# Patient Record
Sex: MALE | Race: WHITE | NOT HISPANIC OR LATINO | URBAN - METROPOLITAN AREA
[De-identification: names, ages, dates, MRNs, and addresses within clinical notes are randomized per-mention and may not be internally consistent; named-entity substitution may affect disease eponyms.]

---

## 2016-11-26 RX ORDER — SERTRALINE 25 MG/1
1 TABLET, FILM COATED ORAL
Qty: 0 | Refills: 0 | DISCHARGE
Start: 2016-11-26

## 2018-04-20 ENCOUNTER — EMERGENCY (EMERGENCY)
Facility: HOSPITAL | Age: 63
LOS: 1 days | Discharge: HOME | End: 2018-04-20
Attending: EMERGENCY MEDICINE | Admitting: EMERGENCY MEDICINE
Payer: COMMERCIAL

## 2018-04-20 VITALS
SYSTOLIC BLOOD PRESSURE: 141 MMHG | DIASTOLIC BLOOD PRESSURE: 78 MMHG | HEART RATE: 70 BPM | OXYGEN SATURATION: 96 % | RESPIRATION RATE: 18 BRPM

## 2018-04-20 VITALS
SYSTOLIC BLOOD PRESSURE: 149 MMHG | WEIGHT: 164.02 LBS | HEART RATE: 74 BPM | RESPIRATION RATE: 18 BRPM | OXYGEN SATURATION: 96 % | HEIGHT: 70 IN | DIASTOLIC BLOOD PRESSURE: 80 MMHG | TEMPERATURE: 98 F

## 2018-04-20 DIAGNOSIS — Z98.89 OTHER SPECIFIED POSTPROCEDURAL STATES: Chronic | ICD-10-CM

## 2018-04-20 DIAGNOSIS — Z90.49 ACQUIRED ABSENCE OF OTHER SPECIFIED PARTS OF DIGESTIVE TRACT: Chronic | ICD-10-CM

## 2018-04-20 DIAGNOSIS — E11.9 TYPE 2 DIABETES MELLITUS WITHOUT COMPLICATIONS: ICD-10-CM

## 2018-04-20 LAB
ACETONE SERPL-MCNC: ABNORMAL
ALBUMIN SERPL ELPH-MCNC: 3.8 G/DL — SIGNIFICANT CHANGE UP (ref 3.3–5)
ALP SERPL-CCNC: 138 U/L — HIGH (ref 40–120)
ALT FLD-CCNC: 20 U/L — SIGNIFICANT CHANGE UP (ref 12–78)
ANION GAP SERPL CALC-SCNC: 8 MMOL/L — SIGNIFICANT CHANGE UP (ref 5–17)
APPEARANCE UR: CLEAR — SIGNIFICANT CHANGE UP
AST SERPL-CCNC: 20 U/L — SIGNIFICANT CHANGE UP (ref 15–37)
BASOPHILS # BLD AUTO: 0.1 K/UL — SIGNIFICANT CHANGE UP (ref 0–0.2)
BASOPHILS NFR BLD AUTO: 1.3 % — SIGNIFICANT CHANGE UP (ref 0–2)
BILIRUB SERPL-MCNC: 0.4 MG/DL — SIGNIFICANT CHANGE UP (ref 0.2–1.2)
BILIRUB UR-MCNC: NEGATIVE — SIGNIFICANT CHANGE UP
BUN SERPL-MCNC: 13 MG/DL — SIGNIFICANT CHANGE UP (ref 7–23)
CALCIUM SERPL-MCNC: 9.7 MG/DL — SIGNIFICANT CHANGE UP (ref 8.5–10.1)
CHLORIDE SERPL-SCNC: 99 MMOL/L — SIGNIFICANT CHANGE UP (ref 96–108)
CO2 SERPL-SCNC: 26 MMOL/L — SIGNIFICANT CHANGE UP (ref 22–31)
COLOR SPEC: SIGNIFICANT CHANGE UP
CREAT SERPL-MCNC: 0.81 MG/DL — SIGNIFICANT CHANGE UP (ref 0.5–1.3)
DIFF PNL FLD: NEGATIVE — SIGNIFICANT CHANGE UP
EOSINOPHIL # BLD AUTO: 0.1 K/UL — SIGNIFICANT CHANGE UP (ref 0–0.5)
EOSINOPHIL NFR BLD AUTO: 1.1 % — SIGNIFICANT CHANGE UP (ref 0–6)
GLUCOSE SERPL-MCNC: 319 MG/DL — HIGH (ref 70–99)
GLUCOSE UR QL: 1000 MG/DL
HCT VFR BLD CALC: 44.8 % — SIGNIFICANT CHANGE UP (ref 39–50)
HGB BLD-MCNC: 15.9 G/DL — SIGNIFICANT CHANGE UP (ref 13–17)
KETONES UR-MCNC: NEGATIVE — SIGNIFICANT CHANGE UP
LACTATE SERPL-SCNC: 1.3 MMOL/L — SIGNIFICANT CHANGE UP (ref 0.7–2)
LEUKOCYTE ESTERASE UR-ACNC: NEGATIVE — SIGNIFICANT CHANGE UP
LYMPHOCYTES # BLD AUTO: 2.3 K/UL — SIGNIFICANT CHANGE UP (ref 1–3.3)
LYMPHOCYTES # BLD AUTO: 23.9 % — SIGNIFICANT CHANGE UP (ref 13–44)
MAGNESIUM SERPL-MCNC: 1.8 MG/DL — SIGNIFICANT CHANGE UP (ref 1.6–2.6)
MCHC RBC-ENTMCNC: 33.1 PG — SIGNIFICANT CHANGE UP (ref 27–34)
MCHC RBC-ENTMCNC: 35.5 GM/DL — SIGNIFICANT CHANGE UP (ref 32–36)
MCV RBC AUTO: 93.2 FL — SIGNIFICANT CHANGE UP (ref 80–100)
MONOCYTES # BLD AUTO: 0.3 K/UL — SIGNIFICANT CHANGE UP (ref 0–0.9)
MONOCYTES NFR BLD AUTO: 3.6 % — SIGNIFICANT CHANGE UP (ref 1–9)
NEUTROPHILS # BLD AUTO: 6.7 K/UL — SIGNIFICANT CHANGE UP (ref 1.8–7.4)
NEUTROPHILS NFR BLD AUTO: 70.1 % — SIGNIFICANT CHANGE UP (ref 43–77)
NITRITE UR-MCNC: NEGATIVE — SIGNIFICANT CHANGE UP
PH UR: 7 — SIGNIFICANT CHANGE UP (ref 5–8)
PLATELET # BLD AUTO: 197 K/UL — SIGNIFICANT CHANGE UP (ref 150–400)
POTASSIUM SERPL-MCNC: 3.8 MMOL/L — SIGNIFICANT CHANGE UP (ref 3.5–5.3)
POTASSIUM SERPL-SCNC: 3.8 MMOL/L — SIGNIFICANT CHANGE UP (ref 3.5–5.3)
PROT SERPL-MCNC: 7.6 G/DL — SIGNIFICANT CHANGE UP (ref 6–8.3)
PROT UR-MCNC: NEGATIVE — SIGNIFICANT CHANGE UP
RBC # BLD: 4.8 M/UL — SIGNIFICANT CHANGE UP (ref 4.2–5.8)
RBC # FLD: 11.7 % — SIGNIFICANT CHANGE UP (ref 10.3–14.5)
SODIUM SERPL-SCNC: 133 MMOL/L — LOW (ref 135–145)
SP GR SPEC: 1 — LOW (ref 1.01–1.02)
UROBILINOGEN FLD QL: NEGATIVE — SIGNIFICANT CHANGE UP
WBC # BLD: 9.6 K/UL — SIGNIFICANT CHANGE UP (ref 3.8–10.5)
WBC # FLD AUTO: 9.6 K/UL — SIGNIFICANT CHANGE UP (ref 3.8–10.5)

## 2018-04-20 PROCEDURE — 87086 URINE CULTURE/COLONY COUNT: CPT

## 2018-04-20 PROCEDURE — 96361 HYDRATE IV INFUSION ADD-ON: CPT

## 2018-04-20 PROCEDURE — 99284 EMERGENCY DEPT VISIT MOD MDM: CPT | Mod: 25

## 2018-04-20 PROCEDURE — 83605 ASSAY OF LACTIC ACID: CPT

## 2018-04-20 PROCEDURE — 81003 URINALYSIS AUTO W/O SCOPE: CPT

## 2018-04-20 PROCEDURE — 80053 COMPREHEN METABOLIC PANEL: CPT

## 2018-04-20 PROCEDURE — 96360 HYDRATION IV INFUSION INIT: CPT

## 2018-04-20 PROCEDURE — 87040 BLOOD CULTURE FOR BACTERIA: CPT

## 2018-04-20 PROCEDURE — 85027 COMPLETE CBC AUTOMATED: CPT

## 2018-04-20 PROCEDURE — 99285 EMERGENCY DEPT VISIT HI MDM: CPT

## 2018-04-20 PROCEDURE — 82009 KETONE BODYS QUAL: CPT

## 2018-04-20 PROCEDURE — 83735 ASSAY OF MAGNESIUM: CPT

## 2018-04-20 PROCEDURE — 82962 GLUCOSE BLOOD TEST: CPT

## 2018-04-20 RX ORDER — METFORMIN HYDROCHLORIDE 850 MG/1
500 TABLET ORAL ONCE
Qty: 0 | Refills: 0 | Status: COMPLETED | OUTPATIENT
Start: 2018-04-20 | End: 2018-04-20

## 2018-04-20 RX ORDER — SODIUM CHLORIDE 9 MG/ML
1000 INJECTION INTRAMUSCULAR; INTRAVENOUS; SUBCUTANEOUS
Qty: 0 | Refills: 0 | Status: COMPLETED | OUTPATIENT
Start: 2018-04-20 | End: 2018-04-20

## 2018-04-20 RX ORDER — INSULIN HUMAN 100 [IU]/ML
7 INJECTION, SOLUTION SUBCUTANEOUS ONCE
Qty: 0 | Refills: 0 | Status: DISCONTINUED | OUTPATIENT
Start: 2018-04-20 | End: 2018-04-20

## 2018-04-20 RX ORDER — METFORMIN HYDROCHLORIDE 850 MG/1
1 TABLET ORAL
Qty: 60 | Refills: 0
Start: 2018-04-20 | End: 2018-05-19

## 2018-04-20 RX ADMIN — SODIUM CHLORIDE 1000 MILLILITER(S): 9 INJECTION INTRAMUSCULAR; INTRAVENOUS; SUBCUTANEOUS at 15:41

## 2018-04-20 RX ADMIN — SODIUM CHLORIDE 1000 MILLILITER(S): 9 INJECTION INTRAMUSCULAR; INTRAVENOUS; SUBCUTANEOUS at 12:37

## 2018-04-20 RX ADMIN — METFORMIN HYDROCHLORIDE 500 MILLIGRAM(S): 850 TABLET ORAL at 15:49

## 2018-04-20 RX ADMIN — SODIUM CHLORIDE 1000 MILLILITER(S): 9 INJECTION INTRAMUSCULAR; INTRAVENOUS; SUBCUTANEOUS at 13:45

## 2018-04-20 NOTE — ED PROVIDER NOTE - CHPI ED SYMPTOMS NEG
no vomiting/no headache/no chills/no dizziness/no pain/no fever/no decreased eating/drinking/no loss of consciousness/no nausea/no back pain

## 2018-04-20 NOTE — ED PROVIDER NOTE - OBJECTIVE STATEMENT
pt is a 61 yo male who is recovering alcoholic last drink was 11/17. he has hx of mi with stent sp orif left femur sp colon cancer with resection of colon is still smoking.  has family hx of diabetes.  his pmd is dr scott and cardiologist is dr joiner.  he went to see his doctor for routine care.  prior to the visit he had non fasting blood work done yesterday. he was called early this am because his blood glucose was over 500, and directed to come to er.  pt denies excessive thirst, but endorses frequent urination for past 2 years.  no recent illness or injury.  on further review he states that his diet consists of coca cola, jelly beans, mallomars, egg creams. but since yesterday he has been drinking seltzer instead.  he does eat regular food as well but has swapped out his alcohol for sweets.

## 2018-04-20 NOTE — CONSULT NOTE ADULT - ASSESSMENT
Vital Signs Last 24 Hrs  T(C): 36.6 (20 Apr 2018 11:28), Max: 36.6 (20 Apr 2018 11:28)  T(F): 97.9 (20 Apr 2018 11:28), Max: 97.9 (20 Apr 2018 11:28)  HR: 74 (20 Apr 2018 11:28) (74 - 74)  BP: 149/80 (20 Apr 2018 11:28) (149/80 - 149/80)  BP(mean): --  RR: 18 (20 Apr 2018 11:28) (18 - 18)  SpO2: 96% (20 Apr 2018 11:28) (96% - 96%)     eGFR if Non African American: 96 mL/min/1.73M2 (04-20-18 @ 12:29)  eGFR if African American: 111 mL/min/1.73M2 (04-20-18 @ 12:29)      CAPILLARY BLOOD GLUCOSE      POCT Blood Glucose.: 245 mg/dL (20 Apr 2018 14:27)  POCT Blood Glucose.: 329 mg/dL (20 Apr 2018 11:31)

## 2018-04-20 NOTE — CONSULT NOTE ADULT - PROBLEM SELECTOR RECOMMENDATION 9
T2D with a1c pending  Diabetes education provided with booklet  diabetes wellness brochure given- no referral at this time  Contact #/email given to patient as resource after discharge  recommended following up with PCP-patient to make appt  Recommend if a1c >9% to followup with endocrinologist (PCP has given name)

## 2018-04-20 NOTE — CONSULT NOTE ADULT - SUBJECTIVE AND OBJECTIVE BOX
62y    Male    Patient is a 62y old  Male who presents with a chief complaint of High blood glucose in routine blood work with PCP. Family Hx T2D. patient states has large amounts of candy/cakes/regular coke/milk.     HPI:      PAST MEDICAL & SURGICAL HISTORY:  Colon cancer  Alcohol abuse  Myocardial infarct  Hypertension  Pancreatitis  History of heart surgery: cardiac stent  History of colon resection      MEDICATIONS  (STANDING):  insulin regular  human recombinant. 7 Unit(s) IV Push once  sodium chloride 0.9% Bolus 1000 milliLiter(s) IV Bolus every 1 hour

## 2018-04-20 NOTE — ED ADULT TRIAGE NOTE - CHIEF COMPLAINT QUOTE
Patient sent by MD for abnormal glucose blood levels over 500 per his MD.  in triage. Patient states he recently had a cortisone steroid injection in his shoulder on 4/09. Patient asymptomatic

## 2018-04-20 NOTE — ED PROVIDER NOTE - PROGRESS NOTE DETAILS
pat weil np diabetes educator paged dr scott paged to discuss diabetic care- a1c=14 start with metformin 500 bid he will see in office monday pt was educated by np, improved glucose, counselled on accucheck use and supplies prescribed diet counselled

## 2018-04-20 NOTE — ED PROVIDER NOTE - MEDICAL DECISION MAKING DETAILS
hyperglycemia in pt with poor dietary habits, has hx of pancreatitis due to etoh ro acute dm, dka hydrate correct gbl and confer with pmd

## 2018-04-21 LAB
CULTURE RESULTS: NO GROWTH — SIGNIFICANT CHANGE UP
SPECIMEN SOURCE: SIGNIFICANT CHANGE UP

## 2018-04-26 LAB
CULTURE RESULTS: SIGNIFICANT CHANGE UP
CULTURE RESULTS: SIGNIFICANT CHANGE UP
SPECIMEN SOURCE: SIGNIFICANT CHANGE UP
SPECIMEN SOURCE: SIGNIFICANT CHANGE UP

## 2019-01-11 NOTE — ED PROVIDER NOTE - DISCHARGE REVIEW MATERIAL PRESENTED
. O-L Flap Text: The defect edges were debeveled with a #15 scalpel blade.  Given the location of the defect, shape of the defect and the proximity to free margins an O-L flap was deemed most appropriate.  Using a sterile surgical marker, an appropriate advancement flap was drawn incorporating the defect and placing the expected incisions within the relaxed skin tension lines where possible.    The area thus outlined was incised deep to adipose tissue with a #15 scalpel blade.  The skin margins were undermined to an appropriate distance in all directions utilizing iris scissors.

## 2022-03-25 PROBLEM — Z00.00 ENCOUNTER FOR PREVENTIVE HEALTH EXAMINATION: Status: ACTIVE | Noted: 2022-03-25

## 2022-05-09 ENCOUNTER — TRANSCRIPTION ENCOUNTER (OUTPATIENT)
Age: 67
End: 2022-05-09

## 2022-05-09 ENCOUNTER — INPATIENT (INPATIENT)
Facility: HOSPITAL | Age: 67
LOS: 7 days | Discharge: HOME | DRG: 441 | End: 2022-05-17
Attending: INTERNAL MEDICINE | Admitting: HOSPITALIST
Payer: COMMERCIAL

## 2022-05-09 VITALS
TEMPERATURE: 98 F | SYSTOLIC BLOOD PRESSURE: 103 MMHG | WEIGHT: 167.99 LBS | HEART RATE: 99 BPM | RESPIRATION RATE: 20 BRPM | OXYGEN SATURATION: 98 % | HEIGHT: 70 IN | DIASTOLIC BLOOD PRESSURE: 61 MMHG

## 2022-05-09 DIAGNOSIS — Z98.89 OTHER SPECIFIED POSTPROCEDURAL STATES: Chronic | ICD-10-CM

## 2022-05-09 DIAGNOSIS — Z90.49 ACQUIRED ABSENCE OF OTHER SPECIFIED PARTS OF DIGESTIVE TRACT: Chronic | ICD-10-CM

## 2022-05-09 DIAGNOSIS — K92.2 GASTROINTESTINAL HEMORRHAGE, UNSPECIFIED: ICD-10-CM

## 2022-05-09 LAB
ALBUMIN SERPL ELPH-MCNC: 4.2 G/DL — SIGNIFICANT CHANGE UP (ref 3.3–5.2)
ALP SERPL-CCNC: 151 U/L — HIGH (ref 40–120)
ALT FLD-CCNC: 57 U/L — HIGH
ANION GAP SERPL CALC-SCNC: 20 MMOL/L — HIGH (ref 5–17)
ANISOCYTOSIS BLD QL: SIGNIFICANT CHANGE UP
AST SERPL-CCNC: 137 U/L — HIGH
BASOPHILS # BLD AUTO: 0 K/UL — SIGNIFICANT CHANGE UP (ref 0–0.2)
BASOPHILS NFR BLD AUTO: 0 % — SIGNIFICANT CHANGE UP (ref 0–2)
BILIRUB SERPL-MCNC: 1.7 MG/DL — SIGNIFICANT CHANGE UP (ref 0.4–2)
BUN SERPL-MCNC: 14.7 MG/DL — SIGNIFICANT CHANGE UP (ref 8–20)
CALCIUM SERPL-MCNC: 10.1 MG/DL — SIGNIFICANT CHANGE UP (ref 8.6–10.2)
CHLORIDE SERPL-SCNC: 91 MMOL/L — LOW (ref 98–107)
CO2 SERPL-SCNC: 23 MMOL/L — SIGNIFICANT CHANGE UP (ref 22–29)
CREAT SERPL-MCNC: 0.57 MG/DL — SIGNIFICANT CHANGE UP (ref 0.5–1.3)
EGFR: 109 ML/MIN/1.73M2 — SIGNIFICANT CHANGE UP
EOSINOPHIL # BLD AUTO: 0.05 K/UL — SIGNIFICANT CHANGE UP (ref 0–0.5)
EOSINOPHIL NFR BLD AUTO: 0.8 % — SIGNIFICANT CHANGE UP (ref 0–6)
GIANT PLATELETS BLD QL SMEAR: PRESENT — SIGNIFICANT CHANGE UP
GLUCOSE SERPL-MCNC: 140 MG/DL — HIGH (ref 70–99)
HCT VFR BLD CALC: 36.5 % — LOW (ref 39–50)
HCT VFR BLD CALC: 41.1 % — SIGNIFICANT CHANGE UP (ref 39–50)
HGB BLD-MCNC: 13.1 G/DL — SIGNIFICANT CHANGE UP (ref 13–17)
HGB BLD-MCNC: 14.6 G/DL — SIGNIFICANT CHANGE UP (ref 13–17)
LIDOCAIN IGE QN: 46 U/L — SIGNIFICANT CHANGE UP (ref 22–51)
LYMPHOCYTES # BLD AUTO: 1.19 K/UL — SIGNIFICANT CHANGE UP (ref 1–3.3)
LYMPHOCYTES # BLD AUTO: 21 % — SIGNIFICANT CHANGE UP (ref 13–44)
MACROCYTES BLD QL: SIGNIFICANT CHANGE UP
MAGNESIUM SERPL-MCNC: 1.8 MG/DL — SIGNIFICANT CHANGE UP (ref 1.6–2.6)
MANUAL SMEAR VERIFICATION: SIGNIFICANT CHANGE UP
MCHC RBC-ENTMCNC: 35.5 GM/DL — SIGNIFICANT CHANGE UP (ref 32–36)
MCHC RBC-ENTMCNC: 35.9 GM/DL — SIGNIFICANT CHANGE UP (ref 32–36)
MCHC RBC-ENTMCNC: 38.1 PG — HIGH (ref 27–34)
MCHC RBC-ENTMCNC: 38.3 PG — HIGH (ref 27–34)
MCV RBC AUTO: 106.7 FL — HIGH (ref 80–100)
MCV RBC AUTO: 107.3 FL — HIGH (ref 80–100)
MONOCYTES # BLD AUTO: 0.28 K/UL — SIGNIFICANT CHANGE UP (ref 0–0.9)
MONOCYTES NFR BLD AUTO: 5 % — SIGNIFICANT CHANGE UP (ref 2–14)
NEUTROPHILS # BLD AUTO: 3.94 K/UL — SIGNIFICANT CHANGE UP (ref 1.8–7.4)
NEUTROPHILS NFR BLD AUTO: 69.8 % — SIGNIFICANT CHANGE UP (ref 43–77)
OB PNL STL: POSITIVE
PLAT MORPH BLD: NORMAL — SIGNIFICANT CHANGE UP
PLATELET # BLD AUTO: 41 K/UL — LOW (ref 150–400)
PLATELET # BLD AUTO: 48 K/UL — LOW (ref 150–400)
POTASSIUM SERPL-MCNC: 3.7 MMOL/L — SIGNIFICANT CHANGE UP (ref 3.5–5.3)
POTASSIUM SERPL-SCNC: 3.7 MMOL/L — SIGNIFICANT CHANGE UP (ref 3.5–5.3)
PROT SERPL-MCNC: 7.2 G/DL — SIGNIFICANT CHANGE UP (ref 6.6–8.7)
RBC # BLD: 3.42 M/UL — LOW (ref 4.2–5.8)
RBC # BLD: 3.83 M/UL — LOW (ref 4.2–5.8)
RBC # FLD: 11 % — SIGNIFICANT CHANGE UP (ref 10.3–14.5)
RBC # FLD: 11.2 % — SIGNIFICANT CHANGE UP (ref 10.3–14.5)
RBC BLD AUTO: ABNORMAL
SODIUM SERPL-SCNC: 134 MMOL/L — LOW (ref 135–145)
VARIANT LYMPHS # BLD: 3.4 % — SIGNIFICANT CHANGE UP (ref 0–6)
WBC # BLD: 5.65 K/UL — SIGNIFICANT CHANGE UP (ref 3.8–10.5)
WBC # BLD: 5.65 K/UL — SIGNIFICANT CHANGE UP (ref 3.8–10.5)
WBC # FLD AUTO: 5.65 K/UL — SIGNIFICANT CHANGE UP (ref 3.8–10.5)
WBC # FLD AUTO: 5.65 K/UL — SIGNIFICANT CHANGE UP (ref 3.8–10.5)

## 2022-05-09 PROCEDURE — 99223 1ST HOSP IP/OBS HIGH 75: CPT

## 2022-05-09 PROCEDURE — 76705 ECHO EXAM OF ABDOMEN: CPT | Mod: 26

## 2022-05-09 PROCEDURE — 99284 EMERGENCY DEPT VISIT MOD MDM: CPT

## 2022-05-09 PROCEDURE — 73502 X-RAY EXAM HIP UNI 2-3 VIEWS: CPT | Mod: 26,LT

## 2022-05-09 PROCEDURE — 99497 ADVNCD CARE PLAN 30 MIN: CPT | Mod: 25

## 2022-05-09 RX ORDER — SODIUM CHLORIDE 9 MG/ML
1000 INJECTION INTRAMUSCULAR; INTRAVENOUS; SUBCUTANEOUS
Refills: 0 | Status: COMPLETED | OUTPATIENT
Start: 2022-05-09 | End: 2022-05-09

## 2022-05-09 RX ORDER — ATENOLOL 25 MG/1
1 TABLET ORAL
Qty: 0 | Refills: 0 | DISCHARGE

## 2022-05-09 RX ORDER — ONDANSETRON 8 MG/1
4 TABLET, FILM COATED ORAL
Refills: 0 | Status: DISCONTINUED | OUTPATIENT
Start: 2022-05-09 | End: 2022-05-17

## 2022-05-09 RX ORDER — PANTOPRAZOLE SODIUM 20 MG/1
40 TABLET, DELAYED RELEASE ORAL
Refills: 0 | Status: DISCONTINUED | OUTPATIENT
Start: 2022-05-09 | End: 2022-05-15

## 2022-05-09 RX ORDER — ACETAMINOPHEN 500 MG
650 TABLET ORAL EVERY 6 HOURS
Refills: 0 | Status: DISCONTINUED | OUTPATIENT
Start: 2022-05-09 | End: 2022-05-17

## 2022-05-09 RX ORDER — PANTOPRAZOLE SODIUM 20 MG/1
40 TABLET, DELAYED RELEASE ORAL ONCE
Refills: 0 | Status: COMPLETED | OUTPATIENT
Start: 2022-05-09 | End: 2022-05-09

## 2022-05-09 RX ORDER — SODIUM CHLORIDE 9 MG/ML
1000 INJECTION INTRAMUSCULAR; INTRAVENOUS; SUBCUTANEOUS ONCE
Refills: 0 | Status: COMPLETED | OUTPATIENT
Start: 2022-05-09 | End: 2022-05-09

## 2022-05-09 RX ORDER — MAGNESIUM SULFATE 500 MG/ML
2 VIAL (ML) INJECTION ONCE
Refills: 0 | Status: COMPLETED | OUTPATIENT
Start: 2022-05-09 | End: 2022-05-10

## 2022-05-09 RX ADMIN — SODIUM CHLORIDE 80 MILLILITER(S): 9 INJECTION INTRAMUSCULAR; INTRAVENOUS; SUBCUTANEOUS at 19:41

## 2022-05-09 RX ADMIN — SODIUM CHLORIDE 1000 MILLILITER(S): 9 INJECTION INTRAMUSCULAR; INTRAVENOUS; SUBCUTANEOUS at 11:33

## 2022-05-09 RX ADMIN — Medication 30 MILLILITER(S): at 11:33

## 2022-05-09 RX ADMIN — PANTOPRAZOLE SODIUM 40 MILLIGRAM(S): 20 TABLET, DELAYED RELEASE ORAL at 11:33

## 2022-05-09 NOTE — H&P ADULT - ASSESSMENT
67 yo M smoker , with h/o CAD , HTN , anxiety admitted with abdominal pain , nausea poor appetite and bloody vomiting , alcohol use       1- Epigastric pain /nausea vomiting   bloody emesis   suspected Gi bleed   protonix 40 mg bid   clear liquid   GI  consult called by ED   monitor hb closely ,type and screen in am     2- fatty liver due to cronic alcohol use likely   counseling provided   monitor LFTs     3- h/o CAD / HTN   will call pharmacy for his med list   will hold plavix     4- Hypomagnesemia   replace iv mg x1     5- Alcohol use cronic , mild   last drink 5 days ago   no sign of withdrawal will monitor   use ativan prn     6- Anxiety disorder   cont sertraline     7- Frequent falls with left leg pain   h/o left femur fx surgery in the past limping   will get xary r/o fracture     DVT prophylaxis   no chemical prophylaxis  due to gi bleed

## 2022-05-09 NOTE — ED PROVIDER NOTE - NSICDXPASTMEDICALHX_GEN_ALL_CORE_FT
PAST MEDICAL HISTORY:  Alcohol abuse     Colon cancer     Hypertension     Myocardial infarct     Pancreatitis

## 2022-05-09 NOTE — H&P ADULT - NSHPLABSRESULTS_GEN_ALL_CORE
13.1   5.65  )-----------( 41       ( 09 May 2022 16:09 )             36.5   05-09    134<L>  |  91<L>  |  14.7  ----------------------------<  140<H>  3.7   |  23.0  |  0.57    Ca    10.1      09 May 2022 11:34  Mg     1.8     05-09    TPro  7.2  /  Alb  4.2  /  TBili  1.7  /  DBili  x   /  AST  137<H>  /  ALT  57<H>  /  AlkPhos  151<H>  05-09  s< from: US Abdomen Upper Quadrant Right (05.09.22 @ 13:37) >    IMPRESSION:  No gallbladder disease or other acute finding. Fatty liver.        --- End of Report ---      < end of copied text >

## 2022-05-09 NOTE — ED ADULT NURSE NOTE - OBJECTIVE STATEMENT
Pt walked in c/o blood in stool and vomiting blood for 2 x days hx alcohol abuse in no acute distress [facility]

## 2022-05-09 NOTE — H&P ADULT - HISTORY OF PRESENT ILLNESS
67 yo M with h/o HTN , CAD , depression smoker presented to the ED with c/o vomiting bloody pink color  started Friday to saturday morning associated with abdominal discomfort , nausea . He had daily vomiting yesterday and today no blood , also reports dark  black  color stool since Friday . Pt is feeling lightheaded , weak reports falling on Friday . He is c/o leg pain hip thigh area    65 yo M with h/o HTN , CAD , depression smoker presented to the ED with c/o vomiting bloody pink color  started Friday to saturday morning associated with abdominal discomfort , nausea . He had daily vomiting yesterday and today no blood , also reports dark  black  color stool since Friday . Pt is feeling lightheaded , weak reports falling on Friday . He is c/o leg pain hip thigh area   Pt is also states that has not been able to eta since Friday can not hold food due to stomach discomfort

## 2022-05-09 NOTE — ED PROVIDER NOTE - PROGRESS NOTE DETAILS
dropped by 1unit on hgb only got 500cc on repeat; still notes that feels lightheaded weak + hgb; notes problems with transportation and that wont be able to get to appoitments will admit for GI eval

## 2022-05-09 NOTE — H&P ADULT - NSHPPHYSICALEXAM_GEN_ALL_CORE
Vital Signs Last 24 Hrs  T(C): 36.8 (09 May 2022 14:54), Max: 36.8 (09 May 2022 14:54)  T(F): 98.3 (09 May 2022 14:54), Max: 98.3 (09 May 2022 14:54)  HR: 89 (09 May 2022 14:54) (89 - 99)  BP: 117/76 (09 May 2022 14:54) (103/61 - 117/76)  BP(mean): --  RR: 20 (09 May 2022 14:54) (20 - 20)  SpO2: 98% (09 May 2022 14:54) (98% - 98%)

## 2022-05-09 NOTE — ED PROVIDER NOTE - CLINICAL SUMMARY MEDICAL DECISION MAKING FREE TEXT BOX
possibly gastritis vs PUD vs pancreatitis; also ttp to ruq will check ruq sono labs gi cocktail; guiac reassess

## 2022-05-09 NOTE — ED PROVIDER NOTE - NS ED ROS FT
Denies f/c//cp/sob/palpitations/ cough/rash/headached/c/dysuria/hematuria  +EPIGASTRIC PAIN BLACK stools, vomiting

## 2022-05-09 NOTE — ED PROVIDER NOTE - OBJECTIVE STATEMENT
67yo M hx of pancreatitis, htn, alcohol abuse, colon resenction pw vomiting since saturday. notes pink tinged blood in the vomit and since then has been having black stools. also notes has been feeling more generalized weakness since the vomiting as cant keep anything down. denies diarrhea. has been feeling cold. notes 2 episodes of vomiting on sat and then sunday; one toeday but none bloody today. +epigastric discomfort. Denies f/cp/sob/palpitations/ cough/rash/headache//d/c/dysuria/hematuria. no sick contacts no recent travel. not on ac. last alcohol drink was on thursday - denies drinking daily.

## 2022-05-09 NOTE — H&P ADULT - NSHPSOCIALHISTORY_GEN_ALL_CORE
he is smoker 1 pack daily stopped 1-2 weeks ago , drinks alcohol 1 beer daily used to drink more in the past

## 2022-05-09 NOTE — H&P ADULT - CONVERSATION DETAILS
Pt is lives alone , has kids and had no HCP or living will   discussed with him his wishes and who he would want to be his HCP : his ex wife and one his son   he wants full code

## 2022-05-09 NOTE — ED ADULT TRIAGE NOTE - CHIEF COMPLAINT QUOTE
Pt brought in by ex wife c/o vomiting blood and dark tarry stools x 2 days.  PMH ALS.  Pt endorsing lightheadedness.  As per ex wife, pt is everyday drinker and has required ativan in past admissions for withdrawal; pt endorsing last drink thursday

## 2022-05-09 NOTE — ED PROVIDER NOTE - PHYSICAL EXAMINATION
Head: atraumatic, normacephalic  Face: atraumatic, no crepitus no orbiral/maxillary/mandibular ttp  throat: uvula midline no exudates  eyes: perrla eomi  heart: rrr s1s2  lungs: ctab  abd: soft, mild epigastric RUQ ttp nd +bs no rebound/guarding no cva ttp; rectal: no hemorrhoids + black stool  skin: warm  LE: no swelling, no calf ttp  back: no midline cervical/thoracic/lumbar ttp

## 2022-05-09 NOTE — ED ADULT TRIAGE NOTE - GLASGOW COMA SCALE: EYE OPENING, MLM
Occupational Therapy   Occupational Therapy Initial Assessment and Treatment Note   Date: 2021   Patient Name: Sammi Kathleen  MRN: [id number]     : 1961    Date of Service: 2021    Discharge Recommendations:Solitario Tapia scored a 19/24 on the AM-PAC ADL Inpatient form. Current research shows that an AM-PAC score of 18 or greater is typically associated with a discharge to the patient's home setting. Based on the patient's AM-PAC score, and their current ADL deficits, it is recommended that the patient have 2-3 sessions per week of Occupational Therapy at d/c to increase the patient's independence. At this time, this patient demonstrates the endurance and safety to discharge home with [facility] and a follow up treatment frequency of 2-3x/wk. Please see assessment section for further patient specific details. If patient discharges prior to next session this note will serve as a discharge summary. Please see below for the latest assessment towards goals. S Level 1  OT Equipment Recommendations  Equipment Needed: Yes  Other: Tub transfer bench / Raised Toilet seat w / arms---pt edu on use. Assessment   Performance deficits / Impairments: Decreased functional mobility ; Decreased ADL status; Decreased endurance  Assessment: Pt from home with family - pt struggling with mobility /LE ADLs 2/2 BLE OA and recent fall in 2021. Pt needing Mod /Max A with LE ADLs and Min A with functional transfers. Pt would benefit from ongoing OT at d/c to maximize functional level. Recommend Tub transfer bench / RTS with arm. Will follow as inpt  Treatment Diagnosis: impaired ADLs / functional transfers / decreased endurance  Prognosis: Fair;Good  Decision Making: Medium Complexity  OT Education: OT Role;Plan of Care;ADL Adaptive Strategies; Equipment; Family Education  Patient Education: Pt/ wife verb understanding- reinforce as needed  REQUIRES OT FOLLOW UP: Yes  Activity Tolerance  Activity Tolerance: Patient Tolerated treatment well;Patient limited by fatigue  Activity Tolerance: fatigues easily this pm. Pt reports medication is making him very tired  Safety Devices  Safety Devices in place: Yes  Type of devices: Bed alarm in place; Left in bed;Nurse notified;Call light within reach           Patient Diagnosis(es): The encounter diagnosis was Primary osteoarthritis of right knee. has a past medical history of Adopted, Arthritis, Asthma, CAD (coronary artery disease), Depression, Diabetes mellitus ([address] GERD (gastroesophageal reflux disease), Hyperlipidemia, MI, old, Obesity, Urinary frequency, Urinary urgency, and Vitamin D deficiency. has a past surgical history that includes Cholecystectomy; angioplasty (sept 2015); hernia repair; Testicle removal (Right); other surgical history (N/A, 10/20/2015); other surgical history; Andi-en-Y Gastric Bypass (11/19/2018); pr lap gastric bypass/andi-en-y (N/A, 11/19/2018); Stimulator Surgery; and Total knee arthroplasty (Right, 4/15/2021). Treatment Diagnosis: impaired ADLs / functional transfers / decreased endurance      Restrictions  Position Activity Restriction  Other position/activity restrictions: Full weight bearing on operative leg; Knee immobilizer until full quadriceps function present    Subjective   General  Chart Reviewed:  Yes  Additional Pertinent Hx: Admit to observation 4/15 s/p R TOTAL KNEE ARTHROPLASTY GIOVANY                           PMHX: CAD, DM, HLD, Depression,Obesity, Gasric bypass 2018,  Family / Caregiver Present: Yes(wife)  Diagnosis: R TKA -Giovany on 4/15  Subjective  Subjective: \"I just feel really really tired\"  Pt found in bed - agreeable for OOB/OT eval and tx with min encouragement  Patient Currently in Pain: Yes(4/10 Knee area -RLE)  Vital Signs  Patient Currently in Pain: Yes(4/10 Knee area -RLE)    Social/Functional History  Social/Functional History  Lives With: Spouse  Type of Home: House  Home Layout: One level  Home Access: Stairs to home initially  ADL  Feeding: Setup  Grooming: Setup  LE Dressing: Maximum assistance(to don /doff off of RLE 2/2 KI / pain. Pt edu on Modified tech - possible need for AE -)  Toileting: Maximum assistance; Moderate assistance(2/2 KI and reach limit on commode this pm.  Min A with clothing management in stance - simulated)  Tone RUE  RUE Tone: Normotonic  Tone LUE  LUE Tone: Normotonic  Coordination  Movements Are Fluid And Coordinated: Yes     Bed mobility  Supine to Sit: Contact guard assistance(effortful)  Sit to Supine: Moderate assistance;Minimal assistance(with RLE)  Scooting: Supervision(to HOB in sitting x3)  Transfers  Sit to stand: Contact guard assistance;Minimal assistance  Stand to sit: Contact guard assistance  Transfer Comments: raised bed and extra time -- attempts  Vision - Basic Assessment  Prior Vision: Wears glasses only for reading  Cognition  Overall Cognitive Status: [facility]  Cognition Comment: sleepy during session.     LUE AROM (degrees)  LUE AROM : WFL  Left Hand AROM (degrees)  Left Hand AROM: WFL  RUE AROM (degrees)  RUE AROM : WFL  Right Hand AROM (degrees)  Right Hand AROM: WFL  LUE Strength  Gross LUE Strength: WFL  RUE Strength  Gross RUE Strength: WFL     Plan   Plan  Times per week: 7x  Times per day: Daily  Current Treatment Recommendations: Functional Mobility Training, Endurance Training, Safety Education & Training, Self-Care / ADL, Equipment Evaluation, Education, & procurement, Patient/Caregiver Education & Training    AM-PAC Score  AM-[facility] Inpatient Daily Activity Raw Score: 19 (04/16/21 1449)  AM-PAC Inpatient ADL T-Scale Score : 40.22 (04/16/21 1449)  ADL Inpatient CMS 0-100% Score: 42.8 (04/16/21 1449)  ADL Inpatient CMS G-Code Modifier : CK (04/16/21 1449)    Goals  Short term goals  Time Frame for Short term goals: at d/c  Short term goal 1: Stance x 6 mins with Supervision for ADLs  Short term goal 2: LE Dressing with CGA and AE prn  Short term goal 3: Commode transfers with SBA /RTS prn  Patient Goals   Patient goals : Be independent     Therapy Time   Individual Concurrent Group Co-treatment   Time In 1332         Time Out 1430         Minutes 58            Timed Code Treatment Minutes:   40 mins     Total Treatment Minutes:  58 mins       Neli Robbins OT (E4) spontaneous

## 2022-05-09 NOTE — ED STATDOCS - PROGRESS NOTE DETAILS
Rhett Barahona for ED attending, Dr. Moreno. 67 y/o male with PMHx of alcohol abuse, history of colon cancer s/p partial resection presents with vomiting blood x2. associated with black stools and dizzy spells. Will send to main for further evaluation.

## 2022-05-09 NOTE — H&P ADULT - NSICDXFAMILYHX_GEN_ALL_CORE_FT
FAMILY HISTORY:  Father  Still living? No  FH: prostate cancer, Age at diagnosis: Age Unknown    Mother  Still living? No  Family history of atherosclerosis, Age at diagnosis: Age Unknown

## 2022-05-09 NOTE — H&P ADULT - EYES
Last remote check received was January 3rd. Will ask Meli to reschedule patient for office visit in June/July. Thank you   detailed exam PERRL/EOMI/conjunctiva clear

## 2022-05-10 ENCOUNTER — RESULT REVIEW (OUTPATIENT)
Age: 67
End: 2022-05-10

## 2022-05-10 DIAGNOSIS — K92.2 GASTROINTESTINAL HEMORRHAGE, UNSPECIFIED: ICD-10-CM

## 2022-05-10 DIAGNOSIS — Z01.810 ENCOUNTER FOR PREPROCEDURAL CARDIOVASCULAR EXAMINATION: ICD-10-CM

## 2022-05-10 DIAGNOSIS — I25.10 ATHEROSCLEROTIC HEART DISEASE OF NATIVE CORONARY ARTERY WITHOUT ANGINA PECTORIS: ICD-10-CM

## 2022-05-10 LAB
ALBUMIN SERPL ELPH-MCNC: 3.6 G/DL — SIGNIFICANT CHANGE UP (ref 3.3–5.2)
ALP SERPL-CCNC: 127 U/L — HIGH (ref 40–120)
ALT FLD-CCNC: 44 U/L — HIGH
ANION GAP SERPL CALC-SCNC: 15 MMOL/L — SIGNIFICANT CHANGE UP (ref 5–17)
AST SERPL-CCNC: 88 U/L — HIGH
BILIRUB SERPL-MCNC: 1.4 MG/DL — SIGNIFICANT CHANGE UP (ref 0.4–2)
BLD GP AB SCN SERPL QL: SIGNIFICANT CHANGE UP
BUN SERPL-MCNC: 10.8 MG/DL — SIGNIFICANT CHANGE UP (ref 8–20)
CALCIUM SERPL-MCNC: 9.4 MG/DL — SIGNIFICANT CHANGE UP (ref 8.6–10.2)
CHLORIDE SERPL-SCNC: 96 MMOL/L — LOW (ref 98–107)
CO2 SERPL-SCNC: 22 MMOL/L — SIGNIFICANT CHANGE UP (ref 22–29)
CREAT SERPL-MCNC: 0.48 MG/DL — LOW (ref 0.5–1.3)
EGFR: 115 ML/MIN/1.73M2 — SIGNIFICANT CHANGE UP
FERRITIN SERPL-MCNC: 1005 NG/ML — HIGH (ref 30–400)
FOLATE SERPL-MCNC: 10.5 NG/ML — SIGNIFICANT CHANGE UP
GLUCOSE BLDC GLUCOMTR-MCNC: 140 MG/DL — HIGH (ref 70–99)
GLUCOSE BLDC GLUCOMTR-MCNC: 143 MG/DL — HIGH (ref 70–99)
GLUCOSE BLDC GLUCOMTR-MCNC: 149 MG/DL — HIGH (ref 70–99)
GLUCOSE BLDC GLUCOMTR-MCNC: 164 MG/DL — HIGH (ref 70–99)
GLUCOSE SERPL-MCNC: 269 MG/DL — HIGH (ref 70–99)
HCT VFR BLD CALC: 34 % — LOW (ref 39–50)
HCV AB S/CO SERPL IA: 0.1 S/CO — SIGNIFICANT CHANGE UP (ref 0–0.99)
HCV AB SERPL-IMP: SIGNIFICANT CHANGE UP
HGB BLD-MCNC: 12.1 G/DL — LOW (ref 13–17)
IRON SATN MFR SERPL: 32 % — SIGNIFICANT CHANGE UP (ref 16–55)
IRON SATN MFR SERPL: 84 UG/DL — SIGNIFICANT CHANGE UP (ref 59–158)
MAGNESIUM SERPL-MCNC: 2.5 MG/DL — SIGNIFICANT CHANGE UP (ref 1.6–2.6)
PHOSPHATE SERPL-MCNC: 1.2 MG/DL — LOW (ref 2.4–4.7)
POTASSIUM SERPL-MCNC: 3 MMOL/L — LOW (ref 3.5–5.3)
POTASSIUM SERPL-SCNC: 3 MMOL/L — LOW (ref 3.5–5.3)
PROT SERPL-MCNC: 6.1 G/DL — LOW (ref 6.6–8.7)
SARS-COV-2 RNA SPEC QL NAA+PROBE: SIGNIFICANT CHANGE UP
SODIUM SERPL-SCNC: 133 MMOL/L — LOW (ref 135–145)
TIBC SERPL-MCNC: 266 UG/DL — SIGNIFICANT CHANGE UP (ref 220–430)
TRANSFERRIN SERPL-MCNC: 186 MG/DL — SIGNIFICANT CHANGE UP (ref 180–329)
TSH SERPL-MCNC: 0.71 UIU/ML — SIGNIFICANT CHANGE UP (ref 0.27–4.2)
VIT B12 SERPL-MCNC: 1183 PG/ML — SIGNIFICANT CHANGE UP (ref 232–1245)

## 2022-05-10 PROCEDURE — 73700 CT LOWER EXTREMITY W/O DYE: CPT | Mod: 26,LT

## 2022-05-10 PROCEDURE — 88342 IMHCHEM/IMCYTCHM 1ST ANTB: CPT | Mod: 26

## 2022-05-10 PROCEDURE — 99222 1ST HOSP IP/OBS MODERATE 55: CPT

## 2022-05-10 PROCEDURE — 88305 TISSUE EXAM BY PATHOLOGIST: CPT | Mod: 26

## 2022-05-10 PROCEDURE — 43239 EGD BIOPSY SINGLE/MULTIPLE: CPT

## 2022-05-10 PROCEDURE — 99222 1ST HOSP IP/OBS MODERATE 55: CPT | Mod: 25

## 2022-05-10 PROCEDURE — 99233 SBSQ HOSP IP/OBS HIGH 50: CPT

## 2022-05-10 RX ORDER — INSULIN LISPRO 100/ML
VIAL (ML) SUBCUTANEOUS
Refills: 0 | Status: DISCONTINUED | OUTPATIENT
Start: 2022-05-10 | End: 2022-05-11

## 2022-05-10 RX ORDER — DEXTROSE 50 % IN WATER 50 %
12.5 SYRINGE (ML) INTRAVENOUS ONCE
Refills: 0 | Status: DISCONTINUED | OUTPATIENT
Start: 2022-05-10 | End: 2022-05-11

## 2022-05-10 RX ORDER — POTASSIUM PHOSPHATE, MONOBASIC POTASSIUM PHOSPHATE, DIBASIC 236; 224 MG/ML; MG/ML
15 INJECTION, SOLUTION INTRAVENOUS ONCE
Refills: 0 | Status: DISCONTINUED | OUTPATIENT
Start: 2022-05-10 | End: 2022-05-10

## 2022-05-10 RX ORDER — DEXTROSE 50 % IN WATER 50 %
25 SYRINGE (ML) INTRAVENOUS ONCE
Refills: 0 | Status: DISCONTINUED | OUTPATIENT
Start: 2022-05-10 | End: 2022-05-11

## 2022-05-10 RX ORDER — POTASSIUM PHOSPHATE, MONOBASIC POTASSIUM PHOSPHATE, DIBASIC 236; 224 MG/ML; MG/ML
15 INJECTION, SOLUTION INTRAVENOUS ONCE
Refills: 0 | Status: COMPLETED | OUTPATIENT
Start: 2022-05-10 | End: 2022-05-10

## 2022-05-10 RX ORDER — POTASSIUM CHLORIDE 20 MEQ
40 PACKET (EA) ORAL ONCE
Refills: 0 | Status: COMPLETED | OUTPATIENT
Start: 2022-05-10 | End: 2022-05-10

## 2022-05-10 RX ORDER — GLUCAGON INJECTION, SOLUTION 0.5 MG/.1ML
1 INJECTION, SOLUTION SUBCUTANEOUS ONCE
Refills: 0 | Status: DISCONTINUED | OUTPATIENT
Start: 2022-05-10 | End: 2022-05-11

## 2022-05-10 RX ORDER — OCTREOTIDE ACETATE 200 UG/ML
50 INJECTION, SOLUTION INTRAVENOUS; SUBCUTANEOUS ONCE
Refills: 0 | Status: COMPLETED | OUTPATIENT
Start: 2022-05-10 | End: 2022-05-10

## 2022-05-10 RX ORDER — DEXTROSE 50 % IN WATER 50 %
15 SYRINGE (ML) INTRAVENOUS ONCE
Refills: 0 | Status: DISCONTINUED | OUTPATIENT
Start: 2022-05-10 | End: 2022-05-11

## 2022-05-10 RX ORDER — SODIUM CHLORIDE 9 MG/ML
1000 INJECTION, SOLUTION INTRAVENOUS
Refills: 0 | Status: DISCONTINUED | OUTPATIENT
Start: 2022-05-10 | End: 2022-05-11

## 2022-05-10 RX ORDER — POTASSIUM CHLORIDE 20 MEQ
10 PACKET (EA) ORAL ONCE
Refills: 0 | Status: COMPLETED | OUTPATIENT
Start: 2022-05-10 | End: 2022-05-10

## 2022-05-10 RX ORDER — OCTREOTIDE ACETATE 200 UG/ML
50 INJECTION, SOLUTION INTRAVENOUS; SUBCUTANEOUS
Qty: 500 | Refills: 0 | Status: DISCONTINUED | OUTPATIENT
Start: 2022-05-10 | End: 2022-05-11

## 2022-05-10 RX ORDER — SODIUM,POTASSIUM PHOSPHATES 278-250MG
1 POWDER IN PACKET (EA) ORAL
Refills: 0 | Status: COMPLETED | OUTPATIENT
Start: 2022-05-10 | End: 2022-05-10

## 2022-05-10 RX ADMIN — Medication 100 MILLIEQUIVALENT(S): at 11:01

## 2022-05-10 RX ADMIN — Medication 25 GRAM(S): at 01:05

## 2022-05-10 RX ADMIN — POTASSIUM PHOSPHATE, MONOBASIC POTASSIUM PHOSPHATE, DIBASIC 62.5 MILLIMOLE(S): 236; 224 INJECTION, SOLUTION INTRAVENOUS at 10:04

## 2022-05-10 RX ADMIN — OCTREOTIDE ACETATE 10 MICROGRAM(S)/HR: 200 INJECTION, SOLUTION INTRAVENOUS; SUBCUTANEOUS at 23:08

## 2022-05-10 RX ADMIN — OCTREOTIDE ACETATE 10 MICROGRAM(S)/HR: 200 INJECTION, SOLUTION INTRAVENOUS; SUBCUTANEOUS at 10:07

## 2022-05-10 RX ADMIN — OCTREOTIDE ACETATE 50 MICROGRAM(S): 200 INJECTION, SOLUTION INTRAVENOUS; SUBCUTANEOUS at 10:04

## 2022-05-10 RX ADMIN — Medication 1 TABLET(S): at 17:00

## 2022-05-10 RX ADMIN — PANTOPRAZOLE SODIUM 40 MILLIGRAM(S): 20 TABLET, DELAYED RELEASE ORAL at 05:33

## 2022-05-10 RX ADMIN — PANTOPRAZOLE SODIUM 40 MILLIGRAM(S): 20 TABLET, DELAYED RELEASE ORAL at 21:48

## 2022-05-10 NOTE — PROGRESS NOTE ADULT - ASSESSMENT
67 yo M smoker , with h/o CAD , HTN , anxiety admitted with abdominal pain , nausea poor appetite and bloody vomiting , alcohol use       1- Epigastric pain /nausea vomiting EGD performed   duodenitis , possible Derrek's esophagitis   no vomiting   no ulcer active bleed   cont protonix bid   advance diet   d/w Gi team   hb dropped but good range , no need for blood tx     2-Hypokalemia / hypomagnesemia   replaced this am   repeat lytes in am     3-fatty liver due to cronic alcohol  counseling provided to stop     4 h/o CAD / HTN   cardiology input appreciated   plavix on hold  will d/w gi if wilfredo to resume     5- Alcohol use cronic , mild   no sign of withdrawal will monitor   use ativan prn   drinks 1 beer daily last drink 6 days ago     6- h/o Diabetes Mellitus old records reviewed   accucheck and insulin sliding scale ordered   check hba1c     7-Anxiety disorder   cont sertraline     8- Frequent falls with left leg pain   h/o left femur fx surgery in the past limping   xray reviewed no fx   will get CT of hip       DVT prophylaxis   no chemical prophylaxis  due to gi bleed

## 2022-05-10 NOTE — CONSULT NOTE ADULT - SUBJECTIVE AND OBJECTIVE BOX
HISTORY OF PRESENT ILLNESS: 65 yo M with h/o HTN , CAD, s/p PCI on Plavix , colon cancer s/p transverse colon resection, depression, smoker (1 packet per day), prior use of alcohol (now drink 1 beer per day) presented to the ED after hematemesis and melena. Patient reported bright blood emesis x 2 days, (Saturday 05/07 and Sunday 05/08, 2 times each day), and pink colored emesis yesterday. Patient aslo reported black stool on Saturday and Sunday, followed by dark colored BM yesterday. Reports associated generalized abdominal pain and poor appetite.  Denies fever, chills, shortness of breath, chest pian, palpitation, diarrhea, or constipation. In ED his initial hemoglobin was 14.0, now with slow down trend to 13-->12.0. ZAIN reveals brown stool.  US abdomen reveals steatosis. His last colonoscopy and endoscopy was a few years ago and patient unable to recall the report. Patient follows with [facility] and his last visit was1.5 years ago.     Review of Systems:  · ENMT: negative  · Respiratory and Thorax: negative  · Cardiovascular: negative  · Gastrointestinal: see above.  · Genitourinary:	negative  · Musculoskeletal: negative  · Neurological: negative  · Psychiatric: negative  · Hematology/Lymphatics: negative  · Endocrine: negative      PAST MEDICAL/SURGICAL HISTORY:  Pancreatitis    Hypertension    Myocardial infarct    Alcohol abuse    Colon cancer    History of colon resection    History of heart surgery  cardiac stent      SOCIAL HISTORY:  - TOBACCO: Denies  - ALCOHOL: Denies  - ILLICIT DRUG USE: Denies    FAMILY HISTORY:  No known history of gastrointestinal or liver disease;  FH: prostate cancer (Father)    Family history of atherosclerosis (Mother)        HOME MEDICATIONS:  atenolol 25 mg oral tablet: 1 tab(s) orally once a day (09 May 2022 18:48)  mirtazapine 7.5 mg oral tablet: 2 tab(s) orally once a day (at bedtime) (09 May 2022 18:50)  sertraline 100 mg oral tablet: 1 tab(s) orally once a day (09 May 2022 18:50)  simvastatin 40 mg oral tablet: 1 tab(s) orally once a day (at bedtime) (09 May 2022 18:50)    INPATIENT MEDICATIONS:  MEDICATIONS  (STANDING):  dextrose 5%. 1000 milliLiter(s) (50 mL/Hr) IV Continuous <Continuous>  dextrose 5%. 1000 milliLiter(s) (100 mL/Hr) IV Continuous <Continuous>  dextrose 50% Injectable 25 Gram(s) IV Push once  dextrose 50% Injectable 12.5 Gram(s) IV Push once  dextrose 50% Injectable 25 Gram(s) IV Push once  glucagon  Injectable 1 milliGRAM(s) IntraMuscular once  insulin lispro (ADMELOG) corrective regimen sliding scale   SubCutaneous three times a day before meals  octreotide  Infusion 50 MICROgram(s)/Hr (10 mL/Hr) IV Continuous <Continuous>  octreotide  Injectable 50 MICROGram(s) IV Push once  pantoprazole  Injectable 40 milliGRAM(s) IV Push two times a day  potassium chloride    Tablet ER 40 milliEquivalent(s) Oral once  potassium chloride  10 mEq/100 mL IVPB 10 milliEquivalent(s) IV Intermittent once  potassium phosphate / sodium phosphate Tablet (K-PHOS No. 2) 1 Tablet(s) Oral three times a day with meals  potassium phosphate IVPB 15 milliMole(s) IV Intermittent once  potassium phosphate IVPB 15 milliMole(s) IV Intermittent once    MEDICATIONS  (PRN):  acetaminophen     Tablet .. 650 milliGRAM(s) Oral every 6 hours PRN Moderate Pain (4 - 6)  aluminum hydroxide/magnesium hydroxide/simethicone Suspension 30 milliLiter(s) Oral every 4 hours PRN Dyspepsia  dextrose Oral Gel 15 Gram(s) Oral once PRN Blood Glucose LESS THAN 70 milliGRAM(s)/deciliter  ondansetron Injectable 4 milliGRAM(s) IV Push four times a day PRN Nausea and/or Vomiting    ALLERGIES:  No Known Allergies    T(C): 36.7 (05-10-22 @ 08:14), Max: 37.1 (05-09-22 @ 22:19)  HR: 99 (05-10-22 @ 08:14) (89 - 108)  BP: 131/90 (05-10-22 @ 08:14) (103/61 - 153/85)  RR: 18 (05-10-22 @ 08:14) (18 - 20)  SpO2: 96% (05-10-22 @ 08:14) (95% - 98%)    05-09-22 @ 07:01  -  05-10-22 @ 07:00  --------------------------------------------------------  IN: 0 mL / OUT: 300 mL / NET: -300 mL        PHYSICAL EXAM:    Constitutional: In no acute distress  Neuro: Awake alert, oriented to person, place and situation  HEENT: PERRL, anicteric sclerae  Neck: supple, no JVD  CV: regular rate, regular rhythm, +S1S2,   Pulm/chest: lung sounds clear anteriorly, fine rales on bilateral bases. No accessory muscle use noted  Abd: soft, NT, ND, +BS  Rectal exam: Brown stool. Internal hemorrhoids   Ext: no Cyanosis, clubbing or edema  Skin: warm, no jaundice.    Psych: calm, appropriate affect        LABS:             12.1   x     )-----------( x        ( 05-10 @ 03:03 )             34.0                13.1   5.65  )-----------( 41       ( 05-09 @ 16:09 )             36.5                14.6   5.65  )-----------( 48       ( 05-09 @ 11:34 )             41.1         05-10    133<L>  |  96<L>  |  10.8  ----------------------------<  269<H>  3.0<L>   |  22.0  |  0.48<L>    Ca    9.4      10 May 2022 03:03  Phos  1.2     05-10  Mg     2.5     05-10    TPro  6.1<L>  /  Alb  3.6  /  TBili  1.4  /  DBili  x   /  AST  88<H>  /  ALT  44<H>  /  AlkPhos  127<H>  05-10    LIVER FUNCTIONS - ( 10 May 2022 03:03 )  Alb: 3.6 g/dL / Pro: 6.1 g/dL / ALK PHOS: 127 U/L / ALT: 44 U/L / AST: 88 U/L / GGT: x             Lipase, Serum: 46 U/L (05-09-22 @ 11:34)    Iron - Total Binding Capacity.: 266 ug/dL (05-10-22 @ 03:03)  % Saturation, Iron: 32 % (05-10-22 @ 03:03)  Iron Total, Serum: 84 ug/dL (05-10-22 @ 03:03)  Ferritin, Serum: 1005 ng/mL *H* (05-10-22 @ 03:03)        < from: US Abdomen Upper Quadrant Right (05.09.22 @ 13:37) >  FINDINGS:  Liver: Normal size, increased echotexture consistent with steatosis.  Bile ducts: Normal caliber. Common bile duct measures 5 mm.  Gallbladder: Within normal limits.  Pancreas: Visualized portions are within normal limits.  Right kidney: 10.2 cm. No hydronephrosis.  Ascites: None.  IVC: Visualized portions are within normal limits.    IMPRESSION:  No gallbladder disease or other acute finding. Fatty liver.    < end of copied text >

## 2022-05-10 NOTE — CONSULT NOTE ADULT - PROBLEM SELECTOR RECOMMENDATION 9
Most likley from peptic ulcer disease or Padmini Wies tear. US ultrasound reveals steatosis. Reported melena prior to arrival. ZAIN with brown stool.   Last Hemoglobin 12.0 down trend from 14 on arrival to ED yesterday. No evidence of overt GI bleed.   Octreotide 50 mcg x1 dose followed by infusion  Protonix 40 IV BID  Will keep NPO  Type and screen, INR ordered.   Will plan for EGD today once [id number] by cardiology.   Replace electrolytes as needed, order placed.   Plan discussed with the patient and Dr. Sands
- RCRI 0.9% class II risk of major cardiac event.   - METS<4, but no s/s of ACS or decompensated CHF.   - Obtain TTE.   - If echo with normal EF, no new RWMA, and no significant valvular abnormalities, with no active chest pain, evidence of ischemia, decompensated CHF, or unstable arrhythmia and therefore no absolute cardiac contraindication to the planned surgical procedure.

## 2022-05-10 NOTE — CONSULT NOTE ADULT - SUBJECTIVE AND OBJECTIVE BOX
[facility]                                              CARDIOLOGY AT [place] [address]                                             Telephone: [phone]. Fax:[phone]                                                       CARDIOLOGY CONSULTATION NOTE                                                                                             History obtained by: Patient and medical record  Community Cardiologist: Dr. Ricci    obtained: Yes [  ] No [ x ]  Reason for Consultation: Hanny-operative Cardiac Risk Stratification   Available out pt records reviewed: Yes [ x ] No [  ]    Chief complaint:    Patient is a 66y old  Male who presents with a chief complaint of vomiting blood , abdominal pain (10 May 2022 08:27)      HPI:  65 yo M with h/o HTN , CAD , depression smoker presented to the ED with c/o vomiting bloody pink color  started Friday to saturday morning associated with abdominal discomfort , nausea . He had daily vomiting yesterday and today no blood , also reports dark  black  color stool since Friday . Pt is feeling lightheaded , weak reports falling on Friday . He is c/o leg pain hip thigh area   Pt is also states that has not been able to eta since Friday can not hold food due to stomach discomfort    (09 May 2022 18:26)      CARDIAC TESTING   ECHO:    STRESS:    CATH:     ELECTROPHYSIOLOGY:     PAST MEDICAL HISTORY  Pancreatitis    Hypertension    Myocardial infarct    Alcohol abuse    Colon cancer        PAST SURGICAL HISTORY  History of colon resection    History of heart surgery        SOCIAL HISTORY:  Denies smoking/alcohol/drugs  CIGARETTES:     ALCOHOL:  DRUGS:    FAMILY HISTORY:  FH: prostate cancer (Father)    Family history of atherosclerosis (Mother)      Family History of Cardiovascular Disease:  Yes [  ] No [  ]  Coronary Artery Disease in first degree relative: Yes [  ] No [  ]  Sudden Cardiac Death in First degree relative: Yes [  ] No [  ]    HOME MEDICATIONS:  atenolol 25 mg oral tablet: 1 tab(s) orally once a day (09 May 2022 18:48)  mirtazapine 7.5 mg oral tablet: 2 tab(s) orally once a day (at bedtime) (09 May 2022 18:50)  sertraline 100 mg oral tablet: 1 tab(s) orally once a day (09 May 2022 18:50)  simvastatin 40 mg oral tablet: 1 tab(s) orally once a day (at bedtime) (09 May 2022 18:50)      CURRENT CARDIAC MEDICATIONS:      CURRENT OTHER MEDICATIONS:  acetaminophen     Tablet .. 650 milliGRAM(s) Oral every 6 hours PRN Moderate Pain (4 - 6)  ondansetron Injectable 4 milliGRAM(s) IV Push four times a day PRN Nausea and/or Vomiting  aluminum hydroxide/magnesium hydroxide/simethicone Suspension 30 milliLiter(s) Oral every 4 hours PRN Dyspepsia  pantoprazole  Injectable 40 milliGRAM(s) IV Push two times a day  dextrose 5%. 1000 milliLiter(s) (50 mL/Hr) IV Continuous <Continuous>  dextrose 5%. 1000 milliLiter(s) (100 mL/Hr) IV Continuous <Continuous>  dextrose 50% Injectable 25 Gram(s) IV Push once, Stop order after: 1 Doses  dextrose 50% Injectable 12.5 Gram(s) IV Push once, Stop order after: 1 Doses  dextrose 50% Injectable 25 Gram(s) IV Push once, Stop order after: 1 Doses  dextrose Oral Gel 15 Gram(s) Oral once, Stop order after: 1 Doses PRN Blood Glucose LESS THAN 70 milliGRAM(s)/deciliter  glucagon  Injectable 1 milliGRAM(s) IntraMuscular once, Stop order after: 1 Doses  insulin lispro (ADMELOG) corrective regimen sliding scale   SubCutaneous three times a day before meals  octreotide  Infusion 50 MICROgram(s)/Hr (10 mL/Hr) IV Continuous <Continuous>  potassium phosphate / sodium phosphate Tablet (K-PHOS No. 2) 1 Tablet(s) Oral three times a day with meals, Stop order after: 3 Doses      ALLERGIES:   No Known Allergies      REVIEW OF SYMPTOMS:   CONSTITUTIONAL: No fever, no chills, no weight loss, no weight gain, no fatigue   ENMT:  No vertigo; No sinus or throat pain  NECK: No pain or stiffness  CARDIOVASCULAR: No chest pain, no dyspnea, no syncope/presyncope, no palpitations, no dizziness, no Orthopnea, no Paroxsymal nocturnal dyspnea  RESPIRATORY: no Shortness of breath, no cough, no wheezing  : No dysuria, no hematuria   GI: No dark color stool, no nausea, no diarrhea, no constipation, no abdominal pain   NEURO: No headache, no slurred speech   MUSCULOSKELETAL: No joint pain or swelling; No muscle, back, or extremity pain  PSYCH: No agitation, no anxiety.    ALL OTHER REVIEW OF SYSTEMS ARE NEGATIVE.    VITAL SIGNS:  T(C): 36.7 (05-10-22 @ 08:14), Max: 37.1 (05-09-22 @ 22:19)  T(F): 98 (05-10-22 @ 08:14), Max: 98.8 (05-09-22 @ 22:19)  HR: 99 (05-10-22 @ 08:14) (89 - 108)  BP: 131/90 (05-10-22 @ 08:14) (117/76 - 153/85)  RR: 18 (05-10-22 @ 08:14) (18 - 20)  SpO2: 96% (05-10-22 @ 08:14) (95% - 98%)    INTAKE AND OUTPUT:     05-09 @ 07:01  -  05-10 @ 07:00  --------------------------------------------------------  IN: 0 mL / OUT: 300 mL / NET: -300 mL        PHYSICAL EXAM:  Constitutional: Comfortable . No acute distress.   HEENT: Atraumatic and normocephalic , neck is supple . no JVD. No carotid bruit.  CNS: A&Ox3. No focal deficits.   Respiratory: CTAB, unlabored   Cardiovascular: RRR normal s1 s2. No murmur. No rubs or gallop.  Gastrointestinal: Soft, non-tender. +Bowel sounds.   Extremities: 2+ Peripheral Pulses, No clubbing, cyanosis, or edema  Psychiatric: Calm . no agitation.   Skin: Warm and dry, no ulcers on extremities     LABS:  ( 09 May 2022 11:34 )  Troponin T  <0.01,  CPK  X    , CKMB  X    , BNP X                                  12.1   x     )-----------( x        ( 10 May 2022 03:03 )             34.0     05-10    133<L>  |  96<L>  |  10.8  ----------------------------<  269<H>  3.0<L>   |  22.0  |  0.48<L>    Ca    9.4      10 May 2022 03:03  Phos  1.2     05-10  Mg     2.5     05-10    TPro  6.1<L>  /  Alb  3.6  /  TBili  1.4  /  DBili  x   /  AST  88<H>  /  ALT  44<H>  /  AlkPhos  127<H>  05-10            Thyroid Stimulating Hormone, Serum: 0.71 uIU/mL (05-10-22 @ 03:03)      INTERPRETATION OF TELEMETRY:     ECG:   Prior ECG: Yes [  ] No [  ]    RADIOLOGY & ADDITIONAL STUDIES:    X-ray:    CT scan:   MRI:   US:                                                [facility]                                              CARDIOLOGY AT [place] [address]                                             Telephone: [phone]. Fax:[phone]                                                       CARDIOLOGY CONSULTATION NOTE                                                                                             History obtained by: Patient and medical record  Community Cardiologist: Dr. Ricci    obtained: Yes [  ] No [ x ]  Reason for Consultation: Hanny-operative Cardiac Risk Stratification   Available out pt records reviewed: Yes [ x ] No [  ]    Chief complaint:    Patient is a 66y old  Male who presents with a chief complaint of vomiting blood , abdominal pain (10 May 2022 08:27)      HPI: 67 y/o M with a PMHx MI/CAD s/p PCI (LEELA x 1 pRCA 2007), HTN, depression, former heavy EtOH abuse, and HLD who presented to the ED with complaints of hematemesis. Patient states that he had a beer on Thursday night, but overall does not drink often anymore. Patient states that on Saturday he felt very weak and tired, and then had some nausea and emesis with some pink color. Pt also noted dark black colored stools as well over the weekend. Patient states that the symptoms occurred on Sunday as well, so he came to the ER to be evaluated. Patient is planned for EGD, and cardiology evaluation is requested. Patient follows with Dr. Ricci, but often isn't seen for years at a time. Patient's last NST was 2018 with no ischemia. Patient with METS<4, but denies any chest pain or dyspnea. Patient denies fevers, chills, syncope, near syncope, headache, or dizziness.       CARDIAC TESTING   ECHO:  Outpatient Echocardiogram 03/18  EF 60-65%, mild concentric LVH, no significant valvular abnormalities.    STRESS:  NST 03/18  Normal EF, diaphragmatic attenuation.    CATH:       PAST MEDICAL HISTORY  Pancreatitis    Hypertension    Myocardial infarct    Alcohol abuse    Colon cancer        PAST SURGICAL HISTORY  History of colon resection    History of heart surgery        SOCIAL HISTORY:  CIGARETTES:   Former smoker, quit a few weeks ago. Smoked 1 PPD x 45 years  ALCOHOL: Used to drink multiple beers daily, now beer occasionally   DRUGS: Denies    FAMILY HISTORY:  FH: prostate cancer (Father)    Family history of atherosclerosis (Mother)      Family History of Cardiovascular Disease:  Yes [x  ] No [  ]  Coronary Artery Disease in first degree relative: Yes [  x] No [  ]  Sudden Cardiac Death in First degree relative: Yes [  ] No [x  ]    HOME MEDICATIONS:  atenolol 25 mg oral tablet: 1 tab(s) orally once a day (09 May 2022 18:48)  mirtazapine 7.5 mg oral tablet: 2 tab(s) orally once a day (at bedtime) (09 May 2022 18:50)  sertraline 100 mg oral tablet: 1 tab(s) orally once a day (09 May 2022 18:50)  simvastatin 40 mg oral tablet: 1 tab(s) orally once a day (at bedtime) (09 May 2022 18:50)      CURRENT CARDIAC MEDICATIONS:      CURRENT OTHER MEDICATIONS:  acetaminophen     Tablet .. 650 milliGRAM(s) Oral every 6 hours PRN Moderate Pain (4 - 6)  ondansetron Injectable 4 milliGRAM(s) IV Push four times a day PRN Nausea and/or Vomiting  aluminum hydroxide/magnesium hydroxide/simethicone Suspension 30 milliLiter(s) Oral every 4 hours PRN Dyspepsia  pantoprazole  Injectable 40 milliGRAM(s) IV Push two times a day  dextrose 5%. 1000 milliLiter(s) (50 mL/Hr) IV Continuous <Continuous>  dextrose 5%. 1000 milliLiter(s) (100 mL/Hr) IV Continuous <Continuous>  dextrose 50% Injectable 25 Gram(s) IV Push once, Stop order after: 1 Doses  dextrose 50% Injectable 12.5 Gram(s) IV Push once, Stop order after: 1 Doses  dextrose 50% Injectable 25 Gram(s) IV Push once, Stop order after: 1 Doses  dextrose Oral Gel 15 Gram(s) Oral once, Stop order after: 1 Doses PRN Blood Glucose LESS THAN 70 milliGRAM(s)/deciliter  glucagon  Injectable 1 milliGRAM(s) IntraMuscular once, Stop order after: 1 Doses  insulin lispro (ADMELOG) corrective regimen sliding scale   SubCutaneous three times a day before meals  octreotide  Infusion 50 MICROgram(s)/Hr (10 mL/Hr) IV Continuous <Continuous>  potassium phosphate / sodium phosphate Tablet (K-PHOS No. 2) 1 Tablet(s) Oral three times a day with meals, Stop order after: 3 Doses      ALLERGIES:   No Known Allergies      REVIEW OF SYMPTOMS:   CONSTITUTIONAL: No fever, no chills, no weight loss, no weight gain, no fatigue   ENMT:  No vertigo; No sinus or throat pain  NECK: No pain or stiffness  CARDIOVASCULAR: AS PER HPI  RESPIRATORY: no Shortness of breath, no cough, no wheezing  : No dysuria, no hematuria   GI: AS PER HPI  NEURO: No headache, no slurred speech   MUSCULOSKELETAL: No joint pain or swelling; No muscle, back, or extremity pain  PSYCH: No agitation, no anxiety.    ALL OTHER REVIEW OF SYSTEMS ARE NEGATIVE.    VITAL SIGNS:  T(C): 36.7 (05-10-22 @ 08:14), Max: 37.1 (05-09-22 @ 22:19)  T(F): 98 (05-10-22 @ 08:14), Max: 98.8 (05-09-22 @ 22:19)  HR: 99 (05-10-22 @ 08:14) (89 - 108)  BP: 131/90 (05-10-22 @ 08:14) (117/76 - 153/85)  RR: 18 (05-10-22 @ 08:14) (18 - 20)  SpO2: 96% (05-10-22 @ 08:14) (95% - 98%)    INTAKE AND OUTPUT:     05-09 @ 07:01  -  05-10 @ 07:00  --------------------------------------------------------  IN: 0 mL / OUT: 300 mL / NET: -300 mL        PHYSICAL EXAM:  Constitutional: Comfortable . No acute distress.   HEENT: Atraumatic and normocephalic , neck is supple . no JVD. No carotid bruit.  CNS: A&Ox3. No focal deficits.   Respiratory: CTAB, unlabored   Cardiovascular: RRR normal s1 s2. No murmur. No rubs or gallop.  Gastrointestinal: Soft, non-tender. +Bowel sounds.   Extremities: 2+ Peripheral Pulses, No clubbing, cyanosis, or edema  Psychiatric: Calm . no agitation.   Skin: Warm and dry, no ulcers on extremities     LABS:  ( 09 May 2022 11:34 )  Troponin T  <0.01,  CPK  X    , CKMB  X    , BNP X                                  12.1   x     )-----------( x        ( 10 May 2022 03:03 )             34.0     05-10    133<L>  |  96<L>  |  10.8  ----------------------------<  269<H>  3.0<L>   |  22.0  |  0.48<L>    Ca    9.4      10 May 2022 03:03  Phos  1.2     05-10  Mg     2.5     05-10    TPro  6.1<L>  /  Alb  3.6  /  TBili  1.4  /  DBili  x   /  AST  88<H>  /  ALT  44<H>  /  AlkPhos  127<H>  05-10            Thyroid Stimulating Hormone, Serum: 0.71 uIU/mL (05-10-22 @ 03:03)      INTERPRETATION OF TELEMETRY: Not on telemetry    ECG: NSR, inferior infarct, PRWP, NSST/ T wave abnormalities  Prior ECG: Yes [  ] No [  ]    RADIOLOGY & ADDITIONAL STUDIES:    X-ray:      CT scan:     MRI:   US:

## 2022-05-10 NOTE — PROGRESS NOTE ADULT - SUBJECTIVE AND OBJECTIVE BOX
Patient is a 66y old  Male who presents with a chief complaint of vomiting blood , abdominal pain     pt is seen in am , no overnight events   no nausea NPO   had BM overnight       MEDICATIONS  (STANDING):  dextrose 5%. 1000 milliLiter(s) (50 mL/Hr) IV Continuous <Continuous>  dextrose 5%. 1000 milliLiter(s) (100 mL/Hr) IV Continuous <Continuous>  dextrose 50% Injectable 25 Gram(s) IV Push once  dextrose 50% Injectable 12.5 Gram(s) IV Push once  dextrose 50% Injectable 25 Gram(s) IV Push once  glucagon  Injectable 1 milliGRAM(s) IntraMuscular once  insulin lispro (ADMELOG) corrective regimen sliding scale   SubCutaneous three times a day before meals  octreotide  Infusion 50 MICROgram(s)/Hr (10 mL/Hr) IV Continuous <Continuous>  pantoprazole  Injectable 40 milliGRAM(s) IV Push two times a day  potassium phosphate / sodium phosphate Tablet (K-PHOS No. 2) 1 Tablet(s) Oral three times a day with meals  ondansetron Injectable 4 milliGRAM(s) IV Push four times a day PRN Nausea and/or Vomiting    Vital Signs Last 24 Hrs  T(C): 36.7 (10 May 2022 08:14), Max: 37.1 (09 May 2022 22:19)  T(F): 98 (10 May 2022 08:14), Max: 98.8 (09 May 2022 22:19)  HR: 99 (10 May 2022 08:14) (96 - 108)  BP: 131/90 (10 May 2022 08:14) (131/90 - 153/85)  BP(mean): --  RR: 18 (10 May 2022 08:14) (18 - 18)  SpO2: 96% (10 May 2022 08:14) (95% - 96%)09 May 2022 07:01  -  10 May 2022 07:00  --------------------------------------------------------  IN: 0 mL / OUT: 300 mL / NET: -300 mL        PHYSICAL EXAM:  General: awake alert , no distress  ENT: dry mouth   Neck: supple, no JVD   Lungs: CTA bilateral   Cardio: Regular rate , S1/S2, No murmur  Abdomen: Soft, Nontender, Nondistended; Bowel sounds present  Extremities: No calf tenderness, No pitting edema    LABS:                        12.1   x     )-----------( x        ( 10 May 2022 03:03 )             34.0     05-10    133  |  96  |  10.8  ----------------------------<  269  3.0   |  22.0  |  0.48    Ca    9.4      10 May 2022 03:03  Phos  1.2     05-10  Mg     2.5     05-10    TPro  6.1  /  Alb  3.6  /  TBili  1.4  /  DBili  x   /  AST  88  /  ALT  44  /  AlkPhos  127  05-10      Lipase, Serum: 46 U/L (05-09-22 @ 11:34)    end< from: EGD (05.10.22 @ 00:00) >  Findings:     Esophagus Mucosa Localized irregularity at 40 cm ofthe mucosa was noted in the    Z-line and gastroesophageal junction. These findings raise suspicion for    Chung's intestinal metaplasia.     Stomach Mucosa Diffuse congestion, petechiae and mosaic mucosal pattern of the    mucosa was noted in the stomach. These findings were suggestive of portal    hypertensive gastropathy and Biopsies were obtained to evaluate for H.Pylori    infection.     Duodenum Mucosa Diffuse erythema of the mucosa was noted in the Bulb. These    findings are compatible with duodenitis.       < end of copied text >                TSH 0.71   TSH with FT4 reflex --  Total T3 --              POCT Blood Glucose.: 140 mg/dL (10 May 2022 16:08)  POCT Blood Glucose.: 143 mg/dL (10 May 2022 14:43)  POCT Blood Glucose.: 149 mg/dL (10 May 2022 11:38)          COVID-19 PCR: NotDetec (05-09-22 @ 23:02)    RADIOLOGY & ADDITIONAL TESTS:

## 2022-05-10 NOTE — CONSULT NOTE ADULT - NS ATTEND AMEND GEN_ALL_CORE FT
Patient with history of coronary disease, on Plavix, alcohol abuse, fatty liver, presented with hematemesis and melena.  Will recommend to correct electrolytes and will consider EGD today.  IV PPI infusion.  IV octreotide.  Antibiotics.  Further recommendations after the EGD.
65 y/o M with a PMHx MI/CAD s/p PCI (LEELA x 1 pRCA 2007), HTN, depression, former heavy EtOH abuse, and HLD who presented to the ED with complaints of hematemesis. Patient states that he had a beer on Thursday night, but overall does not drink often anymore. Patient states that on Saturday he felt very weak and tired, and then had some nausea and emesis with some pink color. Pt also noted dark black colored stools as well over the weekend. Patient states that the symptoms occurred on Sunday as well, so he came to the ER to be evaluated. Patient is planned for EGD, and cardiology evaluation is requested. Patient follows with Dr. Ricci, but often isn't seen for years at a time. Patient's last NST was 2018 with no ischemia. Patient with METS<4, but denies any chest pain or dyspnea. Patient denies fevers, chills, syncope, near syncope, headache, or dizziness.   Troponin negative x 1.    Patient is feeling OK, no chest pain.  Patient states he has not been very active due to leg problem.    Exam: Chest- Bilateral Clear BS  Cardiac-S1 and S2+. No murmur  Abdomen- soft    Patient has <4 Mets exercise capacity, no symptoms suggestive of angina or CHF.    Patient is elevated risk for perioperative cardiac events for GI procedure (assuming echo is normal), Pending his echo. Please check echo results.  Patient is advised to f/u his Cardiologist Dr Ricci.    Will sign off,

## 2022-05-10 NOTE — CONSULT NOTE ADULT - PROBLEM SELECTOR RECOMMENDATION 3
- Hx of MI s/p PCI x 1 pRCA.   - NST 2018 with no ischemia.   - Restart Plavix when cleared by GI.   - Restart home atenolol and zocor when able to take PO.   - Echo as above.

## 2022-05-11 DIAGNOSIS — I50.21 ACUTE SYSTOLIC (CONGESTIVE) HEART FAILURE: ICD-10-CM

## 2022-05-11 LAB
A1C WITH ESTIMATED AVERAGE GLUCOSE RESULT: 5.1 % — SIGNIFICANT CHANGE UP (ref 4–5.6)
ALBUMIN SERPL ELPH-MCNC: 3.6 G/DL — SIGNIFICANT CHANGE UP (ref 3.3–5.2)
ALP SERPL-CCNC: 116 U/L — SIGNIFICANT CHANGE UP (ref 40–120)
ALT FLD-CCNC: 38 U/L — SIGNIFICANT CHANGE UP
ANION GAP SERPL CALC-SCNC: 10 MMOL/L — SIGNIFICANT CHANGE UP (ref 5–17)
AST SERPL-CCNC: 60 U/L — HIGH
BILIRUB SERPL-MCNC: 1.2 MG/DL — SIGNIFICANT CHANGE UP (ref 0.4–2)
BUN SERPL-MCNC: 9.4 MG/DL — SIGNIFICANT CHANGE UP (ref 8–20)
CALCIUM SERPL-MCNC: 9.3 MG/DL — SIGNIFICANT CHANGE UP (ref 8.6–10.2)
CHLORIDE SERPL-SCNC: 102 MMOL/L — SIGNIFICANT CHANGE UP (ref 98–107)
CO2 SERPL-SCNC: 25 MMOL/L — SIGNIFICANT CHANGE UP (ref 22–29)
CREAT SERPL-MCNC: 0.4 MG/DL — LOW (ref 0.5–1.3)
EGFR: 121 ML/MIN/1.73M2 — SIGNIFICANT CHANGE UP
ESTIMATED AVERAGE GLUCOSE: 100 MG/DL — SIGNIFICANT CHANGE UP (ref 68–114)
GLUCOSE BLDC GLUCOMTR-MCNC: 123 MG/DL — HIGH (ref 70–99)
GLUCOSE BLDC GLUCOMTR-MCNC: 171 MG/DL — HIGH (ref 70–99)
GLUCOSE SERPL-MCNC: 147 MG/DL — HIGH (ref 70–99)
HCT VFR BLD CALC: 34.2 % — LOW (ref 39–50)
HGB BLD-MCNC: 12.2 G/DL — LOW (ref 13–17)
MCHC RBC-ENTMCNC: 35.7 GM/DL — SIGNIFICANT CHANGE UP (ref 32–36)
MCHC RBC-ENTMCNC: 38.7 PG — HIGH (ref 27–34)
MCV RBC AUTO: 108.6 FL — HIGH (ref 80–100)
PLATELET # BLD AUTO: 58 K/UL — LOW (ref 150–400)
POTASSIUM SERPL-MCNC: 3.4 MMOL/L — LOW (ref 3.5–5.3)
POTASSIUM SERPL-SCNC: 3.4 MMOL/L — LOW (ref 3.5–5.3)
PROT SERPL-MCNC: 6.2 G/DL — LOW (ref 6.6–8.7)
RBC # BLD: 3.15 M/UL — LOW (ref 4.2–5.8)
RBC # FLD: 11.2 % — SIGNIFICANT CHANGE UP (ref 10.3–14.5)
SODIUM SERPL-SCNC: 137 MMOL/L — SIGNIFICANT CHANGE UP (ref 135–145)
WBC # BLD: 4.15 K/UL — SIGNIFICANT CHANGE UP (ref 3.8–10.5)
WBC # FLD AUTO: 4.15 K/UL — SIGNIFICANT CHANGE UP (ref 3.8–10.5)

## 2022-05-11 PROCEDURE — 74176 CT ABD & PELVIS W/O CONTRAST: CPT | Mod: 26

## 2022-05-11 PROCEDURE — 99232 SBSQ HOSP IP/OBS MODERATE 35: CPT

## 2022-05-11 PROCEDURE — 99233 SBSQ HOSP IP/OBS HIGH 50: CPT

## 2022-05-11 RX ORDER — POTASSIUM CHLORIDE 20 MEQ
40 PACKET (EA) ORAL ONCE
Refills: 0 | Status: COMPLETED | OUTPATIENT
Start: 2022-05-11 | End: 2022-05-11

## 2022-05-11 RX ORDER — LOSARTAN POTASSIUM 100 MG/1
25 TABLET, FILM COATED ORAL DAILY
Refills: 0 | Status: DISCONTINUED | OUTPATIENT
Start: 2022-05-11 | End: 2022-05-13

## 2022-05-11 RX ORDER — POTASSIUM CHLORIDE 20 MEQ
40 PACKET (EA) ORAL ONCE
Refills: 0 | Status: DISCONTINUED | OUTPATIENT
Start: 2022-05-11 | End: 2022-05-11

## 2022-05-11 RX ORDER — CEFTRIAXONE 500 MG/1
1 INJECTION, POWDER, FOR SOLUTION INTRAMUSCULAR; INTRAVENOUS EVERY 24 HOURS
Refills: 0 | Status: DISCONTINUED | OUTPATIENT
Start: 2022-05-11 | End: 2022-05-11

## 2022-05-11 RX ORDER — METOPROLOL TARTRATE 50 MG
25 TABLET ORAL DAILY
Refills: 0 | Status: DISCONTINUED | OUTPATIENT
Start: 2022-05-11 | End: 2022-05-13

## 2022-05-11 RX ORDER — SERTRALINE 25 MG/1
100 TABLET, FILM COATED ORAL DAILY
Refills: 0 | Status: DISCONTINUED | OUTPATIENT
Start: 2022-05-11 | End: 2022-05-17

## 2022-05-11 RX ADMIN — Medication 25 MILLIGRAM(S): at 15:59

## 2022-05-11 RX ADMIN — Medication 2: at 08:23

## 2022-05-11 RX ADMIN — SERTRALINE 100 MILLIGRAM(S): 25 TABLET, FILM COATED ORAL at 14:29

## 2022-05-11 RX ADMIN — LOSARTAN POTASSIUM 25 MILLIGRAM(S): 100 TABLET, FILM COATED ORAL at 15:59

## 2022-05-11 RX ADMIN — PANTOPRAZOLE SODIUM 40 MILLIGRAM(S): 20 TABLET, DELAYED RELEASE ORAL at 15:54

## 2022-05-11 RX ADMIN — PANTOPRAZOLE SODIUM 40 MILLIGRAM(S): 20 TABLET, DELAYED RELEASE ORAL at 08:23

## 2022-05-11 RX ADMIN — Medication 40 MILLIEQUIVALENT(S): at 14:29

## 2022-05-11 NOTE — PROGRESS NOTE ADULT - SUBJECTIVE AND OBJECTIVE BOX
[facility]                                                         CARDIOLOGY AT [facility]                                                                  [address], [place] [address]                                                         Telephone: [phone]. Fax:[phone]                                                                             PROGRESS NOTE    Reason for follow up: Hanny-operative Cardiac Risk Stratification  Update: Patient underwent EGD yesterday that showed portal hypertensive gastropathy. Patient's echo showed newly reduced LVEF 30% with new RWMA. Patient will need LHC, but needs to be able to take DAPT. Awaiting clearance from GI for DAPT.       Review of symptoms:   Cardiac:  No chest pain. No dyspnea. No palpitations.  Respiratory: no cough. No dyspnea  Gastrointestinal: No diarrhea. No abdominal pain. No bleeding.   Neuro: No focal neuro complaints.    Vitals:  T(C): 36.9 (05-11-22 @ 08:33), Max: 37.2 (05-11-22 @ 01:22)  HR: 90 (05-11-22 @ 08:33) (87 - 94)  BP: 148/82 (05-11-22 @ 08:33) (128/74 - 148/82)  RR: 18 (05-11-22 @ 08:33) (18 - 18)  SpO2: 96% (05-11-22 @ 08:33) (95% - 96%)  Wt(kg): --  I&O's Summary    10 May 2022 07:01  -  11 May 2022 07:00  --------------------------------------------------------  IN: 0 mL / OUT: 100 mL / NET: -100 mL      Weight (kg): 76.2 (05-09 @ 09:34)    PHYSICAL EXAM:  Appearance: Comfortable. No acute distress  HEENT:  Atraumatic. Normocephalic.  Normal oral mucosa  Neurologic: A & O x 3, no gross focal deficits.  Cardiovascular: RRR S1 S2, No murmur, no rubs/gallops. No JVD  Respiratory: Lungs clear to auscultation, unlabored   Gastrointestinal:  Soft, Non-tender, + BS  Lower Extremities: 2+ Peripheral Pulses, No clubbing, cyanosis, or edema  Psychiatry: Patient is calm. No agitation.   Skin: warm and dry.    CURRENT CARDIAC MEDICATIONS:      CURRENT OTHER MEDICATIONS:  acetaminophen     Tablet .. 650 milliGRAM(s) Oral every 6 hours PRN Moderate Pain (4 - 6)  ondansetron Injectable 4 milliGRAM(s) IV Push four times a day PRN Nausea and/or Vomiting  aluminum hydroxide/magnesium hydroxide/simethicone Suspension 30 milliLiter(s) Oral every 4 hours PRN Dyspepsia  pantoprazole  Injectable 40 milliGRAM(s) IV Push two times a day  dextrose 50% Injectable 25 Gram(s) IV Push once, Stop order after: 1 Doses  dextrose 50% Injectable 12.5 Gram(s) IV Push once, Stop order after: 1 Doses  dextrose 50% Injectable 25 Gram(s) IV Push once, Stop order after: 1 Doses  dextrose Oral Gel 15 Gram(s) Oral once, Stop order after: 1 Doses PRN Blood Glucose LESS THAN 70 milliGRAM(s)/deciliter  glucagon  Injectable 1 milliGRAM(s) IntraMuscular once, Stop order after: 1 Doses  insulin lispro (ADMELOG) corrective regimen sliding scale   SubCutaneous three times a day before meals  octreotide  Infusion 50 MICROgram(s)/Hr (10 mL/Hr) IV Continuous <Continuous>  dextrose 5%. 1000 milliLiter(s) (50 mL/Hr) IV Continuous <Continuous>  dextrose 5%. 1000 milliLiter(s) (100 mL/Hr) IV Continuous <Continuous>  potassium phosphate / sodium phosphate Tablet (K-PHOS No. 2) 1 Tablet(s) Oral three times a day with meals, Stop order after: 3 Doses      LABS:	 	  ( 09 May 2022 11:34 )  Troponin T  <0.01,  CPK  X    , CKMB  X    , BNP X                                  12.2   4.15  )-----------( 58       ( 11 May 2022 08:27 )             34.2     05-11    137  |  102  |  9.4  ----------------------------<  147<H>  3.4<L>   |  25.0  |  0.40<L>    Ca    9.3      11 May 2022 08:27  Phos  1.2     05-10  Mg     2.5     05-10    TPro  6.2<L>  /  Alb  3.6  /  TBili  1.2  /  DBili  x   /  AST  60<H>  /  ALT  38  /  AlkPhos  116  05-11      Lipid Profile:   HgA1c:   TSH: Thyroid Stimulating Hormone, Serum: 0.71 uIU/mL      TELEMETRY: SR  ECG:    DIAGNOSTIC TESTING:  [ ] Echocardiogram:   < from: TTE Echo Complete w/ Contrast w/ Doppler (05.10.22 @ 17:24) >  PHYSICIAN INTERPRETATION:  Left Ventricle: Endocardial visualization was enhanced with intravenous   echo contrast. The left ventricular internal cavity size is normal.  Moderately decreased segmental left ventricular systolic function.   Spectral Doppler shows impaired relaxation pattern of left ventricular   myocardial filling (Grade I diastolic dysfunction). Normal LV filling   pressures.      LV Wall Scoring:  The entire apex and mid and apical anterior wall are akinetic. The mid  anteroseptal segment is hypokinetic.    Right Ventricle: Normal right ventricular size and function.  Left Atrium: The left atrium is normal in size.  Right Atrium: The right atrium is normal in size.  Pericardium: There is no evidence of pericardial effusion. There is a   significant pericardial fat pad present.  Mitral Valve: The mitral valve is normal in structure. Mitral leaflet   mobility is normal. Trace mitral valve regurgitation is seen.  Tricuspid Valve: Trivial tricuspid regurgitation is visualized. Adequate   TR velocity was not obtained to accurately assess RVSP.  Aortic Valve: The aortic valve is trileaflet. Sclerotic aortic valve with   normal opening. No evidence of aortic valve regurgitation is seen.  Pulmonic Valve: The pulmonic valve was not well visualized. Trace   pulmonic valve regurgitation.  Aorta: The aortic root is normal in size and structure.  Pulmonary Artery: The pulmonary artery is not well seen.  Venous:The inferior vena cava was normal sized, with respiratory size   variation greater than 50%.  In comparison to the previous echocardiogram(s): There are no prior   studies on this patient for comparison purposes.      Summary:   1. Endocardial visualization was enhanced with intravenous echo contrast.   2. LV Ejection Fraction by Blake's Method with a biplane EF of 30 %.   3. Moderately decreased segmental left ventricular systolic function.   4. Entire apex, mid and apical anterior wall, and mid anteroseptal   segment are abnormal as described above.   5. Spectral Doppler shows impaired relaxation pattern of left   ventricular myocardial filling (Grade I diastolic dysfunction).   6. Normal right ventricular size and function.   7. Trace mitral valve regurgitation.   8. Sclerotic aortic valve with normal opening.   9. Adequate TR velocity was not obtained to accurately assess RVSP.    Adebayo Echeverria MD Electronically signed on 5/11/2022 at 7:22:52 AM    < end of copied text >    [ ]  Catheterization:  [ ] Stress Test:    OTHER:

## 2022-05-11 NOTE — PROGRESS NOTE ADULT - PROBLEM SELECTOR PLAN 1
portal hypertensive gastropathy  hemoglobin stable  PPI qd  needs ETOH rehab  may discharge from GI standpoint portal hypertensive gastropathy  hemoglobin stable  PPI qd  needs ETOH rehab  -   I spoke to Dr Martinez. Cardiology  would like to do cardiac cath and may need dual antiplt meds. Pt had severe portal hypertensive gastropathy and will likely bleed. ASA is likley ok.    - will get ct abdomen to assess liver ( has sequela of cirrhosis- thrombocytopenia, portal hypertensive gastropathy)    - check AFP

## 2022-05-11 NOTE — PHYSICAL THERAPY INITIAL EVALUATION ADULT - PERTINENT HX OF CURRENT PROBLEM, REHAB EVAL
67 yo M smoker , with h/o CAD , HTN , anxiety admitted with abdominal pain , nausea poor appetite and bloody vomiting , alcohol use. Patient admitted to medicine and seen in consult by GI and cardio. Patient had an egd on 5/10 showing - Also 1 unit of platelets given but no prbcs

## 2022-05-11 NOTE — PROGRESS NOTE ADULT - ASSESSMENT
A/P: A/P:  65 y/o M with a PMHx MI/CAD s/p PCI (LEELA x 1 pRCA 2007), HTN, depression, former heavy EtOH abuse, and HLD who presented to the ED with complaints of hematemesis. Patient states that he had a beer on Thursday night, but overall does not drink often anymore. Patient states that on Saturday he felt very weak and tired, and then had some nausea and emesis with some pink color. Pt also noted dark black colored stools as well over the weekend. Patient states that the symptoms occurred on Sunday as well, so he came to the ER to be evaluated. Patient is planned for EGD, and cardiology evaluation is requested. Patient follows with Dr. Ricci, but often isn't seen for years at a time. Patient's last NST was 2018 with no ischemia. Patient with METS<4, but denies any chest pain or dyspnea. Patient denies fevers, chills, syncope, near syncope, headache, or dizziness.   Troponin negative x 1

## 2022-05-11 NOTE — PROGRESS NOTE ADULT - SUBJECTIVE AND OBJECTIVE BOX
Patient is a 66y old  Male who presents with a chief complaint of vomiting blood , abdominal pain (10 May 2022 16:11)      HPI:  65 yo M with h/o HTN , CAD , depression smoker presented to the ED with c/o vomiting bloody pink color  started Friday to saturday morning associated with abdominal discomfort , nausea . He had daily vomiting yesterday and today no blood , also reports dark  black  color stool since Friday . Pt is feeling lightheaded , weak reports falling on Friday . He is c/o leg pain hip thigh area   Pt is also states that has not been able to eta since Friday can not hold food due to stomach discomfort         Patient  has no rectal bleeding or  melena .  Tolerating solid diet.  Waiting for hemoglobin today . NO abdominal pain        REVIEW OF SYSTEMS:  Constitutional: No fever, weight loss or fatigue  ENMT:  No difficulty hearing, tinnitus, vertigo; No sinus or throat pain  Respiratory: No cough, wheezing, chills or hemoptysis  Cardiovascular: No chest pain, palpitations, dizziness or leg swelling  Gastrointestinal: No abdominal or epigastric pain. No nausea, vomiting or hematemesis; No diarrhea or constipation. No melena or hematochezia.  Skin: No itching, burning, rashes or lesions   Musculoskeletal: No joint pain or swelling; No muscle, back or extremity pain    PAST MEDICAL & SURGICAL HISTORY:  Pancreatitis      Hypertension      Myocardial infarct      Alcohol abuse      Colon cancer      History of colon resection      History of heart surgery  cardiac stent          FAMILY HISTORY:  FH: prostate cancer (Father)    Family history of atherosclerosis (Mother)        SOCIAL HISTORY:  Smoking Status: [ ] Current, [ ] Former, [ ] Never  Pack Years:  [  ] EtOH-no  [  ] IVDA    MEDICATIONS:  MEDICATIONS  (STANDING):  dextrose 5%. 1000 milliLiter(s) (50 mL/Hr) IV Continuous <Continuous>  dextrose 5%. 1000 milliLiter(s) (100 mL/Hr) IV Continuous <Continuous>  dextrose 50% Injectable 25 Gram(s) IV Push once  dextrose 50% Injectable 12.5 Gram(s) IV Push once  dextrose 50% Injectable 25 Gram(s) IV Push once  glucagon  Injectable 1 milliGRAM(s) IntraMuscular once  insulin lispro (ADMELOG) corrective regimen sliding scale   SubCutaneous three times a day before meals  octreotide  Infusion 50 MICROgram(s)/Hr (10 mL/Hr) IV Continuous <Continuous>  pantoprazole  Injectable 40 milliGRAM(s) IV Push two times a day  potassium phosphate / sodium phosphate Tablet (K-PHOS No. 2) 1 Tablet(s) Oral three times a day with meals    MEDICATIONS  (PRN):  acetaminophen     Tablet .. 650 milliGRAM(s) Oral every 6 hours PRN Moderate Pain (4 - 6)  aluminum hydroxide/magnesium hydroxide/simethicone Suspension 30 milliLiter(s) Oral every 4 hours PRN Dyspepsia  dextrose Oral Gel 15 Gram(s) Oral once PRN Blood Glucose LESS THAN 70 milliGRAM(s)/deciliter  ondansetron Injectable 4 milliGRAM(s) IV Push four times a day PRN Nausea and/or Vomiting      Allergies    No Known Allergies    Intolerances        Vital Signs Last 24 Hrs  T(C): 36.9 (11 May 2022 08:33), Max: 37.2 (11 May 2022 01:22)  T(F): 98.4 (11 May 2022 08:33), Max: 99 (11 May 2022 01:22)  HR: 90 (11 May 2022 08:33) (87 - 94)  BP: 148/82 (11 May 2022 08:33) (128/74 - 148/82)  BP(mean): --  RR: 18 (11 May 2022 08:33) (18 - 18)  SpO2: 96% (11 May 2022 08:33) (95% - 96%)    05-10 @ 07:01  -  05-11 @ 07:00  --------------------------------------------------------  IN: 0 mL / OUT: 100 mL / NET: -100 mL          PHYSICAL EXAM:    General: ; in no acute distress  HEENT: MMM, conjunctiva and sclera clear  H-RRR  L-CTA  Gastrointestinal: Soft, non-tender non-distended; Normal bowel sounds; No rebound or guarding  Extremities: Normal range of motion, No clubbing, cyanosis or edema  Neurological: Alert and oriented x3  Skin: Warm and dry. No obvious rash      LABS:                        12.2   4.15  )-----------( 58       ( 11 May 2022 08:27 )             34.2     11 May 2022 08:27    137    |  102    |  9.4    ----------------------------<  147    3.4     |  25.0   |  0.40     Ca    9.3        11 May 2022 08:27  Phos  1.2       10 May 2022 03:03  Mg     2.5       10 May 2022 03:03    TPro  6.2    /  Alb  3.6    /  TBili  1.2    /  DBili  x      /  AST  60     /  ALT  38     /  AlkPhos  116    / Amylase x      /Lipase x      11 May 2022 08:27              RADIOLOGY & ADDITIONAL STUDIES:     < from: US Abdomen Upper Quadrant Right (05.09.22 @ 13:37) >  IMPRESSION:  No gallbladder disease or other acute finding. Fatty liver.        < end of copied text >   Patient is a 66y old  Male who presents with a chief complaint of vomiting blood , abdominal pain (10 May 2022 16:11)      HPI:  67 yo M with h/o HTN , CAD , depression smoker presented to the ED with c/o vomiting bloody pink color  started Friday to saturday morning associated with abdominal discomfort , nausea . He had daily vomiting yesterday and today no blood , also reports dark  black  color stool since Friday . Pt is feeling lightheaded , weak reports falling on Friday . He is c/o leg pain hip thigh area   Pt is also states that has not been able to eta since Friday can not hold food due to stomach discomfort         Patient  has no rectal bleeding or  melena .  Tolerating solid diet.  Waiting for hemoglobin today . NO abdominal pain. EGD showed white's portal hypertensive gastropathy and duodenitis         REVIEW OF SYSTEMS:  Constitutional: No fever, weight loss or fatigue  ENMT:  No difficulty hearing, tinnitus, vertigo; No sinus or throat pain  Respiratory: No cough, wheezing, chills or hemoptysis  Cardiovascular: No chest pain, palpitations, dizziness or leg swelling  Gastrointestinal: No abdominal or epigastric pain. No nausea, vomiting or hematemesis; No diarrhea or constipation. No melena or hematochezia.  Skin: No itching, burning, rashes or lesions   Musculoskeletal: No joint pain or swelling; No muscle, back or extremity pain    PAST MEDICAL & SURGICAL HISTORY:  Pancreatitis      Hypertension      Myocardial infarct      Alcohol abuse      Colon cancer      History of colon resection      History of heart surgery  cardiac stent          FAMILY HISTORY:  FH: prostate cancer (Father)    Family history of atherosclerosis (Mother)        SOCIAL HISTORY:  Smoking Status: [ ] Current, [ ] Former, [ ] Never  Pack Years:  [  ] EtOH-no  [  ] IVDA    MEDICATIONS:  MEDICATIONS  (STANDING):  dextrose 5%. 1000 milliLiter(s) (50 mL/Hr) IV Continuous <Continuous>  dextrose 5%. 1000 milliLiter(s) (100 mL/Hr) IV Continuous <Continuous>  dextrose 50% Injectable 25 Gram(s) IV Push once  dextrose 50% Injectable 12.5 Gram(s) IV Push once  dextrose 50% Injectable 25 Gram(s) IV Push once  glucagon  Injectable 1 milliGRAM(s) IntraMuscular once  insulin lispro (ADMELOG) corrective regimen sliding scale   SubCutaneous three times a day before meals  octreotide  Infusion 50 MICROgram(s)/Hr (10 mL/Hr) IV Continuous <Continuous>  pantoprazole  Injectable 40 milliGRAM(s) IV Push two times a day  potassium phosphate / sodium phosphate Tablet (K-PHOS No. 2) 1 Tablet(s) Oral three times a day with meals    MEDICATIONS  (PRN):  acetaminophen     Tablet .. 650 milliGRAM(s) Oral every 6 hours PRN Moderate Pain (4 - 6)  aluminum hydroxide/magnesium hydroxide/simethicone Suspension 30 milliLiter(s) Oral every 4 hours PRN Dyspepsia  dextrose Oral Gel 15 Gram(s) Oral once PRN Blood Glucose LESS THAN 70 milliGRAM(s)/deciliter  ondansetron Injectable 4 milliGRAM(s) IV Push four times a day PRN Nausea and/or Vomiting      Allergies    No Known Allergies    Intolerances        Vital Signs Last 24 Hrs  T(C): 36.9 (11 May 2022 08:33), Max: 37.2 (11 May 2022 01:22)  T(F): 98.4 (11 May 2022 08:33), Max: 99 (11 May 2022 01:22)  HR: 90 (11 May 2022 08:33) (87 - 94)  BP: 148/82 (11 May 2022 08:33) (128/74 - 148/82)  BP(mean): --  RR: 18 (11 May 2022 08:33) (18 - 18)  SpO2: 96% (11 May 2022 08:33) (95% - 96%)    05-10 @ 07:01  -  05-11 @ 07:00  --------------------------------------------------------  IN: 0 mL / OUT: 100 mL / NET: -100 mL          PHYSICAL EXAM:    General: ; in no acute distress  HEENT: MMM, conjunctiva and sclera clear  H-RRR  L-CTA  Gastrointestinal: Soft, non-tender non-distended; Normal bowel sounds; No rebound or guarding  Extremities: Normal range of motion, No clubbing, cyanosis or edema  Neurological: Alert and oriented x3  Skin: Warm and dry. No obvious rash      LABS:                        12.2   4.15  )-----------( 58       ( 11 May 2022 08:27 )             34.2     11 May 2022 08:27    137    |  102    |  9.4    ----------------------------<  147    3.4     |  25.0   |  0.40     Ca    9.3        11 May 2022 08:27  Phos  1.2       10 May 2022 03:03  Mg     2.5       10 May 2022 03:03    TPro  6.2    /  Alb  3.6    /  TBili  1.2    /  DBili  x      /  AST  60     /  ALT  38     /  AlkPhos  116    / Amylase x      /Lipase x      11 May 2022 08:27              RADIOLOGY & ADDITIONAL STUDIES:     < from: US Abdomen Upper Quadrant Right (05.09.22 @ 13:37) >  IMPRESSION:  No gallbladder disease or other acute finding. Fatty liver.        < end of copied text >

## 2022-05-11 NOTE — PATIENT PROFILE ADULT - FALL HARM RISK - HARM RISK INTERVENTIONS

## 2022-05-11 NOTE — PHYSICAL THERAPY INITIAL EVALUATION ADULT - ADDITIONAL COMMENTS
pt lives alone on a housebarge with no stairs. he has to walk down the dock and across the ramp to the barge. he has a cane and RW from prior hip surgery. was not using them. states he will not be able to fit the RW on the barge, but agreeable to using it in the community.

## 2022-05-11 NOTE — PROGRESS NOTE ADULT - ASSESSMENT
65 yo M smoker , with h/o CAD , HTN , anxiety admitted with abdominal pain , nausea poor appetite and bloody vomiting , alcohol use. Patient admitted to medicine and seen in consult by GI and cardio. Patient had an egd on 5/10 showing - Also 1 unit of platelets given but no prbcs required      Impressions:      Irregularity at 40 cm in the Z-line and gastroesophageal junction.      Congestion, petechiae and mosaic mucosal pattern in the stomach.      Erythema in the Bulb compatible with duodenitis. Path taken       #Epigastric pain /nausea vomiting EGD performed   duodenitis , possible Derrek's esophagitis   no vomiting   no ulcer active bleed   cont protonix bid , dc octreotide  advance diet   d/w Gi team     Hypokalemia / hypomagnesemia   replete    #fatty liver due to chronic alcohol  counseling provided to stop     #h/o CAD / HTN / acute chf 0- spoke to cardio  - for diagnostic cath in am   - npo pmn   - spoke to GI , recommended ct abd, will order without IV contrast;  given the  shortage ( to assess for cirrhosis)    #Alcohol use cronic , mild   no sign of withdrawal will monitor     #h/o Diabetes Mellitus old records reviewed   dc ssi   a1c 5    #-Anxiety disorder   cont sertraline     #thrombocytopenia - from bm suppression  -  given 1 unit of platelets already     #Frequent falls with left leg pain   h/o left femur fx surgery in the past limping   ct hip showing healing frcture    DVT prophylaxis   no chemical prophylaxis  due to gi bleed

## 2022-05-11 NOTE — PHYSICAL THERAPY INITIAL EVALUATION ADULT - MANUAL MUSCLE TESTING RESULTS, REHAB EVAL
bilateral shoulder flex, elbow flex WFL; right hip flex, knee ext, ankle df WFL; left hip flex 3+/5, knee ext 4-/5, ankle df WFL

## 2022-05-11 NOTE — PROGRESS NOTE ADULT - SUBJECTIVE AND OBJECTIVE BOX
BRIE WEBB    [id number]    66y      Male    INTERVAL HPI/OVERNIGHT EVENTS: patient being seen for gi bleed and chf. Patient seen at bedside and complains of continued fatigue.     patient is s.p egd yesterday     REVIEW OF SYSTEMS:    CONSTITUTIONAL: fatigue  RESPIRATORY: No cough, wheezing, hemoptysis; No shortness of breath  CARDIOVASCULAR: No chest pain, palpitations  GASTROINTESTINAL: No abdominal or epigastric pain. No nausea, vomiting  NEUROLOGICAL: No headaches, memory loss, loss of strength.  MISCELLANEOUS:      Vital Signs Last 24 Hrs  T(C): 36.9 (11 May 2022 08:33), Max: 37.2 (11 May 2022 01:22)  T(F): 98.4 (11 May 2022 08:33), Max: 99 (11 May 2022 01:22)  HR: 90 (11 May 2022 08:33) (87 - 94)  BP: 148/82 (11 May 2022 08:33) (128/74 - 148/82)  BP(mean): --  RR: 18 (11 May 2022 08:33) (18 - 18)  SpO2: 96% (11 May 2022 08:33) (95% - 96%)    PHYSICAL EXAM:  General: awake alert , no distress  ENT: dry mouth   Neck: supple, no JVD   Lungs: CTA bilateral   Cardio: Regular rate , S1/S2, No murmur  Abdomen: Soft, Nontender, Nondistended; Bowel sounds present  Extremities: No calf tenderness, No pitting edema  NEURO aao x4    LABS:                        12.2   4.15  )-----------( 58       ( 11 May 2022 08:27 )             34.2     05-11    137  |  102  |  9.4  ----------------------------<  147<H>  3.4<L>   |  25.0  |  0.40<L>    Ca    9.3      11 May 2022 08:27  Phos  1.2     05-10  Mg     2.5     05-10    TPro  6.2<L>  /  Alb  3.6  /  TBili  1.2  /  DBili  x   /  AST  60<H>  /  ALT  38  /  AlkPhos  116  05-11        MEDICATIONS  (STANDING):  losartan 25 milliGRAM(s) Oral daily  metoprolol succinate ER 25 milliGRAM(s) Oral daily  pantoprazole  Injectable 40 milliGRAM(s) IV Push two times a day  potassium chloride   Solution 40 milliEquivalent(s) Oral once  potassium phosphate / sodium phosphate Tablet (K-PHOS No. 2) 1 Tablet(s) Oral three times a day with meals    MEDICATIONS  (PRN):  acetaminophen     Tablet .. 650 milliGRAM(s) Oral every 6 hours PRN Moderate Pain (4 - 6)  aluminum hydroxide/magnesium hydroxide/simethicone Suspension 30 milliLiter(s) Oral every 4 hours PRN Dyspepsia  dextrose Oral Gel 15 Gram(s) Oral once PRN Blood Glucose LESS THAN 70 milliGRAM(s)/deciliter  ondansetron Injectable 4 milliGRAM(s) IV Push four times a day PRN Nausea and/or Vomiting      RADIOLOGY & ADDITIONAL TESTS:  ct abd and pelvis without contrast ordered

## 2022-05-11 NOTE — PROGRESS NOTE ADULT - PROBLEM SELECTOR PLAN 1
- Newly reduced LVEF of 30% with RWMA.   - Patient asymptomatic.   - Does not appear overloaded on exam.   - Will need LHC, but  needs to be cleared for DAPT by GI.   - Start losartan 25mg PO qday and Toprol XL 25mg PO qday.   - Will need f/u with primary Cardiologist Dr. Ricci for optimization.

## 2022-05-12 LAB
ALBUMIN SERPL ELPH-MCNC: 3.5 G/DL — SIGNIFICANT CHANGE UP (ref 3.3–5.2)
ALP SERPL-CCNC: 113 U/L — SIGNIFICANT CHANGE UP (ref 40–120)
ALT FLD-CCNC: 33 U/L — SIGNIFICANT CHANGE UP
ANION GAP SERPL CALC-SCNC: 12 MMOL/L — SIGNIFICANT CHANGE UP (ref 5–17)
APTT BLD: 27.2 SEC — LOW (ref 27.5–35.5)
AST SERPL-CCNC: 49 U/L — HIGH
BASOPHILS # BLD AUTO: 0.06 K/UL — SIGNIFICANT CHANGE UP (ref 0–0.2)
BASOPHILS NFR BLD AUTO: 1.2 % — SIGNIFICANT CHANGE UP (ref 0–2)
BILIRUB SERPL-MCNC: 1 MG/DL — SIGNIFICANT CHANGE UP (ref 0.4–2)
BUN SERPL-MCNC: 9 MG/DL — SIGNIFICANT CHANGE UP (ref 8–20)
CALCIUM SERPL-MCNC: 9.5 MG/DL — SIGNIFICANT CHANGE UP (ref 8.6–10.2)
CHLORIDE SERPL-SCNC: 100 MMOL/L — SIGNIFICANT CHANGE UP (ref 98–107)
CO2 SERPL-SCNC: 23 MMOL/L — SIGNIFICANT CHANGE UP (ref 22–29)
CREAT SERPL-MCNC: 0.4 MG/DL — LOW (ref 0.5–1.3)
EGFR: 121 ML/MIN/1.73M2 — SIGNIFICANT CHANGE UP
EOSINOPHIL # BLD AUTO: 0.14 K/UL — SIGNIFICANT CHANGE UP (ref 0–0.5)
EOSINOPHIL NFR BLD AUTO: 2.8 % — SIGNIFICANT CHANGE UP (ref 0–6)
GLUCOSE SERPL-MCNC: 142 MG/DL — HIGH (ref 70–99)
HCT VFR BLD CALC: 33.7 % — LOW (ref 39–50)
HGB BLD-MCNC: 12 G/DL — LOW (ref 13–17)
IMM GRANULOCYTES NFR BLD AUTO: 0.4 % — SIGNIFICANT CHANGE UP (ref 0–1.5)
INR BLD: 1.14 RATIO — SIGNIFICANT CHANGE UP (ref 0.88–1.16)
LYMPHOCYTES # BLD AUTO: 1.54 K/UL — SIGNIFICANT CHANGE UP (ref 1–3.3)
LYMPHOCYTES # BLD AUTO: 31 % — SIGNIFICANT CHANGE UP (ref 13–44)
MAGNESIUM SERPL-MCNC: 1.6 MG/DL — LOW (ref 1.8–2.6)
MCHC RBC-ENTMCNC: 35.6 GM/DL — SIGNIFICANT CHANGE UP (ref 32–36)
MCHC RBC-ENTMCNC: 38.8 PG — HIGH (ref 27–34)
MCV RBC AUTO: 109.1 FL — HIGH (ref 80–100)
MONOCYTES # BLD AUTO: 0.66 K/UL — SIGNIFICANT CHANGE UP (ref 0–0.9)
MONOCYTES NFR BLD AUTO: 13.3 % — SIGNIFICANT CHANGE UP (ref 2–14)
NEUTROPHILS # BLD AUTO: 2.55 K/UL — SIGNIFICANT CHANGE UP (ref 1.8–7.4)
NEUTROPHILS NFR BLD AUTO: 51.3 % — SIGNIFICANT CHANGE UP (ref 43–77)
PLATELET # BLD AUTO: 66 K/UL — LOW (ref 150–400)
POTASSIUM SERPL-MCNC: 3.7 MMOL/L — SIGNIFICANT CHANGE UP (ref 3.5–5.3)
POTASSIUM SERPL-SCNC: 3.7 MMOL/L — SIGNIFICANT CHANGE UP (ref 3.5–5.3)
PROT SERPL-MCNC: 6.2 G/DL — LOW (ref 6.6–8.7)
PROTHROM AB SERPL-ACNC: 13.2 SEC — SIGNIFICANT CHANGE UP (ref 10.5–13.4)
RBC # BLD: 3.09 M/UL — LOW (ref 4.2–5.8)
RBC # FLD: 11 % — SIGNIFICANT CHANGE UP (ref 10.3–14.5)
SODIUM SERPL-SCNC: 135 MMOL/L — SIGNIFICANT CHANGE UP (ref 135–145)
WBC # BLD: 4.97 K/UL — SIGNIFICANT CHANGE UP (ref 3.8–10.5)
WBC # FLD AUTO: 4.97 K/UL — SIGNIFICANT CHANGE UP (ref 3.8–10.5)

## 2022-05-12 PROCEDURE — 99497 ADVNCD CARE PLAN 30 MIN: CPT

## 2022-05-12 PROCEDURE — 93458 L HRT ARTERY/VENTRICLE ANGIO: CPT | Mod: 26

## 2022-05-12 PROCEDURE — 99152 MOD SED SAME PHYS/QHP 5/>YRS: CPT

## 2022-05-12 PROCEDURE — 99233 SBSQ HOSP IP/OBS HIGH 50: CPT

## 2022-05-12 RX ORDER — SODIUM CHLORIDE 9 MG/ML
250 INJECTION INTRAMUSCULAR; INTRAVENOUS; SUBCUTANEOUS
Refills: 0 | Status: DISCONTINUED | OUTPATIENT
Start: 2022-05-12 | End: 2022-05-17

## 2022-05-12 RX ORDER — ASCORBIC ACID 60 MG
500 TABLET,CHEWABLE ORAL DAILY
Refills: 0 | Status: DISCONTINUED | OUTPATIENT
Start: 2022-05-12 | End: 2022-05-17

## 2022-05-12 RX ORDER — MAGNESIUM SULFATE 500 MG/ML
2 VIAL (ML) INJECTION ONCE
Refills: 0 | Status: COMPLETED | OUTPATIENT
Start: 2022-05-12 | End: 2022-05-12

## 2022-05-12 RX ORDER — POTASSIUM CHLORIDE 20 MEQ
20 PACKET (EA) ORAL
Refills: 0 | Status: COMPLETED | OUTPATIENT
Start: 2022-05-12 | End: 2022-05-13

## 2022-05-12 RX ORDER — FOLIC ACID 0.8 MG
1 TABLET ORAL DAILY
Refills: 0 | Status: DISCONTINUED | OUTPATIENT
Start: 2022-05-12 | End: 2022-05-17

## 2022-05-12 RX ORDER — THIAMINE MONONITRATE (VIT B1) 100 MG
100 TABLET ORAL DAILY
Refills: 0 | Status: DISCONTINUED | OUTPATIENT
Start: 2022-05-12 | End: 2022-05-17

## 2022-05-12 RX ADMIN — Medication 25 MILLIGRAM(S): at 05:56

## 2022-05-12 RX ADMIN — PANTOPRAZOLE SODIUM 40 MILLIGRAM(S): 20 TABLET, DELAYED RELEASE ORAL at 16:27

## 2022-05-12 RX ADMIN — Medication 25 GRAM(S): at 10:09

## 2022-05-12 RX ADMIN — Medication 100 MILLIGRAM(S): at 16:28

## 2022-05-12 RX ADMIN — PANTOPRAZOLE SODIUM 40 MILLIGRAM(S): 20 TABLET, DELAYED RELEASE ORAL at 05:56

## 2022-05-12 RX ADMIN — SERTRALINE 100 MILLIGRAM(S): 25 TABLET, FILM COATED ORAL at 16:28

## 2022-05-12 RX ADMIN — Medication 1 MILLIGRAM(S): at 10:09

## 2022-05-12 RX ADMIN — Medication 500 MILLIGRAM(S): at 10:09

## 2022-05-12 RX ADMIN — LOSARTAN POTASSIUM 25 MILLIGRAM(S): 100 TABLET, FILM COATED ORAL at 05:56

## 2022-05-12 NOTE — PROGRESS NOTE ADULT - SUBJECTIVE AND OBJECTIVE BOX
BRIE WEBB    [id number]    66y      Male    INTERVAL HPI/OVERNIGHT EVENTS: patient being seen for gi bleed and chf. Patient seen at bedside and complains of continued fatigue.   Pt examined in Cath labs s/p Rt radial LHC   CAD noted, CT surgery consulted     REVIEW OF SYSTEMS:    CONSTITUTIONAL: fatigue  RESPIRATORY: No cough, wheezing, hemoptysis; No shortness of breath  CARDIOVASCULAR: No chest pain, palpitations  GASTROINTESTINAL: No abdominal or epigastric pain. No nausea, vomiting  NEUROLOGICAL: No headaches, memory loss, loss of strength.  MISCELLANEOUS:      Vital Signs Last 24 Hrs  T(C): 36.7 (12 May 2022 15:53), Max: 36.7 (12 May 2022 01:20)  T(F): 98.1 (12 May 2022 15:53), Max: 98.1 (12 May 2022 15:53)  HR: 85 (12 May 2022 15:53) (70 - 87)  BP: 138/84 (12 May 2022 15:53) (129/76 - 169/113)  BP(mean): --  RR: 18 (12 May 2022 15:53) (16 - 19)  SpO2: 100% (12 May 2022 15:53) (96% - 100%)    PHYSICAL EXAM:  General: awake alert , no distress  ENT: dry mouth   Neck: supple, no JVD   Lungs: CTA bilateral   Cardio: Regular rate , S1/S2, No murmur  Abdomen: Soft, Nontender, Nondistended; Bowel sounds present  Extremities: No calf tenderness, No pitting edema  NEURO aao x4    LABS:                                   12.0   4.97  )-----------( 66       ( 12 May 2022 06:53 )             33.7   05-12    135  |  100  |  9.0  ----------------------------<  142<H>  3.7   |  23.0  |  0.40<L>    Ca    9.5      12 May 2022 06:53  Mg     1.6     05-12    TPro  6.2<L>  /  Alb  3.5  /  TBili  1.0  /  DBili  x   /  AST  49<H>  /  ALT  33  /  AlkPhos  113  05-12        MEDICATIONS  (STANDING):  ascorbic acid 500 milliGRAM(s) Oral daily  folic acid 1 milliGRAM(s) Oral daily  losartan 25 milliGRAM(s) Oral daily  metoprolol succinate ER 25 milliGRAM(s) Oral daily  pantoprazole  Injectable 40 milliGRAM(s) IV Push two times a day  potassium phosphate / sodium phosphate Tablet (K-PHOS No. 2) 1 Tablet(s) Oral three times a day with meals  sertraline 100 milliGRAM(s) Oral daily  sodium chloride 0.9%. 250 milliLiter(s) (250 mL/Hr) IV Continuous <Continuous>  sodium chloride 0.9%. 250 milliLiter(s) (250 mL/Hr) IV Continuous <Continuous>  thiamine 100 milliGRAM(s) Oral daily    MEDICATIONS  (PRN):  acetaminophen     Tablet .. 650 milliGRAM(s) Oral every 6 hours PRN Moderate Pain (4 - 6)  aluminum hydroxide/magnesium hydroxide/simethicone Suspension 30 milliLiter(s) Oral every 4 hours PRN Dyspepsia  artificial  tears Solution 1 Drop(s) Both EYES two times a day PRN Dry Eyes  ondansetron Injectable 4 milliGRAM(s) IV Push four times a day PRN Nausea and/or Vomiting

## 2022-05-12 NOTE — PROGRESS NOTE ADULT - SUBJECTIVE AND OBJECTIVE BOX
[facility]                                                         CARDIOLOGY AT [facility]                                                                  [address] [facility], [facility] [place] [address]                                                         Telephone: [phone]. Fax:[phone]                                                                             PROGRESS NOTE    Reason for follow up: Hanny-operative Cardiac Risk Stratification  Update: Patient underwent EGD that showed portal hypertensive gastropathy. Patient's echo showed newly reduced LVEF 30% with new RWMA. Patient presents today for [facility].       Review of symptoms:   Cardiac:  No chest pain. No dyspnea. No palpitations.  Respiratory: no cough. No dyspnea  Gastrointestinal: No diarrhea. No abdominal pain. No bleeding.   Neuro: No focal neuro complaints.    Vitals:  T(C): 36.6 (05-12-22 @ 08:22), Max: 37.1 (05-11-22 @ 16:00)  HR: 70 (05-12-22 @ 08:22) (70 - 88)  BP: 137/83 (05-12-22 @ 08:22) (126/68 - 144/87)  RR: 18 (05-12-22 @ 08:22) (17 - 18)  SpO2: 96% (05-12-22 @ 08:22) (96% - 98%)  Wt(kg): --  I&O's Summary    Weight (kg): 76.2 (05-09 @ 09:34)    PHYSICAL EXAM:  Appearance: Comfortable. No acute distress  HEENT:  Atraumatic. Normocephalic.  Normal oral mucosa  Neurologic: A & O x 3, no gross focal deficits.  Cardiovascular: RRR S1 S2, No murmur, no rubs/gallops. No JVD  Respiratory: Lungs clear to auscultation, unlabored   Gastrointestinal:  Soft, Non-tender, + BS  Lower Extremities: 2+ Peripheral Pulses, No clubbing, cyanosis, or edema  Psychiatry: Patient is calm. No agitation.   Skin: warm and dry.  ASA 3  Mallampati 2  BRA 1.1%    creat 0.4    CURRENT CARDIAC MEDICATIONS:  losartan 25 milliGRAM(s) Oral daily  metoprolol succinate ER 25 milliGRAM(s) Oral daily      CURRENT OTHER MEDICATIONS:  acetaminophen     Tablet .. 650 milliGRAM(s) Oral every 6 hours PRN Moderate Pain (4 - 6)  ondansetron Injectable 4 milliGRAM(s) IV Push four times a day PRN Nausea and/or Vomiting  sertraline 100 milliGRAM(s) Oral daily  aluminum hydroxide/magnesium hydroxide/simethicone Suspension 30 milliLiter(s) Oral every 4 hours PRN Dyspepsia  pantoprazole  Injectable 40 milliGRAM(s) IV Push two times a day  ascorbic acid 500 milliGRAM(s) Oral daily  folic acid 1 milliGRAM(s) Oral daily  potassium phosphate / sodium phosphate Tablet (K-PHOS No. 2) 1 Tablet(s) Oral three times a day with meals, Stop order after: 3 Doses  sodium chloride 0.9%. 250 milliLiter(s) (250 mL/Hr) IV Continuous <Continuous>, Stop order after: 1 Hours  thiamine 100 milliGRAM(s) Oral daily      LABS:	 	  ( 09 May 2022 11:34 )  Troponin T  <0.01,  CPK  X    , CKMB  X    , BNP X                                  12.0   4.97  )-----------( 67       ( 12 May 2022 06:53 )             33.7     05-12    135  |  100  |  9.0  ----------------------------<  142<H>  3.7   |  23.0  |  0.40<L>    Ca    9.5      12 May 2022 06:53  Mg     1.6     05-12    TPro  6.2<L>  /  Alb  3.5  /  TBili  1.0  /  DBili  x   /  AST  49<H>  /  ALT  33  /  AlkPhos  113  05-12    PT/INR/PTT ( 12 May 2022 06:53 )                       :                       :      13.2         :       27.2                  .        .                   .              .           .       1.14        .                                       TSH: Thyroid Stimulating Hormone, Serum: 0.71 uIU/mL      TELEMETRY: NSR 70s  ECG: < from: 12 Lead ECG (05.09.22 @ 16:38) >  Ventricular Rate 97 BPM    Atrial Rate 97 BPM    P-R Interval 140 ms    QRS Duration 84 ms    Q-T Interval 364 ms    QTC Calculation(Bazett) 462 ms    P Axis 68 degrees    R Axis 58 degrees    T Axis 53 degrees    Diagnosis Line *** Poor data quality, interpretation may be adversely affected  Sinus rhythm with occasional Premature ventricular complexes  Possible Inferior infarct , age undetermined  Anterior infarct , age undetermined  Abnormal ECG    Confirmed by JOSEFINA JONES ([id number]) on 5/9/2022 5:18:17 PM    < end of copied text >      DIAGNOSTIC TESTING:  [ ] Echocardiogram:   < from: TTE Echo Complete w/ Contrast w/ Doppler (05.10.22 @ 17:24) >  PHYSICIAN INTERPRETATION:  Left Ventricle: Endocardial visualization was enhanced with intravenous   echo contrast. The left ventricular internal cavity size is normal.  Moderately decreased segmental left ventricular systolic function.   Spectral Doppler shows impaired relaxation pattern of left ventricular   myocardial filling (Grade I diastolic dysfunction). Normal LV filling   pressures.      LV Wall Scoring:  The entire apex and mid and apical anterior wall are akinetic. The mid  anteroseptal segment is hypokinetic.    Right Ventricle: Normal right ventricular size and function.  Left Atrium: The left atrium is normal in size.  Right Atrium: The right atrium is normal in size.  Pericardium: There is no evidence of pericardial effusion. There is a   significant pericardial fat pad present.  Mitral Valve: The mitral valve is normal in structure. Mitral leaflet   mobility is normal. Trace mitral valve regurgitation is seen.  Tricuspid Valve: Trivial tricuspid regurgitation is visualized. Adequate   TR velocity was not obtained to accurately assess RVSP.  Aortic Valve: The aortic valve is trileaflet. Sclerotic aortic valve with   normal opening. No evidence of aortic valve regurgitation is seen.  Pulmonic Valve: The pulmonic valve was not well visualized. Trace   pulmonic valve regurgitation.  Aorta: The aortic root is normal in size and structure.  Pulmonary Artery: The pulmonary artery is not well seen.  Venous:The inferior vena cava was normal sized, with respiratory size   variation greater than 50%.  In comparison to the previous echocardiogram(s): There are no prior   studies on this patient for comparison purposes.      Summary:   1. Endocardial visualization was enhanced with intravenous echo contrast.   2. LV Ejection Fraction by Blake's Method with a biplane EF of 30 %.   3. Moderately decreased segmental left ventricular systolic function.   4. Entire apex, mid and apical anterior wall, and mid anteroseptal   segment are abnormal as described above.   5. Spectral Doppler shows impaired relaxation pattern of left   ventricular myocardial filling (Grade I diastolic dysfunction).   6. Normal right ventricular size and function.   7. Trace mitral valve regurgitation.   8. Sclerotic aortic valve with normal opening.   9. Adequate TR velocity was not obtained to accurately assess RVSP.    Adebayo Echeverria MD Electronically signed on 5/11/2022 at 7:22:52 AM    < end of copied text >    [ ]  Catheterization: RCA stent x 1 2007 at [facility] as per patient  [ ] Stress Test:  2018--No ischemia  OTHER: 	                                                                [facility]                                                         CARDIOLOGY AT [facility]                                                                  [address] [facility], [place] [address]                                                         Telephone: [phone]. Fax:[phone]                                                                             PROGRESS NOTE    Reason for follow up: Hanny-operative Cardiac Risk Stratification  Update: Patient underwent EGD that showed portal hypertensive gastropathy. Patient's echo showed newly reduced LVEF 30% with RWMA. Patient presents today for [facility].       Review of symptoms:   Cardiac:  No chest pain. No dyspnea. No palpitations.  Respiratory: no cough. No dyspnea  Gastrointestinal: No diarrhea. No abdominal pain. No bleeding.   Neuro: No focal neuro complaints.    Vitals:  T(C): 36.6 (05-12-22 @ 08:22), Max: 37.1 (05-11-22 @ 16:00)  HR: 70 (05-12-22 @ 08:22) (70 - 88)  BP: 137/83 (05-12-22 @ 08:22) (126/68 - 144/87)  RR: 18 (05-12-22 @ 08:22) (17 - 18)  SpO2: 96% (05-12-22 @ 08:22) (96% - 98%)  Wt(kg): --  I&O's Summary    Weight (kg): 76.2 (05-09 @ 09:34)    PHYSICAL EXAM:  Appearance: Comfortable. No acute distress  HEENT:  Atraumatic. Normocephalic.  Normal oral mucosa  Neurologic: A & O x 3, no gross focal deficits.  Cardiovascular: RRR S1 S2, No murmur, no rubs/gallops. No JVD  Respiratory: Lungs clear to auscultation, unlabored   Gastrointestinal:  Soft, Non-tender, + BS  Lower Extremities: 2+ Peripheral Pulses, No clubbing, cyanosis, or edema  Psychiatry: Patient is calm. No agitation.   Skin: warm and dry.  ASA 3  Mallampati 2  BRA 1.1%    creat 0.4    CURRENT CARDIAC MEDICATIONS:  losartan 25 milliGRAM(s) Oral daily  metoprolol succinate ER 25 milliGRAM(s) Oral daily      CURRENT OTHER MEDICATIONS:  acetaminophen     Tablet .. 650 milliGRAM(s) Oral every 6 hours PRN Moderate Pain (4 - 6)  ondansetron Injectable 4 milliGRAM(s) IV Push four times a day PRN Nausea and/or Vomiting  sertraline 100 milliGRAM(s) Oral daily  aluminum hydroxide/magnesium hydroxide/simethicone Suspension 30 milliLiter(s) Oral every 4 hours PRN Dyspepsia  pantoprazole  Injectable 40 milliGRAM(s) IV Push two times a day  ascorbic acid 500 milliGRAM(s) Oral daily  folic acid 1 milliGRAM(s) Oral daily  potassium phosphate / sodium phosphate Tablet (K-PHOS No. 2) 1 Tablet(s) Oral three times a day with meals, Stop order after: 3 Doses  sodium chloride 0.9%. 250 milliLiter(s) (250 mL/Hr) IV Continuous <Continuous>, Stop order after: 1 Hours  thiamine 100 milliGRAM(s) Oral daily      LABS:	 	  ( 09 May 2022 11:34 )  Troponin T  <0.01,  CPK  X    , CKMB  X    , BNP X                                  12.0   4.97  )-----------( 66       ( 12 May 2022 06:53 )             33.7     05-12    135  |  100  |  9.0  ----------------------------<  142<H>  3.7   |  23.0  |  0.40<L>    Ca    9.5      12 May 2022 06:53  Mg     1.6     05-12    TPro  6.2<L>  /  Alb  3.5  /  TBili  1.0  /  DBili  x   /  AST  49<H>  /  ALT  33  /  AlkPhos  113  05-12    PT/INR/PTT ( 12 May 2022 06:53 )                       :                       :      13.2         :       27.2                  .        .                   .              .           .       1.14        .                                       TSH: Thyroid Stimulating Hormone, Serum: 0.71 uIU/mL      TELEMETRY: NSR 70s  ECG: < from: 12 Lead ECG (05.09.22 @ 16:38) >  Ventricular Rate 97 BPM    Atrial Rate 97 BPM    P-R Interval 140 ms    QRS Duration 84 ms    Q-T Interval 364 ms    QTC Calculation(Bazett) 462 ms    P Axis 68 degrees    R Axis 58 degrees    T Axis 53 degrees    Diagnosis Line *** Poor data quality, interpretation may be adversely affected  Sinus rhythm with occasional Premature ventricular complexes  Possible Inferior infarct , age undetermined  Anterior infarct , age undetermined  Abnormal ECG    Confirmed by JOSEFINA JONES ([id number]) on 5/9/2022 5:18:17 PM    < end of copied text >      DIAGNOSTIC TESTING:  [ ] Echocardiogram:   < from: TTE Echo Complete w/ Contrast w/ Doppler (05.10.22 @ 17:24) >  PHYSICIAN INTERPRETATION:  Left Ventricle: Endocardial visualization was enhanced with intravenous   echo contrast. The left ventricular internal cavity size is normal.  Moderately decreased segmental left ventricular systolic function.   Spectral Doppler shows impaired relaxation pattern of left ventricular   myocardial filling (Grade I diastolic dysfunction). Normal LV filling   pressures.      LV Wall Scoring:  The entire apex and mid and apical anterior wall are akinetic. The mid  anteroseptal segment is hypokinetic.    Right Ventricle: Normal right ventricular size and function.  Left Atrium: The left atrium is normal in size.  Right Atrium: The right atrium is normal in size.  Pericardium: There is no evidence of pericardial effusion. There is a   significant pericardial fat pad present.  Mitral Valve: The mitral valve is normal in structure. Mitral leaflet   mobility is normal. Trace mitral valve regurgitation is seen.  Tricuspid Valve: Trivial tricuspid regurgitation is visualized. Adequate   TR velocity was not obtained to accurately assess RVSP.  Aortic Valve: The aortic valve is trileaflet. Sclerotic aortic valve with   normal opening. No evidence of aortic valve regurgitation is seen.  Pulmonic Valve: The pulmonic valve was not well visualized. Trace   pulmonic valve regurgitation.  Aorta: The aortic root is normal in size and structure.  Pulmonary Artery: The pulmonary artery is not well seen.  Venous:The inferior vena cava was normal sized, with respiratory size   variation greater than 50%.  In comparison to the previous echocardiogram(s): There are no prior   studies on this patient for comparison purposes.      Summary:   1. Endocardial visualization was enhanced with intravenous echo contrast.   2. LV Ejection Fraction by Blake's Method with a biplane EF of 30 %.   3. Moderately decreased segmental left ventricular systolic function.   4. Entire apex, mid and apical anterior wall, and mid anteroseptal   segment are abnormal as described above.   5. Spectral Doppler shows impaired relaxation pattern of left   ventricular myocardial filling (Grade I diastolic dysfunction).   6. Normal right ventricular size and function.   7. Trace mitral valve regurgitation.   8. Sclerotic aortic valve with normal opening.   9. Adequate TR velocity was not obtained to accurately assess RVSP.    Adebayo Echeverria MD Electronically signed on 5/11/2022 at 7:22:52 AM    < end of copied text >    [ ]  Catheterization: RCA stent x 1 2007 at [facility] as per patient  [ ] Stress Test:  2018--No ischemia  OTHER:

## 2022-05-12 NOTE — CONSULT NOTE ADULT - ASSESSMENT
65 yo M with h/o HTN , CAD, s/p PCI on Plavix , colon cancer s/p transverse colon resection, depression, smoker (1 packet per day), prior use of alcohol (now drink 1 beer per day) presented to the ED after hematemesis and melena. Patient reported bright blood emesis x 2 days, (Saturday 05/07 and Sunday 05/08, 2 times each day), and pink colored emesis yesterday. Patient seen and evaluated at bedside, reporting no complaints, no overnight events,                                                            .
A/P:  67 y/o M with a PMHx MI/CAD s/p PCI (LEELA x 1 pRCA 2007), HTN, depression, former heavy EtOH abuse, and HLD who presented to the ED with complaints of hematemesis. Patient states that he had a beer on Thursday night, but overall does not drink often anymore. Patient states that on Saturday he felt very weak and tired, and then had some nausea and emesis with some pink color. Pt also noted dark black colored stools as well over the weekend. Patient states that the symptoms occurred on Sunday as well, so he came to the ER to be evaluated.  Patient underwent EGD that showed portal hypertensive gastropathy.  Patient follows with Dr. Ricci, but often isn't seen for years at a time. Patient's last NST was 2018 with no ischemia. Patient with METS<4, but denies any chest pain or dyspnea. Patient denies fevers, chills, syncope, near syncope, headache, or dizziness. Troponin negative x 1.   Presents today for [facility] as part of cardiac evaluation which revealed 2v CAD.  CT Surgery to eval for CABG     Problem/Plan - 1:  ·  Problem: Acute systolic congestive heart failure.   ·  Plan: - Newly reduced LVEF of 30% with RWMA.   - Patient asymptomatic.   - Does not appear overloaded on exam.   - Hold losartan in preparation for surgery, continue Toprol XL 25mg PO qday.   - Will need f/u with primary Cardiologist Dr. Ricci for optimization.     Problem/Plan - 2:  ·  Problem: CAD (coronary artery disease).   ·  Plan: - Hx of PCI to pRCA 2007.   -significant 2v CAD on todays cath  - Now with newly reduced LVEF and RWMA as above.   - Plavix on hold for GI bleed.   -  toprol XL.   - Start statin when cleared by GI and LFTs have normalized.  - No s/ s of ACS.   Films reviewed wth Dr. Gray, he will speak to patient about surgery.  -Pre-op workup in the meantime - PFT's, carotids, labs, CXR,      Problem/Plan - 3:  ·  Problem: GI bleed.   ·  Plan: - GI following.    Problem 4 - Cirrhosis  -appears to be Estiven Class A  -Will ask GI to comment further in light of plan for surgical revascularization  -Discussed with Dr. Gray      
A/P:  67 y/o M with a PMHx MI/CAD s/p PCI (LEELA x 1 pRCA 2007), HTN, depression, former heavy EtOH abuse, and HLD who presented to the ED with complaints of hematemesis. Patient states that he had a beer on Thursday night, but overall does not drink often anymore. Patient states that on Saturday he felt very weak and tired, and then had some nausea and emesis with some pink color. Pt also noted dark black colored stools as well over the weekend. Patient states that the symptoms occurred on Sunday as well, so he came to the ER to be evaluated. Patient is planned for EGD, and cardiology evaluation is requested. Patient follows with Dr. Ricci, but often isn't seen for years at a time. Patient's last NST was 2018 with no ischemia. Patient with METS<4, but denies any chest pain or dyspnea. Patient denies fevers, chills, syncope, near syncope, headache, or dizziness.   Troponin negative x 1

## 2022-05-12 NOTE — PROGRESS NOTE ADULT - ASSESSMENT
65 yo M smoker , with h/o CAD , HTN , anxiety admitted with abdominal pain , nausea poor appetite and bloody vomiting , alcohol use. Patient admitted to medicine and seen in consult by GI and cardio. Patient had an egd on 5/10 showing - Also 1 unit of platelets given but no prbcs required      Impressions:      Irregularity at 40 cm in the Z-line and gastroesophageal junction.      Congestion, petechiae and mosaic mucosal pattern in the stomach.      Erythema in the Bulb compatible with duodenitis. Path taken       #Epigastric pain /nausea vomiting EGD performed   duodenitis , possible Derrek's esophagitis   no vomiting   no ulcer active bleed   cont protonix bid , dc octreotide  Diet advanced    Hypokalemia / hypomagnesemia   KCL 40 PO x 1     #fatty liver due to chronic alcohol  counseling provided to stop     #h/o CAD / HTN / acute chf 0- spoke to cardio  s/p LHC, plan for Cardiac surgery eval fpr significant CAD     #Alcohol use cronic , mild   no sign of withdrawal will monitor     #h/o Diabetes Mellitus old records reviewed   dc ssi   a1c 5    #-Anxiety disorder   cont sertraline     #thrombocytopenia - from bm suppression  -  given 1 unit of platelets already     #Frequent falls with left leg pain   h/o left femur fx surgery in the past limping   ct hip showing healing frcture    DVT prophylaxis   no chemical prophylaxis  due to gi bleed

## 2022-05-12 NOTE — PROGRESS NOTE ADULT - SUBJECTIVE AND OBJECTIVE BOX
Patient is a 66y old  Male who presents with a chief complaint of vomiting blood , abdominal pain (11 May 2022 11:47)      INTERVAL HPI/OVERNIGHT EVENTS: Patient seen and evaluated at bedside, reporting no complaints, no overnight events, CT of abdomen and pelvis revealed micronodular liver, suspecting liver cirrhosis, mildly dilated proximal and mid small bowel without free transition point. Consider enteritis.  Patient stating used to be a drinker, but stopped 1 year ago. Denies nausea, vomiting, abdominal pain, chest pain, shortness of breath, hematemesis, hematochezia, melena    MEDICATIONS  (STANDING):  ascorbic acid 500 milliGRAM(s) Oral daily  folic acid 1 milliGRAM(s) Oral daily  losartan 25 milliGRAM(s) Oral daily  magnesium sulfate  IVPB 2 Gram(s) IV Intermittent once  metoprolol succinate ER 25 milliGRAM(s) Oral daily  pantoprazole  Injectable 40 milliGRAM(s) IV Push two times a day  potassium phosphate / sodium phosphate Tablet (K-PHOS No. 2) 1 Tablet(s) Oral three times a day with meals  sertraline 100 milliGRAM(s) Oral daily  thiamine 100 milliGRAM(s) Oral daily    MEDICATIONS  (PRN):  acetaminophen     Tablet .. 650 milliGRAM(s) Oral every 6 hours PRN Moderate Pain (4 - 6)  aluminum hydroxide/magnesium hydroxide/simethicone Suspension 30 milliLiter(s) Oral every 4 hours PRN Dyspepsia  ondansetron Injectable 4 milliGRAM(s) IV Push four times a day PRN Nausea and/or Vomiting      Allergies    No Known Allergies    Intolerances    Review of Systems:  · ENMT: negative  · Respiratory and Thorax: negative  · Cardiovascular: negative  · Gastrointestinal: see above.  · Genitourinary:	negative  · Musculoskeletal: negative  · Neurological: negative  · Psychiatric: negative  · Hematology/Lymphatics: negative  · Endocrine: negative      Vital Signs Last 24 Hrs  T(C): 36.6 (12 May 2022 08:22), Max: 37.1 (11 May 2022 16:00)  T(F): 97.9 (12 May 2022 08:22), Max: 98.7 (11 May 2022 16:00)  HR: 70 (12 May 2022 08:22) (70 - 88)  BP: 137/83 (12 May 2022 08:22) (126/68 - 144/87)  BP(mean): --  RR: 18 (12 May 2022 08:22) (17 - 18)  SpO2: 96% (12 May 2022 08:22) (96% - 98%)    PHYSICAL EXAM:  · Constitutional: Sitting comfortably, thin man, in no acute distress.   · Eyes: EOMI; PERRL; no drainage or redness  · ENMT: No oral lesions; no gross abnormalities  · Neck:	No bruits; no thyromegaly or nodules  · Back:	No deformity or limitation of movement  · Respiratory: Breath Sounds equal & clear to percussion & auscultation, no accessory muscle use  · Cardiovascular: Regular rate & rhythm, normal S1, S2; no murmurs, gallops or rubs; no S3, S4  · Gastrointestinal: Soft, non-tender, no hepatosplenomegaly, normal bowel sounds      LABS:                        12.0   4.97  )-----------( 66       ( 12 May 2022 06:53 )             33.7     05-12    135  |  100  |  9.0  ----------------------------<  142<H>  3.7   |  23.0  |  0.40<L>    Ca    9.5      12 May 2022 06:53  Mg     1.6     05-12    TPro  6.2<L>  /  Alb  3.5  /  TBili  1.0  /  DBili  x   /  AST  49<H>  /  ALT  33  /  AlkPhos  113  05-12    PT/INR - ( 12 May 2022 06:53 )   PT: 13.2 sec;   INR: 1.14 ratio         PTT - ( 12 May 2022 06:53 )  PTT:27.2 sec    LIVER FUNCTIONS - ( 12 May 2022 06:53 )  Alb: 3.5 g/dL / Pro: 6.2 g/dL / ALK PHOS: 113 U/L / ALT: 33 U/L / AST: 49 U/L / GGT: x             RADIOLOGY & ADDITIONAL TESTS:  < from: CT Abdomen and Pelvis No Cont (05.11.22 @ 12:32) >  ACC: [id number] EXAM:  CT ABDOMEN AND PELVIS                          PROCEDURE DATE:  05/11/2022          INTERPRETATION:  CLINICAL INFORMATION: 66 years  Male with cirrhosis.    COMPARISON: None.    CONTRAST/COMPLICATIONS:  IV Contrast: None  Oral Contrast: None  Complications: None    PROCEDURE:  CT of the Abdomen and Pelvis was performed.  Sagittal and coronal reformats were performed.    LIMITATIONS: Evaluation of the solid organs, vascular structures and GI   tract is limited without oral and IV contrast.    FINDINGS:  LOWER CHEST: Patchy lingular groundglass opacities. Coronary artery   calcifications and/or stents.    LIVER: Micronodular contour.  BILE DUCTS: Normal caliber.  GALLBLADDER: Within normal limits.  SPLEEN: Within normal limits.  PANCREAS: Within normal limits.  ADRENALS: Within normal limits.  KIDNEYS/URETERS: Nonspecific bilateral perinephric fat stranding. No   hydroureteronephrosis or calculus.    BLADDER: Decompressed.  REPRODUCTIVE ORGANS: Unremarkable prostate.    BOWEL: No bowel obstruction.Partial right colon resection with ileocolic   anastomosis. Mildly dilated proximal and mid small bowel without discrete   transition point.  PERITONEUM: Trace perihepatic ascites.  VESSELS: Atherosclerotic changes.  RETROPERITONEUM/LYMPH NODES: No lymphadenopathy.  ABDOMINAL WALL: Within normal limits.  BONES: ORIF left hip. Large L4 superior endplate Schmorl's node.    IMPRESSION:  Lingular groundglass opacities secondary to pneumonia or motion artifact.    Suspectcirrhosis.    Mildly dilated proximal and mid small bowel without free transition   point. Consider enteritis. Recommend follow-up to exclude early or   partial obstruction.      < end of copied text >

## 2022-05-12 NOTE — PROGRESS NOTE ADULT - PROBLEM SELECTOR PLAN 1
Possibly bleed cause by portal hypertensive gastropathy precipitated by Plavix.  S/p EGD on 05/10 that revealed irregular Z-line and gastroesophageal junction, congestion, petechia and mosaic pattern in the stomach, erythema in the bulb compatible with duodenitis  NPO for Cath   Continue PPI  Awaiting pathology  Continue to follow up Cardiology recommendations. Possibly bleed cause by portal hypertensive gastropathy while on Plavix.  S/p EGD on 05/10 that revealed irregular Z-line and gastroesophageal junction, congestion, petechia and mosaic pattern in the stomach, erythema in the bulb compatible with duodenitis  NPO for Cath   Continue PPI  Awaiting pathology  Continue to follow up Cardiology recommendations.

## 2022-05-12 NOTE — PROGRESS NOTE ADULT - ASSESSMENT
A/P:  67 y/o M with a PMHx MI/CAD s/p PCI (LEELA x 1 pRCA 2007), HTN, depression, former heavy EtOH abuse, and HLD who presented to the ED with complaints of hematemesis. Patient states that he had a beer on Thursday night, but overall does not drink often anymore. Patient states that on Saturday he felt very weak and tired, and then had some nausea and emesis with some pink color. Pt also noted dark black colored stools as well over the weekend. Patient states that the symptoms occurred on Sunday as well, so he came to the ER to be evaluated.  Patient underwent EGD that showed portal hypertensive gastropathy.  Patient follows with Dr. Ricci, but often isn't seen for years at a time. Patient's last NST was 2018 with no ischemia. Patient with METS<4, but denies any chest pain or dyspnea. Patient denies fevers, chills, syncope, near syncope, headache, or dizziness. Troponin negative x 1.   Presents today for [facility] as part of cardiac evaluation.     Problem/Plan - 1:  ·  Problem: Acute systolic congestive heart failure.   ·  Plan: - Newly reduced LVEF of 30% with RWMA.   - Patient asymptomatic.   - Does not appear overloaded on exam.   - For Diagnostic only LHC today;  Needs to be cleared for DAPT by GI.   - Maintain losartan 25mg PO qday and Toprol XL 25mg PO qday.   - Will need f/u with primary Cardiologist Dr. Ricci for optimization.  - NPO for cardiac cath  - Consent obtained by MD    Problem/Plan - 2:  ·  Problem: CAD (coronary artery disease).   ·  Plan: - Hx of PCI to pRCA 2007.   - Now with newly reduced LVEF and RWMA as above.   - Plavix on hold for GI bleed.   - On losartan and toprol XL.   - Start statin when cleared by GI and LFTs have normalized.  - For diagnostic only LHC today, but need clearance from GI for DAPT.   - No s/ s of ACS.    Problem/Plan - 3:  ·  Problem: GI bleed.   ·  Plan: - GI following.          A/P:  67 y/o M with a PMHx MI/CAD s/p PCI (LEELA x 1 pRCA 2007), HTN, depression, former heavy EtOH abuse, and HLD who presented to the ED with complaints of hematemesis. Patient states that he had a beer on Thursday night, but overall does not drink often anymore. Patient states that on Saturday he felt very weak and tired, and then had some nausea and emesis with some pink color. Pt also noted dark black colored stools as well over the weekend. Patient states that the symptoms occurred on Sunday as well, so he came to the ER to be evaluated.  Patient underwent EGD that showed portal hypertensive gastropathy.  Patient follows with Dr. Ricci, but often isn't seen for years at a time. Patient's last NST was 2018 with no ischemia. Patient with METS<4, but denies any chest pain or dyspnea. Patient denies fevers, chills, syncope, near syncope, headache, or dizziness. Troponin negative x 1.   Presents today for LHC as part of cardiac evaluation.     Problem/Plan - 1:  ·  Problem: Acute systolic congestive heart failure.   ·  Plan: - Newly reduced LVEF of 30% with RWMA.   - Patient asymptomatic.   - Does not appear overloaded on exam.   - For Diagnostic only LHC today;  Needs to be cleared for DAPT by GI.   - Maintain losartan 25mg PO qday and Toprol XL 25mg PO qday.   - Will need f/u with primary Cardiologist Dr. Ricci for optimization.  - NPO for cardiac cath  - Consent obtained by MD    Problem/Plan - 2:  ·  Problem: CAD (coronary artery disease).   ·  Plan: - Hx of PCI to pRCA 2007.   - Now with newly reduced LVEF and RWMA as above.   - Plavix on hold for GI bleed.   - On losartan and toprol XL.   - Start statin when cleared by GI and LFTs have normalized.  - For diagnostic only LHC today, but need clearance from GI for DAPT.   - No s/ s of ACS.    Problem/Plan - 3:  ·  Problem: GI bleed.   ·  Plan: - GI following.    CARDIOLOGY NP ADDENDUM:  -s/p LHC via RRA with Dr. Farmer:  mLAD; 80% mid to distal LCX; patent RCA stent (mild ISR) (prelim report; official report to follow)  -Right radial site benign (band removed by RN) with 4x4 and tegaderm dressing in place; RUE warm/mobile/acyanotic; + right ulnar pulse  -Bedrest x 1 hour post procedure then OOB as tolerated  -Recommending CT surgery for revascularization--consult called  -Discussed with Dr. Farmer          A/P:  65 y/o M with a PMHx MI/CAD s/p PCI (LEELA x 1 pRCA 2007), HTN, depression, former heavy EtOH abuse, and HLD who presented to the ED with complaints of hematemesis. Patient states that he had a beer on Thursday night, but overall does not drink often anymore. Patient states that on Saturday he felt very weak and tired, and then had some nausea and emesis with some pink color. Pt also noted dark black colored stools as well over the weekend. Patient states that the symptoms occurred on Sunday as well, so he came to the ER to be evaluated.  Patient underwent EGD that showed portal hypertensive gastropathy.  Patient follows with Dr. Ricci, but often isn't seen for years at a time. Patient's last NST was 2018 with no ischemia. Patient with METS<4, but denies any chest pain or dyspnea. Patient denies fevers, chills, syncope, near syncope, headache, or dizziness. Troponin negative x 1.   Presents today for LHC as part of cardiac evaluation.     Problem/Plan - 1:  ·  Problem: Acute systolic congestive heart failure.   ·  Plan: - Newly reduced LVEF of 30% with RWMA.   - Patient asymptomatic.   - Does not appear overloaded on exam.   - For Diagnostic only LHC today;  Needs to be cleared for DAPT by GI.   - Maintain losartan 25mg PO qday and Toprol XL 25mg PO qday.   - Will need f/u with primary Cardiologist Dr. Ricci for optimization.  - NPO for cardiac cath  - Consent obtained by MD    Problem/Plan - 2:  ·  Problem: CAD (coronary artery disease).   ·  Plan: - Hx of PCI to pRCA 2007.   - Now with newly reduced LVEF and RWMA as above.   - Plavix on hold for GI bleed.   - On losartan and toprol XL.   - Start statin when cleared by GI and LFTs have normalized.  - For diagnostic only LHC today, but need clearance from GI for DAPT.   - No s/ s of ACS.    Problem/Plan - 3:  ·  Problem: GI bleed.   ·  Plan: - GI following.    CARDIOLOGY NP ADDENDUM:  -s/p LHC via RRA with Dr. Farmer:  mLAD; 80% mid to distal LCX; patent RCA stent (mild ISR) (prelim report; official report to follow)  -Right radial site benign (band removed by RN) with 4x4 and tegaderm dressing in place; RUE warm/mobile/acyanotic; + right ulnar pulse  -Bedrest x 1 hour post procedure then OOB as tolerated  -IVF hydration pre and post cardiac cath as per protocol for JJ prevention  -Recommending CT surgery for revascularization--consult called  -Discussed with Dr. Farmer

## 2022-05-12 NOTE — CONSULT NOTE ADULT - SUBJECTIVE AND OBJECTIVE BOX
Surgeon:    Consult requesting by:    HISTORY OF PRESENT ILLNESS:  67 y/o M with a PMHx MI/CAD s/p PCI (LEELA x 1 pRCA 2007), HTN, depression, former heavy EtOH abuse, and HLD who presented to the ED with complaints of hematemesis. Patient states that he had a beer on Thursday night, but overall does not drink often anymore. Patient states that on Saturday he felt very weak and tired, and then had some nausea and emesis with some pink color. Pt also noted dark black colored stools as well over the weekend. Patient states that the symptoms occurred on Sunday as well, so he came to the ER to be evaluated.  Patient underwent EGD that showed portal hypertensive gastropathy.  Patient follows with Dr. Ricci, but often isn't seen for years at a time. Patient's last NST was 2018 with no ischemia. Patient with METS<4, but denies any chest pain or dyspnea. Patient denies fevers, chills, syncope, near syncope, headache, or dizziness. Troponin negative x 1.   Presents today for [facility] as part of cardiac evaluation which revealed  of LAD and significant circumflex stenosis with patent RCA stents.  CT Surgery consulted for surgical revascularization in light of the patient's recent GI Bleed and the need for DAPT after a stent, in addition to the patient's potential for lack of compliance.  Patient also expresses interest in going to skilled nursing facility because he feels he can not be completely independent.          PAST MEDICAL & SURGICAL HISTORY:  Pancreatitis      Hypertension      Myocardial infarct      Alcohol abuse      Colon cancer      History of colon resection      History of cardiac stent          MEDICATIONS  (STANDING):  ascorbic acid 500 milliGRAM(s) Oral daily  folic acid 1 milliGRAM(s) Oral daily  losartan 25 milliGRAM(s) Oral daily  metoprolol succinate ER 25 milliGRAM(s) Oral daily  pantoprazole  Injectable 40 milliGRAM(s) IV Push two times a day  potassium phosphate / sodium phosphate Tablet (K-PHOS No. 2) 1 Tablet(s) Oral three times a day with meals  sertraline 100 milliGRAM(s) Oral daily  sodium chloride 0.9%. 250 milliLiter(s) (250 mL/Hr) IV Continuous <Continuous>  sodium chloride 0.9%. 250 milliLiter(s) (250 mL/Hr) IV Continuous <Continuous>  thiamine 100 milliGRAM(s) Oral daily    MEDICATIONS  (PRN):  acetaminophen     Tablet .. 650 milliGRAM(s) Oral every 6 hours PRN Moderate Pain (4 - 6)  aluminum hydroxide/magnesium hydroxide/simethicone Suspension 30 milliLiter(s) Oral every 4 hours PRN Dyspepsia  ondansetron Injectable 4 milliGRAM(s) IV Push four times a day PRN Nausea and/or Vomiting    Antiplatelet therapy:                           Last dose/amt:    Allergies    No Known Allergies    Intolerances        SOCIAL HISTORY:  Smoker: [ ] Yes  [x ] No        PACK YEARS:                         WHEN QUIT?  ETOH use: [x ] Yes  [ ] No              FREQUENCY / QUANTITY: quit years ago, now only occasional    Ilicit Drug use:  [ ] Yes  [ x] No  Occupation: retired  Live with: self, in a house[place] on the [place]  Assisted device use: none    FAMILY HISTORY:  FH: prostate cancer (Father)    Family history of atherosclerosis (Mother)        Review of Systems  CONSTITUTIONAL:  Fevers[ ] chills[ ] sweats[ ] fatigue[x ] weight loss[ ] weight gain [ ]                                     NEGATIVE [ ]   NEURO:  parathesias[ ] seizures [ ]  syncope [ ]  confusion [ ]   + frequent falls                                                   NEGATIVE[ ]   EYES: glasses[ ]  blurry vision[ ]  discharge[ ] pain[ ] glaucoma [ ]                                                                          NEGATIVEx ]   ENMT:  difficulty hearing [ ]  vertigo[ ]  dysphagia[ ] epistaxis[ ] recent dental work [ ]                                    NEGATIVE[ x]   CV:  chest pain[ ] palpitations[ ] BOLES [ ] diaphoresis [ ]                                                                                           NEGATIVE[x ]   RESPIRATORY:  wheezing[ ] SOB[ ] cough [ x] sputum[ ] hemoptysis[ ]                                                                  NEGATIVE[ ]   GI:  nausea[ ]  vommiting [ ]  diarrhea[ ] constipation [ ] melena [ ]                                                                      NEGATIVE[x ] not currently, see HPI  : hematuria[ ]  dysuria[ ] urgency[ ] incontinence[ ]                                                                                            NEGATIVE[ x]   MUSKULOSKELETAL:  arthritis[ ]  joint swelling [ ] muscle weakness [x ]   weakness in legs. + claudication               NEGATIVE[ ]   SKIN/BREAST:  rash[ ] itching [ ]  hair loss[ ] masses[ ]                                                                                              NEGATIVE[x ]   PSYCH:  dementia [ ] depresion [ ] anxiety[ ]                                                                                                               NEGATIVE[ x]   HEME/LYMPH:  bruises easily[ ] enlarged lymph nodes[ ] tender lymph nodes[ ]                                               NEGATIVE[x ]   ENDOCRINE:  cold intolerance[ ] heat intolerance[ ] polydipsia[ ]                                                                          NEGATIVE[x ]     PHYSICAL EXAM  Vital Signs Last 24 Hrs  T(C): 36.7 (12 May 2022 15:53), Max: 36.7 (11 May 2022 20:00)  T(F): 98.1 (12 May 2022 15:53), Max: 98.1 (12 May 2022 15:53)  HR: 85 (12 May 2022 15:53) (70 - 87)  BP: 138/84 (12 May 2022 15:53) (129/76 - 169/113)  BP(mean): --  RR: 18 (12 May 2022 15:53) (16 - 19)  SpO2: 100% (12 May 2022 15:53) (96% - 100%)    CONSTITUTIONAL:                                                                          WNL[ ]   Neuro: WNL[ x] Normal exam oriented to person/place & time with no focal motor or sensory  deficits. Other                     Eyes: WNL[x ]   Normal exam of conjunctiva & lids, pupils equally reactive. Other     ENT: WNL[ x]    Normal exam of nasal/oral mucosa with absence of cyanosis. Other.  + dental caries, multiple missing teeth, overall poor dentition  Neck: WNL[ x]  Normal exam of jugular veins, trachea & thyroid. Other no bruits  Chest: WNL[ ] Normal lung exam with good air movement absence of wheezes, rales, Other + ronchi right base                                                                               CV:  Auscultation: normal [x ] S3[ ] S4[ ] Irregular [ ] Rub[ ] Clicks[ ]    Murmurs none:[x ]systolic [ ]  diastolic [ ] holosystolic [ ]  Carotids: No Bruits[x ] Other                 Abdominal Aorta: normal [x ] nonpalpable[x ]Other                                                                                      GI:           WNL[ x] Normal exam of abdomen, liver & spleen with no noted masses or tenderness. Other ?palpable liver border                                                                                                        Extremities: WNL[x ] Normal no evidence of cyanosis or deformity Edema: none[x ]trace[ ]1+[ ]2+[ ]3+[ ]4+[ ]  Lower Extremity Pulses: Right[ ] Left[ ]Varicosities[ ] unable to palpate DP pulses  SKIN :WNL[x ] Normal exam to inspection & palation. Other:                                                          LABS:                        12.0   4.97  )-----------( 66       ( 12 May 2022 06:53 )             33.7     05-12    135  |  100  |  9.0  ----------------------------<  142<H>  3.7   |  23.0  |  0.40<L>    Ca    9.5      12 May 2022 06:53  Mg     1.6     05-12    TPro  6.2<L>  /  Alb  3.5  /  TBili  1.0  /  DBili  x   /  AST  49<H>  /  ALT  33  /  AlkPhos  113  05-12    PT/INR - ( 12 May 2022 06:53 )   PT: 13.2 sec;   INR: 1.14 ratio         PTT - ( 12 May 2022 06:53 )  PTT:27.2 sec            Cardiac Cath:  LAD,  80% mid to distal LCX; patent RCA stent    TTE / JACQUIE:1. Endocardial visualization was enhanced with intravenous echo contrast.   2. LV Ejection Fraction by Blake's Method with a biplane EF of 30 %.   3. Moderately decreased segmental left ventricular systolic function.   4. Entire apex, mid and apical anterior wall, and mid anteroseptal   segment are abnormal as described above.   5. Spectral Doppler shows impaired relaxation pattern of left   ventricular myocardial filling (Grade I diastolic dysfunction).   6. Normal right ventricular size and function.   7. Trace mitral valve regurgitation.   8. Sclerotic aortic valve with normal opening.   9. Adequate TR velocity was not obtained to accurately assess RVSP.

## 2022-05-12 NOTE — PROGRESS NOTE ADULT - CONVERSATION DETAILS
Pt understands extent of acute on ch medical issues. At this time oulw like to remain full code.   Wishes for ex wife (Mai) to be HCP        ACP time : 18 mins

## 2022-05-13 DIAGNOSIS — K76.6 PORTAL HYPERTENSION: ICD-10-CM

## 2022-05-13 DIAGNOSIS — K31.9 DISEASE OF STOMACH AND DUODENUM, UNSPECIFIED: ICD-10-CM

## 2022-05-13 LAB
A1C WITH ESTIMATED AVERAGE GLUCOSE RESULT: 5.1 % — SIGNIFICANT CHANGE UP (ref 4–5.6)
ALBUMIN SERPL ELPH-MCNC: 3.7 G/DL — SIGNIFICANT CHANGE UP (ref 3.3–5.2)
ALP SERPL-CCNC: 130 U/L — HIGH (ref 40–120)
ALT FLD-CCNC: 36 U/L — SIGNIFICANT CHANGE UP
ANION GAP SERPL CALC-SCNC: 10 MMOL/L — SIGNIFICANT CHANGE UP (ref 5–17)
APPEARANCE UR: ABNORMAL
APTT BLD: 26.4 SEC — LOW (ref 27.5–35.5)
AST SERPL-CCNC: 60 U/L — HIGH
BACTERIA # UR AUTO: NEGATIVE — SIGNIFICANT CHANGE UP
BILIRUB SERPL-MCNC: 1.1 MG/DL — SIGNIFICANT CHANGE UP (ref 0.4–2)
BILIRUB UR-MCNC: NEGATIVE — SIGNIFICANT CHANGE UP
BLD GP AB SCN SERPL QL: SIGNIFICANT CHANGE UP
BUN SERPL-MCNC: 10.4 MG/DL — SIGNIFICANT CHANGE UP (ref 8–20)
CALCIUM SERPL-MCNC: 9.6 MG/DL — SIGNIFICANT CHANGE UP (ref 8.6–10.2)
CHLORIDE SERPL-SCNC: 101 MMOL/L — SIGNIFICANT CHANGE UP (ref 98–107)
CHOLEST SERPL-MCNC: 163 MG/DL — SIGNIFICANT CHANGE UP
CO2 SERPL-SCNC: 24 MMOL/L — SIGNIFICANT CHANGE UP (ref 22–29)
COLOR SPEC: YELLOW — SIGNIFICANT CHANGE UP
CREAT SERPL-MCNC: 0.45 MG/DL — LOW (ref 0.5–1.3)
DIFF PNL FLD: NEGATIVE — SIGNIFICANT CHANGE UP
EGFR: 117 ML/MIN/1.73M2 — SIGNIFICANT CHANGE UP
EPI CELLS # UR: SIGNIFICANT CHANGE UP
ESTIMATED AVERAGE GLUCOSE: 100 MG/DL — SIGNIFICANT CHANGE UP (ref 68–114)
GLUCOSE BLDC GLUCOMTR-MCNC: 137 MG/DL — HIGH (ref 70–99)
GLUCOSE SERPL-MCNC: 125 MG/DL — HIGH (ref 70–99)
GLUCOSE UR QL: NEGATIVE MG/DL — SIGNIFICANT CHANGE UP
HCT VFR BLD CALC: 35.2 % — LOW (ref 39–50)
HDLC SERPL-MCNC: 53 MG/DL — SIGNIFICANT CHANGE UP
HGB BLD-MCNC: 12.2 G/DL — LOW (ref 13–17)
INR BLD: 1.12 RATIO — SIGNIFICANT CHANGE UP (ref 0.88–1.16)
KETONES UR-MCNC: NEGATIVE — SIGNIFICANT CHANGE UP
LEUKOCYTE ESTERASE UR-ACNC: NEGATIVE — SIGNIFICANT CHANGE UP
LIPID PNL WITH DIRECT LDL SERPL: 91 MG/DL — SIGNIFICANT CHANGE UP
MAGNESIUM SERPL-MCNC: 1.8 MG/DL — SIGNIFICANT CHANGE UP (ref 1.6–2.6)
MCHC RBC-ENTMCNC: 34.7 GM/DL — SIGNIFICANT CHANGE UP (ref 32–36)
MCHC RBC-ENTMCNC: 38.1 PG — HIGH (ref 27–34)
MCV RBC AUTO: 110 FL — HIGH (ref 80–100)
MRSA PCR RESULT.: SIGNIFICANT CHANGE UP
NITRITE UR-MCNC: NEGATIVE — SIGNIFICANT CHANGE UP
NON HDL CHOLESTEROL: 110 MG/DL — SIGNIFICANT CHANGE UP
PH UR: 7 — SIGNIFICANT CHANGE UP (ref 5–8)
PHOSPHATE SERPL-MCNC: 2.4 MG/DL — SIGNIFICANT CHANGE UP (ref 2.4–4.7)
PLATELET # BLD AUTO: 92 K/UL — LOW (ref 150–400)
POTASSIUM SERPL-MCNC: 4.1 MMOL/L — SIGNIFICANT CHANGE UP (ref 3.5–5.3)
POTASSIUM SERPL-SCNC: 4.1 MMOL/L — SIGNIFICANT CHANGE UP (ref 3.5–5.3)
PREALB SERPL-MCNC: 13 MG/DL — LOW (ref 18–38)
PROT SERPL-MCNC: 6.5 G/DL — LOW (ref 6.6–8.7)
PROT UR-MCNC: NEGATIVE — SIGNIFICANT CHANGE UP
PROTHROM AB SERPL-ACNC: 13 SEC — SIGNIFICANT CHANGE UP (ref 10.5–13.4)
RBC # BLD: 3.2 M/UL — LOW (ref 4.2–5.8)
RBC # FLD: 11.2 % — SIGNIFICANT CHANGE UP (ref 10.3–14.5)
RBC CASTS # UR COMP ASSIST: SIGNIFICANT CHANGE UP /HPF (ref 0–4)
S AUREUS DNA NOSE QL NAA+PROBE: DETECTED
SODIUM SERPL-SCNC: 135 MMOL/L — SIGNIFICANT CHANGE UP (ref 135–145)
SP GR SPEC: 1.01 — SIGNIFICANT CHANGE UP (ref 1.01–1.02)
TRIGL SERPL-MCNC: 93 MG/DL — SIGNIFICANT CHANGE UP
TSH SERPL-MCNC: 1.17 UIU/ML — SIGNIFICANT CHANGE UP (ref 0.27–4.2)
UROBILINOGEN FLD QL: 8 MG/DL
WBC # BLD: 5.16 K/UL — SIGNIFICANT CHANGE UP (ref 3.8–10.5)
WBC # FLD AUTO: 5.16 K/UL — SIGNIFICANT CHANGE UP (ref 3.8–10.5)
WBC UR QL: SIGNIFICANT CHANGE UP /HPF (ref 0–5)

## 2022-05-13 PROCEDURE — 99232 SBSQ HOSP IP/OBS MODERATE 35: CPT

## 2022-05-13 PROCEDURE — 93880 EXTRACRANIAL BILAT STUDY: CPT | Mod: 26

## 2022-05-13 PROCEDURE — 71045 X-RAY EXAM CHEST 1 VIEW: CPT | Mod: 26

## 2022-05-13 RX ORDER — ASPIRIN/CALCIUM CARB/MAGNESIUM 324 MG
81 TABLET ORAL DAILY
Refills: 0 | Status: DISCONTINUED | OUTPATIENT
Start: 2022-05-13 | End: 2022-05-17

## 2022-05-13 RX ORDER — CHLORHEXIDINE GLUCONATE 213 G/1000ML
1 SOLUTION TOPICAL DAILY
Refills: 0 | Status: DISCONTINUED | OUTPATIENT
Start: 2022-05-13 | End: 2022-05-17

## 2022-05-13 RX ORDER — MUPIROCIN 20 MG/G
1 OINTMENT TOPICAL
Refills: 0 | Status: DISCONTINUED | OUTPATIENT
Start: 2022-05-13 | End: 2022-05-17

## 2022-05-13 RX ORDER — IPRATROPIUM/ALBUTEROL SULFATE 18-103MCG
3 AEROSOL WITH ADAPTER (GRAM) INHALATION EVERY 6 HOURS
Refills: 0 | Status: DISCONTINUED | OUTPATIENT
Start: 2022-05-13 | End: 2022-05-17

## 2022-05-13 RX ORDER — ATORVASTATIN CALCIUM 80 MG/1
20 TABLET, FILM COATED ORAL AT BEDTIME
Refills: 0 | Status: DISCONTINUED | OUTPATIENT
Start: 2022-05-13 | End: 2022-05-13

## 2022-05-13 RX ORDER — METOPROLOL TARTRATE 50 MG
50 TABLET ORAL DAILY
Refills: 0 | Status: DISCONTINUED | OUTPATIENT
Start: 2022-05-13 | End: 2022-05-17

## 2022-05-13 RX ADMIN — Medication 1 DROP(S): at 16:35

## 2022-05-13 RX ADMIN — PANTOPRAZOLE SODIUM 40 MILLIGRAM(S): 20 TABLET, DELAYED RELEASE ORAL at 05:59

## 2022-05-13 RX ADMIN — Medication 20 MILLIEQUIVALENT(S): at 00:52

## 2022-05-13 RX ADMIN — MUPIROCIN 1 APPLICATION(S): 20 OINTMENT TOPICAL at 16:35

## 2022-05-13 RX ADMIN — LOSARTAN POTASSIUM 25 MILLIGRAM(S): 100 TABLET, FILM COATED ORAL at 05:59

## 2022-05-13 RX ADMIN — SERTRALINE 100 MILLIGRAM(S): 25 TABLET, FILM COATED ORAL at 09:42

## 2022-05-13 RX ADMIN — Medication 20 MILLIEQUIVALENT(S): at 00:54

## 2022-05-13 RX ADMIN — Medication 1 MILLIGRAM(S): at 09:42

## 2022-05-13 RX ADMIN — PANTOPRAZOLE SODIUM 40 MILLIGRAM(S): 20 TABLET, DELAYED RELEASE ORAL at 16:35

## 2022-05-13 RX ADMIN — CHLORHEXIDINE GLUCONATE 1 APPLICATION(S): 213 SOLUTION TOPICAL at 09:47

## 2022-05-13 RX ADMIN — Medication 81 MILLIGRAM(S): at 09:41

## 2022-05-13 RX ADMIN — Medication 25 MILLIGRAM(S): at 05:58

## 2022-05-13 RX ADMIN — Medication 100 MILLIGRAM(S): at 09:41

## 2022-05-13 RX ADMIN — Medication 500 MILLIGRAM(S): at 09:42

## 2022-05-13 NOTE — PROGRESS NOTE ADULT - NS ATTEND OPT1 GEN_ALL_CORE
I attest my time as attending is greater than 50% of the total combined time spent on qualifying patient care activities by the PA/NP and attending.
I independently performed the documented:

## 2022-05-13 NOTE — PROGRESS NOTE ADULT - ASSESSMENT
65 yo M smoker , with h/o CAD , HTN , anxiety admitted with abdominal pain , nausea poor appetite and bloody vomiting , alcohol use. Patient admitted to medicine and seen in consult by GI and cardio. Patient had an egd on 5/10 showing - Also 1 unit of platelets given but no prbcs required. s/p LHC with concern for significant 2 vessel CAD. CT surgery now on board.       Epigastric pain /nausea vomiting EGD performed   duodenitis , possible Derrek's esophagitis   no vomiting   no ulcer active bleed   cont protonix bid , dc octreotide  Diet advanced    Hypokalemia / hypomagnesemia   KCL 40 PO x 1     fatty liver due to chronic alcohol  counseling provided to stop  Hold Statin for now  Repeat LFTs in am  f/u with GI for preop cardiac surgical clearance in am      h/o CAD / HTN / CHF  s/p [facility], plan for Cardiac surgery eval for 2 vessel CAD   significant CAD   Hold Losartan for now (per CT surgery)   Continue ASA, hold Plavix for now   #Alcohol use cronic , mild   no sign of withdrawal will monitor     #h/o Diabetes Mellitus old records reviewed   dc ssi   a1c 5    #-Anxiety disorder   cont sertraline     #thrombocytopenia - from bm suppression  -  given 1 unit of platelets already     #Frequent falls with left leg pain   h/o left femur fx surgery in the past limping   ct hip showing healing frcture    DVT prophylaxis   no chemical prophylaxis  due to gi bleed

## 2022-05-13 NOTE — PROGRESS NOTE ADULT - NS ATTEND AMEND GEN_ALL_CORE FT
65 y/o M with a PMHx MI/CAD s/p PCI (LEELA x 1 pRCA 2007), HTN, depression, former heavy EtOH abuse, and HLD who presented to the ED with complaints of hematemesis. Patient states that he had a beer on Thursday night, but overall does not drink often anymore. Patient states that on Saturday he felt very weak and tired, and then had some nausea and emesis with some pink color. Pt also noted dark black colored stools as well over the weekend. Patient states that the symptoms occurred on Sunday as well, so he came to the ER to be evaluated. Patient is planned for EGD, and cardiology evaluation is requested. Patient follows with Dr. Ricci, but often isn't seen for years at a time. Patient's last NST was 2018 with no ischemia. Patient with METS<4, but denies any chest pain or dyspnea. Patient denies fevers, chills, syncope, near syncope, headache, or dizziness.   Troponin negative x 1.    Patient is feeling OK, no chest pain.  Patient states he has not been very active due to leg problem.    GI note appreciated, Patient has Portal hypertensive gastropathy.        Exam: Chest- Bilateral Clear BS  Cardiac-S1 and S2+. No murmur  Abdomen- soft    Patient has <4 Mets exercise capacity, no symptoms suggestive of angina or CHF.    Patients echo results as noted above.  Patients Cath findings noted, he has 2 Vessel CAD.  CT Surgery Consult appreciated,       Patient seen and examined by me.  I have discussed my recommendation with the PA which are outlined above.  Patient to f/u his Cardiologist Dr Ricci    Will sign off
65 y/o M with a PMHx MI/CAD s/p PCI (LEELA x 1 pRCA 2007), HTN, depression, former heavy EtOH abuse, and HLD who presented to the ED with complaints of hematemesis. Patient states that he had a beer on Thursday night, but overall does not drink often anymore. Patient states that on Saturday he felt very weak and tired, and then had some nausea and emesis with some pink color. Pt also noted dark black colored stools as well over the weekend. Patient states that the symptoms occurred on Sunday as well, so he came to the ER to be evaluated. Patient is planned for EGD, and cardiology evaluation is requested. Patient follows with Dr. Ricci, but often isn't seen for years at a time. Patient's last NST was 2018 with no ischemia. Patient with METS<4, but denies any chest pain or dyspnea. Patient denies fevers, chills, syncope, near syncope, headache, or dizziness.   Troponin negative x 1.    Patient is feeling OK, no chest pain.  Patient states he has not been very active due to leg problem.    GI note appreciated, Patient has Portal hypertensive gastropathy.        Exam: Chest- Bilateral Clear BS  Cardiac-S1 and S2+. No murmur  Abdomen- soft    Patient has <4 Mets exercise capacity, no symptoms suggestive of angina or CHF.    Patients echo results as noted above.  Patients Cath findings noted, he has 2 Vessel CAD.  CT Surgery Consult appreciated, awaiting decision for CABG      Patient seen and examined by me.  I have discussed my recommendation with the PA which are outlined above.  Will follow.
The patient is now asymptomatic with no complaints and a stable hemoglobin.  The minor hematemesis was probably due to the portal hypertensive gastropathy as a result of underlying alcoholic cirrhosis.  There were no varices however.  Would recommend ongoing use of pantoprazole 40 mg p.o. every morning as well as beta-blocker on a daily basis. He is at risk of further bleeding with anticoagulation or antiplatelet agents but there is no absolute contraindication should they absolutely be required.  Can follow-up with Dr. Pate, the hepatologist in our office for further evaluation.  Otherwise we will simply see as needed your request while in hospital.
65 y/o M with a PMHx MI/CAD s/p PCI (LEELA x 1 pRCA 2007), HTN, depression, former heavy EtOH abuse, and HLD who presented to the ED with complaints of hematemesis. Patient states that he had a beer on Thursday night, but overall does not drink often anymore. Patient states that on Saturday he felt very weak and tired, and then had some nausea and emesis with some pink color. Pt also noted dark black colored stools as well over the weekend. Patient states that the symptoms occurred on Sunday as well, so he came to the ER to be evaluated. Patient is planned for EGD, and cardiology evaluation is requested. Patient follows with Dr. Ricci, but often isn't seen for years at a time. Patient's last NST was 2018 with no ischemia. Patient with METS<4, but denies any chest pain or dyspnea. Patient denies fevers, chills, syncope, near syncope, headache, or dizziness.   Troponin negative x 1.    Patient is feeling OK, no chest pain.  Patient states he has not been very active due to leg problem.    GI note appreciated, Patient has Portal hypertensive gastropathy.    Exam: Chest- Bilateral Clear BS  Cardiac-S1 and S2+. No murmur  Abdomen- soft    Patient has <4 Mets exercise capacity, no symptoms suggestive of angina or CHF.    Patients echo results as noted above.    Needs at least Diagnostic LHC.  NO PCI    Patient seen and examined by me.  I have discussed my recommendation with the PA which are outlined above.  Will follow.

## 2022-05-13 NOTE — PROGRESS NOTE ADULT - SUBJECTIVE AND OBJECTIVE BOX
[facility]                                                         CARDIOLOGY AT [facility]                                                                  [address] [facility], [id number] [place] [address]- [facility]                                                         Telephone: [phone]. Fax:[phone]                                                                             PROGRESS NOTE    Reason for follow up: Gi bleeding / Cardio clear prior to EGD  Update: "feels OK , denies complaints of chest pain/sob/dizziness/palps  abd pain or vomititing   sp; [facility] 5/12/22: [facility]      Review of symptoms:   Cardiac:  No chest pain. No dyspnea. No palpitations.  Respiratory: no cough. No dyspnea  Gastrointestinal: No diarrhea. No abdominal pain. No bleeding.   Neuro: No focal neuro complaints.    Vitals:  T(C): 36.8 (05-13-22 @ 05:55), Max: 36.9 (05-12-22 @ 21:16)  HR: 69 (05-13-22 @ 05:55) (69 - 86)  BP: 147/80 (05-13-22 @ 05:55) (111/68 - 169/113)  RR: 18 (05-13-22 @ 05:55) (16 - 19)  SpO2: 99% (05-13-22 @ 05:55) (97% - 100%)  Wt(kg): --  I&O's Summary    Weight (kg): 76.2 (05-09 @ 09:34)    PHYSICAL EXAM:  Appearance: Comfortable. No acute distress  HEENT:  Atraumatic. Normocephalic.   Neurologic: A & O x 3, no gross focal deficits.  Cardiovascular: RRR S1 S2,RRR60's  No murmur, no rubs/gallops. No JVD  Respiratory: Lungs clear to auscultation, unlabored   Gastrointestinal:  Soft, Non-tender, + BS  Right radial site no hematoma , pulse 2+ good cap refill dressing removed   Lower Extremities: 2+ Peripheral Pulses, No clubbing, cyanosis, or edema  Psychiatry: Patient is calm. No agitation.   Skin: warm and dry.    CURRENT CARDIAC MEDICATIONS:  losartan 25 milliGRAM(s) Oral daily  metoprolol succinate ER 25 milliGRAM(s) Oral daily      CURRENT OTHER MEDICATIONS:  acetaminophen     Tablet .. 650 milliGRAM(s) Oral every 6 hours PRN Moderate Pain (4 - 6)  ondansetron Injectable 4 milliGRAM(s) IV Push four times a day PRN Nausea and/or Vomiting  sertraline 100 milliGRAM(s) Oral daily  aluminum hydroxide/magnesium hydroxide/simethicone Suspension 30 milliLiter(s) Oral every 4 hours PRN Dyspepsia  pantoprazole  Injectable 40 milliGRAM(s) IV Push two times a day  artificial  tears Solution 1 Drop(s) Both EYES two times a day PRN Dry Eyes  ascorbic acid 500 milliGRAM(s) Oral daily  folic acid 1 milliGRAM(s) Oral daily  potassium phosphate / sodium phosphate Tablet (K-PHOS No. 2) 1 Tablet(s) Oral three times a day with meals, Stop order after: 3 Doses  sodium chloride 0.9%. 250 milliLiter(s) (250 mL/Hr) IV Continuous <Continuous>, Stop order after: 1 Hours  sodium chloride 0.9%. 250 milliLiter(s) (250 mL/Hr) IV Continuous <Continuous>, Stop order after: 1 Hours  thiamine 100 milliGRAM(s) Oral daily      LABS:	 	  ( 09 May 2022 11:34 )  Troponin T  <0.01,  CPK  X    , CKMB  X    , BNP X                                  12.2   5.16  )-----------( 92       ( 13 May 2022 06:44 )             35.2     05-13    135  |  101  |  10.4  ----------------------------<  125<H>  4.1   |  24.0  |  0.45<L>    Ca    9.6      13 May 2022 06:44  Phos  2.4     05-13  Mg     1.8     05-13    TPro  6.5<L>  /  Alb  3.7  /  TBili  1.1  /  DBili  x   /  AST  60<H>  /  ALT  36  /  AlkPhos  130<H>  05-13    PT/INR/PTT ( 13 May 2022 06:44 )                       :                       :      13.0         :       26.4                  .        .                   .              .           .       1.12        .                                       Lipid Profile: Date: 05-13 @ 06:44  Total cholesterol 163; Direct LDL: --; HDL: 53; Triglycerides:93    HgA1c:   TSH: Thyroid Stimulating Hormone, Serum: 1.17 uIU/mL  Thyroid Stimulating Hormone, Serum: 0.71 uIU/mL      TELEMETRY: RSR 60's no events      DIAGNOSTIC TESTING:  [ ] Echocardiogram:     < from: TTE Echo Complete w/ Contrast w/ Doppler (05.10.22 @ 17:24) >  . Endocardial visualization was enhanced with intravenous echo contrast.   2. LV Ejection Fraction by Blake's Method with a biplane EF of 30 %.   3. Moderately decreased segmental left ventricular systolic function.   4. Entire apex, mid and apical anterior wall, and mid anteroseptal   segment are abnormal as described above.   5. Spectral Doppler shows impaired relaxation pattern of left   ventricular myocardial filling (Grade I diastolic dysfunction).   6. Normal right ventricular size and function.   7. Trace mitral valve regurgitation.   8. Sclerotic aortic valve with normal opening.   9. Adequate TR velocity was not obtained to accurately assess RVSP.    < end of copied text >  [ ]  Catheterization:   RCA stent x 1 2007 at [facility] as per patient  [ ] Stress Test:  2018--No ischemia	    5/12/22: [facility] via [facility] with Dr. Farmer:  mLAD; 80% mid to distal LCX; patent RCA stent (mild ISR) (prelim report; official report to follow)

## 2022-05-13 NOTE — PROGRESS NOTE ADULT - SUBJECTIVE AND OBJECTIVE BOX
BRIE WEBB    [id number]    66y      Male    INTERVAL HPI/OVERNIGHT EVENTS: patient being seen for gi bleed and chf. Patient seen at bedside and complains of continued fatigue.   Pt examined in Cath labs s/p Rt radial LHC   CAD noted, CT surgery recs noted  Statin and Losartan on hold for now    REVIEW OF SYSTEMS:    CONSTITUTIONAL: fatigue  RESPIRATORY: No cough, wheezing, hemoptysis; No shortness of breath  CARDIOVASCULAR: No chest pain, palpitations  GASTROINTESTINAL: No abdominal or epigastric pain. No nausea, vomiting  NEUROLOGICAL: No headaches, memory loss, loss of strength.  MISCELLANEOUS:      Vital Signs Last 24 Hrs  T(C): 36.8 (13 May 2022 15:38), Max: 36.9 (12 May 2022 21:16)  T(F): 98.2 (13 May 2022 15:38), Max: 98.4 (12 May 2022 21:16)  HR: 93 (13 May 2022 15:38) (69 - 93)  BP: 128/80 (13 May 2022 15:38) (111/68 - 147/80)  BP(mean): --  RR: 18 (13 May 2022 15:38) (17 - 18)  SpO2: 97% (13 May 2022 15:38) (96% - 99%)    PHYSICAL EXAM:  General: awake alert , no distress  ENT: dry mouth   Neck: supple, no JVD   Lungs: CTA bilateral   Cardio: Regular rate , S1/S2, No murmur  Abdomen: Soft, Nontender, Nondistended; Bowel sounds present  Extremities: No calf tenderness, No pitting edema  NEURO aao x4    LABS:                                   12.2   5.16  )-----------( 92       ( 13 May 2022 06:44 )             35.2   05-13    135  |  101  |  10.4  ----------------------------<  125<H>  4.1   |  24.0  |  0.45<L>    Ca    9.6      13 May 2022 06:44  Phos  2.4     05-13  Mg     1.8     05-13    TPro  6.5<L>  /  Alb  3.7  /  TBili  1.1  /  DBili  x   /  AST  60<H>  /  ALT  36  /  AlkPhos  130<H>  05-13          MEDICATIONS  (STANDING):  ascorbic acid 500 milliGRAM(s) Oral daily  folic acid 1 milliGRAM(s) Oral daily  losartan 25 milliGRAM(s) Oral daily  metoprolol succinate ER 25 milliGRAM(s) Oral daily  pantoprazole  Injectable 40 milliGRAM(s) IV Push two times a day  potassium phosphate / sodium phosphate Tablet (K-PHOS No. 2) 1 Tablet(s) Oral three times a day with meals  sertraline 100 milliGRAM(s) Oral daily  sodium chloride 0.9%. 250 milliLiter(s) (250 mL/Hr) IV Continuous <Continuous>  sodium chloride 0.9%. 250 milliLiter(s) (250 mL/Hr) IV Continuous <Continuous>  thiamine 100 milliGRAM(s) Oral daily    MEDICATIONS  (PRN):  acetaminophen     Tablet .. 650 milliGRAM(s) Oral every 6 hours PRN Moderate Pain (4 - 6)  aluminum hydroxide/magnesium hydroxide/simethicone Suspension 30 milliLiter(s) Oral every 4 hours PRN Dyspepsia  artificial  tears Solution 1 Drop(s) Both EYES two times a day PRN Dry Eyes  ondansetron Injectable 4 milliGRAM(s) IV Push four times a day PRN Nausea and/or Vomiting

## 2022-05-13 NOTE — PROGRESS NOTE ADULT - SUBJECTIVE AND OBJECTIVE BOX
Subjective: Pt sitting up in bed.  NAD noted.  Denies CP or SOB.      Tele:  SR                        T(F): 98 (05-13-22 @ 10:42), Max: 98.4 (05-12-22 @ 21:16)  HR: 88 (05-13-22 @ 10:42) (69 - 88)  BP: 140/68 (05-13-22 @ 10:42) (111/68 - 147/80)  RR: 17 (05-13-22 @ 10:42) (17 - 18)  SpO2: 96% (05-13-22 @ 10:42) (96% - 100%) room air.      LV EF: 35%    No Known Allergies      05-13    135  |  101  |  10.4  ----------------------------<  125<H>  4.1   |  24.0  |  0.45<L>    Ca    9.6      13 May 2022 06:44  Phos  2.4     05-13  Mg     1.8     05-13    TPro  6.5<L>  /  Alb  3.7  /  TBili  1.1  /  DBili  x   /  AST  60<H>  /  ALT  36  /  AlkPhos  130<H>  05-13                               12.2   5.16  )-----------( 92       ( 13 May 2022 06:44 )             35.2        PT/INR - ( 13 May 2022 06:44 )   PT: 13.0 sec;   INR: 1.12 ratio         PTT - ( 13 May 2022 06:44 )  PTT:26.4 sec         CAPILLARY BLOOD GLUCOSE  POCT Blood Glucose.: 137 mg/dL (13 May 2022 12:06)           CXR: < from: Xray Chest 1 View- PORTABLE-Routine (Xray Chest 1 View- PORTABLE-Routine in AM.) (05.13.22 @ 04:54) >  IMPRESSION:  No acute radiographic findings and no change    --- End of Report ---    YUMIKO CLARK DO; Attending Radiologist  This document has been electronically signed. May 13 2022  2:06PM    < end of copied text >      I&O's Detail    13 May 2022 07:01  -  13 May 2022 15:12  --------------------------------------------------------  IN:    Oral Fluid: 720 mL  Total IN: 720 mL    OUT:    Voided (mL): 400 mL  Total OUT: 400 mL    Total NET: 320 mL    Active Medications:  acetaminophen     Tablet .. 650 milliGRAM(s) Oral every 6 hours PRN  albuterol/ipratropium for Nebulization 3 milliLiter(s) Nebulizer every 6 hours PRN  aluminum hydroxide/magnesium hydroxide/simethicone Suspension 30 milliLiter(s) Oral every 4 hours PRN  artificial  tears Solution 1 Drop(s) Both EYES two times a day PRN  ascorbic acid 500 milliGRAM(s) Oral daily  aspirin  chewable 81 milliGRAM(s) Oral daily  atorvastatin 20 milliGRAM(s) Oral at bedtime  chlorhexidine 2% Cloths 1 Application(s) Topical daily  folic acid 1 milliGRAM(s) Oral daily  losartan 25 milliGRAM(s) Oral daily  metoprolol succinate ER 50 milliGRAM(s) Oral daily  mupirocin 2% Nasal 1 Application(s) Both Nostrils two times a day  ondansetron Injectable 4 milliGRAM(s) IV Push four times a day PRN  pantoprazole  Injectable 40 milliGRAM(s) IV Push two times a day  sertraline 100 milliGRAM(s) Oral daily  sodium chloride 0.9%. 250 milliLiter(s) IV Continuous <Continuous>  sodium chloride 0.9%. 250 milliLiter(s) IV Continuous <Continuous>  thiamine 100 milliGRAM(s) Oral daily      Physical Exam:    Neuro:  AAOX3.      Pulm: Diminished BLL.    CV: RRR    Abd: Soft/NT                      PAST MEDICAL & SURGICAL HISTORY:  Pancreatitis      Hypertension      Myocardial infarct      Alcohol abuse      Colon cancer      History of colon resection      History of heart surgery  cardiac stent

## 2022-05-13 NOTE — PROGRESS NOTE ADULT - ASSESSMENT
A/P:  65 y/o M with a PMHx MI/CAD s/p PCI (LEELA x 1 pRCA 2007), HTN, depression, former heavy EtOH abuse, and HLD who presented to the ED with complaints of hematemesis. Patient states that he had a beer on Thursday night, but overall does not drink often anymore. Patient states that on Saturday he felt very weak and tired, and then had some nausea and emesis with some pink color. Pt also noted dark black colored stools as well over the weekend. Patient states that the symptoms occurred on Sunday as well, so he came to the ER to be evaluated.  Patient underwent EGD that showed portal hypertensive gastropathy.  Patient follows with Dr. Ricci, but often isn't seen for years at a time. Patient's last NST was 2018 with no ischemia. Patient with METS<4, but denies any chest pain or dyspnea. Patient denies fevers, chills, syncope, near syncope, headache, or dizziness. Troponin negative x 1.   Presents today for [facility] as part of cardiac evaluation which revealed 2v CAD.  CT Surgery to eval for CABG     Problem/Plan - 1:  ·  Problem: Acute systolic congestive heart failure.   ·  Plan: - Newly reduced LVEF of 30% with RWMA.   - Patient asymptomatic.   - Does not appear overloaded on exam.   - Hold losartan in preparation for surgery, continue Toprol XL 25mg PO qday.   - Will need f/u with primary Cardiologist Dr. Ricci for optimization.     Problem/Plan - 2:  ·  Problem: CAD (coronary artery disease).   ·  Plan: - Hx of PCI to pRCA 2007.   -significant 2v CAD on todays cath  - Now with newly reduced LVEF and RWMA as above.   - Plavix on hold for GI bleed.   -  toprol XL.   - Start statin when cleared by GI and LFTs have normalized.  - No s/ s of ACS.   Films reviewed wth Dr. Gray, he will speak to patient about surgery.  -Pre-op workup in the meantime - PFT's, carotids, labs, CXR,      Problem/Plan - 3:  ·  Problem: GI bleed.   ·  Plan: - GI following.    Problem 4 - Cirrhosis  -appears to be Estiven Class A  -Will ask GI to comment further in light of plan for surgical revascularization  -Discussed with Dr. Gray

## 2022-05-13 NOTE — PROGRESS NOTE ADULT - PROBLEM SELECTOR PLAN 1
[facility]   -significant 2v CAD cath yesterday.  - Now with newly reduced LVEF and RWMA as above.   - Plavix on hold for GI bleed.   -  toprol XL.   - Statin when cleared by GI and LFTs have normalized.  - No s/ s of ACS.   Pre-op workup for possible CABG.    Plan per Primary team Is discharge to [facility].   Pt to see hepatologist in office after discharge from [facility], then may see Dr. Gray in the office.

## 2022-05-13 NOTE — PROGRESS NOTE ADULT - ASSESSMENT
65 y/o M with a PMHx MI/CAD s/p PCI (LEELA x 1 pRCA 2007), HTN, depression, former heavy EtOH abuse, and HLD   who presented to the ED with complaints of hematemesis. Patient states that he had a beer on Thursday night, but overall does not drink often anymore. Patient states that on Saturday he felt very weak and tired, and then had some nausea and emesis with some pink color. Pt also noted dark black colored stools as well over the weekend. Patient states that the symptoms occurred on Sunday as well, so he came to the ER to be evaluated.    Patient underwent EGD that showed portal hypertensive gastropathy.  Patient follows with Dr. Ricci, but often isn't seen for years at a time.   Patient's last NST was 2018 with no ischemia. Patient with METS<4, but denies any chest pain or dyspnea. Patient denies fevers, chills, syncope, near syncope, headache, or dizziness. Troponin negative x 1.       Presents 5/12/22 for [facility] as part of cardiac evaluation.: newly reduced LVF and WMA on echo     5/13/22 Hemodynamically stable without complaints chest pain /sob      Problem/Plan - 1:  ·  Problem: Acute systolic congestive heart failure.   ·  - Newly reduced LVEF of 30% with RWMA.   - Patient asymptomatic. without complaints of chest pain or sob  - Euvolemic on  exam.   - For Diagnostic only [facility] 5/12/22 :  mLAD; 80% mid to distal LCX; patent RCA stent (mild ISR) (prelim report; official report to follow)      Problem/Plan - 2:  ·  Problem: CAD (coronary artery disease).   - Hx of PCI to pRCA 2007.    C 5/12/22 :  mLAD; 80% mid to distal LCX; patent RCA stent (mild ISR) (prelim report; official report to follow)  CTS follow up discussed: pt will go to [facility]/ follow up EGD prior to surgical intervention  continue BB/ARB, will add asa 81 daily and statin now that LFTs have improved     Problem/Plan - 3:  ·  Problem: GI bleed/ portal  hypertensive gastropathy, likely due to chronic alcohol use  stable no bleeding noted   H&H stable   GI follow up noted - high risk for bleed with AC/ Antiplatelet use, no absolute contraindication   will add ASA 81 mg     * discussed with CTS and Dr. Bradley barlow for [facility]  67 y/o M with a PMHx MI/CAD s/p PCI (LEELA x 1 pRCA 2007), HTN, depression, former heavy EtOH abuse, and HLD   who presented to the ED with complaints of hematemesis. Patient states that he had a beer on Thursday night, but overall does not drink often anymore. Patient states that on Saturday he felt very weak and tired, and then had some nausea and emesis with some pink color. Pt also noted dark black colored stools as well over the weekend. Patient states that the symptoms occurred on Sunday as well, so he came to the ER to be evaluated.    Patient underwent EGD that showed portal hypertensive gastropathy.  Patient follows with Dr. Ricci, but often isn't seen for years at a time.   Patient's last NST was 2018 with no ischemia. Patient with METS<4, but denies any chest pain or dyspnea. Patient denies fevers, chills, syncope, near syncope, headache, or dizziness. Troponin negative x 1.       Presents 5/12/22 for [facility] as part of cardiac evaluation.: newly reduced LVF and WMA on echo     5/13/22 Hemodynamically stable without complaints chest pain /sob      Problem/Plan - 1:  ·  Problem: Acute systolic congestive heart failure.   ·  - Newly reduced LVEF of 30% with RWMA.   - Patient asymptomatic. without complaints of chest pain or sob  - Euvolemic on  exam.   - For Diagnostic only [facility] 5/12/22 :  mLAD; 80% mid to distal LCX; patent RCA stent (mild ISR) (prelim report; official report to follow)      Problem/Plan - 2:  ·  Problem: CAD (coronary artery disease).   - Hx of PCI to pRCA 2007.    C 5/12/22 :  mLAD; 80% mid to distal LCX; patent RCA stent (mild ISR) (prelim report; official report to follow)  CTS follow up discussed: pt will go to [facility]/ follow up EGD prior to surgical intervention  continue BB will increase to 50 mg daily /ARB/ , will add asa 81 daily and statin now that LFTs have improved     Problem/Plan - 3:  ·  Problem: GI bleed/ portal  hypertensive gastropathy, likely due to chronic alcohol use  stable no bleeding noted   H&H stable   GI follow up noted - high risk for bleed with AC/ Antiplatelet use, no absolute contraindication   will add ASA 81 mg     * discussed with CTS and Dr. Bradley barlow for [facility]

## 2022-05-14 LAB
ALBUMIN SERPL ELPH-MCNC: 3.3 G/DL — SIGNIFICANT CHANGE UP (ref 3.3–5.2)
ALP SERPL-CCNC: 135 U/L — HIGH (ref 40–120)
ALT FLD-CCNC: 39 U/L — SIGNIFICANT CHANGE UP
ANION GAP SERPL CALC-SCNC: 16 MMOL/L — SIGNIFICANT CHANGE UP (ref 5–17)
AST SERPL-CCNC: 58 U/L — HIGH
BILIRUB SERPL-MCNC: 1.1 MG/DL — SIGNIFICANT CHANGE UP (ref 0.4–2)
BUN SERPL-MCNC: 7.2 MG/DL — LOW (ref 8–20)
CALCIUM SERPL-MCNC: 9.4 MG/DL — SIGNIFICANT CHANGE UP (ref 8.6–10.2)
CHLORIDE SERPL-SCNC: 98 MMOL/L — SIGNIFICANT CHANGE UP (ref 98–107)
CO2 SERPL-SCNC: 22 MMOL/L — SIGNIFICANT CHANGE UP (ref 22–29)
CREAT SERPL-MCNC: 0.38 MG/DL — LOW (ref 0.5–1.3)
EGFR: 123 ML/MIN/1.73M2 — SIGNIFICANT CHANGE UP
GLUCOSE BLDC GLUCOMTR-MCNC: 142 MG/DL — HIGH (ref 70–99)
GLUCOSE SERPL-MCNC: 120 MG/DL — HIGH (ref 70–99)
HCT VFR BLD CALC: 33.8 % — LOW (ref 39–50)
HGB BLD-MCNC: 11.8 G/DL — LOW (ref 13–17)
MCHC RBC-ENTMCNC: 34.9 GM/DL — SIGNIFICANT CHANGE UP (ref 32–36)
MCHC RBC-ENTMCNC: 37.9 PG — HIGH (ref 27–34)
MCV RBC AUTO: 108.7 FL — HIGH (ref 80–100)
PLATELET # BLD AUTO: 91 K/UL — LOW (ref 150–400)
POTASSIUM SERPL-MCNC: 3.5 MMOL/L — SIGNIFICANT CHANGE UP (ref 3.5–5.3)
POTASSIUM SERPL-SCNC: 3.5 MMOL/L — SIGNIFICANT CHANGE UP (ref 3.5–5.3)
PROT SERPL-MCNC: 6.2 G/DL — LOW (ref 6.6–8.7)
RBC # BLD: 3.11 M/UL — LOW (ref 4.2–5.8)
RBC # FLD: 11.1 % — SIGNIFICANT CHANGE UP (ref 10.3–14.5)
SODIUM SERPL-SCNC: 136 MMOL/L — SIGNIFICANT CHANGE UP (ref 135–145)
WBC # BLD: 4.57 K/UL — SIGNIFICANT CHANGE UP (ref 3.8–10.5)
WBC # FLD AUTO: 4.57 K/UL — SIGNIFICANT CHANGE UP (ref 3.8–10.5)

## 2022-05-14 PROCEDURE — 99233 SBSQ HOSP IP/OBS HIGH 50: CPT

## 2022-05-14 PROCEDURE — 99232 SBSQ HOSP IP/OBS MODERATE 35: CPT

## 2022-05-14 RX ADMIN — MUPIROCIN 1 APPLICATION(S): 20 OINTMENT TOPICAL at 05:55

## 2022-05-14 RX ADMIN — Medication 1 DROP(S): at 16:48

## 2022-05-14 RX ADMIN — Medication 500 MILLIGRAM(S): at 08:42

## 2022-05-14 RX ADMIN — Medication 81 MILLIGRAM(S): at 08:42

## 2022-05-14 RX ADMIN — PANTOPRAZOLE SODIUM 40 MILLIGRAM(S): 20 TABLET, DELAYED RELEASE ORAL at 05:55

## 2022-05-14 RX ADMIN — SERTRALINE 100 MILLIGRAM(S): 25 TABLET, FILM COATED ORAL at 08:41

## 2022-05-14 RX ADMIN — PANTOPRAZOLE SODIUM 40 MILLIGRAM(S): 20 TABLET, DELAYED RELEASE ORAL at 16:48

## 2022-05-14 RX ADMIN — MUPIROCIN 1 APPLICATION(S): 20 OINTMENT TOPICAL at 16:48

## 2022-05-14 RX ADMIN — Medication 1 MILLIGRAM(S): at 08:41

## 2022-05-14 RX ADMIN — Medication 50 MILLIGRAM(S): at 05:56

## 2022-05-14 RX ADMIN — Medication 100 MILLIGRAM(S): at 08:41

## 2022-05-14 RX ADMIN — CHLORHEXIDINE GLUCONATE 1 APPLICATION(S): 213 SOLUTION TOPICAL at 08:39

## 2022-05-14 NOTE — PROGRESS NOTE ADULT - PROBLEM SELECTOR PLAN 1
Hx of PCI to pRCA 2007.   -significant 2v CAD on cath  - Now with newly reduced LVEF and RWMA as above.   - Plavix on hold for GI bleed.   -  toprol XL.   - Statin when cleared by GI and LFTs have normalized.  - No s/ s of ACS.   Pre-op workup for possible CABG.    Plan per Primary team Is discharge to [facility].   Pt to see hepatologist in office after discharge from [facility], then f/u with Dr. Gray in the office for CABG eval

## 2022-05-14 NOTE — PROGRESS NOTE ADULT - ASSESSMENT
65 yo M smoker , with h/o CAD , HTN , anxiety admitted with abdominal pain , nausea poor appetite and bloody vomiting , alcohol use.  s/p LHC with concern for significant 2 vessel CAD. CT surgery now on board.       Epigastric pain /nausea vomiting  EGD performed - duodenitis , possible Derrek's esophagitis,  portal hypertension gastropathy.  cont protonix bid     fatty liver due to chronic alcohol  counseling provided to stop  Hold Statin for now    h/o CAD / HTN / CHF  EF 30%  s/p [facility], plan for Cardiac surgery eval for 2 vessel CAD   significant CAD   Hold Losartan for now (per CT surgery)   Continue ASA, hold Plavix in light of GI bleed  f/u CT surgery regarding timing of surgery. Pre-op testing ordered by CT surgery    #Alcohol use, mild   no sign of withdrawal will monitor     #h/o Diabetes Mellitus old records reviewed   dc ssi   a1c 5    #-Anxiety disorder   cont sertraline     #thrombocytopenia - from bm suppression  -  given 1 unit of platelets already     #Frequent falls with left leg pain   h/o left femur fx surgery in the past limping   ct hip showing healing fracture    DVT prophylaxis   no chemical prophylaxis  due to gi bleed

## 2022-05-14 NOTE — PROGRESS NOTE ADULT - SUBJECTIVE AND OBJECTIVE BOX
[facility] Division of Hospital Medicine    Chief Complaint:      SUBJECTIVE / OVERNIGHT EVENTS: no complaints this am. no chest pain or SOB.     Patient denies chest pain, SOB, abd pain, N/V, fever, chills, dysuria or any other complaints. All remainder ROS negative.     MEDICATIONS  (STANDING):  ascorbic acid 500 milliGRAM(s) Oral daily  aspirin  chewable 81 milliGRAM(s) Oral daily  chlorhexidine 2% Cloths 1 Application(s) Topical daily  folic acid 1 milliGRAM(s) Oral daily  metoprolol succinate ER 50 milliGRAM(s) Oral daily  mupirocin 2% Nasal 1 Application(s) Both Nostrils two times a day  pantoprazole  Injectable 40 milliGRAM(s) IV Push two times a day  sertraline 100 milliGRAM(s) Oral daily  sodium chloride 0.9%. 250 milliLiter(s) (250 mL/Hr) IV Continuous <Continuous>  sodium chloride 0.9%. 250 milliLiter(s) (250 mL/Hr) IV Continuous <Continuous>  thiamine 100 milliGRAM(s) Oral daily    MEDICATIONS  (PRN):  acetaminophen     Tablet .. 650 milliGRAM(s) Oral every 6 hours PRN Moderate Pain (4 - 6)  albuterol/ipratropium for Nebulization 3 milliLiter(s) Nebulizer every 6 hours PRN Shortness of Breath and/or Wheezing  aluminum hydroxide/magnesium hydroxide/simethicone Suspension 30 milliLiter(s) Oral every 4 hours PRN Dyspepsia  artificial  tears Solution 1 Drop(s) Both EYES two times a day PRN Dry Eyes  ondansetron Injectable 4 milliGRAM(s) IV Push four times a day PRN Nausea and/or Vomiting        I&O's Summary    13 May 2022 07:01  -  14 May 2022 07:00  --------------------------------------------------------  IN: 1200 mL / OUT: 400 mL / NET: 800 mL        PHYSICAL EXAM:  Vital Signs Last 24 Hrs  T(C): 36.7 (14 May 2022 09:33), Max: 37.1 (14 May 2022 05:55)  T(F): 98 (14 May 2022 09:33), Max: 98.7 (14 May 2022 05:55)  HR: 88 (14 May 2022 09:33) (85 - 93)  BP: 132/60 (14 May 2022 09:33) (128/80 - 137/84)  BP(mean): --  RR: 17 (14 May 2022 09:33) (17 - 18)  SpO2: 96% (14 May 2022 09:33) (95% - 98%)        CONSTITUTIONAL: NAD, well-developed, well-groomed  ENMT: Moist oral mucosa, no pharyngeal injection or exudates; normal dentition  RESPIRATORY: Normal respiratory effort; lungs are clear to auscultation bilaterally  CARDIOVASCULAR: Regular rate and rhythm, normal S1 and S2, no murmur/rub/gallop; No lower extremity edema; Peripheral pulses are 2+ bilaterally  ABDOMEN: Nontender to palpation, normoactive bowel sounds, no rebound/guarding; No hepatosplenomegaly  MUSCLOSKELETAL:  Normal gait; no clubbing or cyanosis of digits; no joint swelling or tenderness to palpation  PSYCH: A+O to person, place, and time; affect appropriate  NEUROLOGY: CN 2-12 are intact and symmetric; no gross sensory deficits;   SKIN: No rashes; no palpable lesions    LABS:                        11.8   4.57  )-----------( 91       ( 14 May 2022 07:40 )             33.8     05-14    136  |  98  |  7.2<L>  ----------------------------<  120<H>  3.5   |  22.0  |  0.38<L>    Ca    9.4      14 May 2022 07:41  Phos  2.4     05-13  Mg     1.8     05-13    TPro  6.2<L>  /  Alb  3.3  /  TBili  1.1  /  DBili  x   /  AST  58<H>  /  ALT  39  /  AlkPhos  135<H>  05-14    PT/INR - ( 13 May 2022 06:44 )   PT: 13.0 sec;   INR: 1.12 ratio         PTT - ( 13 May 2022 06:44 )  PTT:26.4 sec      Urinalysis Basic - ( 13 May 2022 05:33 )    Color: Yellow / Appearance: Slightly Turbid / S.015 / pH: x  Gluc: x / Ketone: Negative  / Bili: Negative / Urobili: 8 mg/dL   Blood: x / Protein: Negative / Nitrite: Negative   Leuk Esterase: Negative / RBC: 0-2 /HPF / WBC 0-2 /HPF   Sq Epi: x / Non Sq Epi: Few / Bacteria: Negative        CAPILLARY BLOOD GLUCOSE            RADIOLOGY & ADDITIONAL TESTS:  Results Reviewed:   Imaging Personally Reviewed:  Electrocardiogram Personally Reviewed:

## 2022-05-14 NOTE — PROGRESS NOTE ADULT - SUBJECTIVE AND OBJECTIVE BOX
Subjective: no complaints denies CP, palpitations, SOB, cough, fever, chills, itchiness/rash, diaphoresis, vision changes, HA, dizziness/lightheadedness, numbness/tingling, abd pain, N/V     T(C): 36.8 (05-14-22 @ 15:40), Max: 37.1 (05-14-22 @ 05:55)  HR: 88 (05-14-22 @ 15:40) (85 - 88)  BP: 128/71 (05-14-22 @ 15:40) (128/71 - 137/80)  RR: 18 (05-14-22 @ 15:40) (17 - 18)  SpO2: 96% (05-14-22 @ 15:40) (95% - 96%)    05-14    136  |  98  |  7.2<L>  ----------------------------<  120<H>  3.5   |  22.0  |  0.38<L>    Ca    9.4      14 May 2022 07:41  Phos  2.4     05-13  Mg     1.8     05-13    TPro  6.2<L>  /  Alb  3.3  /  TBili  1.1  /  DBili  x   /  AST  58<H>  /  ALT  39  /  AlkPhos  135<H>  05-14                               11.8   4.57  )-----------( 91       ( 14 May 2022 07:40 )             33.8   PT/INR - ( 13 May 2022 06:44 )   PT: 13.0 sec;   INR: 1.12 ratio    PTT - ( 13 May 2022 06:44 )  PTT:26.4 sec    05-13 @ 07:01  -  05-14 @ 07:00  --------------------------------------------------------  IN:    Oral Fluid: 1200 mL  Total IN: 1200 mL    OUT:    Voided (mL): 400 mL  Total OUT: 400 mL  Total NET: 800 mL    05-14 @ 07:01  -  05-14 @ 20:59  --------------------------------------------------------  IN:    Oral Fluid: 480 mL  Total IN: 480 mL    OUT:  Total OUT: 0 mL  Total NET: 480 mL    MEDICATIONS  (STANDING):  ascorbic acid 500 milliGRAM(s) Oral daily  aspirin  chewable 81 milliGRAM(s) Oral daily  chlorhexidine 2% Cloths 1 Application(s) Topical daily  folic acid 1 milliGRAM(s) Oral daily  metoprolol succinate ER 50 milliGRAM(s) Oral daily  mupirocin 2% Nasal 1 Application(s) Both Nostrils two times a day  pantoprazole  Injectable 40 milliGRAM(s) IV Push two times a day  sertraline 100 milliGRAM(s) Oral daily  sodium chloride 0.9%. 250 milliLiter(s) (250 mL/Hr) IV Continuous <Continuous>  sodium chloride 0.9%. 250 milliLiter(s) (250 mL/Hr) IV Continuous <Continuous>  thiamine 100 milliGRAM(s) Oral daily    MEDICATIONS  (PRN):  acetaminophen     Tablet .. 650 milliGRAM(s) Oral every 6 hours PRN Moderate Pain (4 - 6)  albuterol/ipratropium for Nebulization 3 milliLiter(s) Nebulizer every 6 hours PRN Shortness of Breath and/or Wheezing  aluminum hydroxide/magnesium hydroxide/simethicone Suspension 30 milliLiter(s) Oral every 4 hours PRN Dyspepsia  artificial  tears Solution 1 Drop(s) Both EYES two times a day PRN Dry Eyes  ondansetron Injectable 4 milliGRAM(s) IV Push four times a day PRN Nausea and/or Vomiting    Physical Exam  Neuro: A+O x 3, non-focal, speech clear and intact  Pulm: CTA, b/l  CV: S1S2 RRR +WILTON  Abd:+BS soft NT ND  Extrem/MS: no edema/cyanosis, WWP, COLTON

## 2022-05-14 NOTE — EEG REPORT - NS EEG TEXT BOX
[facility]   COMPREHENSIVE EPILEPSY CENTER   REPORT OF ROUTINE VIDEO EEG     [facility]: [address], [place] [address], Ph#: [phone]  [facility]: [id number], [place] [address], Ph#: [phone]  [facility]: [address] [place] [address], Ph#: [phone]    Patient Name: BRIE WEBB  Age and : 66y ([birth year])  MRN #: [id number]  Location: [address] [address]  Referring Physician: Vivi Salmon    Study Date: 22    _____________________________________________________________  TECHNICAL INFORMATION    Placement and Labeling of Electrodes:  The EEG was performed utilizing 20 channels referential EEG connections (coronal over temporal over parasagittal montage) using all standard 10-20 electrode placements with EKG.  Recording was at a sampling rate of 256 samples per second per channel.  Time synchronized digital video recording was done simultaneously with EEG recording.  A low light infrared camera was used for low light recording.  Fabricio and seizure detection algorithms were utilized.    _____________________________________________________________  HISTORY    Patient is a 66y old  Male who presents with a chief complaint of vomiting blood , abdominal pain (14 May 2022 14:10)      PERTINENT MEDICATION:  none  _____________________________________________________________  STUDY INTERPRETATION    Findings: The background was continuous, spontaneously variable and reactive. During wakefulness, the posterior dominant rhythm consisted of symmetric, well-modulated 8 Hz activity, with amplitude to 30 uV, that attenuated to eye opening.  Low amplitude frontal beta was noted in wakefulness.    Background Slowing:  No generalized background slowing was present.    Focal Slowing:   None were present.    Sleep Background:  Drowsiness and stage II sleep transients were not recorded.    Other Non-Epileptiform Findings:  None were present.    Interictal Epileptiform Activity:   None were present.    Events:  Clinical events: None recorded.  Seizures: None recorded.    Activation Procedures:   Hyperventilation was not performed.    Photic stimulation was performed and did not elicit any abnormality.     Artifacts:  Intermittent myogenic and movement artifacts were noted.    ECG:  The heart rate on single channel ECG was predominantly between 85-95 BPM.  _____________________________________________________________  EEG SUMMARY/CLASSIFICATION    Normal EEG in the awake state  _____________________________________________________________  EEG IMPRESSION/CLINICAL CORRELATE    Normal EEG study.  No epileptiform pattern or seizure seen.    Ruslan Golden MD PGY-5  Epilepsy Fellow    This Preliminary report is based on fellow review. Final report pending attending review.    Reading Room: [phone]  On Call Service After Hours: [phone]     [facility]   COMPREHENSIVE EPILEPSY CENTER   REPORT OF ROUTINE VIDEO EEG     [facility]: [address], [place] [address], Ph#: [phone]  [facility]: [id number], [place] [address], Ph#: [phone]  [facility]: [address] [place] [address], Ph#: [phone]    Patient Name: BRIE WEBB  Age and : 66y (57)  MRN #: [id number]  Location: [address] [address]  Referring Physician: Vivi Salmon    Study Date: 22    _____________________________________________________________  TECHNICAL INFORMATION    Placement and Labeling of Electrodes:  The EEG was performed utilizing 20 channels referential EEG connections (coronal over temporal over parasagittal montage) using all standard 10-20 electrode placements with EKG.  Recording was at a sampling rate of 256 samples per second per channel.  Time synchronized digital video recording was done simultaneously with EEG recording.  A low light infrared camera was used for low light recording.  Fabricio and seizure detection algorithms were utilized.    _____________________________________________________________  HISTORY    Patient is a 66y old  Male who presents with a chief complaint of vomiting blood , abdominal pain (14 May 2022 14:10)      PERTINENT MEDICATION:  none  _____________________________________________________________  STUDY INTERPRETATION    Findings: The background was continuous, spontaneously variable and reactive. During wakefulness, the posterior dominant rhythm consisted of symmetric, well-modulated 8 Hz activity, with amplitude to 30 uV, that attenuated to eye opening.  Low amplitude frontal beta was noted in wakefulness.    Background Slowing:  No generalized background slowing was present.    Focal Slowing:   None were present.    Sleep Background:  Drowsiness and stage II sleep transients were not recorded.    Other Non-Epileptiform Findings:  None were present.    Interictal Epileptiform Activity:   None were present.    Events:  Clinical events: None recorded.  Seizures: None recorded.    Activation Procedures:   Hyperventilation was not performed.    Photic stimulation was performed and did not elicit any abnormality.     Artifacts:  Intermittent myogenic and movement artifacts were noted.    ECG:  The heart rate on single channel ECG was predominantly between 85-95 BPM.  _____________________________________________________________  EEG SUMMARY/CLASSIFICATION    Normal EEG in the awake state  _____________________________________________________________  EEG IMPRESSION/CLINICAL CORRELATE    Normal EEG study.  No epileptiform pattern or seizure seen.    Ruslan Golden MD PGY-5  Epilepsy Fellow        Reading Room: [phone]  On Call Service After Hours: [phone]

## 2022-05-15 LAB
GLUCOSE BLDC GLUCOMTR-MCNC: 146 MG/DL — HIGH (ref 70–99)
GLUCOSE BLDC GLUCOMTR-MCNC: 157 MG/DL — HIGH (ref 70–99)
HCT VFR BLD CALC: 34.6 % — LOW (ref 39–50)
HGB BLD-MCNC: 12.3 G/DL — LOW (ref 13–17)
MCHC RBC-ENTMCNC: 35.5 GM/DL — SIGNIFICANT CHANGE UP (ref 32–36)
MCHC RBC-ENTMCNC: 38.6 PG — HIGH (ref 27–34)
MCV RBC AUTO: 108.5 FL — HIGH (ref 80–100)
PLATELET # BLD AUTO: 138 K/UL — LOW (ref 150–400)
RBC # BLD: 3.19 M/UL — LOW (ref 4.2–5.8)
RBC # FLD: 11.1 % — SIGNIFICANT CHANGE UP (ref 10.3–14.5)
WBC # BLD: 5.4 K/UL — SIGNIFICANT CHANGE UP (ref 3.8–10.5)
WBC # FLD AUTO: 5.4 K/UL — SIGNIFICANT CHANGE UP (ref 3.8–10.5)

## 2022-05-15 PROCEDURE — 99231 SBSQ HOSP IP/OBS SF/LOW 25: CPT

## 2022-05-15 PROCEDURE — 99233 SBSQ HOSP IP/OBS HIGH 50: CPT

## 2022-05-15 RX ORDER — PANTOPRAZOLE SODIUM 20 MG/1
40 TABLET, DELAYED RELEASE ORAL
Refills: 0 | Status: DISCONTINUED | OUTPATIENT
Start: 2022-05-15 | End: 2022-05-17

## 2022-05-15 RX ADMIN — SERTRALINE 100 MILLIGRAM(S): 25 TABLET, FILM COATED ORAL at 17:24

## 2022-05-15 RX ADMIN — Medication 100 MILLIGRAM(S): at 17:24

## 2022-05-15 RX ADMIN — CHLORHEXIDINE GLUCONATE 1 APPLICATION(S): 213 SOLUTION TOPICAL at 08:23

## 2022-05-15 RX ADMIN — MUPIROCIN 1 APPLICATION(S): 20 OINTMENT TOPICAL at 05:56

## 2022-05-15 RX ADMIN — Medication 1 MILLIGRAM(S): at 17:24

## 2022-05-15 RX ADMIN — Medication 50 MILLIGRAM(S): at 05:48

## 2022-05-15 RX ADMIN — PANTOPRAZOLE SODIUM 40 MILLIGRAM(S): 20 TABLET, DELAYED RELEASE ORAL at 05:48

## 2022-05-15 RX ADMIN — MUPIROCIN 1 APPLICATION(S): 20 OINTMENT TOPICAL at 17:24

## 2022-05-15 RX ADMIN — Medication 500 MILLIGRAM(S): at 17:24

## 2022-05-15 RX ADMIN — Medication 81 MILLIGRAM(S): at 17:23

## 2022-05-15 NOTE — PROGRESS NOTE ADULT - ASSESSMENT
66M, PMHx MI/CAD s/p PCI (LEELA x 1 pRCA 2007), HTN, depression, former heavy EtOH abuse, and HLD who presented to the ED with complaints of hematemesis. Patient states that he had a beer on Thursday night, but overall does not drink often anymore. Patient states that on Saturday he felt very weak and tired, and then had some nausea and emesis with some pink color. Pt also noted dark black colored stools as well over the weekend. Patient states that the symptoms occurred on Sunday as well, so he came to the ER to be evaluated.  Patient underwent EGD that showed portal hypertensive gastropathy.  Patient follows with Dr. Ricci, but often isn't seen for years at a time. Patient's last NST was 2018 with no ischemia. Patient with METS<4, but denies any chest pain or dyspnea. Patient denies fevers, chills, syncope, near syncope, headache, or dizziness. Troponin negative x 1.   Presents today for [facility] as part of cardiac evaluation which revealed 2v CAD.  CT Surgery to eval for CABG

## 2022-05-15 NOTE — PROGRESS NOTE ADULT - SUBJECTIVE AND OBJECTIVE BOX
SUBJECTIVE:  Pt walking out of the bathroom, pt states, "I am doing fine"  Pt c/o of slight SOB while walking and excretion alleviated with rest. Pt denies any current chest pain, SOB, palpitations, n/v/d      PAST MEDICAL & SURGICAL HISTORY:  Pancreatitis      Hypertension      Myocardial infarct      Alcohol abuse      Colon cancer      History of colon resection      History of heart surgery  cardiac stent          MEDICATIONS  acetaminophen     Tablet .. 650 milliGRAM(s) Oral every 6 hours PRN  albuterol/ipratropium for Nebulization 3 milliLiter(s) Nebulizer every 6 hours PRN  aluminum hydroxide/magnesium hydroxide/simethicone Suspension 30 milliLiter(s) Oral every 4 hours PRN  artificial  tears Solution 1 Drop(s) Both EYES two times a day PRN  ascorbic acid 500 milliGRAM(s) Oral daily  aspirin  chewable 81 milliGRAM(s) Oral daily  chlorhexidine 2% Cloths 1 Application(s) Topical daily  folic acid 1 milliGRAM(s) Oral daily  metoprolol succinate ER 50 milliGRAM(s) Oral daily  mupirocin 2% Nasal 1 Application(s) Both Nostrils two times a day  ondansetron Injectable 4 milliGRAM(s) IV Push four times a day PRN  pantoprazole    Tablet 40 milliGRAM(s) Oral before breakfast  sertraline 100 milliGRAM(s) Oral daily  sodium chloride 0.9%. 250 milliLiter(s) IV Continuous <Continuous>  sodium chloride 0.9%. 250 milliLiter(s) IV Continuous <Continuous>  thiamine 100 milliGRAM(s) Oral daily  MEDICATIONS  (PRN):  acetaminophen     Tablet .. 650 milliGRAM(s) Oral every 6 hours PRN Moderate Pain (4 - 6)  albuterol/ipratropium for Nebulization 3 milliLiter(s) Nebulizer every 6 hours PRN Shortness of Breath and/or Wheezing  aluminum hydroxide/magnesium hydroxide/simethicone Suspension 30 milliLiter(s) Oral every 4 hours PRN Dyspepsia  artificial  tears Solution 1 Drop(s) Both EYES two times a day PRN Dry Eyes  ondansetron Injectable 4 milliGRAM(s) IV Push four times a day PRN Nausea and/or Vomiting      Daily     Daily Weight in k.5 (15 May 2022 05:11)                              12.3   5.40  )-----------( 138      ( 15 May 2022 05:37 )             34.6       136  |  98  |  7.2<L>  ----------------------------<  120<H>  3.5   |  22.0  |  0.38<L>    Ca    9.4      14 May 2022 07:41    TPro  6.2<L>  /  Alb  3.3  /  TBili  1.1  /  DBili  x   /  AST  58<H>  /  ALT  39  /  AlkPhos  135<H>              Objective:  T(C): 36.7 (05-15-22 @ 08:57), Max: 37.1 (22 @ 21:13)  HR: 80 (05-15-22 @ 08:57) (80 - 81)  BP: 122/68 (05-15-22 @ 08:57) (122/68 - 131/78)  RR: 17 (05-15-22 @ 08:57) (17 - 18)  SpO2: 96% (05-15-22 @ 08:57) (96% - 99%)  Wt(kg): --CAPILLARY BLOOD GLUCOSE      POCT Blood Glucose.: 142 mg/dL (14 May 2022 21:28)  I&O's Summary    14 May 2022 07:  -  15 May 2022 07:00  --------------------------------------------------------  IN: 480 mL / OUT: 0 mL / NET: 480 mL    15 May 2022 07:01  -  15 May 2022 16:34  --------------------------------------------------------  IN: 240 mL / OUT: 0 mL / NET: 240 mL        Physical Exam  Neuro: A+O x 3, non-focal, speech clear and intact  Pulm: CTA, b/l  CV: S1S2 RRR   Abd:+BS soft NT ND  Extrem/MS: no edema/cyanosis, WWP, SEGURA    Imaging:  CXR:  < from: Xray Chest 1 View- PORTABLE-Routine (Xray Chest 1 View- PORTABLE-Routine in AM.) (22 @ 04:54) >      INTERPRETATION:  Clinical history: 66-year-old male, preop CABG.    Single view of the chest is compared to 2016.    FINDINGS: Normal cardiac silhouette and normal pulmonary vasculature with   no consolidation, effusion, pneumothorax or acute osseous finding.    IMPRESSION:  No acute radiographic findings and no change    --- End of Report ---      < end of copied text >      < from: TTE Echo Complete w/ Contrast w/ Doppler (05.10.22 @ 17:24) >  Summary:   1. Endocardial visualization was enhanced with intravenous echo contrast.   2. LV Ejection Fraction by Blake's Method with a biplane EF of 30 %.   3. Moderately decreased segmental left ventricular systolic function.   4. Entire apex, mid and apical anterior wall, and mid anteroseptal   segment are abnormal as described above.   5. Spectral Doppler shows impaired relaxation pattern of left   ventricular myocardial filling (Grade I diastolic dysfunction).   6. Normal right ventricular size and function.   7. Trace mitral valve regurgitation.   8. Sclerotic aortic valve with normal opening.   9. Adequate TR velocity was not obtained to accurately assess RVSP.    Adebayo Echeverria MD Electronically signed on 2022 at 7:22:52 AM            *** Final ***    < end of copied text >      < from: US Duplex Carotid Arteries Complete, Bilateral (22 @ 19:22) >  IMPRESSION:    No hemodynamically significant stenosis in the visualized internal   carotid arteries. Elevated peak systolic velocities of the left common   carotid artery, suggesting stenosis.    Measurement of carotid stenosis is based on velocity parameters that   correlate the residual internal carotid diameter with that of the more   distal vessel in accordance with a method such as the [place] American   Symptomatic Carotid Endarterectomy Trial (NASCET).    --- End of Report ---            < end of copied text >

## 2022-05-15 NOTE — PROGRESS NOTE ADULT - PROBLEM SELECTOR PLAN 1
[facility]   -significant 2v CAD on cath  - Now with newly reduced LVEF and RWMA as above.   - Plavix on hold for GI bleed.   -  toprol XL.   - Statin when cleared by GI and LFTs have normalized.  - No s/ s of ACS.   Pre-op workup for possible CABG.    Plan per Primary team Is discharge to [facility]   Pt to see hepatologist in office after discharge from [facility] then f/u with Dr. Gray in the office for CABG eval    Plan of care d/w with Attending on call Dr. Smith

## 2022-05-15 NOTE — PROGRESS NOTE ADULT - PROBLEM SELECTOR PROBLEM 1
Acute systolic congestive heart failure
CAD (coronary artery disease)
GI bleed
CAD (coronary artery disease)
GI bleed
CAD (coronary artery disease)

## 2022-05-15 NOTE — PROGRESS NOTE ADULT - PROBLEM SELECTOR PLAN 2
Newly reduced LVEF of 30% with RWMA.   - Patient asymptomatic.   - Does not appear overloaded on exam.   - continue Toprol XL 25mg PO qday.   - Will need f/u with primary Cardiologist Dr. Ricci for optimization.
- Hx of PCI to [facility] 2007.   - Now with newly reduced LVEF and RWMA as above.   - Plavix on hold for GI bleed.   - Started on losartan and toprol XL.   - Start statin when cleared by GI and LFTs have normalized.  - Will need LHC, but need clearance from GI for DAPT.   - No s/ s of ACS.
Newly reduced LVEF of 30% with RWMA.   - Patient asymptomatic.   - Does not appear overloaded on exam.   - Hold losartan in preparation for surgery, continue Toprol XL 25mg PO qday.   - Will need f/u with primary Cardiologist Dr. Ricci for optimization.
Newly reduced LVEF of 30% with RWMA.   - Patient asymptomatic.   - Does not appear overloaded on exam.   - continue Toprol XL 25mg PO qday.   - Will need f/u with primary Cardiologist Dr. Ricci for optimization.

## 2022-05-15 NOTE — PROGRESS NOTE ADULT - ASSESSMENT
65 yo M smoker , with h/o CAD , HTN , anxiety admitted with abdominal pain , nausea poor appetite and bloody vomiting , alcohol use.  s/p [facility] with concern for significant 2 vessel CAD. CT surgery now on board.       Epigastric pain /nausea vomiting  EGD performed - duodenitis , possible Derrek's esophagitis,  portal hypertension gastropathy.  cont protonix bid     fatty liver due to chronic alcohol  counseling provided to stop  Hold Statin for now    h/o CAD / HTN / CHF  EF 30%  s/p [facility], plan for Cardiac surgery eval for 2 vessel CAD   significant CAD   Hold Losartan for now (per CT surgery)   Continue ASA, hold Plavix in light of GI bleed per CT surgery    #Alcohol use, mild   no sign of withdrawal will monitor     #h/o Diabetes Mellitus old records reviewed   dc ssi   a1c 5    #-Anxiety disorder   cont sertraline     #thrombocytopenia - from bm suppression  -  given 1 unit of platelets already     #Frequent falls with left leg pain   h/o left femur fx surgery in the past limping   ct hip showing healing fracture    DVT prophylaxis   no chemical prophylaxis  due to gi bleed   Dispo: Discharge planning. pt wants to go to rehab. spoke with CM/SW today- awaiting rehab placement. Follow with GI after rehab then Dr. Guardado per CT surgery

## 2022-05-15 NOTE — PROGRESS NOTE ADULT - SUBJECTIVE AND OBJECTIVE BOX
[facility] Division of Hospital Medicine    Chief Complaint:      SUBJECTIVE / OVERNIGHT EVENTS: doin ok. no new complaints. states he cannot go back to where he lives as its not safe for him. he wants to go to rehab.     Patient denies chest pain, SOB, abd pain, N/V, fever, chills, dysuria or any other complaints. All remainder ROS negative.     MEDICATIONS  (STANDING):  ascorbic acid 500 milliGRAM(s) Oral daily  aspirin  chewable 81 milliGRAM(s) Oral daily  chlorhexidine 2% Cloths 1 Application(s) Topical daily  folic acid 1 milliGRAM(s) Oral daily  metoprolol succinate ER 50 milliGRAM(s) Oral daily  mupirocin 2% Nasal 1 Application(s) Both Nostrils two times a day  pantoprazole    Tablet 40 milliGRAM(s) Oral before breakfast  sertraline 100 milliGRAM(s) Oral daily  sodium chloride 0.9%. 250 milliLiter(s) (250 mL/Hr) IV Continuous <Continuous>  sodium chloride 0.9%. 250 milliLiter(s) (250 mL/Hr) IV Continuous <Continuous>  thiamine 100 milliGRAM(s) Oral daily    MEDICATIONS  (PRN):  acetaminophen     Tablet .. 650 milliGRAM(s) Oral every 6 hours PRN Moderate Pain (4 - 6)  albuterol/ipratropium for Nebulization 3 milliLiter(s) Nebulizer every 6 hours PRN Shortness of Breath and/or Wheezing  aluminum hydroxide/magnesium hydroxide/simethicone Suspension 30 milliLiter(s) Oral every 4 hours PRN Dyspepsia  artificial  tears Solution 1 Drop(s) Both EYES two times a day PRN Dry Eyes  ondansetron Injectable 4 milliGRAM(s) IV Push four times a day PRN Nausea and/or Vomiting        I&O's Summary    14 May 2022 07:01  -  15 May 2022 07:00  --------------------------------------------------------  IN: 480 mL / OUT: 0 mL / NET: 480 mL    15 May 2022 07:01  -  15 May 2022 15:28  --------------------------------------------------------  IN: 240 mL / OUT: 0 mL / NET: 240 mL        PHYSICAL EXAM:  Vital Signs Last 24 Hrs  T(C): 36.7 (15 May 2022 08:57), Max: 37.1 (14 May 2022 21:13)  T(F): 98 (15 May 2022 08:57), Max: 98.8 (14 May 2022 21:13)  HR: 80 (15 May 2022 08:57) (80 - 88)  BP: 122/68 (15 May 2022 08:57) (122/68 - 131/78)  BP(mean): --  RR: 17 (15 May 2022 08:57) (17 - 18)  SpO2: 96% (15 May 2022 08:57) (96% - 99%)        CONSTITUTIONAL: NAD, well-developed, well-groomed  ENMT: Moist oral mucosa, no pharyngeal injection or exudates; normal dentition  RESPIRATORY: Normal respiratory effort; lungs are clear to auscultation bilaterally  CARDIOVASCULAR: Regular rate and rhythm, normal S1 and S2, no murmur/rub/gallop; No lower extremity edema; Peripheral pulses are 2+ bilaterally  ABDOMEN: Nontender to palpation, normoactive bowel sounds, no rebound/guarding; No hepatosplenomegaly  MUSCLOSKELETAL:  Normal gait; no clubbing or cyanosis of digits; no joint swelling or tenderness to palpation  PSYCH: A+O to person, place, and time; affect appropriate  NEUROLOGY: CN 2-12 are intact and symmetric; no gross sensory deficits;   SKIN: No rashes; no palpable lesions    LABS:                        12.3   5.40  )-----------( 138      ( 15 May 2022 05:37 )             34.6     05-14    136  |  98  |  7.2<L>  ----------------------------<  120<H>  3.5   |  22.0  |  0.38<L>    Ca    9.4      14 May 2022 07:41    TPro  6.2<L>  /  Alb  3.3  /  TBili  1.1  /  DBili  x   /  AST  58<H>  /  ALT  39  /  AlkPhos  135<H>  05-14              CAPILLARY BLOOD GLUCOSE      POCT Blood Glucose.: 142 mg/dL (14 May 2022 21:28)        RADIOLOGY & ADDITIONAL TESTS:  Results Reviewed:   Imaging Personally Reviewed:  Electrocardiogram Personally Reviewed:

## 2022-05-15 NOTE — PROGRESS NOTE ADULT - PROBLEM SELECTOR PROBLEM 2
CAD (coronary artery disease)
Acute systolic congestive heart failure

## 2022-05-16 LAB
GLUCOSE BLDC GLUCOMTR-MCNC: 161 MG/DL — HIGH (ref 70–99)
SARS-COV-2 RNA SPEC QL NAA+PROBE: SIGNIFICANT CHANGE UP
SARS-COV-2 RNA SPEC QL NAA+PROBE: SIGNIFICANT CHANGE UP
SURGICAL PATHOLOGY STUDY: SIGNIFICANT CHANGE UP

## 2022-05-16 PROCEDURE — 99232 SBSQ HOSP IP/OBS MODERATE 35: CPT

## 2022-05-16 RX ADMIN — Medication 81 MILLIGRAM(S): at 13:55

## 2022-05-16 RX ADMIN — Medication 1 MILLIGRAM(S): at 13:55

## 2022-05-16 RX ADMIN — Medication 500 MILLIGRAM(S): at 13:55

## 2022-05-16 RX ADMIN — PANTOPRAZOLE SODIUM 40 MILLIGRAM(S): 20 TABLET, DELAYED RELEASE ORAL at 05:23

## 2022-05-16 RX ADMIN — Medication 100 MILLIGRAM(S): at 13:55

## 2022-05-16 RX ADMIN — Medication 50 MILLIGRAM(S): at 05:22

## 2022-05-16 RX ADMIN — MUPIROCIN 1 APPLICATION(S): 20 OINTMENT TOPICAL at 17:45

## 2022-05-16 RX ADMIN — SERTRALINE 100 MILLIGRAM(S): 25 TABLET, FILM COATED ORAL at 13:55

## 2022-05-16 RX ADMIN — MUPIROCIN 1 APPLICATION(S): 20 OINTMENT TOPICAL at 05:25

## 2022-05-16 RX ADMIN — CHLORHEXIDINE GLUCONATE 1 APPLICATION(S): 213 SOLUTION TOPICAL at 17:45

## 2022-05-16 NOTE — PROGRESS NOTE ADULT - ASSESSMENT
67 yo M smoker , with h/o CAD , HTN , anxiety admitted with abdominal pain , nausea poor appetite and bloody vomiting , alcohol use.  s/p [facility] with concern for significant 2 vessel CAD. CT surgery now on board.       Epigastric pain /nausea vomiting  EGD performed - duodenitis , possible Derrek's esophagitis,  portal hypertension gastropathy.  cont protonix bid     fatty liver due to chronic alcohol  counseling provided to stop  Hold Statin for now    h/o CAD / HTN / CHF  EF 30%  s/p [facility], plan for Cardiac surgery eval for 2 vessel CAD   significant CAD   Hold Losartan for now (per CT surgery)   Continue ASA, hold Plavix in light of GI bleed per CT surgery    #Alcohol use, mild   no sign of withdrawal will monitor     #h/o Diabetes Mellitus old records reviewed   dc ssi   a1c 5    #-Anxiety disorder   cont sertraline     #thrombocytopenia - from bm suppression  -  given 1 unit of platelets already     #Frequent falls with left leg pain   h/o left femur fx surgery in the past limping   ct hip showing healing fracture    DVT prophylaxis   no chemical prophylaxis  due to gi bleed   Dispo: Discharge planning.  Follow with GI after rehab then Dr. Guardado per CT surgery. pending auth for    65 yo M smoker , with h/o CAD , HTN , anxiety admitted with abdominal pain , nausea poor appetite and bloody vomiting , alcohol use.  s/p [facility] with concern for significant 2 vessel CAD. CT surgery now on board.       Epigastric pain /nausea vomiting  EGD performed - duodenitis , possible Derrek's esophagitis,  portal hypertension gastropathy.  cont protonix bid     fatty liver due to chronic alcohol  counseling provided to stop  Hold Statin for now    h/o CAD / HTN / CHF  EF 30%  s/p [facility], plan for Cardiac surgery eval for 2 vessel CAD   significant CAD   Hold Losartan for now (per CT surgery)   Continue ASA, hold Plavix in light of GI bleed per CT surgery    #Alcohol use, mild   no sign of withdrawal will monitor     #h/o Diabetes Mellitus old records reviewed   dc ssi   a1c 5    #-Anxiety disorder   cont sertraline     #thrombocytopenia - from bm suppression  -  given 1 unit of platelets already     #Frequent falls with left leg pain   h/o left femur fx surgery in the past limping   ct hip showing healing fracture    DVT prophylaxis   no chemical prophylaxis  due to gi bleed   Dispo: Discharge planning.  Follow with GI after rehab then Dr. Guardado per CT surgery. pending auth for rehab per CM/SW. medically ready for discharge. spoke with IRAIDA this am

## 2022-05-16 NOTE — PROGRESS NOTE ADULT - PROVIDER SPECIALTY LIST ADULT
Internal Medicine
Cardiology
Cardiology
Hospitalist
Gastroenterology
Hospitalist
Hospitalist
Thoracic Surgery
Cardiology
Hospitalist
Internal Medicine
CT Surgery
Internal Medicine
Gastroenterology
CT Surgery

## 2022-05-16 NOTE — PROGRESS NOTE ADULT - SUBJECTIVE AND OBJECTIVE BOX
[facility] Division of Hospital Medicine    Chief Complaint:      SUBJECTIVE / OVERNIGHT EVENTS: pt is doing well. not in distress. no issues overnight. he wants to go rehab. [place] boat per CM.    All remainder ROS negative.     MEDICATIONS  (STANDING):  ascorbic acid 500 milliGRAM(s) Oral daily  aspirin  chewable 81 milliGRAM(s) Oral daily  chlorhexidine 2% Cloths 1 Application(s) Topical daily  folic acid 1 milliGRAM(s) Oral daily  metoprolol succinate ER 50 milliGRAM(s) Oral daily  mupirocin 2% Nasal 1 Application(s) Both Nostrils two times a day  pantoprazole    Tablet 40 milliGRAM(s) Oral before breakfast  sertraline 100 milliGRAM(s) Oral daily  sodium chloride 0.9%. 250 milliLiter(s) (250 mL/Hr) IV Continuous <Continuous>  sodium chloride 0.9%. 250 milliLiter(s) (250 mL/Hr) IV Continuous <Continuous>  thiamine 100 milliGRAM(s) Oral daily    MEDICATIONS  (PRN):  acetaminophen     Tablet .. 650 milliGRAM(s) Oral every 6 hours PRN Moderate Pain (4 - 6)  albuterol/ipratropium for Nebulization 3 milliLiter(s) Nebulizer every 6 hours PRN Shortness of Breath and/or Wheezing  aluminum hydroxide/magnesium hydroxide/simethicone Suspension 30 milliLiter(s) Oral every 4 hours PRN Dyspepsia  artificial  tears Solution 1 Drop(s) Both EYES two times a day PRN Dry Eyes  ondansetron Injectable 4 milliGRAM(s) IV Push four times a day PRN Nausea and/or Vomiting        I&O's Summary    15 May 2022 07:01  -  16 May 2022 07:00  --------------------------------------------------------  IN: 530 mL / OUT: 0 mL / NET: 530 mL        PHYSICAL EXAM:  Vital Signs Last 24 Hrs  T(C): 36.7 (16 May 2022 11:00), Max: 37 (15 May 2022 15:45)  T(F): 98 (16 May 2022 11:00), Max: 98.6 (15 May 2022 15:45)  HR: 82 (16 May 2022 11:00) (78 - 93)  BP: 101/78 (16 May 2022 11:00) (101/78 - 129/76)  BP(mean): --  RR: 18 (16 May 2022 11:00) (17 - 18)  SpO2: 97% (16 May 2022 11:00) (96% - 97%)        CONSTITUTIONAL: NAD, well-developed, well-groomed  ENMT: Moist oral mucosa, no pharyngeal injection or exudates; normal dentition  RESPIRATORY: Normal respiratory effort; lungs are clear to auscultation bilaterally  CARDIOVASCULAR: Regular rate and rhythm, normal S1 and S2, no murmur/rub/gallop; No lower extremity edema; Peripheral pulses are 2+ bilaterally  ABDOMEN: Nontender to palpation, normoactive bowel sounds, no rebound/guarding; No hepatosplenomegaly  MUSCLOSKELETAL:  Normal gait; no clubbing or cyanosis of digits; no joint swelling or tenderness to palpation  PSYCH: A+O to person, place, and time; affect appropriate  NEUROLOGY: CN 2-12 are intact and symmetric; no gross sensory deficits;   SKIN: No rashes; no palpable lesions    LABS:                        12.3   5.40  )-----------( 138      ( 15 May 2022 05:37 )             34.6                     CAPILLARY BLOOD GLUCOSE      POCT Blood Glucose.: 157 mg/dL (15 May 2022 21:19)  POCT Blood Glucose.: 146 mg/dL (15 May 2022 17:22)        RADIOLOGY & ADDITIONAL TESTS:  Results Reviewed:   Imaging Personally Reviewed:  Electrocardiogram Personally Reviewed:

## 2022-05-16 NOTE — PROGRESS NOTE ADULT - REASON FOR ADMISSION
vomiting blood , abdominal pain

## 2022-05-17 ENCOUNTER — NON-APPOINTMENT (OUTPATIENT)
Age: 67
End: 2022-05-17

## 2022-05-17 ENCOUNTER — TRANSCRIPTION ENCOUNTER (OUTPATIENT)
Age: 67
End: 2022-05-17

## 2022-05-17 VITALS — WEIGHT: 167.99 LBS

## 2022-05-17 LAB — GLUCOSE BLDC GLUCOMTR-MCNC: 168 MG/DL — HIGH (ref 70–99)

## 2022-05-17 PROCEDURE — C1769: CPT

## 2022-05-17 PROCEDURE — 97163 PT EVAL HIGH COMPLEX 45 MIN: CPT

## 2022-05-17 PROCEDURE — P9100: CPT

## 2022-05-17 PROCEDURE — U0003: CPT

## 2022-05-17 PROCEDURE — 73502 X-RAY EXAM HIP UNI 2-3 VIEWS: CPT

## 2022-05-17 PROCEDURE — 84484 ASSAY OF TROPONIN QUANT: CPT

## 2022-05-17 PROCEDURE — 93005 ELECTROCARDIOGRAM TRACING: CPT

## 2022-05-17 PROCEDURE — 88342 IMHCHEM/IMCYTCHM 1ST ANTB: CPT

## 2022-05-17 PROCEDURE — 86850 RBC ANTIBODY SCREEN: CPT

## 2022-05-17 PROCEDURE — 85025 COMPLETE CBC W/AUTO DIFF WBC: CPT

## 2022-05-17 PROCEDURE — 84100 ASSAY OF PHOSPHORUS: CPT

## 2022-05-17 PROCEDURE — 99239 HOSP IP/OBS DSCHRG MGMT >30: CPT

## 2022-05-17 PROCEDURE — 85610 PROTHROMBIN TIME: CPT

## 2022-05-17 PROCEDURE — 82746 ASSAY OF FOLIC ACID SERUM: CPT

## 2022-05-17 PROCEDURE — 86803 HEPATITIS C AB TEST: CPT

## 2022-05-17 PROCEDURE — 84443 ASSAY THYROID STIM HORMONE: CPT

## 2022-05-17 PROCEDURE — 88305 TISSUE EXAM BY PATHOLOGIST: CPT

## 2022-05-17 PROCEDURE — 93880 EXTRACRANIAL BILAT STUDY: CPT

## 2022-05-17 PROCEDURE — 83540 ASSAY OF IRON: CPT

## 2022-05-17 PROCEDURE — 97112 NEUROMUSCULAR REEDUCATION: CPT

## 2022-05-17 PROCEDURE — 99285 EMERGENCY DEPT VISIT HI MDM: CPT | Mod: 25

## 2022-05-17 PROCEDURE — 82272 OCCULT BLD FECES 1-3 TESTS: CPT

## 2022-05-17 PROCEDURE — 93458 L HRT ARTERY/VENTRICLE ANGIO: CPT

## 2022-05-17 PROCEDURE — U0005: CPT

## 2022-05-17 PROCEDURE — C1894: CPT

## 2022-05-17 PROCEDURE — 82728 ASSAY OF FERRITIN: CPT

## 2022-05-17 PROCEDURE — 87641 MR-STAPH DNA AMP PROBE: CPT

## 2022-05-17 PROCEDURE — 83735 ASSAY OF MAGNESIUM: CPT

## 2022-05-17 PROCEDURE — 36415 COLL VENOUS BLD VENIPUNCTURE: CPT

## 2022-05-17 PROCEDURE — 76705 ECHO EXAM OF ABDOMEN: CPT

## 2022-05-17 PROCEDURE — 94010 BREATHING CAPACITY TEST: CPT

## 2022-05-17 PROCEDURE — 83036 HEMOGLOBIN GLYCOSYLATED A1C: CPT

## 2022-05-17 PROCEDURE — 97116 GAIT TRAINING THERAPY: CPT

## 2022-05-17 PROCEDURE — 80053 COMPREHEN METABOLIC PANEL: CPT

## 2022-05-17 PROCEDURE — 84466 ASSAY OF TRANSFERRIN: CPT

## 2022-05-17 PROCEDURE — 80061 LIPID PANEL: CPT

## 2022-05-17 PROCEDURE — 99152 MOD SED SAME PHYS/QHP 5/>YRS: CPT

## 2022-05-17 PROCEDURE — 86900 BLOOD TYPING SEROLOGIC ABO: CPT

## 2022-05-17 PROCEDURE — 74176 CT ABD & PELVIS W/O CONTRAST: CPT

## 2022-05-17 PROCEDURE — 83690 ASSAY OF LIPASE: CPT

## 2022-05-17 PROCEDURE — 81001 URINALYSIS AUTO W/SCOPE: CPT

## 2022-05-17 PROCEDURE — P9037: CPT

## 2022-05-17 PROCEDURE — C8929: CPT

## 2022-05-17 PROCEDURE — 71045 X-RAY EXAM CHEST 1 VIEW: CPT

## 2022-05-17 PROCEDURE — 82962 GLUCOSE BLOOD TEST: CPT

## 2022-05-17 PROCEDURE — 97530 THERAPEUTIC ACTIVITIES: CPT

## 2022-05-17 PROCEDURE — C1887: CPT

## 2022-05-17 PROCEDURE — 99153 MOD SED SAME PHYS/QHP EA: CPT

## 2022-05-17 PROCEDURE — 85018 HEMOGLOBIN: CPT

## 2022-05-17 PROCEDURE — 73700 CT LOWER EXTREMITY W/O DYE: CPT

## 2022-05-17 PROCEDURE — 96374 THER/PROPH/DIAG INJ IV PUSH: CPT

## 2022-05-17 PROCEDURE — 82607 VITAMIN B-12: CPT

## 2022-05-17 PROCEDURE — 85730 THROMBOPLASTIN TIME PARTIAL: CPT

## 2022-05-17 PROCEDURE — 86901 BLOOD TYPING SEROLOGIC RH(D): CPT

## 2022-05-17 PROCEDURE — 85014 HEMATOCRIT: CPT

## 2022-05-17 PROCEDURE — 85027 COMPLETE CBC AUTOMATED: CPT

## 2022-05-17 PROCEDURE — 87640 STAPH A DNA AMP PROBE: CPT

## 2022-05-17 PROCEDURE — 36430 TRANSFUSION BLD/BLD COMPNT: CPT

## 2022-05-17 PROCEDURE — 83550 IRON BINDING TEST: CPT

## 2022-05-17 PROCEDURE — 84134 ASSAY OF PREALBUMIN: CPT

## 2022-05-17 RX ORDER — PANTOPRAZOLE SODIUM 20 MG/1
1 TABLET, DELAYED RELEASE ORAL
Qty: 0 | Refills: 0 | DISCHARGE
Start: 2022-05-17

## 2022-05-17 RX ORDER — ASPIRIN/CALCIUM CARB/MAGNESIUM 324 MG
1 TABLET ORAL
Qty: 0 | Refills: 0 | DISCHARGE
Start: 2022-05-17

## 2022-05-17 RX ORDER — FOLIC ACID 0.8 MG
1 TABLET ORAL
Qty: 0 | Refills: 0 | DISCHARGE
Start: 2022-05-17

## 2022-05-17 RX ORDER — ASCORBIC ACID 60 MG
1 TABLET,CHEWABLE ORAL
Qty: 0 | Refills: 0 | DISCHARGE
Start: 2022-05-17

## 2022-05-17 RX ORDER — THIAMINE MONONITRATE (VIT B1) 100 MG
1 TABLET ORAL
Qty: 0 | Refills: 0 | DISCHARGE
Start: 2022-05-17

## 2022-05-17 RX ORDER — METOPROLOL TARTRATE 50 MG
1 TABLET ORAL
Qty: 0 | Refills: 0 | DISCHARGE
Start: 2022-05-17

## 2022-05-17 RX ADMIN — Medication 500 MILLIGRAM(S): at 08:26

## 2022-05-17 RX ADMIN — MUPIROCIN 1 APPLICATION(S): 20 OINTMENT TOPICAL at 05:46

## 2022-05-17 RX ADMIN — Medication 81 MILLIGRAM(S): at 08:26

## 2022-05-17 RX ADMIN — CHLORHEXIDINE GLUCONATE 1 APPLICATION(S): 213 SOLUTION TOPICAL at 08:29

## 2022-05-17 RX ADMIN — Medication 50 MILLIGRAM(S): at 05:46

## 2022-05-17 RX ADMIN — Medication 100 MILLIGRAM(S): at 08:27

## 2022-05-17 RX ADMIN — SERTRALINE 100 MILLIGRAM(S): 25 TABLET, FILM COATED ORAL at 08:26

## 2022-05-17 RX ADMIN — PANTOPRAZOLE SODIUM 40 MILLIGRAM(S): 20 TABLET, DELAYED RELEASE ORAL at 05:46

## 2022-05-17 RX ADMIN — Medication 1 MILLIGRAM(S): at 08:27

## 2022-05-17 NOTE — DIETITIAN INITIAL EVALUATION ADULT - OTHER INFO
Pt with h/o CAD , HTN , anxiety admitted with abdominal pain , nausea, poor appetite and bloody vomiting , alcohol use.  s/p [facility] with concern for significant 2 vessel CAD. CT surgery now on board.

## 2022-05-17 NOTE — DISCHARGE NOTE PROVIDER - CARE PROVIDER_API CALL
Ruslan Gray; KADY)  Surgery; Thoracic and Cardiac Surgery  [address] [place] [address]  Phone: [phone]  Fax: [phone]  Follow Up Time:     Kristi Huerta)  Cardiology; Cardiovascular Disease; Internal Medicine  [address] [place] [address]  Phone: [phone]  Fax: [phone]  Follow Up Time:     Anthony Barnes)  Gastroenterology; Internal Medicine  [address] [place] [address]  Phone: [phone]  Fax: [phone]  Follow Up Time:

## 2022-05-17 NOTE — DIETITIAN NUTRITION RISK NOTIFICATION - TREATMENT: THE FOLLOWING DIET HAS BEEN RECOMMENDED
Diet, DASH/TLC:   Sodium & Cholesterol Restricted  Consistent Carbohydrate {Evening Snack} (CSTCHOSN) (05-10-22 @ 16:19) [Active]

## 2022-05-17 NOTE — DISCHARGE NOTE NURSING/CASE MANAGEMENT/SOCIAL WORK - PATIENT PORTAL LINK FT
You can access the FollowMyHealth Patient Portal offered by [facility] by registering at the following website: [email or web address]. By joining [employer]’s FollowMyHealth portal, you will also be able to view your health information using other applications (apps) compatible with our system.

## 2022-05-17 NOTE — DIETITIAN INITIAL EVALUATION ADULT - NS FNS DIET ORDER
Spoke with patient and advised of message per Dr. Pramod Og. Diet, DASH/TLC:   Sodium & Cholesterol Restricted  Consistent Carbohydrate {Evening Snack} (CSTCHOSN) (05-10-22 @ 16:19)

## 2022-05-17 NOTE — DISCHARGE NOTE NURSING/CASE MANAGEMENT/SOCIAL WORK - NSDCVIVACCINE_GEN_ALL_CORE_FT
Tdap; 02-Sep-2016 07:30; Syl Snow (RN); [employer]; [id number]; IntraMuscular; Deltoid Left.; 0.5 milliLiter(s); VIS (VIS Published: 09-May-2013, VIS Presented: 02-Sep-2016);

## 2022-05-17 NOTE — DIETITIAN INITIAL EVALUATION ADULT - ORAL INTAKE PTA/DIET HISTORY
Pt reports good po intake at meals.  Pt states fair po intake prior to admission and reports likely wt loss, but unsure how much.  Discussed importance of consuming adequate protein intake at meals to optimize nutrition status. Pt agreeable.  Aware pt awaiting discharge to rehab.  RD to remain available.

## 2022-05-17 NOTE — DISCHARGE NOTE PROVIDER - NSDCMRMEDTOKEN_GEN_ALL_CORE_FT
ascorbic acid 500 mg oral tablet: 1 tab(s) orally once a day  aspirin 81 mg oral tablet, chewable: 1 tab(s) orally once a day  folic acid 1 mg oral tablet: 1 tab(s) orally once a day  metoprolol succinate 50 mg oral tablet, extended release: 1 tab(s) orally once a day  mirtazapine 7.5 mg oral tablet: 2 tab(s) orally once a day (at bedtime)  pantoprazole 40 mg oral delayed release tablet: 1 tab(s) orally once a day (before a meal)  sertraline 100 mg oral tablet: 1 tab(s) orally once a day  simvastatin 40 mg oral tablet: 1 tab(s) orally once a day (at bedtime)  thiamine 100 mg oral tablet: 1 tab(s) orally once a day

## 2022-05-17 NOTE — DIETITIAN INITIAL EVALUATION ADULT - PERTINENT MEDS FT
MEDICATIONS  (STANDING):  ascorbic acid 500 milliGRAM(s) Oral daily  aspirin  chewable 81 milliGRAM(s) Oral daily  chlorhexidine 2% Cloths 1 Application(s) Topical daily  folic acid 1 milliGRAM(s) Oral daily  metoprolol succinate ER 50 milliGRAM(s) Oral daily  mupirocin 2% Nasal 1 Application(s) Both Nostrils two times a day  pantoprazole    Tablet 40 milliGRAM(s) Oral before breakfast  sertraline 100 milliGRAM(s) Oral daily  sodium chloride 0.9%. 250 milliLiter(s) (250 mL/Hr) IV Continuous <Continuous>  sodium chloride 0.9%. 250 milliLiter(s) (250 mL/Hr) IV Continuous <Continuous>  thiamine 100 milliGRAM(s) Oral daily    MEDICATIONS  (PRN):  acetaminophen     Tablet .. 650 milliGRAM(s) Oral every 6 hours PRN Moderate Pain (4 - 6)  albuterol/ipratropium for Nebulization 3 milliLiter(s) Nebulizer every 6 hours PRN Shortness of Breath and/or Wheezing  aluminum hydroxide/magnesium hydroxide/simethicone Suspension 30 milliLiter(s) Oral every 4 hours PRN Dyspepsia  artificial  tears Solution 1 Drop(s) Both EYES two times a day PRN Dry Eyes  ondansetron Injectable 4 milliGRAM(s) IV Push four times a day PRN Nausea and/or Vomiting

## 2022-05-17 NOTE — DISCHARGE NOTE PROVIDER - NSDCCPCAREPLAN_GEN_ALL_CORE_FT
PRINCIPAL DISCHARGE DIAGNOSIS  Diagnosis: GI bleed  Assessment and Plan of Treatment: S/P EGD: duodenitis , possible Derrek's esophagitis,  portal hypertension gastropathy.   Continue protonix  Follow up with [facility]        SECONDARY DISCHARGE DIAGNOSES  Diagnosis: CAD (coronary artery disease)  Assessment and Plan of Treatment: Continue ASA 81 mg daily/Statin/BB  Follow up with CTS for eventual CABG  Follow up with your Cardiologist    Diagnosis: Ischemic cardiomyopathy  Assessment and Plan of Treatment: Low sodium diet  Follow up with your Cardiologist for further management

## 2022-05-17 NOTE — DISCHARGE NOTE PROVIDER - HOSPITAL COURSE
66M, PMHx MI/CAD s/p PCI (LEELA x 1 pRCA 2007), HTN, depression, former heavy EtOH abuse, and HLD who presented to the ED with complaints of hematemesis.  Patient underwent EGD :   duodenitis , possible Derrek's esophagitis,  portal hypertension gastropathy. to cont protonix bid. Patient follows with Dr. Ricci, but often isn't seen for years at a time. Patient's last NST was 2018 with no ischemia. Patient with METS<4, but denies any chest pain or dyspnea. Troponin negative x 1. ECHO: EF 30%  with RWMA. Seen in consultation by Cardiology for [facility] as part of cardiac evaluation which revealed  :  mLAD; 80% mid to distal LCX; patent RCA stent (mild ISR). CT Surgery consulted  to eval for CABG. Patient will go to [facility]/ follow up EGD prior to surgical intervention. To continue BB will increased to 50 mg daily /ARB/ . GI follow up noted - high risk for bleed with AC/ Antiplatelet use, no absolute contraindication .  Added asa 81 daily and statin now that LFTs have improved. Patient is medically stable for discharge to [facility].   Vital Signs Last 24 Hrs  T(C): 37.1 (17 May 2022 05:30), Max: 37.1 (17 May 2022 05:30)  T(F): 98.8 (17 May 2022 05:30), Max: 98.8 (17 May 2022 05:30)  HR: 82 (17 May 2022 05:30) (60 - 82)  BP: 111/73 (17 May 2022 05:30) (101/78 - 130/75)  BP(mean): --  RR: 18 (17 May 2022 05:30) (18 - 18)  SpO2: 96% (17 May 2022 05:30) (96% - 98%)                CAPILLARY BLOOD GLUCOSE      POCT Blood Glucose.: 168 mg/dL (17 May 2022 08:06)  POCT Blood Glucose.: 161 mg/dL (16 May 2022 17:35    PE:  CONSTITUTIONAL: NAD, well-developed, well-groomed  ENMT: Moist oral mucosa, no pharyngeal injection or exudates; normal dentition  RESPIRATORY: Normal respiratory effort; lungs are clear to auscultation bilaterally  CARDIOVASCULAR: Regular rate and rhythm, normal S1 and S2, no murmur/rub/gallop; No lower extremity edema; Peripheral pulses are 2+ bilaterally  ABDOMEN: Nontender to palpation, normoactive bowel sounds, no rebound/guarding; No hepatosplenomegaly  MUSCLOSKELETAL:  Normal gait; no clubbing or cyanosis of digits; no joint swelling or tenderness to palpation  PSYCH: A+O to person, place, and time; affect appropriate  NEUROLOGY: CN 2-12 are intact and symmetric; no gross sensory deficits;   SKIN: No rashes; no palpable lesions  Right radial access site: Ecchymotic, no active bleeding, +radial pulse

## 2022-05-17 NOTE — DISCHARGE NOTE NURSING/CASE MANAGEMENT/SOCIAL WORK - NSDCPEFALRISK_GEN_ALL_CORE
For information on Fall & Injury Prevention, visit: [email or web address] OR  [email or web address] OR  https://www.cdc.gov/steadi/patient.html

## 2022-05-17 NOTE — DIETITIAN INITIAL EVALUATION ADULT - ETIOLOGY
related to inability to consume sufficient protein energy intake with decreased appetite in setting of N/V, weakness

## 2022-05-17 NOTE — DIETITIAN INITIAL EVALUATION ADULT - PERTINENT LABORATORY DATA
POCT Blood Glucose.: 168 mg/dL (05-17-22 @ 08:06)  A1C with Estimated Average Glucose Result: 5.1 % (05-13-22 @ 06:44)  A1C with Estimated Average Glucose Result: 5.1 % (05-11-22 @ 04:27)

## 2022-05-17 NOTE — DISCHARGE NOTE PROVIDER - CARE PROVIDERS DIRECT ADDRESSES
,DirectAddress_Unknown,[email or web address]

## 2022-05-17 NOTE — DISCHARGE NOTE PROVIDER - PROVIDER TOKENS
PROVIDER:[TOKEN:[[id number]]],PROVIDER:[TOKEN:[[id number]]],PROVIDER:[TOKEN:[[id number]]]

## 2022-06-03 ENCOUNTER — APPOINTMENT (OUTPATIENT)
Dept: NEUROLOGY | Facility: CLINIC | Age: 67
End: 2022-06-03

## 2022-07-01 ENCOUNTER — APPOINTMENT (OUTPATIENT)
Dept: CARDIOTHORACIC SURGERY | Facility: CLINIC | Age: 67
End: 2022-07-01

## 2022-07-08 ENCOUNTER — APPOINTMENT (OUTPATIENT)
Dept: CARDIOTHORACIC SURGERY | Facility: CLINIC | Age: 67
End: 2022-07-08

## 2022-07-08 VITALS
SYSTOLIC BLOOD PRESSURE: 137 MMHG | RESPIRATION RATE: 16 BRPM | DIASTOLIC BLOOD PRESSURE: 78 MMHG | WEIGHT: 161 LBS | HEART RATE: 88 BPM | OXYGEN SATURATION: 94 % | HEIGHT: 72 IN | BODY MASS INDEX: 21.81 KG/M2

## 2022-07-08 DIAGNOSIS — Z85.038 PERSONAL HISTORY OF OTHER MALIGNANT NEOPLASM OF LARGE INTESTINE: ICD-10-CM

## 2022-07-08 DIAGNOSIS — Z78.9 OTHER SPECIFIED HEALTH STATUS: ICD-10-CM

## 2022-07-08 DIAGNOSIS — Z87.898 PERSONAL HISTORY OF OTHER SPECIFIED CONDITIONS: ICD-10-CM

## 2022-07-08 DIAGNOSIS — Z87.891 PERSONAL HISTORY OF NICOTINE DEPENDENCE: ICD-10-CM

## 2022-07-08 PROCEDURE — 99214 OFFICE O/P EST MOD 30 MIN: CPT

## 2022-07-11 NOTE — CONSULT LETTER
[FreeTextEntry2] : Kristi Huerta MD [FreeTextEntry3] : Ruslan Gray MD\par Chief of Cardiovascular and Thoracic Surgery\par System Director of Endovascular and Cardiovascular Surgery\par , Cardiovascular and Thoracic Surgery\par [facility], [facility]\par [facility]\par [facility]\par [address] [place] [address]\[place] [address]\par Tel. [phone]\par Fax [phone]

## 2022-07-11 NOTE — HISTORY OF PRESENT ILLNESS
[FreeTextEntry1] : Mr. BRIE WEBB is a 66 year male who presents today for followup. Previously, he has been seen while in the hospital where he presented with hematemesis and was found to have duodenitis, portal hypertension gastropathy. During workup he had an echocardiogram which showed a reduced ejection fraction of 30%. Subsequent cardiac catheterization revealed significant coronary artery disease.\par \par Additional past medical history includes MI/CAD s/p PCI (LEELA x 1 pRCA 2007), HTN, depression, former heavy \par EtOH abuse, and HLD\par \par Today, the patient reports living at [facility]. \par \par When the patient was seen in hospital, he had a very tenuous social situation.  He was living alone in a house boat, which he described himself as filthy, and he had no support.  He was also somewhat malnourished, and had become physically inactive.  He was therefore discharged to rehabilitation where he reports doing quite well.  My main concern has been his liver function prior to undergoing surgery.  CT scan of the abdomen shows a micronodular morphology.  Thus far, he has not been seen by hepatology.  He reports no further GI bleeding.  He denies chest pain or shortness of breath.\par \par I have reviewed his entire hospital work-up including cardiac catheterization.\par \par The patient's past medical history, past surgical history, family history, social history, allergies, medications, and multisystem review of systems were individually reviewed with the patient.  There are no pertinent additions or subtractions.  The patient was personally seen and examined.\par \par Referring: Bradley

## 2022-07-11 NOTE — ASSESSMENT
[FreeTextEntry1] : PLAN:\par - Hepatology Consultation\par - Gastroenterology Consultation\par - Transthoracic Echocardiogram \par - Return to care after consults and imaging\par - Obtain records from [facility].  At this time we have no records from his rehabilitation center.  He does not recall any of his medications, nor do we have the medication list.  This requires careful review prior to surgery, especially if he is maintained on any antiplatelet agents.\par \par I would like to thank you for referring this patient to my attention and for allowing me to participate in his care.  If there are any further questions, or I can be of any further assistance, please do not hesitate contacting me at any time.\par \par \par I, Ketan Kendrick NP am scribing for and in the presence of Dr. Gray the following sections HISTORY OF PRESENT ILLNESS, PAST MEDICAL/FAMILY/SOCIAL HISTORY; REVIEW OF SYSTEMS; VITAL SIGNS; PHYSICAL EXAM; DISPOSITION.\par \par "I personally performed the services described in the documentation, reviewed the documentation recorded by the scribe in my presence and accurately and completely records my words and actions."\par

## 2022-07-11 NOTE — PHYSICAL EXAM
[Sclera] : the sclera and conjunctiva were normal [Neck Appearance] : the appearance of the neck was normal [Respiration, Rhythm And Depth] : normal respiratory rhythm and effort [Auscultation Breath Sounds / Voice Sounds] : lungs were clear to auscultation bilaterally [Heart Rate And Rhythm] : heart rate was normal and rhythm regular [Examination Of The Chest] : the chest was normal in appearance [2+] : left 2+ [Abnormal Walk] : normal gait [Motor Tone] : muscle strength and tone were normal [Skin Color & Pigmentation] : normal skin color and pigmentation [Sensation] : the sensory exam was normal to light touch and pinprick [Motor Exam] : the motor exam was normal [Oriented To Time, Place, And Person] : oriented to person, place, and time [Impaired Insight] : insight and judgment were intact [FreeTextEntry1] : NYHAC II

## 2022-07-11 NOTE — REVIEW OF SYSTEMS
[Negative] : Heme/Lymph [Feeling Poorly] : not feeling poorly [Feeling Tired] : not feeling tired [Chest Pain] : no chest pain [Palpitations] : no palpitations [Lower Ext Edema] : no extremity edema [Shortness Of Breath] : no shortness of breath [Cough] : no cough [SOB on Exertion] : no shortness of breath during exertion

## 2022-07-11 NOTE — DATA REVIEWED
[FreeTextEntry1] : Cardiac Catheterization from 05/12/22\par \par \par \par \par Carotid Doppler from 05/13/22\par \par \par \par \par Echocardiogram from 05/10/22\par \par \par \par CT Scan of the Abdomen and Pelvis

## 2022-07-18 ENCOUNTER — OUTPATIENT (OUTPATIENT)
Dept: OUTPATIENT SERVICES | Facility: HOSPITAL | Age: 67
LOS: 1 days | End: 2022-07-18
Payer: COMMERCIAL

## 2022-07-18 DIAGNOSIS — I25.10 ATHEROSCLEROTIC HEART DISEASE OF NATIVE CORONARY ARTERY WITHOUT ANGINA PECTORIS: ICD-10-CM

## 2022-07-18 DIAGNOSIS — Z90.49 ACQUIRED ABSENCE OF OTHER SPECIFIED PARTS OF DIGESTIVE TRACT: Chronic | ICD-10-CM

## 2022-07-18 DIAGNOSIS — Z98.89 OTHER SPECIFIED POSTPROCEDURAL STATES: Chronic | ICD-10-CM

## 2022-07-18 PROCEDURE — 93306 TTE W/DOPPLER COMPLETE: CPT | Mod: 26

## 2022-07-18 PROCEDURE — C8929: CPT

## 2022-07-19 ENCOUNTER — APPOINTMENT (OUTPATIENT)
Dept: GASTROENTEROLOGY | Facility: CLINIC | Age: 67
End: 2022-07-19

## 2022-07-19 VITALS
TEMPERATURE: 98 F | OXYGEN SATURATION: 98 % | DIASTOLIC BLOOD PRESSURE: 60 MMHG | RESPIRATION RATE: 16 BRPM | HEART RATE: 80 BPM | HEIGHT: 72 IN | SYSTOLIC BLOOD PRESSURE: 110 MMHG | BODY MASS INDEX: 21.67 KG/M2 | WEIGHT: 160 LBS

## 2022-07-19 PROCEDURE — 99214 OFFICE O/P EST MOD 30 MIN: CPT

## 2022-07-19 NOTE — HISTORY OF PRESENT ILLNESS
[de-identified] : Patient arrived for evaluation.  He was evaluated in the [facility] in May 2022.  He has history of MI, coronary artery disease, status post PCI, RCA stent, hypertension, depression, former have the ethanol use and dyslipidemia.  He had presented with hematemesis and underwent EGD which revealed irregular Z-line and portal hypertensive gastropathy.  He was also noted to have ejection fraction of 30% with regional wall motion abnormalities.  He underwent left heart cath.  He was evaluated by CT surgery.  He was started on aspirin.  He had EGD biopsies which were negative for H. pylori infection. He was seen by CT surgery and was planned for hepatology and gastroenterology consultation.  Transthoracic echo was also being planned.  CT abdomen was performed without contrast which has revealed possible cirrhosis.  He is in a rehab.  He is improving.  No more bleeding.  Last colonoscopy was about 8 to 10 years ago.  He used to drink 5-6 beers every day for a long term.  Last use was 3 months ago.  He used to smoke but stopped many years ago.  No recall of any hepatitis infection.

## 2022-07-19 NOTE — ASSESSMENT
[FreeTextEntry1] : I had extensive discussion with the patient.  He should be evaluated with blood test, ultrasound abdominal, alpha-fetoprotein.  On EGD there is no evidence of any esophageal varices.  We will perform a colonoscopy.  If his blood tests are improving and he continues to be abstinent from alcohol, we will calculate his child Enrique class and his meld score.  Based on that we will determine the any perioperative risk if he is a candidate for surgery.  In the interim, he will follow-up with cardiology, CT surgery and hepatology team.\par \par Anthony Barnes MD\par Gastroenterology \par \par

## 2022-07-20 ENCOUNTER — EMERGENCY (EMERGENCY)
Facility: HOSPITAL | Age: 67
LOS: 1 days | Discharge: HOME | End: 2022-07-20
Attending: EMERGENCY MEDICINE
Payer: COMMERCIAL

## 2022-07-20 VITALS
DIASTOLIC BLOOD PRESSURE: 83 MMHG | OXYGEN SATURATION: 95 % | HEART RATE: 86 BPM | SYSTOLIC BLOOD PRESSURE: 149 MMHG | HEIGHT: 70 IN | RESPIRATION RATE: 18 BRPM

## 2022-07-20 DIAGNOSIS — Z90.49 ACQUIRED ABSENCE OF OTHER SPECIFIED PARTS OF DIGESTIVE TRACT: Chronic | ICD-10-CM

## 2022-07-20 DIAGNOSIS — Z98.89 OTHER SPECIFIED POSTPROCEDURAL STATES: Chronic | ICD-10-CM

## 2022-07-20 LAB
A1C WITH ESTIMATED AVERAGE GLUCOSE RESULT: 14 % — HIGH (ref 4–5.6)
ALBUMIN SERPL ELPH-MCNC: 4.4 G/DL — SIGNIFICANT CHANGE UP (ref 3.3–5.2)
ALP SERPL-CCNC: 198 U/L — HIGH (ref 40–120)
ALT FLD-CCNC: 23 U/L — SIGNIFICANT CHANGE UP
ANION GAP SERPL CALC-SCNC: 13 MMOL/L — SIGNIFICANT CHANGE UP (ref 5–17)
APPEARANCE UR: CLEAR — SIGNIFICANT CHANGE UP
AST SERPL-CCNC: 27 U/L — SIGNIFICANT CHANGE UP
BASE EXCESS BLDV CALC-SCNC: 3.6 MMOL/L — HIGH (ref -2–3)
BASOPHILS # BLD AUTO: 0 K/UL — SIGNIFICANT CHANGE UP (ref 0–0.2)
BASOPHILS NFR BLD AUTO: 0 % — SIGNIFICANT CHANGE UP (ref 0–2)
BILIRUB SERPL-MCNC: 0.4 MG/DL — SIGNIFICANT CHANGE UP (ref 0.4–2)
BILIRUB UR-MCNC: NEGATIVE — SIGNIFICANT CHANGE UP
BUN SERPL-MCNC: 14.3 MG/DL — SIGNIFICANT CHANGE UP (ref 8–20)
CA-I SERPL-SCNC: 1.33 MMOL/L — SIGNIFICANT CHANGE UP (ref 1.15–1.33)
CALCIUM SERPL-MCNC: 10.4 MG/DL — HIGH (ref 8.6–10.2)
CHLORIDE BLDV-SCNC: 92 MMOL/L — LOW (ref 98–107)
CHLORIDE SERPL-SCNC: 89 MMOL/L — LOW (ref 98–107)
CO2 SERPL-SCNC: 25 MMOL/L — SIGNIFICANT CHANGE UP (ref 22–29)
COLOR SPEC: YELLOW — SIGNIFICANT CHANGE UP
CREAT SERPL-MCNC: 0.81 MG/DL — SIGNIFICANT CHANGE UP (ref 0.5–1.3)
DIFF PNL FLD: NEGATIVE — SIGNIFICANT CHANGE UP
EGFR: 98 ML/MIN/1.73M2 — SIGNIFICANT CHANGE UP
EOSINOPHIL # BLD AUTO: 1.19 K/UL — HIGH (ref 0–0.5)
EOSINOPHIL NFR BLD AUTO: 11.5 % — HIGH (ref 0–6)
ESTIMATED AVERAGE GLUCOSE: 355 MG/DL — HIGH (ref 68–114)
ETHANOL SERPL-MCNC: <10 MG/DL — SIGNIFICANT CHANGE UP (ref 0–9)
GAS PNL BLDV: 127 MMOL/L — LOW (ref 136–145)
GAS PNL BLDV: SIGNIFICANT CHANGE UP
GLUCOSE BLDV-MCNC: 618 MG/DL — CRITICAL HIGH (ref 70–99)
GLUCOSE SERPL-MCNC: 560 MG/DL — CRITICAL HIGH (ref 70–99)
GLUCOSE UR QL: 1000 MG/DL
HCO3 BLDV-SCNC: 29 MMOL/L — SIGNIFICANT CHANGE UP (ref 22–29)
HCT VFR BLD CALC: 36.6 % — LOW (ref 39–50)
HCT VFR BLDA CALC: 43 % — SIGNIFICANT CHANGE UP
HGB BLD CALC-MCNC: 14.4 G/DL — SIGNIFICANT CHANGE UP (ref 12.6–17.4)
HGB BLD-MCNC: 13.5 G/DL — SIGNIFICANT CHANGE UP (ref 13–17)
KETONES UR-MCNC: NEGATIVE — SIGNIFICANT CHANGE UP
LACTATE BLDV-MCNC: 2.9 MMOL/L — HIGH (ref 0.5–2)
LEUKOCYTE ESTERASE UR-ACNC: NEGATIVE — SIGNIFICANT CHANGE UP
LIDOCAIN IGE QN: 46 U/L — SIGNIFICANT CHANGE UP (ref 22–51)
LYMPHOCYTES # BLD AUTO: 4.27 K/UL — HIGH (ref 1–3.3)
LYMPHOCYTES # BLD AUTO: 41.3 % — SIGNIFICANT CHANGE UP (ref 13–44)
MANUAL SMEAR VERIFICATION: SIGNIFICANT CHANGE UP
MCHC RBC-ENTMCNC: 34.9 PG — HIGH (ref 27–34)
MCHC RBC-ENTMCNC: 36.9 GM/DL — HIGH (ref 32–36)
MCV RBC AUTO: 94.6 FL — SIGNIFICANT CHANGE UP (ref 80–100)
MONOCYTES # BLD AUTO: 0.1 K/UL — SIGNIFICANT CHANGE UP (ref 0–0.9)
MONOCYTES NFR BLD AUTO: 1 % — LOW (ref 2–14)
NEUTROPHILS # BLD AUTO: 4.78 K/UL — SIGNIFICANT CHANGE UP (ref 1.8–7.4)
NEUTROPHILS NFR BLD AUTO: 46.2 % — SIGNIFICANT CHANGE UP (ref 43–77)
NITRITE UR-MCNC: NEGATIVE — SIGNIFICANT CHANGE UP
OSMOLALITY SERPL: 312 MOSMOL/KG — HIGH (ref 280–301)
PCO2 BLDV: 50 MMHG — SIGNIFICANT CHANGE UP (ref 42–55)
PH BLDV: 7.37 — SIGNIFICANT CHANGE UP (ref 7.32–7.43)
PH UR: 6.5 — SIGNIFICANT CHANGE UP (ref 5–8)
PLAT MORPH BLD: NORMAL — SIGNIFICANT CHANGE UP
PLATELET # BLD AUTO: 158 K/UL — SIGNIFICANT CHANGE UP (ref 150–400)
PO2 BLDV: <42 MMHG — SIGNIFICANT CHANGE UP (ref 25–45)
POTASSIUM BLDV-SCNC: 4.2 MMOL/L — SIGNIFICANT CHANGE UP (ref 3.5–5.1)
POTASSIUM SERPL-MCNC: 4.2 MMOL/L — SIGNIFICANT CHANGE UP (ref 3.5–5.3)
POTASSIUM SERPL-SCNC: 4.2 MMOL/L — SIGNIFICANT CHANGE UP (ref 3.5–5.3)
PROT SERPL-MCNC: 7.6 G/DL — SIGNIFICANT CHANGE UP (ref 6.6–8.7)
PROT UR-MCNC: NEGATIVE — SIGNIFICANT CHANGE UP
RBC # BLD: 3.87 M/UL — LOW (ref 4.2–5.8)
RBC # FLD: 11.6 % — SIGNIFICANT CHANGE UP (ref 10.3–14.5)
RBC BLD AUTO: NORMAL — SIGNIFICANT CHANGE UP
SAO2 % BLDV: 72.7 % — SIGNIFICANT CHANGE UP
SMUDGE CELLS # BLD: PRESENT — SIGNIFICANT CHANGE UP
SODIUM SERPL-SCNC: 127 MMOL/L — LOW (ref 135–145)
SP GR SPEC: 1.01 — SIGNIFICANT CHANGE UP (ref 1.01–1.02)
UROBILINOGEN FLD QL: NEGATIVE MG/DL — SIGNIFICANT CHANGE UP
WBC # BLD: 10.34 K/UL — SIGNIFICANT CHANGE UP (ref 3.8–10.5)
WBC # FLD AUTO: 10.34 K/UL — SIGNIFICANT CHANGE UP (ref 3.8–10.5)

## 2022-07-20 PROCEDURE — 99285 EMERGENCY DEPT VISIT HI MDM: CPT

## 2022-07-20 PROCEDURE — 99204 OFFICE O/P NEW MOD 45 MIN: CPT

## 2022-07-20 RX ORDER — INSULIN LISPRO 100/ML
14 VIAL (ML) SUBCUTANEOUS ONCE
Refills: 0 | Status: COMPLETED | OUTPATIENT
Start: 2022-07-20 | End: 2022-07-20

## 2022-07-20 RX ORDER — SODIUM CHLORIDE 9 MG/ML
1000 INJECTION, SOLUTION INTRAVENOUS ONCE
Refills: 0 | Status: COMPLETED | OUTPATIENT
Start: 2022-07-20 | End: 2022-07-20

## 2022-07-20 RX ADMIN — SODIUM CHLORIDE 1000 MILLILITER(S): 9 INJECTION, SOLUTION INTRAVENOUS at 20:32

## 2022-07-20 RX ADMIN — Medication 14 UNIT(S): at 21:18

## 2022-07-20 NOTE — ED PROVIDER NOTE - OBJECTIVE STATEMENT
66 y m with pmh of CADn htn colon cnacner presenting for hyperglycemia found on labs at outside facility. Has been getting phsyical therapy and got labs last night which showed glucose in 500s. Says that was seen 8 years ago for DM and started metformin but endo took him off after glycemic control without meds. Denies f/c, n/v, ab pain, sob, cp, dysuria.

## 2022-07-20 NOTE — ED PROVIDER NOTE - ATTENDING CONTRIBUTION TO CARE
Hyperglycemia without ketosis or acidosis, a1c 14, will need to initiate insulin therapy. Will keep in ED obs for glycemic control, endocrine consult.

## 2022-07-21 LAB
ANION GAP SERPL CALC-SCNC: 11 MMOL/L — SIGNIFICANT CHANGE UP (ref 5–17)
BASE EXCESS BLDV CALC-SCNC: 5.8 MMOL/L — HIGH (ref -2–3)
BUN SERPL-MCNC: 9.6 MG/DL — SIGNIFICANT CHANGE UP (ref 8–20)
CA-I SERPL-SCNC: 1.33 MMOL/L — SIGNIFICANT CHANGE UP (ref 1.15–1.33)
CALCIUM SERPL-MCNC: 9.9 MG/DL — SIGNIFICANT CHANGE UP (ref 8.6–10.2)
CHLORIDE BLDV-SCNC: 100 MMOL/L — SIGNIFICANT CHANGE UP (ref 98–107)
CHLORIDE SERPL-SCNC: 97 MMOL/L — LOW (ref 98–107)
CO2 SERPL-SCNC: 26 MMOL/L — SIGNIFICANT CHANGE UP (ref 22–29)
CREAT SERPL-MCNC: 0.55 MG/DL — SIGNIFICANT CHANGE UP (ref 0.5–1.3)
CULTURE RESULTS: SIGNIFICANT CHANGE UP
EGFR: 110 ML/MIN/1.73M2 — SIGNIFICANT CHANGE UP
GAS PNL BLDV: 131 MMOL/L — LOW (ref 136–145)
GAS PNL BLDV: SIGNIFICANT CHANGE UP
GAS PNL BLDV: SIGNIFICANT CHANGE UP
GLUCOSE BLDC GLUCOMTR-MCNC: 299 MG/DL — HIGH (ref 70–99)
GLUCOSE BLDC GLUCOMTR-MCNC: 323 MG/DL — HIGH (ref 70–99)
GLUCOSE BLDC GLUCOMTR-MCNC: 343 MG/DL — HIGH (ref 70–99)
GLUCOSE BLDC GLUCOMTR-MCNC: 404 MG/DL — HIGH (ref 70–99)
GLUCOSE BLDV-MCNC: 324 MG/DL — HIGH (ref 70–99)
GLUCOSE SERPL-MCNC: 291 MG/DL — HIGH (ref 70–99)
HCO3 BLDV-SCNC: 30 MMOL/L — HIGH (ref 22–29)
HCT VFR BLDA CALC: 42 % — SIGNIFICANT CHANGE UP
HGB BLD CALC-MCNC: 13.9 G/DL — SIGNIFICANT CHANGE UP (ref 12.6–17.4)
LACTATE BLDV-MCNC: 1.1 MMOL/L — SIGNIFICANT CHANGE UP (ref 0.5–2)
MAGNESIUM SERPL-MCNC: 1.5 MG/DL — LOW (ref 1.8–2.6)
PCO2 BLDV: 48 MMHG — SIGNIFICANT CHANGE UP (ref 42–55)
PH BLDV: 7.41 — SIGNIFICANT CHANGE UP (ref 7.32–7.43)
PO2 BLDV: 65 MMHG — HIGH (ref 25–45)
POTASSIUM BLDV-SCNC: 4.1 MMOL/L — SIGNIFICANT CHANGE UP (ref 3.5–5.1)
POTASSIUM SERPL-MCNC: 3.9 MMOL/L — SIGNIFICANT CHANGE UP (ref 3.5–5.3)
POTASSIUM SERPL-SCNC: 3.9 MMOL/L — SIGNIFICANT CHANGE UP (ref 3.5–5.3)
SAO2 % BLDV: 93.3 % — SIGNIFICANT CHANGE UP
SARS-COV-2 RNA SPEC QL NAA+PROBE: SIGNIFICANT CHANGE UP
SODIUM SERPL-SCNC: 134 MMOL/L — LOW (ref 135–145)
SPECIMEN SOURCE: SIGNIFICANT CHANGE UP

## 2022-07-21 PROCEDURE — 99220: CPT

## 2022-07-21 RX ORDER — SERTRALINE 25 MG/1
100 TABLET, FILM COATED ORAL DAILY
Refills: 0 | Status: DISCONTINUED | OUTPATIENT
Start: 2022-07-21 | End: 2022-07-25

## 2022-07-21 RX ORDER — MAGNESIUM SULFATE 500 MG/ML
2 VIAL (ML) INJECTION ONCE
Refills: 0 | Status: COMPLETED | OUTPATIENT
Start: 2022-07-21 | End: 2022-07-21

## 2022-07-21 RX ORDER — DEXTROSE 50 % IN WATER 50 %
25 SYRINGE (ML) INTRAVENOUS ONCE
Refills: 0 | Status: DISCONTINUED | OUTPATIENT
Start: 2022-07-21 | End: 2022-07-25

## 2022-07-21 RX ORDER — ASPIRIN/CALCIUM CARB/MAGNESIUM 324 MG
81 TABLET ORAL DAILY
Refills: 0 | Status: DISCONTINUED | OUTPATIENT
Start: 2022-07-21 | End: 2022-07-25

## 2022-07-21 RX ORDER — ACETAMINOPHEN 500 MG
650 TABLET ORAL EVERY 6 HOURS
Refills: 0 | Status: DISCONTINUED | OUTPATIENT
Start: 2022-07-21 | End: 2022-07-25

## 2022-07-21 RX ORDER — DEXTROSE 50 % IN WATER 50 %
15 SYRINGE (ML) INTRAVENOUS ONCE
Refills: 0 | Status: DISCONTINUED | OUTPATIENT
Start: 2022-07-21 | End: 2022-07-25

## 2022-07-21 RX ORDER — DIPHENHYDRAMINE HCL 50 MG
50 CAPSULE ORAL AT BEDTIME
Refills: 0 | Status: DISCONTINUED | OUTPATIENT
Start: 2022-07-21 | End: 2022-07-25

## 2022-07-21 RX ORDER — SODIUM CHLORIDE 9 MG/ML
1000 INJECTION, SOLUTION INTRAVENOUS
Refills: 0 | Status: DISCONTINUED | OUTPATIENT
Start: 2022-07-21 | End: 2022-07-25

## 2022-07-21 RX ORDER — MIRTAZAPINE 45 MG/1
15 TABLET, ORALLY DISINTEGRATING ORAL AT BEDTIME
Refills: 0 | Status: DISCONTINUED | OUTPATIENT
Start: 2022-07-21 | End: 2022-07-25

## 2022-07-21 RX ORDER — LANOLIN ALCOHOL/MO/W.PET/CERES
5 CREAM (GRAM) TOPICAL AT BEDTIME
Refills: 0 | Status: DISCONTINUED | OUTPATIENT
Start: 2022-07-21 | End: 2022-07-25

## 2022-07-21 RX ORDER — INSULIN LISPRO 100/ML
VIAL (ML) SUBCUTANEOUS
Refills: 0 | Status: DISCONTINUED | OUTPATIENT
Start: 2022-07-21 | End: 2022-07-25

## 2022-07-21 RX ORDER — SODIUM CHLORIDE 9 MG/ML
1000 INJECTION INTRAMUSCULAR; INTRAVENOUS; SUBCUTANEOUS
Refills: 0 | Status: DISCONTINUED | OUTPATIENT
Start: 2022-07-21 | End: 2022-07-25

## 2022-07-21 RX ORDER — THIAMINE MONONITRATE (VIT B1) 100 MG
100 TABLET ORAL DAILY
Refills: 0 | Status: DISCONTINUED | OUTPATIENT
Start: 2022-07-21 | End: 2022-07-25

## 2022-07-21 RX ORDER — FOLIC ACID 0.8 MG
1 TABLET ORAL DAILY
Refills: 0 | Status: DISCONTINUED | OUTPATIENT
Start: 2022-07-21 | End: 2022-07-25

## 2022-07-21 RX ORDER — GLUCAGON INJECTION, SOLUTION 0.5 MG/.1ML
1 INJECTION, SOLUTION SUBCUTANEOUS ONCE
Refills: 0 | Status: DISCONTINUED | OUTPATIENT
Start: 2022-07-21 | End: 2022-07-25

## 2022-07-21 RX ORDER — METFORMIN HYDROCHLORIDE 850 MG/1
500 TABLET ORAL EVERY 12 HOURS
Refills: 0 | Status: DISCONTINUED | OUTPATIENT
Start: 2022-07-21 | End: 2022-07-25

## 2022-07-21 RX ORDER — INSULIN GLARGINE 100 [IU]/ML
15 INJECTION, SOLUTION SUBCUTANEOUS AT BEDTIME
Refills: 0 | Status: DISCONTINUED | OUTPATIENT
Start: 2022-07-21 | End: 2022-07-25

## 2022-07-21 RX ORDER — METOPROLOL TARTRATE 50 MG
50 TABLET ORAL DAILY
Refills: 0 | Status: DISCONTINUED | OUTPATIENT
Start: 2022-07-21 | End: 2022-07-25

## 2022-07-21 RX ORDER — INSULIN GLARGINE 100 [IU]/ML
5 INJECTION, SOLUTION SUBCUTANEOUS ONCE
Refills: 0 | Status: DISCONTINUED | OUTPATIENT
Start: 2022-07-21 | End: 2022-07-21

## 2022-07-21 RX ORDER — INSULIN GLARGINE 100 [IU]/ML
22 INJECTION, SOLUTION SUBCUTANEOUS AT BEDTIME
Refills: 0 | Status: DISCONTINUED | OUTPATIENT
Start: 2022-07-21 | End: 2022-07-21

## 2022-07-21 RX ORDER — DEXTROSE 50 % IN WATER 50 %
12.5 SYRINGE (ML) INTRAVENOUS ONCE
Refills: 0 | Status: DISCONTINUED | OUTPATIENT
Start: 2022-07-21 | End: 2022-07-25

## 2022-07-21 RX ORDER — SIMVASTATIN 20 MG/1
40 TABLET, FILM COATED ORAL AT BEDTIME
Refills: 0 | Status: DISCONTINUED | OUTPATIENT
Start: 2022-07-21 | End: 2022-07-25

## 2022-07-21 RX ADMIN — Medication 81 MILLIGRAM(S): at 15:33

## 2022-07-21 RX ADMIN — Medication 6: at 08:49

## 2022-07-21 RX ADMIN — Medication 1 MILLIGRAM(S): at 15:33

## 2022-07-21 RX ADMIN — Medication 8: at 13:02

## 2022-07-21 RX ADMIN — Medication 25 GRAM(S): at 23:21

## 2022-07-21 RX ADMIN — METFORMIN HYDROCHLORIDE 500 MILLIGRAM(S): 850 TABLET ORAL at 05:01

## 2022-07-21 RX ADMIN — METFORMIN HYDROCHLORIDE 500 MILLIGRAM(S): 850 TABLET ORAL at 18:41

## 2022-07-21 RX ADMIN — Medication 100 MILLIGRAM(S): at 15:33

## 2022-07-21 RX ADMIN — Medication 50 MILLIGRAM(S): at 05:01

## 2022-07-21 RX ADMIN — Medication 12: at 16:24

## 2022-07-21 RX ADMIN — SIMVASTATIN 40 MILLIGRAM(S): 20 TABLET, FILM COATED ORAL at 22:30

## 2022-07-21 RX ADMIN — INSULIN GLARGINE 15 UNIT(S): 100 INJECTION, SOLUTION SUBCUTANEOUS at 22:23

## 2022-07-21 RX ADMIN — SERTRALINE 100 MILLIGRAM(S): 25 TABLET, FILM COATED ORAL at 15:33

## 2022-07-21 RX ADMIN — MIRTAZAPINE 15 MILLIGRAM(S): 45 TABLET, ORALLY DISINTEGRATING ORAL at 22:23

## 2022-07-21 RX ADMIN — SODIUM CHLORIDE 100 MILLILITER(S): 9 INJECTION INTRAMUSCULAR; INTRAVENOUS; SUBCUTANEOUS at 05:01

## 2022-07-21 NOTE — ED CDU PROVIDER INITIAL DAY NOTE - PHYSICAL EXAMINATION
Const: AOX3 nontoxic appearing, no apparent respiratory or physical distress. Stable gait   HEENT: NC/AT. Moist mucous membranes.  Eyes: ASHLEY. EOMI  Neck: Soft and supple. Full ROM without pain.  Cardiac: Regular rate and regular rhythm. +S1/S2. No murmurs. Peripheral pulses 2+ and symmetric. No LE edema.  Resp: Speaking in full sentences. No evidence of respiratory distress. No wheezes, rales or rhonchi. No adventitious breath sounds   Abd: Soft, non-tender, non-distended.   Back: Spine midline and non-tender. No CVAT.  Skin: No rashes, abrasions or lacerations.  Neuro: Awake, alert & oriented x 3. Moves all extremities symmetrically.

## 2022-07-21 NOTE — ED CDU PROVIDER INITIAL DAY NOTE - PROGRESS NOTE DETAILS
Davey: spoke with endocrinologist who saw patient and has recs as follows: 5u lantus now then 15 u lantus QHS, metformin 500 BID, diabetic educator consult and outpatient follow up with endo. Recommends staying overnight for optimal diabetic meds adjustment. Diabetic educator consult placed. I spoke with patient and updated him on plan, he is agreeable. Adds that he is scheduled for open heart surgery in August and has been staying at [place] in antipication of this.

## 2022-07-21 NOTE — CONSULT NOTE ADULT - SUBJECTIVE AND OBJECTIVE BOX
HPI:  66 y.o. male with PMHx of DM, CAD, s/p MI and stents, HLD, HTN, ETOH abuse was reffered to ED after outside office fount him hyperglycemic . Patient was diagnossed with DM 8 years ago and was placed shortly on Metformin. Treatment was discontinued in 2-3 months due to "good sugars". Since then patient never treated for diabetes. Does not keep any diabetic diet. He states he sees PCP once a year and BG was normal. Denies polyuria polydipsia or polyphagia Denies wt. loss. Reports quitting alcohol 3 months ago. Supposed to have CABG in August.   Upon presentation serum . Received 14U of short acting insulin and dose of Metformin 500 mg. Placed on correction scale and Lantus 22U at bed time to be given tonight    PAST MEDICAL & SURGICAL HISTORY:  Pancreatitis  Hypertension  Myocardial infarct  Alcohol abuse  Colon cancer  History of colon resection  History of heart surgery  cardiac stent    Social History:  Hx of ETOH abuse    FAMILY HISTORY:  FH: prostate cancer (Father)    Family history of atherosclerosis (Mother)    Allergies  No Known Allergies    REVIEW OF SYSTEMS:    CONSTITUTIONAL: No fever, weight loss, or fatigue  EYES: No eye pain, visual disturbances, or discharge  ENMT:  No difficulty hearing, tinnitus, vertigo; No sinus or throat pain  NECK: No pain or stiffness  RESPIRATORY: No cough, wheezing, chills or hemoptysis; No shortness of breath  CARDIOVASCULAR: No chest pain, palpitations, dizziness, or leg swelling  GASTROINTESTINAL: No abdominal or epigastric pain. No nausea, vomiting, or hematemesis; No diarrhea or constipation. No melena or hematochezia.  NEUROLOGICAL: No headaches, memory loss, loss of strength, numbness, or tremors  SKIN: No itching, burning, rashes, or lesions   MUSCULOSKELETAL: No joint pain or swelling; No muscle, back, or extremity pain  PSYCHIATRIC: No depression, anxiety, mood swings, or difficulty sleeping    MEDICATIONS  (STANDING):  aspirin enteric coated 81 milliGRAM(s) Oral daily  dextrose 5%. 1000 milliLiter(s) (50 mL/Hr) IV Continuous <Continuous>  dextrose 5%. 1000 milliLiter(s) (100 mL/Hr) IV Continuous <Continuous>  dextrose 50% Injectable 25 Gram(s) IV Push once  dextrose 50% Injectable 12.5 Gram(s) IV Push once  dextrose 50% Injectable 25 Gram(s) IV Push once  folic acid 1 milliGRAM(s) Oral daily  glucagon  Injectable 1 milliGRAM(s) IntraMuscular once  insulin glargine Injectable (LANTUS) 15 Unit(s) SubCutaneous at bedtime  insulin glargine Injectable (LANTUS) 5 Unit(s) SubCutaneous once  insulin lispro (ADMELOG) corrective regimen sliding scale   SubCutaneous three times a day before meals  metFORMIN 500 milliGRAM(s) Oral every 12 hours  metoprolol succinate ER 50 milliGRAM(s) Oral daily  mirtazapine 15 milliGRAM(s) Oral at bedtime  sertraline 100 milliGRAM(s) Oral daily  simvastatin 40 milliGRAM(s) Oral at bedtime  sodium chloride 0.9%. 1000 milliLiter(s) (100 mL/Hr) IV Continuous <Continuous>  thiamine 100 milliGRAM(s) Oral daily    MEDICATIONS  (PRN):  acetaminophen     Tablet .. 650 milliGRAM(s) Oral every 6 hours PRN Temp greater or equal to 38C (100.4F), Mild Pain (1 - 3)  dextrose Oral Gel 15 Gram(s) Oral once PRN Blood Glucose LESS THAN 70 milliGRAM(s)/deciliter  diphenhydrAMINE 50 milliGRAM(s) Oral at bedtime PRN Insomnia  melatonin. 5 milliGRAM(s) Oral at bedtime PRN Insomnia      Vital Signs Last 24 Hrs  T(C): 36.8 (21 Jul 2022 08:02), Max: 36.8 (21 Jul 2022 08:02)  T(F): 98.2 (21 Jul 2022 08:02), Max: 98.2 (21 Jul 2022 08:02)  HR: 74 (21 Jul 2022 08:02) (71 - 86)  BP: 134/76 (21 Jul 2022 08:02) (134/76 - 149/83)  BP(mean): --  RR: 18 (21 Jul 2022 08:02) (18 - 20)  SpO2: 97% (21 Jul 2022 08:02) (95% - 98%)    Parameters below as of 21 Jul 2022 08:02  Patient On (Oxygen Delivery Method): room air    Height (cm): 177.8 (07-20-22 @ 19:13)    Physical Exam:    Constitutional: NAD, well-developed  HEENT: EOMI, no exophalmos  Neck: trachea midline, no thyroid enlargement  Respiratory: CTAB, normal respirations  Cardiovascular: S1 and S2, RRR  Gastrointestinal: BS+, soft, ntnd  Extremities: No peripheral edema  Neurological: AOx3, no focal deficits  Psychiatric: Normal mood and normal affect  Skin: no rashes, no acanthosis    LABS  07-21    134<L>  |  97<L>  |  9.6  ----------------------------<  291<H>  3.9   |  26.0  |  0.55    Ca    9.9      21 Jul 2022 08:04  Mg     1.5     07-21    TPro  7.6  /  Alb  4.4  /  TBili  0.4  /  DBili  x   /  AST  27  /  ALT  23  /  AlkPhos  198<H>  07-20                          13.5   10.34 )-----------( 158      ( 20 Jul 2022 19:34 )             36.6       A1C with Estimated Average Glucose Result: 14.0 % (07-20-22 @ 19:34)    Ketone - Urine: Negative (07-20 @ 19:34)    Albumin, Serum: 4.4 g/dL (07-20-22 @ 19:34)  Aspartate Aminotransferase (AST/SGOT): 27 U/L (07-20-22 @ 19:34)  Alanine Aminotransferase (ALT/SGPT): 23 U/L (07-20-22 @ 19:34)  Alkaline Phosphatase, Serum: 198 U/L (07-20-22 @ 19:34)    CAPILLARY BLOOD GLUCOSE  POCT Blood Glucose.: 323 mg/dL (21 Jul 2022 11:27)  POCT Blood Glucose.: 299 mg/dL (21 Jul 2022 08:18)  POCT Blood Glucose.: 178 mg/dL (21 Jul 2022 01:03)  POCT Blood Glucose.: 392 mg/dL (20 Jul 2022 21:46)  POCT Blood Glucose.: 401 mg/dL (20 Jul 2022 21:12)  POCT Blood Glucose.: >530 mg/dL (20 Jul 2022 19:14)

## 2022-07-21 NOTE — CONSULT NOTE ADULT - CONSULT REQUESTED BY NAME
Patient reports falling down the stairs last night at 8 PM. Patient reports pain and swelling in left hand 3rd and 4th digits.    No medications taken for pain.    Patient would like communication of their results via:        Cell Phone:   Telephone Information:   Mobile [phone]     Okay to leave a message containing results? Yes     Dr. Chinchilla

## 2022-07-21 NOTE — CONSULT NOTE ADULT - ASSESSMENT
66 y.o. male with PMHx of DM, CAD, s/p MI and stents, HLD, HTN, ETOH abuse, Hx of pancreatitis, Colon Ca s/p colon resection(13y.ago) was referred to ED after outside office fount him hyperglycemic . Patient was diagnossed with DM 8 years ago and was placed shortly on Metformin. Treatment was discontinued in 2-3 months due to "good sugars". Since then patient never treated for diabetes. Does not keep any diabetic diet. He states he sees PCP once a year and BG was normal. Denies polyuria polydipsia or polyphagia Denies wt. loss. Reports quitting alcohol 3 months ago. Supposed to have CABG in August.   Upon presentation serum , A1C 14%. Received 14U of short acting insulin and dose of Metformin 500 mg. Placed on correction scale and Lantus 22U at bed time to be given tonight      # Severely uncontrolled DM  Evidenced by A1C 14%  Likely Type 2 with Hx of ETOH abuse  However auto-immune DM should be r/o  Please send C-peptide, SUKHWINDER and Islet Cell Ab  Suspect non compliance. Needs easy regimen to assure compliance  Will start basal insulin  Give Lantus 5 units now.  Start Lantus 15 units qhs with first dose tonight  Continue correction scale while in the hospital.  Monitor POC glucose qac and qhs with goal 120 - 180 mg/dl  Can continue Metformin 500 mg BID  Might benefit from SGLT-2 as outpatient.   Patient needs diabetic education and diet  Endocrinology will follow    # Hx of CAD, MI and stents, supposed to have CABG in August, 2022  Unlikely if A1C that high  Needs good glycemic control and follow up with his cardiologist  Continue Metoprolol    # HLD  Continue statins  Advised low fat/cholesterol diet    # Hx of ETOH abuse  Claiming stopped 3 months ago  Continue thiamine

## 2022-07-21 NOTE — ED CDU PROVIDER INITIAL DAY NOTE - MEDICAL DECISION MAKING DETAILS
66 y m with pmh of CADn htn colon cnacner presenting for hyperglycemia found on labs at outside facility. Has been getting physical therapy and got labs last night which showed glucose in 500s. Says that was seen 8 years ago for DM and started metformin but endo took him off after glycemic control without meds   start on metformin    Lantus 22  units   sliding scale   consult Endo  cont home med   repeat the BMP and mag  htn / cad on med 66 y m with pmh of CADn htn colon cnacner presenting for hyperglycemia found on labs at outside facility. Has been getting physical therapy and got labs last night which showed glucose in 500s. Says that was seen 8 years ago for DM and started metformin but endo took him off after glycemic control without meds   start on metformin    Lantus 22  units   sliding scale   consult Endo  cont home med   repeat the BMP- VBG in Am and trending serum lactate -   pseudohyponatremia  likely will re check the  bmp   htn / cad on med

## 2022-07-21 NOTE — ED CDU PROVIDER INITIAL DAY NOTE - OBJECTIVE STATEMENT
66 y m with pmh of CADn htn colon cnacner presenting for hyperglycemia found on labs at outside facility. Has been getting physical therapy and got labs last night which showed glucose in 500s. Says that was seen 8 years ago for DM and started metformin but endo took him off after glycemic control without meds. states last mom seen her pcp and every thins was normal . he has some pins and needle sensation on the left foot /. Denies f/c, n/v, ab pain, sob, cp, dysuria.

## 2022-07-21 NOTE — ED CDU PROVIDER INITIAL DAY NOTE - NS ED ATTENDING STATEMENT MOD
Attending with This was a shared visit with the MABEL. I reviewed and verified the documentation and independently performed the documented:

## 2022-07-22 VITALS
TEMPERATURE: 98 F | SYSTOLIC BLOOD PRESSURE: 134 MMHG | DIASTOLIC BLOOD PRESSURE: 75 MMHG | OXYGEN SATURATION: 94 % | RESPIRATION RATE: 18 BRPM | HEART RATE: 70 BPM

## 2022-07-22 LAB
ALBUMIN SERPL ELPH-MCNC: 4 G/DL — SIGNIFICANT CHANGE UP (ref 3.3–5.2)
ALP SERPL-CCNC: 118 U/L — SIGNIFICANT CHANGE UP (ref 40–120)
ALT FLD-CCNC: 10 U/L — SIGNIFICANT CHANGE UP
ANION GAP SERPL CALC-SCNC: 10 MMOL/L — SIGNIFICANT CHANGE UP (ref 5–17)
ANION GAP SERPL CALC-SCNC: 13 MMOL/L — SIGNIFICANT CHANGE UP (ref 5–17)
AST SERPL-CCNC: 14 U/L — SIGNIFICANT CHANGE UP
BASOPHILS # BLD AUTO: 0.03 K/UL — SIGNIFICANT CHANGE UP (ref 0–0.2)
BASOPHILS NFR BLD AUTO: 0.5 % — SIGNIFICANT CHANGE UP (ref 0–2)
BILIRUB SERPL-MCNC: 0.5 MG/DL — SIGNIFICANT CHANGE UP (ref 0.4–2)
BUN SERPL-MCNC: 15.1 MG/DL — SIGNIFICANT CHANGE UP (ref 8–20)
BUN SERPL-MCNC: 16.3 MG/DL — SIGNIFICANT CHANGE UP (ref 8–20)
C PEPTIDE SERPL-MCNC: 2.5 NG/ML — SIGNIFICANT CHANGE UP (ref 1.1–4.4)
CALCIUM SERPL-MCNC: 10.1 MG/DL — SIGNIFICANT CHANGE UP (ref 8.6–10.2)
CALCIUM SERPL-MCNC: 9.6 MG/DL — SIGNIFICANT CHANGE UP (ref 8.6–10.2)
CHLORIDE SERPL-SCNC: 101 MMOL/L — SIGNIFICANT CHANGE UP (ref 98–107)
CHLORIDE SERPL-SCNC: 102 MMOL/L — SIGNIFICANT CHANGE UP (ref 98–107)
CO2 SERPL-SCNC: 26 MMOL/L — SIGNIFICANT CHANGE UP (ref 22–29)
CO2 SERPL-SCNC: 27 MMOL/L — SIGNIFICANT CHANGE UP (ref 22–29)
CREAT SERPL-MCNC: 0.57 MG/DL — SIGNIFICANT CHANGE UP (ref 0.5–1.3)
CREAT SERPL-MCNC: 1.15 MG/DL — SIGNIFICANT CHANGE UP (ref 0.5–1.3)
EGFR: 109 ML/MIN/1.73M2 — SIGNIFICANT CHANGE UP
EGFR: 71 ML/MIN/1.73M2 — SIGNIFICANT CHANGE UP
EOSINOPHIL # BLD AUTO: 0.11 K/UL — SIGNIFICANT CHANGE UP (ref 0–0.5)
EOSINOPHIL NFR BLD AUTO: 1.7 % — SIGNIFICANT CHANGE UP (ref 0–6)
GLUCOSE BLDC GLUCOMTR-MCNC: 210 MG/DL — HIGH (ref 70–99)
GLUCOSE BLDC GLUCOMTR-MCNC: 289 MG/DL — HIGH (ref 70–99)
GLUCOSE BLDC GLUCOMTR-MCNC: 290 MG/DL — HIGH (ref 70–99)
GLUCOSE BLDC GLUCOMTR-MCNC: 348 MG/DL — HIGH (ref 70–99)
GLUCOSE SERPL-MCNC: 112 MG/DL — HIGH (ref 70–99)
GLUCOSE SERPL-MCNC: 236 MG/DL — HIGH (ref 70–99)
HCT VFR BLD CALC: 32 % — LOW (ref 39–50)
HCT VFR BLD CALC: 39.4 % — SIGNIFICANT CHANGE UP (ref 39–50)
HGB BLD-MCNC: 10.2 G/DL — LOW (ref 13–17)
HGB BLD-MCNC: 13.9 G/DL — SIGNIFICANT CHANGE UP (ref 13–17)
IMM GRANULOCYTES NFR BLD AUTO: 0.3 % — SIGNIFICANT CHANGE UP (ref 0–1.5)
LYMPHOCYTES # BLD AUTO: 1.82 K/UL — SIGNIFICANT CHANGE UP (ref 1–3.3)
LYMPHOCYTES # BLD AUTO: 28.2 % — SIGNIFICANT CHANGE UP (ref 13–44)
MAGNESIUM SERPL-MCNC: 1.9 MG/DL — SIGNIFICANT CHANGE UP (ref 1.6–2.6)
MCHC RBC-ENTMCNC: 28.8 PG — SIGNIFICANT CHANGE UP (ref 27–34)
MCHC RBC-ENTMCNC: 31.9 GM/DL — LOW (ref 32–36)
MCHC RBC-ENTMCNC: 34.1 PG — HIGH (ref 27–34)
MCHC RBC-ENTMCNC: 35.3 GM/DL — SIGNIFICANT CHANGE UP (ref 32–36)
MCV RBC AUTO: 90.4 FL — SIGNIFICANT CHANGE UP (ref 80–100)
MCV RBC AUTO: 96.6 FL — SIGNIFICANT CHANGE UP (ref 80–100)
MONOCYTES # BLD AUTO: 0.72 K/UL — SIGNIFICANT CHANGE UP (ref 0–0.9)
MONOCYTES NFR BLD AUTO: 11.1 % — SIGNIFICANT CHANGE UP (ref 2–14)
NEUTROPHILS # BLD AUTO: 3.76 K/UL — SIGNIFICANT CHANGE UP (ref 1.8–7.4)
NEUTROPHILS NFR BLD AUTO: 58.2 % — SIGNIFICANT CHANGE UP (ref 43–77)
PLATELET # BLD AUTO: 153 K/UL — SIGNIFICANT CHANGE UP (ref 150–400)
PLATELET # BLD AUTO: 159 K/UL — SIGNIFICANT CHANGE UP (ref 150–400)
POTASSIUM SERPL-MCNC: 3.5 MMOL/L — SIGNIFICANT CHANGE UP (ref 3.5–5.3)
POTASSIUM SERPL-MCNC: 4 MMOL/L — SIGNIFICANT CHANGE UP (ref 3.5–5.3)
POTASSIUM SERPL-SCNC: 3.5 MMOL/L — SIGNIFICANT CHANGE UP (ref 3.5–5.3)
POTASSIUM SERPL-SCNC: 4 MMOL/L — SIGNIFICANT CHANGE UP (ref 3.5–5.3)
PROT SERPL-MCNC: 7.6 G/DL — SIGNIFICANT CHANGE UP (ref 6.6–8.7)
RBC # BLD: 3.54 M/UL — LOW (ref 4.2–5.8)
RBC # BLD: 4.08 M/UL — LOW (ref 4.2–5.8)
RBC # FLD: 11.6 % — SIGNIFICANT CHANGE UP (ref 10.3–14.5)
RBC # FLD: 15.8 % — HIGH (ref 10.3–14.5)
SODIUM SERPL-SCNC: 139 MMOL/L — SIGNIFICANT CHANGE UP (ref 135–145)
SODIUM SERPL-SCNC: 140 MMOL/L — SIGNIFICANT CHANGE UP (ref 135–145)
WBC # BLD: 6.46 K/UL — SIGNIFICANT CHANGE UP (ref 3.8–10.5)
WBC # BLD: 8.88 K/UL — SIGNIFICANT CHANGE UP (ref 3.8–10.5)
WBC # FLD AUTO: 6.46 K/UL — SIGNIFICANT CHANGE UP (ref 3.8–10.5)
WBC # FLD AUTO: 8.88 K/UL — SIGNIFICANT CHANGE UP (ref 3.8–10.5)

## 2022-07-22 PROCEDURE — 99217: CPT

## 2022-07-22 PROCEDURE — U0005: CPT

## 2022-07-22 PROCEDURE — 96374 THER/PROPH/DIAG INJ IV PUSH: CPT

## 2022-07-22 PROCEDURE — G0378: CPT

## 2022-07-22 PROCEDURE — 85018 HEMOGLOBIN: CPT

## 2022-07-22 PROCEDURE — 84132 ASSAY OF SERUM POTASSIUM: CPT

## 2022-07-22 PROCEDURE — 83605 ASSAY OF LACTIC ACID: CPT

## 2022-07-22 PROCEDURE — 82803 BLOOD GASES ANY COMBINATION: CPT

## 2022-07-22 PROCEDURE — 83930 ASSAY OF BLOOD OSMOLALITY: CPT

## 2022-07-22 PROCEDURE — 84295 ASSAY OF SERUM SODIUM: CPT

## 2022-07-22 PROCEDURE — 80048 BASIC METABOLIC PNL TOTAL CA: CPT

## 2022-07-22 PROCEDURE — 99283 EMERGENCY DEPT VISIT LOW MDM: CPT | Mod: 25

## 2022-07-22 PROCEDURE — 83735 ASSAY OF MAGNESIUM: CPT

## 2022-07-22 PROCEDURE — 82962 GLUCOSE BLOOD TEST: CPT

## 2022-07-22 PROCEDURE — 82947 ASSAY GLUCOSE BLOOD QUANT: CPT

## 2022-07-22 PROCEDURE — 85014 HEMATOCRIT: CPT

## 2022-07-22 PROCEDURE — 86341 ISLET CELL ANTIBODY: CPT

## 2022-07-22 PROCEDURE — 84681 ASSAY OF C-PEPTIDE: CPT

## 2022-07-22 PROCEDURE — 83036 HEMOGLOBIN GLYCOSYLATED A1C: CPT

## 2022-07-22 PROCEDURE — 85027 COMPLETE CBC AUTOMATED: CPT

## 2022-07-22 PROCEDURE — 83690 ASSAY OF LIPASE: CPT

## 2022-07-22 PROCEDURE — 81003 URINALYSIS AUTO W/O SCOPE: CPT

## 2022-07-22 PROCEDURE — 82435 ASSAY OF BLOOD CHLORIDE: CPT

## 2022-07-22 PROCEDURE — 80307 DRUG TEST PRSMV CHEM ANLYZR: CPT

## 2022-07-22 PROCEDURE — 85025 COMPLETE CBC W/AUTO DIFF WBC: CPT

## 2022-07-22 PROCEDURE — U0003: CPT

## 2022-07-22 PROCEDURE — 36415 COLL VENOUS BLD VENIPUNCTURE: CPT

## 2022-07-22 PROCEDURE — 80053 COMPREHEN METABOLIC PANEL: CPT

## 2022-07-22 PROCEDURE — 87086 URINE CULTURE/COLONY COUNT: CPT

## 2022-07-22 PROCEDURE — 82330 ASSAY OF CALCIUM: CPT

## 2022-07-22 RX ORDER — ISOPROPYL ALCOHOL, BENZOCAINE .7; .06 ML/ML; ML/ML
1 SWAB TOPICAL
Qty: 100 | Refills: 1
Start: 2022-07-22 | End: 2022-09-09

## 2022-07-22 RX ORDER — METFORMIN HYDROCHLORIDE 850 MG/1
1 TABLET ORAL
Qty: 60 | Refills: 0
Start: 2022-07-22 | End: 2022-08-20

## 2022-07-22 RX ORDER — DIPHENHYDRAMINE HCL 50 MG
1 CAPSULE ORAL
Qty: 0 | Refills: 0 | DISCHARGE
Start: 2022-07-22

## 2022-07-22 RX ORDER — ACETAMINOPHEN 500 MG
2 TABLET ORAL
Qty: 0 | Refills: 0 | DISCHARGE
Start: 2022-07-22

## 2022-07-22 RX ORDER — INSULIN GLARGINE 100 [IU]/ML
15 INJECTION, SOLUTION SUBCUTANEOUS
Qty: 3 | Refills: 0
Start: 2022-07-22 | End: 2022-08-20

## 2022-07-22 RX ORDER — LANOLIN ALCOHOL/MO/W.PET/CERES
1 CREAM (GRAM) TOPICAL
Qty: 0 | Refills: 0 | DISCHARGE
Start: 2022-07-22

## 2022-07-22 RX ADMIN — Medication 8: at 11:53

## 2022-07-22 RX ADMIN — Medication 81 MILLIGRAM(S): at 11:52

## 2022-07-22 RX ADMIN — Medication 50 MILLIGRAM(S): at 05:32

## 2022-07-22 RX ADMIN — SERTRALINE 100 MILLIGRAM(S): 25 TABLET, FILM COATED ORAL at 11:52

## 2022-07-22 RX ADMIN — Medication 6: at 17:03

## 2022-07-22 RX ADMIN — METFORMIN HYDROCHLORIDE 500 MILLIGRAM(S): 850 TABLET ORAL at 17:03

## 2022-07-22 RX ADMIN — Medication 6: at 07:44

## 2022-07-22 RX ADMIN — Medication 100 MILLIGRAM(S): at 11:52

## 2022-07-22 RX ADMIN — METFORMIN HYDROCHLORIDE 500 MILLIGRAM(S): 850 TABLET ORAL at 05:32

## 2022-07-22 RX ADMIN — Medication 1 MILLIGRAM(S): at 11:52

## 2022-07-22 NOTE — ED CDU PROVIDER SUBSEQUENT DAY NOTE - MEDICAL DECISION MAKING DETAILS
66 y m with pmh of CAD, htn, colon cancer presenting for hyperglycemia.   -continuing glucose monitoring with lantus, sliding scale and metformin.   -T1DM labs sent.  -followed by endocrine

## 2022-07-22 NOTE — ED CDU PROVIDER SUBSEQUENT DAY NOTE - NSICDXFAMILYHX_GEN_ALL_CORE_FT
FAMILY HISTORY:  Father  Still living? No  FH: prostate cancer, Age at diagnosis: Age Unknown    Mother  Still living? No  Family history of atherosclerosis, Age at diagnosis: Age Unknown    
FAMILY HISTORY:  Father  Still living? No  FH: prostate cancer, Age at diagnosis: Age Unknown    Mother  Still living? No  Family history of atherosclerosis, Age at diagnosis: Age Unknown

## 2022-07-22 NOTE — ED CDU PROVIDER SUBSEQUENT DAY NOTE - NSICDXPASTMEDICALHX_GEN_ALL_CORE_FT
PAST MEDICAL HISTORY:  Alcohol abuse     Colon cancer     Hypertension     Myocardial infarct     Pancreatitis     
PAST MEDICAL HISTORY:  Alcohol abuse     Colon cancer     Hypertension     Myocardial infarct     Pancreatitis

## 2022-07-22 NOTE — ED CDU PROVIDER SUBSEQUENT DAY NOTE - ATTENDING APP SHARED VISIT CONTRIBUTION OF CARE
hyperglycemia- pt has no complaints at this time; endocrinology evaluated pt recommend lantus continue metformin  - diabetes education--anticipate dc
hyperglycemia- pt has no complaints at this time; endocrinology evaluated pt recommend lantus continue metformin  - diabetes education--anticipate dc.

## 2022-07-22 NOTE — ED CDU PROVIDER DISPOSITION NOTE - CARE PROVIDERS DIRECT ADDRESSES
,[email or web address]

## 2022-07-22 NOTE — ED CDU PROVIDER DISPOSITION NOTE - CLINICAL COURSE
66 y.o. male with PMHx of DM, CAD, s/p MI and stents, HLD, HTN, ETOH abuse was referred to ED after outside office fount him hyperglycemic . He was on insulin in the past and was stopped due "good sugars." Remained asx throughout hospital course. Not acidotic. Seen by endocrinology and started on lantus, SSI and metformin. T1DM labs sent. Stable for discharge home with Lantus 15U and metformin 500bid. RN taught diabetic teaching to pt including self injecting. epigastric area 66 y.o. male with PMHx of DM, CAD, s/p MI and stents, HLD, HTN, ETOH abuse was referred to ED after outside office fount him hyperglycemic . He was on insulin in the past and was stopped due "good sugars." Remained asx throughout hospital course. Not acidotic. Seen by endocrinology and started on lantus, SSI and metformin. T1DM labs sent. Stable for discharge home with Lantus 15U and metformin 500bid. RN taught diabetic teaching to pt including self injecting.  AIDAN spoke to facility [facility] to notify then that pt is ready to return. Transportation set by AIDAN.

## 2022-07-22 NOTE — ED CDU PROVIDER SUBSEQUENT DAY NOTE - HISTORY
Pt resting comfortably at time of re-assessment. No events overnight. Pending further glucose control. Will continue to monitor.

## 2022-07-22 NOTE — ED CDU PROVIDER SUBSEQUENT DAY NOTE - NSICDXPASTSURGICALHX_GEN_ALL_CORE_FT
PAST SURGICAL HISTORY:  History of colon resection     History of heart surgery cardiac stent    
PAST SURGICAL HISTORY:  History of colon resection     History of heart surgery cardiac stent

## 2022-07-22 NOTE — ED CDU PROVIDER DISPOSITION NOTE - PATIENT PORTAL LINK FT
You can access the FollowMyHealth Patient Portal offered by [facility] by registering at the following website: [email or web address]. By joining [facility]’s FollowMyHealth portal, you will also be able to view your health information using other applications (apps) compatible with our system.

## 2022-07-22 NOTE — CHART NOTE - NSCHARTNOTEFT_GEN_A_CORE
SWNote: per SW handoff pt ready for discharge to go back to [facility] ([facility]) . Worker called admissions (John) pt accepted, per john pt can be sent by jason pt has mcare and mcaid. Pt not showing mcaid on our registration (face sheet) . John provided [id number]. Worker informed registration dept (Ella)  info updated. [facility] called (waiting ~13 min for response). RN (Rasheeda) and pt made aware lette to p/u pt ,felicia provide ETA as soon as its obtained.

## 2022-07-22 NOTE — ED CDU PROVIDER DISPOSITION NOTE - NS ED ATTENDING STATEMENT MOD
This was a shared visit with the MABEL. I reviewed and verified the documentation and independently performed the documented:

## 2022-07-22 NOTE — ED CDU PROVIDER DISPOSITION NOTE - NSFOLLOWUPINSTRUCTIONS_ED_ALL_ED_FT
Follow up with your cardiologist.   Follow up with endocrinologist (2 office numbers provided below).   Continue insulin once a day at night.   Continue to monitor blood sugar twice a day.     Hyperglycemia    Hyperglycemia occurs when the glucose (sugar) level in your blood is too high. Symptoms include increased urination, increased thirst, a dry mouth, or changes in appetite. If started on a medication, take exactly as prescribed by your health care professional. Maintain a healthy lifestyle and follow up with your primary care physician.    SEEK IMMEDIATE MEDICAL CARE IF YOU HAVE ANY OF THE FOLLOWING SYMPTOMS: shortness of breath, change in mental status, nausea or vomiting, fruity smell to your breath, or any signs of dehydration.

## 2022-07-22 NOTE — ED ADULT NURSE REASSESSMENT NOTE - NS ED NURSE REASSESS COMMENT FT1
Medicated pt as ordered. . 8 units given per sliding scale. Pt lunch tray given. Will continue to monitor
patient received AOx4  without distress. negative complaints, vss as per RN on transfer of care.
provided edu, to patient regarding checking sugars, with frequency and insulin injection process. pt vocalized understanding and provided teach-back demonstration
Rec'd pt in stretcher AAO x3. Lungs CTA. Abd s/nt. Skin intact. IV in place and flushing with blood return. Fluids running at 100 mL/hr. Pt AM . 6 units given per sliding scale. Pt given a breakfast tray. Awaiting POC. Will continue to monitor. In view of nursing station. VSS

## 2022-07-22 NOTE — ED CDU PROVIDER DISPOSITION NOTE - ATTENDING APP SHARED VISIT CONTRIBUTION OF CARE
Seen by endocrinology and started on lantus, SSI and metformin. T1DM labs sent. Stable for discharge home with Lantus 15U and metformin 500bid. diabetic teaching provided pt feels compfortable using lantus at home dc

## 2022-07-22 NOTE — ED CDU PROVIDER SUBSEQUENT DAY NOTE - NS ED ATTENDING STATEMENT MOD
This was a shared visit with the MABEL. I reviewed and verified the documentation and independently performed the documented:
This was a shared visit with the MABEL. I reviewed and verified the documentation and independently performed the documented:

## 2022-07-22 NOTE — ED CDU PROVIDER DISPOSITION NOTE - CARE PROVIDER_API CALL
Liliam Cuevas)  EndocrinologyMetabDiabetes  [address], [place] [id number]  Phone: [phone]  Fax: [phone]  Follow Up Time:     Mark Guallpa (DO)  Internal Medicine  [address] [place] [address]  Phone: [phone]  Fax: [phone]  Follow Up Time:

## 2022-07-24 LAB — ISLET CELL512 AB SER-ACNC: SIGNIFICANT CHANGE UP

## 2022-07-26 LAB — GAD65 AB SER-MCNC: 0.01 NMOL/L — SIGNIFICANT CHANGE UP

## 2022-07-29 ENCOUNTER — NON-APPOINTMENT (OUTPATIENT)
Age: 67
End: 2022-07-29

## 2022-07-29 ENCOUNTER — APPOINTMENT (OUTPATIENT)
Dept: CARDIOLOGY | Facility: CLINIC | Age: 67
End: 2022-07-29

## 2022-07-29 VITALS
HEART RATE: 84 BPM | SYSTOLIC BLOOD PRESSURE: 128 MMHG | BODY MASS INDEX: 22.35 KG/M2 | WEIGHT: 165 LBS | TEMPERATURE: 98.2 F | DIASTOLIC BLOOD PRESSURE: 70 MMHG | HEIGHT: 72 IN | OXYGEN SATURATION: 98 %

## 2022-07-29 VITALS — SYSTOLIC BLOOD PRESSURE: 126 MMHG | DIASTOLIC BLOOD PRESSURE: 72 MMHG

## 2022-07-29 PROCEDURE — 99214 OFFICE O/P EST MOD 30 MIN: CPT

## 2022-07-29 PROCEDURE — 93000 ELECTROCARDIOGRAM COMPLETE: CPT

## 2022-07-29 RX ORDER — PNV NO.95/FERROUS FUM/FOLIC AC 28MG-0.8MG
TABLET ORAL
Refills: 0 | Status: DISCONTINUED | COMMUNITY
End: 2022-07-29

## 2022-07-29 NOTE — ASSESSMENT
[FreeTextEntry1] : CORONARY VESSELS: The coronary circulation is right dominant.\par LM:   --  LM: The vessel was large sized. Angiography showed mild\par atherosclerosis with no flow limiting lesions.\par LAD:   --  Mid LAD: There was a 100 % stenosis. The lesion was moderately\par calcified. This lesion is a chronic total occlusion.\par --  Distal LAD: This is an excellent target for bypass.\par CX:   --  Mid circumflex: There was a 50 % stenosis.\par --  OM2: There was a 80 % stenosis. This is an excellent target for bypass.\par RCA:   --  RCA: The vessel was large sized (dominant).\par --  Proximal RCA: patent stent.\par --  Mid RCA: Patent stent.\par COMPLICATIONS: No complications occurred during the cath lab visit.\par DIAGNOSTIC IMPRESSIONS: Two vessel CAD ( of the LAD and severe OM\par disease)\par DIAGNOSTIC RECOMMENDATIONS: CT surgery consult for CABG\par Prepared and signed by\par Tai Farmer MD\par Signed 05/13/2022 12:37:1\par \par Summary:\par  1. Technically difficult study. Endocardial visualization was enhanced \par with intravenous echo contrast.\par  2. The left atrium is normal in size.\par  3. Segmental wall motion abnormalities in Mid LAD territory.\par  4. Left ventricular ejection fraction, by visual estimation, is 30 to \par 35%. NOrmal left atrial pressures.\par  5. The right atrium is normal in size.\par  6. Normal right ventricular size and function.\par  7. NO significant valvular abnormality.\par  8. There is no evidence of pericardial effusion.\par \par MD Chris, RPVI Electronically signed on 7/20/2022 at 9:00:07 PM\par \par EKG 7/29/2022- Sinus  Rhythm \par -Consider Old inferior infarct  -Old anterior infarct. \par \par Assessment:\par 1.  Coronary artery disease-two-vessel CAD based on the cardiac catheterization in May 2022\par 2.  Heart failure with reduced LVEF-LVEF 30 to 35% by echocardiogram in May 2022\par 3.  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  Continue current medical\par 2.  Start Entresto 24/26 1 tablet twice daily\par 3.  Basic metabolic panel in 1 week\par 4.  Follow-up in 4 weeks\par \par Admission Reconciliation is Completed\par Discharge Reconciliation is Completed\par ascorbic acid 500 mg oral tablet: 1 tab(s) orally once a day\par aspirin 81 mg oral tablet, chewable: 1 tab(s) orally once a day\par folic acid 1 mg oral tablet: 1 tab(s) orally once a day\par metoprolol succinate 50 mg oral tablet, extended release: 1 tab(s) orally once a day\par mirtazapine 7.5 mg oral tablet: 2 tab(s) orally once a day (at bedtime)\par pantoprazole 40 mg oral delayed release tablet: 1 tab(s) orally once a day (before a meal)\par sertraline 100 mg oral tablet: 1 tab(s) orally once a day\par simvastatin 40 mg oral tablet: 1 tab(s) orally once a day (at bedtime)\par thiamine 100 mg oral tablet: 1 tab(s) orally once a day\par

## 2022-07-29 NOTE — HISTORY OF PRESENT ILLNESS
[FreeTextEntry1] : Patient is a 66-year-old  male M, PMHx MI/CAD s/p PCI (LEELA x 1 pRCA 2007), HTN, depression, former smoker, former heavy EtOH abuse, and HLD who has a history of :   duodenitis , possible Derrek's esophagitis,  portal hypertension gastropathy. to cont protonix bid. Patient follows with Dr. Ricci, but often isn't seen for years at a time.  Patient was seen by me in the hospital for an abnormal echo,. Troponin negative x 1. ECHO: EF 30%  with RWMA.  Patient had a cardiac catheterization in the hospital in May 2022 which showed two-vessel coronary artery disease,  mLAD; 80% mid to distal LCX; patent RCA stent (mild ISR).  Patient was seen by CT surgery for possible CABG.  CT Surgery consulted  to nader for CABG. .\par \par    \par Patient was discharged to rehab, patient currently living in [place].  Patient brought a list of medications that he is given at the [facility].  Patient denies any cardiopulmonary complaints.  Patient denies any chest pain dyspnea, orthopnea.  Patient reports that he stopped smoking stopped alcohol drinking.  Patient is currently on aspirin and Toprol-XL.\par \par \par

## 2022-08-04 ENCOUNTER — APPOINTMENT (OUTPATIENT)
Dept: HEPATOLOGY | Facility: CLINIC | Age: 67
End: 2022-08-04

## 2022-08-04 ENCOUNTER — APPOINTMENT (OUTPATIENT)
Dept: CARDIOTHORACIC SURGERY | Facility: CLINIC | Age: 67
End: 2022-08-04

## 2022-08-22 ENCOUNTER — APPOINTMENT (OUTPATIENT)
Dept: CARDIOLOGY | Facility: CLINIC | Age: 67
End: 2022-08-22

## 2022-08-22 ENCOUNTER — NON-APPOINTMENT (OUTPATIENT)
Age: 67
End: 2022-08-22

## 2022-08-22 VITALS
OXYGEN SATURATION: 96 % | HEART RATE: 75 BPM | BODY MASS INDEX: 23.3 KG/M2 | DIASTOLIC BLOOD PRESSURE: 66 MMHG | SYSTOLIC BLOOD PRESSURE: 143 MMHG | TEMPERATURE: 98.7 F | WEIGHT: 172 LBS | HEIGHT: 72 IN

## 2022-08-22 PROCEDURE — 99214 OFFICE O/P EST MOD 30 MIN: CPT

## 2022-08-22 PROCEDURE — 93000 ELECTROCARDIOGRAM COMPLETE: CPT

## 2022-08-22 RX ORDER — POLYETHYLENE GLYCOL 3350 AND ELECTROLYTES WITH LEMON FLAVOR 236; 22.74; 6.74; 5.86; 2.97 G/4L; G/4L; G/4L; G/4L; G/4L
236 POWDER, FOR SOLUTION ORAL
Qty: 1 | Refills: 0 | Status: DISCONTINUED | COMMUNITY
Start: 2022-08-11 | End: 2022-08-22

## 2022-08-31 ENCOUNTER — NON-APPOINTMENT (OUTPATIENT)
Age: 67
End: 2022-08-31

## 2022-08-31 ENCOUNTER — APPOINTMENT (OUTPATIENT)
Dept: ENDOCRINOLOGY | Facility: CLINIC | Age: 67
End: 2022-08-31

## 2022-08-31 VITALS
RESPIRATION RATE: 16 BRPM | HEIGHT: 72 IN | BODY MASS INDEX: 23.87 KG/M2 | HEART RATE: 87 BPM | WEIGHT: 176.25 LBS | TEMPERATURE: 97.8 F | DIASTOLIC BLOOD PRESSURE: 70 MMHG | OXYGEN SATURATION: 98 % | SYSTOLIC BLOOD PRESSURE: 150 MMHG

## 2022-08-31 DIAGNOSIS — Z79.4 LONG TERM (CURRENT) USE OF INSULIN: ICD-10-CM

## 2022-08-31 DIAGNOSIS — E55.9 VITAMIN D DEFICIENCY, UNSPECIFIED: ICD-10-CM

## 2022-08-31 DIAGNOSIS — E53.8 DEFICIENCY OF OTHER SPECIFIED B GROUP VITAMINS: ICD-10-CM

## 2022-08-31 DIAGNOSIS — E78.5 HYPERLIPIDEMIA, UNSPECIFIED: ICD-10-CM

## 2022-08-31 PROCEDURE — 99215 OFFICE O/P EST HI 40 MIN: CPT

## 2022-08-31 PROCEDURE — 99205 OFFICE O/P NEW HI 60 MIN: CPT

## 2022-08-31 NOTE — HISTORY OF PRESENT ILLNESS
[FreeTextEntry1] : quality: dm2 \par onset 2012\par severity: moderate \par modifying factors: diet helps and exercise \par associated factors: HLD\par

## 2022-08-31 NOTE — ASSESSMENT
[FreeTextEntry1] : DM2 in patient with HTN, HLD, alcoholic cirrhosis and CAD in needs of CABG soon . \par H/o pancreatitis. s/p PCI after MI  when 45 yo. Recently seen in ER [facility] for NKH and A1c 1.4. \par He is in rehab center now. needs clearance \par \par DM2: \par bgs am 200-270 lunch 200-270 dinner= 200-270  bedtime 250's \par pt noncompliant with terrible diet habits \par MF ER  1000 mg BID \par increase lantus to 18 u Hs \par take novolog 6 u TID  w meals. \par send logbook in 1 week to adjust doses of insulins.  \par use SSi moderate 2 units > 150 / 50 increments \par discussed diet and exercise\par encouraged more exercise walking 30 min 3 x week\par Needs to try to have more protein for meals\par Importance of blood glucose monitoring discussed. Targets reviewed. Recommended pt try to keep blood glucose level below 130 (110) before meals and below 160 (140) two hours after meals.\par diabetic / alcoholic neuropathy: starT b complex 1 tb qd \par check nas/c ratio \par eye exam annually reccom \par \par HTN:  bp at target on meds. Continue current management.\par I advised low fat/low cholesterol diet, low salt diet, and weight loss\par \par HLD: check lipids. continue statin. CAD needs CABG soon. \par \par Alcoholic cirrhosis \par h/o esophageal bleed. \par start B complex.

## 2022-09-08 ENCOUNTER — OUTPATIENT (OUTPATIENT)
Dept: OUTPATIENT SERVICES | Facility: HOSPITAL | Age: 67
LOS: 1 days | End: 2022-09-08
Payer: COMMERCIAL

## 2022-09-08 ENCOUNTER — APPOINTMENT (OUTPATIENT)
Dept: GASTROENTEROLOGY | Facility: HOSPITAL | Age: 67
End: 2022-09-08

## 2022-09-08 ENCOUNTER — RESULT REVIEW (OUTPATIENT)
Age: 67
End: 2022-09-08

## 2022-09-08 ENCOUNTER — TRANSCRIPTION ENCOUNTER (OUTPATIENT)
Age: 67
End: 2022-09-08

## 2022-09-08 DIAGNOSIS — Z90.49 ACQUIRED ABSENCE OF OTHER SPECIFIED PARTS OF DIGESTIVE TRACT: Chronic | ICD-10-CM

## 2022-09-08 DIAGNOSIS — Z98.89 OTHER SPECIFIED POSTPROCEDURAL STATES: Chronic | ICD-10-CM

## 2022-09-08 DIAGNOSIS — Z12.11 ENCOUNTER FOR SCREENING FOR MALIGNANT NEOPLASM OF COLON: ICD-10-CM

## 2022-09-08 LAB — GLUCOSE BLDC GLUCOMTR-MCNC: 176 MG/DL — HIGH (ref 70–99)

## 2022-09-08 PROCEDURE — 88305 TISSUE EXAM BY PATHOLOGIST: CPT

## 2022-09-08 PROCEDURE — 45380 COLONOSCOPY AND BIOPSY: CPT | Mod: XS,PT

## 2022-09-08 PROCEDURE — 82962 GLUCOSE BLOOD TEST: CPT

## 2022-09-08 PROCEDURE — 88305 TISSUE EXAM BY PATHOLOGIST: CPT | Mod: 26

## 2022-09-08 PROCEDURE — 45385 COLONOSCOPY W/LESION REMOVAL: CPT

## 2022-09-08 PROCEDURE — 45385 COLONOSCOPY W/LESION REMOVAL: CPT | Mod: PT

## 2022-09-08 PROCEDURE — 45380 COLONOSCOPY AND BIOPSY: CPT | Mod: 59

## 2022-09-12 ENCOUNTER — NON-APPOINTMENT (OUTPATIENT)
Age: 67
End: 2022-09-12

## 2022-09-12 LAB — SURGICAL PATHOLOGY STUDY: SIGNIFICANT CHANGE UP

## 2022-09-13 ENCOUNTER — APPOINTMENT (OUTPATIENT)
Dept: GASTROENTEROLOGY | Facility: CLINIC | Age: 67
End: 2022-09-13

## 2022-09-13 ENCOUNTER — NON-APPOINTMENT (OUTPATIENT)
Age: 67
End: 2022-09-13

## 2022-09-13 VITALS
HEIGHT: 72 IN | BODY MASS INDEX: 24.52 KG/M2 | SYSTOLIC BLOOD PRESSURE: 126 MMHG | DIASTOLIC BLOOD PRESSURE: 78 MMHG | RESPIRATION RATE: 14 BRPM | HEART RATE: 90 BPM | WEIGHT: 181 LBS | OXYGEN SATURATION: 98 %

## 2022-09-13 PROCEDURE — 99215 OFFICE O/P EST HI 40 MIN: CPT

## 2022-09-19 ENCOUNTER — APPOINTMENT (OUTPATIENT)
Dept: HEPATOLOGY | Facility: CLINIC | Age: 67
End: 2022-09-19

## 2022-09-21 ENCOUNTER — APPOINTMENT (OUTPATIENT)
Dept: GASTROENTEROLOGY | Facility: CLINIC | Age: 67
End: 2022-09-21

## 2022-09-21 VITALS
RESPIRATION RATE: 14 BRPM | DIASTOLIC BLOOD PRESSURE: 72 MMHG | HEART RATE: 89 BPM | TEMPERATURE: 98.1 F | WEIGHT: 178 LBS | SYSTOLIC BLOOD PRESSURE: 116 MMHG | OXYGEN SATURATION: 98 % | BODY MASS INDEX: 24.11 KG/M2 | HEIGHT: 72 IN

## 2022-09-21 PROCEDURE — 91200 LIVER ELASTOGRAPHY: CPT

## 2022-09-21 NOTE — HISTORY OF PRESENT ILLNESS
[de-identified] : BRIE WEBB is a 66 year old male with a PMH significant for (high waist circumference, high BMI)\par \par He presents today for\par \par Denies heart disease, DM, HLD, autoimmune disease, fatigue, malaise, arthralgias, myalgias, pruritus, recent infection, abdominal pain or distension, jaundice, hematemesis, hematochezia, dark urine, confusion, unintentional weight loss or gain. Denies family history of liver disease, sudden death or late trimester miscarriages.

## 2022-09-21 NOTE — ADDENDUM
[FreeTextEntry1] : I, Rupali Harper NP, acted as scribe for SATYA Bansal for this patient encounter.

## 2022-09-29 ENCOUNTER — NON-APPOINTMENT (OUTPATIENT)
Age: 67
End: 2022-09-29

## 2022-10-06 ENCOUNTER — RESULT REVIEW (OUTPATIENT)
Age: 67
End: 2022-10-06

## 2022-10-06 ENCOUNTER — APPOINTMENT (OUTPATIENT)
Dept: GASTROENTEROLOGY | Facility: CLINIC | Age: 67
End: 2022-10-06

## 2022-10-06 VITALS
RESPIRATION RATE: 16 BRPM | OXYGEN SATURATION: 97 % | HEART RATE: 96 BPM | WEIGHT: 184 LBS | SYSTOLIC BLOOD PRESSURE: 130 MMHG | BODY MASS INDEX: 24.92 KG/M2 | TEMPERATURE: 97.7 F | HEIGHT: 72 IN | DIASTOLIC BLOOD PRESSURE: 84 MMHG

## 2022-10-06 PROCEDURE — 99214 OFFICE O/P EST MOD 30 MIN: CPT

## 2022-10-06 NOTE — ASSESSMENT
[FreeTextEntry1] : BRIE WEBB is a 67 year old male with a PMH significant for Cirrhosis, HTN, DM, HLD, CAD s/p PCI (DESx1), CHF, former smoker, and EtOH abuse. \par \par Cirrhosis\par -Disease progression of cirrhosis discussed, including but not limited to hepatocellular carcinoma, varices with or without bleeding, hepatic encephalopathy, ascites, liver failure and death. \par -Labs ordered including CBC, CMP, and PT/INR\par \par HCC Screening\par -I have explained the need for imaging every 6 months to assess for HCC.\par -CT AP no con 5/11/22 - suspicious for cirrhosis\par -triphasic CT AP ordered\par -AFP 9/27/22 - 5.3, next due March 2023\par \par Hepatitis\par -Hepatitis A antibody positive but IgM negative indicating resolved infections. \par -Hepatitis B and C negative. Pt is immune to Hepatitis B. \par -Autoimmune panel normal 2022\par \par Variceal Screening\par -EGD 5/10/22 - portal hypertensive gastropathy, no varices\par -if pt experiences hematemesis, advised to go to ER immediately\par \par Encephalopathy\par -notify provider if new onset confusion\par -at least 1-2 bms/day\par \par Ascites\par -monitor weight daily\par -Contact provider for increased abdominal distension\par \par Diet\par -High Protein Low Fat Low Sugar Low Salt (up to 2G Na/day) Diet\par -No prolonged fasting\par -Mediterranean Diet info provided\par -No alcohol consumption\par \par Pain\par -NO NSAIDS!!\par -Can take up to 2G Tylenol per day\par \par Transplant\par -I have explained that disease can progress to the point of requiring an evaluation for liver transplantation. \par -MELDNa - will calculate at next visit with labs\par \par Follow Up\par -Pt is pending CABG, to be scheduled after pt receives clearance. Will calculate pt's surgical risks after we receive lab work.\par -Follow up in 2-3 weeks with labs and imaging or sooner if needed

## 2022-10-06 NOTE — ASSESSMENT
[FreeTextEntry1] : 67-year-old male with metabolic syndrome and extensive cardiac disease with a decreased ejection fraction, heart failure with reduced ejection fraction with a possible CABG in the future presents for management of his cirrhosis diagnosed on noncontrast CT .\par \par Labs ordered to evaluate competing etiology of liver disease.\par FibroScan ordered to follow-up on the cirrhosis diagnosis.\par

## 2022-10-06 NOTE — PHYSICAL EXAM
[General Appearance - Alert] : alert [General Appearance - In No Acute Distress] : in no acute distress [Outer Ear] : the ears and nose were normal in appearance [Oropharynx] : the oropharynx was normal [Neck Appearance] : the appearance of the neck was normal [Auscultation Breath Sounds / Voice Sounds] : lungs were clear to auscultation bilaterally [Heart Rate And Rhythm] : heart rate was normal and rhythm regular [Edema] : there was no peripheral edema [Bowel Sounds] : normal bowel sounds [Abdomen Soft] : soft [Abdomen Tenderness] : non-tender [Abdomen Mass (___ Cm)] : no abdominal mass palpated [Abnormal Walk] : normal gait [Nail Clubbing] : no clubbing  or cyanosis of the fingernails [Musculoskeletal - Swelling] : no joint swelling seen [Motor Tone] : muscle strength and tone were normal [Skin Color & Pigmentation] : normal skin color and pigmentation [Skin Turgor] : normal skin turgor [] : no rash [No Focal Deficits] : no focal deficits [Oriented To Time, Place, And Person] : oriented to person, place, and time [Impaired Insight] : insight and judgment were intact [Affect] : the affect was normal

## 2022-10-06 NOTE — PHYSICAL EXAM
[General Appearance - Alert] : alert [General Appearance - In No Acute Distress] : in no acute distress [General Appearance - Well Nourished] : well nourished [General Appearance - Well Developed] : well developed [General Appearance - Well-Appearing] : healthy appearing [Normal Sphincter Tone] : normal sphincter tone [No Rectal Mass] : no rectal mass [Oriented To Time, Place, And Person] : oriented to person, place, and time [Scleral Icterus] : No Scleral Icterus [Spider Angioma] : No spider angioma(s) were observed [Abdominal  Ascites] : no ascites [Ascites Tense] : ascites is not tense [Splenomegaly] : no splenomegaly [Caput Medusae] : no caput medusae observed [Asterixis] : no asterixis observed [Jaundice] : No jaundice [Palmar Erythema] : no Palmar Erythema [Occult Blood Positive] : stool was negative for occult blood

## 2022-10-06 NOTE — HISTORY OF PRESENT ILLNESS
[de-identified] : BRIE WEBB is a 67 year old male with a PMH significant for Cirrhosis, HTN, DM, HLD, CAD s/p PCI (DESx1), CHF, former smoker, and EtOH abuse. He is currently a resident at [facility] ([phone]).\par \par 10/6/22: Pt presents for follow up visit. Lab work ordered at last visit not completely done. Labs reviewed with pt. Denies fatigue, malaise, arthralgias, myalgias, pruritus, recent infection, abdominal pain or distension, jaundice, hematemesis, hematochezia, dark urine, confusion, unintentional weight loss or gain. \par \par Labs 9/27/22 - AFP 5.3. Hepatitis A antibody positive but IgM negative indicating resolved infections. Hepatitis B and C negative. Pt is immune to Hepatitis B. TFT normal. Iron studies normal. CHRISSY, ASMA, AMA, and IgG normal.\par \par Pt has a history of alcohol abuse, with 5-6 beers per day since he was 16 years old. His last alcohol drink was in March 2022. \par \par CT AP no con 5/11/22 - suspicious for cirrhosis\par EGD 5/10/22 - portal hypertensive gastropathy, no varices

## 2022-10-11 ENCOUNTER — APPOINTMENT (OUTPATIENT)
Dept: ENDOCRINOLOGY | Facility: CLINIC | Age: 67
End: 2022-10-11

## 2022-10-21 ENCOUNTER — OUTPATIENT (OUTPATIENT)
Dept: OUTPATIENT SERVICES | Facility: HOSPITAL | Age: 67
LOS: 1 days | End: 2022-10-21

## 2022-10-21 ENCOUNTER — APPOINTMENT (OUTPATIENT)
Dept: CT IMAGING | Facility: CLINIC | Age: 67
End: 2022-10-21

## 2022-10-21 DIAGNOSIS — Z98.89 OTHER SPECIFIED POSTPROCEDURAL STATES: Chronic | ICD-10-CM

## 2022-10-21 DIAGNOSIS — Z90.49 ACQUIRED ABSENCE OF OTHER SPECIFIED PARTS OF DIGESTIVE TRACT: Chronic | ICD-10-CM

## 2022-10-21 DIAGNOSIS — K70.30 ALCOHOLIC CIRRHOSIS OF LIVER WITHOUT ASCITES: ICD-10-CM

## 2022-10-21 PROCEDURE — 74177 CT ABD & PELVIS W/CONTRAST: CPT | Mod: 26

## 2022-10-25 NOTE — PROCEDURE
[FreeTextEntry1] : FibroScan/ Vibration Controlled Transient Elastography (VCTE) Report\par \par PROBE SIZE: \par \par INDICATION: M\par \par REFERRING PHYSICIAN: RADHA\par \par  \par PROCEDURE:\par \par Patient was NPO for 4 hours prior to procedure. After providing oral explanations of the procedure to the patient, patient was placed in supine position with right arm in maximum abduction to allow optimal exposure of right lateral abdomen. Patient abdomen was briefly assessed to identify to the terminus of the xyphoid process. The ideal target testing spot was located mid-line and lateral to this point. To acquire proper signals, the skin to liver capsule distance was accessed with the FibroScan probe. Patient was instructed to breathe normally and to abstain from sudden movements during the procedure. Ten shear waves were produced and measurements of the speed of each wave evaluated. Patient tolerated the procedure well and was discharged without incident.\par \par  \par FINDINGS:\par \par - Median shear wave speed of3.1  meters/second.\par \par - This corresponds to a median Liver Stiffness Score of 28.8 kPa.\par \par - IQR/med 13   %\par \par - CAP  353 dB/m\par  \par RESULTS:\par \par FIBROSIS: F4 Fibrosis\par \par \par \par  \par \par The recommendation regarding fibrosis stage and/ or steatosis grade is based on current published scientific literature and the provided patient’s diagnosis. Any further medical or surgical intervention should be made by considering the overall medical condition of the patient.\par

## 2022-10-28 ENCOUNTER — APPOINTMENT (OUTPATIENT)
Dept: CARDIOTHORACIC SURGERY | Facility: CLINIC | Age: 67
End: 2022-10-28

## 2022-10-28 VITALS
WEIGHT: 184 LBS | BODY MASS INDEX: 24.92 KG/M2 | RESPIRATION RATE: 16 BRPM | OXYGEN SATURATION: 96 % | HEART RATE: 92 BPM | TEMPERATURE: 97.6 F | DIASTOLIC BLOOD PRESSURE: 72 MMHG | SYSTOLIC BLOOD PRESSURE: 118 MMHG | HEIGHT: 72 IN

## 2022-10-28 DIAGNOSIS — I10 ESSENTIAL (PRIMARY) HYPERTENSION: ICD-10-CM

## 2022-10-28 DIAGNOSIS — Z82.49 FAMILY HISTORY OF ISCHEMIC HEART DISEASE AND OTHER DISEASES OF THE CIRCULATORY SYSTEM: ICD-10-CM

## 2022-10-28 PROCEDURE — 99215 OFFICE O/P EST HI 40 MIN: CPT

## 2022-10-28 RX ORDER — SACUBITRIL AND VALSARTAN 24; 26 MG/1; MG/1
24-26 TABLET, FILM COATED ORAL
Qty: 28 | Refills: 0 | Status: COMPLETED | COMMUNITY
Start: 2022-08-10 | End: 2022-10-28

## 2022-10-31 NOTE — ASSESSMENT
[FreeTextEntry1] : I had the pleasure of seeing Mr. WEBB in the office today to discuss .\par \par Briefly, this is a 67 year old male remains clean without alcohol, history of coronary artery disease status post stents, hyperlipidemia and hypertension. Since his last visit he has followed up with a gastroenterologist and hepatologist as requested. He hasn't used alcohol in several months. He looks well and reports feeling good with no current symptoms. \par \par I would like to obtain the final staging from the hepatologist before surgery. He still has to complete some labwork, so these were printed and the patient was instructed to get the labs drawn as soon as possible. He states that he will. After the labs are evaluated by Dr. Pate, we will be able to give the patient a better idea of risk. On review of old labs, he does not seem to have advanced cirrhosis and we will likely be able to proceed with the surgery at that time.\par \par As it has been several months now since his last cardiac catheterization, I also do feel it is prudent to repeat the test one day prior to surgery to detail surgical plan.\par \par The planned procedure, hospital stay and recovery was discussed in detail. All risks, benefits and alternatives were discussed at length with the patient. All questions addressed. The patient fully understood and would like to proceed with surgical intervention as discussed. There is not an imminent rush to surgery given the stability of the patient's symptoms and the importance of continued rehabilitation so I feel it would be best for him to also establish his housing plan as he is currently at subacute rehab ([facility]) and plans to move to an assisted living facility. This would allow for a smooth transition from the hospital to his new permanent residence.\par \par I have fully reviewed and evaluated all available results. All questions were answered and concerns were addressed. \par \par Plan:\par - Continue aspirin up until day of surgery\par - No drug allergies and no pacemaker confirmed\par - Assisted living planned for postop residence\par - Coronary artery bypass grafting x 2 planned\par - Will plan to schedule surgery after MELD score finalized with risk profile\par - *Preadmit* Will plan a cardiac catheterization the day prior to surgery to evaluate right coronary artery stents\par - Presurgical testing to be scheduled, which will include chest xray, electrocardiogram and standard labs\par - Patient is in full understanding and agreement with the plan going forward. \par \par I, AVINASH Michael, am scribing for and in the presence of Dr. Gray the following sections HISTORY OF PRESENT ILLNESS, PAST MEDICAL/FAMILY/SOCIAL HISTORY; REVIEW OF SYSTEMS, VITAL SIGNS, PHYSICAL EXAM, ASSESSMENT, PLAN AND DISPOSITION.\par \par "I personally performed the services described in the documentation and reviewed the documentation recorded by the scribe in my presence which accurately and completely recorded my words and actions."

## 2022-10-31 NOTE — CONSULT LETTER
[Dear  ___] : Dear  [unfilled], [Courtesy Letter:] : I had the pleasure of seeing your patient, [unfilled], in my office today. [Please see my note below.] : Please see my note below. [Consult Closing:] : Thank you very much for allowing me to participate in the care of this patient.  If you have any questions, please do not hesitate to contact me. [Sincerely,] : Sincerely, [FreeTextEntry2] : Kristi Huerta MD [FreeTextEntry3] : Ruslan Gray MD\par Chief of Cardiovascular and Thoracic Surgery\par System Director of Endovascular and Cardiovascular Surgery\par , Cardiovascular and Thoracic Surgery\par [facility], [facility]\par [facility]\par [facility]\par [address] [place] [address]\[place] [address]\par Tel. [phone]\par Fax [phone]

## 2022-10-31 NOTE — PHYSICAL EXAM
[Sclera] : the sclera and conjunctiva were normal [PERRL With Normal Accommodation] : pupils were equal in size, round, and reactive to light [Neck Appearance] : the appearance of the neck was normal [] : the neck was supple [Neck Cervical Mass (___cm)] : no neck mass was observed [Jugular Venous Distention Increased] : there was no jugular-venous distention [Respiration, Rhythm And Depth] : normal respiratory rhythm and effort [Heart Rate And Rhythm] : heart rate was normal and rhythm regular [Heart Sounds] : normal S1 and S2 [2+] : left 2+ [Cervical Lymph Nodes Enlarged Posterior Bilaterally] : posterior cervical [Cervical Lymph Nodes Enlarged Anterior Bilaterally] : anterior cervical [Abnormal Walk] : normal gait [Skin Color & Pigmentation] : normal skin color and pigmentation [Skin Turgor] : normal skin turgor [No Focal Deficits] : no focal deficits [Oriented To Time, Place, And Person] : oriented to person, place, and time [Impaired Insight] : insight and judgment were intact [FreeTextEntry1] : systolic murmur at rusb 2/6 [FreeTextEntry2] : no edema

## 2022-10-31 NOTE — HISTORY OF PRESENT ILLNESS
[FreeTextEntry1] : Mr. BRIE WEBB is a 67 year male who presents today for followup. Previously, he has been followed for coronary artery disease and reduced ejection fraction of 30%. At his last appointment, we identified several social factors preventing immediate surgery. We recommended a hepatology and gastroenterology consultation as well as a transthoracic echocardiogram at his last appointment in July and he is following up today. He was confirmed to have cirrhosis but labs were pending to confirm staging.\par \par Additional past medical history includes MI/CAD s/p PCI (LEELA x 1 pRCA 2007), HTN, depression, former heavy\par EtOH abuse, and HLD\par \par Today, the patient reports feeling fine. Patient denies chest pain, palpitations, shortness of breath, cough, fevers, chills, fatigue and unintentional weight loss or gain. \par \par Resides at [place]. Arrives today walking independent. \par \par Did have a colonoscopy but will have to be re-done due to incomplete prep.\par \par Referring: Bradley

## 2022-11-03 ENCOUNTER — APPOINTMENT (OUTPATIENT)
Dept: GASTROENTEROLOGY | Facility: CLINIC | Age: 67
End: 2022-11-03

## 2022-11-04 ENCOUNTER — TRANSCRIPTION ENCOUNTER (OUTPATIENT)
Age: 67
End: 2022-11-04

## 2022-11-04 ENCOUNTER — APPOINTMENT (OUTPATIENT)
Dept: GASTROENTEROLOGY | Facility: HOSPITAL | Age: 67
End: 2022-11-04

## 2022-11-04 ENCOUNTER — OUTPATIENT (OUTPATIENT)
Dept: OUTPATIENT SERVICES | Facility: HOSPITAL | Age: 67
LOS: 1 days | Discharge: HOME | End: 2022-11-04
Payer: COMMERCIAL

## 2022-11-04 ENCOUNTER — RESULT REVIEW (OUTPATIENT)
Age: 67
End: 2022-11-04

## 2022-11-04 DIAGNOSIS — Z12.11 ENCOUNTER FOR SCREENING FOR MALIGNANT NEOPLASM OF COLON: ICD-10-CM

## 2022-11-04 DIAGNOSIS — Z98.89 OTHER SPECIFIED POSTPROCEDURAL STATES: Chronic | ICD-10-CM

## 2022-11-04 DIAGNOSIS — Z90.49 ACQUIRED ABSENCE OF OTHER SPECIFIED PARTS OF DIGESTIVE TRACT: Chronic | ICD-10-CM

## 2022-11-04 DIAGNOSIS — K70.30 ALCOHOLIC CIRRHOSIS OF LIVER W/OUT ASCITES: ICD-10-CM

## 2022-11-04 LAB — GLUCOSE BLDC GLUCOMTR-MCNC: 131 MG/DL — HIGH (ref 70–99)

## 2022-11-04 PROCEDURE — 45385 COLONOSCOPY W/LESION REMOVAL: CPT

## 2022-11-04 PROCEDURE — 82962 GLUCOSE BLOOD TEST: CPT

## 2022-11-04 PROCEDURE — 88305 TISSUE EXAM BY PATHOLOGIST: CPT | Mod: 26

## 2022-11-04 PROCEDURE — 88305 TISSUE EXAM BY PATHOLOGIST: CPT

## 2022-11-04 DEVICE — NAIL OSTEO 1.5X16MM STRL: Type: IMPLANTABLE DEVICE | Status: FUNCTIONAL

## 2022-11-04 NOTE — PHYSICAL EXAM

## 2022-11-10 LAB — SURGICAL PATHOLOGY STUDY: SIGNIFICANT CHANGE UP

## 2022-11-14 ENCOUNTER — APPOINTMENT (OUTPATIENT)
Dept: CARDIOLOGY | Facility: CLINIC | Age: 67
End: 2022-11-14

## 2022-11-14 ENCOUNTER — NON-APPOINTMENT (OUTPATIENT)
Age: 67
End: 2022-11-14

## 2022-11-14 VITALS
HEIGHT: 72 IN | SYSTOLIC BLOOD PRESSURE: 129 MMHG | BODY MASS INDEX: 24.79 KG/M2 | OXYGEN SATURATION: 95 % | DIASTOLIC BLOOD PRESSURE: 76 MMHG | TEMPERATURE: 98.3 F | WEIGHT: 183 LBS | HEART RATE: 88 BPM

## 2022-11-14 DIAGNOSIS — I25.10 ATHEROSCLEROTIC HEART DISEASE OF NATIVE CORONARY ARTERY W/OUT ANGINA PECTORIS: ICD-10-CM

## 2022-11-14 DIAGNOSIS — E11.65 TYPE 2 DIABETES MELLITUS WITH HYPERGLYCEMIA: ICD-10-CM

## 2022-11-14 DIAGNOSIS — R52 PAIN, UNSPECIFIED: ICD-10-CM

## 2022-11-14 DIAGNOSIS — I50.20 UNSPECIFIED SYSTOLIC (CONGESTIVE) HEART FAILURE: ICD-10-CM

## 2022-11-14 PROCEDURE — 93000 ELECTROCARDIOGRAM COMPLETE: CPT

## 2022-11-14 PROCEDURE — 99213 OFFICE O/P EST LOW 20 MIN: CPT

## 2022-11-14 RX ORDER — POLYETHYLENE GLYCOL 3350 AND ELECTROLYTES WITH LEMON FLAVOR 236; 22.74; 6.74; 5.86; 2.97 G/4L; G/4L; G/4L; G/4L; G/4L
236 POWDER, FOR SOLUTION ORAL
Qty: 1 | Refills: 0 | Status: DISCONTINUED | COMMUNITY
Start: 2022-09-28 | End: 2022-11-14

## 2022-11-14 RX ORDER — METFORMIN HYDROCHLORIDE 500 MG/1
500 TABLET, COATED ORAL
Qty: 120 | Refills: 0 | Status: DISCONTINUED | COMMUNITY
Start: 2022-09-05 | End: 2022-11-14

## 2022-11-14 NOTE — ASSESSMENT
[FreeTextEntry1] : CORONARY VESSELS: The coronary circulation is right dominant.\par LM:   --  LM: The vessel was large sized. Angiography showed mild\par atherosclerosis with no flow limiting lesions.\par LAD:   --  Mid LAD: There was a 100 % stenosis. The lesion was moderately\par calcified. This lesion is a chronic total occlusion.\par --  Distal LAD: This is an excellent target for bypass.\par CX:   --  Mid circumflex: There was a 50 % stenosis.\par --  OM2: There was a 80 % stenosis. This is an excellent target for bypass.\par RCA:   --  RCA: The vessel was large sized (dominant).\par --  Proximal RCA: patent stent.\par --  Mid RCA: Patent stent.\par COMPLICATIONS: No complications occurred during the cath lab visit.\par DIAGNOSTIC IMPRESSIONS: Two vessel CAD ( of the LAD and severe OM\par disease)\par DIAGNOSTIC RECOMMENDATIONS: CT surgery consult for CABG\par Prepared and signed by\par Tai Farmer MD\par Signed 05/13/2022 12:37:1\par \par Summary:\par  1. Technically difficult study. Endocardial visualization was enhanced \par with intravenous echo contrast.\par  2. The left atrium is normal in size.\par  3. Segmental wall motion abnormalities in Mid LAD territory.\par  4. Left ventricular ejection fraction, by visual estimation, is 30 to \par 35%. NOrmal left atrial pressures.\par  5. The right atrium is normal in size.\par  6. Normal right ventricular size and function.\par  7. NO significant valvular abnormality.\par  8. There is no evidence of pericardial effusion.\par \par MD Chris, RPVI Electronically signed on 7/20/2022 at 9:00:07 PM\par \par EKG 7/29/2022- Sinus  Rhythm \par -Consider Old inferior infarct  -Old anterior infarct. \par \par Echocardiogram July 18, 2022:\par LVEF 30 to 35%.\par \par EKG 11/14/2022- Sinus  Rhythm  -With rate variation \par cv = 12.\par -Old inferior-apical infarct  -Old anterior infarct. \par \par Assessment:\par 1.  Coronary artery disease-two-vessel CAD based on the cardiac catheterization in May 2022\par 2.  Heart failure with reduced LVEF-LVEF 30 to 35% by echocardiogram in May 2022\par 3.  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  Continue current medical therapy\par 2.  Follow-up in 2 months, patient scheduled for a CABG November 23, 2022

## 2022-11-14 NOTE — HISTORY OF PRESENT ILLNESS
[FreeTextEntry1] : Patient is a 67-year-old  male M, PMHx MI/CAD s/p PCI (LEELA x 1 pRCA 2007), HTN, depression, former smoker, former heavy EtOH abuse, and HLD who has a history of :   duodenitis , possible Derrek's esophagitis,  portal hypertension gastropathy. to cont protonix bid. .  Patient was seen by me in the hospital for an abnormal echo,. Troponin negative x 1. ECHO: EF 30%  with RWMA.  Patient had a cardiac catheterization in the hospital in May 2022 which showed two-vessel coronary artery disease,  mLAD; 80% mid to distal LCX; patent RCA stent (mild ISR).  Patient was seen by CT surgery for possible CABG.  CT Surgery consulted  to nader for CABG. .\par Patient is followed by GI for cirrhosis of liver\par    \par \par Today, patient presents for a follow-up visit.  Patient reports that he is scheduled for a CABG on November 23, 2022, a day prior to that we will have a cardiac catheterization.  Patient is feeling fine he has no complaints.\par  Patient brought a list of medications that he is given at the [place].  Patient denies any cardiopulmonary complaints.  Patient denies any chest pain dyspnea, orthopnea.  Patient reports that he has stopped smoking stopped alcohol drinking many months ago..  Patient is currently on aspirin and Toprol-XL.Patient  currently living in [place]. \par \par \par

## 2022-11-18 RX ORDER — CHLORHEXIDINE GLUCONATE 213 G/1000ML
1 SOLUTION TOPICAL ONCE
Refills: 0 | Status: DISCONTINUED | OUTPATIENT
Start: 2022-11-21 | End: 2022-11-22

## 2022-11-18 NOTE — H&P PST ADULT - OTHER CARE PROVIDERS
Dr. Gray Ruslan Gray ([facility], [address], [place] [address], Phone: [phone], Fax: [phone]), Yan Ricci ([facility], [address], [place] [address], Phone: [phone], Fax: [phone])

## 2022-11-18 NOTE — H&P PST ADULT - ASSESSMENT
Assessment: 66 yo male former smoker with h/o HTN , CAD, s/p PCI (LEELA x 1 pRCA 2007), ACC/AHA stage C, NYHA functional class 3, HFrEF, depression who was admitted to [facility] in May 2022 for GI bleed. During the admission he had a cardiac work up which included an echo which showed and EF of 30-35%. He had a LHC which showed a  of the mLAD, 50% mLCX stenosis, and an 80% OM2 stenosis. He was referred to CT surgery for CABG, but surgery was deferred due to alcoholism and needed hepatology and gastroenterology consultation. He was worked up by GI and Hepatology, and is now proceeding with CABG, scheduled for tomorrow. Now he presents for C prior to surgical intervention.    ASA: 3  Mall: 2  ABR: 0.8%  COVID: Negative  GFR: 99  Creatinine: 0.77    Problem List:   1. CAD  ·	LHC. Consent obtained.  ·	Procedure explained and questions answered.   ·	IV: Saline bolus deferred due to LV dysfunction.  ·	Aspirin if not taken today.    2. Hypomagnesemia  ·	Magnesium 2 gm IV X 2    3. HFrEF  ·	GDMT  ·	     Beta Blocker: Toprol 50 mg daily  ·	     RAAS Inhibitor: Entresto 100 mg BID  ·	     MRA: N/A  ·	     Diuretic: N/A  ·	     SGLT2i: Would likely benefit from SGLT2i in setting of elevated HgbA1C and HFrEF  ·	     Other: N/A  ·	Strict I&O's  ·	Daily standing weights (if able)    4. DM  ·	GDMT:   ·	     Diabetic diet.  ·	     FS Q AC and HS with coverage  ·	     HgbA1C: 7.5%  ·	     Basal Insulin: Will start Lantus 15 units QHS post CT surgery  ·	     Nutritional Insulin: N/A  ·	     Oral Antihyperglycemics: Will resume metformin 500 mg BID  ·	     SGLT2i/GLP1-RA: Would likely benefit from SGLT2i in setting of elevated HgbA1C and HFrEF

## 2022-11-18 NOTE — H&P PST ADULT - HISTORY OF PRESENT ILLNESS
Narrative:   68 yo M with h/o HTN , CAD s/p PCI (LEELA x 1 pRCA 2007), depression, smoker needs CABG x2 but was deferred due to         Symptoms:        Angina (Class):        Ischemic Symptoms:     Heart Failure:        Systolic/Diastolic/Combined:        NYHA Class (within 2 weeks):     Assessment of LVEF:       EF:        Assessed by:        Date:     Prior Cardiac Interventions:       PCI's:        CABG:     Noninvasive Testing:   Stress Test: Date:        Protocol:        Duration of Exercise:        Symptoms:        EKG Changes:        DTS:        Myocardial Imaging:        Risk Assessment:     Echo:     Antianginal Therapies:        Beta Blockers:         Calcium Channel Blockers:        Long Acting Nitrates:        Ranexa:     Associated Risk Factors:        Cerebrovascular Disease: N/A       Chronic Lung Disease: N/A       Peripheral Arterial Disease: N/A       Chronic Kidney Disease (if yes, what is GFR): N/A       Uncontrolled Diabetes (if yes, what is HgbA1C or FBS): N/A       Poorly Controlled Hypertension (if yes, what is SBP): N/A       Morbid Obesity (if yes, what is BMI): N/A       History of Recent Ventricular Arrhythmia: N/A       Inability to Ambulate Safely: N/A       Need for Therapeutic Anticoagulation: N/A       Antiplatelet or Contrast Allergy: N/A   Narrative:   66 yo M with h/o HTN , CAD s/p PCI (LEELA x 1 pRCA 2007), depression, smoker needs CABG x2 but was deferred due to alcoholism and needed hepatology and gastroenterology consultation. Now he presents for [facility] prior to surgical intervention for detailed surgical plan.        Symptoms:        Angina (Class):        Ischemic Symptoms:     Heart Failure:        Systolic/Diastolic/Combined:        NYHA Class (within 2 weeks):     Assessment of LVEF:       EF:  30-35%       Assessed by: TTE       Date: 7/2022    Prior Cardiac Interventions:       PCI's: < from: Cardiac Catheterization (05.12.22 @ 13:13) >  CORONARY VESSELS: The coronary circulation is right dominant.  LM:   --  LM: The vessel was large sized. Angiography showed mild  atherosclerosis with no flow limiting lesions.  LAD:   --  Mid LAD: There was a 100 % stenosis. The lesion was moderately  calcified. This lesion is a chronic total occlusion.  --  Distal LAD: This is an excellent target for bypass.  CX:   --  Mid circumflex: There was a 50 % stenosis.  --  OM2: There was a 80 % stenosis.This is an excellent target for bypass.  RCA:   --  RCA: The vessel was large sized (dominant).  --  Proximal RCA: patent stent.  --  Mid RCA: Patent stent.  COMPLICATIONS: No complications occurred during the cath lab visit.  DIAGNOSTIC IMPRESSIONS:Two vessel CAD ( of the LAD and severe OM  disease)  DIAGNOSTIC RECOMMENDATIONS: CT surgery consult for CABG  Prepared and signed by  Tai Farmer MD  Signed 05/13/2022 12:37:11    < end of copied text >               Echo: < from: TTE Echo Complete w/ Contrast w/ Doppler (07.18.22 @ 10:30) >  Summary:   1. Technically difficult study. Endocardial visualization was enhanced   with intravenous echo contrast.   2. The left atrium is normal in size.   3. Segmental wall motion abnormalities in Mid LAD territory.   4. Left ventricular ejection fraction, by visual estimation, is 30 to   35%. NOrmal left atrial pressures.   5. The right atrium is normal in size.   6. Normal right ventricular size and function.   7. NO significant valvular abnormality.   8. There is no evidence of pericardial effusion.    MD Chris, RPVI Electronically signed on 7/20/2022 at 9:00:07 PM    < end of copied text >      Antianginal Therapies:        Beta Blockers:  Toprol xl       Calcium Channel Blockers:        Long Acting Nitrates:        Ranexa:     Associated Risk Factors:        Cerebrovascular Disease: N/A       Chronic Lung Disease: N/A       Peripheral Arterial Disease: N/A       Chronic Kidney Disease (if yes, what is GFR): N/A       Uncontrolled Diabetes (if yes, what is HgbA1C or FBS): N/A       Poorly Controlled Hypertension (if yes, what is SBP): N/A       Morbid Obesity (if yes, what is BMI): N/A       History of Recent Ventricular Arrhythmia: N/A       Inability to Ambulate Safely: N/A       Need for Therapeutic Anticoagulation: N/A       Antiplatelet or Contrast Allergy: N/A   68 yo male former smoker with h/o HTN , CAD, s/p PCI (LEELA x 1 pRCA 2007), ACC/AHA stage C, NYHA functional class 3, HFrEF, depression who was admitted to [facility] in May 2022 for GI bleed. During the admission he had a cardiac work up which included an echo which showed and EF of 30-35%. He had a LHC which showed a  of the mLAD, 50% mLCX stenosis, and an 80% OM2 stenosis. He was referred to CT surgery for CABG, but surgery was deferred due to alcoholism and needed hepatology and gastroenterology consultation. He was worked up by GI and Hepatology, and is now proceeding with CABG, scheduled for tomorrow. Now he presents for [facility] prior to surgical intervention.    LHC: 5/12/2022  CORONARY VESSELS: The coronary circulation is right dominant.  LM:     --  LM: The vessel was large sized. Angiography showed mild atherosclerosis with no flow limiting lesions.  LAD:     --  Mid LAD: There was a 100 % stenosis. The lesion was moderately calcified. This lesion is a chronic total occlusion.  --  Distal LAD: This is an excellent target for bypass.  CX:     --  Mid circumflex: There was a 50 % stenosis.  --  OM2: There was a 80 % stenosis. This is an excellent target for bypass.  RCA:     --  RCA: The vessel was large sized (dominant).  --  Proximal RCA: patent stent.  --  Mid RCA: Patent stent.    Symptoms:        Angina (Class): N/A       Ischemic Symptoms: BOLES (CCS class 3 anginal equivalent)    Heart Failure: ACC/AHA stage C, NYHA functional class 3, HFrEF    Assessment of LVEF:       EF: 30-35%       Assessed by: Echo       Date: 7/18/2022    Prior Cardiac Interventions:       PCI's: LEELA of pRCA in 2007       CABG: N/A    Noninvasive Testing:   Stress Test: N/A    Echo: 7/18/2022  Left Ventricle: Endocardial visualization was enhanced with intravenous echo contrast. The left ventricular internal cavity size is normal. Global LV systolic function was moderately to severely decreased. Left ventricular ejection fraction, by visual estimation, is 30 to 35%. Spectral Doppler shows impaired relaxation pattern of left ventricular myocardial filling (Grade I diastolic dysfunction). Normal LV filling pressures. Segmental wall motion abnormalities in Mid LAD territory.  LV Wall Scoring: The entire apex, mid and apical anterior wall, and mid and apical anteriorseptum are akinetic.  Right Ventricle: Normal right ventricular size and function. TV S' 0.1 m/s.  Left Atrium: The left atrium is normal in size.  Right Atrium: The right atrium is normal in size.  Pericardium: There is no evidence of pericardial effusion.  Mitral Valve: Mild thickening of the anterior and posterior mitral valve leaflets. Trace mitral valve regurgitation is seen.  Tricuspid Valve: Trivial tricuspid regurgitation is visualized.  Aortic Valve: The aortic valve is trileaflet. No evidence of aortic valve regurgitation is seen.  Pulmonic Valve: Structurally normal pulmonic valve, with normal leaflet excursion. Trace pulmonic valve regurgitation.  Aorta: The aortic root and ascending aorta are structurally normal, with no evidence of dilitation.  Pulmonary Artery: The main pulmonary artery is normal in size.  Venous: The inferior vena cava was normal sized, with respiratory size variation greater than 50%.    Antianginal Therapies:        Beta Blockers: Toprol 50 mg daily       Calcium Channel Blockers: N/A       Long Acting Nitrates: N/A       Ranexa: N/A    Associated Risk Factors:        Frailty: N/A       Cerebrovascular Disease: N/A       Chronic Lung Disease: N/A       Peripheral Arterial Disease: N/A       Chronic Kidney Disease (if yes, what is GFR): N/A       Uncontrolled Diabetes (if yes, what is HgbA1C or FBS): N/A       Poorly Controlled Hypertension (if yes, what is SBP): N/A       Morbid Obesity (if yes, what is BMI): N/A       History of Recent Ventricular Arrhythmia: N/A       Inability to Ambulate Safely: N/A       Need for Therapeutic Anticoagulation: N/A       Antiplatelet or Contrast Allergy: N/A    Social History:        Marital:        Tobacco: Former 1 ppd smoker for 38 years, quit 3 years ago.       ETOH: Denies       Drugs: Denies       Caffeine: 1 cup coffee and 3 cans soda daily.    ROS:   General: No fevers/chills. + fatigue  HEENT: + hearing loss (right > left). No visual disturbances. No headaches. No epistaxis.  Pulmonary: No dyspnea. No wheeze. No cough.  CV: No chest pain. + BOLES. No palpitations. No orthopnea. No PND. No edema.  GI: No BRBPR. No melena. No nausea. + hematemesis in May 2022.  : No hematuria.  Neuro: No weakness. No paresthesia. No syncope.. + numbness soles of feet.  Heme: + easy bruising    T(C): 36.6 (11-21-22 @ 08:38), Max: 36.6 (11-21-22 @ 08:38)  HR: 79 (11-21-22 @ 08:38) (79 - 79)  BP: 149/74 (11-21-22 @ 08:38) (149/74 - 149/74)  RR: 16 (11-21-22 @ 08:38) (16 - 16)  SpO2: 97% (11-21-22 @ 08:38) (97% - 97%)  Physical Exam:   General: Awake, alert, speech clear, no acute distress.  Neck: No bruit, no JVD.  Chest: S1, S2. + grade 3/6 systolic murmur. CTA.  Abdomen: Soft. Nondistended.  Extremities: No edema. Pulses: DP: Right: + by doppler, Left: + by doppler, Radial: Right: 2+, Left: 2+

## 2022-11-21 ENCOUNTER — TRANSCRIPTION ENCOUNTER (OUTPATIENT)
Age: 67
End: 2022-11-21

## 2022-11-21 ENCOUNTER — INPATIENT (INPATIENT)
Facility: HOSPITAL | Age: 67
LOS: 22 days | Discharge: NURSING, REHAB OR LONG-TERM CARE | DRG: 233 | End: 2022-12-14
Attending: THORACIC SURGERY (CARDIOTHORACIC VASCULAR SURGERY) | Admitting: THORACIC SURGERY (CARDIOTHORACIC VASCULAR SURGERY)
Payer: COMMERCIAL

## 2022-11-21 VITALS
SYSTOLIC BLOOD PRESSURE: 149 MMHG | OXYGEN SATURATION: 97 % | TEMPERATURE: 98 F | DIASTOLIC BLOOD PRESSURE: 74 MMHG | RESPIRATION RATE: 16 BRPM | HEART RATE: 79 BPM

## 2022-11-21 DIAGNOSIS — Z90.49 ACQUIRED ABSENCE OF OTHER SPECIFIED PARTS OF DIGESTIVE TRACT: Chronic | ICD-10-CM

## 2022-11-21 DIAGNOSIS — I10 ESSENTIAL (PRIMARY) HYPERTENSION: ICD-10-CM

## 2022-11-21 DIAGNOSIS — E78.5 HYPERLIPIDEMIA, UNSPECIFIED: ICD-10-CM

## 2022-11-21 DIAGNOSIS — Z98.89 OTHER SPECIFIED POSTPROCEDURAL STATES: Chronic | ICD-10-CM

## 2022-11-21 DIAGNOSIS — E11.9 TYPE 2 DIABETES MELLITUS WITHOUT COMPLICATIONS: ICD-10-CM

## 2022-11-21 DIAGNOSIS — I25.10 ATHEROSCLEROTIC HEART DISEASE OF NATIVE CORONARY ARTERY WITHOUT ANGINA PECTORIS: ICD-10-CM

## 2022-11-21 LAB
A1C WITH ESTIMATED AVERAGE GLUCOSE RESULT: 7.5 % — HIGH (ref 4–5.6)
A1C WITH ESTIMATED AVERAGE GLUCOSE RESULT: 7.6 % — HIGH (ref 4–5.6)
ALBUMIN SERPL ELPH-MCNC: 4.8 G/DL — SIGNIFICANT CHANGE UP (ref 3.3–5.2)
ALP SERPL-CCNC: 98 U/L — SIGNIFICANT CHANGE UP (ref 40–120)
ALT FLD-CCNC: 18 U/L — SIGNIFICANT CHANGE UP
ANION GAP SERPL CALC-SCNC: 11 MMOL/L — SIGNIFICANT CHANGE UP (ref 5–17)
APPEARANCE UR: CLEAR — SIGNIFICANT CHANGE UP
APTT BLD: 32.8 SEC — SIGNIFICANT CHANGE UP (ref 27.5–35.5)
AST SERPL-CCNC: 26 U/L — SIGNIFICANT CHANGE UP
BASOPHILS # BLD AUTO: 0.15 K/UL — SIGNIFICANT CHANGE UP (ref 0–0.2)
BASOPHILS NFR BLD AUTO: 1.7 % — SIGNIFICANT CHANGE UP (ref 0–2)
BILIRUB SERPL-MCNC: 0.3 MG/DL — LOW (ref 0.4–2)
BILIRUB UR-MCNC: NEGATIVE — SIGNIFICANT CHANGE UP
BLD GP AB SCN SERPL QL: SIGNIFICANT CHANGE UP
BUN SERPL-MCNC: 12.3 MG/DL — SIGNIFICANT CHANGE UP (ref 8–20)
CALCIUM SERPL-MCNC: 10.3 MG/DL — SIGNIFICANT CHANGE UP (ref 8.4–10.5)
CHLORIDE SERPL-SCNC: 100 MMOL/L — SIGNIFICANT CHANGE UP (ref 96–108)
CHOLEST SERPL-MCNC: 159 MG/DL — SIGNIFICANT CHANGE UP
CO2 SERPL-SCNC: 26 MMOL/L — SIGNIFICANT CHANGE UP (ref 22–29)
COLOR SPEC: YELLOW — SIGNIFICANT CHANGE UP
CREAT SERPL-MCNC: 0.77 MG/DL — SIGNIFICANT CHANGE UP (ref 0.5–1.3)
DIFF PNL FLD: NEGATIVE — SIGNIFICANT CHANGE UP
EGFR: 99 ML/MIN/1.73M2 — SIGNIFICANT CHANGE UP
EOSINOPHIL # BLD AUTO: 1.06 K/UL — HIGH (ref 0–0.5)
EOSINOPHIL NFR BLD AUTO: 12 % — HIGH (ref 0–6)
ESTIMATED AVERAGE GLUCOSE: 169 MG/DL — HIGH (ref 68–114)
ESTIMATED AVERAGE GLUCOSE: 171 MG/DL — HIGH (ref 68–114)
GLUCOSE SERPL-MCNC: 198 MG/DL — HIGH (ref 70–99)
GLUCOSE UR QL: NEGATIVE MG/DL — SIGNIFICANT CHANGE UP
HCT VFR BLD CALC: 38.4 % — LOW (ref 39–50)
HDLC SERPL-MCNC: 40 MG/DL — LOW
HGB BLD-MCNC: 13.1 G/DL — SIGNIFICANT CHANGE UP (ref 13–17)
IMM GRANULOCYTES NFR BLD AUTO: 0.2 % — SIGNIFICANT CHANGE UP (ref 0–0.9)
INR BLD: 1.15 RATIO — SIGNIFICANT CHANGE UP (ref 0.88–1.16)
KETONES UR-MCNC: NEGATIVE — SIGNIFICANT CHANGE UP
LEUKOCYTE ESTERASE UR-ACNC: NEGATIVE — SIGNIFICANT CHANGE UP
LIPID PNL WITH DIRECT LDL SERPL: 81 MG/DL — SIGNIFICANT CHANGE UP
LYMPHOCYTES # BLD AUTO: 4.01 K/UL — HIGH (ref 1–3.3)
LYMPHOCYTES # BLD AUTO: 45.4 % — HIGH (ref 13–44)
MAGNESIUM SERPL-MCNC: 1.5 MG/DL — LOW (ref 1.6–2.6)
MCHC RBC-ENTMCNC: 32 PG — SIGNIFICANT CHANGE UP (ref 27–34)
MCHC RBC-ENTMCNC: 34.1 GM/DL — SIGNIFICANT CHANGE UP (ref 32–36)
MCV RBC AUTO: 93.9 FL — SIGNIFICANT CHANGE UP (ref 80–100)
MONOCYTES # BLD AUTO: 0.47 K/UL — SIGNIFICANT CHANGE UP (ref 0–0.9)
MONOCYTES NFR BLD AUTO: 5.3 % — SIGNIFICANT CHANGE UP (ref 2–14)
MRSA PCR RESULT.: SIGNIFICANT CHANGE UP
NEUTROPHILS # BLD AUTO: 3.12 K/UL — SIGNIFICANT CHANGE UP (ref 1.8–7.4)
NEUTROPHILS NFR BLD AUTO: 35.4 % — LOW (ref 43–77)
NITRITE UR-MCNC: NEGATIVE — SIGNIFICANT CHANGE UP
NON HDL CHOLESTEROL: 119 MG/DL — SIGNIFICANT CHANGE UP
NT-PROBNP SERPL-SCNC: 167 PG/ML — SIGNIFICANT CHANGE UP (ref 0–300)
PH UR: 6.5 — SIGNIFICANT CHANGE UP (ref 5–8)
PLATELET # BLD AUTO: 208 K/UL — SIGNIFICANT CHANGE UP (ref 150–400)
POTASSIUM SERPL-MCNC: 4.8 MMOL/L — SIGNIFICANT CHANGE UP (ref 3.5–5.3)
POTASSIUM SERPL-SCNC: 4.8 MMOL/L — SIGNIFICANT CHANGE UP (ref 3.5–5.3)
PREALB SERPL-MCNC: 27 MG/DL — SIGNIFICANT CHANGE UP (ref 18–38)
PROT SERPL-MCNC: 7.5 G/DL — SIGNIFICANT CHANGE UP (ref 6.6–8.7)
PROT UR-MCNC: NEGATIVE — SIGNIFICANT CHANGE UP
PROTHROM AB SERPL-ACNC: 13.4 SEC — SIGNIFICANT CHANGE UP (ref 10.5–13.4)
RBC # BLD: 4.09 M/UL — LOW (ref 4.2–5.8)
RBC # FLD: 12.1 % — SIGNIFICANT CHANGE UP (ref 10.3–14.5)
S AUREUS DNA NOSE QL NAA+PROBE: DETECTED
SARS-COV-2 RNA SPEC QL NAA+PROBE: SIGNIFICANT CHANGE UP
SODIUM SERPL-SCNC: 137 MMOL/L — SIGNIFICANT CHANGE UP (ref 135–145)
SP GR SPEC: 1.01 — SIGNIFICANT CHANGE UP (ref 1.01–1.02)
TRIGL SERPL-MCNC: 192 MG/DL — HIGH
TSH SERPL-MCNC: 1.23 UIU/ML — SIGNIFICANT CHANGE UP (ref 0.27–4.2)
UROBILINOGEN FLD QL: NEGATIVE MG/DL — SIGNIFICANT CHANGE UP
WBC # BLD: 8.83 K/UL — SIGNIFICANT CHANGE UP (ref 3.8–10.5)
WBC # FLD AUTO: 8.83 K/UL — SIGNIFICANT CHANGE UP (ref 3.8–10.5)

## 2022-11-21 PROCEDURE — 93454 CORONARY ARTERY ANGIO S&I: CPT | Mod: 26

## 2022-11-21 PROCEDURE — 71045 X-RAY EXAM CHEST 1 VIEW: CPT | Mod: 26

## 2022-11-21 PROCEDURE — 93010 ELECTROCARDIOGRAM REPORT: CPT

## 2022-11-21 PROCEDURE — 99152 MOD SED SAME PHYS/QHP 5/>YRS: CPT

## 2022-11-21 RX ORDER — VANCOMYCIN HCL 1 G
1250 VIAL (EA) INTRAVENOUS ONCE
Refills: 0 | Status: DISCONTINUED | OUTPATIENT
Start: 2022-11-21 | End: 2022-11-22

## 2022-11-21 RX ORDER — MIRTAZAPINE 45 MG/1
15 TABLET, ORALLY DISINTEGRATING ORAL AT BEDTIME
Refills: 0 | Status: DISCONTINUED | OUTPATIENT
Start: 2022-11-21 | End: 2022-11-22

## 2022-11-21 RX ORDER — METOPROLOL TARTRATE 50 MG
25 TABLET ORAL
Refills: 0 | Status: DISCONTINUED | OUTPATIENT
Start: 2022-11-22 | End: 2022-11-22

## 2022-11-21 RX ORDER — MUPIROCIN 20 MG/G
1 OINTMENT TOPICAL
Refills: 0 | Status: DISCONTINUED | OUTPATIENT
Start: 2022-11-21 | End: 2022-11-22

## 2022-11-21 RX ORDER — CHLORHEXIDINE GLUCONATE 213 G/1000ML
1 SOLUTION TOPICAL
Refills: 0 | Status: DISCONTINUED | OUTPATIENT
Start: 2022-11-21 | End: 2022-11-22

## 2022-11-21 RX ORDER — CHLORHEXIDINE GLUCONATE 213 G/1000ML
15 SOLUTION TOPICAL
Refills: 0 | Status: DISCONTINUED | OUTPATIENT
Start: 2022-11-21 | End: 2022-11-22

## 2022-11-21 RX ORDER — MAGNESIUM SULFATE 500 MG/ML
2 VIAL (ML) INJECTION
Refills: 0 | Status: COMPLETED | OUTPATIENT
Start: 2022-11-21 | End: 2022-11-21

## 2022-11-21 RX ORDER — DIPHENHYDRAMINE HCL 50 MG
25 CAPSULE ORAL ONCE
Refills: 0 | Status: COMPLETED | OUTPATIENT
Start: 2022-11-21 | End: 2022-11-21

## 2022-11-21 RX ORDER — SERTRALINE 25 MG/1
100 TABLET, FILM COATED ORAL DAILY
Refills: 0 | Status: DISCONTINUED | OUTPATIENT
Start: 2022-11-21 | End: 2022-11-22

## 2022-11-21 RX ORDER — FOLIC ACID 0.8 MG
1 TABLET ORAL DAILY
Refills: 0 | Status: DISCONTINUED | OUTPATIENT
Start: 2022-11-21 | End: 2022-11-22

## 2022-11-21 RX ORDER — ASPIRIN/CALCIUM CARB/MAGNESIUM 324 MG
81 TABLET ORAL ONCE
Refills: 0 | Status: COMPLETED | OUTPATIENT
Start: 2022-11-21 | End: 2022-11-21

## 2022-11-21 RX ORDER — PANTOPRAZOLE SODIUM 20 MG/1
40 TABLET, DELAYED RELEASE ORAL
Refills: 0 | Status: DISCONTINUED | OUTPATIENT
Start: 2022-11-21 | End: 2022-11-22

## 2022-11-21 RX ORDER — SODIUM CHLORIDE 9 MG/ML
3 INJECTION INTRAMUSCULAR; INTRAVENOUS; SUBCUTANEOUS EVERY 8 HOURS
Refills: 0 | Status: DISCONTINUED | OUTPATIENT
Start: 2022-11-21 | End: 2022-11-22

## 2022-11-21 RX ORDER — METOPROLOL TARTRATE 50 MG
50 TABLET ORAL DAILY
Refills: 0 | Status: DISCONTINUED | OUTPATIENT
Start: 2022-11-21 | End: 2022-11-21

## 2022-11-21 RX ORDER — LANOLIN ALCOHOL/MO/W.PET/CERES
3 CREAM (GRAM) TOPICAL AT BEDTIME
Refills: 0 | Status: DISCONTINUED | OUTPATIENT
Start: 2022-11-21 | End: 2022-11-22

## 2022-11-21 RX ORDER — CEFUROXIME AXETIL 250 MG
1500 TABLET ORAL ONCE
Refills: 0 | Status: DISCONTINUED | OUTPATIENT
Start: 2022-11-22 | End: 2022-11-22

## 2022-11-21 RX ORDER — THIAMINE MONONITRATE (VIT B1) 100 MG
100 TABLET ORAL DAILY
Refills: 0 | Status: DISCONTINUED | OUTPATIENT
Start: 2022-11-21 | End: 2022-11-22

## 2022-11-21 RX ORDER — ATORVASTATIN CALCIUM 80 MG/1
80 TABLET, FILM COATED ORAL AT BEDTIME
Refills: 0 | Status: DISCONTINUED | OUTPATIENT
Start: 2022-11-21 | End: 2022-11-22

## 2022-11-21 RX ADMIN — MUPIROCIN 1 APPLICATION(S): 20 OINTMENT TOPICAL at 21:51

## 2022-11-21 RX ADMIN — SODIUM CHLORIDE 3 MILLILITER(S): 9 INJECTION INTRAMUSCULAR; INTRAVENOUS; SUBCUTANEOUS at 13:44

## 2022-11-21 RX ADMIN — Medication 3 MILLIGRAM(S): at 21:57

## 2022-11-21 RX ADMIN — SODIUM CHLORIDE 3 MILLILITER(S): 9 INJECTION INTRAMUSCULAR; INTRAVENOUS; SUBCUTANEOUS at 21:44

## 2022-11-21 RX ADMIN — Medication 50 GRAM(S): at 11:05

## 2022-11-21 RX ADMIN — CHLORHEXIDINE GLUCONATE 15 MILLILITER(S): 213 SOLUTION TOPICAL at 21:51

## 2022-11-21 RX ADMIN — MIRTAZAPINE 15 MILLIGRAM(S): 45 TABLET, ORALLY DISINTEGRATING ORAL at 22:31

## 2022-11-21 RX ADMIN — Medication 81 MILLIGRAM(S): at 09:21

## 2022-11-21 RX ADMIN — Medication 25 MILLIGRAM(S): at 22:20

## 2022-11-21 RX ADMIN — Medication 50 GRAM(S): at 09:22

## 2022-11-21 RX ADMIN — CHLORHEXIDINE GLUCONATE 1 APPLICATION(S): 213 SOLUTION TOPICAL at 21:52

## 2022-11-21 RX ADMIN — ATORVASTATIN CALCIUM 80 MILLIGRAM(S): 80 TABLET, FILM COATED ORAL at 21:51

## 2022-11-21 NOTE — PROGRESS NOTE ADULT - ASSESSMENT
68 yo male former smoker with h/o HTN , CAD, s/p PCI (LEELA x 1 pRCA 2007), ACC/AHA stage C, NYHA functional class 3, HFrEF, colon cancer s/p partial colectomy, ETOH cirrhosis, depression who was admitted to [facility] in May 2022 for GI bleed. During the admission he had a cardiac work up which included an echo which showed and EF of 30-35%. He had a LHC which showed a  of the mLAD, 50% mLCX stenosis, and an 80% OM2 stenosis. He was referred to CT surgery for CABG, but surgery was deferred due to alcoholism and needed hepatology and gastroenterology consultation. He was worked up by GI and Hepatology, and is now proceeding with CABG, scheduled for tomorrow. Now he presents for C prior to surgical intervention.

## 2022-11-21 NOTE — PROGRESS NOTE ADULT - SUBJECTIVE AND OBJECTIVE BOX
Department of Cardiology                                                                  [facility]/[place] [address] [place] [id number]                                                            Telephone: [phone]. Fax:[phone]                                                                                         PCI NOTE       Subjective:  67y  Male who had a left heart catheterization which showed (official report pending):       LM: No significant CAD       LAD:  of mLAD.       LCX: 80% OM2 stenosis.       RCA: 60% mid to distal stenosis.         Access: Right radial artery       Hemostasis: Radial band       Total Contrast: 48 mL Omnipaque       Total Heparin: 4,000 units       Antiplatelet Given: N/A    PAST MEDICAL & SURGICAL HISTORY:  Pancreatitis  Hypertension  Myocardial infarct  Alcohol abuse  Colon cancer  History of colon resection  History of heart surgery: cardiac stent    FAMILY HISTORY:  FH: prostate cancer (Father)  Family history of atherosclerosis (Mother)    Home Medications:  acetaminophen 325 mg oral tablet: 2 tab(s) orally every 6 hours, As needed, Temp greater or equal to 38C (100.4F), Mild Pain (1 - 3) (21 Nov 2022 08:37)  cholecalciferol 1250 mcg (50,000 intl units) oral capsule: 1 cap(s) orally once a week (21 Nov 2022 08:37)  diphenhydrAMINE 50 mg oral capsule: 1 cap(s) orally once a day (at bedtime), As needed, Insomnia (21 Nov 2022 08:37)  Entresto 49 mg-51 mg oral tablet: 1 tab(s) orally 2 times a day (21 Nov 2022 08:37)  folic acid 1 mg oral tablet: 1 tab(s) orally once a day (21 Nov 2022 08:37)  melatonin 3 mg oral tablet: 1 tab(s) orally once a day (at bedtime), As Needed - for insomnia (21 Nov 2022 08:37)  Melatonin 5 mg oral tablet: 1 tab(s) orally once a day (at bedtime) (21 Nov 2022 08:37)  metoprolol succinate 50 mg oral tablet, extended release: 1 tab(s) orally once a day (21 Nov 2022 08:37)  mirtazapine 7.5 mg oral tablet: 2 tab(s) orally once a day (at bedtime) (21 Nov 2022 08:37)  pantoprazole 40 mg oral delayed release tablet: 1 tab(s) orally once a day (before a meal) (21 Nov 2022 08:37)  sertraline 100 mg oral tablet: 1 tab(s) orally once a day (21 Nov 2022 08:37)  simvastatin 40 mg oral tablet: 1 tab(s) orally once a day (at bedtime) (21 Nov 2022 08:37)  thiamine 100 mg oral tablet: 1 tab(s) orally once a day (21 Nov 2022 08:37)    HPI: 66 yo male former smoker with h/o HTN , CAD, s/p PCI (LEELA x 1 pRCA 2007), ACC/AHA stage C, NYHA functional class 3, HFrEF, depression who was admitted to [facility] in May 2022 for GI bleed. During the admission he had a cardiac work up which included an echo which showed and EF of 30-35%. He had a LHC which showed a  of the mLAD, 50% mLCX stenosis, and an 80% OM2 stenosis. He was referred to CT surgery for CABG, but surgery was deferred due to alcoholism and needed hepatology and gastroenterology consultation. He was worked up by GI and Hepatology, and is now proceeding with CABG, scheduled for tomorrow. Now he presents for LHC prior to surgical intervention.    General: No fatigue, no fevers/chills  Respiratory: No dyspnea, no cough, no wheeze  CV: No chest pain, no palpitations  Abd: No nausea  Neuro: No headache, no dizziness    No Known Allergies    Objective:  Vital Signs Last 24 Hrs  T(C): 36.6 (21 Nov 2022 08:38), Max: 36.6 (21 Nov 2022 08:38)  T(F): 97.9 (21 Nov 2022 08:38), Max: 97.9 (21 Nov 2022 08:38)  HR: 72 (21 Nov 2022 11:30) (72 - 79)  BP: 117/68 (21 Nov 2022 11:30) (117/68 - 149/74)  RR: 16 (21 Nov 2022 11:30) (16 - 16)  SpO2: 96% (21 Nov 2022 11:30) (96% - 97%)    Parameters below as of 21 Nov 2022 11:30  Patient On (Oxygen Delivery Method): room air    CM: SR  Neuro: A&OX3, CN 2-12 intact  HEENT: NC, AT  Lungs: CTA B/L  CV: S1, S2, no murmur, RRR  Abd: Soft  Extremity: Right radial band: no bleeding, fingers warm with good cap refil. Pulses: DP: Right: + by doppler, Left: + by doppler, Radial: Right: 2+, Left: 2+                          13.1   8.83  )-----------( 208      ( 21 Nov 2022 08:15 )             38.4     11-21    137  |  100  |  12.3  ----------------------------<  198  4.8   |  26.0  |  0.77    Ca    10.3      21 Nov 2022 08:15  Mg     1.5     11-21    TPro  7.5  /  Alb  4.8  /  TBili  0.3  /  DBili  x   /  AST  26  /  ALT  18  /  AlkPhos  98  11-21    Lipids       Total Cholesterol: 159       Triglycerides: 192       HDL: 40       Non-HDL: 119       LDL: 81    HgbA1C: 7.5%

## 2022-11-21 NOTE — PATIENT PROFILE ADULT - FALL HARM RISK - HARM RISK INTERVENTIONS

## 2022-11-21 NOTE — PROGRESS NOTE ADULT - PROBLEM SELECTOR PLAN 4
HgbA1c 7.6  Continue ISS.  Continue insulin coverage.  Consider endocrine consult postoperatively.  Hold metformin in preoperative period.

## 2022-11-21 NOTE — PROGRESS NOTE ADULT - ASSESSMENT
Procedure: Left heart catheterization    1. S/P LHC:  of LAD, 80% OM2 stenosis, 60% mid to distal RCA stenosis.  ·	Remove radial band at: 12:00  ·	Wrist precautions explained.  ·	Medications: Continue current medications.  ·	Plan for CABG tomorrow    2. HFrEF  ·	GDMT  ·	     Beta Blocker: Toprol 50 mg daily  ·	     RAAS Inhibitor: Entresto 100 mg BID (will hold in preparation for CT surgery)  ·	     MRA: N/A  ·	     Diuretic: N/A  ·	     SGLT2i: Would likely benefit from SGLT2i in setting of elevated HgbA1C and HFrEF  ·	     Other: N/A  ·	Strict I&O's  ·	Daily standing weights (if able)    3. DM  ·	GDMT:   ·	     Diabetic diet.  ·	     FS Q AC and HS with coverage  ·	     HgbA1C: 7.5%  ·	     Basal Insulin: Will start Lantus 15 units QHS post CT surgery  ·	     Nutritional Insulin: N/A  ·	     Oral Antihyperglycemics: Will resume metformin 500 mg BID  ·	     SGLT2i/GLP1-RA: Would likely benefit from SGLT2i in setting of elevated HgbA1C and HFrEF     4. Hypomagnesemia  ·	Magnesium 2 gm IV X 2    5. Mixed HLD  ·	Goal Non-HDL < 80, LDL < 55  ·	Lipid Panel: Not at goal  ·	Statin: Will switch to Lipitor 80 mg daily  ·	Other: N/A    Disposition:   ·	Admit to [address] [place] [address]

## 2022-11-21 NOTE — PROGRESS NOTE ADULT - SUBJECTIVE AND OBJECTIVE BOX
Brief summary:  67yMale s/p repeat catheterization today to confirm no change in CAD since prior cath in May, now admitted to CTS service pending CABG tomorrow with Dr. Gray.  Upon examination, pt with no physical complaints.  Denies f/c, n/v, chest pain, sob, abdominal pain, d/c, urinary symptoms, LE edema.  States he is sober and denies ETOH consumption.  Is also a former smoker.        PAST MEDICAL & SURGICAL HISTORY:  Pancreatitis    Hypertension    Myocardial infarct    Alcohol abuse    Colon cancer      History of colon resection    History of heart surgery  cardiac stent          Medications:  atorvastatin 80 milliGRAM(s) Oral at bedtime  chlorhexidine 0.12% Liquid 15 milliLiter(s) Swish and Spit two times a day  chlorhexidine 4% Liquid 1 Application(s) Topical two times a day  chlorhexidine 4% Liquid 1 Application(s) Topical Once  folic acid 1 milliGRAM(s) Oral daily  melatonin 3 milliGRAM(s) Oral at bedtime PRN  metoprolol succinate ER 50 milliGRAM(s) Oral daily  mirtazapine 15 milliGRAM(s) Oral at bedtime PRN  pantoprazole    Tablet 40 milliGRAM(s) Oral before breakfast  sertraline 100 milliGRAM(s) Oral daily  sodium chloride 0.9% lock flush 3 milliLiter(s) IV Push every 8 hours  thiamine 100 milliGRAM(s) Oral daily  vancomycin  IVPB 1250 milliGRAM(s) IV Intermittent once      MEDICATIONS  (PRN):  melatonin 3 milliGRAM(s) Oral at bedtime PRN for insomnia  mirtazapine 15 milliGRAM(s) Oral at bedtime PRN sleep      Daily Review:    Height (cm): 182.9 (11-21 @ 08:31)  Weight (kg): 82.554 (11-21 @ 08:31)  BMI (kg/m2): 24.7 (11-21 @ 08:31)  BSA (m2): 2.05 (11-21 @ 08:31)                            13.1   8.83  )-----------( 208      ( 21 Nov 2022 08:15 )             38.4   11-21    137  |  100  |  12.3  ----------------------------<  198<H>  4.8   |  26.0  |  0.77    Ca    10.3      21 Nov 2022 08:15  Mg     1.5     11-21    TPro  7.5  /  Alb  4.8  /  TBili  0.3<L>  /  DBili  x   /  AST  26  /  ALT  18  /  AlkPhos  98  11-21      PT/INR - ( 21 Nov 2022 14:08 )   PT: 13.4 sec;   INR: 1.15 ratio         PTT - ( 21 Nov 2022 14:08 )  PTT:32.8 sec    T(C): 36.6 (11-21-22 @ 08:38), Max: 36.6 (11-21-22 @ 08:38)  HR: 71 (11-21-22 @ 12:52) (70 - 79)  BP: 136/83 (11-21-22 @ 12:52) (114/68 - 149/74)  RR: 16 (11-21-22 @ 12:52) (16 - 16)  SpO2: 99% (11-21-22 @ 12:52) (96% - 99%)          Physical Exam    Neuro: A+O x 3, non-focal, speech clear and intact  Pulm: coarse breath sounds bilaterally, no wheezing or rales  CV: RRR, +S1S2  Abd: soft, NT, ND, normoactive bowel sounds  Ext: +DP Pulses b/l, +PT pulses, no edema

## 2022-11-22 ENCOUNTER — APPOINTMENT (OUTPATIENT)
Dept: CARDIOTHORACIC SURGERY | Facility: HOSPITAL | Age: 67
End: 2022-11-22

## 2022-11-22 LAB
ALBUMIN SERPL ELPH-MCNC: 3.1 G/DL — LOW (ref 3.3–5.2)
ALBUMIN SERPL ELPH-MCNC: 4.4 G/DL — SIGNIFICANT CHANGE UP (ref 3.3–5.2)
ALP SERPL-CCNC: 38 U/L — LOW (ref 40–120)
ALP SERPL-CCNC: 68 U/L — SIGNIFICANT CHANGE UP (ref 40–120)
ALT FLD-CCNC: 11 U/L — SIGNIFICANT CHANGE UP
ALT FLD-CCNC: 15 U/L — SIGNIFICANT CHANGE UP
ANION GAP SERPL CALC-SCNC: 11 MMOL/L — SIGNIFICANT CHANGE UP (ref 5–17)
ANION GAP SERPL CALC-SCNC: 12 MMOL/L — SIGNIFICANT CHANGE UP (ref 5–17)
APTT BLD: 29.8 SEC — SIGNIFICANT CHANGE UP (ref 27.5–35.5)
AST SERPL-CCNC: 17 U/L — SIGNIFICANT CHANGE UP
AST SERPL-CCNC: 25 U/L — SIGNIFICANT CHANGE UP
BASE EXCESS BLDA CALC-SCNC: -1 MMOL/L — SIGNIFICANT CHANGE UP (ref -2–3)
BASE EXCESS BLDA CALC-SCNC: -2.5 MMOL/L — LOW (ref -2–3)
BASE EXCESS BLDA CALC-SCNC: -2.6 MMOL/L — LOW (ref -2–3)
BASE EXCESS BLDA CALC-SCNC: 0.4 MMOL/L — SIGNIFICANT CHANGE UP (ref -2–3)
BASE EXCESS BLDA CALC-SCNC: 1 MMOL/L — SIGNIFICANT CHANGE UP (ref -2–3)
BASE EXCESS BLDA CALC-SCNC: 2.8 MMOL/L — SIGNIFICANT CHANGE UP (ref -2–3)
BASE EXCESS BLDV CALC-SCNC: -1.9 MMOL/L — SIGNIFICANT CHANGE UP (ref -2–3)
BASE EXCESS BLDV CALC-SCNC: -2.2 MMOL/L — LOW (ref -2–3)
BASE EXCESS BLDV CALC-SCNC: 0.6 MMOL/L — SIGNIFICANT CHANGE UP (ref -2–3)
BASOPHILS # BLD AUTO: 0.1 K/UL — SIGNIFICANT CHANGE UP (ref 0–0.2)
BASOPHILS # BLD AUTO: 0.13 K/UL — SIGNIFICANT CHANGE UP (ref 0–0.2)
BASOPHILS NFR BLD AUTO: 0.6 % — SIGNIFICANT CHANGE UP (ref 0–2)
BASOPHILS NFR BLD AUTO: 1.5 % — SIGNIFICANT CHANGE UP (ref 0–2)
BILIRUB SERPL-MCNC: 0.4 MG/DL — SIGNIFICANT CHANGE UP (ref 0.4–2)
BILIRUB SERPL-MCNC: 0.8 MG/DL — SIGNIFICANT CHANGE UP (ref 0.4–2)
BUN SERPL-MCNC: 14.6 MG/DL — SIGNIFICANT CHANGE UP (ref 8–20)
BUN SERPL-MCNC: 16.6 MG/DL — SIGNIFICANT CHANGE UP (ref 8–20)
CA-I BLDA-SCNC: 1.1 MMOL/L — LOW (ref 1.15–1.33)
CA-I BLDA-SCNC: 1.11 MMOL/L — LOW (ref 1.15–1.33)
CA-I BLDA-SCNC: 1.14 MMOL/L — LOW (ref 1.15–1.33)
CA-I BLDA-SCNC: 1.32 MMOL/L — SIGNIFICANT CHANGE UP (ref 1.15–1.33)
CA-I BLDA-SCNC: 1.36 MMOL/L — HIGH (ref 1.15–1.33)
CA-I BLDA-SCNC: 1.36 MMOL/L — HIGH (ref 1.15–1.33)
CA-I SERPL-SCNC: 1.12 MMOL/L — LOW (ref 1.15–1.33)
CA-I SERPL-SCNC: 1.13 MMOL/L — LOW (ref 1.15–1.33)
CA-I SERPL-SCNC: 1.13 MMOL/L — LOW (ref 1.15–1.33)
CALCIUM SERPL-MCNC: 10.2 MG/DL — SIGNIFICANT CHANGE UP (ref 8.4–10.5)
CALCIUM SERPL-MCNC: 9.2 MG/DL — SIGNIFICANT CHANGE UP (ref 8.4–10.5)
CHLORIDE BLDA-SCNC: 102 MMOL/L — SIGNIFICANT CHANGE UP (ref 96–108)
CHLORIDE BLDA-SCNC: 103 MMOL/L — SIGNIFICANT CHANGE UP (ref 96–108)
CHLORIDE BLDA-SCNC: 106 MMOL/L — SIGNIFICANT CHANGE UP (ref 96–108)
CHLORIDE BLDA-SCNC: 107 MMOL/L — SIGNIFICANT CHANGE UP (ref 96–108)
CHLORIDE BLDV-SCNC: 104 MMOL/L — SIGNIFICANT CHANGE UP (ref 96–108)
CHLORIDE BLDV-SCNC: 105 MMOL/L — SIGNIFICANT CHANGE UP (ref 96–108)
CHLORIDE BLDV-SCNC: 105 MMOL/L — SIGNIFICANT CHANGE UP (ref 96–108)
CHLORIDE SERPL-SCNC: 102 MMOL/L — SIGNIFICANT CHANGE UP (ref 96–108)
CHLORIDE SERPL-SCNC: 109 MMOL/L — HIGH (ref 96–108)
CK MB CFR SERPL CALC: 16.9 NG/ML — HIGH (ref 0–6.7)
CK SERPL-CCNC: 197 U/L — SIGNIFICANT CHANGE UP (ref 30–200)
CO2 SERPL-SCNC: 23 MMOL/L — SIGNIFICANT CHANGE UP (ref 22–29)
CO2 SERPL-SCNC: 26 MMOL/L — SIGNIFICANT CHANGE UP (ref 22–29)
COHGB MFR BLDA: 0.6 % — SIGNIFICANT CHANGE UP
COHGB MFR BLDA: 0.7 % — SIGNIFICANT CHANGE UP
COHGB MFR BLDA: 0.8 % — SIGNIFICANT CHANGE UP
COHGB MFR BLDA: 0.9 % — SIGNIFICANT CHANGE UP
COHGB MFR BLDA: 1 % — SIGNIFICANT CHANGE UP
COHGB MFR BLDA: 1.6 % — SIGNIFICANT CHANGE UP
COHGB MFR BLDV: 1.6 % — SIGNIFICANT CHANGE UP
COHGB MFR BLDV: 1.7 % — SIGNIFICANT CHANGE UP
COHGB MFR BLDV: 2 % — SIGNIFICANT CHANGE UP
CREAT SERPL-MCNC: 0.7 MG/DL — SIGNIFICANT CHANGE UP (ref 0.5–1.3)
CREAT SERPL-MCNC: 0.72 MG/DL — SIGNIFICANT CHANGE UP (ref 0.5–1.3)
EGFR: 101 ML/MIN/1.73M2 — SIGNIFICANT CHANGE UP
EGFR: 102 ML/MIN/1.73M2 — SIGNIFICANT CHANGE UP
EOSINOPHIL # BLD AUTO: 0.46 K/UL — SIGNIFICANT CHANGE UP (ref 0–0.5)
EOSINOPHIL # BLD AUTO: 0.92 K/UL — HIGH (ref 0–0.5)
EOSINOPHIL NFR BLD AUTO: 10.9 % — HIGH (ref 0–6)
EOSINOPHIL NFR BLD AUTO: 2.7 % — SIGNIFICANT CHANGE UP (ref 0–6)
GAS PNL BLDA: SIGNIFICANT CHANGE UP
GAS PNL BLDA: SIGNIFICANT CHANGE UP
GAS PNL BLDV: 137 MMOL/L — SIGNIFICANT CHANGE UP (ref 136–145)
GAS PNL BLDV: 138 MMOL/L — SIGNIFICANT CHANGE UP (ref 136–145)
GAS PNL BLDV: 138 MMOL/L — SIGNIFICANT CHANGE UP (ref 136–145)
GLUCOSE BLDA-MCNC: 118 MG/DL — HIGH (ref 70–99)
GLUCOSE BLDA-MCNC: 135 MG/DL — HIGH (ref 70–99)
GLUCOSE BLDA-MCNC: 160 MG/DL — HIGH (ref 70–99)
GLUCOSE BLDA-MCNC: 191 MG/DL — HIGH (ref 70–99)
GLUCOSE BLDA-MCNC: 210 MG/DL — HIGH (ref 70–99)
GLUCOSE BLDA-MCNC: 247 MG/DL — HIGH (ref 70–99)
GLUCOSE BLDC GLUCOMTR-MCNC: 122 MG/DL — HIGH (ref 70–99)
GLUCOSE BLDC GLUCOMTR-MCNC: 142 MG/DL — HIGH (ref 70–99)
GLUCOSE BLDC GLUCOMTR-MCNC: 153 MG/DL — HIGH (ref 70–99)
GLUCOSE BLDC GLUCOMTR-MCNC: 163 MG/DL — HIGH (ref 70–99)
GLUCOSE BLDC GLUCOMTR-MCNC: 164 MG/DL — HIGH (ref 70–99)
GLUCOSE BLDC GLUCOMTR-MCNC: 178 MG/DL — HIGH (ref 70–99)
GLUCOSE BLDC GLUCOMTR-MCNC: 183 MG/DL — HIGH (ref 70–99)
GLUCOSE BLDC GLUCOMTR-MCNC: 194 MG/DL — HIGH (ref 70–99)
GLUCOSE BLDC GLUCOMTR-MCNC: 199 MG/DL — HIGH (ref 70–99)
GLUCOSE BLDC GLUCOMTR-MCNC: 201 MG/DL — HIGH (ref 70–99)
GLUCOSE BLDV-MCNC: 114 MG/DL — HIGH (ref 70–99)
GLUCOSE BLDV-MCNC: 149 MG/DL — HIGH (ref 70–99)
GLUCOSE BLDV-MCNC: 231 MG/DL — HIGH (ref 70–99)
GLUCOSE SERPL-MCNC: 154 MG/DL — HIGH (ref 70–99)
GLUCOSE SERPL-MCNC: 170 MG/DL — HIGH (ref 70–99)
HCO3 BLDA-SCNC: 22 MMOL/L — SIGNIFICANT CHANGE UP (ref 21–28)
HCO3 BLDA-SCNC: 22 MMOL/L — SIGNIFICANT CHANGE UP (ref 21–28)
HCO3 BLDA-SCNC: 24 MMOL/L — SIGNIFICANT CHANGE UP (ref 21–28)
HCO3 BLDA-SCNC: 24 MMOL/L — SIGNIFICANT CHANGE UP (ref 21–28)
HCO3 BLDA-SCNC: 26 MMOL/L — SIGNIFICANT CHANGE UP (ref 21–28)
HCO3 BLDA-SCNC: 28 MMOL/L — SIGNIFICANT CHANGE UP (ref 21–28)
HCO3 BLDV-SCNC: 23 MMOL/L — SIGNIFICANT CHANGE UP (ref 22–29)
HCO3 BLDV-SCNC: 24 MMOL/L — SIGNIFICANT CHANGE UP (ref 22–29)
HCO3 BLDV-SCNC: 25 MMOL/L — SIGNIFICANT CHANGE UP (ref 22–29)
HCT VFR BLD CALC: 27.7 % — LOW (ref 39–50)
HCT VFR BLD CALC: 36.6 % — LOW (ref 39–50)
HCT VFR BLDA CALC: 21 % — SIGNIFICANT CHANGE UP
HCT VFR BLDA CALC: 21 % — SIGNIFICANT CHANGE UP
HCT VFR BLDA CALC: 23 % — SIGNIFICANT CHANGE UP
HCT VFR BLDA CALC: 24 % — SIGNIFICANT CHANGE UP
HCT VFR BLDA CALC: 25 % — SIGNIFICANT CHANGE UP
HCT VFR BLDA CALC: 26 % — SIGNIFICANT CHANGE UP
HCT VFR BLDA CALC: 27 % — SIGNIFICANT CHANGE UP
HCT VFR BLDA CALC: 33 % — SIGNIFICANT CHANGE UP
HCT VFR BLDA CALC: 49 % — SIGNIFICANT CHANGE UP
HGB BLD CALC-MCNC: 6.9 G/DL — CRITICAL LOW (ref 12.6–17.4)
HGB BLD CALC-MCNC: 7.6 G/DL — LOW (ref 12.6–17.4)
HGB BLD CALC-MCNC: 8.6 G/DL — LOW (ref 12.6–17.4)
HGB BLD-MCNC: 12.8 G/DL — LOW (ref 13–17)
HGB BLD-MCNC: 9.9 G/DL — LOW (ref 13–17)
HGB BLDA-MCNC: 10.9 G/DL — LOW (ref 12.6–17.4)
HGB BLDA-MCNC: 16.3 G/DL — SIGNIFICANT CHANGE UP (ref 12.6–17.4)
HGB BLDA-MCNC: 6.9 G/DL — CRITICAL LOW (ref 12.6–17.4)
HGB BLDA-MCNC: 8 G/DL — LOW (ref 12.6–17.4)
HGB BLDA-MCNC: 8.3 G/DL — LOW (ref 12.6–17.4)
HGB BLDA-MCNC: 9 G/DL — LOW (ref 12.6–17.4)
IMM GRANULOCYTES NFR BLD AUTO: 0.4 % — SIGNIFICANT CHANGE UP (ref 0–0.9)
IMM GRANULOCYTES NFR BLD AUTO: 0.8 % — SIGNIFICANT CHANGE UP (ref 0–0.9)
INR BLD: 1.48 RATIO — HIGH (ref 0.88–1.16)
LACTATE BLDA-MCNC: 1.7 MMOL/L — SIGNIFICANT CHANGE UP (ref 0.5–2)
LACTATE BLDA-MCNC: 1.7 MMOL/L — SIGNIFICANT CHANGE UP (ref 0.5–2)
LACTATE BLDA-MCNC: 2.1 MMOL/L — HIGH (ref 0.5–2)
LACTATE BLDA-MCNC: 3.8 MMOL/L — HIGH (ref 0.5–2)
LACTATE BLDA-MCNC: 4 MMOL/L — CRITICAL HIGH (ref 0.5–2)
LACTATE BLDA-MCNC: 4.6 MMOL/L — CRITICAL HIGH (ref 0.5–2)
LACTATE BLDV-MCNC: 2 MMOL/L — SIGNIFICANT CHANGE UP (ref 0.5–2)
LACTATE BLDV-MCNC: 3.6 MMOL/L — HIGH (ref 0.5–2)
LACTATE BLDV-MCNC: 4.4 MMOL/L — CRITICAL HIGH (ref 0.5–2)
LYMPHOCYTES # BLD AUTO: 18 % — SIGNIFICANT CHANGE UP (ref 13–44)
LYMPHOCYTES # BLD AUTO: 3.1 K/UL — SIGNIFICANT CHANGE UP (ref 1–3.3)
LYMPHOCYTES # BLD AUTO: 3.33 K/UL — HIGH (ref 1–3.3)
LYMPHOCYTES # BLD AUTO: 39.6 % — SIGNIFICANT CHANGE UP (ref 13–44)
MAGNESIUM SERPL-MCNC: 2.2 MG/DL — SIGNIFICANT CHANGE UP (ref 1.8–2.6)
MCHC RBC-ENTMCNC: 32.5 PG — SIGNIFICANT CHANGE UP (ref 27–34)
MCHC RBC-ENTMCNC: 33.2 PG — SIGNIFICANT CHANGE UP (ref 27–34)
MCHC RBC-ENTMCNC: 35 GM/DL — SIGNIFICANT CHANGE UP (ref 32–36)
MCHC RBC-ENTMCNC: 35.7 GM/DL — SIGNIFICANT CHANGE UP (ref 32–36)
MCV RBC AUTO: 90.8 FL — SIGNIFICANT CHANGE UP (ref 80–100)
MCV RBC AUTO: 95.1 FL — SIGNIFICANT CHANGE UP (ref 80–100)
METHGB MFR BLDA: 0.6 % — SIGNIFICANT CHANGE UP
METHGB MFR BLDA: 0.9 % — SIGNIFICANT CHANGE UP
METHGB MFR BLDA: 0.9 % — SIGNIFICANT CHANGE UP
METHGB MFR BLDA: 1 % — SIGNIFICANT CHANGE UP
METHGB MFR BLDA: 1 % — SIGNIFICANT CHANGE UP
METHGB MFR BLDA: 1.1 % — SIGNIFICANT CHANGE UP
METHGB MFR BLDV: 0.2 % — SIGNIFICANT CHANGE UP
METHGB MFR BLDV: 0.7 % — SIGNIFICANT CHANGE UP
METHGB MFR BLDV: 0.8 % — SIGNIFICANT CHANGE UP
MONOCYTES # BLD AUTO: 0.43 K/UL — SIGNIFICANT CHANGE UP (ref 0–0.9)
MONOCYTES # BLD AUTO: 0.91 K/UL — HIGH (ref 0–0.9)
MONOCYTES NFR BLD AUTO: 5.1 % — SIGNIFICANT CHANGE UP (ref 2–14)
MONOCYTES NFR BLD AUTO: 5.3 % — SIGNIFICANT CHANGE UP (ref 2–14)
NEUTROPHILS # BLD AUTO: 12.55 K/UL — HIGH (ref 1.8–7.4)
NEUTROPHILS # BLD AUTO: 3.57 K/UL — SIGNIFICANT CHANGE UP (ref 1.8–7.4)
NEUTROPHILS NFR BLD AUTO: 42.5 % — LOW (ref 43–77)
NEUTROPHILS NFR BLD AUTO: 72.6 % — SIGNIFICANT CHANGE UP (ref 43–77)
OXYHGB MFR BLDA: 98 % — HIGH (ref 90–95)
PCO2 BLDA: 34 MMHG — LOW (ref 35–48)
PCO2 BLDA: 36 MMHG — SIGNIFICANT CHANGE UP (ref 35–48)
PCO2 BLDA: 38 MMHG — SIGNIFICANT CHANGE UP (ref 35–48)
PCO2 BLDA: 42 MMHG — SIGNIFICANT CHANGE UP (ref 35–48)
PCO2 BLDA: 43 MMHG — SIGNIFICANT CHANGE UP (ref 35–48)
PCO2 BLDA: 46 MMHG — SIGNIFICANT CHANGE UP (ref 35–48)
PCO2 BLDV: 41 MMHG — LOW (ref 42–55)
PCO2 BLDV: 41 MMHG — LOW (ref 42–55)
PCO2 BLDV: 43 MMHG — SIGNIFICANT CHANGE UP (ref 42–55)
PH BLDA: 7.37 — SIGNIFICANT CHANGE UP (ref 7.35–7.45)
PH BLDA: 7.38 — SIGNIFICANT CHANGE UP (ref 7.35–7.45)
PH BLDA: 7.39 — SIGNIFICANT CHANGE UP (ref 7.35–7.45)
PH BLDA: 7.39 — SIGNIFICANT CHANGE UP (ref 7.35–7.45)
PH BLDA: 7.4 — SIGNIFICANT CHANGE UP (ref 7.35–7.45)
PH BLDA: 7.46 — HIGH (ref 7.35–7.45)
PH BLDV: 7.35 — SIGNIFICANT CHANGE UP (ref 7.32–7.43)
PH BLDV: 7.36 — SIGNIFICANT CHANGE UP (ref 7.32–7.43)
PH BLDV: 7.4 — SIGNIFICANT CHANGE UP (ref 7.32–7.43)
PHOSPHATE SERPL-MCNC: 1 MG/DL — CRITICAL LOW (ref 2.4–4.7)
PLATELET # BLD AUTO: 185 K/UL — SIGNIFICANT CHANGE UP (ref 150–400)
PLATELET # BLD AUTO: 216 K/UL — SIGNIFICANT CHANGE UP (ref 150–400)
PO2 BLDA: 327 MMHG — HIGH (ref 83–108)
PO2 BLDA: 436 MMHG — HIGH (ref 83–108)
PO2 BLDA: 448 MMHG — HIGH (ref 83–108)
PO2 BLDA: >496 MMHG — HIGH (ref 83–108)
PO2 BLDV: 44 MMHG — SIGNIFICANT CHANGE UP (ref 25–45)
PO2 BLDV: 44 MMHG — SIGNIFICANT CHANGE UP (ref 25–45)
PO2 BLDV: 54 MMHG — HIGH (ref 25–45)
POTASSIUM BLDA-SCNC: 3.9 MMOL/L — SIGNIFICANT CHANGE UP (ref 3.5–5.1)
POTASSIUM BLDA-SCNC: 4.1 MMOL/L — SIGNIFICANT CHANGE UP (ref 3.5–5.1)
POTASSIUM BLDA-SCNC: 4.2 MMOL/L — SIGNIFICANT CHANGE UP (ref 3.5–5.1)
POTASSIUM BLDA-SCNC: 4.3 MMOL/L — SIGNIFICANT CHANGE UP (ref 3.5–5.1)
POTASSIUM BLDA-SCNC: 5 MMOL/L — SIGNIFICANT CHANGE UP (ref 3.5–5.1)
POTASSIUM BLDA-SCNC: 5.1 MMOL/L — SIGNIFICANT CHANGE UP (ref 3.5–5.1)
POTASSIUM BLDV-SCNC: 4.3 MMOL/L — SIGNIFICANT CHANGE UP (ref 3.5–5.1)
POTASSIUM BLDV-SCNC: 4.9 MMOL/L — SIGNIFICANT CHANGE UP (ref 3.5–5.1)
POTASSIUM BLDV-SCNC: 5 MMOL/L — SIGNIFICANT CHANGE UP (ref 3.5–5.1)
POTASSIUM SERPL-MCNC: 4 MMOL/L — SIGNIFICANT CHANGE UP (ref 3.5–5.3)
POTASSIUM SERPL-MCNC: 4 MMOL/L — SIGNIFICANT CHANGE UP (ref 3.5–5.3)
POTASSIUM SERPL-SCNC: 4 MMOL/L — SIGNIFICANT CHANGE UP (ref 3.5–5.3)
POTASSIUM SERPL-SCNC: 4 MMOL/L — SIGNIFICANT CHANGE UP (ref 3.5–5.3)
PROT SERPL-MCNC: 4.8 G/DL — LOW (ref 6.6–8.7)
PROT SERPL-MCNC: 7 G/DL — SIGNIFICANT CHANGE UP (ref 6.6–8.7)
PROTHROM AB SERPL-ACNC: 17.2 SEC — HIGH (ref 10.5–13.4)
RBC # BLD: 3.05 M/UL — LOW (ref 4.2–5.8)
RBC # BLD: 3.85 M/UL — LOW (ref 4.2–5.8)
RBC # FLD: 12.6 % — SIGNIFICANT CHANGE UP (ref 10.3–14.5)
RBC # FLD: 13.5 % — SIGNIFICANT CHANGE UP (ref 10.3–14.5)
SAO2 % BLDA: 100 % — HIGH (ref 94–98)
SAO2 % BLDA: 100 % — HIGH (ref 94–98)
SAO2 % BLDA: 99.4 % — HIGH (ref 94–98)
SAO2 % BLDA: 99.5 % — HIGH (ref 94–98)
SAO2 % BLDA: 99.6 % — HIGH (ref 94–98)
SAO2 % BLDA: 99.7 % — HIGH (ref 94–98)
SAO2 % BLDV: 80 % — SIGNIFICANT CHANGE UP (ref 67–88)
SAO2 % BLDV: 85.5 % — SIGNIFICANT CHANGE UP (ref 67–88)
SAO2 % BLDV: 91.4 % — HIGH (ref 67–88)
SODIUM BLDA-SCNC: 131 MMOL/L — LOW (ref 136–145)
SODIUM BLDA-SCNC: 136 MMOL/L — SIGNIFICANT CHANGE UP (ref 136–145)
SODIUM BLDA-SCNC: 137 MMOL/L — SIGNIFICANT CHANGE UP (ref 136–145)
SODIUM BLDA-SCNC: 138 MMOL/L — SIGNIFICANT CHANGE UP (ref 136–145)
SODIUM BLDA-SCNC: 138 MMOL/L — SIGNIFICANT CHANGE UP (ref 136–145)
SODIUM BLDA-SCNC: 139 MMOL/L — SIGNIFICANT CHANGE UP (ref 136–145)
SODIUM SERPL-SCNC: 139 MMOL/L — SIGNIFICANT CHANGE UP (ref 135–145)
SODIUM SERPL-SCNC: 144 MMOL/L — SIGNIFICANT CHANGE UP (ref 135–145)
T4 FREE SERPL-MCNC: 1.1 NG/DL — SIGNIFICANT CHANGE UP (ref 0.9–1.8)
TROPONIN T SERPL-MCNC: 0.33 NG/ML — HIGH (ref 0–0.06)
WBC # BLD: 17.26 K/UL — HIGH (ref 3.8–10.5)
WBC # BLD: 8.41 K/UL — SIGNIFICANT CHANGE UP (ref 3.8–10.5)
WBC # FLD AUTO: 17.26 K/UL — HIGH (ref 3.8–10.5)
WBC # FLD AUTO: 8.41 K/UL — SIGNIFICANT CHANGE UP (ref 3.8–10.5)

## 2022-11-22 PROCEDURE — 33508 ENDOSCOPIC VEIN HARVEST: CPT | Mod: 59

## 2022-11-22 PROCEDURE — 93010 ELECTROCARDIOGRAM REPORT: CPT

## 2022-11-22 PROCEDURE — 99291 CRITICAL CARE FIRST HOUR: CPT | Mod: 25

## 2022-11-22 PROCEDURE — 33518 CABG ARTERY-VEIN TWO: CPT

## 2022-11-22 PROCEDURE — 33518 CABG ARTERY-VEIN TWO: CPT | Mod: AS

## 2022-11-22 PROCEDURE — 33533 CABG ARTERIAL SINGLE: CPT

## 2022-11-22 PROCEDURE — 33533 CABG ARTERIAL SINGLE: CPT | Mod: AS

## 2022-11-22 PROCEDURE — 71045 X-RAY EXAM CHEST 1 VIEW: CPT | Mod: 26

## 2022-11-22 DEVICE — PACING WIRE CLEAR 0 24" SC-2 CV-24: Type: IMPLANTABLE DEVICE | Status: FUNCTIONAL

## 2022-11-22 DEVICE — SURGICEL NU-KNIT 6 X 9": Type: IMPLANTABLE DEVICE | Status: FUNCTIONAL

## 2022-11-22 DEVICE — SURGICEL FIBRILLAR 4 X 4": Type: IMPLANTABLE DEVICE | Status: FUNCTIONAL

## 2022-11-22 DEVICE — CHEST DRAIN PLEUR-EVAC 32FR STRAIGHT: Type: IMPLANTABLE DEVICE | Status: FUNCTIONAL

## 2022-11-22 DEVICE — LIGATING CLIPS WECK HORIZON SMALL-WIDE (RED) 24: Type: IMPLANTABLE DEVICE | Status: FUNCTIONAL

## 2022-11-22 DEVICE — LIGATING CLIPS WECK HORIZON LARGE (ORANGE) 6: Type: IMPLANTABLE DEVICE | Status: FUNCTIONAL

## 2022-11-22 DEVICE — CANNULA ATRASUMP 1/4" X 38CM: Type: IMPLANTABLE DEVICE | Status: FUNCTIONAL

## 2022-11-22 DEVICE — CANNULA VENOUS 2 STAGE OPEN LIGHTHOUSE TIP 32-40FR X 1/2" NON-VENTED: Type: IMPLANTABLE DEVICE | Status: FUNCTIONAL

## 2022-11-22 DEVICE — CANNULA ARTERIAL SOFT-FLOW 21FR EXTENDED VENTED: Type: IMPLANTABLE DEVICE | Status: FUNCTIONAL

## 2022-11-22 DEVICE — KIT A-LINE 1LUM 20G X 12CM SAFE KIT: Type: IMPLANTABLE DEVICE | Status: FUNCTIONAL

## 2022-11-22 DEVICE — PACING WIRE BLUE DARK 2-0 24" BB-1 SKS-3: Type: IMPLANTABLE DEVICE | Status: FUNCTIONAL

## 2022-11-22 DEVICE — TISSEEL 10ML: Type: IMPLANTABLE DEVICE | Status: FUNCTIONAL

## 2022-11-22 DEVICE — PACING WIRE ORANGE M-25 WINGED WIRE 37MM X 89MM: Type: IMPLANTABLE DEVICE | Status: FUNCTIONAL

## 2022-11-22 DEVICE — CANNULA VESSEL 3MM BLUNT TIP CLEAR 1-WAY VALVE: Type: IMPLANTABLE DEVICE | Status: FUNCTIONAL

## 2022-11-22 DEVICE — CATH THERMAL OXI SWAN GANZ DILUTION 7.5FR: Type: IMPLANTABLE DEVICE | Status: FUNCTIONAL

## 2022-11-22 DEVICE — CANNULA AORTIC ROOT WITH VENT LINE 12G X 14CM FLANGED: Type: IMPLANTABLE DEVICE | Status: FUNCTIONAL

## 2022-11-22 DEVICE — FLOSEAL FAST PREP 10ML: Type: IMPLANTABLE DEVICE | Status: FUNCTIONAL

## 2022-11-22 RX ORDER — ALBUMIN HUMAN 25 %
250 VIAL (ML) INTRAVENOUS ONCE
Refills: 0 | Status: COMPLETED | OUTPATIENT
Start: 2022-11-22 | End: 2022-11-22

## 2022-11-22 RX ORDER — CEFUROXIME AXETIL 250 MG
1500 TABLET ORAL EVERY 8 HOURS
Refills: 0 | Status: COMPLETED | OUTPATIENT
Start: 2022-11-22 | End: 2022-11-24

## 2022-11-22 RX ORDER — CHLORHEXIDINE GLUCONATE 213 G/1000ML
1 SOLUTION TOPICAL DAILY
Refills: 0 | Status: DISCONTINUED | OUTPATIENT
Start: 2022-11-22 | End: 2022-11-27

## 2022-11-22 RX ORDER — ACETAMINOPHEN 500 MG
1000 TABLET ORAL ONCE
Refills: 0 | Status: COMPLETED | OUTPATIENT
Start: 2022-11-22 | End: 2022-11-22

## 2022-11-22 RX ORDER — SODIUM CHLORIDE 9 MG/ML
500 INJECTION, SOLUTION INTRAVENOUS ONCE
Refills: 0 | Status: COMPLETED | OUTPATIENT
Start: 2022-11-22 | End: 2022-11-22

## 2022-11-22 RX ORDER — VANCOMYCIN HCL 1 G
1250 VIAL (EA) INTRAVENOUS EVERY 12 HOURS
Refills: 0 | Status: COMPLETED | OUTPATIENT
Start: 2022-11-22 | End: 2022-11-24

## 2022-11-22 RX ORDER — VANCOMYCIN HCL 1 G
1000 VIAL (EA) INTRAVENOUS EVERY 12 HOURS
Refills: 0 | Status: DISCONTINUED | OUTPATIENT
Start: 2022-11-22 | End: 2022-11-22

## 2022-11-22 RX ORDER — POTASSIUM CHLORIDE 20 MEQ
10 PACKET (EA) ORAL
Refills: 0 | Status: DISCONTINUED | OUTPATIENT
Start: 2022-11-22 | End: 2022-11-23

## 2022-11-22 RX ORDER — INSULIN HUMAN 100 [IU]/ML
2 INJECTION, SOLUTION SUBCUTANEOUS
Qty: 50 | Refills: 0 | Status: DISCONTINUED | OUTPATIENT
Start: 2022-11-22 | End: 2022-11-24

## 2022-11-22 RX ORDER — ASPIRIN/CALCIUM CARB/MAGNESIUM 324 MG
81 TABLET ORAL ONCE
Refills: 0 | Status: COMPLETED | OUTPATIENT
Start: 2022-11-22 | End: 2022-11-22

## 2022-11-22 RX ORDER — ATORVASTATIN CALCIUM 80 MG/1
80 TABLET, FILM COATED ORAL AT BEDTIME
Refills: 0 | Status: DISCONTINUED | OUTPATIENT
Start: 2022-11-23 | End: 2022-12-14

## 2022-11-22 RX ORDER — NOREPINEPHRINE BITARTRATE/D5W 8 MG/250ML
0.05 PLASTIC BAG, INJECTION (ML) INTRAVENOUS
Qty: 8 | Refills: 0 | Status: DISCONTINUED | OUTPATIENT
Start: 2022-11-22 | End: 2022-11-23

## 2022-11-22 RX ORDER — FERROUS SULFATE 325(65) MG
325 TABLET ORAL DAILY
Refills: 0 | Status: DISCONTINUED | OUTPATIENT
Start: 2022-11-22 | End: 2022-12-14

## 2022-11-22 RX ORDER — ASCORBIC ACID 60 MG
500 TABLET,CHEWABLE ORAL DAILY
Refills: 0 | Status: DISCONTINUED | OUTPATIENT
Start: 2022-11-22 | End: 2022-12-14

## 2022-11-22 RX ORDER — ACETAMINOPHEN 500 MG
650 TABLET ORAL EVERY 6 HOURS
Refills: 0 | Status: DISCONTINUED | OUTPATIENT
Start: 2022-11-22 | End: 2022-12-14

## 2022-11-22 RX ORDER — DEXTROSE 50 % IN WATER 50 %
25 SYRINGE (ML) INTRAVENOUS
Refills: 0 | Status: DISCONTINUED | OUTPATIENT
Start: 2022-11-22 | End: 2022-12-04

## 2022-11-22 RX ORDER — FENTANYL CITRATE 50 UG/ML
50 INJECTION INTRAVENOUS ONCE
Refills: 0 | Status: DISCONTINUED | OUTPATIENT
Start: 2022-11-22 | End: 2022-11-22

## 2022-11-22 RX ORDER — POLYETHYLENE GLYCOL 3350 17 G/17G
17 POWDER, FOR SOLUTION ORAL DAILY
Refills: 0 | Status: DISCONTINUED | OUTPATIENT
Start: 2022-11-23 | End: 2022-12-14

## 2022-11-22 RX ORDER — POTASSIUM CHLORIDE 20 MEQ
10 PACKET (EA) ORAL
Refills: 0 | Status: COMPLETED | OUTPATIENT
Start: 2022-11-22 | End: 2022-11-22

## 2022-11-22 RX ORDER — HYDROMORPHONE HYDROCHLORIDE 2 MG/ML
0.5 INJECTION INTRAMUSCULAR; INTRAVENOUS; SUBCUTANEOUS ONCE
Refills: 0 | Status: DISCONTINUED | OUTPATIENT
Start: 2022-11-22 | End: 2022-11-22

## 2022-11-22 RX ORDER — FOLIC ACID 0.8 MG
1 TABLET ORAL DAILY
Refills: 0 | Status: DISCONTINUED | OUTPATIENT
Start: 2022-11-22 | End: 2022-12-14

## 2022-11-22 RX ORDER — DEXTROSE 50 % IN WATER 50 %
50 SYRINGE (ML) INTRAVENOUS
Refills: 0 | Status: DISCONTINUED | OUTPATIENT
Start: 2022-11-22 | End: 2022-12-04

## 2022-11-22 RX ORDER — PANTOPRAZOLE SODIUM 20 MG/1
40 TABLET, DELAYED RELEASE ORAL ONCE
Refills: 0 | Status: COMPLETED | OUTPATIENT
Start: 2022-11-22 | End: 2022-11-22

## 2022-11-22 RX ORDER — SERTRALINE 25 MG/1
100 TABLET, FILM COATED ORAL DAILY
Refills: 0 | Status: CANCELLED | OUTPATIENT
Start: 2022-11-22 | End: 2022-12-14

## 2022-11-22 RX ORDER — PANTOPRAZOLE SODIUM 20 MG/1
40 TABLET, DELAYED RELEASE ORAL DAILY
Refills: 0 | Status: DISCONTINUED | OUTPATIENT
Start: 2022-11-23 | End: 2022-12-14

## 2022-11-22 RX ORDER — SODIUM CHLORIDE 9 MG/ML
1000 INJECTION INTRAMUSCULAR; INTRAVENOUS; SUBCUTANEOUS
Refills: 0 | Status: DISCONTINUED | OUTPATIENT
Start: 2022-11-22 | End: 2022-11-25

## 2022-11-22 RX ORDER — DEXMEDETOMIDINE HYDROCHLORIDE IN 0.9% SODIUM CHLORIDE 4 UG/ML
0.02 INJECTION INTRAVENOUS
Qty: 200 | Refills: 0 | Status: DISCONTINUED | OUTPATIENT
Start: 2022-11-22 | End: 2022-11-22

## 2022-11-22 RX ORDER — OXYCODONE HYDROCHLORIDE 5 MG/1
10 TABLET ORAL EVERY 4 HOURS
Refills: 0 | Status: DISCONTINUED | OUTPATIENT
Start: 2022-11-22 | End: 2022-11-29

## 2022-11-22 RX ORDER — ASPIRIN/CALCIUM CARB/MAGNESIUM 324 MG
81 TABLET ORAL DAILY
Refills: 0 | Status: DISCONTINUED | OUTPATIENT
Start: 2022-11-23 | End: 2022-12-14

## 2022-11-22 RX ORDER — CHLORHEXIDINE GLUCONATE 213 G/1000ML
15 SOLUTION TOPICAL EVERY 12 HOURS
Refills: 0 | Status: DISCONTINUED | OUTPATIENT
Start: 2022-11-22 | End: 2022-11-22

## 2022-11-22 RX ORDER — LANOLIN ALCOHOL/MO/W.PET/CERES
5 CREAM (GRAM) TOPICAL AT BEDTIME
Refills: 0 | Status: DISCONTINUED | OUTPATIENT
Start: 2022-11-22 | End: 2022-12-14

## 2022-11-22 RX ORDER — SENNA PLUS 8.6 MG/1
2 TABLET ORAL AT BEDTIME
Refills: 0 | Status: DISCONTINUED | OUTPATIENT
Start: 2022-11-23 | End: 2022-12-14

## 2022-11-22 RX ORDER — OXYCODONE HYDROCHLORIDE 5 MG/1
5 TABLET ORAL EVERY 4 HOURS
Refills: 0 | Status: DISCONTINUED | OUTPATIENT
Start: 2022-11-22 | End: 2022-11-29

## 2022-11-22 RX ADMIN — FENTANYL CITRATE 50 MICROGRAM(S): 50 INJECTION INTRAVENOUS at 16:10

## 2022-11-22 RX ADMIN — SODIUM CHLORIDE 10 MILLILITER(S): 9 INJECTION INTRAMUSCULAR; INTRAVENOUS; SUBCUTANEOUS at 17:38

## 2022-11-22 RX ADMIN — CHLORHEXIDINE GLUCONATE 1 APPLICATION(S): 213 SOLUTION TOPICAL at 17:54

## 2022-11-22 RX ADMIN — MUPIROCIN 1 APPLICATION(S): 20 OINTMENT TOPICAL at 05:15

## 2022-11-22 RX ADMIN — Medication 100 MILLIGRAM(S): at 17:37

## 2022-11-22 RX ADMIN — FENTANYL CITRATE 50 MICROGRAM(S): 50 INJECTION INTRAVENOUS at 16:25

## 2022-11-22 RX ADMIN — SODIUM CHLORIDE 3 MILLILITER(S): 9 INJECTION INTRAMUSCULAR; INTRAVENOUS; SUBCUTANEOUS at 05:07

## 2022-11-22 RX ADMIN — Medication 81 MILLIGRAM(S): at 18:35

## 2022-11-22 RX ADMIN — Medication 100 MILLIEQUIVALENT(S): at 16:12

## 2022-11-22 RX ADMIN — Medication 125 MILLILITER(S): at 14:04

## 2022-11-22 RX ADMIN — Medication 25 MILLIGRAM(S): at 05:13

## 2022-11-22 RX ADMIN — Medication 166.67 MILLIGRAM(S): at 19:28

## 2022-11-22 RX ADMIN — CHLORHEXIDINE GLUCONATE 1 APPLICATION(S): 213 SOLUTION TOPICAL at 05:13

## 2022-11-22 RX ADMIN — Medication 100 MILLIGRAM(S): at 23:58

## 2022-11-22 RX ADMIN — SODIUM CHLORIDE 1000 MILLILITER(S): 9 INJECTION, SOLUTION INTRAVENOUS at 14:05

## 2022-11-22 RX ADMIN — OXYCODONE HYDROCHLORIDE 10 MILLIGRAM(S): 5 TABLET ORAL at 22:15

## 2022-11-22 RX ADMIN — HYDROMORPHONE HYDROCHLORIDE 0.5 MILLIGRAM(S): 2 INJECTION INTRAMUSCULAR; INTRAVENOUS; SUBCUTANEOUS at 20:00

## 2022-11-22 RX ADMIN — PANTOPRAZOLE SODIUM 40 MILLIGRAM(S): 20 TABLET, DELAYED RELEASE ORAL at 14:05

## 2022-11-22 RX ADMIN — Medication 100 MILLIEQUIVALENT(S): at 16:19

## 2022-11-22 RX ADMIN — CHLORHEXIDINE GLUCONATE 15 MILLILITER(S): 213 SOLUTION TOPICAL at 05:13

## 2022-11-22 RX ADMIN — PANTOPRAZOLE SODIUM 40 MILLIGRAM(S): 20 TABLET, DELAYED RELEASE ORAL at 05:13

## 2022-11-22 RX ADMIN — HYDROMORPHONE HYDROCHLORIDE 0.5 MILLIGRAM(S): 2 INJECTION INTRAMUSCULAR; INTRAVENOUS; SUBCUTANEOUS at 19:40

## 2022-11-22 RX ADMIN — Medication 125 MILLILITER(S): at 20:27

## 2022-11-22 RX ADMIN — Medication 1000 MILLIGRAM(S): at 17:55

## 2022-11-22 RX ADMIN — Medication 400 MILLIGRAM(S): at 17:40

## 2022-11-22 RX ADMIN — Medication 125 MILLILITER(S): at 19:42

## 2022-11-22 RX ADMIN — Medication 5 MILLIGRAM(S): at 21:26

## 2022-11-22 RX ADMIN — OXYCODONE HYDROCHLORIDE 10 MILLIGRAM(S): 5 TABLET ORAL at 21:45

## 2022-11-22 NOTE — BRIEF OPERATIVE NOTE - COMMENTS
Cell Saver Utilized - Unable to Quantify Blood Loss  Invasive Lines: RRAL, AISHA Cordis/SGC  IV Medication Infusions: Levo, Insulin  No qualified resident was available to assist in this case. I have personally first assisted the Cardiothoracic Surgeon listed in this brief op note throughout the entirety of this case.

## 2022-11-22 NOTE — PROGRESS NOTE ADULT - SUBJECTIVE AND OBJECTIVE BOX
BRIE WEBB  [id number]    HPI:  66 yo male former smoker with h/o HTN , CAD, s/p PCI (LEELA x 1 pRCA ), ACC/AHA stage C, NYHA functional class 3, HFrEF, depression who was admitted to [facility] in May 2022 for GI bleed. During the admission he had a cardiac work up which included an echo which showed and EF of 30-35%. He had a LHC which showed a  of the mLAD, 50% mLCX stenosis, and an 80% OM2 stenosis. He was referred to CT surgery for CABG, but surgery was deferred due to alcoholism and needed hepatology and gastroenterology consultation. He was worked up by GI and Hepatology, and is now proceeding with CABG, scheduled for tomorrow. Now he presents for [facility] prior to surgical intervention.  (2022 12:49)      Surgery/Hospital Course:  ·  PRE-OP DIAGNOSIS:  CAD in native artery  ·  POST-OP DIAGNOSIS:  CAD in native artery   ·  PROCEDURES:  CABG X 3 ,  with JACQUIE 2022   Today:  No acute events     ICU Vital Signs Last 24 Hrs  T(C): 36.6 (2022 05:48), Max: 37.1 (2022 21:10)  T(F): 97.9 (2022 05:48), Max: 98.7 (2022 21:10)  HR: 86 (2022 05:48) (63 - 92)  BP: 147/73 (2022 05:48) (107/67 - 147/73)  BP(mean): 80 (2022 15:32) (80 - 80)  ABP: --  ABP(mean): --  RR: 16 (2022 05:48) (16 - 17)  SpO2: 96% (2022 05:48) (95% - 96%)    O2 Parameters below as of 2022 04:17  Patient On (Oxygen Delivery Method): room air            Physical Exam:  Gen:sedated  CNS: non focal 	  Neck: no JVD  RES : clear , no wheezing              CVS: Regular  rhythm. Normal S1/S2  Abd: Soft, non-distended. Bowel sounds present.  Skin: No rash.  Ext:  no edema    ============================I/O===========================   I&O's Detail    2022 07:01  -  2022 07:00  --------------------------------------------------------  IN:    Oral Fluid: 240 mL  Total IN: 240 mL    OUT:    Voided (mL): 220 mL  Total OUT: 220 mL    Total NET: 20 mL        ============================ LABS =========================                        12.8   8.41  )-----------( 185      ( 2022 05:34 )             36.6     11-    139  |  102  |  16.6  ----------------------------<  170<H>  4.0   |  26.0  |  0.70    Ca    10.2      2022 05:34  Mg     1.5     -    TPro  7.0  /  Alb  4.4  /  TBili  0.4  /  DBili  x   /  AST  17  /  ALT  15  /  AlkPhos  68  -    LIVER FUNCTIONS - ( 2022 05:34 )  Alb: 4.4 g/dL / Pro: 7.0 g/dL / ALK PHOS: 68 U/L / ALT: 15 U/L / AST: 17 U/L / GGT: x           PT/INR - ( 2022 14:08 )   PT: 13.4 sec;   INR: 1.15 ratio         PTT - ( 2022 14:08 )  PTT:32.8 sec  ABG - ( 2022 11:44 )  pH, Arterial: 7.370 pH, Blood: x     /  pCO2: 42    /  pO2: 436   / HCO3: 24    / Base Excess: -1.0  /  SaO2: 99.6              Urinalysis Basic - ( 2022 16:00 )    Color: Yellow / Appearance: Clear / S.010 / pH: x  Gluc: x / Ketone: Negative  / Bili: Negative / Urobili: Negative mg/dL   Blood: x / Protein: Negative / Nitrite: Negative   Leuk Esterase: Negative / RBC: x / WBC x   Sq Epi: x / Non Sq Epi: x / Bacteria: x      ======================Micro/Rad/Cardio=================  Culture: Reviewed   CXR: Reviewed  Echo:Reviewed  ======================================================  PAST MEDICAL & SURGICAL HISTORY:  Pancreatitis      Hypertension      Myocardial infarct      Alcohol abuse      Colon cancer      History of colon resection      History of heart surgery  cardiac stent        ====================ASSESSMENT ==============  66 yo male former smoker with h/o HTN , CAD, s/p PCI (LEELA x 1 pRCA ), ACC/AHA stage C, NYHA functional class 3, HFrEF, depression who was admitted to [facility] in May 2022 for GI bleed. During the admission he had a cardiac work up which included an echo which showed and EF of 30-35%. He had a LHC which showed a  of the mLAD, 50% mLCX stenosis, and an 80% OM2 stenosis. He was referred to CT surgery for CABG, but surgery was deferred due to alcoholism and needed hepatology and gastroenterology consultation. He was worked up by GI and Hepatology, and is now proceeding with CABG. Now he presents for [facility] prior to surgical intervention.  (2022 12:49)    Pancreatitis  Hypertension  Myocardial infarct  Alcohol abuse  History of colon resection  H/O cardiac stent  CAD in native artery   CABG X 3 ,  with JACQUIE 2022   Post op Hypovolemia  Post op respiratory insufficiency       Plan:  ====================== NEUROLOGY=====================  acetaminophen   IVPB .. 1000 milliGRAM(s) IV Intermittent once  dexMEDEtomidine Infusion 0.02 MICROgram(s)/kG/Hr (0.41 mL/Hr) IV Continuous <Continuous>    ==================== RESPIRATORY======================  Post op respiratory insufficiency    ====================CARDIOVASCULAR==================  Post op Hypovolemia  norepinephrine Infusion 0.05 MICROgram(s)/kG/Min (7.74 mL/Hr) IV Continuous <Continuous>    ===================HEMATOLOGIC/ONC ===================  Monitor H&H/Plts    aspirin  chewable 81 milliGRAM(s) Oral once    ===================== RENAL =========================  Continue monitoring urine output, I&OS, BUN/Cr     ==================== GASTROINTESTINAL===================  pantoprazole  Injectable 40 milliGRAM(s) IV Push once  potassium chloride  10 mEq/50 mL IVPB 10 milliEquivalent(s) IV Intermittent every 1 hour  potassium chloride  10 mEq/50 mL IVPB 10 milliEquivalent(s) IV Intermittent every 1 hour  potassium chloride  10 mEq/50 mL IVPB 10 milliEquivalent(s) IV Intermittent every 1 hour  sodium chloride 0.9%. 1000 milliLiter(s) (10 mL/Hr) IV Continuous <Continuous>  sodium chloride 0.9%. 1000 milliLiter(s) (5 mL/Hr) IV Continuous <Continuous>    =======================    ENDOCRINE  =====================  dextrose 50% Injectable 50 milliLiter(s) IV Push every 15 minutes  dextrose 50% Injectable 25 milliLiter(s) IV Push every 15 minutes  insulin regular Infusion 2 Unit(s)/Hr (2 mL/Hr) IV Continuous <Continuous>    ========================INFECTIOUS DISEASE================  cefuroxime  IVPB 1500 milliGRAM(s) IV Intermittent every 8 hours  vancomycin  IVPB 1250 milliGRAM(s) IV Intermittent every 12 hours      -Close hemodynamic , ventilatory and drain monitoring and management per post op routine .  -Monitor Neurologic status ,   -Head of the bed should remain elevated to 45 degrees,  -Monitor adequacy of oxygenation and ventilation and attempt to wean oxygen ,  -Monitor for arrhythmias and monitor parameters for organ perfusion,  -Glycemic control is satisfactory,  -Nutritional goals will be met using po eventually , insure adequate caloric intake and monitor the same ,  -Electrolytes have been repleted as necessary , pain control has been achieved  and wound care has been carried out ,  -Stress ulcer and VTE prophylaxis will be achieved,  -Agressive PT and early mobility and ambulation goals will be met,  -The family was updated about the course and plan .      I have spent 35 minutes providing acute care for this critically ill patient     Patient requires continuous monitoring with bedside rhythm monitoring, pulse ox monitoring, and intermittent blood gas analysis. Care plan discussed with ICU care team. Patient remained critical and at risk for life threatening decompensation.            BRIE WEBB  MRN-[id number]    HPI:  68 yo male former smoker with h/o HTN , CAD, s/p PCI (LEELA x 1 pRCA ), ACC/AHA stage C, NYHA functional class 3, HFrEF, depression who was admitted to [facility] in May 2022 for GI bleed. During the admission he had a cardiac work up which included an echo which showed and EF of 30-35%. He had a LHC which showed a  of the mLAD, 50% mLCX stenosis, and an 80% OM2 stenosis. He was referred to CT surgery for CABG, but surgery was deferred due to alcoholism and needed hepatology and gastroenterology consultation. He was worked up by GI and Hepatology, and is now proceeding with CABG, scheduled for tomorrow. Now he presents for [facility] prior to surgical intervention.  (2022 12:49)      Surgery/Hospital Course:  ·  PRE-OP DIAGNOSIS:  CAD in native artery  ·  POST-OP DIAGNOSIS:  CAD in native artery   ·  PROCEDURES:  CABG X 3 ,  with JACQUIE 2022   Today:  No acute events     ICU Vital Signs Last 24 Hrs  T(C): 36.6 (2022 05:48), Max: 37.1 (2022 21:10)  T(F): 97.9 (2022 05:48), Max: 98.7 (2022 21:10)  HR: 86 (2022 05:48) (63 - 92)  BP: 147/73 (2022 05:48) (107/67 - 147/73)  BP(mean): 80 (2022 15:32) (80 - 80)  ABP: --  ABP(mean): --  RR: 16 (2022 05:48) (16 - 17)  SpO2: 96% (2022 05:48) (95% - 96%)    O2 Parameters below as of 2022 04:17  Patient On (Oxygen Delivery Method): room air            Physical Exam:  Gen:sedated  CNS: non focal 	  Neck: no JVD  RES : clear , no wheezing              CVS: Regular  rhythm. Normal S1/S2  Abd: Soft, non-distended. Bowel sounds present.  Skin: No rash.  Ext:  no edema    ============================I/O===========================   I&O's Detail    2022 07:01  -  2022 07:00  --------------------------------------------------------  IN:    Oral Fluid: 240 mL  Total IN: 240 mL    OUT:    Voided (mL): 220 mL  Total OUT: 220 mL    Total NET: 20 mL        ============================ LABS =========================                        12.8   8.41  )-----------( 185      ( 2022 05:34 )             36.6     11-    139  |  102  |  16.6  ----------------------------<  170<H>  4.0   |  26.0  |  0.70    Ca    10.2      2022 05:34  Mg     1.5     -    TPro  7.0  /  Alb  4.4  /  TBili  0.4  /  DBili  x   /  AST  17  /  ALT  15  /  AlkPhos  68  -    LIVER FUNCTIONS - ( 2022 05:34 )  Alb: 4.4 g/dL / Pro: 7.0 g/dL / ALK PHOS: 68 U/L / ALT: 15 U/L / AST: 17 U/L / GGT: x           PT/INR - ( 2022 14:08 )   PT: 13.4 sec;   INR: 1.15 ratio         PTT - ( 2022 14:08 )  PTT:32.8 sec  ABG - ( 2022 11:44 )  pH, Arterial: 7.370 pH, Blood: x     /  pCO2: 42    /  pO2: 436   / HCO3: 24    / Base Excess: -1.0  /  SaO2: 99.6              Urinalysis Basic - ( 2022 16:00 )    Color: Yellow / Appearance: Clear / S.010 / pH: x  Gluc: x / Ketone: Negative  / Bili: Negative / Urobili: Negative mg/dL   Blood: x / Protein: Negative / Nitrite: Negative   Leuk Esterase: Negative / RBC: x / WBC x   Sq Epi: x / Non Sq Epi: x / Bacteria: x      ======================Micro/Rad/Cardio=================  Culture: Reviewed   CXR: Reviewed  Echo:Reviewed  ======================================================  PAST MEDICAL & SURGICAL HISTORY:  Pancreatitis      Hypertension      Myocardial infarct      Alcohol abuse      Colon cancer      History of colon resection      History of heart surgery  cardiac stent        ====================ASSESSMENT ==============  68 yo male former smoker with h/o HTN , CAD, s/p PCI (LEELA x 1 pRCA ), ACC/AHA stage C, NYHA functional class 3, HFrEF, depression who was admitted to [facility] in May 2022 for GI bleed. During the admission he had a cardiac work up which included an echo which showed and EF of 30-35%. He had a LHC which showed a  of the mLAD, 50% mLCX stenosis, and an 80% OM2 stenosis. He was referred to CT surgery for CABG, but surgery was deferred due to alcoholism and needed hepatology and gastroenterology consultation. He was worked up by GI and Hepatology, and is now proceeding with CABG. Now he presents for [facility] prior to surgical intervention.  (2022 12:49)    Pancreatitis  Hypertension  Myocardial infarct  Alcohol abuse  History of colon resection  H/O cardiac stent  CAD in native artery   CABG X 3 ,  with JACQUIE 2022   Post op Hypovolemic shock  Post op respiratory insufficiency       Plan:  ====================== NEUROLOGY=====================  acetaminophen   IVPB .. 1000 milliGRAM(s) IV Intermittent once  dexMEDEtomidine Infusion 0.02 MICROgram(s)/kG/Hr (0.41 mL/Hr) IV Continuous <Continuous>    ==================== RESPIRATORY======================  Post op respiratory insufficiency    ====================CARDIOVASCULAR==================  Post op Hypovolemic shock  norepinephrine Infusion 0.05 MICROgram(s)/kG/Min (7.74 mL/Hr) IV Continuous <Continuous>    ===================HEMATOLOGIC/ONC ===================  Monitor H&H/Plts    aspirin  chewable 81 milliGRAM(s) Oral once    ===================== RENAL =========================  Continue monitoring urine output, I&OS, BUN/Cr     ==================== GASTROINTESTINAL===================  pantoprazole  Injectable 40 milliGRAM(s) IV Push once  potassium chloride  10 mEq/50 mL IVPB 10 milliEquivalent(s) IV Intermittent every 1 hour  potassium chloride  10 mEq/50 mL IVPB 10 milliEquivalent(s) IV Intermittent every 1 hour  potassium chloride  10 mEq/50 mL IVPB 10 milliEquivalent(s) IV Intermittent every 1 hour  sodium chloride 0.9%. 1000 milliLiter(s) (10 mL/Hr) IV Continuous <Continuous>  sodium chloride 0.9%. 1000 milliLiter(s) (5 mL/Hr) IV Continuous <Continuous>    =======================    ENDOCRINE  =====================  dextrose 50% Injectable 50 milliLiter(s) IV Push every 15 minutes  dextrose 50% Injectable 25 milliLiter(s) IV Push every 15 minutes  insulin regular Infusion 2 Unit(s)/Hr (2 mL/Hr) IV Continuous <Continuous>    ========================INFECTIOUS DISEASE================  cefuroxime  IVPB 1500 milliGRAM(s) IV Intermittent every 8 hours  vancomycin  IVPB 1250 milliGRAM(s) IV Intermittent every 12 hours      -Close hemodynamic , ventilatory and drain monitoring and management per post op routine .  -Monitor Neurologic status ,   -Head of the bed should remain elevated to 45 degrees,  -Monitor adequacy of oxygenation and ventilation and attempt to wean oxygen ,  -Monitor for arrhythmias and monitor parameters for organ perfusion,  -Glycemic control is satisfactory,  -Nutritional goals will be met using po eventually , insure adequate caloric intake and monitor the same ,  -Electrolytes have been repleted as necessary , pain control has been achieved  and wound care has been carried out ,  -Stress ulcer and VTE prophylaxis will be achieved,  -Agressive PT and early mobility and ambulation goals will be met,  -The family was updated about the course and plan .      I have spent 35 minutes providing acute care for this critically ill patient     Patient requires continuous monitoring with bedside rhythm monitoring, pulse ox monitoring, and intermittent blood gas analysis. Care plan discussed with ICU care team. Patient remained critical and at risk for life threatening decompensation.

## 2022-11-23 LAB
ALBUMIN SERPL ELPH-MCNC: 4 G/DL — SIGNIFICANT CHANGE UP (ref 3.3–5.2)
ALP SERPL-CCNC: 30 U/L — LOW (ref 40–120)
ALT FLD-CCNC: 11 U/L — SIGNIFICANT CHANGE UP
ANION GAP SERPL CALC-SCNC: 10 MMOL/L — SIGNIFICANT CHANGE UP (ref 5–17)
APTT BLD: 23.1 SEC — LOW (ref 27.5–35.5)
AST SERPL-CCNC: 29 U/L — SIGNIFICANT CHANGE UP
BILIRUB SERPL-MCNC: 0.4 MG/DL — SIGNIFICANT CHANGE UP (ref 0.4–2)
BUN SERPL-MCNC: 19.8 MG/DL — SIGNIFICANT CHANGE UP (ref 8–20)
CALCIUM SERPL-MCNC: 8.9 MG/DL — SIGNIFICANT CHANGE UP (ref 8.4–10.5)
CHLORIDE SERPL-SCNC: 107 MMOL/L — SIGNIFICANT CHANGE UP (ref 96–108)
CK MB CFR SERPL CALC: 11.6 NG/ML — HIGH (ref 0–6.7)
CK SERPL-CCNC: 371 U/L — HIGH (ref 30–200)
CO2 SERPL-SCNC: 24 MMOL/L — SIGNIFICANT CHANGE UP (ref 22–29)
CREAT SERPL-MCNC: 0.82 MG/DL — SIGNIFICANT CHANGE UP (ref 0.5–1.3)
EGFR: 97 ML/MIN/1.73M2 — SIGNIFICANT CHANGE UP
GLUCOSE BLDC GLUCOMTR-MCNC: 118 MG/DL — HIGH (ref 70–99)
GLUCOSE BLDC GLUCOMTR-MCNC: 120 MG/DL — HIGH (ref 70–99)
GLUCOSE BLDC GLUCOMTR-MCNC: 127 MG/DL — HIGH (ref 70–99)
GLUCOSE BLDC GLUCOMTR-MCNC: 129 MG/DL — HIGH (ref 70–99)
GLUCOSE BLDC GLUCOMTR-MCNC: 130 MG/DL — HIGH (ref 70–99)
GLUCOSE BLDC GLUCOMTR-MCNC: 137 MG/DL — HIGH (ref 70–99)
GLUCOSE BLDC GLUCOMTR-MCNC: 145 MG/DL — HIGH (ref 70–99)
GLUCOSE BLDC GLUCOMTR-MCNC: 164 MG/DL — HIGH (ref 70–99)
GLUCOSE BLDC GLUCOMTR-MCNC: 180 MG/DL — HIGH (ref 70–99)
GLUCOSE BLDC GLUCOMTR-MCNC: 184 MG/DL — HIGH (ref 70–99)
GLUCOSE BLDC GLUCOMTR-MCNC: 195 MG/DL — HIGH (ref 70–99)
GLUCOSE SERPL-MCNC: 118 MG/DL — HIGH (ref 70–99)
HCT VFR BLD CALC: 24.5 % — LOW (ref 39–50)
HGB BLD-MCNC: 8.5 G/DL — LOW (ref 13–17)
INR BLD: 1.31 RATIO — HIGH (ref 0.88–1.16)
MAGNESIUM SERPL-MCNC: 1.6 MG/DL — SIGNIFICANT CHANGE UP (ref 1.6–2.6)
MCHC RBC-ENTMCNC: 31.5 PG — SIGNIFICANT CHANGE UP (ref 27–34)
MCHC RBC-ENTMCNC: 34.7 GM/DL — SIGNIFICANT CHANGE UP (ref 32–36)
MCV RBC AUTO: 90.7 FL — SIGNIFICANT CHANGE UP (ref 80–100)
PLATELET # BLD AUTO: 114 K/UL — LOW (ref 150–400)
POTASSIUM SERPL-MCNC: 4.6 MMOL/L — SIGNIFICANT CHANGE UP (ref 3.5–5.3)
POTASSIUM SERPL-SCNC: 4.6 MMOL/L — SIGNIFICANT CHANGE UP (ref 3.5–5.3)
PROT SERPL-MCNC: 5.4 G/DL — LOW (ref 6.6–8.7)
PROTHROM AB SERPL-ACNC: 15.2 SEC — HIGH (ref 10.5–13.4)
RBC # BLD: 2.7 M/UL — LOW (ref 4.2–5.8)
RBC # FLD: 14.8 % — HIGH (ref 10.3–14.5)
SODIUM SERPL-SCNC: 141 MMOL/L — SIGNIFICANT CHANGE UP (ref 135–145)
TROPONIN T SERPL-MCNC: 0.28 NG/ML — HIGH (ref 0–0.06)
WBC # BLD: 12.12 K/UL — HIGH (ref 3.8–10.5)
WBC # FLD AUTO: 12.12 K/UL — HIGH (ref 3.8–10.5)

## 2022-11-23 PROCEDURE — 99291 CRITICAL CARE FIRST HOUR: CPT | Mod: 25

## 2022-11-23 PROCEDURE — 99223 1ST HOSP IP/OBS HIGH 75: CPT

## 2022-11-23 PROCEDURE — 71045 X-RAY EXAM CHEST 1 VIEW: CPT | Mod: 26

## 2022-11-23 PROCEDURE — 99024 POSTOP FOLLOW-UP VISIT: CPT

## 2022-11-23 PROCEDURE — 93010 ELECTROCARDIOGRAM REPORT: CPT

## 2022-11-23 RX ORDER — INSULIN GLARGINE 100 [IU]/ML
40 INJECTION, SOLUTION SUBCUTANEOUS AT BEDTIME
Refills: 0 | Status: DISCONTINUED | OUTPATIENT
Start: 2022-11-23 | End: 2022-11-24

## 2022-11-23 RX ORDER — ENOXAPARIN SODIUM 100 MG/ML
40 INJECTION SUBCUTANEOUS EVERY 24 HOURS
Refills: 0 | Status: DISCONTINUED | OUTPATIENT
Start: 2022-11-24 | End: 2022-11-30

## 2022-11-23 RX ORDER — SODIUM CHLORIDE 9 MG/ML
1000 INJECTION, SOLUTION INTRAVENOUS
Refills: 0 | Status: DISCONTINUED | OUTPATIENT
Start: 2022-11-23 | End: 2022-12-04

## 2022-11-23 RX ORDER — DEXTROSE 50 % IN WATER 50 %
15 SYRINGE (ML) INTRAVENOUS ONCE
Refills: 0 | Status: DISCONTINUED | OUTPATIENT
Start: 2022-11-23 | End: 2022-12-04

## 2022-11-23 RX ORDER — IRON SUCROSE 20 MG/ML
100 INJECTION, SOLUTION INTRAVENOUS EVERY 24 HOURS
Refills: 0 | Status: COMPLETED | OUTPATIENT
Start: 2022-11-23 | End: 2022-11-27

## 2022-11-23 RX ORDER — LIDOCAINE 4 G/100G
1 CREAM TOPICAL DAILY
Refills: 0 | Status: DISCONTINUED | OUTPATIENT
Start: 2022-11-23 | End: 2022-12-14

## 2022-11-23 RX ORDER — METOPROLOL TARTRATE 50 MG
25 TABLET ORAL
Refills: 0 | Status: DISCONTINUED | OUTPATIENT
Start: 2022-11-23 | End: 2022-11-24

## 2022-11-23 RX ORDER — GLUCAGON INJECTION, SOLUTION 0.5 MG/.1ML
1 INJECTION, SOLUTION SUBCUTANEOUS ONCE
Refills: 0 | Status: DISCONTINUED | OUTPATIENT
Start: 2022-11-23 | End: 2022-12-14

## 2022-11-23 RX ORDER — INSULIN LISPRO 100/ML
10 VIAL (ML) SUBCUTANEOUS
Refills: 0 | Status: DISCONTINUED | OUTPATIENT
Start: 2022-11-23 | End: 2022-11-24

## 2022-11-23 RX ORDER — MAGNESIUM SULFATE 500 MG/ML
2 VIAL (ML) INJECTION
Refills: 0 | Status: COMPLETED | OUTPATIENT
Start: 2022-11-23 | End: 2022-11-23

## 2022-11-23 RX ORDER — GABAPENTIN 400 MG/1
300 CAPSULE ORAL THREE TIMES A DAY
Refills: 0 | Status: DISCONTINUED | OUTPATIENT
Start: 2022-11-23 | End: 2022-12-14

## 2022-11-23 RX ORDER — INSULIN LISPRO 100/ML
VIAL (ML) SUBCUTANEOUS
Refills: 0 | Status: DISCONTINUED | OUTPATIENT
Start: 2022-11-23 | End: 2022-12-04

## 2022-11-23 RX ADMIN — PANTOPRAZOLE SODIUM 40 MILLIGRAM(S): 20 TABLET, DELAYED RELEASE ORAL at 11:21

## 2022-11-23 RX ADMIN — Medication 100 MILLIGRAM(S): at 16:58

## 2022-11-23 RX ADMIN — Medication 25 MILLIGRAM(S): at 12:23

## 2022-11-23 RX ADMIN — Medication 166.67 MILLIGRAM(S): at 20:06

## 2022-11-23 RX ADMIN — Medication 25 GRAM(S): at 05:20

## 2022-11-23 RX ADMIN — ATORVASTATIN CALCIUM 80 MILLIGRAM(S): 80 TABLET, FILM COATED ORAL at 22:22

## 2022-11-23 RX ADMIN — GABAPENTIN 300 MILLIGRAM(S): 400 CAPSULE ORAL at 22:23

## 2022-11-23 RX ADMIN — Medication 650 MILLIGRAM(S): at 11:17

## 2022-11-23 RX ADMIN — INSULIN HUMAN 2 UNIT(S)/HR: 100 INJECTION, SOLUTION SUBCUTANEOUS at 04:45

## 2022-11-23 RX ADMIN — OXYCODONE HYDROCHLORIDE 5 MILLIGRAM(S): 5 TABLET ORAL at 01:38

## 2022-11-23 RX ADMIN — LIDOCAINE 1 PATCH: 4 CREAM TOPICAL at 12:23

## 2022-11-23 RX ADMIN — OXYCODONE HYDROCHLORIDE 10 MILLIGRAM(S): 5 TABLET ORAL at 20:06

## 2022-11-23 RX ADMIN — Medication 100 MILLIGRAM(S): at 08:17

## 2022-11-23 RX ADMIN — OXYCODONE HYDROCHLORIDE 5 MILLIGRAM(S): 5 TABLET ORAL at 06:30

## 2022-11-23 RX ADMIN — Medication 1 MILLIGRAM(S): at 11:21

## 2022-11-23 RX ADMIN — Medication 500 MILLIGRAM(S): at 11:21

## 2022-11-23 RX ADMIN — POLYETHYLENE GLYCOL 3350 17 GRAM(S): 17 POWDER, FOR SOLUTION ORAL at 11:21

## 2022-11-23 RX ADMIN — OXYCODONE HYDROCHLORIDE 10 MILLIGRAM(S): 5 TABLET ORAL at 04:45

## 2022-11-23 RX ADMIN — GABAPENTIN 300 MILLIGRAM(S): 400 CAPSULE ORAL at 13:03

## 2022-11-23 RX ADMIN — Medication 5 MILLIGRAM(S): at 22:34

## 2022-11-23 RX ADMIN — Medication 166.67 MILLIGRAM(S): at 09:08

## 2022-11-23 RX ADMIN — OXYCODONE HYDROCHLORIDE 5 MILLIGRAM(S): 5 TABLET ORAL at 02:15

## 2022-11-23 RX ADMIN — OXYCODONE HYDROCHLORIDE 10 MILLIGRAM(S): 5 TABLET ORAL at 21:06

## 2022-11-23 RX ADMIN — OXYCODONE HYDROCHLORIDE 10 MILLIGRAM(S): 5 TABLET ORAL at 14:42

## 2022-11-23 RX ADMIN — Medication 650 MILLIGRAM(S): at 01:38

## 2022-11-23 RX ADMIN — OXYCODONE HYDROCHLORIDE 10 MILLIGRAM(S): 5 TABLET ORAL at 10:15

## 2022-11-23 RX ADMIN — Medication 650 MILLIGRAM(S): at 02:15

## 2022-11-23 RX ADMIN — OXYCODONE HYDROCHLORIDE 10 MILLIGRAM(S): 5 TABLET ORAL at 05:15

## 2022-11-23 RX ADMIN — Medication 81 MILLIGRAM(S): at 11:21

## 2022-11-23 RX ADMIN — Medication 25 MILLIGRAM(S): at 18:18

## 2022-11-23 RX ADMIN — OXYCODONE HYDROCHLORIDE 5 MILLIGRAM(S): 5 TABLET ORAL at 07:15

## 2022-11-23 RX ADMIN — CHLORHEXIDINE GLUCONATE 1 APPLICATION(S): 213 SOLUTION TOPICAL at 11:22

## 2022-11-23 RX ADMIN — OXYCODONE HYDROCHLORIDE 10 MILLIGRAM(S): 5 TABLET ORAL at 09:30

## 2022-11-23 RX ADMIN — LIDOCAINE 1 PATCH: 4 CREAM TOPICAL at 20:00

## 2022-11-23 RX ADMIN — Medication 25 GRAM(S): at 06:29

## 2022-11-23 RX ADMIN — OXYCODONE HYDROCHLORIDE 10 MILLIGRAM(S): 5 TABLET ORAL at 13:57

## 2022-11-23 RX ADMIN — Medication 650 MILLIGRAM(S): at 12:00

## 2022-11-23 RX ADMIN — INSULIN GLARGINE 40 UNIT(S): 100 INJECTION, SOLUTION SUBCUTANEOUS at 22:25

## 2022-11-23 RX ADMIN — IRON SUCROSE 210 MILLIGRAM(S): 20 INJECTION, SOLUTION INTRAVENOUS at 16:17

## 2022-11-23 RX ADMIN — Medication 325 MILLIGRAM(S): at 11:21

## 2022-11-23 NOTE — DIETITIAN INITIAL EVALUATION ADULT - ORAL INTAKE PTA/DIET HISTORY
Spoke to pt at bedside; Pt reports eating well PTA; pt reports being at [facility] since may (5 months) and gained 15lbs secondary to eating 3 meals per day, which he does not typically do at home. Pt typically eats 2 meals + snack and follows a low Na+, Diabetic diet. Pt denies difficulty chewing or swallowing post-op. Pt currently taking and tolerating diet well Encouraged HBV protein foods to promote healing. Pt declined diet education at this time.

## 2022-11-23 NOTE — PROGRESS NOTE ADULT - ASSESSMENT
66 yo M PMH HTN, HLD, DM (A1c 7.6), CAD s/p PCI RCA 2007, ETOH cirrhosis, B12/Vit D def., former smoker, recent hospitalization for GIB 05/2022   Admit on 11/21 for repeat cath, CABG 11/22 C3L (LIMA-LAD, SVG-OM, SVG-PDA), out of OR on Levo and insulin gtt  11/22 pt volume resuscitated and phenylephrine gtt slowly weaned off. Pt received PRBC x1 for acute blood loss anemia in ICU.   11/23 tomás removed because of inadequate wave form and unable to draw back. Plan to remove SGC in AM. CI remain2.5-3

## 2022-11-23 NOTE — PROGRESS NOTE ADULT - SUBJECTIVE AND OBJECTIVE BOX
BRIE WEBB  MRN-[id number]    HPI:  68 yo male former smoker with h/o HTN , CAD, s/p PCI (LEEAL x 1 pRCA ), ACC/AHA stage C, NYHA functional class 3, HFrEF, depression who was admitted to [facility] in May 2022 for GI bleed. During the admission he had a cardiac work up which included an echo which showed and EF of 30-35%. He had a LHC which showed a  of the mLAD, 50% mLCX stenosis, and an 80% OM2 stenosis. He was referred to CT surgery for CABG, but surgery was deferred due to alcoholism and needed hepatology and gastroenterology consultation. He was worked up by GI and Hepatology, and is now proceeding with CABG, scheduled for tomorrow. Now he presents for [facility] prior to surgical intervention.  (2022 12:49)    Surgery/Hospital Course:  ·  PRE-OP DIAGNOSIS:  CAD in native artery  ·  POST-OP DIAGNOSIS:  CAD in native artery   ·  PROCEDURES:  CABG X 3 ,  with JACQUIE 2022   Today:  No acute events  start Lopressor  DC [place]  DC a line       ICU Vital Signs Last 24 Hrs  T(C): 36.7 (2022 08:01), Max: 37.2 (2022 16:00)  T(F): 98 (2022 08:01), Max: 99 (2022 16:00)  HR: 98 (2022 12:00) (85 - 117)  BP: 136/65 (2022 12:00) (90/62 - 146/67)  BP(mean): 94 (2022 12:00) (73 - 103)  ABP: 74/66 (2022 03:30) (57/57 - 129/64)  ABP(mean): 70 (2022 03:30) (56 - 102)  RR: 16 (2022 12:00) (10 - 40)  SpO2: 100% (2022 12:00) (94% - 100%)    O2 Parameters below as of 2022 12:00  Patient On (Oxygen Delivery Method): nasal cannula  O2 Flow (L/min): 2          Physical Exam:  Gen: A&O   CNS: non focal 	  Neck: no JVD  RES : clear , no wheezing              CVS: Regular  rhythm. Normal S1/S2  Abd: Soft, non-distended. Bowel sounds present.  Skin: No rash.  Ext:  no edema    ============================I/O===========================   I&O's Detail    2022 07:  -  2022 07:00  --------------------------------------------------------  IN:    Albumin 5%  - 250 mL: 750 mL    Insulin: 77 mL    IV PiggyBack: 500 mL    IV PiggyBack: 150 mL    Lactated Ringers Bolus: 500 mL    Norepinephrine: 41.4 mL    Oral Fluid: 500 mL    PRBCs (Packed Red Blood Cells): 284 mL    sodium chloride 0.9%: 190 mL    sodium chloride 0.9%: 95 mL  Total IN: 3087.4 mL    OUT:    Bulb (mL): 60 mL    Chest Tube (mL): 25 mL    Chest Tube (mL): 390 mL    Chest Tube (mL): 90 mL    Indwelling Catheter - Urethral (mL): 1555 mL  Total OUT: 2120 mL    Total NET: 967.4 mL      2022 07:01  -  2022 12:08  --------------------------------------------------------  IN:    Insulin: 16 mL    sodium chloride 0.9%: 25 mL    sodium chloride 0.9%: 50 mL  Total IN: 91 mL    OUT:    Chest Tube (mL): 0 mL    Chest Tube (mL): 80 mL    Chest Tube (mL): 0 mL    Indwelling Catheter - Urethral (mL): 240 mL  Total OUT: 320 mL    Total NET: -229 mL        ============================ LABS =========================                        8.5    12.12 )-----------( 114      ( 2022 02:00 )             24.5     11    141  |  107  |  19.8  ----------------------------<  118<H>  4.6   |  24.0  |  0.82    Ca    8.9      2022 02:00  Phos  1.0       Mg     1.6         TPro  5.4<L>  /  Alb  4.0  /  TBili  0.4  /  DBili  x   /  AST  29  /  ALT  11  /  AlkPhos  30<L>      LIVER FUNCTIONS - ( 2022 02:00 )  Alb: 4.0 g/dL / Pro: 5.4 g/dL / ALK PHOS: 30 U/L / ALT: 11 U/L / AST: 29 U/L / GGT: x           PT/INR - ( 2022 02:00 )   PT: 15.2 sec;   INR: 1.31 ratio         PTT - ( 2022 02:00 )  PTT:23.1 sec  ABG - ( 2022 16:42 )  pH, Arterial: 7.350 pH, Blood: x     /  pCO2: 43    /  pO2: 158   / HCO3: 24    / Base Excess: -1.9  /  SaO2: 100.0             Urinalysis Basic - ( 2022 16:00 )    Color: Yellow / Appearance: Clear / S.010 / pH: x  Gluc: x / Ketone: Negative  / Bili: Negative / Urobili: Negative mg/dL   Blood: x / Protein: Negative / Nitrite: Negative   Leuk Esterase: Negative / RBC: x / WBC x   Sq Epi: x / Non Sq Epi: x / Bacteria: x      ======================Micro/Rad/Cardio=================  Culture: Reviewed   CXR: Reviewed  Echo:Reviewed  ======================================================  PAST MEDICAL & SURGICAL HISTORY:  Pancreatitis      Hypertension      Myocardial infarct      Alcohol abuse      Colon cancer      History of colon resection      History of heart surgery  cardiac stent        ====================ASSESSMENT ==============  68 yo M PMH HTN, HLD, DM (A1c 7.6), CAD s/p PCI RCA , ETOH cirrhosis, B12/Vit D def., former smoker, recent hospitalization for GIB 2022   Admit on  for repeat cath, CABG  C3L (LIMA-LAD, SVG-OM, SVG-PDA), out of OR on Levo and insulin gtt   pt volume resuscitated and phenylephrine gtt slowly weaned off. Pt received PRBC x1 for acute blood loss anemia in ICU.    tomás removed because of inadequate wave form and unable to draw back. Plan to remove SGC in AM. CI remain2.5-3      --- Hypertension.   --- CAD (coronary artery disease).   ---HLD (hyperlipidemia).   ---Diabetes mellitus.   ---Post op Hypovolemia  ---Post op respiratory insufficiency       Plan:  - start lopressor   - Pain control with oxy ir and tylenol prn  - Encourage DBE/IS, wean NC as tolerated   - Daily CXR  - ASA  therapy for graft patency  - Statin therapy for chronic graft occlusion ppx   - Protonix for GI ppx  - Strict i/o's  - Chemical DVT ppx with lovenox, maintain SCD's for mechanical DVT ppx.      ====================== NEUROLOGY=====================  acetaminophen     Tablet .. 650 milliGRAM(s) Oral every 6 hours PRN Mild Pain (1 - 3)  gabapentin 300 milliGRAM(s) Oral three times a day  melatonin 5 milliGRAM(s) Oral at bedtime  oxyCODONE    IR 5 milliGRAM(s) Oral every 4 hours PRN Moderate Pain (4 - 6)  oxyCODONE    IR 10 milliGRAM(s) Oral every 4 hours PRN Severe Pain (7 - 10)    ==================== RESPIRATORY======================  Post op respiratory insufficiency      ====================CARDIOVASCULAR==================  Post op Hypovolemia  metoprolol tartrate 25 milliGRAM(s) Oral two times a day    ===================HEMATOLOGIC/ONC ===================  Monitor H&H/Plts    aspirin enteric coated 81 milliGRAM(s) Oral daily    ===================== RENAL =========================  Continue monitoring urine output, I&OS, BUN/Cr     ==================== GASTROINTESTINAL===================  ascorbic acid 500 milliGRAM(s) Oral daily  ferrous    sulfate 325 milliGRAM(s) Oral daily  folic acid 1 milliGRAM(s) Oral daily  iron sucrose IVPB 100 milliGRAM(s) IV Intermittent every 24 hours  pantoprazole    Tablet 40 milliGRAM(s) Oral daily  polyethylene glycol 3350 17 Gram(s) Oral daily  potassium chloride  10 mEq/50 mL IVPB 10 milliEquivalent(s) IV Intermittent every 1 hour  potassium chloride  10 mEq/50 mL IVPB 10 milliEquivalent(s) IV Intermittent every 1 hour  potassium chloride  10 mEq/50 mL IVPB 10 milliEquivalent(s) IV Intermittent every 1 hour  senna 2 Tablet(s) Oral at bedtime  sodium chloride 0.9%. 1000 milliLiter(s) (10 mL/Hr) IV Continuous <Continuous>  sodium chloride 0.9%. 1000 milliLiter(s) (5 mL/Hr) IV Continuous <Continuous>    =======================    ENDOCRINE  =====================  atorvastatin 80 milliGRAM(s) Oral at bedtime  dextrose 50% Injectable 50 milliLiter(s) IV Push every 15 minutes  dextrose 50% Injectable 25 milliLiter(s) IV Push every 15 minutes  insulin regular Infusion 2 Unit(s)/Hr (2 mL/Hr) IV Continuous <Continuous>    ========================INFECTIOUS DISEASE================  cefuroxime  IVPB 1500 milliGRAM(s) IV Intermittent every 8 hours  vancomycin  IVPB 1250 milliGRAM(s) IV Intermittent every 12 hours      -Close hemodynamic , ventilatory and drain monitoring and management per post op routine .  -Monitor Neurologic status ,   -Head of the bed should remain elevated to 45 degrees,  -Monitor adequacy of oxygenation and ventilation and attempt to wean oxygen ,  -Monitor for arrhythmias and monitor parameters for organ perfusion,  -Glycemic control is satisfactory,  -Nutritional goals will be met using po eventually , insure adequate caloric intake and monitor the same ,  -Electrolytes have been repleted as necessary , pain control has been achieved  and wound care has been carried out ,  -Stress ulcer and VTE prophylaxis will be achieved,  -Agressive PT and early mobility and ambulation goals will be met,  -The family was updated about the course and plan .      I have spent 35 minutes providing acute care for this critically ill patient     Patient requires continuous monitoring with bedside rhythm monitoring, pulse ox monitoring, and intermittent blood gas analysis. Care plan discussed with ICU care team. Patient remained critical and at risk for life threatening decompensation.

## 2022-11-23 NOTE — PHYSICAL THERAPY INITIAL EVALUATION ADULT - ADDITIONAL COMMENTS
Pt states he was at [facility] prior to surgery for rehab./to get stronger;  Pt. is unsure as to where he will go upon discharge.

## 2022-11-23 NOTE — DIETITIAN INITIAL EVALUATION ADULT - NSFNSGIIOFT_GEN_A_CORE
11-22-22 @ 07:01  -  11-23-22 @ 07:00  --------------------------------------------------------  OUT:    Chest Tube (mL): 90 mL    Chest Tube (mL): 390 mL    Chest Tube (mL): 25 mL  Total OUT: 505 mL    Total NET: -505 mL      11-23-22 @ 07:01  -  11-23-22 @ 10:32  --------------------------------------------------------  OUT:    Chest Tube (mL): 0 mL    Chest Tube (mL): 0 mL    Chest Tube (mL): 20 mL  Total OUT: 20 mL    Total NET: -20 mL

## 2022-11-23 NOTE — DIETITIAN INITIAL EVALUATION ADULT - OTHER INFO
Pt is a 68 yo M PMH HTN, HLD, DM (A1c 7.6), CAD s/p PCI RCA 2007, ETOH cirrhosis, B12/Vit D def., former smoker, recent hospitalization for GIB 05/2022   Admit on 11/21 for repeat cath, CABG 11/22 C3L (LIMA-LAD, SVG-OM, SVG-PDA), out of OR on Levo and insulin gtt  11/22 pt volume resuscitated and phenylephrine gtt slowly weaned off. Pt received PRBC x1 for acute blood loss anemia in ICU.   11/23 tomás removed because of inadequate wave form and unable to draw back. Plan to remove SGC in AM. CI remain2.5-3

## 2022-11-23 NOTE — PROGRESS NOTE ADULT - SUBJECTIVE AND OBJECTIVE BOX
Subjective: no c/o incisional pain at this time. Denies CP, SOB, palpitations, N/V, other c/o.    all other ROS negative x10 except as noted above     T(C): 36.6 (11-23-22 @ 00:00), Max: 37.2 (11-22-22 @ 16:00)  HR: 98 (11-23-22 @ 01:00) (63 - 103)  BP: 117/69 (11-23-22 @ 01:00) (90/62 - 147/73)  ABP: 69/60 (11-23-22 @ 01:00) (57/57 - 129/64)  ABP(mean): 64 (11-23-22 @ 01:00) (56 - 102)  RR: 24 (11-23-22 @ 01:00) (10 - 36)  SpO2: 100% (11-23-22 @ 01:00) (96% - 100%)  Wt(kg): --  CVP(mm Hg): 10 (11-23-22 @ 01:00) (1 - 341)  CO: 5.4 (11-23-22 @ 01:00) (3.6 - 5.5)  CI: 2.6 (11-23-22 @ 01:00) (1.7 - 2.6)  PA: 26/12 (11-23-22 @ 01:00) (14/4 - 31/17)   Mode: CPAP with PS  FiO2: 40  PEEP: 5  PS: 5  MAP: 8      I&O's Detail    21 Nov 2022 07:01  -  22 Nov 2022 07:00  --------------------------------------------------------  IN:    Oral Fluid: 240 mL  Total IN: 240 mL    OUT:    Voided (mL): 220 mL  Total OUT: 220 mL    Total NET: 20 mL      22 Nov 2022 07:01  -  23 Nov 2022 01:22  --------------------------------------------------------  IN:    Albumin 5%  - 250 mL: 750 mL    Insulin: 57 mL    IV PiggyBack: 450 mL    IV PiggyBack: 150 mL    Lactated Ringers Bolus: 500 mL    Norepinephrine: 41.4 mL    Oral Fluid: 500 mL    PRBCs (Packed Red Blood Cells): 284 mL    sodium chloride 0.9%: 130 mL    sodium chloride 0.9%: 65 mL  Total IN: 2927.4 mL    OUT:    Bulb (mL): 30 mL    Chest Tube (mL): 260 mL    Chest Tube (mL): 70 mL    Chest Tube (mL): 15 mL    Indwelling Catheter - Urethral (mL): 1210 mL  Total OUT: 1585 mL    Total NET: 1342.4 mL          LABS: All Lab data reviewed and analyzed                        9.9    17.26 )-----------( 216      ( 22 Nov 2022 13:20 )             27.7     11-22    144  |  109<H>  |  14.6  ----------------------------<  154<H>  4.0   |  23.0  |  0.72    Ca    9.2      22 Nov 2022 13:20  Phos  1.0     11-22  Mg     2.2     11-22    TPro  4.8<L>  /  Alb  3.1<L>  /  TBili  0.8  /  DBili  x   /  AST  25  /  ALT  11  /  AlkPhos  38<L>  11-22    PT/INR - ( 22 Nov 2022 13:20 )   PT: 17.2 sec;   INR: 1.48 ratio         PTT - ( 22 Nov 2022 13:20 )  PTT:29.8 sec  CARDIAC MARKERS ( 22 Nov 2022 13:20 )   U/L / CKMB16.9 ng/mL / Troponin T0.33 ng/mL /      ABG - ( 22 Nov 2022 16:42 )  pH, Arterial: 7.350 pH, Blood: x     /  pCO2: 43    /  pO2: 158   / HCO3: 24    / Base Excess: -1.9  /  SaO2: 100.0             CAPILLARY BLOOD GLUCOSE      POCT Blood Glucose.: 122 mg/dL (22 Nov 2022 23:54)  POCT Blood Glucose.: 153 mg/dL (22 Nov 2022 21:54)  POCT Blood Glucose.: 142 mg/dL (22 Nov 2022 20:52)  POCT Blood Glucose.: 164 mg/dL (22 Nov 2022 19:53)  POCT Blood Glucose.: 178 mg/dL (22 Nov 2022 18:57)  POCT Blood Glucose.: 201 mg/dL (22 Nov 2022 18:03)  POCT Blood Glucose.: 194 mg/dL (22 Nov 2022 15:58)  POCT Blood Glucose.: 199 mg/dL (22 Nov 2022 15:04)  POCT Blood Glucose.: 183 mg/dL (22 Nov 2022 14:12)  POCT Blood Glucose.: 163 mg/dL (22 Nov 2022 05:56)           RADIOLOGY: - Reviewed and analyzed   CXR: pending    HOSPITAL MEDICATIONS: All medications reviewed and analyzed  MEDICATIONS  (STANDING):  ascorbic acid 500 milliGRAM(s) Oral daily  aspirin enteric coated 81 milliGRAM(s) Oral daily  atorvastatin 80 milliGRAM(s) Oral at bedtime  cefuroxime  IVPB 1500 milliGRAM(s) IV Intermittent every 8 hours  chlorhexidine 2% Cloths 1 Application(s) Topical daily  dextrose 50% Injectable 50 milliLiter(s) IV Push every 15 minutes  dextrose 50% Injectable 25 milliLiter(s) IV Push every 15 minutes  ferrous    sulfate 325 milliGRAM(s) Oral daily  folic acid 1 milliGRAM(s) Oral daily  insulin regular Infusion 2 Unit(s)/Hr (2 mL/Hr) IV Continuous <Continuous>  melatonin 5 milliGRAM(s) Oral at bedtime  norepinephrine Infusion 0.05 MICROgram(s)/kG/Min (7.74 mL/Hr) IV Continuous <Continuous>  pantoprazole    Tablet 40 milliGRAM(s) Oral daily  polyethylene glycol 3350 17 Gram(s) Oral daily  potassium chloride  10 mEq/50 mL IVPB 10 milliEquivalent(s) IV Intermittent every 1 hour  potassium chloride  10 mEq/50 mL IVPB 10 milliEquivalent(s) IV Intermittent every 1 hour  potassium chloride  10 mEq/50 mL IVPB 10 milliEquivalent(s) IV Intermittent every 1 hour  senna 2 Tablet(s) Oral at bedtime  sodium chloride 0.9%. 1000 milliLiter(s) (10 mL/Hr) IV Continuous <Continuous>  sodium chloride 0.9%. 1000 milliLiter(s) (5 mL/Hr) IV Continuous <Continuous>  vancomycin  IVPB 1250 milliGRAM(s) IV Intermittent every 12 hours    MEDICATIONS  (PRN):  acetaminophen     Tablet .. 650 milliGRAM(s) Oral every 6 hours PRN Mild Pain (1 - 3)  oxyCODONE    IR 5 milliGRAM(s) Oral every 4 hours PRN Moderate Pain (4 - 6)  oxyCODONE    IR 10 milliGRAM(s) Oral every 4 hours PRN Severe Pain (7 - 10)          Lines:     Physical Exam  Neuro: A+O x 3, non-focal, speech clear and intact  HEENT:  NCAT, PERRL, EOMI. No conjuctival edema or iIcterus, no thrush.  No ETT or NGT/OGT  Neck:  ROM intact, no JVD, no nodes or masses palpable, trachea midline, no tracheostomy  Pulm: CTA, good air entry, equal bilaterally, no rales/rhonchi/wheezing, no accessory muscle use noted  Chest:        mediastinal CT and left pleural CT all with dressings intact and no air leak, no subQ emphysema       +PW attatched to pacing box  CV: RRR, S1, S2. No murmurs, rubs, or gallops noted.  Abd: +BSx4. Soft, nontender, nondistended.  : molina in situ to bedside drainage  Ext: SEGURA x 4, no edema, no cyanosis or clubbing, distal motor/neuro/circ intact  Skin: warm, dry, no rashes                 67yMale with PMH     CABG, with JACQUIE        PAST MEDICAL & SURGICAL HISTORY:  Pancreatitis      Hypertension      Myocardial infarct      Alcohol abuse      Colon cancer      History of colon resection      History of heart surgery  cardiac stent

## 2022-11-23 NOTE — DIETITIAN INITIAL EVALUATION ADULT - NS FNS DIET ORDER
Diet, Regular:   Consistent Carbohydrate {No Snacks} (CSTCHO)  DASH/TLC {Sodium & Cholesterol Restricted} (DASH) (11-23-22 @ 07:57)  Diet, Clear Liquid:   Consistent Carbohydrate {No Snacks} (CSTCHO)  DASH/TLC {Sodium & Cholesterol Restricted} (DASH)  Lacto Veg (Accepts Milk & Milk Products) (11-22-22 @ 11:40)

## 2022-11-23 NOTE — DIETITIAN INITIAL EVALUATION ADULT - PERTINENT LABORATORY DATA
11-23    141  |  107  |  19.8  ----------------------------<  118<H>  4.6   |  24.0  |  0.82    Ca    8.9      23 Nov 2022 02:00  Phos  1.0     11-22  Mg     1.6     11-23    TPro  5.4<L>  /  Alb  4.0  /  TBili  0.4  /  DBili  x   /  AST  29  /  ALT  11  /  AlkPhos  30<L>  11-23  POCT Blood Glucose.: 129 mg/dL (11-23-22 @ 09:08)  A1C with Estimated Average Glucose Result: 7.6 % (11-21-22 @ 14:08)  A1C with Estimated Average Glucose Result: 7.5 % (11-21-22 @ 08:15)  A1C with Estimated Average Glucose Result: 14.0 % (07-20-22 @ 19:34)

## 2022-11-23 NOTE — DIETITIAN INITIAL EVALUATION ADULT - PERTINENT MEDS FT
MEDICATIONS  (STANDING):  ascorbic acid 500 milliGRAM(s) Oral daily  aspirin enteric coated 81 milliGRAM(s) Oral daily  atorvastatin 80 milliGRAM(s) Oral at bedtime  cefuroxime  IVPB 1500 milliGRAM(s) IV Intermittent every 8 hours  chlorhexidine 2% Cloths 1 Application(s) Topical daily  dextrose 50% Injectable 50 milliLiter(s) IV Push every 15 minutes  dextrose 50% Injectable 25 milliLiter(s) IV Push every 15 minutes  ferrous    sulfate 325 milliGRAM(s) Oral daily  folic acid 1 milliGRAM(s) Oral daily  insulin regular Infusion 2 Unit(s)/Hr (2 mL/Hr) IV Continuous <Continuous>  melatonin 5 milliGRAM(s) Oral at bedtime  pantoprazole    Tablet 40 milliGRAM(s) Oral daily  polyethylene glycol 3350 17 Gram(s) Oral daily  potassium chloride  10 mEq/50 mL IVPB 10 milliEquivalent(s) IV Intermittent every 1 hour  potassium chloride  10 mEq/50 mL IVPB 10 milliEquivalent(s) IV Intermittent every 1 hour  potassium chloride  10 mEq/50 mL IVPB 10 milliEquivalent(s) IV Intermittent every 1 hour  senna 2 Tablet(s) Oral at bedtime  sodium chloride 0.9%. 1000 milliLiter(s) (10 mL/Hr) IV Continuous <Continuous>  sodium chloride 0.9%. 1000 milliLiter(s) (5 mL/Hr) IV Continuous <Continuous>  vancomycin  IVPB 1250 milliGRAM(s) IV Intermittent every 12 hours    MEDICATIONS  (PRN):  acetaminophen     Tablet .. 650 milliGRAM(s) Oral every 6 hours PRN Mild Pain (1 - 3)  oxyCODONE    IR 5 milliGRAM(s) Oral every 4 hours PRN Moderate Pain (4 - 6)  oxyCODONE    IR 10 milliGRAM(s) Oral every 4 hours PRN Severe Pain (7 - 10)

## 2022-11-24 LAB
ANION GAP SERPL CALC-SCNC: 10 MMOL/L — SIGNIFICANT CHANGE UP (ref 5–17)
ANION GAP SERPL CALC-SCNC: 9 MMOL/L — SIGNIFICANT CHANGE UP (ref 5–17)
BUN SERPL-MCNC: 24.9 MG/DL — HIGH (ref 8–20)
BUN SERPL-MCNC: 24.9 MG/DL — HIGH (ref 8–20)
CALCIUM SERPL-MCNC: 9.4 MG/DL — SIGNIFICANT CHANGE UP (ref 8.4–10.5)
CALCIUM SERPL-MCNC: 9.7 MG/DL — SIGNIFICANT CHANGE UP (ref 8.4–10.5)
CHLORIDE SERPL-SCNC: 103 MMOL/L — SIGNIFICANT CHANGE UP (ref 96–108)
CHLORIDE SERPL-SCNC: 99 MMOL/L — SIGNIFICANT CHANGE UP (ref 96–108)
CO2 SERPL-SCNC: 24 MMOL/L — SIGNIFICANT CHANGE UP (ref 22–29)
CO2 SERPL-SCNC: 25 MMOL/L — SIGNIFICANT CHANGE UP (ref 22–29)
CREAT SERPL-MCNC: 0.75 MG/DL — SIGNIFICANT CHANGE UP (ref 0.5–1.3)
CREAT SERPL-MCNC: 0.79 MG/DL — SIGNIFICANT CHANGE UP (ref 0.5–1.3)
EGFR: 100 ML/MIN/1.73M2 — SIGNIFICANT CHANGE UP
EGFR: 98 ML/MIN/1.73M2 — SIGNIFICANT CHANGE UP
GLUCOSE BLDC GLUCOMTR-MCNC: 192 MG/DL — HIGH (ref 70–99)
GLUCOSE BLDC GLUCOMTR-MCNC: 211 MG/DL — HIGH (ref 70–99)
GLUCOSE BLDC GLUCOMTR-MCNC: 215 MG/DL — HIGH (ref 70–99)
GLUCOSE BLDC GLUCOMTR-MCNC: 221 MG/DL — HIGH (ref 70–99)
GLUCOSE BLDC GLUCOMTR-MCNC: 223 MG/DL — HIGH (ref 70–99)
GLUCOSE SERPL-MCNC: 202 MG/DL — HIGH (ref 70–99)
GLUCOSE SERPL-MCNC: 245 MG/DL — HIGH (ref 70–99)
HCT VFR BLD CALC: 23.5 % — LOW (ref 39–50)
HCT VFR BLD CALC: 28.9 % — LOW (ref 39–50)
HGB BLD-MCNC: 7.9 G/DL — LOW (ref 13–17)
HGB BLD-MCNC: 9.8 G/DL — LOW (ref 13–17)
MAGNESIUM SERPL-MCNC: 2.1 MG/DL — SIGNIFICANT CHANGE UP (ref 1.6–2.6)
MCHC RBC-ENTMCNC: 31.6 PG — SIGNIFICANT CHANGE UP (ref 27–34)
MCHC RBC-ENTMCNC: 31.7 PG — SIGNIFICANT CHANGE UP (ref 27–34)
MCHC RBC-ENTMCNC: 33.6 GM/DL — SIGNIFICANT CHANGE UP (ref 32–36)
MCHC RBC-ENTMCNC: 33.9 GM/DL — SIGNIFICANT CHANGE UP (ref 32–36)
MCV RBC AUTO: 93.5 FL — SIGNIFICANT CHANGE UP (ref 80–100)
MCV RBC AUTO: 94 FL — SIGNIFICANT CHANGE UP (ref 80–100)
PLATELET # BLD AUTO: 109 K/UL — LOW (ref 150–400)
PLATELET # BLD AUTO: 116 K/UL — LOW (ref 150–400)
POTASSIUM SERPL-MCNC: 4.8 MMOL/L — SIGNIFICANT CHANGE UP (ref 3.5–5.3)
POTASSIUM SERPL-MCNC: 5.6 MMOL/L — HIGH (ref 3.5–5.3)
POTASSIUM SERPL-SCNC: 4.8 MMOL/L — SIGNIFICANT CHANGE UP (ref 3.5–5.3)
POTASSIUM SERPL-SCNC: 5.6 MMOL/L — HIGH (ref 3.5–5.3)
RBC # BLD: 2.5 M/UL — LOW (ref 4.2–5.8)
RBC # BLD: 3.09 M/UL — LOW (ref 4.2–5.8)
RBC # FLD: 15.1 % — HIGH (ref 10.3–14.5)
RBC # FLD: 15.3 % — HIGH (ref 10.3–14.5)
SODIUM SERPL-SCNC: 134 MMOL/L — LOW (ref 135–145)
SODIUM SERPL-SCNC: 136 MMOL/L — SIGNIFICANT CHANGE UP (ref 135–145)
WBC # BLD: 13.58 K/UL — HIGH (ref 3.8–10.5)
WBC # BLD: 16.93 K/UL — HIGH (ref 3.8–10.5)
WBC # FLD AUTO: 13.58 K/UL — HIGH (ref 3.8–10.5)
WBC # FLD AUTO: 16.93 K/UL — HIGH (ref 3.8–10.5)

## 2022-11-24 PROCEDURE — 99291 CRITICAL CARE FIRST HOUR: CPT | Mod: 24

## 2022-11-24 PROCEDURE — 99024 POSTOP FOLLOW-UP VISIT: CPT

## 2022-11-24 PROCEDURE — 99233 SBSQ HOSP IP/OBS HIGH 50: CPT

## 2022-11-24 PROCEDURE — 71045 X-RAY EXAM CHEST 1 VIEW: CPT | Mod: 26

## 2022-11-24 PROCEDURE — 71045 X-RAY EXAM CHEST 1 VIEW: CPT | Mod: 26,77

## 2022-11-24 RX ORDER — INSULIN GLARGINE 100 [IU]/ML
50 INJECTION, SOLUTION SUBCUTANEOUS AT BEDTIME
Refills: 0 | Status: DISCONTINUED | OUTPATIENT
Start: 2022-11-24 | End: 2022-11-26

## 2022-11-24 RX ORDER — METOPROLOL TARTRATE 50 MG
25 TABLET ORAL ONCE
Refills: 0 | Status: COMPLETED | OUTPATIENT
Start: 2022-11-24 | End: 2022-11-24

## 2022-11-24 RX ORDER — LABETALOL HCL 100 MG
10 TABLET ORAL ONCE
Refills: 0 | Status: COMPLETED | OUTPATIENT
Start: 2022-11-24 | End: 2022-11-24

## 2022-11-24 RX ORDER — FUROSEMIDE 40 MG
40 TABLET ORAL ONCE
Refills: 0 | Status: DISCONTINUED | OUTPATIENT
Start: 2022-11-24 | End: 2022-11-24

## 2022-11-24 RX ORDER — METOPROLOL TARTRATE 50 MG
50 TABLET ORAL
Refills: 0 | Status: DISCONTINUED | OUTPATIENT
Start: 2022-11-25 | End: 2022-11-30

## 2022-11-24 RX ORDER — INSULIN LISPRO 100/ML
15 VIAL (ML) SUBCUTANEOUS
Refills: 0 | Status: DISCONTINUED | OUTPATIENT
Start: 2022-11-24 | End: 2022-11-26

## 2022-11-24 RX ORDER — METOCLOPRAMIDE HCL 10 MG
10 TABLET ORAL EVERY 8 HOURS
Refills: 0 | Status: COMPLETED | OUTPATIENT
Start: 2022-11-24 | End: 2022-11-25

## 2022-11-24 RX ADMIN — OXYCODONE HYDROCHLORIDE 10 MILLIGRAM(S): 5 TABLET ORAL at 07:03

## 2022-11-24 RX ADMIN — GABAPENTIN 300 MILLIGRAM(S): 400 CAPSULE ORAL at 15:14

## 2022-11-24 RX ADMIN — GABAPENTIN 300 MILLIGRAM(S): 400 CAPSULE ORAL at 21:25

## 2022-11-24 RX ADMIN — Medication 10 UNIT(S): at 08:34

## 2022-11-24 RX ADMIN — Medication 5 MILLIGRAM(S): at 21:25

## 2022-11-24 RX ADMIN — OXYCODONE HYDROCHLORIDE 10 MILLIGRAM(S): 5 TABLET ORAL at 21:26

## 2022-11-24 RX ADMIN — Medication 166.67 MILLIGRAM(S): at 08:30

## 2022-11-24 RX ADMIN — LIDOCAINE 1 PATCH: 4 CREAM TOPICAL at 12:04

## 2022-11-24 RX ADMIN — OXYCODONE HYDROCHLORIDE 10 MILLIGRAM(S): 5 TABLET ORAL at 07:45

## 2022-11-24 RX ADMIN — OXYCODONE HYDROCHLORIDE 10 MILLIGRAM(S): 5 TABLET ORAL at 01:28

## 2022-11-24 RX ADMIN — Medication 10 UNIT(S): at 11:59

## 2022-11-24 RX ADMIN — Medication 1 MILLIGRAM(S): at 12:05

## 2022-11-24 RX ADMIN — CHLORHEXIDINE GLUCONATE 1 APPLICATION(S): 213 SOLUTION TOPICAL at 12:05

## 2022-11-24 RX ADMIN — Medication 25 MILLIGRAM(S): at 05:50

## 2022-11-24 RX ADMIN — Medication 4: at 08:35

## 2022-11-24 RX ADMIN — IRON SUCROSE 210 MILLIGRAM(S): 20 INJECTION, SOLUTION INTRAVENOUS at 18:49

## 2022-11-24 RX ADMIN — ATORVASTATIN CALCIUM 80 MILLIGRAM(S): 80 TABLET, FILM COATED ORAL at 21:24

## 2022-11-24 RX ADMIN — Medication 4: at 11:59

## 2022-11-24 RX ADMIN — Medication 15 UNIT(S): at 16:47

## 2022-11-24 RX ADMIN — INSULIN GLARGINE 50 UNIT(S): 100 INJECTION, SOLUTION SUBCUTANEOUS at 21:36

## 2022-11-24 RX ADMIN — OXYCODONE HYDROCHLORIDE 10 MILLIGRAM(S): 5 TABLET ORAL at 22:26

## 2022-11-24 RX ADMIN — LIDOCAINE 1 PATCH: 4 CREAM TOPICAL at 20:04

## 2022-11-24 RX ADMIN — Medication 100 MILLIGRAM(S): at 00:20

## 2022-11-24 RX ADMIN — Medication 10 MILLIGRAM(S): at 21:24

## 2022-11-24 RX ADMIN — LIDOCAINE 1 PATCH: 4 CREAM TOPICAL at 00:42

## 2022-11-24 RX ADMIN — Medication 81 MILLIGRAM(S): at 12:00

## 2022-11-24 RX ADMIN — OXYCODONE HYDROCHLORIDE 10 MILLIGRAM(S): 5 TABLET ORAL at 14:50

## 2022-11-24 RX ADMIN — OXYCODONE HYDROCHLORIDE 10 MILLIGRAM(S): 5 TABLET ORAL at 15:20

## 2022-11-24 RX ADMIN — OXYCODONE HYDROCHLORIDE 10 MILLIGRAM(S): 5 TABLET ORAL at 00:28

## 2022-11-24 RX ADMIN — SENNA PLUS 2 TABLET(S): 8.6 TABLET ORAL at 21:25

## 2022-11-24 RX ADMIN — Medication 25 MILLIGRAM(S): at 18:49

## 2022-11-24 RX ADMIN — POLYETHYLENE GLYCOL 3350 17 GRAM(S): 17 POWDER, FOR SOLUTION ORAL at 12:00

## 2022-11-24 RX ADMIN — GABAPENTIN 300 MILLIGRAM(S): 400 CAPSULE ORAL at 05:51

## 2022-11-24 RX ADMIN — ENOXAPARIN SODIUM 40 MILLIGRAM(S): 100 INJECTION SUBCUTANEOUS at 05:50

## 2022-11-24 RX ADMIN — Medication 500 MILLIGRAM(S): at 12:04

## 2022-11-24 RX ADMIN — Medication 100 MILLIGRAM(S): at 08:34

## 2022-11-24 RX ADMIN — Medication 325 MILLIGRAM(S): at 12:05

## 2022-11-24 RX ADMIN — PANTOPRAZOLE SODIUM 40 MILLIGRAM(S): 20 TABLET, DELAYED RELEASE ORAL at 12:00

## 2022-11-24 RX ADMIN — Medication 4: at 16:47

## 2022-11-24 NOTE — PROGRESS NOTE ADULT - SUBJECTIVE AND OBJECTIVE BOX
Interval Events:  no overnight events  follow up on diabetes    patient seen and examined at bedside.  FS still running high  reports that he has stopped drinking for a years now  used to drink 4-5 beers daily     REVIEW OF SYSTEMS:    CONSTITUTIONAL: No fever, weight loss, or fatigue  EYES: No eye pain, visual disturbances, or discharge  ENMT:  No difficulty hearing, tinnitus, vertigo; No sinus or throat pain  NECK: No pain or stiffness  RESPIRATORY: No cough, wheezing, chills or hemoptysis; No shortness of breath  CARDIOVASCULAR: No chest pain, palpitations, dizziness, or leg swelling  GASTROINTESTINAL: No abdominal or epigastric pain. No nausea, vomiting, or hematemesis; No diarrhea or constipation. No melena or hematochezia.  NEUROLOGICAL: No headaches, memory loss, loss of strength, numbness, or tremors  SKIN: No itching, burning, rashes, or lesions   MUSCULOSKELETAL: No joint pain or swelling; No muscle, back, or extremity pain  PSYCHIATRIC: No depression, anxiety, mood swings, or difficulty sleeping        No Known Allergies      MEDICATIONS  (STANDING):  ascorbic acid 500 milliGRAM(s) Oral daily  aspirin enteric coated 81 milliGRAM(s) Oral daily  atorvastatin 80 milliGRAM(s) Oral at bedtime  chlorhexidine 2% Cloths 1 Application(s) Topical daily  dextrose 5%. 1000 milliLiter(s) (50 mL/Hr) IV Continuous <Continuous>  dextrose 5%. 1000 milliLiter(s) (100 mL/Hr) IV Continuous <Continuous>  dextrose 50% Injectable 50 milliLiter(s) IV Push every 15 minutes  dextrose 50% Injectable 25 milliLiter(s) IV Push every 15 minutes  enoxaparin Injectable 40 milliGRAM(s) SubCutaneous every 24 hours  ferrous    sulfate 325 milliGRAM(s) Oral daily  folic acid 1 milliGRAM(s) Oral daily  gabapentin 300 milliGRAM(s) Oral three times a day  glucagon  Injectable 1 milliGRAM(s) IntraMuscular once  insulin glargine Injectable (LANTUS) 40 Unit(s) SubCutaneous at bedtime  insulin lispro (ADMELOG) corrective regimen sliding scale   SubCutaneous three times a day before meals  insulin lispro Injectable (ADMELOG) 10 Unit(s) SubCutaneous three times a day before meals  iron sucrose IVPB 100 milliGRAM(s) IV Intermittent every 24 hours  lidocaine   4% Patch 1 Patch Transdermal daily  melatonin 5 milliGRAM(s) Oral at bedtime  metoclopramide Injectable 10 milliGRAM(s) IV Push every 8 hours  metoprolol tartrate 25 milliGRAM(s) Oral two times a day  pantoprazole    Tablet 40 milliGRAM(s) Oral daily  polyethylene glycol 3350 17 Gram(s) Oral daily  senna 2 Tablet(s) Oral at bedtime  sodium chloride 0.9%. 1000 milliLiter(s) (10 mL/Hr) IV Continuous <Continuous>  sodium chloride 0.9%. 1000 milliLiter(s) (5 mL/Hr) IV Continuous <Continuous>    MEDICATIONS  (PRN):  acetaminophen     Tablet .. 650 milliGRAM(s) Oral every 6 hours PRN Mild Pain (1 - 3)  dextrose Oral Gel 15 Gram(s) Oral once PRN Blood Glucose LESS THAN 70 milliGRAM(s)/deciliter  oxyCODONE    IR 5 milliGRAM(s) Oral every 4 hours PRN Moderate Pain (4 - 6)  oxyCODONE    IR 10 milliGRAM(s) Oral every 4 hours PRN Severe Pain (7 - 10)      Vital Signs Last 24 Hrs  T(C): 36.9 (24 Nov 2022 12:00), Max: 37.1 (24 Nov 2022 00:00)  T(F): 98.4 (24 Nov 2022 12:00), Max: 98.7 (24 Nov 2022 00:00)  HR: 91 (24 Nov 2022 12:00) (86 - 100)  BP: 128/68 (24 Nov 2022 12:00) (94/66 - 139/71)  BP(mean): 86 (24 Nov 2022 12:00) (71 - 98)  RR: 17 (24 Nov 2022 12:00) (9 - 30)  SpO2: 96% (24 Nov 2022 12:00) (94% - 100%)    Parameters below as of 24 Nov 2022 12:00  Patient On (Oxygen Delivery Method): room air      Weight (kg): 82.6 (11-22-22 @ 05:48)    Physical Exam:    Constitutional: NAD, well-developed  Respiratory: CTAB, normal respirations  Cardiovascular: S1 and S2, RRR, bandaged anterior chest  Gastrointestinal: BS+, soft, ntnd  Extremities: +peripheral edema, bandaged b/l LE  Neurological: AOx3, no focal deficits  Psychiatric: Normal mood and normal affect  Skin: no rashes, no acanthosis    LABS  11-24    134<L>  |  99  |  24.9<H>  ----------------------------<  245<H>  4.8   |  25.0  |  0.75    Ca    9.7      24 Nov 2022 11:10  Mg     2.1     11-24    TPro  5.4<L>  /  Alb  4.0  /  TBili  0.4  /  DBili  x   /  AST  29  /  ALT  11  /  AlkPhos  30<L>  11-23                          9.8    16.93 )-----------( 116      ( 24 Nov 2022 11:10 )             28.9     Triglycerides, Serum: 192 mg/dL (11-21-22 @ 08:15)  HDL Cholesterol, Serum: 40 mg/dL (11-21-22 @ 08:15)  Cholesterol, Serum: 159 mg/dL (11-21-22 @ 08:15)    Thyroid Stimulating Hormone, Serum: 1.23 uIU/mL (11-21-22 @ 14:08)  Free Thyroxine, Serum: 1.1 ng/dL (11-21-22 @ 14:08)  A1C with Estimated Average Glucose Result: 7.6 % (11-21-22 @ 14:08)  A1C with Estimated Average Glucose Result: 7.5 % (11-21-22 @ 08:15)    Albumin, Serum: 4.0 g/dL (11-23-22 @ 02:00)  Aspartate Aminotransferase (AST/SGOT): 29 U/L (11-23-22 @ 02:00)  Alkaline Phosphatase, Serum: 30 U/L (11-23-22 @ 02:00)  Alanine Aminotransferase (ALT/SGPT): 11 U/L (11-23-22 @ 02:00)          CAPILLARY BLOOD GLUCOSE      POCT Blood Glucose.: 223 mg/dL (24 Nov 2022 11:09)  POCT Blood Glucose.: 211 mg/dL (24 Nov 2022 07:47)  POCT Blood Glucose.: 192 mg/dL (24 Nov 2022 00:48)  POCT Blood Glucose.: 145 mg/dL (23 Nov 2022 23:11)  POCT Blood Glucose.: 137 mg/dL (23 Nov 2022 22:15)  POCT Blood Glucose.: 145 mg/dL (23 Nov 2022 21:13)  POCT Blood Glucose.: 184 mg/dL (23 Nov 2022 20:15)  POCT Blood Glucose.: 180 mg/dL (23 Nov 2022 19:00)  POCT Blood Glucose.: 145 mg/dL (23 Nov 2022 15:46)  POCT Blood Glucose.: 164 mg/dL (23 Nov 2022 14:03)

## 2022-11-24 NOTE — PROGRESS NOTE ADULT - ASSESSMENT
66 yo M PMH HTN, HLD, DM (A1c 7.6), CAD s/p PCI RCA 2007, ETOH cirrhosis, B12/Vit D def., former smoker, recent hospitalization for GIB 05/2022   Admit on 11/21 for repeat cath, CABG 11/22 C3L (LIMA-LAD, SVG-OM, SVG-PDA), out of OR on Levo and insulin gtt  11/22 pt volume resuscitated and phenylephrine gtt slowly weaned off. Pt received PRBC x1 for acute blood loss anemia in ICU.   11/23 tomás removed because of inadequate wave form and unable to draw back. Plan to remove SGC in AM. CI remain2.5-3  11/24 pt deintensified plan for downgrade to floor in AM    68 yo M PMH HTN, HLD, DM (A1c 7.6), CAD s/p PCI RCA 2007, ETOH cirrhosis, B12/Vit D def., former smoker, recent hospitalization for GIB 05/2022   Admit on 11/21 for repeat cath, CABG 11/22 C3L (LIMA-LAD, SVG-OM, SVG-PDA), out of OR on Levo and insulin gtt  11/22 pt volume resuscitated and phenylephrine gtt slowly weaned off. Pt received PRBC x1 for acute blood loss anemia in ICU.   11/23 tomás removed because of inadequate wave form and unable to draw back. Plan to remove SGC in AM. CI remain2.5-3  11/24 pt deintensified plan for downgrade to floor in AM . Pt requiring PRBC x1 for Hgb <8. K5.6. $0 IVP lasix given after PRBC   68 yo M PMH HTN, HLD, DM (A1c 7.6), CAD s/p PCI RCA 2007, ETOH cirrhosis, B12/Vit D def., former smoker, recent hospitalization for GIB 05/2022   Admit on 11/21 for repeat cath, CABG 11/22 C3L (LIMA-LAD, SVG-OM, SVG-PDA), out of OR on Levo and insulin gtt  11/22 pt volume resuscitated and phenylephrine gtt slowly weaned off. Pt received PRBC x1 for acute blood loss anemia in ICU.   11/23 tomás removed because of inadequate wave form and unable to draw back. Plan to remove SGC in AM. CI remain2.5-3  11/24 pt deintensified plan for downgrade to floor in AM . Pt requiring PRBC x1 for Hgb <8. K5.6.             40 IVP lasix given after PRBC

## 2022-11-24 NOTE — PROGRESS NOTE ADULT - ASSESSMENT
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 Patient was admitted to [facility] initially in May 2022 for GI bleed and found to have EF 30-35%, and multivessel disease but CABG deferred due to hx of alcoholism and patient transferred to rehab.   Consult for diabetes mgmt. 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 Patient was admitted to [facility] initially in May 2022 for GI bleed and found to have EF 30-35%, and multivessel disease but CABG deferred due to hx of alcoholism and patient transferred to rehab.   Consult for diabetes mgmt. A1c 7.6    T2DM in setting of hx of pancreatitis due to EtOH abuse- FS still high  -A1c 7.6  -check sugars AC and bedtime  -ensure diabetic diet  -change to lantus 50 units QHS  -change to admelog 15 units TID  -continue with insulin sliding scale  -EF 30-35%, cannot be started on metformin, consider adding farxiga 5mg daily when downgraded  -check cpeptide and SUKHWINDER due to hx of pancreatitis    CAD- s/p CABG, care per primary team    HLD- continue statin

## 2022-11-24 NOTE — PROGRESS NOTE ADULT - SUBJECTIVE AND OBJECTIVE BOX
Subjective: no c/o incisional pain at this time. Denies CP, SOB, palpitations, N/V, other c/o.    all other ROS negative x10 except as noted above     T(C): 37.1 (11-24-22 @ 00:00), Max: 37.1 (11-24-22 @ 00:00)  HR: 86 (11-24-22 @ 01:00) (86 - 117)  BP: 109/57 (11-24-22 @ 01:00) (99/57 - 146/67)  ABP: 74/66 (11-23-22 @ 03:30) (62/57 - 84/70)  ABP(mean): 70 (11-23-22 @ 03:30) (59 - 76)  RR: 23 (11-24-22 @ 01:00) (10 - 40)  SpO2: 98% (11-24-22 @ 01:00) (94% - 100%)  Wt(kg): --  CVP(mm Hg): 16 (11-24-22 @ 01:00) (5 - 18)  CO: 5.4 (11-23-22 @ 03:00) (5.4 - 5.5)  CI: 2.6 (11-23-22 @ 03:00) (2.6 - 2.6)  PA: 8/-26 (11-23-22 @ 04:15) (8/-26 - 32/19)       I&O's Detail    22 Nov 2022 07:01  -  23 Nov 2022 07:00  --------------------------------------------------------  IN:    Albumin 5%  - 250 mL: 750 mL    Insulin: 77 mL    IV PiggyBack: 500 mL    IV PiggyBack: 150 mL    Lactated Ringers Bolus: 500 mL    Norepinephrine: 41.4 mL    Oral Fluid: 500 mL    PRBCs (Packed Red Blood Cells): 284 mL    sodium chloride 0.9%: 190 mL    sodium chloride 0.9%: 95 mL  Total IN: 3087.4 mL    OUT:    Bulb (mL): 60 mL    Chest Tube (mL): 90 mL    Chest Tube (mL): 25 mL    Chest Tube (mL): 390 mL    Indwelling Catheter - Urethral (mL): 1555 mL  Total OUT: 2120 mL    Total NET: 967.4 mL      23 Nov 2022 07:01  -  24 Nov 2022 01:38  --------------------------------------------------------  IN:    Insulin: 79.5 mL    IV PiggyBack: 250 mL    Oral Fluid: 700 mL    sodium chloride 0.9%: 90 mL    sodium chloride 0.9%: 180 mL  Total IN: 1299.5 mL    OUT:    Bulb (mL): 30 mL    Chest Tube (mL): 190 mL    Chest Tube (mL): 40 mL    Chest Tube (mL): 5 mL    Indwelling Catheter - Urethral (mL): 1720 mL  Total OUT: 1985 mL    Total NET: -685.5 mL          LABS: All Lab data reviewed and analyzed                        8.5    12.12 )-----------( 114      ( 23 Nov 2022 02:00 )             24.5     11-23    141  |  107  |  19.8  ----------------------------<  118<H>  4.6   |  24.0  |  0.82    Ca    8.9      23 Nov 2022 02:00  Phos  1.0     11-22  Mg     1.6     11-23    TPro  5.4<L>  /  Alb  4.0  /  TBili  0.4  /  DBili  x   /  AST  29  /  ALT  11  /  AlkPhos  30<L>  11-23    PT/INR - ( 23 Nov 2022 02:00 )   PT: 15.2 sec;   INR: 1.31 ratio         PTT - ( 23 Nov 2022 02:00 )  PTT:23.1 sec  CARDIAC MARKERS ( 23 Nov 2022 02:00 )   U/L / CKMB11.6 ng/mL / Troponin T0.28 ng/mL /      ABG - ( 22 Nov 2022 16:42 )  pH, Arterial: 7.350 pH, Blood: x     /  pCO2: 43    /  pO2: 158   / HCO3: 24    / Base Excess: -1.9  /  SaO2: 100.0             CAPILLARY BLOOD GLUCOSE      POCT Blood Glucose.: 192 mg/dL (24 Nov 2022 00:48)  POCT Blood Glucose.: 145 mg/dL (23 Nov 2022 23:11)  POCT Blood Glucose.: 137 mg/dL (23 Nov 2022 22:15)  POCT Blood Glucose.: 145 mg/dL (23 Nov 2022 21:13)  POCT Blood Glucose.: 184 mg/dL (23 Nov 2022 20:15)  POCT Blood Glucose.: 180 mg/dL (23 Nov 2022 19:00)  POCT Blood Glucose.: 145 mg/dL (23 Nov 2022 15:46)  POCT Blood Glucose.: 164 mg/dL (23 Nov 2022 14:03)  POCT Blood Glucose.: 195 mg/dL (23 Nov 2022 11:43)  POCT Blood Glucose.: 129 mg/dL (23 Nov 2022 09:08)  POCT Blood Glucose.: 127 mg/dL (23 Nov 2022 05:57)  POCT Blood Glucose.: 130 mg/dL (23 Nov 2022 04:48)  POCT Blood Glucose.: 120 mg/dL (23 Nov 2022 03:13)  POCT Blood Glucose.: 118 mg/dL (23 Nov 2022 02:15)           RADIOLOGY: - Reviewed and analyzed   CXR: pending    HOSPITAL MEDICATIONS: All medications reviewed and analyzed  MEDICATIONS  (STANDING):  ascorbic acid 500 milliGRAM(s) Oral daily  aspirin enteric coated 81 milliGRAM(s) Oral daily  atorvastatin 80 milliGRAM(s) Oral at bedtime  cefuroxime  IVPB 1500 milliGRAM(s) IV Intermittent every 8 hours  chlorhexidine 2% Cloths 1 Application(s) Topical daily  dextrose 5%. 1000 milliLiter(s) (50 mL/Hr) IV Continuous <Continuous>  dextrose 5%. 1000 milliLiter(s) (100 mL/Hr) IV Continuous <Continuous>  dextrose 50% Injectable 50 milliLiter(s) IV Push every 15 minutes  dextrose 50% Injectable 25 milliLiter(s) IV Push every 15 minutes  enoxaparin Injectable 40 milliGRAM(s) SubCutaneous every 24 hours  ferrous    sulfate 325 milliGRAM(s) Oral daily  folic acid 1 milliGRAM(s) Oral daily  gabapentin 300 milliGRAM(s) Oral three times a day  glucagon  Injectable 1 milliGRAM(s) IntraMuscular once  insulin glargine Injectable (LANTUS) 40 Unit(s) SubCutaneous at bedtime  insulin lispro (ADMELOG) corrective regimen sliding scale   SubCutaneous three times a day before meals  insulin lispro Injectable (ADMELOG) 10 Unit(s) SubCutaneous three times a day before meals  insulin regular Infusion 2 Unit(s)/Hr (2 mL/Hr) IV Continuous <Continuous>  iron sucrose IVPB 100 milliGRAM(s) IV Intermittent every 24 hours  lidocaine   4% Patch 1 Patch Transdermal daily  melatonin 5 milliGRAM(s) Oral at bedtime  metoprolol tartrate 25 milliGRAM(s) Oral two times a day  pantoprazole    Tablet 40 milliGRAM(s) Oral daily  polyethylene glycol 3350 17 Gram(s) Oral daily  senna 2 Tablet(s) Oral at bedtime  sodium chloride 0.9%. 1000 milliLiter(s) (10 mL/Hr) IV Continuous <Continuous>  sodium chloride 0.9%. 1000 milliLiter(s) (5 mL/Hr) IV Continuous <Continuous>  vancomycin  IVPB 1250 milliGRAM(s) IV Intermittent every 12 hours    MEDICATIONS  (PRN):  acetaminophen     Tablet .. 650 milliGRAM(s) Oral every 6 hours PRN Mild Pain (1 - 3)  dextrose Oral Gel 15 Gram(s) Oral once PRN Blood Glucose LESS THAN 70 milliGRAM(s)/deciliter  oxyCODONE    IR 5 milliGRAM(s) Oral every 4 hours PRN Moderate Pain (4 - 6)  oxyCODONE    IR 10 milliGRAM(s) Oral every 4 hours PRN Severe Pain (7 - 10)          Lines:     Physical Exam  Neuro: A+O x 3, non-focal, speech clear and intact  HEENT:  NCAT, PERRL, EOMI. No conjuctival edema or iIcterus, no thrush.  No ETT or NGT/OGT  Neck:  ROM intact, no JVD, no nodes or masses palpable, trachea midline, no tracheostomy  Pulm: CTA, good air entry, equal bilaterally, no rales/rhonchi/wheezing, no accessory muscle use noted  Chest:        mediastinal CT and left pleural CT all with dressings intact and no air leak, no subQ emphysema       +PW attatched to pacing box  CV: RRR, S1, S2. No murmurs, rubs, or gallops noted.  Abd: +BSx4. Soft, nontender, nondistended.  : molina in situ to bedside drainage  Ext: SEGURA x 4, no edema, no cyanosis or clubbing, distal motor/neuro/circ intact  Skin: warm, dry, no rashes                 67yMale with PMH     CABG, with JACQUIE        PAST MEDICAL & SURGICAL HISTORY:  Pancreatitis      Hypertension      Myocardial infarct      Alcohol abuse      Colon cancer      History of colon resection      History of heart surgery  cardiac stent       Subjective: no c/o incisional pain at this time. Denies CP, SOB, palpitations, N/V, other c/o.    all other ROS negative x10 except as noted above     T(C): 37.1 (11-24-22 @ 00:00), Max: 37.1 (11-24-22 @ 00:00)  HR: 86 (11-24-22 @ 01:00) (86 - 117)  BP: 109/57 (11-24-22 @ 01:00) (99/57 - 146/67)  ABP: 74/66 (11-23-22 @ 03:30) (62/57 - 84/70)  ABP(mean): 70 (11-23-22 @ 03:30) (59 - 76)  RR: 23 (11-24-22 @ 01:00) (10 - 40)  SpO2: 98% (11-24-22 @ 01:00) (94% - 100%)  Wt(kg): --  CVP(mm Hg): 16 (11-24-22 @ 01:00) (5 - 18)  CO: 5.4 (11-23-22 @ 03:00) (5.4 - 5.5)  CI: 2.6 (11-23-22 @ 03:00) (2.6 - 2.6)  PA: 8/-26 (11-23-22 @ 04:15) (8/-26 - 32/19)       I&O's Detail    22 Nov 2022 07:01  -  23 Nov 2022 07:00  --------------------------------------------------------  IN:    Albumin 5%  - 250 mL: 750 mL    Insulin: 77 mL    IV PiggyBack: 500 mL    IV PiggyBack: 150 mL    Lactated Ringers Bolus: 500 mL    Norepinephrine: 41.4 mL    Oral Fluid: 500 mL    PRBCs (Packed Red Blood Cells): 284 mL    sodium chloride 0.9%: 190 mL    sodium chloride 0.9%: 95 mL  Total IN: 3087.4 mL    OUT:    Bulb (mL): 60 mL    Chest Tube (mL): 90 mL    Chest Tube (mL): 25 mL    Chest Tube (mL): 390 mL    Indwelling Catheter - Urethral (mL): 1555 mL  Total OUT: 2120 mL    Total NET: 967.4 mL      23 Nov 2022 07:01  -  24 Nov 2022 01:38  --------------------------------------------------------  IN:    Insulin: 79.5 mL    IV PiggyBack: 250 mL    Oral Fluid: 700 mL    sodium chloride 0.9%: 90 mL    sodium chloride 0.9%: 180 mL  Total IN: 1299.5 mL    OUT:    Bulb (mL): 30 mL    Chest Tube (mL): 190 mL    Chest Tube (mL): 40 mL    Chest Tube (mL): 5 mL    Indwelling Catheter - Urethral (mL): 1720 mL  Total OUT: 1985 mL    Total NET: -685.5 mL          LABS: All Lab data reviewed and analyzed                        8.5    12.12 )-----------( 114      ( 23 Nov 2022 02:00 )             24.5     11-23    141  |  107  |  19.8  ----------------------------<  118<H>  4.6   |  24.0  |  0.82    Ca    8.9      23 Nov 2022 02:00  Phos  1.0     11-22  Mg     1.6     11-23    TPro  5.4<L>  /  Alb  4.0  /  TBili  0.4  /  DBili  x   /  AST  29  /  ALT  11  /  AlkPhos  30<L>  11-23    PT/INR - ( 23 Nov 2022 02:00 )   PT: 15.2 sec;   INR: 1.31 ratio         PTT - ( 23 Nov 2022 02:00 )  PTT:23.1 sec  CARDIAC MARKERS ( 23 Nov 2022 02:00 )   U/L / CKMB11.6 ng/mL / Troponin T0.28 ng/mL /      ABG - ( 22 Nov 2022 16:42 )  pH, Arterial: 7.350 pH, Blood: x     /  pCO2: 43    /  pO2: 158   / HCO3: 24    / Base Excess: -1.9  /  SaO2: 100.0             CAPILLARY BLOOD GLUCOSE      POCT Blood Glucose.: 192 mg/dL (24 Nov 2022 00:48)  POCT Blood Glucose.: 145 mg/dL (23 Nov 2022 23:11)  POCT Blood Glucose.: 137 mg/dL (23 Nov 2022 22:15)  POCT Blood Glucose.: 145 mg/dL (23 Nov 2022 21:13)  POCT Blood Glucose.: 184 mg/dL (23 Nov 2022 20:15)  POCT Blood Glucose.: 180 mg/dL (23 Nov 2022 19:00)  POCT Blood Glucose.: 145 mg/dL (23 Nov 2022 15:46)  POCT Blood Glucose.: 164 mg/dL (23 Nov 2022 14:03)  POCT Blood Glucose.: 195 mg/dL (23 Nov 2022 11:43)  POCT Blood Glucose.: 129 mg/dL (23 Nov 2022 09:08)  POCT Blood Glucose.: 127 mg/dL (23 Nov 2022 05:57)  POCT Blood Glucose.: 130 mg/dL (23 Nov 2022 04:48)  POCT Blood Glucose.: 120 mg/dL (23 Nov 2022 03:13)  POCT Blood Glucose.: 118 mg/dL (23 Nov 2022 02:15)           RADIOLOGY: - Reviewed and analyzed   CXR: pending    HOSPITAL MEDICATIONS: All medications reviewed and analyzed  MEDICATIONS  (STANDING):  ascorbic acid 500 milliGRAM(s) Oral daily  aspirin enteric coated 81 milliGRAM(s) Oral daily  atorvastatin 80 milliGRAM(s) Oral at bedtime  cefuroxime  IVPB 1500 milliGRAM(s) IV Intermittent every 8 hours  chlorhexidine 2% Cloths 1 Application(s) Topical daily  dextrose 5%. 1000 milliLiter(s) (50 mL/Hr) IV Continuous <Continuous>  dextrose 5%. 1000 milliLiter(s) (100 mL/Hr) IV Continuous <Continuous>  dextrose 50% Injectable 50 milliLiter(s) IV Push every 15 minutes  dextrose 50% Injectable 25 milliLiter(s) IV Push every 15 minutes  enoxaparin Injectable 40 milliGRAM(s) SubCutaneous every 24 hours  ferrous    sulfate 325 milliGRAM(s) Oral daily  folic acid 1 milliGRAM(s) Oral daily  gabapentin 300 milliGRAM(s) Oral three times a day  glucagon  Injectable 1 milliGRAM(s) IntraMuscular once  insulin glargine Injectable (LANTUS) 40 Unit(s) SubCutaneous at bedtime  insulin lispro (ADMELOG) corrective regimen sliding scale   SubCutaneous three times a day before meals  insulin lispro Injectable (ADMELOG) 10 Unit(s) SubCutaneous three times a day before meals  insulin regular Infusion 2 Unit(s)/Hr (2 mL/Hr) IV Continuous <Continuous>  iron sucrose IVPB 100 milliGRAM(s) IV Intermittent every 24 hours  lidocaine   4% Patch 1 Patch Transdermal daily  melatonin 5 milliGRAM(s) Oral at bedtime  metoprolol tartrate 25 milliGRAM(s) Oral two times a day  pantoprazole    Tablet 40 milliGRAM(s) Oral daily  polyethylene glycol 3350 17 Gram(s) Oral daily  senna 2 Tablet(s) Oral at bedtime  sodium chloride 0.9%. 1000 milliLiter(s) (10 mL/Hr) IV Continuous <Continuous>  sodium chloride 0.9%. 1000 milliLiter(s) (5 mL/Hr) IV Continuous <Continuous>  vancomycin  IVPB 1250 milliGRAM(s) IV Intermittent every 12 hours    MEDICATIONS  (PRN):  acetaminophen     Tablet .. 650 milliGRAM(s) Oral every 6 hours PRN Mild Pain (1 - 3)  dextrose Oral Gel 15 Gram(s) Oral once PRN Blood Glucose LESS THAN 70 milliGRAM(s)/deciliter  oxyCODONE    IR 5 milliGRAM(s) Oral every 4 hours PRN Moderate Pain (4 - 6)  oxyCODONE    IR 10 milliGRAM(s) Oral every 4 hours PRN Severe Pain (7 - 10)          Lines:     Physical Exam  Neuro: A+O x 3, non-focal, speech clear and intact  HEENT:  NCAT, PERRL, EOMI. No conjuctival edema or iIcterus, no thrush.  No ETT or NGT/OGT  Neck:  ROM intact, no JVD, no nodes or masses palpable, trachea midline, no tracheostomy  Pulm: CTA, good air entry, equal bilaterally, no rales/rhonchi/wheezing, no accessory muscle use noted  Chest:        mediastinal CT and left pleural CT all with dressings intact and no air leak, no subQ emphysema       +PW attatched to pacing box  CV: RRR, S1, S2. No murmurs, rubs, or gallops noted.  Abd: +BSx4. Soft, nontender, nondistended.  : molina in situ to bedside drainage  Ext: SEGURA x 4, no edema, no cyanosis or clubbing, distal motor/neuro/circ intact  Skin: warm, dry, no rashes                 67yMale with PMH     CABG, with JACQUIE        PAST MEDICAL & SURGICAL HISTORY:  Pancreatitis      Hypertension      Myocardial infarct      Alcohol abuse      Colon cancer      History of colon resection      History of heart surgery  cardiac stent      Case including assessment/plan of care discussed with   CT surgery attending.  Critical Care time:    35    minutes of noncontinuous critical care time spent evaluating/treating, reviewing imaging, labs, discussing case with multidisciplinary team, discussing plans/goals of care with patient/family to prevent further life threatening depreciation of the patient's condition. Non-inclusive is procedure time.

## 2022-11-25 LAB
ANION GAP SERPL CALC-SCNC: 9 MMOL/L — SIGNIFICANT CHANGE UP (ref 5–17)
BUN SERPL-MCNC: 20.7 MG/DL — HIGH (ref 8–20)
CALCIUM SERPL-MCNC: 10.4 MG/DL — SIGNIFICANT CHANGE UP (ref 8.4–10.5)
CHLORIDE SERPL-SCNC: 100 MMOL/L — SIGNIFICANT CHANGE UP (ref 96–108)
CO2 SERPL-SCNC: 27 MMOL/L — SIGNIFICANT CHANGE UP (ref 22–29)
CREAT SERPL-MCNC: 0.71 MG/DL — SIGNIFICANT CHANGE UP (ref 0.5–1.3)
EGFR: 101 ML/MIN/1.73M2 — SIGNIFICANT CHANGE UP
GLUCOSE BLDC GLUCOMTR-MCNC: 142 MG/DL — HIGH (ref 70–99)
GLUCOSE BLDC GLUCOMTR-MCNC: 160 MG/DL — HIGH (ref 70–99)
GLUCOSE BLDC GLUCOMTR-MCNC: 177 MG/DL — HIGH (ref 70–99)
GLUCOSE BLDC GLUCOMTR-MCNC: 184 MG/DL — HIGH (ref 70–99)
GLUCOSE SERPL-MCNC: 187 MG/DL — HIGH (ref 70–99)
HCT VFR BLD CALC: 29.7 % — LOW (ref 39–50)
HGB BLD-MCNC: 9.9 G/DL — LOW (ref 13–17)
MAGNESIUM SERPL-MCNC: 1.6 MG/DL — SIGNIFICANT CHANGE UP (ref 1.6–2.6)
MCHC RBC-ENTMCNC: 31.1 PG — SIGNIFICANT CHANGE UP (ref 27–34)
MCHC RBC-ENTMCNC: 33.3 GM/DL — SIGNIFICANT CHANGE UP (ref 32–36)
MCV RBC AUTO: 93.4 FL — SIGNIFICANT CHANGE UP (ref 80–100)
PLATELET # BLD AUTO: 121 K/UL — LOW (ref 150–400)
POTASSIUM SERPL-MCNC: 4.2 MMOL/L — SIGNIFICANT CHANGE UP (ref 3.5–5.3)
POTASSIUM SERPL-SCNC: 4.2 MMOL/L — SIGNIFICANT CHANGE UP (ref 3.5–5.3)
RBC # BLD: 3.18 M/UL — LOW (ref 4.2–5.8)
RBC # FLD: 14.9 % — HIGH (ref 10.3–14.5)
SODIUM SERPL-SCNC: 136 MMOL/L — SIGNIFICANT CHANGE UP (ref 135–145)
WBC # BLD: 14.68 K/UL — HIGH (ref 3.8–10.5)
WBC # FLD AUTO: 14.68 K/UL — HIGH (ref 3.8–10.5)

## 2022-11-25 PROCEDURE — 71045 X-RAY EXAM CHEST 1 VIEW: CPT | Mod: 26

## 2022-11-25 PROCEDURE — 74018 RADEX ABDOMEN 1 VIEW: CPT | Mod: 26

## 2022-11-25 PROCEDURE — 99232 SBSQ HOSP IP/OBS MODERATE 35: CPT

## 2022-11-25 RX ORDER — MAGNESIUM SULFATE 500 MG/ML
1 VIAL (ML) INJECTION ONCE
Refills: 0 | Status: COMPLETED | OUTPATIENT
Start: 2022-11-25 | End: 2022-11-25

## 2022-11-25 RX ORDER — DAPAGLIFLOZIN 10 MG/1
5 TABLET, FILM COATED ORAL EVERY 24 HOURS
Refills: 0 | Status: DISCONTINUED | OUTPATIENT
Start: 2022-11-25 | End: 2022-11-26

## 2022-11-25 RX ORDER — MAGNESIUM SULFATE 500 MG/ML
2 VIAL (ML) INJECTION ONCE
Refills: 0 | Status: COMPLETED | OUTPATIENT
Start: 2022-11-25 | End: 2022-11-25

## 2022-11-25 RX ORDER — SODIUM CHLORIDE 9 MG/ML
3 INJECTION INTRAMUSCULAR; INTRAVENOUS; SUBCUTANEOUS EVERY 8 HOURS
Refills: 0 | Status: DISCONTINUED | OUTPATIENT
Start: 2022-11-25 | End: 2022-12-14

## 2022-11-25 RX ADMIN — OXYCODONE HYDROCHLORIDE 10 MILLIGRAM(S): 5 TABLET ORAL at 22:13

## 2022-11-25 RX ADMIN — IRON SUCROSE 210 MILLIGRAM(S): 20 INJECTION, SOLUTION INTRAVENOUS at 16:00

## 2022-11-25 RX ADMIN — Medication 15 UNIT(S): at 17:59

## 2022-11-25 RX ADMIN — SODIUM CHLORIDE 3 MILLILITER(S): 9 INJECTION INTRAMUSCULAR; INTRAVENOUS; SUBCUTANEOUS at 21:15

## 2022-11-25 RX ADMIN — Medication 2: at 07:53

## 2022-11-25 RX ADMIN — Medication 325 MILLIGRAM(S): at 11:26

## 2022-11-25 RX ADMIN — OXYCODONE HYDROCHLORIDE 10 MILLIGRAM(S): 5 TABLET ORAL at 04:37

## 2022-11-25 RX ADMIN — Medication 81 MILLIGRAM(S): at 11:26

## 2022-11-25 RX ADMIN — Medication 10 MILLIGRAM(S): at 06:24

## 2022-11-25 RX ADMIN — INSULIN GLARGINE 50 UNIT(S): 100 INJECTION, SOLUTION SUBCUTANEOUS at 21:14

## 2022-11-25 RX ADMIN — CHLORHEXIDINE GLUCONATE 1 APPLICATION(S): 213 SOLUTION TOPICAL at 11:27

## 2022-11-25 RX ADMIN — ENOXAPARIN SODIUM 40 MILLIGRAM(S): 100 INJECTION SUBCUTANEOUS at 06:24

## 2022-11-25 RX ADMIN — GABAPENTIN 300 MILLIGRAM(S): 400 CAPSULE ORAL at 13:50

## 2022-11-25 RX ADMIN — Medication 650 MILLIGRAM(S): at 08:00

## 2022-11-25 RX ADMIN — LIDOCAINE 1 PATCH: 4 CREAM TOPICAL at 00:00

## 2022-11-25 RX ADMIN — OXYCODONE HYDROCHLORIDE 10 MILLIGRAM(S): 5 TABLET ORAL at 14:34

## 2022-11-25 RX ADMIN — SENNA PLUS 2 TABLET(S): 8.6 TABLET ORAL at 21:17

## 2022-11-25 RX ADMIN — GABAPENTIN 300 MILLIGRAM(S): 400 CAPSULE ORAL at 21:14

## 2022-11-25 RX ADMIN — Medication 1 MILLIGRAM(S): at 11:26

## 2022-11-25 RX ADMIN — GABAPENTIN 300 MILLIGRAM(S): 400 CAPSULE ORAL at 06:24

## 2022-11-25 RX ADMIN — OXYCODONE HYDROCHLORIDE 10 MILLIGRAM(S): 5 TABLET ORAL at 21:13

## 2022-11-25 RX ADMIN — SODIUM CHLORIDE 3 MILLILITER(S): 9 INJECTION INTRAMUSCULAR; INTRAVENOUS; SUBCUTANEOUS at 13:56

## 2022-11-25 RX ADMIN — Medication 50 MILLIGRAM(S): at 18:01

## 2022-11-25 RX ADMIN — Medication 50 MILLIGRAM(S): at 06:24

## 2022-11-25 RX ADMIN — Medication 2: at 18:00

## 2022-11-25 RX ADMIN — Medication 15 UNIT(S): at 07:53

## 2022-11-25 RX ADMIN — Medication 500 MILLIGRAM(S): at 11:26

## 2022-11-25 RX ADMIN — Medication 100 GRAM(S): at 07:20

## 2022-11-25 RX ADMIN — PANTOPRAZOLE SODIUM 40 MILLIGRAM(S): 20 TABLET, DELAYED RELEASE ORAL at 11:26

## 2022-11-25 RX ADMIN — OXYCODONE HYDROCHLORIDE 10 MILLIGRAM(S): 5 TABLET ORAL at 05:37

## 2022-11-25 RX ADMIN — Medication 650 MILLIGRAM(S): at 07:19

## 2022-11-25 RX ADMIN — Medication 5 MILLIGRAM(S): at 21:14

## 2022-11-25 RX ADMIN — ATORVASTATIN CALCIUM 80 MILLIGRAM(S): 80 TABLET, FILM COATED ORAL at 21:14

## 2022-11-25 RX ADMIN — OXYCODONE HYDROCHLORIDE 10 MILLIGRAM(S): 5 TABLET ORAL at 13:34

## 2022-11-25 RX ADMIN — Medication 25 GRAM(S): at 09:15

## 2022-11-25 RX ADMIN — POLYETHYLENE GLYCOL 3350 17 GRAM(S): 17 POWDER, FOR SOLUTION ORAL at 11:26

## 2022-11-25 NOTE — PROGRESS NOTE ADULT - ASSESSMENT
68 yo M PMH HTN, HLD, DM (A1c 7.6), CAD s/p PCI RCA 2007, ETOH cirrhosis, B12/Vit D def., former smoker, recent hospitalization for GIB 05/2022   Admit on 11/21 for repeat cath, CABG 11/22 C3L (LIMA-LAD, SVG-OM, SVG-PDA), out of OR on Levo and insulin gtt  11/22 pt volume resuscitated and phenylephrine gtt slowly weaned off. Pt received PRBC x1 for acute blood loss anemia in ICU.   11/23 tomás removed because of inadequate wave form and unable to draw back. Plan to remove SGC in AM. CI remain2.5-3  11/24 pt deintensified plan for downgrade to floor in AM . Pt requiring PRBC x1 for Hgb <8. K5.6.        blood transfusion secondary to acute blood loss anermia    11/25 plan to downgrade to floor in AM.

## 2022-11-25 NOTE — PROGRESS NOTE ADULT - SUBJECTIVE AND OBJECTIVE BOX
Subjective: no c/o incisional pain at this time. Denies CP, SOB, palpitations, N/V, other c/o.    all other ROS negative x10 except as noted above     T(C): 36.8 (11-25-22 @ 00:00), Max: 37 (11-24-22 @ 04:00)  HR: 101 (11-25-22 @ 01:00) (87 - 107)  BP: 134/63 (11-25-22 @ 01:00) (94/66 - 163/73)  ABP: --  ABP(mean): --  RR: 15 (11-25-22 @ 01:00) (10 - 30)  SpO2: 92% (11-25-22 @ 01:00) (92% - 99%)  Wt(kg): --  CVP(mm Hg): 11 (11-24-22 @ 10:00) (9 - 14)  CO: --  CI: --  PA: --       I&O's Detail    23 Nov 2022 07:01  -  24 Nov 2022 07:00  --------------------------------------------------------  IN:    Insulin: 79.5 mL    IV PiggyBack: 50 mL    IV PiggyBack: 250 mL    Oral Fluid: 700 mL    PRBCs (Packed Red Blood Cells): 400 mL    sodium chloride 0.9%: 240 mL    sodium chloride 0.9%: 120 mL  Total IN: 1839.5 mL    OUT:    Bulb (mL): 30 mL    Chest Tube (mL): 100 mL    Chest Tube (mL): 260 mL    Chest Tube (mL): 5 mL    Indwelling Catheter - Urethral (mL): 2745 mL  Total OUT: 3140 mL    Total NET: -1300.5 mL      24 Nov 2022 07:01  -  25 Nov 2022 02:21  --------------------------------------------------------  IN:    IV PiggyBack: 100 mL    IV PiggyBack: 100 mL    IV PiggyBack: 250 mL    Oral Fluid: 580 mL    sodium chloride 0.9%: 20 mL    sodium chloride 0.9%: 20 mL  Total IN: 1070 mL    OUT:    Chest Tube (mL): 25 mL    Chest Tube (mL): 170 mL    Indwelling Catheter - Urethral (mL): 800 mL    Voided (mL): 900 mL  Total OUT: 1895 mL    Total NET: -825 mL          LABS: All Lab data reviewed and analyzed                        9.8    16.93 )-----------( 116      ( 24 Nov 2022 11:10 )             28.9     11-24    134<L>  |  99  |  24.9<H>  ----------------------------<  245<H>  4.8   |  25.0  |  0.75    Ca    9.7      24 Nov 2022 11:10  Mg     2.1     11-24              CAPILLARY BLOOD GLUCOSE      POCT Blood Glucose.: 221 mg/dL (24 Nov 2022 21:35)  POCT Blood Glucose.: 215 mg/dL (24 Nov 2022 16:43)  POCT Blood Glucose.: 223 mg/dL (24 Nov 2022 11:09)  POCT Blood Glucose.: 211 mg/dL (24 Nov 2022 07:47)           RADIOLOGY: - Reviewed and analyzed   CXR: pending    HOSPITAL MEDICATIONS: All medications reviewed and analyzed  MEDICATIONS  (STANDING):  ascorbic acid 500 milliGRAM(s) Oral daily  aspirin enteric coated 81 milliGRAM(s) Oral daily  atorvastatin 80 milliGRAM(s) Oral at bedtime  chlorhexidine 2% Cloths 1 Application(s) Topical daily  dextrose 5%. 1000 milliLiter(s) (50 mL/Hr) IV Continuous <Continuous>  dextrose 5%. 1000 milliLiter(s) (100 mL/Hr) IV Continuous <Continuous>  dextrose 50% Injectable 50 milliLiter(s) IV Push every 15 minutes  dextrose 50% Injectable 25 milliLiter(s) IV Push every 15 minutes  enoxaparin Injectable 40 milliGRAM(s) SubCutaneous every 24 hours  ferrous    sulfate 325 milliGRAM(s) Oral daily  folic acid 1 milliGRAM(s) Oral daily  gabapentin 300 milliGRAM(s) Oral three times a day  glucagon  Injectable 1 milliGRAM(s) IntraMuscular once  insulin glargine Injectable (LANTUS) 50 Unit(s) SubCutaneous at bedtime  insulin lispro (ADMELOG) corrective regimen sliding scale   SubCutaneous three times a day before meals  insulin lispro Injectable (ADMELOG) 15 Unit(s) SubCutaneous three times a day before meals  iron sucrose IVPB 100 milliGRAM(s) IV Intermittent every 24 hours  lidocaine   4% Patch 1 Patch Transdermal daily  melatonin 5 milliGRAM(s) Oral at bedtime  metoclopramide Injectable 10 milliGRAM(s) IV Push every 8 hours  metoprolol tartrate 50 milliGRAM(s) Oral two times a day  pantoprazole    Tablet 40 milliGRAM(s) Oral daily  polyethylene glycol 3350 17 Gram(s) Oral daily  senna 2 Tablet(s) Oral at bedtime  sodium chloride 0.9%. 1000 milliLiter(s) (10 mL/Hr) IV Continuous <Continuous>  sodium chloride 0.9%. 1000 milliLiter(s) (5 mL/Hr) IV Continuous <Continuous>    MEDICATIONS  (PRN):  acetaminophen     Tablet .. 650 milliGRAM(s) Oral every 6 hours PRN Mild Pain (1 - 3)  dextrose Oral Gel 15 Gram(s) Oral once PRN Blood Glucose LESS THAN 70 milliGRAM(s)/deciliter  oxyCODONE    IR 5 milliGRAM(s) Oral every 4 hours PRN Moderate Pain (4 - 6)  oxyCODONE    IR 10 milliGRAM(s) Oral every 4 hours PRN Severe Pain (7 - 10)          Lines:     Physical Exam  Neuro: A+O x 3, non-focal, speech clear and intact  HEENT:  NCAT, PERRL, EOMI. No conjuctival edema or iIcterus, no thrush.  No ETT or NGT/OGT  Neck:  ROM intact, no JVD, no nodes or masses palpable, trachea midline, no tracheostomy  Pulm: CTA, good air entry, equal bilaterally, no rales/rhonchi/wheezing, no accessory muscle use noted  Chest:        mediastinal CT all with dressings intact and no air leak, no subQ emphysema       +PW attatched to pacing box  CV: RRR, S1, S2. No murmurs, rubs, or gallops noted.  Abd: +BSx4. Soft, nontender, nondistended.  : molina in situ to bedside drainage  Ext: SEGURA x 4, no edema, no cyanosis or clubbing, distal motor/neuro/circ intact  Skin: warm, dry, no rashes                 67yMale with PMH     CABG, with JACQUIE        PAST MEDICAL & SURGICAL HISTORY:  Pancreatitis      Hypertension      Myocardial infarct      Alcohol abuse      Colon cancer      History of colon resection      History of heart surgery  cardiac stent

## 2022-11-25 NOTE — PROGRESS NOTE ADULT - ASSESSMENT
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 Patient was admitted to [facility] initially in May 2022 for GI bleed and found to have EF 30-35%, and multivessel disease but CABG deferred due to hx of alcoholism and patient transferred to rehab.   Consult for diabetes mgmt. 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 Patient was admitted to [facility] initially in May 2022 for GI bleed and found to have EF 30-35%, and multivessel disease but CABG deferred due to hx of alcoholism and patient transferred to rehab.   Consult for diabetes mgmt. 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 Patient was admitted to [facility] initially in May 2022 for GI bleed and found to have EF 30-35%, and multivessel disease but CABG deferred due to hx of alcoholism and patient transferred to rehab.   Consult for diabetes mgmt. A1c 7.6    T2DM in setting of hx of pancreatitis due to EtOH abuse- mild hyperglycemia  -A1c 7.6  -check sugars AC and bedtime  -ensure diabetic diet  -continue lantus 50 units QHS  -continue admelog 15 units TID  -continue with insulin sliding scale  -add farxiga 5mg daily  -EF 30-35%, cannot be started on metformin  -check cpeptide and SUKHWINDER due to hx of pancreatitis    CAD- s/p CABG, care per primary team    HLD- continue statin

## 2022-11-25 NOTE — PROGRESS NOTE ADULT - SUBJECTIVE AND OBJECTIVE BOX
Interval Events:  no overnight events  follow up on diabetes    patient seen and examined at bedside.  FS better but still running high  feels okay    REVIEW OF SYSTEMS:    CONSTITUTIONAL: No fever, weight loss, or fatigue  EYES: No eye pain, visual disturbances, or discharge  ENMT:  No difficulty hearing, tinnitus, vertigo; No sinus or throat pain  NECK: No pain or stiffness  RESPIRATORY: No cough, wheezing, chills or hemoptysis; No shortness of breath  CARDIOVASCULAR: No chest pain, palpitations, dizziness, or leg swelling  GASTROINTESTINAL: No abdominal or epigastric pain. No nausea, vomiting, or hematemesis; No diarrhea or constipation. No melena or hematochezia.  NEUROLOGICAL: No headaches, memory loss, loss of strength, numbness, or tremors  SKIN: No itching, burning, rashes, or lesions   MUSCULOSKELETAL: No joint pain or swelling; No muscle, back, or extremity pain  PSYCHIATRIC: No depression, anxiety, mood swings, or difficulty sleeping        No Known Allergies      MEDICATIONS  (STANDING):  ascorbic acid 500 milliGRAM(s) Oral daily  aspirin enteric coated 81 milliGRAM(s) Oral daily  atorvastatin 80 milliGRAM(s) Oral at bedtime  chlorhexidine 2% Cloths 1 Application(s) Topical daily  dextrose 5%. 1000 milliLiter(s) (50 mL/Hr) IV Continuous <Continuous>  dextrose 5%. 1000 milliLiter(s) (100 mL/Hr) IV Continuous <Continuous>  dextrose 50% Injectable 50 milliLiter(s) IV Push every 15 minutes  dextrose 50% Injectable 25 milliLiter(s) IV Push every 15 minutes  enoxaparin Injectable 40 milliGRAM(s) SubCutaneous every 24 hours  ferrous    sulfate 325 milliGRAM(s) Oral daily  folic acid 1 milliGRAM(s) Oral daily  gabapentin 300 milliGRAM(s) Oral three times a day  glucagon  Injectable 1 milliGRAM(s) IntraMuscular once  insulin glargine Injectable (LANTUS) 50 Unit(s) SubCutaneous at bedtime  insulin lispro (ADMELOG) corrective regimen sliding scale   SubCutaneous three times a day before meals  insulin lispro Injectable (ADMELOG) 15 Unit(s) SubCutaneous three times a day before meals  iron sucrose IVPB 100 milliGRAM(s) IV Intermittent every 24 hours  lidocaine   4% Patch 1 Patch Transdermal daily  melatonin 5 milliGRAM(s) Oral at bedtime  metoprolol tartrate 50 milliGRAM(s) Oral two times a day  pantoprazole    Tablet 40 milliGRAM(s) Oral daily  polyethylene glycol 3350 17 Gram(s) Oral daily  senna 2 Tablet(s) Oral at bedtime  sodium chloride 0.9% lock flush 3 milliLiter(s) IV Push every 8 hours  sodium chloride 0.9%. 1000 milliLiter(s) (10 mL/Hr) IV Continuous <Continuous>  sodium chloride 0.9%. 1000 milliLiter(s) (5 mL/Hr) IV Continuous <Continuous>    MEDICATIONS  (PRN):  acetaminophen     Tablet .. 650 milliGRAM(s) Oral every 6 hours PRN Mild Pain (1 - 3)  dextrose Oral Gel 15 Gram(s) Oral once PRN Blood Glucose LESS THAN 70 milliGRAM(s)/deciliter  oxyCODONE    IR 5 milliGRAM(s) Oral every 4 hours PRN Moderate Pain (4 - 6)  oxyCODONE    IR 10 milliGRAM(s) Oral every 4 hours PRN Severe Pain (7 - 10)      Vital Signs Last 24 Hrs  T(C): 36.7 (25 Nov 2022 10:15), Max: 37.3 (25 Nov 2022 08:01)  T(F): 98.1 (25 Nov 2022 10:15), Max: 99.1 (25 Nov 2022 08:01)  HR: 98 (25 Nov 2022 10:15) (92 - 111)  BP: 114/69 (25 Nov 2022 10:15) (114/69 - 163/73)  BP(mean): 93 (25 Nov 2022 08:00) (85 - 106)  RR: 18 (25 Nov 2022 10:15) (12 - 24)  SpO2: 96% (25 Nov 2022 10:15) (92% - 96%)    Parameters below as of 25 Nov 2022 10:15  Patient On (Oxygen Delivery Method): room air      Weight (kg): 82.6 (11-22-22 @ 05:48)    Physical Exam:    Constitutional: NAD, well-developed  Respiratory: CTAB, normal respirations  Cardiovascular: S1 and S2, RRR, bandaged anterior chest  Gastrointestinal: BS+, soft, ntnd  Extremities: +peripheral edema  Neurological: AOx3, no focal deficits  Psychiatric: Normal mood and normal affect  Skin: no rashes, no acanthosis    LABS  11-25    136  |  100  |  20.7<H>  ----------------------------<  187<H>  4.2   |  27.0  |  0.71    Ca    10.4      25 Nov 2022 02:30  Mg     1.6     11-25                            9.9    14.68 )-----------( 121      ( 25 Nov 2022 02:30 )             29.7     Triglycerides, Serum: 192 mg/dL (11-21-22 @ 08:15)  HDL Cholesterol, Serum: 40 mg/dL (11-21-22 @ 08:15)  Cholesterol, Serum: 159 mg/dL (11-21-22 @ 08:15)    Thyroid Stimulating Hormone, Serum: 1.23 uIU/mL (11-21-22 @ 14:08)  Free Thyroxine, Serum: 1.1 ng/dL (11-21-22 @ 14:08)  A1C with Estimated Average Glucose Result: 7.6 % (11-21-22 @ 14:08)  A1C with Estimated Average Glucose Result: 7.5 % (11-21-22 @ 08:15)            CAPILLARY BLOOD GLUCOSE      POCT Blood Glucose.: 184 mg/dL (25 Nov 2022 07:24)  POCT Blood Glucose.: 221 mg/dL (24 Nov 2022 21:35)  POCT Blood Glucose.: 215 mg/dL (24 Nov 2022 16:43)   Interval Events:  no overnight events  follow up on diabetes    patient seen and examined at bedside.  FS better but still running high  sleeping    REVIEW OF SYSTEMS:    CONSTITUTIONAL: No fever, weight loss, or fatigue  EYES: No eye pain, visual disturbances, or discharge  ENMT:  No difficulty hearing, tinnitus, vertigo; No sinus or throat pain  NECK: No pain or stiffness  RESPIRATORY: No cough, wheezing, chills or hemoptysis; No shortness of breath  CARDIOVASCULAR: No chest pain, palpitations, dizziness, or leg swelling  GASTROINTESTINAL: No abdominal or epigastric pain. No nausea, vomiting, or hematemesis; No diarrhea or constipation. No melena or hematochezia.  NEUROLOGICAL: No headaches, memory loss, loss of strength, numbness, or tremors  SKIN: No itching, burning, rashes, or lesions   MUSCULOSKELETAL: No joint pain or swelling; No muscle, back, or extremity pain  PSYCHIATRIC: No depression, anxiety, mood swings, or difficulty sleeping        No Known Allergies      MEDICATIONS  (STANDING):  ascorbic acid 500 milliGRAM(s) Oral daily  aspirin enteric coated 81 milliGRAM(s) Oral daily  atorvastatin 80 milliGRAM(s) Oral at bedtime  chlorhexidine 2% Cloths 1 Application(s) Topical daily  dextrose 5%. 1000 milliLiter(s) (50 mL/Hr) IV Continuous <Continuous>  dextrose 5%. 1000 milliLiter(s) (100 mL/Hr) IV Continuous <Continuous>  dextrose 50% Injectable 50 milliLiter(s) IV Push every 15 minutes  dextrose 50% Injectable 25 milliLiter(s) IV Push every 15 minutes  enoxaparin Injectable 40 milliGRAM(s) SubCutaneous every 24 hours  ferrous    sulfate 325 milliGRAM(s) Oral daily  folic acid 1 milliGRAM(s) Oral daily  gabapentin 300 milliGRAM(s) Oral three times a day  glucagon  Injectable 1 milliGRAM(s) IntraMuscular once  insulin glargine Injectable (LANTUS) 50 Unit(s) SubCutaneous at bedtime  insulin lispro (ADMELOG) corrective regimen sliding scale   SubCutaneous three times a day before meals  insulin lispro Injectable (ADMELOG) 15 Unit(s) SubCutaneous three times a day before meals  iron sucrose IVPB 100 milliGRAM(s) IV Intermittent every 24 hours  lidocaine   4% Patch 1 Patch Transdermal daily  melatonin 5 milliGRAM(s) Oral at bedtime  metoprolol tartrate 50 milliGRAM(s) Oral two times a day  pantoprazole    Tablet 40 milliGRAM(s) Oral daily  polyethylene glycol 3350 17 Gram(s) Oral daily  senna 2 Tablet(s) Oral at bedtime  sodium chloride 0.9% lock flush 3 milliLiter(s) IV Push every 8 hours  sodium chloride 0.9%. 1000 milliLiter(s) (10 mL/Hr) IV Continuous <Continuous>  sodium chloride 0.9%. 1000 milliLiter(s) (5 mL/Hr) IV Continuous <Continuous>    MEDICATIONS  (PRN):  acetaminophen     Tablet .. 650 milliGRAM(s) Oral every 6 hours PRN Mild Pain (1 - 3)  dextrose Oral Gel 15 Gram(s) Oral once PRN Blood Glucose LESS THAN 70 milliGRAM(s)/deciliter  oxyCODONE    IR 5 milliGRAM(s) Oral every 4 hours PRN Moderate Pain (4 - 6)  oxyCODONE    IR 10 milliGRAM(s) Oral every 4 hours PRN Severe Pain (7 - 10)      Vital Signs Last 24 Hrs  T(C): 36.7 (25 Nov 2022 10:15), Max: 37.3 (25 Nov 2022 08:01)  T(F): 98.1 (25 Nov 2022 10:15), Max: 99.1 (25 Nov 2022 08:01)  HR: 98 (25 Nov 2022 10:15) (92 - 111)  BP: 114/69 (25 Nov 2022 10:15) (114/69 - 163/73)  BP(mean): 93 (25 Nov 2022 08:00) (85 - 106)  RR: 18 (25 Nov 2022 10:15) (12 - 24)  SpO2: 96% (25 Nov 2022 10:15) (92% - 96%)    Parameters below as of 25 Nov 2022 10:15  Patient On (Oxygen Delivery Method): room air      Weight (kg): 82.6 (11-22-22 @ 05:48)    Physical Exam:    Constitutional: NAD, well-developed  Respiratory: CTAB, normal respirations  Cardiovascular: S1 and S2, RRR, bandaged anterior chest  Gastrointestinal: BS+, soft, ntnd  Extremities: +peripheral edema  Neurological: sleeping, no focal deficits  Psychiatric: Normal mood and normal affect  Skin: no rashes, no acanthosis    LABS  11-25    136  |  100  |  20.7<H>  ----------------------------<  187<H>  4.2   |  27.0  |  0.71    Ca    10.4      25 Nov 2022 02:30  Mg     1.6     11-25                            9.9    14.68 )-----------( 121      ( 25 Nov 2022 02:30 )             29.7     Triglycerides, Serum: 192 mg/dL (11-21-22 @ 08:15)  HDL Cholesterol, Serum: 40 mg/dL (11-21-22 @ 08:15)  Cholesterol, Serum: 159 mg/dL (11-21-22 @ 08:15)    Thyroid Stimulating Hormone, Serum: 1.23 uIU/mL (11-21-22 @ 14:08)  Free Thyroxine, Serum: 1.1 ng/dL (11-21-22 @ 14:08)  A1C with Estimated Average Glucose Result: 7.6 % (11-21-22 @ 14:08)  A1C with Estimated Average Glucose Result: 7.5 % (11-21-22 @ 08:15)            CAPILLARY BLOOD GLUCOSE      POCT Blood Glucose.: 184 mg/dL (25 Nov 2022 07:24)  POCT Blood Glucose.: 221 mg/dL (24 Nov 2022 21:35)  POCT Blood Glucose.: 215 mg/dL (24 Nov 2022 16:43)

## 2022-11-25 NOTE — DIETITIAN INITIAL EVALUATION ADULT - WEIGHT IN LBS
11/25/2022    Patient: Rudy Sanchez   YOB: 1965   Date of Visit: 11/25/2022     Nina Storey NP  [id number] [facility] [address]  Via In Basket    Dear Nina Storey NP,      Thank you for referring Mr. Jose Angel Ornelas to [address] [place] [address] for evaluation. My notes for this consultation are attached. If you have questions, please do not hesitate to call me. I look forward to following your patient along with you.       Sincerely,    Ricardo Rosas MD
[phone]

## 2022-11-26 DIAGNOSIS — Z29.9 ENCOUNTER FOR PROPHYLACTIC MEASURES, UNSPECIFIED: ICD-10-CM

## 2022-11-26 LAB
ANION GAP SERPL CALC-SCNC: 9 MMOL/L — SIGNIFICANT CHANGE UP (ref 5–17)
BUN SERPL-MCNC: 29.9 MG/DL — HIGH (ref 8–20)
C PEPTIDE SERPL-MCNC: 2.4 NG/ML — SIGNIFICANT CHANGE UP (ref 1.1–4.4)
CALCIUM SERPL-MCNC: 10 MG/DL — SIGNIFICANT CHANGE UP (ref 8.4–10.5)
CHLORIDE SERPL-SCNC: 102 MMOL/L — SIGNIFICANT CHANGE UP (ref 96–108)
CO2 SERPL-SCNC: 26 MMOL/L — SIGNIFICANT CHANGE UP (ref 22–29)
CREAT SERPL-MCNC: 0.71 MG/DL — SIGNIFICANT CHANGE UP (ref 0.5–1.3)
EGFR: 101 ML/MIN/1.73M2 — SIGNIFICANT CHANGE UP
GLUCOSE BLDC GLUCOMTR-MCNC: 113 MG/DL — HIGH (ref 70–99)
GLUCOSE BLDC GLUCOMTR-MCNC: 113 MG/DL — HIGH (ref 70–99)
GLUCOSE BLDC GLUCOMTR-MCNC: 117 MG/DL — HIGH (ref 70–99)
GLUCOSE BLDC GLUCOMTR-MCNC: 137 MG/DL — HIGH (ref 70–99)
GLUCOSE SERPL-MCNC: 124 MG/DL — HIGH (ref 70–99)
HCT VFR BLD CALC: 29.2 % — LOW (ref 39–50)
HGB BLD-MCNC: 9.4 G/DL — LOW (ref 13–17)
MAGNESIUM SERPL-MCNC: 2 MG/DL — SIGNIFICANT CHANGE UP (ref 1.6–2.6)
MCHC RBC-ENTMCNC: 31.2 PG — SIGNIFICANT CHANGE UP (ref 27–34)
MCHC RBC-ENTMCNC: 32.2 GM/DL — SIGNIFICANT CHANGE UP (ref 32–36)
MCV RBC AUTO: 97 FL — SIGNIFICANT CHANGE UP (ref 80–100)
PLATELET # BLD AUTO: 134 K/UL — LOW (ref 150–400)
POTASSIUM SERPL-MCNC: 3.9 MMOL/L — SIGNIFICANT CHANGE UP (ref 3.5–5.3)
POTASSIUM SERPL-SCNC: 3.9 MMOL/L — SIGNIFICANT CHANGE UP (ref 3.5–5.3)
RBC # BLD: 3.01 M/UL — LOW (ref 4.2–5.8)
RBC # FLD: 14.6 % — HIGH (ref 10.3–14.5)
SODIUM SERPL-SCNC: 137 MMOL/L — SIGNIFICANT CHANGE UP (ref 135–145)
WBC # BLD: 12.37 K/UL — HIGH (ref 3.8–10.5)
WBC # FLD AUTO: 12.37 K/UL — HIGH (ref 3.8–10.5)

## 2022-11-26 PROCEDURE — 99232 SBSQ HOSP IP/OBS MODERATE 35: CPT

## 2022-11-26 PROCEDURE — 71045 X-RAY EXAM CHEST 1 VIEW: CPT | Mod: 26

## 2022-11-26 RX ORDER — POTASSIUM CHLORIDE 20 MEQ
40 PACKET (EA) ORAL ONCE
Refills: 0 | Status: COMPLETED | OUTPATIENT
Start: 2022-11-26 | End: 2022-11-26

## 2022-11-26 RX ORDER — MAGNESIUM OXIDE 400 MG ORAL TABLET 241.3 MG
400 TABLET ORAL
Refills: 0 | Status: COMPLETED | OUTPATIENT
Start: 2022-11-26 | End: 2022-11-27

## 2022-11-26 RX ORDER — DAPAGLIFLOZIN 10 MG/1
10 TABLET, FILM COATED ORAL EVERY 24 HOURS
Refills: 0 | Status: DISCONTINUED | OUTPATIENT
Start: 2022-11-27 | End: 2022-12-14

## 2022-11-26 RX ORDER — INSULIN LISPRO 100/ML
10 VIAL (ML) SUBCUTANEOUS
Refills: 0 | Status: DISCONTINUED | OUTPATIENT
Start: 2022-11-26 | End: 2022-11-29

## 2022-11-26 RX ORDER — MAGNESIUM SULFATE 500 MG/ML
2 VIAL (ML) INJECTION ONCE
Refills: 0 | Status: COMPLETED | OUTPATIENT
Start: 2022-11-26 | End: 2022-11-26

## 2022-11-26 RX ORDER — INSULIN GLARGINE 100 [IU]/ML
40 INJECTION, SOLUTION SUBCUTANEOUS AT BEDTIME
Refills: 0 | Status: DISCONTINUED | OUTPATIENT
Start: 2022-11-26 | End: 2022-11-27

## 2022-11-26 RX ADMIN — SENNA PLUS 2 TABLET(S): 8.6 TABLET ORAL at 21:43

## 2022-11-26 RX ADMIN — DAPAGLIFLOZIN 5 MILLIGRAM(S): 10 TABLET, FILM COATED ORAL at 00:14

## 2022-11-26 RX ADMIN — Medication 15 UNIT(S): at 18:19

## 2022-11-26 RX ADMIN — OXYCODONE HYDROCHLORIDE 10 MILLIGRAM(S): 5 TABLET ORAL at 17:49

## 2022-11-26 RX ADMIN — OXYCODONE HYDROCHLORIDE 10 MILLIGRAM(S): 5 TABLET ORAL at 21:48

## 2022-11-26 RX ADMIN — OXYCODONE HYDROCHLORIDE 10 MILLIGRAM(S): 5 TABLET ORAL at 11:16

## 2022-11-26 RX ADMIN — ENOXAPARIN SODIUM 40 MILLIGRAM(S): 100 INJECTION SUBCUTANEOUS at 05:06

## 2022-11-26 RX ADMIN — Medication 81 MILLIGRAM(S): at 08:07

## 2022-11-26 RX ADMIN — Medication 500 MILLIGRAM(S): at 08:08

## 2022-11-26 RX ADMIN — Medication 5 MILLIGRAM(S): at 21:44

## 2022-11-26 RX ADMIN — Medication 325 MILLIGRAM(S): at 08:08

## 2022-11-26 RX ADMIN — Medication 5 MILLIGRAM(S): at 18:19

## 2022-11-26 RX ADMIN — Medication 15 UNIT(S): at 08:11

## 2022-11-26 RX ADMIN — Medication 1 MILLIGRAM(S): at 08:08

## 2022-11-26 RX ADMIN — Medication 50 MILLIGRAM(S): at 17:00

## 2022-11-26 RX ADMIN — INSULIN GLARGINE 40 UNIT(S): 100 INJECTION, SOLUTION SUBCUTANEOUS at 21:44

## 2022-11-26 RX ADMIN — Medication 50 MILLIGRAM(S): at 08:07

## 2022-11-26 RX ADMIN — GABAPENTIN 300 MILLIGRAM(S): 400 CAPSULE ORAL at 05:06

## 2022-11-26 RX ADMIN — Medication 40 MILLIEQUIVALENT(S): at 11:31

## 2022-11-26 RX ADMIN — MAGNESIUM OXIDE 400 MG ORAL TABLET 400 MILLIGRAM(S): 241.3 TABLET ORAL at 11:32

## 2022-11-26 RX ADMIN — CHLORHEXIDINE GLUCONATE 1 APPLICATION(S): 213 SOLUTION TOPICAL at 08:08

## 2022-11-26 RX ADMIN — POLYETHYLENE GLYCOL 3350 17 GRAM(S): 17 POWDER, FOR SOLUTION ORAL at 08:07

## 2022-11-26 RX ADMIN — Medication 25 GRAM(S): at 11:31

## 2022-11-26 RX ADMIN — OXYCODONE HYDROCHLORIDE 10 MILLIGRAM(S): 5 TABLET ORAL at 10:16

## 2022-11-26 RX ADMIN — OXYCODONE HYDROCHLORIDE 10 MILLIGRAM(S): 5 TABLET ORAL at 16:49

## 2022-11-26 RX ADMIN — ATORVASTATIN CALCIUM 80 MILLIGRAM(S): 80 TABLET, FILM COATED ORAL at 21:44

## 2022-11-26 RX ADMIN — SODIUM CHLORIDE 3 MILLILITER(S): 9 INJECTION INTRAMUSCULAR; INTRAVENOUS; SUBCUTANEOUS at 14:21

## 2022-11-26 RX ADMIN — OXYCODONE HYDROCHLORIDE 10 MILLIGRAM(S): 5 TABLET ORAL at 22:48

## 2022-11-26 RX ADMIN — IRON SUCROSE 210 MILLIGRAM(S): 20 INJECTION, SOLUTION INTRAVENOUS at 16:50

## 2022-11-26 RX ADMIN — GABAPENTIN 300 MILLIGRAM(S): 400 CAPSULE ORAL at 13:55

## 2022-11-26 RX ADMIN — Medication 15 UNIT(S): at 12:30

## 2022-11-26 RX ADMIN — GABAPENTIN 300 MILLIGRAM(S): 400 CAPSULE ORAL at 21:44

## 2022-11-26 RX ADMIN — SODIUM CHLORIDE 3 MILLILITER(S): 9 INJECTION INTRAMUSCULAR; INTRAVENOUS; SUBCUTANEOUS at 05:04

## 2022-11-26 RX ADMIN — PANTOPRAZOLE SODIUM 40 MILLIGRAM(S): 20 TABLET, DELAYED RELEASE ORAL at 08:07

## 2022-11-26 RX ADMIN — SODIUM CHLORIDE 3 MILLILITER(S): 9 INJECTION INTRAMUSCULAR; INTRAVENOUS; SUBCUTANEOUS at 21:49

## 2022-11-26 RX ADMIN — MAGNESIUM OXIDE 400 MG ORAL TABLET 400 MILLIGRAM(S): 241.3 TABLET ORAL at 16:50

## 2022-11-26 NOTE — PROGRESS NOTE ADULT - PROBLEM SELECTOR PLAN 4
HA1c 7.6 on Metformin and Insulin as an outpatient.   Continue fingersticks AC/HS with Lantus, premeal, Farxiga, and ISS for BG coverage.   Endocrine following. HA1c 7.6 on Metformin and Insulin as an outpatient.   Postop hyperglycemia noted.   Continue fingersticks AC/HS with Lantus, premeal, and ISS for BG coverage.   Farxiga added today to regimen.  Endocrine following.

## 2022-11-26 NOTE — PROGRESS NOTE ADULT - ASSESSMENT
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 Patient was admitted to [facility] initially in May 2022 for GI bleed and found to have EF 30-35%, and multivessel disease but CABG deferred due to hx of alcoholism and patient transferred to rehab.   Consult for diabetes mgmt. A1c 7.6    T2DM in setting of hx of pancreatitis due to EtOH abuse- mild hyperglycemia  -A1c 7.6  -check sugars AC and bedtime  -ensure diabetic diet  -decrease to lantus 40 units QHS  -decrease to admelog 10 units TID  -continue with insulin sliding scale  -increase to farxiga 10mg daily  -EF 30-35%, cannot be started on metformin  -check cpeptide and SUKHWINDER due to hx of pancreatitis    CAD- s/p CABG, care per primary team    HLD- continue statin

## 2022-11-26 NOTE — PROGRESS NOTE ADULT - ASSESSMENT
67 year old male patient former smoker with a medical history of HTN, HLD, type 2 DM (HA1c 7.6 on Metformin and Insulin), CAD s/p PCI to RCA 2007, ETOH cirrhosis, B12/Vit D deficiency, recent hospitalization for GI Bleed 05/2022, admitted on 11/21/22 for repeat cath which confirmed multivessel CAD. Patient underwent CABG X 3 (LIMA-LAD, SVG-OM, SVG-PDA) on 11/22/22 with Dr. Gray. Postoperative course significant for cardiogenic shock and hypotension (resolved s/p volume resuscitation, phenylephrine gtt weaned off), and ABLA (stable s/p blood transfusion).  67 year old male patient former smoker with a medical history of HTN, HLD, type 2 DM (HA1c 7.6 on Metformin and Insulin), CAD s/p PCI to RCA 2007, ETOH cirrhosis, B12/Vit D deficiency, recent hospitalization for GI Bleed 05/2022, admitted on 11/21/22 for repeat cath which confirmed multivessel CAD. Patient underwent CABG X 3 (LIMA-LAD, SVG-OM, SVG-PDA) on 11/22/22 with Dr. Gray. Postoperative course significant for cardiogenic shock and hypotension (resolved s/p volume resuscitation, phenylephrine gtt weaned off), ABLA (stable s/p blood transfusion), and hyperglycemia (titrating insulin requirments).

## 2022-11-26 NOTE — PROGRESS NOTE ADULT - SUBJECTIVE AND OBJECTIVE BOX
POD #4 s/p CABG X 3 (LIMA-LAD, SVG-OM, SVG-RPDA)    PAST MEDICAL & SURGICAL HISTORY:  Pancreatitis  Hypertension  Myocardial infarct  Alcohol abuse  Colon cancer  History of colon resection  History of heart surgery  cardiac stent    FAMILY HISTORY:  FH: prostate cancer (Father)  Family history of atherosclerosis (Mother)    Brief Hospital Course: 67 year old male patient former smoker with a medical history of HTN, HLD, type 2 DM (HA1c 7.6 on Metformin and Insulin), CAD s/p PCI to RCA 2007, ETOH cirrhosis, B12/Vit D deficiency, recent hospitalization for GI Bleed 05/2022, admitted on 11/21/22 for repeat cath which confirmed multivessel CAD. Patient underwent CABG X 3 (LIMA-LAD, SVG-OM, SVG-PDA) on 11/22/22 with Dr. Gray. Postoperative course significant for cardiogenic shock and hypotension (resolved s/p volume resuscitation, phenylephrine gtt weaned off), and ABLA (stable s/p blood transfusion).     Significant recent/past 24 hr events: No overnight events reported.    Subjective: Patient lying in bed in NAD. +Tolerating diet. +Passing gas. +Pain currently controlled. Denies fevers, chills, lightheadedness, dizziness, HA, CP, palpitations, SOB, cough, abdominal pain, N/V, diarrhea, numbness/tingling in extremities, or any other acute complaints. ROS negative x 10 systems except as noted above.    MEDICATIONS  (STANDING):  ascorbic acid 500 milliGRAM(s) Oral daily  aspirin enteric coated 81 milliGRAM(s) Oral daily  atorvastatin 80 milliGRAM(s) Oral at bedtime  chlorhexidine 2% Cloths 1 Application(s) Topical daily  dapagliflozin 5 milliGRAM(s) Oral every 24 hours  enoxaparin Injectable 40 milliGRAM(s) SubCutaneous every 24 hours  ferrous    sulfate 325 milliGRAM(s) Oral daily  folic acid 1 milliGRAM(s) Oral daily  gabapentin 300 milliGRAM(s) Oral three times a day  insulin glargine Injectable (LANTUS) 50 Unit(s) SubCutaneous at bedtime  insulin lispro (ADMELOG) corrective regimen sliding scale   SubCutaneous three times a day before meals  insulin lispro Injectable (ADMELOG) 15 Unit(s) SubCutaneous three times a day before meals  iron sucrose IVPB 100 milliGRAM(s) IV Intermittent every 24 hours  lidocaine   4% Patch 1 Patch Transdermal daily  melatonin 5 milliGRAM(s) Oral at bedtime  metoprolol tartrate 50 milliGRAM(s) Oral two times a day  pantoprazole    Tablet 40 milliGRAM(s) Oral daily  polyethylene glycol 3350 17 Gram(s) Oral daily  senna 2 Tablet(s) Oral at bedtime  sodium chloride 0.9% lock flush 3 milliLiter(s) IV Push every 8 hours    MEDICATIONS  (PRN):  acetaminophen     Tablet .. 650 milliGRAM(s) Oral every 6 hours PRN Mild Pain (1 - 3)  dextrose Oral Gel 15 Gram(s) Oral once PRN Blood Glucose LESS THAN 70 milliGRAM(s)/deciliter  oxyCODONE    IR 5 milliGRAM(s) Oral every 4 hours PRN Moderate Pain (4 - 6)  oxyCODONE    IR 10 milliGRAM(s) Oral every 4 hours PRN Severe Pain (7 - 10)    Allergies: No Known Allergies    Vitals   T(C): 37.3 (26 Nov 2022 00:15), Max: 37.4 (25 Nov 2022 18:00)  T(F): 99.2 (26 Nov 2022 00:15), Max: 99.4 (25 Nov 2022 18:00)  HR: 101 (26 Nov 2022 00:15) (98 - 111)  BP: 122/68 (26 Nov 2022 00:15) (114/69 - 151/67)  BP(mean): 93 (25 Nov 2022 08:00) (92 - 106)  RR: 16 (26 Nov 2022 00:15) (14 - 21)  SpO2: 95% (26 Nov 2022 00:15) (94% - 96%)  O2 Parameters below as of 26 Nov 2022 00:15  Patient On (Oxygen Delivery Method): room air    I&O's Detail    24 Nov 2022 07:01  -  25 Nov 2022 07:00  --------------------------------------------------------  IN:    IV PiggyBack: 25 mL    IV PiggyBack: 250 mL    IV PiggyBack: 100 mL    IV PiggyBack: 100 mL    Oral Fluid: 780 mL    sodium chloride 0.9%: 20 mL    sodium chloride 0.9%: 20 mL  Total IN: 1295 mL    OUT:    Chest Tube (mL): 25 mL    Chest Tube (mL): 170 mL    Indwelling Catheter - Urethral (mL): 800 mL    Voided (mL): 1550 mL  Total OUT: 2545 mL    Total NET: -1250 mL      25 Nov 2022 07:01  -  26 Nov 2022 02:48  --------------------------------------------------------  IN:    IV PiggyBack: 50 mL    Oral Fluid: 1110 mL    sodium chloride 0.9%: 20 mL  Total IN: 1180 mL    OUT:    Voided (mL): 1150 mL  Total OUT: 1150 mL    Total NET: 30 mL    Physical Exam  Neuro: A+O x 3, non-focal, speech clear and intact  HEENT:  NCAT, No conjuctival edema or icterus, no thrush.    Neck:  Supple, trachea midline  Pulm: +Diminished BSs b/l bases, no accessory muscle use noted  Chest: +EPWs  CV: regular rate, regular rhythm, +S1S2, no murmur noted  Abd: soft, NT, ND, + BS  Ext: SEGURA x 4, trace LE edema, no cyanosis, distal motor/neuro/circ intact  Skin: warm, dry, perfused  Incisions: midsternal mepilex C/D/I, sternum stable, RLE harvest site C/D/I w/ dressing     LABS                        9.9    14.68 )-----------( 121      ( 25 Nov 2022 02:30 )             29.7     11-25    136  |  100  |  20.7<H>  ----------------------------<  187<H>  4.2   |  27.0  |  0.71    Ca    10.4      25 Nov 2022 02:30  Mg     1.6     11-25    POCT Blood Glucose.: 177 mg/dL (11-25-22 @ 21:00)  POCT Blood Glucose.: 160 mg/dL (11-25-22 @ 17:17)  POCT Blood Glucose.: 142 mg/dL (11-25-22 @ 13:37)  POCT Blood Glucose.: 184 mg/dL (11-25-22 @ 07:24)      Last CXR:  < from: Xray Chest 1 View- PORTABLE-Urgent (Xray Chest 1 View- PORTABLE-Urgent .) (11.24.22 @ 16:48) >  IMPRESSION:  Mild plate like atelectasis at the left base, improved.  No pneumothorax  < end of copied text >     POD #4 s/p CABG X 3 (LIMA-LAD, SVG-OM, SVG-RPDA)    PAST MEDICAL & SURGICAL HISTORY:  Pancreatitis  Hypertension  Myocardial infarct  Alcohol abuse  Colon cancer  History of colon resection  History of heart surgery  cardiac stent    FAMILY HISTORY:  FH: prostate cancer (Father)  Family history of atherosclerosis (Mother)    Brief Hospital Course: 67 year old male patient former smoker with a medical history of HTN, HLD, type 2 DM (HA1c 7.6 on Metformin and Insulin), CAD s/p PCI to RCA 2007, ETOH cirrhosis, B12/Vit D deficiency, recent hospitalization for GI Bleed 05/2022, admitted on 11/21/22 for repeat cath which confirmed multivessel CAD. Patient underwent CABG X 3 (LIMA-LAD, SVG-OM, SVG-PDA) on 11/22/22 with Dr. Gray. Postoperative course significant for cardiogenic shock and hypotension (resolved s/p volume resuscitation, phenylephrine gtt weaned off), ABLA (stable s/p blood transfusion), and hyperglycemia (titrating insulin requirments).      Significant recent/past 24 hr events: No overnight events reported.    Subjective: Patient lying in bed in NAD. +Tolerating diet. +Passing gas. +Pain currently controlled. Denies fevers, chills, lightheadedness, dizziness, HA, CP, palpitations, SOB, cough, abdominal pain, N/V, diarrhea, numbness/tingling in extremities, or any other acute complaints. ROS negative x 10 systems except as noted above.    MEDICATIONS  (STANDING):  ascorbic acid 500 milliGRAM(s) Oral daily  aspirin enteric coated 81 milliGRAM(s) Oral daily  atorvastatin 80 milliGRAM(s) Oral at bedtime  chlorhexidine 2% Cloths 1 Application(s) Topical daily  dapagliflozin 5 milliGRAM(s) Oral every 24 hours  enoxaparin Injectable 40 milliGRAM(s) SubCutaneous every 24 hours  ferrous    sulfate 325 milliGRAM(s) Oral daily  folic acid 1 milliGRAM(s) Oral daily  gabapentin 300 milliGRAM(s) Oral three times a day  insulin glargine Injectable (LANTUS) 50 Unit(s) SubCutaneous at bedtime  insulin lispro (ADMELOG) corrective regimen sliding scale   SubCutaneous three times a day before meals  insulin lispro Injectable (ADMELOG) 15 Unit(s) SubCutaneous three times a day before meals  iron sucrose IVPB 100 milliGRAM(s) IV Intermittent every 24 hours  lidocaine   4% Patch 1 Patch Transdermal daily  melatonin 5 milliGRAM(s) Oral at bedtime  metoprolol tartrate 50 milliGRAM(s) Oral two times a day  pantoprazole    Tablet 40 milliGRAM(s) Oral daily  polyethylene glycol 3350 17 Gram(s) Oral daily  senna 2 Tablet(s) Oral at bedtime  sodium chloride 0.9% lock flush 3 milliLiter(s) IV Push every 8 hours    MEDICATIONS  (PRN):  acetaminophen     Tablet .. 650 milliGRAM(s) Oral every 6 hours PRN Mild Pain (1 - 3)  dextrose Oral Gel 15 Gram(s) Oral once PRN Blood Glucose LESS THAN 70 milliGRAM(s)/deciliter  oxyCODONE    IR 5 milliGRAM(s) Oral every 4 hours PRN Moderate Pain (4 - 6)  oxyCODONE    IR 10 milliGRAM(s) Oral every 4 hours PRN Severe Pain (7 - 10)    Allergies: No Known Allergies    Vitals   T(C): 37.3 (26 Nov 2022 00:15), Max: 37.4 (25 Nov 2022 18:00)  T(F): 99.2 (26 Nov 2022 00:15), Max: 99.4 (25 Nov 2022 18:00)  HR: 101 (26 Nov 2022 00:15) (98 - 111)  BP: 122/68 (26 Nov 2022 00:15) (114/69 - 151/67)  BP(mean): 93 (25 Nov 2022 08:00) (92 - 106)  RR: 16 (26 Nov 2022 00:15) (14 - 21)  SpO2: 95% (26 Nov 2022 00:15) (94% - 96%)  O2 Parameters below as of 26 Nov 2022 00:15  Patient On (Oxygen Delivery Method): room air    I&O's Detail    24 Nov 2022 07:01  -  25 Nov 2022 07:00  --------------------------------------------------------  IN:    IV PiggyBack: 25 mL    IV PiggyBack: 250 mL    IV PiggyBack: 100 mL    IV PiggyBack: 100 mL    Oral Fluid: 780 mL    sodium chloride 0.9%: 20 mL    sodium chloride 0.9%: 20 mL  Total IN: 1295 mL    OUT:    Chest Tube (mL): 25 mL    Chest Tube (mL): 170 mL    Indwelling Catheter - Urethral (mL): 800 mL    Voided (mL): 1550 mL  Total OUT: 2545 mL    Total NET: -1250 mL      25 Nov 2022 07:01  -  26 Nov 2022 02:48  --------------------------------------------------------  IN:    IV PiggyBack: 50 mL    Oral Fluid: 1110 mL    sodium chloride 0.9%: 20 mL  Total IN: 1180 mL    OUT:    Voided (mL): 1150 mL  Total OUT: 1150 mL    Total NET: 30 mL    Physical Exam  Neuro: A+O x 3, non-focal, speech clear and intact  HEENT:  NCAT, No conjuctival edema or icterus, no thrush.    Neck:  Supple, trachea midline  Pulm: +Diminished BSs b/l bases, no accessory muscle use noted  Chest: +EPWs  CV: regular rate, regular rhythm, +S1S2, no murmur noted  Abd: soft, NT, ND, + BS  Ext: SEGURA x 4, trace LE edema, no cyanosis, distal motor/neuro/circ intact  Skin: warm, dry, perfused  Incisions: midsternal mepilex C/D/I, sternum stable, RLE harvest site C/D/I w/ dressing     LABS                        9.9    14.68 )-----------( 121      ( 25 Nov 2022 02:30 )             29.7     11-25    136  |  100  |  20.7<H>  ----------------------------<  187<H>  4.2   |  27.0  |  0.71    Ca    10.4      25 Nov 2022 02:30  Mg     1.6     11-25    POCT Blood Glucose.: 177 mg/dL (11-25-22 @ 21:00)  POCT Blood Glucose.: 160 mg/dL (11-25-22 @ 17:17)  POCT Blood Glucose.: 142 mg/dL (11-25-22 @ 13:37)  POCT Blood Glucose.: 184 mg/dL (11-25-22 @ 07:24)      Last CXR:  < from: Xray Chest 1 View- PORTABLE-Urgent (Xray Chest 1 View- PORTABLE-Urgent .) (11.24.22 @ 16:48) >  IMPRESSION:  Mild plate like atelectasis at the left base, improved.  No pneumothorax  < end of copied text >

## 2022-11-26 NOTE — PROGRESS NOTE ADULT - SUBJECTIVE AND OBJECTIVE BOX
Interval Events:  no overnight events  follow up on diabetes    patient seen and examined at bedside.  feels okay  reports that he would go back to the assisted living facility  FS well controlled    REVIEW OF SYSTEMS:    CONSTITUTIONAL: No fever, weight loss, or fatigue  EYES: No eye pain, visual disturbances, or discharge  ENMT:  No difficulty hearing, tinnitus, vertigo; No sinus or throat pain  NECK: No pain or stiffness  RESPIRATORY: No cough, wheezing, chills or hemoptysis; No shortness of breath  CARDIOVASCULAR: No chest pain, palpitations, dizziness, or leg swelling  GASTROINTESTINAL: No abdominal or epigastric pain. No nausea, vomiting, or hematemesis; No diarrhea or constipation. No melena or hematochezia.  NEUROLOGICAL: No headaches, memory loss, loss of strength, numbness, or tremors  SKIN: No itching, burning, rashes, or lesions   MUSCULOSKELETAL: No joint pain or swelling; No muscle, back, or extremity pain  PSYCHIATRIC: No depression, anxiety, mood swings, or difficulty sleeping        No Known Allergies      MEDICATIONS  (STANDING):  ascorbic acid 500 milliGRAM(s) Oral daily  aspirin enteric coated 81 milliGRAM(s) Oral daily  atorvastatin 80 milliGRAM(s) Oral at bedtime  chlorhexidine 2% Cloths 1 Application(s) Topical daily  dapagliflozin 5 milliGRAM(s) Oral every 24 hours  dextrose 5%. 1000 milliLiter(s) (50 mL/Hr) IV Continuous <Continuous>  dextrose 5%. 1000 milliLiter(s) (100 mL/Hr) IV Continuous <Continuous>  dextrose 50% Injectable 50 milliLiter(s) IV Push every 15 minutes  dextrose 50% Injectable 25 milliLiter(s) IV Push every 15 minutes  enoxaparin Injectable 40 milliGRAM(s) SubCutaneous every 24 hours  ferrous    sulfate 325 milliGRAM(s) Oral daily  folic acid 1 milliGRAM(s) Oral daily  gabapentin 300 milliGRAM(s) Oral three times a day  glucagon  Injectable 1 milliGRAM(s) IntraMuscular once  insulin glargine Injectable (LANTUS) 50 Unit(s) SubCutaneous at bedtime  insulin lispro (ADMELOG) corrective regimen sliding scale   SubCutaneous three times a day before meals  insulin lispro Injectable (ADMELOG) 15 Unit(s) SubCutaneous three times a day before meals  iron sucrose IVPB 100 milliGRAM(s) IV Intermittent every 24 hours  lidocaine   4% Patch 1 Patch Transdermal daily  magnesium oxide 400 milliGRAM(s) Oral three times a day with meals  melatonin 5 milliGRAM(s) Oral at bedtime  metoprolol tartrate 50 milliGRAM(s) Oral two times a day  pantoprazole    Tablet 40 milliGRAM(s) Oral daily  polyethylene glycol 3350 17 Gram(s) Oral daily  senna 2 Tablet(s) Oral at bedtime  sodium chloride 0.9% lock flush 3 milliLiter(s) IV Push every 8 hours    MEDICATIONS  (PRN):  acetaminophen     Tablet .. 650 milliGRAM(s) Oral every 6 hours PRN Mild Pain (1 - 3)  dextrose Oral Gel 15 Gram(s) Oral once PRN Blood Glucose LESS THAN 70 milliGRAM(s)/deciliter  oxyCODONE    IR 5 milliGRAM(s) Oral every 4 hours PRN Moderate Pain (4 - 6)  oxyCODONE    IR 10 milliGRAM(s) Oral every 4 hours PRN Severe Pain (7 - 10)      Vital Signs Last 24 Hrs  T(C): 36.4 (26 Nov 2022 11:38), Max: 37.4 (25 Nov 2022 18:00)  T(F): 97.6 (26 Nov 2022 11:38), Max: 99.4 (25 Nov 2022 18:00)  HR: 96 (26 Nov 2022 11:38) (86 - 104)  BP: 113/68 (26 Nov 2022 11:38) (102/61 - 122/68)  BP(mean): --  RR: 17 (26 Nov 2022 11:38) (16 - 18)  SpO2: 97% (26 Nov 2022 11:38) (93% - 97%)    Parameters below as of 26 Nov 2022 11:38  Patient On (Oxygen Delivery Method): room air      Weight (kg): 82.6 (11-22-22 @ 05:48)    Physical Exam:    Constitutional: NAD, well-developed  Respiratory: CTAB, normal respirations  Cardiovascular: S1 and S2, RRR, bandaged anterior chest  Gastrointestinal: BS+, soft, ntnd  Extremities: +peripheral edema  Neurological: sleeping, no focal deficits  Psychiatric: Normal mood and normal affect  Skin: no rashes, no acanthosis    LABS  11-26    137  |  102  |  29.9<H>  ----------------------------<  124<H>  3.9   |  26.0  |  0.71    Ca    10.0      26 Nov 2022 05:29  Mg     2.0     11-26                            9.4    12.37 )-----------( 134      ( 26 Nov 2022 05:29 )             29.2     Triglycerides, Serum: 192 mg/dL (11-21-22 @ 08:15)  HDL Cholesterol, Serum: 40 mg/dL (11-21-22 @ 08:15)  Cholesterol, Serum: 159 mg/dL (11-21-22 @ 08:15)    Thyroid Stimulating Hormone, Serum: 1.23 uIU/mL (11-21-22 @ 14:08)  Free Thyroxine, Serum: 1.1 ng/dL (11-21-22 @ 14:08)  A1C with Estimated Average Glucose Result: 7.6 % (11-21-22 @ 14:08)  A1C with Estimated Average Glucose Result: 7.5 % (11-21-22 @ 08:15)            CAPILLARY BLOOD GLUCOSE      POCT Blood Glucose.: 137 mg/dL (26 Nov 2022 12:19)  POCT Blood Glucose.: 113 mg/dL (26 Nov 2022 08:09)  POCT Blood Glucose.: 177 mg/dL (25 Nov 2022 21:00)  POCT Blood Glucose.: 160 mg/dL (25 Nov 2022 17:17)

## 2022-11-27 LAB
ALBUMIN SERPL ELPH-MCNC: 3 G/DL — LOW (ref 3.3–5.2)
ALP SERPL-CCNC: 102 U/L — SIGNIFICANT CHANGE UP (ref 40–120)
ALT FLD-CCNC: 30 U/L — SIGNIFICANT CHANGE UP
ANION GAP SERPL CALC-SCNC: 10 MMOL/L — SIGNIFICANT CHANGE UP (ref 5–17)
ANION GAP SERPL CALC-SCNC: 11 MMOL/L — SIGNIFICANT CHANGE UP (ref 5–17)
AST SERPL-CCNC: 41 U/L — HIGH
BILIRUB SERPL-MCNC: 0.9 MG/DL — SIGNIFICANT CHANGE UP (ref 0.4–2)
BUN SERPL-MCNC: 26.5 MG/DL — HIGH (ref 8–20)
BUN SERPL-MCNC: 27.8 MG/DL — HIGH (ref 8–20)
CALCIUM SERPL-MCNC: 10.1 MG/DL — SIGNIFICANT CHANGE UP (ref 8.4–10.5)
CALCIUM SERPL-MCNC: 10.2 MG/DL — SIGNIFICANT CHANGE UP (ref 8.4–10.5)
CHLORIDE SERPL-SCNC: 100 MMOL/L — SIGNIFICANT CHANGE UP (ref 96–108)
CHLORIDE SERPL-SCNC: 104 MMOL/L — SIGNIFICANT CHANGE UP (ref 96–108)
CO2 SERPL-SCNC: 25 MMOL/L — SIGNIFICANT CHANGE UP (ref 22–29)
CO2 SERPL-SCNC: 25 MMOL/L — SIGNIFICANT CHANGE UP (ref 22–29)
CREAT SERPL-MCNC: 0.79 MG/DL — SIGNIFICANT CHANGE UP (ref 0.5–1.3)
CREAT SERPL-MCNC: 0.94 MG/DL — SIGNIFICANT CHANGE UP (ref 0.5–1.3)
EGFR: 89 ML/MIN/1.73M2 — SIGNIFICANT CHANGE UP
EGFR: 98 ML/MIN/1.73M2 — SIGNIFICANT CHANGE UP
GLUCOSE BLDC GLUCOMTR-MCNC: 141 MG/DL — HIGH (ref 70–99)
GLUCOSE BLDC GLUCOMTR-MCNC: 174 MG/DL — HIGH (ref 70–99)
GLUCOSE BLDC GLUCOMTR-MCNC: 98 MG/DL — SIGNIFICANT CHANGE UP (ref 70–99)
GLUCOSE BLDC GLUCOMTR-MCNC: 98 MG/DL — SIGNIFICANT CHANGE UP (ref 70–99)
GLUCOSE SERPL-MCNC: 196 MG/DL — HIGH (ref 70–99)
GLUCOSE SERPL-MCNC: 98 MG/DL — SIGNIFICANT CHANGE UP (ref 70–99)
HCT VFR BLD CALC: 27.5 % — LOW (ref 39–50)
HGB BLD-MCNC: 9 G/DL — LOW (ref 13–17)
MAGNESIUM SERPL-MCNC: 1.8 MG/DL — SIGNIFICANT CHANGE UP (ref 1.8–2.6)
MAGNESIUM SERPL-MCNC: 1.9 MG/DL — SIGNIFICANT CHANGE UP (ref 1.6–2.6)
MCHC RBC-ENTMCNC: 31.8 PG — SIGNIFICANT CHANGE UP (ref 27–34)
MCHC RBC-ENTMCNC: 32.7 GM/DL — SIGNIFICANT CHANGE UP (ref 32–36)
MCV RBC AUTO: 97.2 FL — SIGNIFICANT CHANGE UP (ref 80–100)
PHOSPHATE SERPL-MCNC: 3.2 MG/DL — SIGNIFICANT CHANGE UP (ref 2.4–4.7)
PLATELET # BLD AUTO: 163 K/UL — SIGNIFICANT CHANGE UP (ref 150–400)
POTASSIUM SERPL-MCNC: 4.1 MMOL/L — SIGNIFICANT CHANGE UP (ref 3.5–5.3)
POTASSIUM SERPL-MCNC: 4.1 MMOL/L — SIGNIFICANT CHANGE UP (ref 3.5–5.3)
POTASSIUM SERPL-SCNC: 4.1 MMOL/L — SIGNIFICANT CHANGE UP (ref 3.5–5.3)
POTASSIUM SERPL-SCNC: 4.1 MMOL/L — SIGNIFICANT CHANGE UP (ref 3.5–5.3)
PROT SERPL-MCNC: 5.5 G/DL — LOW (ref 6.6–8.7)
RBC # BLD: 2.83 M/UL — LOW (ref 4.2–5.8)
RBC # FLD: 14.7 % — HIGH (ref 10.3–14.5)
SODIUM SERPL-SCNC: 136 MMOL/L — SIGNIFICANT CHANGE UP (ref 135–145)
SODIUM SERPL-SCNC: 139 MMOL/L — SIGNIFICANT CHANGE UP (ref 135–145)
WBC # BLD: 11.47 K/UL — HIGH (ref 3.8–10.5)
WBC # FLD AUTO: 11.47 K/UL — HIGH (ref 3.8–10.5)

## 2022-11-27 PROCEDURE — 99232 SBSQ HOSP IP/OBS MODERATE 35: CPT

## 2022-11-27 PROCEDURE — 71045 X-RAY EXAM CHEST 1 VIEW: CPT | Mod: 26

## 2022-11-27 RX ORDER — AMIODARONE HYDROCHLORIDE 400 MG/1
150 TABLET ORAL ONCE
Refills: 0 | Status: COMPLETED | OUTPATIENT
Start: 2022-11-27 | End: 2022-11-27

## 2022-11-27 RX ORDER — INSULIN GLARGINE 100 [IU]/ML
32 INJECTION, SOLUTION SUBCUTANEOUS AT BEDTIME
Refills: 0 | Status: DISCONTINUED | OUTPATIENT
Start: 2022-11-27 | End: 2022-12-14

## 2022-11-27 RX ORDER — MAGNESIUM SULFATE 500 MG/ML
2 VIAL (ML) INJECTION ONCE
Refills: 0 | Status: COMPLETED | OUTPATIENT
Start: 2022-11-27 | End: 2022-11-27

## 2022-11-27 RX ORDER — METOPROLOL TARTRATE 50 MG
5 TABLET ORAL ONCE
Refills: 0 | Status: COMPLETED | OUTPATIENT
Start: 2022-11-27 | End: 2022-11-27

## 2022-11-27 RX ORDER — POTASSIUM CHLORIDE 20 MEQ
20 PACKET (EA) ORAL DAILY
Refills: 0 | Status: DISCONTINUED | OUTPATIENT
Start: 2022-11-28 | End: 2022-12-04

## 2022-11-27 RX ORDER — AMIODARONE HYDROCHLORIDE 400 MG/1
200 TABLET ORAL DAILY
Refills: 0 | Status: DISCONTINUED | OUTPATIENT
Start: 2022-12-01 | End: 2022-12-14

## 2022-11-27 RX ORDER — AMIODARONE HYDROCHLORIDE 400 MG/1
400 TABLET ORAL EVERY 8 HOURS
Refills: 0 | Status: COMPLETED | OUTPATIENT
Start: 2022-11-27 | End: 2022-12-01

## 2022-11-27 RX ORDER — AMIODARONE HYDROCHLORIDE 400 MG/1
TABLET ORAL
Refills: 0 | Status: DISCONTINUED | OUTPATIENT
Start: 2022-11-27 | End: 2022-12-14

## 2022-11-27 RX ORDER — FUROSEMIDE 40 MG
40 TABLET ORAL ONCE
Refills: 0 | Status: COMPLETED | OUTPATIENT
Start: 2022-11-27 | End: 2022-11-27

## 2022-11-27 RX ORDER — MAGNESIUM SULFATE 500 MG/ML
2 VIAL (ML) INJECTION ONCE
Refills: 0 | Status: DISCONTINUED | OUTPATIENT
Start: 2022-11-27 | End: 2022-11-27

## 2022-11-27 RX ADMIN — Medication 10 UNIT(S): at 18:11

## 2022-11-27 RX ADMIN — INSULIN GLARGINE 32 UNIT(S): 100 INJECTION, SOLUTION SUBCUTANEOUS at 21:13

## 2022-11-27 RX ADMIN — Medication 500 MILLIGRAM(S): at 08:44

## 2022-11-27 RX ADMIN — POLYETHYLENE GLYCOL 3350 17 GRAM(S): 17 POWDER, FOR SOLUTION ORAL at 08:44

## 2022-11-27 RX ADMIN — Medication 1 MILLIGRAM(S): at 08:44

## 2022-11-27 RX ADMIN — Medication 50 MILLIGRAM(S): at 18:10

## 2022-11-27 RX ADMIN — Medication 10 UNIT(S): at 12:40

## 2022-11-27 RX ADMIN — Medication 10 UNIT(S): at 08:43

## 2022-11-27 RX ADMIN — Medication 2: at 18:11

## 2022-11-27 RX ADMIN — Medication 5 MILLIGRAM(S): at 14:35

## 2022-11-27 RX ADMIN — ATORVASTATIN CALCIUM 80 MILLIGRAM(S): 80 TABLET, FILM COATED ORAL at 21:07

## 2022-11-27 RX ADMIN — Medication 40 MILLIGRAM(S): at 06:39

## 2022-11-27 RX ADMIN — OXYCODONE HYDROCHLORIDE 10 MILLIGRAM(S): 5 TABLET ORAL at 18:20

## 2022-11-27 RX ADMIN — AMIODARONE HYDROCHLORIDE 400 MILLIGRAM(S): 400 TABLET ORAL at 21:07

## 2022-11-27 RX ADMIN — OXYCODONE HYDROCHLORIDE 10 MILLIGRAM(S): 5 TABLET ORAL at 19:20

## 2022-11-27 RX ADMIN — AMIODARONE HYDROCHLORIDE 618 MILLIGRAM(S): 400 TABLET ORAL at 15:29

## 2022-11-27 RX ADMIN — MAGNESIUM OXIDE 400 MG ORAL TABLET 400 MILLIGRAM(S): 241.3 TABLET ORAL at 08:44

## 2022-11-27 RX ADMIN — Medication 50 MILLIGRAM(S): at 05:08

## 2022-11-27 RX ADMIN — PANTOPRAZOLE SODIUM 40 MILLIGRAM(S): 20 TABLET, DELAYED RELEASE ORAL at 08:45

## 2022-11-27 RX ADMIN — SODIUM CHLORIDE 3 MILLILITER(S): 9 INJECTION INTRAMUSCULAR; INTRAVENOUS; SUBCUTANEOUS at 05:08

## 2022-11-27 RX ADMIN — OXYCODONE HYDROCHLORIDE 10 MILLIGRAM(S): 5 TABLET ORAL at 08:44

## 2022-11-27 RX ADMIN — Medication 5 MILLIGRAM(S): at 21:07

## 2022-11-27 RX ADMIN — CHLORHEXIDINE GLUCONATE 1 APPLICATION(S): 213 SOLUTION TOPICAL at 08:45

## 2022-11-27 RX ADMIN — DAPAGLIFLOZIN 10 MILLIGRAM(S): 10 TABLET, FILM COATED ORAL at 05:08

## 2022-11-27 RX ADMIN — Medication 325 MILLIGRAM(S): at 08:44

## 2022-11-27 RX ADMIN — ENOXAPARIN SODIUM 40 MILLIGRAM(S): 100 INJECTION SUBCUTANEOUS at 21:08

## 2022-11-27 RX ADMIN — GABAPENTIN 300 MILLIGRAM(S): 400 CAPSULE ORAL at 21:07

## 2022-11-27 RX ADMIN — SODIUM CHLORIDE 3 MILLILITER(S): 9 INJECTION INTRAMUSCULAR; INTRAVENOUS; SUBCUTANEOUS at 21:07

## 2022-11-27 RX ADMIN — OXYCODONE HYDROCHLORIDE 10 MILLIGRAM(S): 5 TABLET ORAL at 09:40

## 2022-11-27 RX ADMIN — GABAPENTIN 300 MILLIGRAM(S): 400 CAPSULE ORAL at 05:08

## 2022-11-27 RX ADMIN — IRON SUCROSE 210 MILLIGRAM(S): 20 INJECTION, SOLUTION INTRAVENOUS at 18:08

## 2022-11-27 RX ADMIN — Medication 25 GRAM(S): at 18:08

## 2022-11-27 RX ADMIN — GABAPENTIN 300 MILLIGRAM(S): 400 CAPSULE ORAL at 13:45

## 2022-11-27 RX ADMIN — Medication 81 MILLIGRAM(S): at 08:44

## 2022-11-27 RX ADMIN — SODIUM CHLORIDE 3 MILLILITER(S): 9 INJECTION INTRAMUSCULAR; INTRAVENOUS; SUBCUTANEOUS at 13:00

## 2022-11-27 NOTE — PROGRESS NOTE ADULT - SUBJECTIVE AND OBJECTIVE BOX
Interval Events:  no overnight events  follow up on diabetes    patient seen and examined at bedside.  FS improving on farxiga  possibly discharge tomorrow  feels well      REVIEW OF SYSTEMS:    CONSTITUTIONAL: No fever, weight loss, or fatigue  EYES: No eye pain, visual disturbances, or discharge  ENMT:  No difficulty hearing, tinnitus, vertigo; No sinus or throat pain  NECK: No pain or stiffness  RESPIRATORY: No cough, wheezing, chills or hemoptysis; No shortness of breath  CARDIOVASCULAR: No chest pain, palpitations, dizziness, or leg swelling  GASTROINTESTINAL: No abdominal or epigastric pain. No nausea, vomiting, or hematemesis; No diarrhea or constipation. No melena or hematochezia.  NEUROLOGICAL: No headaches, memory loss, loss of strength, numbness, or tremors  SKIN: No itching, burning, rashes, or lesions   MUSCULOSKELETAL: No joint pain or swelling; No muscle, back, or extremity pain  PSYCHIATRIC: No depression, anxiety, mood swings, or difficulty sleeping        No Known Allergies      MEDICATIONS  (STANDING):  aMIOdarone    Tablet   Oral   aMIOdarone    Tablet 400 milliGRAM(s) Oral every 8 hours  ascorbic acid 500 milliGRAM(s) Oral daily  aspirin enteric coated 81 milliGRAM(s) Oral daily  atorvastatin 80 milliGRAM(s) Oral at bedtime  chlorhexidine 2% Cloths 1 Application(s) Topical daily  dapagliflozin 10 milliGRAM(s) Oral every 24 hours  dextrose 5%. 1000 milliLiter(s) (100 mL/Hr) IV Continuous <Continuous>  dextrose 5%. 1000 milliLiter(s) (50 mL/Hr) IV Continuous <Continuous>  dextrose 50% Injectable 50 milliLiter(s) IV Push every 15 minutes  dextrose 50% Injectable 25 milliLiter(s) IV Push every 15 minutes  enoxaparin Injectable 40 milliGRAM(s) SubCutaneous every 24 hours  ferrous    sulfate 325 milliGRAM(s) Oral daily  folic acid 1 milliGRAM(s) Oral daily  gabapentin 300 milliGRAM(s) Oral three times a day  glucagon  Injectable 1 milliGRAM(s) IntraMuscular once  insulin glargine Injectable (LANTUS) 32 Unit(s) SubCutaneous at bedtime  insulin lispro (ADMELOG) corrective regimen sliding scale   SubCutaneous three times a day before meals  insulin lispro Injectable (ADMELOG) 10 Unit(s) SubCutaneous three times a day before meals  iron sucrose IVPB 100 milliGRAM(s) IV Intermittent every 24 hours  lidocaine   4% Patch 1 Patch Transdermal daily  melatonin 5 milliGRAM(s) Oral at bedtime  metoprolol tartrate 50 milliGRAM(s) Oral two times a day  pantoprazole    Tablet 40 milliGRAM(s) Oral daily  polyethylene glycol 3350 17 Gram(s) Oral daily  senna 2 Tablet(s) Oral at bedtime  sodium chloride 0.9% lock flush 3 milliLiter(s) IV Push every 8 hours    MEDICATIONS  (PRN):  acetaminophen     Tablet .. 650 milliGRAM(s) Oral every 6 hours PRN Mild Pain (1 - 3)  dextrose Oral Gel 15 Gram(s) Oral once PRN Blood Glucose LESS THAN 70 milliGRAM(s)/deciliter  oxyCODONE    IR 5 milliGRAM(s) Oral every 4 hours PRN Moderate Pain (4 - 6)  oxyCODONE    IR 10 milliGRAM(s) Oral every 4 hours PRN Severe Pain (7 - 10)      Vital Signs Last 24 Hrs  T(C): 37.2 (27 Nov 2022 16:03), Max: 37.5 (26 Nov 2022 16:20)  T(F): 98.9 (27 Nov 2022 16:03), Max: 99.5 (26 Nov 2022 16:20)  HR: 115 (27 Nov 2022 16:03) (82 - 115)  BP: 109/70 (27 Nov 2022 16:03) (107/50 - 127/73)  BP(mean): --  RR: 18 (27 Nov 2022 16:03) (18 - 19)  SpO2: 96% (27 Nov 2022 16:03) (92% - 97%)    Parameters below as of 27 Nov 2022 16:03  Patient On (Oxygen Delivery Method): room air      Weight (kg): 82.6 (11-22-22 @ 05:48)    Physical Exam:    Constitutional: NAD, well-developed  Respiratory: CTAB, normal respirations  Cardiovascular: S1 and S2, RRR, bandaged anterior chest  Gastrointestinal: BS+, soft, ntnd  Neurological: sleeping, no focal deficits  Psychiatric: Normal mood and normal affect  Skin: no rashes, no acanthosis      LABS  11-27    136  |  100  |  26.5<H>  ----------------------------<  196<H>  4.1   |  25.0  |  0.94    Ca    10.2      27 Nov 2022 14:30  Phos  3.2     11-27  Mg     1.8     11-27    TPro  5.5<L>  /  Alb  3.0<L>  /  TBili  0.9  /  DBili  x   /  AST  41<H>  /  ALT  30  /  AlkPhos  102  11-27                          9.0    11.47 )-----------( 163      ( 27 Nov 2022 05:31 )             27.5     Triglycerides, Serum: 192 mg/dL (11-21-22 @ 08:15)  HDL Cholesterol, Serum: 40 mg/dL (11-21-22 @ 08:15)  Cholesterol, Serum: 159 mg/dL (11-21-22 @ 08:15)    Thyroid Stimulating Hormone, Serum: 1.23 uIU/mL (11-21-22 @ 14:08)  Free Thyroxine, Serum: 1.1 ng/dL (11-21-22 @ 14:08)  A1C with Estimated Average Glucose Result: 7.6 % (11-21-22 @ 14:08)  A1C with Estimated Average Glucose Result: 7.5 % (11-21-22 @ 08:15)    Alanine Aminotransferase (ALT/SGPT): 30 U/L (11-27-22 @ 05:31)  Alkaline Phosphatase, Serum: 102 U/L (11-27-22 @ 05:31)  Albumin, Serum: 3.0 g/dL (11-27-22 @ 05:31)  Aspartate Aminotransferase (AST/SGOT): 41 U/L (11-27-22 @ 05:31)          CAPILLARY BLOOD GLUCOSE      POCT Blood Glucose.: 141 mg/dL (27 Nov 2022 11:57)  POCT Blood Glucose.: 98 mg/dL (27 Nov 2022 08:15)  POCT Blood Glucose.: 117 mg/dL (26 Nov 2022 21:07)  POCT Blood Glucose.: 113 mg/dL (26 Nov 2022 17:24)

## 2022-11-27 NOTE — PROGRESS NOTE ADULT - PROBLEM SELECTOR PLAN 4
HA1c 7.6 on Metformin and Insulin as an outpatient.   Postop hyperglycemia noted.   Continue fingersticks AC/HS with Lantus, premeal, and ISS for BG coverage.   Farxiga added to regimen 11/26.  Endocrine following.

## 2022-11-27 NOTE — PROGRESS NOTE ADULT - SUBJECTIVE AND OBJECTIVE BOX
POD #5 s/p CABG X 3 (LIMA-LAD, SVG-OM, SVG-RPDA)    PAST MEDICAL & SURGICAL HISTORY:  Pancreatitis  Hypertension  Myocardial infarct  Alcohol abuse  Colon cancer  History of colon resection  History of heart surgery  cardiac stent    FAMILY HISTORY:  FH: prostate cancer (Father)  Family history of atherosclerosis (Mother)    Brief Hospital Course: 67 year old male patient former smoker with a medical history of HTN, HLD, type 2 DM (HA1c 7.6 on Metformin and Insulin), CAD s/p PCI to RCA 2007, ETOH cirrhosis, B12/Vit D deficiency, recent hospitalization for GI Bleed 05/2022, admitted on 11/21/22 for repeat cath which confirmed multivessel CAD. Patient underwent CABG X 3 (LIMA-LAD, SVG-OM, SVG-PDA) on 11/22/22 with Dr. Gray. Postoperative course significant for cardiogenic shock and hypotension (resolved s/p volume resuscitation, phenylephrine gtt weaned off), ABLA (stable s/p blood transfusion), and hyperglycemia (titrating insulin requirements).      Significant recent/past 24 hr events: No overnight events reported.    Subjective: Patient lying in bed in NAD. +Tolerating diet. +Passing BMs since surgery. +Pain currently controlled. Denies fevers, chills, lightheadedness, dizziness, HA, CP, palpitations, SOB, cough, abdominal pain, N/V, diarrhea, numbness/tingling in extremities, or any other acute complaints. ROS negative x 10 systems except as noted above.    MEDICATIONS  (STANDING):  ascorbic acid 500 milliGRAM(s) Oral daily  aspirin enteric coated 81 milliGRAM(s) Oral daily  atorvastatin 80 milliGRAM(s) Oral at bedtime  chlorhexidine 2% Cloths 1 Application(s) Topical daily  dapagliflozin 10 milliGRAM(s) Oral every 24 hours  enoxaparin Injectable 40 milliGRAM(s) SubCutaneous every 24 hours  ferrous    sulfate 325 milliGRAM(s) Oral daily  folic acid 1 milliGRAM(s) Oral daily  gabapentin 300 milliGRAM(s) Oral three times a day  insulin glargine Injectable (LANTUS) 40 Unit(s) SubCutaneous at bedtime  insulin lispro (ADMELOG) corrective regimen sliding scale   SubCutaneous three times a day before meals  insulin lispro Injectable (ADMELOG) 10 Unit(s) SubCutaneous three times a day before meals  iron sucrose IVPB 100 milliGRAM(s) IV Intermittent every 24 hours  lidocaine   4% Patch 1 Patch Transdermal daily  magnesium oxide 400 milliGRAM(s) Oral three times a day with meals  melatonin 5 milliGRAM(s) Oral at bedtime  metoprolol tartrate 50 milliGRAM(s) Oral two times a day  pantoprazole    Tablet 40 milliGRAM(s) Oral daily  polyethylene glycol 3350 17 Gram(s) Oral daily  senna 2 Tablet(s) Oral at bedtime  sodium chloride 0.9% lock flush 3 milliLiter(s) IV Push every 8 hours    MEDICATIONS  (PRN):  acetaminophen     Tablet .. 650 milliGRAM(s) Oral every 6 hours PRN Mild Pain (1 - 3)  dextrose Oral Gel 15 Gram(s) Oral once PRN Blood Glucose LESS THAN 70 milliGRAM(s)/deciliter  oxyCODONE    IR 5 milliGRAM(s) Oral every 4 hours PRN Moderate Pain (4 - 6)  oxyCODONE    IR 10 milliGRAM(s) Oral every 4 hours PRN Severe Pain (7 - 10)    Allergies: No Known Allergies    Vitals   T(C): 37.1 (27 Nov 2022 00:30), Max: 37.5 (26 Nov 2022 16:20)  T(F): 98.7 (27 Nov 2022 00:30), Max: 99.5 (26 Nov 2022 16:20)  HR: 90 (27 Nov 2022 00:30) (85 - 96)  BP: 108/65 (27 Nov 2022 00:30) (102/61 - 127/73)  RR: 19 (27 Nov 2022 00:30) (17 - 19)  SpO2: 93% (27 Nov 2022 00:30) (93% - 97%)  O2 Parameters below as of 27 Nov 2022 00:30  Patient On (Oxygen Delivery Method): room air    I&O's Detail    25 Nov 2022 07:01  -  26 Nov 2022 07:00  --------------------------------------------------------  IN:    IV PiggyBack: 50 mL    Oral Fluid: 1260 mL    sodium chloride 0.9%: 20 mL  Total IN: 1330 mL    OUT:    Voided (mL): 1150 mL  Total OUT: 1150 mL    Total NET: 180 mL      26 Nov 2022 07:01  -  27 Nov 2022 02:44  --------------------------------------------------------  IN:    Oral Fluid: 870 mL  Total IN: 870 mL    OUT:    Voided (mL): 900 mL  Total OUT: 900 mL    Total NET: -30 mL    Physical Exam  Neuro: A+O x 3, non-focal, speech clear and intact  HEENT:  NCAT, No conjuctival edema or icterus, no thrush.    Neck:  Supple, trachea midline  Pulm: +Diminished BSs b/l bases, no accessory muscle use noted  CV: regular rate, regular rhythm, +S1S2, no murmur noted  Abd: soft, NT, ND, + BS  Ext: SEGURA x 4, trace/+1 LE edema b/l, no cyanosis, distal motor/neuro/circ intact  Skin: warm, dry, perfused  Incisions: midsternal mepilex C/D/I, sternum stable, RLE harvest site with mild serosanguinous drainage    LABS                        9.4    12.37 )-----------( 134      ( 26 Nov 2022 05:29 )             29.2     11-26    137  |  102  |  29.9<H>  ----------------------------<  124<H>  3.9   |  26.0  |  0.71    Ca    10.0      26 Nov 2022 05:29  Mg     2.0     11-26    POCT Blood Glucose.: 117 mg/dL (11-26-22 @ 21:07)  POCT Blood Glucose.: 113 mg/dL (11-26-22 @ 17:24)  POCT Blood Glucose.: 137 mg/dL (11-26-22 @ 12:19)  POCT Blood Glucose.: 113 mg/dL (11-26-22 @ 08:09)      Last CXR:  < from: Xray Chest 1 View- PORTABLE-Routine (Xray Chest 1 View- PORTABLE-Routine in AM.) (11.26.22 @ 05:41) >  Chest one view 11/26/2022 4:38 AM  Compared to the prior study, there is more left base atelectasis with small left effusion.  IMPRESSION:  Basilar atelectasis and left effusion as noted.  < end of copied text >

## 2022-11-27 NOTE — PROGRESS NOTE ADULT - ASSESSMENT
67 year old male patient former smoker with a medical history of HTN, HLD, type 2 DM (HA1c 7.6 on Metformin and Insulin), CAD s/p PCI to RCA 2007, ETOH cirrhosis, B12/Vit D deficiency, recent hospitalization for GI Bleed 05/2022, admitted on 11/21/22 for repeat cath which confirmed multivessel CAD. Patient underwent CABG X 3 (LIMA-LAD, SVG-OM, SVG-PDA) on 11/22/22 with Dr. Gray. Postoperative course significant for cardiogenic shock and hypotension (resolved s/p volume resuscitation, phenylephrine gtt weaned off), ABLA (stable s/p blood transfusion), and hyperglycemia (titrating insulin requirements).

## 2022-11-27 NOTE — PROGRESS NOTE ADULT - ASSESSMENT
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 Patient was admitted to [facility] initially in May 2022 for GI bleed and found to have EF 30-35%, and multivessel disease but CABG deferred due to hx of alcoholism and patient transferred to rehab.   Consult for diabetes mgmt. A1c 7.6    T2DM in setting of hx of pancreatitis due to EtOH abuse- mild hyperglycemia  -A1c 7.6  -check sugars AC and bedtime  -ensure diabetic diet  -decrease to lantus 32 units QHS  -continue admelog 10 units TID  -continue with insulin sliding scale  -continue farxiga 10mg daily  -EF 30-35%, cannot be started on metformin  -pending SUKHWINDER due to hx of pancreatitis  -making insulin: cpeptide 2.4/glucose 124    CAD- s/p CABG, care per primary team    HLD- continue statin

## 2022-11-28 ENCOUNTER — TRANSCRIPTION ENCOUNTER (OUTPATIENT)
Age: 67
End: 2022-11-28

## 2022-11-28 DIAGNOSIS — I48.92 UNSPECIFIED ATRIAL FLUTTER: ICD-10-CM

## 2022-11-28 LAB
ANION GAP SERPL CALC-SCNC: 11 MMOL/L — SIGNIFICANT CHANGE UP (ref 5–17)
BUN SERPL-MCNC: 25 MG/DL — HIGH (ref 8–20)
CALCIUM SERPL-MCNC: 10.1 MG/DL — SIGNIFICANT CHANGE UP (ref 8.4–10.5)
CHLORIDE SERPL-SCNC: 102 MMOL/L — SIGNIFICANT CHANGE UP (ref 96–108)
CO2 SERPL-SCNC: 26 MMOL/L — SIGNIFICANT CHANGE UP (ref 22–29)
CREAT SERPL-MCNC: 0.84 MG/DL — SIGNIFICANT CHANGE UP (ref 0.5–1.3)
EGFR: 96 ML/MIN/1.73M2 — SIGNIFICANT CHANGE UP
GLUCOSE BLDC GLUCOMTR-MCNC: 106 MG/DL — HIGH (ref 70–99)
GLUCOSE BLDC GLUCOMTR-MCNC: 145 MG/DL — HIGH (ref 70–99)
GLUCOSE BLDC GLUCOMTR-MCNC: 188 MG/DL — HIGH (ref 70–99)
GLUCOSE BLDC GLUCOMTR-MCNC: 89 MG/DL — SIGNIFICANT CHANGE UP (ref 70–99)
GLUCOSE SERPL-MCNC: 120 MG/DL — HIGH (ref 70–99)
HCT VFR BLD CALC: 28.3 % — LOW (ref 39–50)
HGB BLD-MCNC: 9.3 G/DL — LOW (ref 13–17)
MAGNESIUM SERPL-MCNC: 1.8 MG/DL — SIGNIFICANT CHANGE UP (ref 1.6–2.6)
MCHC RBC-ENTMCNC: 32 PG — SIGNIFICANT CHANGE UP (ref 27–34)
MCHC RBC-ENTMCNC: 32.9 GM/DL — SIGNIFICANT CHANGE UP (ref 32–36)
MCV RBC AUTO: 97.3 FL — SIGNIFICANT CHANGE UP (ref 80–100)
PLATELET # BLD AUTO: 198 K/UL — SIGNIFICANT CHANGE UP (ref 150–400)
POTASSIUM SERPL-MCNC: 4.1 MMOL/L — SIGNIFICANT CHANGE UP (ref 3.5–5.3)
POTASSIUM SERPL-SCNC: 4.1 MMOL/L — SIGNIFICANT CHANGE UP (ref 3.5–5.3)
RBC # BLD: 2.91 M/UL — LOW (ref 4.2–5.8)
RBC # FLD: 14.7 % — HIGH (ref 10.3–14.5)
SODIUM SERPL-SCNC: 139 MMOL/L — SIGNIFICANT CHANGE UP (ref 135–145)
WBC # BLD: 12.21 K/UL — HIGH (ref 3.8–10.5)
WBC # FLD AUTO: 12.21 K/UL — HIGH (ref 3.8–10.5)

## 2022-11-28 PROCEDURE — 99232 SBSQ HOSP IP/OBS MODERATE 35: CPT

## 2022-11-28 PROCEDURE — 99024 POSTOP FOLLOW-UP VISIT: CPT

## 2022-11-28 PROCEDURE — 71045 X-RAY EXAM CHEST 1 VIEW: CPT | Mod: 26

## 2022-11-28 PROCEDURE — 93010 ELECTROCARDIOGRAM REPORT: CPT

## 2022-11-28 RX ORDER — THIAMINE MONONITRATE (VIT B1) 100 MG
100 TABLET ORAL DAILY
Refills: 0 | Status: DISCONTINUED | OUTPATIENT
Start: 2022-11-28 | End: 2022-12-14

## 2022-11-28 RX ORDER — MIRTAZAPINE 45 MG/1
15 TABLET, ORALLY DISINTEGRATING ORAL AT BEDTIME
Refills: 0 | Status: DISCONTINUED | OUTPATIENT
Start: 2022-11-28 | End: 2022-12-14

## 2022-11-28 RX ORDER — MAGNESIUM SULFATE 500 MG/ML
2 VIAL (ML) INJECTION ONCE
Refills: 0 | Status: COMPLETED | OUTPATIENT
Start: 2022-11-28 | End: 2022-11-28

## 2022-11-28 RX ADMIN — Medication 20 MILLIEQUIVALENT(S): at 09:19

## 2022-11-28 RX ADMIN — OXYCODONE HYDROCHLORIDE 10 MILLIGRAM(S): 5 TABLET ORAL at 17:10

## 2022-11-28 RX ADMIN — Medication 50 MILLIGRAM(S): at 05:22

## 2022-11-28 RX ADMIN — Medication 325 MILLIGRAM(S): at 09:19

## 2022-11-28 RX ADMIN — ENOXAPARIN SODIUM 40 MILLIGRAM(S): 100 INJECTION SUBCUTANEOUS at 21:36

## 2022-11-28 RX ADMIN — OXYCODONE HYDROCHLORIDE 10 MILLIGRAM(S): 5 TABLET ORAL at 22:33

## 2022-11-28 RX ADMIN — OXYCODONE HYDROCHLORIDE 10 MILLIGRAM(S): 5 TABLET ORAL at 16:06

## 2022-11-28 RX ADMIN — Medication 10 UNIT(S): at 08:24

## 2022-11-28 RX ADMIN — AMIODARONE HYDROCHLORIDE 400 MILLIGRAM(S): 400 TABLET ORAL at 13:55

## 2022-11-28 RX ADMIN — DAPAGLIFLOZIN 10 MILLIGRAM(S): 10 TABLET, FILM COATED ORAL at 05:22

## 2022-11-28 RX ADMIN — Medication 81 MILLIGRAM(S): at 09:17

## 2022-11-28 RX ADMIN — AMIODARONE HYDROCHLORIDE 400 MILLIGRAM(S): 400 TABLET ORAL at 21:32

## 2022-11-28 RX ADMIN — Medication 5 MILLIGRAM(S): at 21:33

## 2022-11-28 RX ADMIN — Medication 100 MILLIGRAM(S): at 09:18

## 2022-11-28 RX ADMIN — GABAPENTIN 300 MILLIGRAM(S): 400 CAPSULE ORAL at 13:55

## 2022-11-28 RX ADMIN — SODIUM CHLORIDE 3 MILLILITER(S): 9 INJECTION INTRAMUSCULAR; INTRAVENOUS; SUBCUTANEOUS at 21:05

## 2022-11-28 RX ADMIN — Medication 500 MILLIGRAM(S): at 09:18

## 2022-11-28 RX ADMIN — SODIUM CHLORIDE 3 MILLILITER(S): 9 INJECTION INTRAMUSCULAR; INTRAVENOUS; SUBCUTANEOUS at 05:22

## 2022-11-28 RX ADMIN — OXYCODONE HYDROCHLORIDE 10 MILLIGRAM(S): 5 TABLET ORAL at 01:20

## 2022-11-28 RX ADMIN — INSULIN GLARGINE 32 UNIT(S): 100 INJECTION, SOLUTION SUBCUTANEOUS at 21:32

## 2022-11-28 RX ADMIN — Medication 20 MILLIGRAM(S): at 09:19

## 2022-11-28 RX ADMIN — PANTOPRAZOLE SODIUM 40 MILLIGRAM(S): 20 TABLET, DELAYED RELEASE ORAL at 09:19

## 2022-11-28 RX ADMIN — OXYCODONE HYDROCHLORIDE 10 MILLIGRAM(S): 5 TABLET ORAL at 09:01

## 2022-11-28 RX ADMIN — Medication 1 MILLIGRAM(S): at 09:18

## 2022-11-28 RX ADMIN — GABAPENTIN 300 MILLIGRAM(S): 400 CAPSULE ORAL at 21:31

## 2022-11-28 RX ADMIN — OXYCODONE HYDROCHLORIDE 10 MILLIGRAM(S): 5 TABLET ORAL at 21:33

## 2022-11-28 RX ADMIN — OXYCODONE HYDROCHLORIDE 10 MILLIGRAM(S): 5 TABLET ORAL at 00:22

## 2022-11-28 RX ADMIN — OXYCODONE HYDROCHLORIDE 10 MILLIGRAM(S): 5 TABLET ORAL at 10:07

## 2022-11-28 RX ADMIN — Medication 25 GRAM(S): at 09:17

## 2022-11-28 RX ADMIN — Medication 50 MILLIGRAM(S): at 17:39

## 2022-11-28 RX ADMIN — Medication 10 UNIT(S): at 12:12

## 2022-11-28 RX ADMIN — GABAPENTIN 300 MILLIGRAM(S): 400 CAPSULE ORAL at 05:22

## 2022-11-28 RX ADMIN — AMIODARONE HYDROCHLORIDE 400 MILLIGRAM(S): 400 TABLET ORAL at 05:22

## 2022-11-28 RX ADMIN — ATORVASTATIN CALCIUM 80 MILLIGRAM(S): 80 TABLET, FILM COATED ORAL at 21:33

## 2022-11-28 RX ADMIN — POLYETHYLENE GLYCOL 3350 17 GRAM(S): 17 POWDER, FOR SOLUTION ORAL at 14:00

## 2022-11-28 RX ADMIN — MIRTAZAPINE 15 MILLIGRAM(S): 45 TABLET, ORALLY DISINTEGRATING ORAL at 21:33

## 2022-11-28 RX ADMIN — SODIUM CHLORIDE 3 MILLILITER(S): 9 INJECTION INTRAMUSCULAR; INTRAVENOUS; SUBCUTANEOUS at 13:37

## 2022-11-28 NOTE — PROGRESS NOTE ADULT - SUBJECTIVE AND OBJECTIVE BOX
INTERVAL EVENTS:  Follow up diabetes management    ROS: Has some incisional pain, relieved with pain medication. Denies SOB, abd pain.    MEDICATIONS  (STANDING):  aMIOdarone    Tablet   Oral   aMIOdarone    Tablet 400 milliGRAM(s) Oral every 8 hours  ascorbic acid 500 milliGRAM(s) Oral daily  aspirin enteric coated 81 milliGRAM(s) Oral daily  atorvastatin 80 milliGRAM(s) Oral at bedtime  dapagliflozin 10 milliGRAM(s) Oral every 24 hours  dextrose 5%. 1000 milliLiter(s) (50 mL/Hr) IV Continuous <Continuous>  dextrose 5%. 1000 milliLiter(s) (100 mL/Hr) IV Continuous <Continuous>  dextrose 50% Injectable 50 milliLiter(s) IV Push every 15 minutes  dextrose 50% Injectable 25 milliLiter(s) IV Push every 15 minutes  enoxaparin Injectable 40 milliGRAM(s) SubCutaneous every 24 hours  ferrous    sulfate 325 milliGRAM(s) Oral daily  folic acid 1 milliGRAM(s) Oral daily  gabapentin 300 milliGRAM(s) Oral three times a day  glucagon  Injectable 1 milliGRAM(s) IntraMuscular once  insulin glargine Injectable (LANTUS) 32 Unit(s) SubCutaneous at bedtime  insulin lispro (ADMELOG) corrective regimen sliding scale   SubCutaneous three times a day before meals  insulin lispro Injectable (ADMELOG) 10 Unit(s) SubCutaneous three times a day before meals  lidocaine   4% Patch 1 Patch Transdermal daily  melatonin 5 milliGRAM(s) Oral at bedtime  metoprolol tartrate 50 milliGRAM(s) Oral two times a day  mirtazapine 15 milliGRAM(s) Oral at bedtime  pantoprazole    Tablet 40 milliGRAM(s) Oral daily  polyethylene glycol 3350 17 Gram(s) Oral daily  potassium chloride    Tablet ER 20 milliEquivalent(s) Oral daily  senna 2 Tablet(s) Oral at bedtime  sodium chloride 0.9% lock flush 3 milliLiter(s) IV Push every 8 hours  thiamine 100 milliGRAM(s) Oral daily  torsemide 20 milliGRAM(s) Oral <User Schedule>    MEDICATIONS  (PRN):  acetaminophen     Tablet .. 650 milliGRAM(s) Oral every 6 hours PRN Mild Pain (1 - 3)  dextrose Oral Gel 15 Gram(s) Oral once PRN Blood Glucose LESS THAN 70 milliGRAM(s)/deciliter  oxyCODONE    IR 5 milliGRAM(s) Oral every 4 hours PRN Moderate Pain (4 - 6)  oxyCODONE    IR 10 milliGRAM(s) Oral every 4 hours PRN Severe Pain (7 - 10)    Allergies  No Known Allergies      Vital Signs Last 24 Hrs  T(C): 36.9 (28 Nov 2022 08:28), Max: 37.2 (27 Nov 2022 16:03)  T(F): 98.5 (28 Nov 2022 08:28), Max: 98.9 (27 Nov 2022 16:03)  HR: 84 (28 Nov 2022 08:28) (84 - 130)  BP: 107/68 (28 Nov 2022 08:28) (100/69 - 138/71)  BP(mean): --  RR: 18 (28 Nov 2022 08:28) (16 - 18)  SpO2: 97% (28 Nov 2022 08:28) (94% - 97%)    Parameters below as of 28 Nov 2022 08:28  Patient On (Oxygen Delivery Method): room air      PHYSICAL EXAM:  General: No apparent distress  Neck: Supple, trachea midline, no thyromegaly  Respiratory: Lungs clear bilaterally, normal rate, effort  Cardiac: +S1, S2, no m/r/g  GI: +BS, soft, non tender, non distended  Extremities: No peripheral edema, no pedal lesions  Neuro: A+O X3, no tremor  Pysch: Affect appropriate   Skin: No acanthosis         LABS:                        9.3    12.21 )-----------( 198      ( 28 Nov 2022 05:40 )             28.3     11-28    139  |  102  |  25.0<H>  ----------------------------<  120<H>  4.1   |  26.0  |  0.84    Ca    10.1      28 Nov 2022 05:40  Phos  3.2     11-27  Mg     1.8     11-28    TPro  5.5<L>  /  Alb  3.0<L>  /  TBili  0.9  /  DBili  x   /  AST  41<H>  /  ALT  30  /  AlkPhos  102  11-27    POCT Blood Glucose.: 106 mg/dL (11-28-22 @ 08:23)  POCT Blood Glucose.: 98 mg/dL (11-27-22 @ 21:05)  POCT Blood Glucose.: 174 mg/dL (11-27-22 @ 17:25)    Thyroid Stimulating Hormone, Serum: 1.23 uIU/mL (11-21-22 @ 14:08)  Free Thyroxine, Serum: 1.1 ng/dL (11-21-22 @ 14:08)

## 2022-11-28 NOTE — PROGRESS NOTE ADULT - ASSESSMENT
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 Patient was admitted to [facility] initially in May 2022 for GI bleed and found to have EF 30-35%, and multivessel disease but CABG deferred due to hx of alcoholism and patient transferred to rehab. Consult for diabetes management.     1. T2DM, a1c 7.6%  - Continue lantus 32 units qhs  - Continue admelog 10 units TID with meals  - Farxiga 10mg daily  - Cpeptide 2.4/glucose 124/SUKHWINDER pending    2. CAD  - S/p CABG, care per primary team    3. HLD  - Continue statin

## 2022-11-28 NOTE — PROGRESS NOTE ADULT - SUBJECTIVE AND OBJECTIVE BOX
Subjective: Patient seen and assessed s/p CABG. Patient states "I had two bowel movements today, finally" At time of exam, Pt denies chest pain, palpitations, dizziness, headache, shortness of breath, abdominal pain or N/V/D/C.    Pertinent events of the past 24 hours: Uneventful, recovering as expected    VITAL SIGNS  Vital Signs Last 24 Hrs  T(C): 36.5 (22 @ 00:19), Max: 37.2 (22 @ 04:30)  T(F): 97.7 (22 @ 00:19), Max: 98.9 (22 @ 04:30)  HR: 120 (22 @ 00:19) (82 - 130)  BP: 118/71 (22 @ 00:19) (107/50 - 138/71)  RR: 17 (22 @ 00:19) (17 - 19)  SpO2: 96% (22 @ 00:19) (92% - 97%)  on (O2)              Telemetry/Alarms:  AF/Flutter 120s w/ PVCs     MEDICATIONS  acetaminophen     Tablet .. 650 milliGRAM(s) Oral every 6 hours PRN  aMIOdarone    Tablet   Oral   aMIOdarone    Tablet 400 milliGRAM(s) Oral every 8 hours  ascorbic acid 500 milliGRAM(s) Oral daily  aspirin enteric coated 81 milliGRAM(s) Oral daily  atorvastatin 80 milliGRAM(s) Oral at bedtime  dapagliflozin 10 milliGRAM(s) Oral every 24 hours  enoxaparin Injectable 40 milliGRAM(s) SubCutaneous every 24 hours  ferrous    sulfate 325 milliGRAM(s) Oral daily  folic acid 1 milliGRAM(s) Oral daily  gabapentin 300 milliGRAM(s) Oral three times a day  glucagon  Injectable 1 milliGRAM(s) IntraMuscular once  insulin glargine Injectable (LANTUS) 32 Unit(s) SubCutaneous at bedtime  insulin lispro (ADMELOG) corrective regimen sliding scale   SubCutaneous three times a day before meals  insulin lispro Injectable (ADMELOG) 10 Unit(s) SubCutaneous three times a day before meals  lidocaine   4% Patch 1 Patch Transdermal daily  melatonin 5 milliGRAM(s) Oral at bedtime  metoprolol tartrate 50 milliGRAM(s) Oral two times a day  oxyCODONE    IR 5 milliGRAM(s) Oral every 4 hours PRN  oxyCODONE    IR 10 milliGRAM(s) Oral every 4 hours PRN  pantoprazole    Tablet 40 milliGRAM(s) Oral daily  polyethylene glycol 3350 17 Gram(s) Oral daily  potassium chloride    Tablet ER 20 milliEquivalent(s) Oral daily  senna 2 Tablet(s) Oral at bedtime  sodium chloride 0.9% lock flush 3 milliLiter(s) IV Push every 8 hours  torsemide 20 milliGRAM(s) Oral <User Schedule>    PHYSICAL EXAM  Constitutional: NAD  Neuro: A+O x 3, non-focal, speech clear and intact  CV: irregular rhythm, tachycardic, +S1S2, no murmurs or rub  Pulm/chest: lung sounds CTA and equal bilaterally, no accessory muscle use noted  Abd: +BS, soft, NT, ND  Ext: SEGURA x 4, no C/C/E, +pedal pulses   Skin: warm, well perfused  Psych: calm, appropriate affect  Incision: Midline sternal incision open to air, +staples, no audible sternal click, RLE vein harvest site well approximated, healing appropriately    Intake and Output   @ : @ 07:00  --------------------------------------------------------  IN: 1020 mL / OUT: 900 mL / NET: 120 mL     @ : @ 01:18  --------------------------------------------------------  IN: 120 mL / OUT: 0 mL / NET: 120 mL    Weights:  Daily     Daily Weight in k.9 (2022 06:35)  Admit Wt: Drug Dosing Weight  Height (cm): 182.9 (2022 05:48)  Weight (kg): 82.6 (2022 05:48)  BMI (kg/m2): 24.7 (2022 05:48)  BSA (m2): 2.05 (2022 05:48)    All laboratory results, radiology and medications reviewed.    LABS      136  |  100  |  26.5<H>  ----------------------------<  196<H>  4.1   |  25.0  |  0.94    Ca    10.2      2022 14:30  Phos  3.2       Mg     1.8         TPro  5.5<L>  /  Alb  3.0<L>  /  TBili  0.9  /  DBili  x   /  AST  41<H>  /  ALT  30  /  AlkPhos  102                                   9.0    11.47 )-----------( 163      ( 2022 05:31 )             27.5            Bilirubin Total, Serum: 0.9 mg/dL ( @ 05:31)    CAPILLARY BLOOD GLUCOSE  POCT Blood Glucose.: 98 mg/dL (2022 21:05)  POCT Blood Glucose.: 174 mg/dL (2022 17:25)  POCT Blood Glucose.: 141 mg/dL (2022 11:57)  POCT Blood Glucose.: 98 mg/dL (2022 08:15)         < from: Xray Chest 1 View- PORTABLE-Routine (Xray Chest 1 View- PORTABLE-Routine in AM.) (22 @ 05:41) >    IMPRESSION:  Basilar atelectasis and left effusion as noted.    < end of copied text >    < from: Xray Abdomen 1 View PORTABLE -Urgent (Xray Abdomen 1 View PORTABLE -Urgent .) (22 @ 06:15) >    IMPRESSION:  Mildly distended stomach. No evidence of bowel obstruction.    < end of copied text >    < from: 12 Lead ECG (22 @ 04:07) >    Ventricular Rate 99 BPM    Atrial Rate 99 BPM    P-R Interval 136 ms    QRS Duration 114 ms    Q-T Interval 360 ms    QTC Calculation(Bazett) 462 ms    P Axis 30 degrees    R Axis 11 degrees    T Axis 14 degrees    Diagnosis Line Normal sinus rhythm  Low voltage QRS  Inferior infarct , age undetermined  Anterolateral infarct , age undetermined  Abnormal ECG    < end of copied text >      < from: Cardiac Catheterization (11.21.22 @ 10:42) >    LM   Left main artery: Angiography shows no disease.      LAD   Left anterior descending artery: Proximal 100% with left to left  collaterals. .    CX   Circumflex: Proximal 60%, Distal 90% long dotty of disease. .      RCA   Right coronary artery: Proximal RCA with 20-40% diffuse instent  restenosis. Mid to distal RCA with eccentric/calcified lesion-  appears to be moderate to severe 60-70%. .      < end of copied text >

## 2022-11-29 LAB
ANION GAP SERPL CALC-SCNC: 10 MMOL/L — SIGNIFICANT CHANGE UP (ref 5–17)
BUN SERPL-MCNC: 21.5 MG/DL — HIGH (ref 8–20)
CALCIUM SERPL-MCNC: 10.3 MG/DL — SIGNIFICANT CHANGE UP (ref 8.4–10.5)
CHLORIDE SERPL-SCNC: 100 MMOL/L — SIGNIFICANT CHANGE UP (ref 96–108)
CO2 SERPL-SCNC: 27 MMOL/L — SIGNIFICANT CHANGE UP (ref 22–29)
CREAT SERPL-MCNC: 0.8 MG/DL — SIGNIFICANT CHANGE UP (ref 0.5–1.3)
EGFR: 98 ML/MIN/1.73M2 — SIGNIFICANT CHANGE UP
GLUCOSE BLDC GLUCOMTR-MCNC: 125 MG/DL — HIGH (ref 70–99)
GLUCOSE BLDC GLUCOMTR-MCNC: 145 MG/DL — HIGH (ref 70–99)
GLUCOSE BLDC GLUCOMTR-MCNC: 167 MG/DL — HIGH (ref 70–99)
GLUCOSE BLDC GLUCOMTR-MCNC: 81 MG/DL — SIGNIFICANT CHANGE UP (ref 70–99)
GLUCOSE SERPL-MCNC: 121 MG/DL — HIGH (ref 70–99)
HCT VFR BLD CALC: 30.6 % — LOW (ref 39–50)
HGB BLD-MCNC: 10 G/DL — LOW (ref 13–17)
MAGNESIUM SERPL-MCNC: 1.9 MG/DL — SIGNIFICANT CHANGE UP (ref 1.8–2.6)
MCHC RBC-ENTMCNC: 31.8 PG — SIGNIFICANT CHANGE UP (ref 27–34)
MCHC RBC-ENTMCNC: 32.7 GM/DL — SIGNIFICANT CHANGE UP (ref 32–36)
MCV RBC AUTO: 97.5 FL — SIGNIFICANT CHANGE UP (ref 80–100)
PLATELET # BLD AUTO: 233 K/UL — SIGNIFICANT CHANGE UP (ref 150–400)
POTASSIUM SERPL-MCNC: 4.1 MMOL/L — SIGNIFICANT CHANGE UP (ref 3.5–5.3)
POTASSIUM SERPL-SCNC: 4.1 MMOL/L — SIGNIFICANT CHANGE UP (ref 3.5–5.3)
RBC # BLD: 3.14 M/UL — LOW (ref 4.2–5.8)
RBC # FLD: 15.1 % — HIGH (ref 10.3–14.5)
SARS-COV-2 RNA SPEC QL NAA+PROBE: SIGNIFICANT CHANGE UP
SODIUM SERPL-SCNC: 137 MMOL/L — SIGNIFICANT CHANGE UP (ref 135–145)
WBC # BLD: 12.37 K/UL — HIGH (ref 3.8–10.5)
WBC # FLD AUTO: 12.37 K/UL — HIGH (ref 3.8–10.5)

## 2022-11-29 PROCEDURE — 32557 INSERT CATH PLEURA W/ IMAGE: CPT | Mod: 58,LT

## 2022-11-29 PROCEDURE — 99024 POSTOP FOLLOW-UP VISIT: CPT

## 2022-11-29 PROCEDURE — 99232 SBSQ HOSP IP/OBS MODERATE 35: CPT

## 2022-11-29 PROCEDURE — 71045 X-RAY EXAM CHEST 1 VIEW: CPT | Mod: 26

## 2022-11-29 RX ORDER — INSULIN LISPRO 100/ML
8 VIAL (ML) SUBCUTANEOUS
Refills: 0 | Status: DISCONTINUED | OUTPATIENT
Start: 2022-11-29 | End: 2022-12-14

## 2022-11-29 RX ORDER — MAGNESIUM SULFATE 500 MG/ML
2 VIAL (ML) INJECTION ONCE
Refills: 0 | Status: COMPLETED | OUTPATIENT
Start: 2022-11-29 | End: 2022-11-29

## 2022-11-29 RX ORDER — OXYCODONE HYDROCHLORIDE 5 MG/1
5 TABLET ORAL EVERY 4 HOURS
Refills: 0 | Status: DISCONTINUED | OUTPATIENT
Start: 2022-11-30 | End: 2022-12-07

## 2022-11-29 RX ORDER — OXYCODONE HYDROCHLORIDE 5 MG/1
10 TABLET ORAL EVERY 4 HOURS
Refills: 0 | Status: DISCONTINUED | OUTPATIENT
Start: 2022-11-29 | End: 2022-12-06

## 2022-11-29 RX ADMIN — Medication 100 MILLIGRAM(S): at 09:50

## 2022-11-29 RX ADMIN — SODIUM CHLORIDE 3 MILLILITER(S): 9 INJECTION INTRAMUSCULAR; INTRAVENOUS; SUBCUTANEOUS at 05:38

## 2022-11-29 RX ADMIN — SODIUM CHLORIDE 3 MILLILITER(S): 9 INJECTION INTRAMUSCULAR; INTRAVENOUS; SUBCUTANEOUS at 14:05

## 2022-11-29 RX ADMIN — DAPAGLIFLOZIN 10 MILLIGRAM(S): 10 TABLET, FILM COATED ORAL at 05:37

## 2022-11-29 RX ADMIN — OXYCODONE HYDROCHLORIDE 10 MILLIGRAM(S): 5 TABLET ORAL at 19:46

## 2022-11-29 RX ADMIN — Medication 81 MILLIGRAM(S): at 09:49

## 2022-11-29 RX ADMIN — OXYCODONE HYDROCHLORIDE 10 MILLIGRAM(S): 5 TABLET ORAL at 05:37

## 2022-11-29 RX ADMIN — ENOXAPARIN SODIUM 40 MILLIGRAM(S): 100 INJECTION SUBCUTANEOUS at 21:11

## 2022-11-29 RX ADMIN — OXYCODONE HYDROCHLORIDE 10 MILLIGRAM(S): 5 TABLET ORAL at 20:40

## 2022-11-29 RX ADMIN — GABAPENTIN 300 MILLIGRAM(S): 400 CAPSULE ORAL at 14:11

## 2022-11-29 RX ADMIN — Medication 25 GRAM(S): at 09:52

## 2022-11-29 RX ADMIN — GABAPENTIN 300 MILLIGRAM(S): 400 CAPSULE ORAL at 05:38

## 2022-11-29 RX ADMIN — SENNA PLUS 2 TABLET(S): 8.6 TABLET ORAL at 21:12

## 2022-11-29 RX ADMIN — OXYCODONE HYDROCHLORIDE 10 MILLIGRAM(S): 5 TABLET ORAL at 14:39

## 2022-11-29 RX ADMIN — Medication 325 MILLIGRAM(S): at 09:50

## 2022-11-29 RX ADMIN — Medication 500 MILLIGRAM(S): at 09:50

## 2022-11-29 RX ADMIN — Medication 1 MILLIGRAM(S): at 09:50

## 2022-11-29 RX ADMIN — SODIUM CHLORIDE 3 MILLILITER(S): 9 INJECTION INTRAMUSCULAR; INTRAVENOUS; SUBCUTANEOUS at 22:17

## 2022-11-29 RX ADMIN — ATORVASTATIN CALCIUM 80 MILLIGRAM(S): 80 TABLET, FILM COATED ORAL at 21:13

## 2022-11-29 RX ADMIN — GABAPENTIN 300 MILLIGRAM(S): 400 CAPSULE ORAL at 21:12

## 2022-11-29 RX ADMIN — Medication 20 MILLIGRAM(S): at 09:51

## 2022-11-29 RX ADMIN — Medication 50 MILLIGRAM(S): at 17:48

## 2022-11-29 RX ADMIN — Medication 10 UNIT(S): at 08:27

## 2022-11-29 RX ADMIN — OXYCODONE HYDROCHLORIDE 10 MILLIGRAM(S): 5 TABLET ORAL at 10:55

## 2022-11-29 RX ADMIN — OXYCODONE HYDROCHLORIDE 10 MILLIGRAM(S): 5 TABLET ORAL at 15:44

## 2022-11-29 RX ADMIN — MIRTAZAPINE 15 MILLIGRAM(S): 45 TABLET, ORALLY DISINTEGRATING ORAL at 22:18

## 2022-11-29 RX ADMIN — OXYCODONE HYDROCHLORIDE 10 MILLIGRAM(S): 5 TABLET ORAL at 09:46

## 2022-11-29 RX ADMIN — AMIODARONE HYDROCHLORIDE 400 MILLIGRAM(S): 400 TABLET ORAL at 14:11

## 2022-11-29 RX ADMIN — INSULIN GLARGINE 32 UNIT(S): 100 INJECTION, SOLUTION SUBCUTANEOUS at 21:11

## 2022-11-29 RX ADMIN — Medication 5 MILLIGRAM(S): at 21:12

## 2022-11-29 RX ADMIN — OXYCODONE HYDROCHLORIDE 10 MILLIGRAM(S): 5 TABLET ORAL at 07:00

## 2022-11-29 RX ADMIN — AMIODARONE HYDROCHLORIDE 400 MILLIGRAM(S): 400 TABLET ORAL at 21:13

## 2022-11-29 RX ADMIN — Medication 50 MILLIGRAM(S): at 05:37

## 2022-11-29 RX ADMIN — Medication 10 UNIT(S): at 12:26

## 2022-11-29 RX ADMIN — AMIODARONE HYDROCHLORIDE 400 MILLIGRAM(S): 400 TABLET ORAL at 05:38

## 2022-11-29 RX ADMIN — Medication 20 MILLIEQUIVALENT(S): at 09:51

## 2022-11-29 RX ADMIN — PANTOPRAZOLE SODIUM 40 MILLIGRAM(S): 20 TABLET, DELAYED RELEASE ORAL at 09:50

## 2022-11-29 NOTE — PROGRESS NOTE ADULT - ASSESSMENT
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 Patient was admitted to [facility] initially in May 2022 for GI bleed and found to have EF 30-35%, and multivessel disease but CABG deferred due to hx of alcoholism and patient transferred to rehab. Consult for diabetes management.     1. T2DM, a1c 7.6% - some low post prandial bgs  - Decrease admelog to 8 units TID with meals  - Continue lantus 32 units qhs  - Farxiga 10mg daily    2. CAD  - S/p CABG, care per primary team    3. HLD  - Continue statin

## 2022-11-29 NOTE — DISCHARGE NOTE PROVIDER - CARE PROVIDERS DIRECT ADDRESSES
,DirectAddress_Unknown,[email or web address]

## 2022-11-29 NOTE — DISCHARGE NOTE PROVIDER - NSDCCPCAREPLAN_GEN_ALL_CORE_FT
PRINCIPAL DISCHARGE DIAGNOSIS  Diagnosis: CAD (coronary artery disease)  Assessment and Plan of Treatment: You had coronary artery bypass grafting on 11/22 with Dr. Gray. Continue to take all medications as prescribed. Follow ALL instruction outlined in CTS discharge booklet.      SECONDARY DISCHARGE DIAGNOSES  Diagnosis: HLD (hyperlipidemia)  Assessment and Plan of Treatment: Continue medications as prescribed.    Diagnosis: Diabetes mellitus  Assessment and Plan of Treatment: It is extremely important to follow a diabetic (consistent carbohydrates, LOW/NO ADDED SUGAR) diet and monitor your glucose (sugar) levels. You have an increased risk of complications, including wound infections, due to the diabetes.  1. Check your glucoses 3-4 times daily before meals and bedtime.   2. Keep a log of your glucose levels every day.   3. Make an appointment to meet with your primary care provider or endocrinologist within a week.   4. Take ALL of your medications as prescribed. Only hold medications for low glucose levels (< 80) or if you are symptomatic (dizzy, sweating, lightheaded).  5. Ideally, we would like all of the glucose levels to be between .   You may have been discharged on different medications than you were taking before. Your body reacts differently to the medications after surgery. Please continue to take the medications as prescribed and notify the office, nurse practitioner or your PCP if you have any concerns right away.      Diagnosis: Atrial fibrillation and flutter  Assessment and Plan of Treatment: While you were in the hospital, you had an arrhythmia called atrial fibrillation. This is a common arrhythmia seen after heart surgery but it is important to take your medications as ordered to reduce the risk of it recurring or continuing in the future. You may be on a blood thinner to reduce your risk of a stroke with this arrhythmia. If you are experiencing chest pain or palpitations, please call us right away.      Diagnosis: Hypertension  Assessment and Plan of Treatment: You have hypertension, or high blood pressure. It is very important to continue your medication as ordered. High blood pressure increases your risk for heart attack, stroke and kidney disease. It does not usually cause symptoms but it can be serious.   1. Be sure to take all of your medication as prescribed.  2. You may find it helpful to get a home blood pressure meter and check your blood pressure periodically.  3. Lifestyle changes can improve your blood pressure and lessen the amount of medication you need, such as: losing weight, choosing a diet low in fat and rich in fruits, vegetables and low-fat dairy products, reducing the amount of salt you eat, cut down on alcohol (to less than two drinks per day) and do something active most days for 30 minutes or more.       PRINCIPAL DISCHARGE DIAGNOSIS  Diagnosis: CAD (coronary artery disease)  Assessment and Plan of Treatment: You had coronary artery bypass grafting on 11/22 with Dr. Gray. Continue to take all medications as prescribed. Follow ALL instruction outlined in CTS discharge booklet.      SECONDARY DISCHARGE DIAGNOSES  Diagnosis: Hypertension  Assessment and Plan of Treatment: You have hypertension, or high blood pressure. It is very important to continue your medication as ordered. High blood pressure increases your risk for heart attack, stroke and kidney disease. It does not usually cause symptoms but it can be serious.   1. Be sure to take all of your medication as prescribed.  2. You may find it helpful to get a home blood pressure meter and check your blood pressure periodically.  3. Lifestyle changes can improve your blood pressure and lessen the amount of medication you need, such as: losing weight, choosing a diet low in fat and rich in fruits, vegetables and low-fat dairy products, reducing the amount of salt you eat, cut down on alcohol (to less than two drinks per day) and do something active most days for 30 minutes or more.      Diagnosis: HLD (hyperlipidemia)  Assessment and Plan of Treatment: Continue medications as prescribed.    Diagnosis: Diabetes mellitus  Assessment and Plan of Treatment: It is extremely important to follow a diabetic (consistent carbohydrates, LOW/NO ADDED SUGAR) diet and monitor your glucose (sugar) levels. You have an increased risk of complications, including wound infections, due to the diabetes.  1. Check your glucoses 3-4 times daily before meals and bedtime.   2. Keep a log of your glucose levels every day.   3. Make an appointment to meet with your primary care provider or endocrinologist within a week.   4. Take ALL of your medications as prescribed. Only hold medications for low glucose levels (< 80) or if you are symptomatic (dizzy, sweating, lightheaded).  5. Ideally, we would like all of the glucose levels to be between .   You may have been discharged on different medications than you were taking before. Your body reacts differently to the medications after surgery. Please continue to take the medications as prescribed and notify the office, nurse practitioner or your PCP if you have any concerns right away.      Diagnosis: Atrial fibrillation and flutter  Assessment and Plan of Treatment: While you were in the hospital, you had an arrhythmia called atrial fibrillation. This is a common arrhythmia seen after heart surgery but it is important to take your medications as ordered to reduce the risk of it recurring or continuing in the future. You are not on a blood due to the increased risk of bleeding. If you are experiencing chest pain or palpitations, please call us right away.

## 2022-11-29 NOTE — DISCHARGE NOTE PROVIDER - PROVIDER TOKENS
PROVIDER:[TOKEN:[[id number]]],PROVIDER:[TOKEN:[[id number]]],PROVIDER:[TOKEN:[[id number]]]

## 2022-11-29 NOTE — DISCHARGE NOTE PROVIDER - NSDCFUADDINST_GEN_ALL_CORE_FT
Please call the Cardiothoracic Surgery office at [phone] if you are experiencing any shortness of breath, chest pain, fevers or chills, drainage from the incisions, persistent nausea, vomiting or if you have any questions about your medications. If the symptoms are severe, call 911 and go to the nearest hospital. You can also call [phone] for an emergency [facility] ambulance, which will take you to the closest [facility].    If you need any assistance for making any appointments for a new consult or referral in any specialty, please call our [facility] Clinical Coordination Center at [phone].

## 2022-11-29 NOTE — DISCHARGE NOTE PROVIDER - NSDCFUADDAPPT_GEN_ALL_CORE_FT
Please follow up with Dr. Gray on __  at [facility].     **Please arrive at [facility] ONE HOUR PRIOR TO APPOINTMENT TIME to get a CHEST XRAY (script in folder). Go directly to the Radiology Department.***    Follow up with your cardiologist (Dr. Huerta) and your primary care provider in 2-4 weeks  Endocrine:    Upon discharge from rehab, make a follow up appointment with Dr Gray by calling [phone].  Follow up with your cardiologist and primary care doctor within 7-10 days after discharge. Sternal wound precautions, Blood glucose fingerstick checks qAC/HS, daily weights, DASH diet, ensure supplements bid, daily shower,  PT/OT Rehab  per rehab facility. BMP, CBC twice a week. CXR weekly. Vitals per routine.    When seeing Dr. Gray:   **Please arrive at [facility] ONE HOUR PRIOR TO APPOINTMENT TIME to get a CHEST XRAY (script in folder). Go directly to the Radiology Department.***    Follow up with your cardiologist (Dr. Huerta), endocrinologist and your primary care provider in 2-4 weeks

## 2022-11-29 NOTE — DISCHARGE NOTE PROVIDER - NSDCMRMEDTOKEN_GEN_ALL_CORE_FT
acetaminophen 325 mg oral tablet: 2 tab(s) orally every 6 hours, As needed, Temp greater or equal to 38C (100.4F), Mild Pain (1 - 3)  cholecalciferol 1250 mcg (50,000 intl units) oral capsule: 1 cap(s) orally once a week  diphenhydrAMINE 50 mg oral capsule: 1 cap(s) orally once a day (at bedtime), As needed, Insomnia  Entresto 49 mg-51 mg oral tablet: 1 tab(s) orally 2 times a day  folic acid 1 mg oral tablet: 1 tab(s) orally once a day  Lantus Solostar Pen 100 units/mL subcutaneous solution: 15 unit(s) subcutaneous once a day (at bedtime)   Melatonin 5 mg oral tablet: 1 tab(s) orally once a day (at bedtime)  metFORMIN 500 mg oral tablet: 1 tab(s) orally 2 times a day   metoprolol succinate 50 mg oral tablet, extended release: 1 tab(s) orally once a day  mirtazapine 7.5 mg oral tablet: 2 tab(s) orally once a day (at bedtime)  pantoprazole 40 mg oral delayed release tablet: 1 tab(s) orally once a day (before a meal)  sertraline 100 mg oral tablet: 1 tab(s) orally once a day  simvastatin 40 mg oral tablet: 1 tab(s) orally once a day (at bedtime)  thiamine 100 mg oral tablet: 1 tab(s) orally once a day   acetaminophen 325 mg oral tablet: 2 tab(s) orally every 6 hours, As needed, Mild Pain (1 - 3)  amiodarone 200 mg oral tablet: 1 tab(s) orally once a day  ascorbic acid 500 mg oral tablet: 1 tab(s) orally once a day  aspirin 81 mg oral delayed release tablet: 1 tab(s) orally once a day  atorvastatin 80 mg oral tablet: 1 tab(s) orally once a day (at bedtime)  cyanocobalamin 1000 mcg oral tablet: 1 tab(s) orally once a day  dapagliflozin 10 mg oral tablet: 1 tab(s) orally every 24 hours  doxycycline monohydrate 50 mg oral capsule: 2 cap(s) orally every 12 hours  STOP AFTER 12/14 PM DOSE   ferrous sulfate 325 mg (65 mg elemental iron) oral tablet: 1 tab(s) orally once a day  folic acid 1 mg oral tablet: 1 tab(s) orally once a day  insulin glargine 100 units/mL subcutaneous solution: 32 unit(s) subcutaneous once a day (at bedtime)  Lantus Solostar Pen 100 units/mL subcutaneous solution: 15 unit(s) subcutaneous once a day (at bedtime)   Melatonin 5 mg oral tablet: 1 tab(s) orally once a day (at bedtime)  metFORMIN 500 mg oral tablet: 1 tab(s) orally 2 times a day   metoprolol tartrate 50 mg oral tablet: 1 tab(s) orally 3 times a day  mirtazapine 7.5 mg oral tablet: 2 tab(s) orally once a day (at bedtime)  oxyCODONE 5 mg oral tablet: 1 tab(s) orally every 6 hours, As Needed - 6) STOP AFTER 5 DAYS  pantoprazole 40 mg oral delayed release tablet: 1 tab(s) orally once a day (before a meal)  senna leaf extract oral tablet: 2 tab(s) orally once a day (at bedtime)  sertraline 100 mg oral tablet: 1 tab(s) orally once a day  thiamine 100 mg oral tablet: 1 tab(s) orally once a day

## 2022-11-29 NOTE — PROGRESS NOTE ADULT - SUBJECTIVE AND OBJECTIVE BOX
INTERVAL EVENTS:  Follow up diabetes management    ROS: Patient denies chest pain, SOB, abd pain, N/V.    MEDICATIONS  (STANDING):  aMIOdarone    Tablet   Oral   aMIOdarone    Tablet 400 milliGRAM(s) Oral every 8 hours  ascorbic acid 500 milliGRAM(s) Oral daily  aspirin enteric coated 81 milliGRAM(s) Oral daily  atorvastatin 80 milliGRAM(s) Oral at bedtime  dapagliflozin 10 milliGRAM(s) Oral every 24 hours  dextrose 5%. 1000 milliLiter(s) (50 mL/Hr) IV Continuous <Continuous>  dextrose 5%. 1000 milliLiter(s) (100 mL/Hr) IV Continuous <Continuous>  dextrose 50% Injectable 50 milliLiter(s) IV Push every 15 minutes  dextrose 50% Injectable 25 milliLiter(s) IV Push every 15 minutes  enoxaparin Injectable 40 milliGRAM(s) SubCutaneous every 24 hours  ferrous    sulfate 325 milliGRAM(s) Oral daily  folic acid 1 milliGRAM(s) Oral daily  gabapentin 300 milliGRAM(s) Oral three times a day  glucagon  Injectable 1 milliGRAM(s) IntraMuscular once  insulin glargine Injectable (LANTUS) 32 Unit(s) SubCutaneous at bedtime  insulin lispro (ADMELOG) corrective regimen sliding scale   SubCutaneous three times a day before meals  insulin lispro Injectable (ADMELOG) 8 Unit(s) SubCutaneous three times a day before meals  lidocaine   4% Patch 1 Patch Transdermal daily  melatonin 5 milliGRAM(s) Oral at bedtime  metoprolol tartrate 50 milliGRAM(s) Oral two times a day  mirtazapine 15 milliGRAM(s) Oral at bedtime  pantoprazole    Tablet 40 milliGRAM(s) Oral daily  polyethylene glycol 3350 17 Gram(s) Oral daily  potassium chloride    Tablet ER 20 milliEquivalent(s) Oral daily  senna 2 Tablet(s) Oral at bedtime  sodium chloride 0.9% lock flush 3 milliLiter(s) IV Push every 8 hours  thiamine 100 milliGRAM(s) Oral daily  torsemide 20 milliGRAM(s) Oral <User Schedule>    MEDICATIONS  (PRN):  acetaminophen     Tablet .. 650 milliGRAM(s) Oral every 6 hours PRN Mild Pain (1 - 3)  dextrose Oral Gel 15 Gram(s) Oral once PRN Blood Glucose LESS THAN 70 milliGRAM(s)/deciliter  oxyCODONE    IR 5 milliGRAM(s) Oral every 4 hours PRN Moderate Pain (4 - 6)  oxyCODONE    IR 10 milliGRAM(s) Oral every 4 hours PRN Severe Pain (7 - 10)    Allergies  No Known Allergies    Vital Signs Last 24 Hrs  T(C): 37 (29 Nov 2022 12:00), Max: 37 (29 Nov 2022 00:00)  T(F): 98.6 (29 Nov 2022 12:00), Max: 98.6 (29 Nov 2022 00:00)  HR: 88 (29 Nov 2022 12:00) (73 - 94)  BP: 127/79 (29 Nov 2022 12:00) (112/67 - 147/79)  BP(mean): --  RR: 18 (29 Nov 2022 12:00) (17 - 19)  SpO2: 96% (29 Nov 2022 12:00) (92% - 99%)    Parameters below as of 29 Nov 2022 12:00  Patient On (Oxygen Delivery Method): room air      PHYSICAL EXAM:  General: No apparent distress  Neck: Supple, trachea midline, no thyromegaly  Respiratory: Lungs clear bilaterally, normal rate, effort  Cardiac: +S1, S2, no m/r/g  GI: +BS, soft, non tender, non distended  Extremities: No peripheral edema, no pedal lesions  Neuro: A+O X3, no tremor  Pysch: Affect appropriate   Skin: No acanthosis         LABS:                        10.0   12.37 )-----------( 233      ( 29 Nov 2022 06:08 )             30.6     11-29    137  |  100  |  21.5<H>  ----------------------------<  121<H>  4.1   |  27.0  |  0.80    Ca    10.3      29 Nov 2022 06:08  Mg     1.9     11-29      POCT Blood Glucose.: 145 mg/dL (11-29-22 @ 12:00)  POCT Blood Glucose.: 125 mg/dL (11-29-22 @ 07:55)  POCT Blood Glucose.: 188 mg/dL (11-28-22 @ 21:15)  POCT Blood Glucose.: 89 mg/dL (11-28-22 @ 17:15)    Thyroid Stimulating Hormone, Serum: 1.23 uIU/mL (11-21-22 @ 14:08)  Free Thyroxine, Serum: 1.1 ng/dL (11-21-22 @ 14:08)   INTERVAL EVENTS:  Follow up diabetes management    ROS: Patient denies chest pain, SOB, abd pain, N/V.    MEDICATIONS  (STANDING):  aMIOdarone    Tablet   Oral   aMIOdarone    Tablet 400 milliGRAM(s) Oral every 8 hours  ascorbic acid 500 milliGRAM(s) Oral daily  aspirin enteric coated 81 milliGRAM(s) Oral daily  atorvastatin 80 milliGRAM(s) Oral at bedtime  dapagliflozin 10 milliGRAM(s) Oral every 24 hours  dextrose 5%. 1000 milliLiter(s) (50 mL/Hr) IV Continuous <Continuous>  dextrose 5%. 1000 milliLiter(s) (100 mL/Hr) IV Continuous <Continuous>  dextrose 50% Injectable 50 milliLiter(s) IV Push every 15 minutes  dextrose 50% Injectable 25 milliLiter(s) IV Push every 15 minutes  enoxaparin Injectable 40 milliGRAM(s) SubCutaneous every 24 hours  ferrous    sulfate 325 milliGRAM(s) Oral daily  folic acid 1 milliGRAM(s) Oral daily  gabapentin 300 milliGRAM(s) Oral three times a day  glucagon  Injectable 1 milliGRAM(s) IntraMuscular once  insulin glargine Injectable (LANTUS) 32 Unit(s) SubCutaneous at bedtime  insulin lispro (ADMELOG) corrective regimen sliding scale   SubCutaneous three times a day before meals  insulin lispro Injectable (ADMELOG) 8 Unit(s) SubCutaneous three times a day before meals  lidocaine   4% Patch 1 Patch Transdermal daily  melatonin 5 milliGRAM(s) Oral at bedtime  metoprolol tartrate 50 milliGRAM(s) Oral two times a day  mirtazapine 15 milliGRAM(s) Oral at bedtime  pantoprazole    Tablet 40 milliGRAM(s) Oral daily  polyethylene glycol 3350 17 Gram(s) Oral daily  potassium chloride    Tablet ER 20 milliEquivalent(s) Oral daily  senna 2 Tablet(s) Oral at bedtime  sodium chloride 0.9% lock flush 3 milliLiter(s) IV Push every 8 hours  thiamine 100 milliGRAM(s) Oral daily  torsemide 20 milliGRAM(s) Oral <User Schedule>    MEDICATIONS  (PRN):  acetaminophen     Tablet .. 650 milliGRAM(s) Oral every 6 hours PRN Mild Pain (1 - 3)  dextrose Oral Gel 15 Gram(s) Oral once PRN Blood Glucose LESS THAN 70 milliGRAM(s)/deciliter  oxyCODONE    IR 5 milliGRAM(s) Oral every 4 hours PRN Moderate Pain (4 - 6)  oxyCODONE    IR 10 milliGRAM(s) Oral every 4 hours PRN Severe Pain (7 - 10)    Allergies  No Known Allergies    Vital Signs Last 24 Hrs  T(C): 37 (29 Nov 2022 12:00), Max: 37 (29 Nov 2022 00:00)  T(F): 98.6 (29 Nov 2022 12:00), Max: 98.6 (29 Nov 2022 00:00)  HR: 88 (29 Nov 2022 12:00) (73 - 94)  BP: 127/79 (29 Nov 2022 12:00) (112/67 - 147/79)  BP(mean): --  RR: 18 (29 Nov 2022 12:00) (17 - 19)  SpO2: 96% (29 Nov 2022 12:00) (92% - 99%)    Parameters below as of 29 Nov 2022 12:00  Patient On (Oxygen Delivery Method): room air      PHYSICAL EXAM:  General: No apparent distress  Neck: Supple, trachea midline, no thyromegaly  Respiratory: Lungs clear bilaterally, normal rate, effort  Cardiac: +S1, S2, no m/r/g  GI: +BS, soft, non tender, non distended  Extremities: No peripheral edema, no pedal lesions  Neuro: A+O X3, no tremor  Pysch: Affect appropriate   Skin: No acanthosis       LABS:                        10.0   12.37 )-----------( 233      ( 29 Nov 2022 06:08 )             30.6     11-29    137  |  100  |  21.5<H>  ----------------------------<  121<H>  4.1   |  27.0  |  0.80    Ca    10.3      29 Nov 2022 06:08  Mg     1.9     11-29      POCT Blood Glucose.: 145 mg/dL (11-29-22 @ 12:00)  POCT Blood Glucose.: 125 mg/dL (11-29-22 @ 07:55)  POCT Blood Glucose.: 188 mg/dL (11-28-22 @ 21:15)  POCT Blood Glucose.: 89 mg/dL (11-28-22 @ 17:15)    Thyroid Stimulating Hormone, Serum: 1.23 uIU/mL (11-21-22 @ 14:08)  Free Thyroxine, Serum: 1.1 ng/dL (11-21-22 @ 14:08)

## 2022-11-29 NOTE — PROGRESS NOTE ADULT - SUBJECTIVE AND OBJECTIVE BOX
Subjective: Patient seen and assessed s/p CABG. Patient states "I just still have pain in my right back" At time of exam, Pt denies chest pain, palpitations, dizziness, headache, shortness of breath, abdominal pain or N/V/D/C.    Pertinent events of the past 24 hours: AF, converted back to NSR    VITAL SIGNS  Vital Signs Last 24 Hrs  T(C): 37 (22 @ 00:00), Max: 37 (22 @ 00:00)  T(F): 98.6 (22 @ 00:00), Max: 98.6 (22 @ 00:00)  HR: 88 (22 @ 00:00) (80 - 107)  BP: 112/67 (22 @ 00:00) (100/69 - 120/71)  RR: 18 (22 @ 00:00) (16 - 18)  SpO2: 97% (22 @ 00:00) (95% - 99%)  on (O2)              Telemetry/Alarms:  NSR PVCs     MEDICATIONS  acetaminophen     Tablet .. 650 milliGRAM(s) Oral every 6 hours PRN  aMIOdarone    Tablet 400 milliGRAM(s) Oral every 8 hours  aMIOdarone    Tablet   Oral   ascorbic acid 500 milliGRAM(s) Oral daily  aspirin enteric coated 81 milliGRAM(s) Oral daily  atorvastatin 80 milliGRAM(s) Oral at bedtime  dapagliflozin 10 milliGRAM(s) Oral every 24 hours  enoxaparin Injectable 40 milliGRAM(s) SubCutaneous every 24 hours  ferrous    sulfate 325 milliGRAM(s) Oral daily  folic acid 1 milliGRAM(s) Oral daily  gabapentin 300 milliGRAM(s) Oral three times a day  insulin glargine Injectable (LANTUS) 32 Unit(s) SubCutaneous at bedtime  insulin lispro (ADMELOG) corrective regimen sliding scale   SubCutaneous three times a day before meals  insulin lispro Injectable (ADMELOG) 10 Unit(s) SubCutaneous three times a day before meals  lidocaine   4% Patch 1 Patch Transdermal daily  melatonin 5 milliGRAM(s) Oral at bedtime  metoprolol tartrate 50 milliGRAM(s) Oral two times a day  mirtazapine 15 milliGRAM(s) Oral at bedtime  oxyCODONE    IR 10 milliGRAM(s) Oral every 4 hours PRN  oxyCODONE    IR 5 milliGRAM(s) Oral every 4 hours PRN  pantoprazole    Tablet 40 milliGRAM(s) Oral daily  polyethylene glycol 3350 17 Gram(s) Oral daily  potassium chloride    Tablet ER 20 milliEquivalent(s) Oral daily  senna 2 Tablet(s) Oral at bedtime  sodium chloride 0.9% lock flush 3 milliLiter(s) IV Push every 8 hours  thiamine 100 milliGRAM(s) Oral daily  torsemide 20 milliGRAM(s) Oral <User Schedule>    PHYSICAL EXAM  Constitutional: NAD  Neuro: A+O x 3, non-focal, speech clear and intact  CV: RRR, +S1S2, no murmurs or rub  Pulm/chest: lung sounds CTA and equal bilaterally, no accessory muscle use noted  Abd: +BS, soft, NT, ND  Ext: SEGURA x 4, no C/C/E, +pedal pulses   Skin: warm, well perfused  Psych: calm, appropriate affect  Incision: Midline sternal incision open to air, no audible sternal click, RLE vein harvest site well approximated, healing appropriately    Intake and Output   @ :  -   @ 07:00  --------------------------------------------------------  IN: 180 mL / OUT: 0 mL / NET: 180 mL     @ :  -   @ 03:32  --------------------------------------------------------  IN: 720 mL / OUT: 300 mL / NET: 420 mL    Weights:  Daily     Daily Weight in k.4 (2022 04:44)  Admit Wt: Drug Dosing Weight  Height (cm): 182.9 (2022 05:48)  Weight (kg): 82.6 (2022 05:48)  BMI (kg/m2): 24.7 (2022 05:48)  BSA (m2): 2.05 (2022 05:48)    All laboratory results, radiology and medications reviewed.    LABS      139  |  102  |  25.0<H>  ----------------------------<  120<H>  4.1   |  26.0  |  0.84    Ca    10.1      2022 05:40  Phos  3.2       Mg     1.8         TPro  5.5<L>  /  Alb  3.0<L>  /  TBili  0.9  /  DBili  x   /  AST  41<H>  /  ALT  30  /  AlkPhos  102                                   9.3    12.21 )-----------( 198      ( 2022 05:40 )             28.3              CAPILLARY BLOOD GLUCOSE  POCT Blood Glucose.: 188 mg/dL (2022 21:15)  POCT Blood Glucose.: 89 mg/dL (2022 17:15)  POCT Blood Glucose.: 145 mg/dL (2022 12:04)  POCT Blood Glucose.: 106 mg/dL (2022 08:23)         < from: Xray Chest 1 View- PORTABLE-Routine (Xray Chest 1 View- PORTABLE-Routine in AM.) (22 @ 05:47) >    IMPRESSION:  Less left atelectasis. Similar left effusion.    < end of copied text >    < from: TTE Echo Complete w/ Contrast w/ Doppler (22 @ 10:30) >    Summary:   1. Technically difficult study. Endocardial visualization was enhanced   with intravenous echo contrast.   2. The left atrium is normal in size.   3. Segmental wall motion abnormalities in Mid LAD territory.   4. Left ventricular ejection fraction, by visual estimation, is 30 to   35%. NOrmal left atrial pressures.   5. The right atrium is normal in size.   6. Normal right ventricular size and function.   7. NO significant valvular abnormality.   8. There is no evidence of pericardial effusion.    < from: 12 Lead ECG (22 @ 04:07) >    Ventricular Rate 99 BPM    Atrial Rate 99 BPM    P-R Interval 136 ms    QRS Duration 114 ms    Q-T Interval 360 ms    QTC Calculation(Bazett) 462 ms    P Axis 30 degrees    R Axis 11 degrees    T Axis 14 degrees    Diagnosis Line Normal sinus rhythm  Low voltage QRS  Inferior infarct , age undetermined  Anterolateral infarct , age undetermined  Abnormal ECG    < end of copied text >

## 2022-11-29 NOTE — DISCHARGE NOTE PROVIDER - NSDCFUSCHEDAPPT_GEN_ALL_CORE_FT
Kristi Huerta  [facility] Physician [facility]  CARDIOLOGY [address] [facility]  Scheduled Appointment: 01/20/2023

## 2022-11-29 NOTE — DIETITIAN NUTRITION RISK NOTIFICATION - UPON NUTRITIONAL ASSESSMENT BY THE REGISTERED DIETITIAN YOUR PATIENT WAS DETERMINED TO MEET CRITERIA/HAS EVIDENCE OF THE FOLLOWING DIAGNOSIS:
Severe protein-calorie malnutrition Valtrex Pregnancy And Lactation Text: this medication is Pregnancy Category B and is considered safe during pregnancy. This medication is not directly found in breast milk but it's metabolite acyclovir is present.

## 2022-11-29 NOTE — DISCHARGE NOTE PROVIDER - CARE PROVIDER_API CALL
Ruslan Gray; KADY)  Surgery; Thoracic and Cardiac Surgery  [address] [place] [address]  Phone: [phone]  Fax: [phone]  Follow Up Time:     Kristi Huerta)  Cardiology; Cardiovascular Disease; Internal Medicine  [address] [place] [address]  Phone: [phone]  Fax: [phone]  Follow Up Time:     Francesca Henson)  Internal Medicine  [address] [place] [address]  Phone: [phone]  Fax: [phone]  Follow Up Time:

## 2022-11-29 NOTE — DISCHARGE NOTE PROVIDER - HOSPITAL COURSE
67 year old male patient former smoker with a medical history of HTN, HLD, type 2 DM (HA1c 7.6 on Metformin and Insulin), CAD s/p PCI to RCA 2007, ETOH cirrhosis, B12/Vit D deficiency, recent hospitalization for GI Bleed 05/2022, admitted on 11/21/22 for repeat cath which confirmed multivessel CAD. Patient underwent CABG X 3 (LIMA-LAD, SVG-OM, SVG-PDA) on 11/22/22 with Dr. Gray. Postoperative course significant for cardiogenic shock and hypotension (resolved s/p volume resuscitation, phenylephrine gtt weaned off), ABLA (stable s/p blood transfusion), hyperglycemia (titrating insulin requirements) and rapid atrial fibrillation (resolved with metoprolol and PO amio).  Patient remains stable from respiratory and hemodynamic standpoint. Discussed with Dr. Gray and patient cleared for discharge to [facility]. 67M, former smoker with a medical history of HTN, HLD, type 2 DM (HA1c 7.6 on Metformin and Insulin), CAD s/p PCI to RCA 2007, ETOH cirrhosis, B12/Vit D deficiency, recent hospitalization for GI Bleed 05/2022, admitted on 11/21/22 for repeat cath which confirmed multivessel CAD. s/p CABG X 3 (LIMA-LAD, SVG-OM, SVG-PDA) on 11/22/22 with Dr. Gray. Postoperative course significant for cardiogenic shock and hypotension (resolved s/p volume resuscitation, phenylephrine gtt weaned off), ABLA (stable s/p blood transfusion), hyperglycemia (titrating insulin requirements), and Left pleural effusion (s/p pigtail catheter insertion 11/29, removed 11/30, with residual effusion noted on CT chest with IR pigtail placed 12/1 and removed 12/3). Patient with right femoral occlusion found on arterial duplex 12/4 (seen by vascular surgery, likely not acute), Right thigh hematoma (from EVH removal), RLE cellulitis (improving, remains on antibiotics as per ID), increasing pericardial effusion in setting of eliquis, eliquis d/c'd. Patient remains stable from respiratory and hemodynamic standpoint. Discussed with Dr. Gray and patient cleared for discharge to [facility].

## 2022-11-30 DIAGNOSIS — J90 PLEURAL EFFUSION, NOT ELSEWHERE CLASSIFIED: ICD-10-CM

## 2022-11-30 LAB
ANION GAP SERPL CALC-SCNC: 12 MMOL/L — SIGNIFICANT CHANGE UP (ref 5–17)
BUN SERPL-MCNC: 25.5 MG/DL — HIGH (ref 8–20)
CALCIUM SERPL-MCNC: 9.8 MG/DL — SIGNIFICANT CHANGE UP (ref 8.4–10.5)
CHLORIDE SERPL-SCNC: 100 MMOL/L — SIGNIFICANT CHANGE UP (ref 96–108)
CO2 SERPL-SCNC: 26 MMOL/L — SIGNIFICANT CHANGE UP (ref 22–29)
CREAT SERPL-MCNC: 0.94 MG/DL — SIGNIFICANT CHANGE UP (ref 0.5–1.3)
EGFR: 89 ML/MIN/1.73M2 — SIGNIFICANT CHANGE UP
GAD65 AB SER-MCNC: 0 NMOL/L — SIGNIFICANT CHANGE UP
GLUCOSE BLDC GLUCOMTR-MCNC: 130 MG/DL — HIGH (ref 70–99)
GLUCOSE BLDC GLUCOMTR-MCNC: 159 MG/DL — HIGH (ref 70–99)
GLUCOSE BLDC GLUCOMTR-MCNC: 184 MG/DL — HIGH (ref 70–99)
GLUCOSE BLDC GLUCOMTR-MCNC: 96 MG/DL — SIGNIFICANT CHANGE UP (ref 70–99)
GLUCOSE SERPL-MCNC: 136 MG/DL — HIGH (ref 70–99)
HCT VFR BLD CALC: 32.6 % — LOW (ref 39–50)
HGB BLD-MCNC: 10.6 G/DL — LOW (ref 13–17)
MAGNESIUM SERPL-MCNC: 2 MG/DL — SIGNIFICANT CHANGE UP (ref 1.6–2.6)
MCHC RBC-ENTMCNC: 31.6 PG — SIGNIFICANT CHANGE UP (ref 27–34)
MCHC RBC-ENTMCNC: 32.5 GM/DL — SIGNIFICANT CHANGE UP (ref 32–36)
MCV RBC AUTO: 97.3 FL — SIGNIFICANT CHANGE UP (ref 80–100)
PLATELET # BLD AUTO: 269 K/UL — SIGNIFICANT CHANGE UP (ref 150–400)
POTASSIUM SERPL-MCNC: 4.4 MMOL/L — SIGNIFICANT CHANGE UP (ref 3.5–5.3)
POTASSIUM SERPL-SCNC: 4.4 MMOL/L — SIGNIFICANT CHANGE UP (ref 3.5–5.3)
RBC # BLD: 3.35 M/UL — LOW (ref 4.2–5.8)
RBC # FLD: 14.9 % — HIGH (ref 10.3–14.5)
SODIUM SERPL-SCNC: 138 MMOL/L — SIGNIFICANT CHANGE UP (ref 135–145)
WBC # BLD: 14.63 K/UL — HIGH (ref 3.8–10.5)
WBC # FLD AUTO: 14.63 K/UL — HIGH (ref 3.8–10.5)

## 2022-11-30 PROCEDURE — 99024 POSTOP FOLLOW-UP VISIT: CPT

## 2022-11-30 PROCEDURE — 71045 X-RAY EXAM CHEST 1 VIEW: CPT | Mod: 26

## 2022-11-30 PROCEDURE — 71045 X-RAY EXAM CHEST 1 VIEW: CPT | Mod: 26,77

## 2022-11-30 RX ORDER — APIXABAN 2.5 MG/1
5 TABLET, FILM COATED ORAL EVERY 12 HOURS
Refills: 0 | Status: DISCONTINUED | OUTPATIENT
Start: 2022-11-30 | End: 2022-12-01

## 2022-11-30 RX ORDER — METOPROLOL TARTRATE 50 MG
50 TABLET ORAL THREE TIMES A DAY
Refills: 0 | Status: DISCONTINUED | OUTPATIENT
Start: 2022-11-30 | End: 2022-12-14

## 2022-11-30 RX ADMIN — PANTOPRAZOLE SODIUM 40 MILLIGRAM(S): 20 TABLET, DELAYED RELEASE ORAL at 12:15

## 2022-11-30 RX ADMIN — MIRTAZAPINE 15 MILLIGRAM(S): 45 TABLET, ORALLY DISINTEGRATING ORAL at 21:33

## 2022-11-30 RX ADMIN — Medication 500 MILLIGRAM(S): at 12:15

## 2022-11-30 RX ADMIN — Medication 20 MILLIGRAM(S): at 09:18

## 2022-11-30 RX ADMIN — OXYCODONE HYDROCHLORIDE 10 MILLIGRAM(S): 5 TABLET ORAL at 19:03

## 2022-11-30 RX ADMIN — ATORVASTATIN CALCIUM 80 MILLIGRAM(S): 80 TABLET, FILM COATED ORAL at 21:33

## 2022-11-30 RX ADMIN — OXYCODONE HYDROCHLORIDE 10 MILLIGRAM(S): 5 TABLET ORAL at 00:23

## 2022-11-30 RX ADMIN — OXYCODONE HYDROCHLORIDE 10 MILLIGRAM(S): 5 TABLET ORAL at 06:17

## 2022-11-30 RX ADMIN — AMIODARONE HYDROCHLORIDE 400 MILLIGRAM(S): 400 TABLET ORAL at 21:33

## 2022-11-30 RX ADMIN — POLYETHYLENE GLYCOL 3350 17 GRAM(S): 17 POWDER, FOR SOLUTION ORAL at 12:14

## 2022-11-30 RX ADMIN — Medication 650 MILLIGRAM(S): at 09:18

## 2022-11-30 RX ADMIN — GABAPENTIN 300 MILLIGRAM(S): 400 CAPSULE ORAL at 13:23

## 2022-11-30 RX ADMIN — Medication 8 UNIT(S): at 08:15

## 2022-11-30 RX ADMIN — SODIUM CHLORIDE 3 MILLILITER(S): 9 INJECTION INTRAMUSCULAR; INTRAVENOUS; SUBCUTANEOUS at 05:20

## 2022-11-30 RX ADMIN — SODIUM CHLORIDE 3 MILLILITER(S): 9 INJECTION INTRAMUSCULAR; INTRAVENOUS; SUBCUTANEOUS at 22:14

## 2022-11-30 RX ADMIN — AMIODARONE HYDROCHLORIDE 400 MILLIGRAM(S): 400 TABLET ORAL at 05:25

## 2022-11-30 RX ADMIN — DAPAGLIFLOZIN 10 MILLIGRAM(S): 10 TABLET, FILM COATED ORAL at 05:25

## 2022-11-30 RX ADMIN — OXYCODONE HYDROCHLORIDE 10 MILLIGRAM(S): 5 TABLET ORAL at 18:33

## 2022-11-30 RX ADMIN — OXYCODONE HYDROCHLORIDE 10 MILLIGRAM(S): 5 TABLET ORAL at 13:22

## 2022-11-30 RX ADMIN — Medication 8 UNIT(S): at 17:19

## 2022-11-30 RX ADMIN — Medication 20 MILLIEQUIVALENT(S): at 12:15

## 2022-11-30 RX ADMIN — OXYCODONE HYDROCHLORIDE 10 MILLIGRAM(S): 5 TABLET ORAL at 01:06

## 2022-11-30 RX ADMIN — Medication 81 MILLIGRAM(S): at 12:14

## 2022-11-30 RX ADMIN — Medication 50 MILLIGRAM(S): at 13:23

## 2022-11-30 RX ADMIN — Medication 1 MILLIGRAM(S): at 12:15

## 2022-11-30 RX ADMIN — OXYCODONE HYDROCHLORIDE 10 MILLIGRAM(S): 5 TABLET ORAL at 23:16

## 2022-11-30 RX ADMIN — APIXABAN 5 MILLIGRAM(S): 2.5 TABLET, FILM COATED ORAL at 17:18

## 2022-11-30 RX ADMIN — SENNA PLUS 2 TABLET(S): 8.6 TABLET ORAL at 21:33

## 2022-11-30 RX ADMIN — Medication 325 MILLIGRAM(S): at 12:15

## 2022-11-30 RX ADMIN — INSULIN GLARGINE 32 UNIT(S): 100 INJECTION, SOLUTION SUBCUTANEOUS at 21:32

## 2022-11-30 RX ADMIN — Medication 8 UNIT(S): at 12:14

## 2022-11-30 RX ADMIN — Medication 100 MILLIGRAM(S): at 12:15

## 2022-11-30 RX ADMIN — Medication 650 MILLIGRAM(S): at 10:18

## 2022-11-30 RX ADMIN — Medication 50 MILLIGRAM(S): at 05:25

## 2022-11-30 RX ADMIN — GABAPENTIN 300 MILLIGRAM(S): 400 CAPSULE ORAL at 05:25

## 2022-11-30 RX ADMIN — OXYCODONE HYDROCHLORIDE 10 MILLIGRAM(S): 5 TABLET ORAL at 14:20

## 2022-11-30 RX ADMIN — Medication 650 MILLIGRAM(S): at 17:18

## 2022-11-30 RX ADMIN — AMIODARONE HYDROCHLORIDE 400 MILLIGRAM(S): 400 TABLET ORAL at 13:23

## 2022-11-30 RX ADMIN — Medication 650 MILLIGRAM(S): at 18:18

## 2022-11-30 RX ADMIN — SODIUM CHLORIDE 3 MILLILITER(S): 9 INJECTION INTRAMUSCULAR; INTRAVENOUS; SUBCUTANEOUS at 13:08

## 2022-11-30 RX ADMIN — Medication 2: at 08:15

## 2022-11-30 RX ADMIN — Medication 5 MILLIGRAM(S): at 21:33

## 2022-11-30 RX ADMIN — OXYCODONE HYDROCHLORIDE 10 MILLIGRAM(S): 5 TABLET ORAL at 05:47

## 2022-11-30 RX ADMIN — GABAPENTIN 300 MILLIGRAM(S): 400 CAPSULE ORAL at 21:33

## 2022-11-30 NOTE — PROGRESS NOTE ADULT - ASSESSMENT
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 Patient was admitted to [facility] initially in May 2022 for GI bleed and found to have EF 30-35%, and multivessel disease but CABG deferred due to hx of alcoholism and patient transferred to rehab. Consult for diabetes management.     1. T2DM, a1c 7.6% - Bgs well controlled  - Continue admelog to 8 units TID with meals  - Continue lantus 32 units qhs  - Farxiga 10mg daily    2. CAD  - S/p CABG, care per primary team    3. HLD  - Continue statin

## 2022-11-30 NOTE — PROGRESS NOTE ADULT - SUBJECTIVE AND OBJECTIVE BOX
Subjective: Patient seen and assessed s/p CABG. Patient states "the pain is worse today" At time of exam, Pt denies chest pain, palpitations, dizziness, headache, shortness of breath, abdominal pain or N/V/D/C.    Pertinent events of the past 24 hours: s/p Left PTC insertion for pleural effusion     VITAL SIGNS  Vital Signs Last 24 Hrs  T(C): 37 (22 @ 00:10), Max: 37.3 (22 @ 16:30)  T(F): 98.6 (22 @ 00:10), Max: 99.1 (22 @ 16:30)  HR: 95 (22 @ 00:10) (73 - 100)  BP: 125/78 (22 @ 00:10) (116/66 - 147/79)  RR: 18 (22 @ 00:10) (16 - 19)  SpO2: 97% (22 @ 00:10) (92% - 98%)  on (O2)              Telemetry/Alarms:  NSE     MEDICATIONS  acetaminophen     Tablet .. 650 milliGRAM(s) Oral every 6 hours PRN  aMIOdarone    Tablet   Oral   aMIOdarone    Tablet 400 milliGRAM(s) Oral every 8 hours  ascorbic acid 500 milliGRAM(s) Oral daily  aspirin enteric coated 81 milliGRAM(s) Oral daily  atorvastatin 80 milliGRAM(s) Oral at bedtime  dapagliflozin 10 milliGRAM(s) Oral every 24 hours  enoxaparin Injectable 40 milliGRAM(s) SubCutaneous every 24 hours  ferrous    sulfate 325 milliGRAM(s) Oral daily  folic acid 1 milliGRAM(s) Oral daily  gabapentin 300 milliGRAM(s) Oral three times a day  insulin glargine Injectable (LANTUS) 32 Unit(s) SubCutaneous at bedtime  insulin lispro (ADMELOG) corrective regimen sliding scale   SubCutaneous three times a day before meals  insulin lispro Injectable (ADMELOG) 8 Unit(s) SubCutaneous three times a day before meals  lidocaine   4% Patch 1 Patch Transdermal daily  melatonin 5 milliGRAM(s) Oral at bedtime  metoprolol tartrate 50 milliGRAM(s) Oral two times a day  mirtazapine 15 milliGRAM(s) Oral at bedtime  oxyCODONE    IR 10 milliGRAM(s) Oral every 4 hours PRN  oxyCODONE    IR 5 milliGRAM(s) Oral every 4 hours PRN  pantoprazole    Tablet 40 milliGRAM(s) Oral daily  polyethylene glycol 3350 17 Gram(s) Oral daily  potassium chloride    Tablet ER 20 milliEquivalent(s) Oral daily  senna 2 Tablet(s) Oral at bedtime  sodium chloride 0.9% lock flush 3 milliLiter(s) IV Push every 8 hours  thiamine 100 milliGRAM(s) Oral daily  torsemide 20 milliGRAM(s) Oral <User Schedule>    PHYSICAL EXAM  Constitutional: NAD  Neuro: A+O x 3, non-focal, speech clear and intact  CV: RRR, +S1S2, no murmurs or rub  Pulm/chest: lung sounds CTA and equal bilaterally, no accessory muscle use noted  Abd: +BS, soft, NT, ND  Ext: SEGURA x 4, no C/C/E, +pedal pulses   Skin: warm, well perfused  Psych: calm, appropriate affect  Incision: Midline sternal incision open to air, no audible sternal click, RLE vein harvest site well approximated, healing appropriately  Drains: Left PTC to H2O seal draining serosaguineous fluid, no air leak, no SQ air    Intake and Output   @ : @ 07:00  --------------------------------------------------------  IN: 960 mL / OUT: 750 mL / NET: 210 mL     @ 07:  -   @ 01:57  --------------------------------------------------------  IN: 360 mL / OUT: 2120 mL / NET: -1760 mL    Weights:  Daily     Daily Weight in k.3 (2022 06:45)  Admit Wt: Drug Dosing Weight  Height (cm): 182.9 (2022 05:48)  Weight (kg): 82.6 (2022 05:48)  BMI (kg/m2): 24.7 (2022 05:48)  BSA (m2): 2.05 (2022 05:48)    All laboratory results, radiology and medications reviewed.    LABS      137  |  100  |  21.5<H>  ----------------------------<  121<H>  4.1   |  27.0  |  0.80    Ca    10.3      2022 06:08  Mg     1.9                                        10.0   12.37 )-----------( 233      ( 2022 06:08 )             30.6              CAPILLARY BLOOD GLUCOSE  POCT Blood Glucose.: 167 mg/dL (2022 21:03)  POCT Blood Glucose.: 81 mg/dL (2022 17:22)  POCT Blood Glucose.: 145 mg/dL (2022 12:00)  POCT Blood Glucose.: 125 mg/dL (2022 07:55)         < from: Xray Chest 1 View- PORTABLE-Urgent (Xray Chest 1 View- PORTABLE-Urgent .) (22 @ 13:01) >    IMPRESSION: Diminished left effusion after insertion of catheter left   chest tube.    < end of copied text >    < from: 12 Lead ECG (22 @ 04:07) >    Ventricular Rate 99 BPM    Atrial Rate 99 BPM    P-R Interval 136 ms    QRS Duration 114 ms    Q-T Interval 360 ms    QTC Calculation(Bazett) 462 ms    P Axis 30 degrees    R Axis 11 degrees    T Axis 14 degrees    Diagnosis Line Normal sinus rhythm  Low voltage QRS  Inferior infarct , age undetermined  Anterolateral infarct , age undetermined  Abnormal ECG    < end of copied text >    < from: TTE Echo Complete w/ Contrast w/ Doppler (22 @ 10:30) >    Summary:   1. Technically difficult study. Endocardial visualization was enhanced   with intravenous echo contrast.   2. The left atrium is normal in size.   3. Segmental wall motion abnormalities in Mid LAD territory.   4. Left ventricular ejection fraction, by visual estimation, is 30 to   35%. NOrmal left atrial pressures.   5. The right atrium is normal in size.   6. Normal right ventricular size and function.   7. NO significant valvular abnormality.   8. There is no evidence of pericardial effusion.    < end of copied text >

## 2022-11-30 NOTE — PROGRESS NOTE ADULT - ASSESSMENT
67 year old male patient former smoker with a medical history of HTN, HLD, type 2 DM (HA1c 7.6 on Metformin and Insulin), CAD s/p PCI to RCA 2007, ETOH cirrhosis, B12/Vit D deficiency, recent hospitalization for GI Bleed 05/2022, admitted on 11/21/22 for repeat cath which confirmed multivessel CAD. Patient underwent CABG X 3 (LIMA-LAD, SVG-OM, SVG-PDA) on 11/22/22 with Dr. Gray. Postoperative course significant for cardiogenic shock and hypotension (resolved s/p volume resuscitation, phenylephrine gtt weaned off), ABLA (stable s/p blood transfusion), hyperglycemia (titrating insulin requirements) and Left pleural effusion (s/p PTC insertion)

## 2022-11-30 NOTE — PROGRESS NOTE ADULT - SUBJECTIVE AND OBJECTIVE BOX
INTERVAL EVENTS:  Follow up diabetes management    ROS: Patient denies chest pain, SOB, abd pain, N/V.    MEDICATIONS  (STANDING):  aMIOdarone    Tablet   Oral   aMIOdarone    Tablet 400 milliGRAM(s) Oral every 8 hours  apixaban 5 milliGRAM(s) Oral every 12 hours  ascorbic acid 500 milliGRAM(s) Oral daily  aspirin enteric coated 81 milliGRAM(s) Oral daily  atorvastatin 80 milliGRAM(s) Oral at bedtime  dapagliflozin 10 milliGRAM(s) Oral every 24 hours  dextrose 5%. 1000 milliLiter(s) (50 mL/Hr) IV Continuous <Continuous>  dextrose 5%. 1000 milliLiter(s) (100 mL/Hr) IV Continuous <Continuous>  dextrose 50% Injectable 50 milliLiter(s) IV Push every 15 minutes  dextrose 50% Injectable 25 milliLiter(s) IV Push every 15 minutes  ferrous    sulfate 325 milliGRAM(s) Oral daily  folic acid 1 milliGRAM(s) Oral daily  gabapentin 300 milliGRAM(s) Oral three times a day  glucagon  Injectable 1 milliGRAM(s) IntraMuscular once  insulin glargine Injectable (LANTUS) 32 Unit(s) SubCutaneous at bedtime  insulin lispro (ADMELOG) corrective regimen sliding scale   SubCutaneous three times a day before meals  insulin lispro Injectable (ADMELOG) 8 Unit(s) SubCutaneous three times a day before meals  lidocaine   4% Patch 1 Patch Transdermal daily  melatonin 5 milliGRAM(s) Oral at bedtime  metoprolol tartrate 50 milliGRAM(s) Oral three times a day  mirtazapine 15 milliGRAM(s) Oral at bedtime  pantoprazole    Tablet 40 milliGRAM(s) Oral daily  polyethylene glycol 3350 17 Gram(s) Oral daily  potassium chloride    Tablet ER 20 milliEquivalent(s) Oral daily  senna 2 Tablet(s) Oral at bedtime  sodium chloride 0.9% lock flush 3 milliLiter(s) IV Push every 8 hours  thiamine 100 milliGRAM(s) Oral daily  torsemide 20 milliGRAM(s) Oral <User Schedule>    MEDICATIONS  (PRN):  acetaminophen     Tablet .. 650 milliGRAM(s) Oral every 6 hours PRN Mild Pain (1 - 3)  dextrose Oral Gel 15 Gram(s) Oral once PRN Blood Glucose LESS THAN 70 milliGRAM(s)/deciliter  oxyCODONE    IR 10 milliGRAM(s) Oral every 4 hours PRN Severe Pain (7 - 10)  oxyCODONE    IR 5 milliGRAM(s) Oral every 4 hours PRN Moderate Pain (4 - 6)    Allergies  No Known Allergies    Vital Signs Last 24 Hrs  T(C): 36.9 (30 Nov 2022 13:22), Max: 37.3 (29 Nov 2022 16:30)  T(F): 98.5 (30 Nov 2022 13:22), Max: 99.1 (29 Nov 2022 16:30)  HR: 87 (30 Nov 2022 13:22) (81 - 134)  BP: 105/61 (30 Nov 2022 13:22) (105/61 - 132/56)  BP(mean): --  RR: 18 (30 Nov 2022 13:22) (16 - 18)  SpO2: 96% (30 Nov 2022 13:22) (96% - 98%)    Parameters below as of 30 Nov 2022 13:22  Patient On (Oxygen Delivery Method): room air      PHYSICAL EXAM:  General: No apparent distress  Neck: Supple, trachea midline, no thyromegaly  Respiratory: Lungs clear bilaterally, normal rate, effort  Cardiac: +S1, S2, no m/r/g. Chest tube in place  GI: +BS, soft, non tender, non distended  Extremities: No peripheral edema, no pedal lesions  Neuro: A+O X3, no tremor  Pysch: Affect appropriate   Skin: No acanthosis       LABS:                        10.6   14.63 )-----------( 269      ( 30 Nov 2022 04:53 )             32.6     11-30    138  |  100  |  25.5<H>  ----------------------------<  136<H>  4.4   |  26.0  |  0.94    Ca    9.8      30 Nov 2022 04:53  Mg     2.0     11-30        POCT Blood Glucose.: 130 mg/dL (11-30-22 @ 11:59)  POCT Blood Glucose.: 159 mg/dL (11-30-22 @ 07:58)  POCT Blood Glucose.: 167 mg/dL (11-29-22 @ 21:03)  POCT Blood Glucose.: 81 mg/dL (11-29-22 @ 17:22)    Thyroid Stimulating Hormone, Serum: 1.23 uIU/mL (11-21-22 @ 14:08)  Free Thyroxine, Serum: 1.1 ng/dL (11-21-22 @ 14:08)

## 2022-12-01 LAB
ALBUMIN SERPL ELPH-MCNC: 3.3 G/DL — SIGNIFICANT CHANGE UP (ref 3.3–5.2)
ALP SERPL-CCNC: 128 U/L — HIGH (ref 40–120)
ALT FLD-CCNC: 26 U/L — SIGNIFICANT CHANGE UP
ANION GAP SERPL CALC-SCNC: 13 MMOL/L — SIGNIFICANT CHANGE UP (ref 5–17)
AST SERPL-CCNC: 33 U/L — SIGNIFICANT CHANGE UP
B PERT IGG+IGM PNL SER: ABNORMAL
BILIRUB SERPL-MCNC: 1.3 MG/DL — SIGNIFICANT CHANGE UP (ref 0.4–2)
BUN SERPL-MCNC: 26.5 MG/DL — HIGH (ref 8–20)
CALCIUM SERPL-MCNC: 10.1 MG/DL — SIGNIFICANT CHANGE UP (ref 8.4–10.5)
CHLORIDE SERPL-SCNC: 96 MMOL/L — SIGNIFICANT CHANGE UP (ref 96–108)
CO2 SERPL-SCNC: 28 MMOL/L — SIGNIFICANT CHANGE UP (ref 22–29)
COLOR FLD: ABNORMAL
CREAT SERPL-MCNC: 1 MG/DL — SIGNIFICANT CHANGE UP (ref 0.5–1.3)
EGFR: 83 ML/MIN/1.73M2 — SIGNIFICANT CHANGE UP
EOSINOPHIL # FLD: 86 % — SIGNIFICANT CHANGE UP
FLUID INTAKE SUBSTANCE CLASS: SIGNIFICANT CHANGE UP
GLUCOSE BLDC GLUCOMTR-MCNC: 118 MG/DL — HIGH (ref 70–99)
GLUCOSE BLDC GLUCOMTR-MCNC: 133 MG/DL — HIGH (ref 70–99)
GLUCOSE BLDC GLUCOMTR-MCNC: 171 MG/DL — HIGH (ref 70–99)
GLUCOSE BLDC GLUCOMTR-MCNC: 184 MG/DL — HIGH (ref 70–99)
GLUCOSE SERPL-MCNC: 123 MG/DL — HIGH (ref 70–99)
HCT VFR BLD CALC: 31.5 % — LOW (ref 39–50)
HGB BLD-MCNC: 10.3 G/DL — LOW (ref 13–17)
LYMPHOCYTES # FLD: 5 % — SIGNIFICANT CHANGE UP
MAGNESIUM SERPL-MCNC: 2 MG/DL — SIGNIFICANT CHANGE UP (ref 1.6–2.6)
MCHC RBC-ENTMCNC: 31.8 PG — SIGNIFICANT CHANGE UP (ref 27–34)
MCHC RBC-ENTMCNC: 32.7 GM/DL — SIGNIFICANT CHANGE UP (ref 32–36)
MCV RBC AUTO: 97.2 FL — SIGNIFICANT CHANGE UP (ref 80–100)
MONOS+MACROS # FLD: 2 % — SIGNIFICANT CHANGE UP
NEUTROPHILS-BODY FLUID: 7 % — SIGNIFICANT CHANGE UP
PH FLD: 8 — SIGNIFICANT CHANGE UP
PLATELET # BLD AUTO: 268 K/UL — SIGNIFICANT CHANGE UP (ref 150–400)
POTASSIUM SERPL-MCNC: 4.5 MMOL/L — SIGNIFICANT CHANGE UP (ref 3.5–5.3)
POTASSIUM SERPL-SCNC: 4.5 MMOL/L — SIGNIFICANT CHANGE UP (ref 3.5–5.3)
PROT SERPL-MCNC: 6.2 G/DL — LOW (ref 6.6–8.7)
RBC # BLD: 3.24 M/UL — LOW (ref 4.2–5.8)
RBC # FLD: 15 % — HIGH (ref 10.3–14.5)
RCV VOL RI: HIGH /UL (ref 0–0)
SODIUM SERPL-SCNC: 137 MMOL/L — SIGNIFICANT CHANGE UP (ref 135–145)
TOTAL NUCLEATED CELL COUNT, BODY FLUID: 1621 /UL — SIGNIFICANT CHANGE UP
TUBE TYPE: SIGNIFICANT CHANGE UP
WBC # BLD: 12.92 K/UL — HIGH (ref 3.8–10.5)
WBC # FLD AUTO: 12.92 K/UL — HIGH (ref 3.8–10.5)

## 2022-12-01 PROCEDURE — 32557 INSERT CATH PLEURA W/ IMAGE: CPT | Mod: LT

## 2022-12-01 PROCEDURE — 99232 SBSQ HOSP IP/OBS MODERATE 35: CPT

## 2022-12-01 PROCEDURE — 93010 ELECTROCARDIOGRAM REPORT: CPT

## 2022-12-01 PROCEDURE — 93306 TTE W/DOPPLER COMPLETE: CPT | Mod: 26

## 2022-12-01 PROCEDURE — 71250 CT THORAX DX C-: CPT | Mod: 26

## 2022-12-01 PROCEDURE — 71045 X-RAY EXAM CHEST 1 VIEW: CPT | Mod: 26,59

## 2022-12-01 RX ADMIN — ATORVASTATIN CALCIUM 80 MILLIGRAM(S): 80 TABLET, FILM COATED ORAL at 21:10

## 2022-12-01 RX ADMIN — OXYCODONE HYDROCHLORIDE 10 MILLIGRAM(S): 5 TABLET ORAL at 15:09

## 2022-12-01 RX ADMIN — Medication 81 MILLIGRAM(S): at 13:13

## 2022-12-01 RX ADMIN — Medication 50 MILLIGRAM(S): at 13:13

## 2022-12-01 RX ADMIN — OXYCODONE HYDROCHLORIDE 10 MILLIGRAM(S): 5 TABLET ORAL at 00:16

## 2022-12-01 RX ADMIN — OXYCODONE HYDROCHLORIDE 10 MILLIGRAM(S): 5 TABLET ORAL at 06:44

## 2022-12-01 RX ADMIN — Medication 1 MILLIGRAM(S): at 13:12

## 2022-12-01 RX ADMIN — PANTOPRAZOLE SODIUM 40 MILLIGRAM(S): 20 TABLET, DELAYED RELEASE ORAL at 13:32

## 2022-12-01 RX ADMIN — Medication 50 MILLIGRAM(S): at 21:10

## 2022-12-01 RX ADMIN — Medication 325 MILLIGRAM(S): at 13:13

## 2022-12-01 RX ADMIN — GABAPENTIN 300 MILLIGRAM(S): 400 CAPSULE ORAL at 05:21

## 2022-12-01 RX ADMIN — AMIODARONE HYDROCHLORIDE 400 MILLIGRAM(S): 400 TABLET ORAL at 13:13

## 2022-12-01 RX ADMIN — SODIUM CHLORIDE 3 MILLILITER(S): 9 INJECTION INTRAMUSCULAR; INTRAVENOUS; SUBCUTANEOUS at 05:19

## 2022-12-01 RX ADMIN — Medication 50 MILLIGRAM(S): at 05:20

## 2022-12-01 RX ADMIN — Medication 20 MILLIEQUIVALENT(S): at 13:12

## 2022-12-01 RX ADMIN — OXYCODONE HYDROCHLORIDE 10 MILLIGRAM(S): 5 TABLET ORAL at 13:13

## 2022-12-01 RX ADMIN — INSULIN GLARGINE 32 UNIT(S): 100 INJECTION, SOLUTION SUBCUTANEOUS at 21:10

## 2022-12-01 RX ADMIN — SODIUM CHLORIDE 3 MILLILITER(S): 9 INJECTION INTRAMUSCULAR; INTRAVENOUS; SUBCUTANEOUS at 13:21

## 2022-12-01 RX ADMIN — Medication 2: at 17:53

## 2022-12-01 RX ADMIN — OXYCODONE HYDROCHLORIDE 10 MILLIGRAM(S): 5 TABLET ORAL at 18:26

## 2022-12-01 RX ADMIN — MIRTAZAPINE 15 MILLIGRAM(S): 45 TABLET, ORALLY DISINTEGRATING ORAL at 21:09

## 2022-12-01 RX ADMIN — Medication 20 MILLIGRAM(S): at 09:32

## 2022-12-01 RX ADMIN — GABAPENTIN 300 MILLIGRAM(S): 400 CAPSULE ORAL at 21:10

## 2022-12-01 RX ADMIN — GABAPENTIN 300 MILLIGRAM(S): 400 CAPSULE ORAL at 13:31

## 2022-12-01 RX ADMIN — Medication 5 MILLIGRAM(S): at 21:11

## 2022-12-01 RX ADMIN — SENNA PLUS 2 TABLET(S): 8.6 TABLET ORAL at 21:10

## 2022-12-01 RX ADMIN — Medication 8 UNIT(S): at 12:24

## 2022-12-01 RX ADMIN — OXYCODONE HYDROCHLORIDE 10 MILLIGRAM(S): 5 TABLET ORAL at 18:11

## 2022-12-01 RX ADMIN — DAPAGLIFLOZIN 10 MILLIGRAM(S): 10 TABLET, FILM COATED ORAL at 05:21

## 2022-12-01 RX ADMIN — Medication 100 MILLIGRAM(S): at 13:12

## 2022-12-01 RX ADMIN — AMIODARONE HYDROCHLORIDE 400 MILLIGRAM(S): 400 TABLET ORAL at 05:20

## 2022-12-01 RX ADMIN — Medication 8 UNIT(S): at 08:45

## 2022-12-01 RX ADMIN — OXYCODONE HYDROCHLORIDE 10 MILLIGRAM(S): 5 TABLET ORAL at 06:14

## 2022-12-01 RX ADMIN — SODIUM CHLORIDE 3 MILLILITER(S): 9 INJECTION INTRAMUSCULAR; INTRAVENOUS; SUBCUTANEOUS at 21:07

## 2022-12-01 RX ADMIN — Medication 8 UNIT(S): at 17:53

## 2022-12-01 RX ADMIN — APIXABAN 5 MILLIGRAM(S): 2.5 TABLET, FILM COATED ORAL at 05:20

## 2022-12-01 RX ADMIN — Medication 500 MILLIGRAM(S): at 13:13

## 2022-12-01 NOTE — PROGRESS NOTE ADULT - ASSESSMENT
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    1. T2DM, a1c 7.6% - Bgs well controlled  - Continue admelog to 8 units TID with meals  - Continue lantus 32 units qhs  - Farxiga 10mg daily    2. CAD  - S/p CABG  - Care per CT surgery team    3. HLD  - Continue statin

## 2022-12-01 NOTE — PROGRESS NOTE ADULT - ASSESSMENT
67 year old male patient former smoker with a medical history of HTN, HLD, type 2 DM (HA1c 7.6 on Metformin and Insulin), CAD s/p PCI to RCA 2007, ETOH cirrhosis, B12/Vit D deficiency, recent hospitalization for GI Bleed 05/2022, admitted on 11/21/22 for repeat cath which confirmed multivessel CAD. Patient underwent CABG X 3 (LIMA-LAD, SVG-OM, SVG-PDA) on 11/22/22 with Dr. Gray. Postoperative course significant for cardiogenic shock and hypotension (resolved s/p volume resuscitation, phenylephrine gtt weaned off), ABLA (stable s/p blood transfusion), hyperglycemia (titrating insulin requirements), and Left pleural effusion (s/p pigtail catheter insertion 11/29 and removed 11/30).

## 2022-12-01 NOTE — PROGRESS NOTE ADULT - SUBJECTIVE AND OBJECTIVE BOX
INTERVAL EVENTS:  Follow up diabetes management    ROS: Patient denies chest pain, SOB, abd pain, N/V.    MEDICATIONS  (STANDING):  aMIOdarone    Tablet   Oral   aMIOdarone    Tablet 400 milliGRAM(s) Oral every 8 hours  aMIOdarone    Tablet 200 milliGRAM(s) Oral daily  apixaban 5 milliGRAM(s) Oral every 12 hours  ascorbic acid 500 milliGRAM(s) Oral daily  aspirin enteric coated 81 milliGRAM(s) Oral daily  atorvastatin 80 milliGRAM(s) Oral at bedtime  dapagliflozin 10 milliGRAM(s) Oral every 24 hours  dextrose 5%. 1000 milliLiter(s) (50 mL/Hr) IV Continuous <Continuous>  dextrose 5%. 1000 milliLiter(s) (100 mL/Hr) IV Continuous <Continuous>  dextrose 50% Injectable 50 milliLiter(s) IV Push every 15 minutes  dextrose 50% Injectable 25 milliLiter(s) IV Push every 15 minutes  ferrous    sulfate 325 milliGRAM(s) Oral daily  folic acid 1 milliGRAM(s) Oral daily  gabapentin 300 milliGRAM(s) Oral three times a day  glucagon  Injectable 1 milliGRAM(s) IntraMuscular once  insulin glargine Injectable (LANTUS) 32 Unit(s) SubCutaneous at bedtime  insulin lispro (ADMELOG) corrective regimen sliding scale   SubCutaneous three times a day before meals  insulin lispro Injectable (ADMELOG) 8 Unit(s) SubCutaneous three times a day before meals  lidocaine   4% Patch 1 Patch Transdermal daily  melatonin 5 milliGRAM(s) Oral at bedtime  metoprolol tartrate 50 milliGRAM(s) Oral three times a day  mirtazapine 15 milliGRAM(s) Oral at bedtime  pantoprazole    Tablet 40 milliGRAM(s) Oral daily  polyethylene glycol 3350 17 Gram(s) Oral daily  potassium chloride    Tablet ER 20 milliEquivalent(s) Oral daily  senna 2 Tablet(s) Oral at bedtime  sodium chloride 0.9% lock flush 3 milliLiter(s) IV Push every 8 hours  thiamine 100 milliGRAM(s) Oral daily  torsemide 20 milliGRAM(s) Oral <User Schedule>    MEDICATIONS  (PRN):  acetaminophen     Tablet .. 650 milliGRAM(s) Oral every 6 hours PRN Mild Pain (1 - 3)  dextrose Oral Gel 15 Gram(s) Oral once PRN Blood Glucose LESS THAN 70 milliGRAM(s)/deciliter  oxyCODONE    IR 10 milliGRAM(s) Oral every 4 hours PRN Severe Pain (7 - 10)  oxyCODONE    IR 5 milliGRAM(s) Oral every 4 hours PRN Moderate Pain (4 - 6)    Allergies  No Known Allergies    Vital Signs Last 24 Hrs  T(C): 36.5 (01 Dec 2022 10:34), Max: 36.9 (30 Nov 2022 13:07)  T(F): 97.7 (01 Dec 2022 10:34), Max: 98.5 (30 Nov 2022 13:07)  HR: 82 (01 Dec 2022 10:34) (74 - 98)  BP: 119/70 (01 Dec 2022 10:34) (95/55 - 130/68)  BP(mean): --  RR: 16 (01 Dec 2022 10:34) (16 - 18)  SpO2: 96% (01 Dec 2022 10:34) (95% - 96%)    Parameters below as of 01 Dec 2022 10:34  Patient On (Oxygen Delivery Method): room air      PHYSICAL EXAM:  General: No apparent distress  Neck: Supple, trachea midline, no thyromegaly  Respiratory: Lungs clear bilaterally, normal rate, effort  Cardiac: +S1, S2, no m/r/g  GI: +BS, soft, non tender, non distended  Extremities: No peripheral edema, no pedal lesions  Neuro: A+O X3, no tremor  Pysch: Affect appropriate   Skin: No acanthosis       LABS:                        10.3   12.92 )-----------( 268      ( 01 Dec 2022 05:58 )             31.5     12-01    137  |  96  |  26.5<H>  ----------------------------<  123<H>  4.5   |  28.0  |  1.00    Ca    10.1      01 Dec 2022 05:58  Mg     2.0     12-01    TPro  6.2<L>  /  Alb  3.3  /  TBili  1.3  /  DBili  x   /  AST  33  /  ALT  26  /  AlkPhos  128<H>  12-01      POCT Blood Glucose.: 118 mg/dL (12-01-22 @ 08:09)  POCT Blood Glucose.: 184 mg/dL (11-30-22 @ 21:26)  POCT Blood Glucose.: 96 mg/dL (11-30-22 @ 16:56)  POCT Blood Glucose.: 130 mg/dL (11-30-22 @ 11:59)    Thyroid Stimulating Hormone, Serum: 1.23 uIU/mL (11-21-22 @ 14:08)  Free Thyroxine, Serum: 1.1 ng/dL (11-21-22 @ 14:08)

## 2022-12-01 NOTE — PROGRESS NOTE ADULT - SUBJECTIVE AND OBJECTIVE BOX
POD #9 s/p CABG X 3 (LIMA-LAD, SVG-OM, SVG-RPDA)    PAST MEDICAL & SURGICAL HISTORY:  Pancreatitis  Hypertension  Myocardial infarct  Alcohol abuse  Colon cancer  History of colon resection  History of heart surgery  cardiac stent    FAMILY HISTORY:  FH: prostate cancer (Father)  Family history of atherosclerosis (Mother)    Brief Hospital Course: 67 year old male patient former smoker with a medical history of HTN, HLD, type 2 DM (HA1c 7.6 on Metformin and Insulin), CAD s/p PCI to RCA 2007, ETOH cirrhosis, B12/Vit D deficiency, recent hospitalization for GI Bleed 05/2022, admitted on 11/21/22 for repeat cath which confirmed multivessel CAD. Patient underwent CABG X 3 (LIMA-LAD, SVG-OM, SVG-PDA) on 11/22/22 with Dr. Gray. Postoperative course significant for cardiogenic shock and hypotension (resolved s/p volume resuscitation, phenylephrine gtt weaned off), ABLA (stable s/p blood transfusion), hyperglycemia (titrating insulin requirements), and Left pleural effusion (s/p pigtail catheter insertion 11/29 and removed 11/30).    Significant recent/past 24 hr events: No overnight events reported.    Subjective: Patient lying in bed in NAD. +Tolerating diet. +Passing BMs since surgery. +Pain currently controlled. Denies fevers, chills, lightheadedness, dizziness, HA, CP, palpitations, SOB, cough, abdominal pain, N/V, diarrhea, numbness/tingling in extremities, or any other acute complaints. ROS negative x 10 systems except as noted above.    MEDICATIONS  (STANDING):  aMIOdarone    Tablet 400 milliGRAM(s) Oral every 8 hours  aMIOdarone    Tablet 200 milliGRAM(s) Oral daily  apixaban 5 milliGRAM(s) Oral every 12 hours  ascorbic acid 500 milliGRAM(s) Oral daily  aspirin enteric coated 81 milliGRAM(s) Oral daily  atorvastatin 80 milliGRAM(s) Oral at bedtime  dapagliflozin 10 milliGRAM(s) Oral every 24 hours  ferrous    sulfate 325 milliGRAM(s) Oral daily  folic acid 1 milliGRAM(s) Oral daily  gabapentin 300 milliGRAM(s) Oral three times a day  insulin glargine Injectable (LANTUS) 32 Unit(s) SubCutaneous at bedtime  insulin lispro (ADMELOG) corrective regimen sliding scale   SubCutaneous three times a day before meals  insulin lispro Injectable (ADMELOG) 8 Unit(s) SubCutaneous three times a day before meals  lidocaine   4% Patch 1 Patch Transdermal daily  melatonin 5 milliGRAM(s) Oral at bedtime  metoprolol tartrate 50 milliGRAM(s) Oral three times a day  mirtazapine 15 milliGRAM(s) Oral at bedtime  pantoprazole    Tablet 40 milliGRAM(s) Oral daily  polyethylene glycol 3350 17 Gram(s) Oral daily  potassium chloride    Tablet ER 20 milliEquivalent(s) Oral daily  senna 2 Tablet(s) Oral at bedtime  sodium chloride 0.9% lock flush 3 milliLiter(s) IV Push every 8 hours  thiamine 100 milliGRAM(s) Oral daily  torsemide 20 milliGRAM(s) Oral <User Schedule>    MEDICATIONS  (PRN):  acetaminophen     Tablet .. 650 milliGRAM(s) Oral every 6 hours PRN Mild Pain (1 - 3)  dextrose Oral Gel 15 Gram(s) Oral once PRN Blood Glucose LESS THAN 70 milliGRAM(s)/deciliter  oxyCODONE    IR 5 milliGRAM(s) Oral every 4 hours PRN Moderate Pain (4 - 6)  oxyCODONE    IR 10 milliGRAM(s) Oral every 4 hours PRN Severe Pain (7 - 10)    Allergies: No Known Allergies    Vitals   T(C): 36.7 (01 Dec 2022 00:00), Max: 37.1 (30 Nov 2022 05:19)  T(F): 98 (01 Dec 2022 00:00), Max: 98.8 (30 Nov 2022 05:19)  HR: 95 (01 Dec 2022 00:00) (74 - 134)  BP: 101/59 (01 Dec 2022 00:00) (95/55 - 121/78)  RR: 18 (01 Dec 2022 00:00) (18 - 18)  SpO2: 95% (01 Dec 2022 00:00) (95% - 96%)  O2 Parameters below as of 01 Dec 2022 00:00  Patient On (Oxygen Delivery Method): room air    I&O's Detail    29 Nov 2022 07:01  -  30 Nov 2022 07:00  --------------------------------------------------------  IN:    Oral Fluid: 660 mL  Total IN: 660 mL    OUT:    Chest Tube (mL): 1430 mL    Voided (mL): 1400 mL  Total OUT: 2830 mL    Total NET: -2170 mL      30 Nov 2022 07:01  -  01 Dec 2022 03:21  --------------------------------------------------------  IN:    Oral Fluid: 240 mL  Total IN: 240 mL    OUT:  Total OUT: 0 mL    Total NET: 240 mL    Physical Exam  Neuro: A+O x 3, non-focal, speech clear and intact  HEENT:  NCAT, No conjuctival edema or icterus, no thrush.    Neck:  Supple, trachea midline  Pulm: +Diminished BSs Left base, no accessory muscle use noted  CV: regular rate, regular rhythm, +S1S2, no murmur noted  Abd: soft, NT, ND, + BS  Ext: SEGURA x 4, trace LLE edema, +1/+2 RLE edema, no cyanosis, distal motor/neuro/circ intact  Skin: warm, dry, perfused  Incisions: midsternal incision open to air C/D/I, sternum stable, RLE harvest site with minimal serosanguinous drainage    LABS                        10.6   14.63 )-----------( 269      ( 30 Nov 2022 04:53 )             32.6     11-30    138  |  100  |  25.5<H>  ----------------------------<  136<H>  4.4   |  26.0  |  0.94    Ca    9.8      30 Nov 2022 04:53  Mg     2.0     11-30    POCT Blood Glucose.: 184 mg/dL (11-30-22 @ 21:26)  POCT Blood Glucose.: 96 mg/dL (11-30-22 @ 16:56)  POCT Blood Glucose.: 130 mg/dL (11-30-22 @ 11:59)  POCT Blood Glucose.: 159 mg/dL (11-30-22 @ 07:58)      Last CXR:  < from: Xray Chest 1 View- PORTABLE-Routine (Xray Chest 1 View- PORTABLE-Routine in AM.) (11.30.22 @ 07:09) >  Impression:  Pigtail chest tube at left costophrenic angle. Mild atelectasis left lower lobe and minimal residual effusion. No pneumothorax.  < end of copied text >

## 2022-12-01 NOTE — PROGRESS NOTE ADULT - SUBJECTIVE AND OBJECTIVE BOX
IR Post Procedure Note    Diagnosis: Left Pleural Effusion    Procedure: Left Chest Tube Placement    : Mohammad Halaibeh MD   Assistant: Reagan DA SILVA    Contrast: None    Anesthesia: 1% Lidocaine Subcutaneous    Estimated Blood Loss: Less than 10cc    Specimens: Identified, Labeled, Confirmed and Sent to Lab.    Complications: No Immediate Complications    Anticoagulation: Resume in 24 Hours    Findings & Plan: 10Fr Chest Tube placed in Left chest wall under US guidance. Chest tube placed to water seal drainage, secured w sutures.    Please call Interventional Radiology with any questions, concerns, or issues.

## 2022-12-02 LAB
ALBUMIN FLD-MCNC: 2.7 G/DL — SIGNIFICANT CHANGE UP
ALBUMIN SERPL ELPH-MCNC: 3.1 G/DL — LOW (ref 3.3–5.2)
ALP SERPL-CCNC: 142 U/L — HIGH (ref 40–120)
ALT FLD-CCNC: 41 U/L — HIGH
ANION GAP SERPL CALC-SCNC: 10 MMOL/L — SIGNIFICANT CHANGE UP (ref 5–17)
AST SERPL-CCNC: 71 U/L — HIGH
BILIRUB SERPL-MCNC: 1.1 MG/DL — SIGNIFICANT CHANGE UP (ref 0.4–2)
BUN SERPL-MCNC: 25 MG/DL — HIGH (ref 8–20)
CALCIUM SERPL-MCNC: 9.6 MG/DL — SIGNIFICANT CHANGE UP (ref 8.4–10.5)
CHLORIDE SERPL-SCNC: 98 MMOL/L — SIGNIFICANT CHANGE UP (ref 96–108)
CO2 SERPL-SCNC: 29 MMOL/L — SIGNIFICANT CHANGE UP (ref 22–29)
CREAT SERPL-MCNC: 0.95 MG/DL — SIGNIFICANT CHANGE UP (ref 0.5–1.3)
EGFR: 88 ML/MIN/1.73M2 — SIGNIFICANT CHANGE UP
GLUCOSE BLDC GLUCOMTR-MCNC: 121 MG/DL — HIGH (ref 70–99)
GLUCOSE BLDC GLUCOMTR-MCNC: 121 MG/DL — HIGH (ref 70–99)
GLUCOSE BLDC GLUCOMTR-MCNC: 139 MG/DL — HIGH (ref 70–99)
GLUCOSE BLDC GLUCOMTR-MCNC: 159 MG/DL — HIGH (ref 70–99)
GLUCOSE FLD-MCNC: 125 MG/DL — SIGNIFICANT CHANGE UP
GLUCOSE SERPL-MCNC: 126 MG/DL — HIGH (ref 70–99)
GRAM STN FLD: SIGNIFICANT CHANGE UP
HCT VFR BLD CALC: 29.4 % — LOW (ref 39–50)
HGB BLD-MCNC: 9.6 G/DL — LOW (ref 13–17)
LDH SERPL L TO P-CCNC: 1291 U/L — SIGNIFICANT CHANGE UP
MAGNESIUM SERPL-MCNC: 1.9 MG/DL — SIGNIFICANT CHANGE UP (ref 1.6–2.6)
MCHC RBC-ENTMCNC: 31.9 PG — SIGNIFICANT CHANGE UP (ref 27–34)
MCHC RBC-ENTMCNC: 32.7 GM/DL — SIGNIFICANT CHANGE UP (ref 32–36)
MCV RBC AUTO: 97.7 FL — SIGNIFICANT CHANGE UP (ref 80–100)
PLATELET # BLD AUTO: 286 K/UL — SIGNIFICANT CHANGE UP (ref 150–400)
POTASSIUM SERPL-MCNC: 3.8 MMOL/L — SIGNIFICANT CHANGE UP (ref 3.5–5.3)
POTASSIUM SERPL-SCNC: 3.8 MMOL/L — SIGNIFICANT CHANGE UP (ref 3.5–5.3)
PROT FLD-MCNC: 4.7 G/DL — SIGNIFICANT CHANGE UP
PROT SERPL-MCNC: 5.8 G/DL — LOW (ref 6.6–8.7)
RBC # BLD: 3.01 M/UL — LOW (ref 4.2–5.8)
RBC # FLD: 15 % — HIGH (ref 10.3–14.5)
SODIUM SERPL-SCNC: 137 MMOL/L — SIGNIFICANT CHANGE UP (ref 135–145)
SPECIMEN SOURCE: SIGNIFICANT CHANGE UP
WBC # BLD: 12.67 K/UL — HIGH (ref 3.8–10.5)
WBC # FLD AUTO: 12.67 K/UL — HIGH (ref 3.8–10.5)

## 2022-12-02 PROCEDURE — 71045 X-RAY EXAM CHEST 1 VIEW: CPT | Mod: 26

## 2022-12-02 PROCEDURE — 99232 SBSQ HOSP IP/OBS MODERATE 35: CPT

## 2022-12-02 PROCEDURE — 93308 TTE F-UP OR LMTD: CPT | Mod: 26

## 2022-12-02 RX ORDER — APIXABAN 2.5 MG/1
2.5 TABLET, FILM COATED ORAL
Refills: 0 | Status: DISCONTINUED | OUTPATIENT
Start: 2022-12-02 | End: 2022-12-11

## 2022-12-02 RX ADMIN — OXYCODONE HYDROCHLORIDE 10 MILLIGRAM(S): 5 TABLET ORAL at 19:03

## 2022-12-02 RX ADMIN — OXYCODONE HYDROCHLORIDE 10 MILLIGRAM(S): 5 TABLET ORAL at 12:21

## 2022-12-02 RX ADMIN — Medication 8 UNIT(S): at 12:59

## 2022-12-02 RX ADMIN — Medication 325 MILLIGRAM(S): at 08:10

## 2022-12-02 RX ADMIN — GABAPENTIN 300 MILLIGRAM(S): 400 CAPSULE ORAL at 21:40

## 2022-12-02 RX ADMIN — MIRTAZAPINE 15 MILLIGRAM(S): 45 TABLET, ORALLY DISINTEGRATING ORAL at 21:40

## 2022-12-02 RX ADMIN — GABAPENTIN 300 MILLIGRAM(S): 400 CAPSULE ORAL at 13:05

## 2022-12-02 RX ADMIN — OXYCODONE HYDROCHLORIDE 10 MILLIGRAM(S): 5 TABLET ORAL at 00:08

## 2022-12-02 RX ADMIN — Medication 5 MILLIGRAM(S): at 21:40

## 2022-12-02 RX ADMIN — OXYCODONE HYDROCHLORIDE 10 MILLIGRAM(S): 5 TABLET ORAL at 22:24

## 2022-12-02 RX ADMIN — OXYCODONE HYDROCHLORIDE 5 MILLIGRAM(S): 5 TABLET ORAL at 06:20

## 2022-12-02 RX ADMIN — Medication 20 MILLIGRAM(S): at 11:22

## 2022-12-02 RX ADMIN — Medication 2: at 12:59

## 2022-12-02 RX ADMIN — Medication 50 MILLIGRAM(S): at 13:05

## 2022-12-02 RX ADMIN — Medication 8 UNIT(S): at 08:13

## 2022-12-02 RX ADMIN — Medication 81 MILLIGRAM(S): at 08:11

## 2022-12-02 RX ADMIN — Medication 500 MILLIGRAM(S): at 08:09

## 2022-12-02 RX ADMIN — APIXABAN 2.5 MILLIGRAM(S): 2.5 TABLET, FILM COATED ORAL at 17:52

## 2022-12-02 RX ADMIN — ATORVASTATIN CALCIUM 80 MILLIGRAM(S): 80 TABLET, FILM COATED ORAL at 21:40

## 2022-12-02 RX ADMIN — GABAPENTIN 300 MILLIGRAM(S): 400 CAPSULE ORAL at 05:20

## 2022-12-02 RX ADMIN — Medication 50 MILLIGRAM(S): at 05:20

## 2022-12-02 RX ADMIN — Medication 1 MILLIGRAM(S): at 08:09

## 2022-12-02 RX ADMIN — OXYCODONE HYDROCHLORIDE 10 MILLIGRAM(S): 5 TABLET ORAL at 23:20

## 2022-12-02 RX ADMIN — Medication 50 MILLIGRAM(S): at 21:41

## 2022-12-02 RX ADMIN — SODIUM CHLORIDE 3 MILLILITER(S): 9 INJECTION INTRAMUSCULAR; INTRAVENOUS; SUBCUTANEOUS at 05:18

## 2022-12-02 RX ADMIN — SODIUM CHLORIDE 3 MILLILITER(S): 9 INJECTION INTRAMUSCULAR; INTRAVENOUS; SUBCUTANEOUS at 21:36

## 2022-12-02 RX ADMIN — SENNA PLUS 2 TABLET(S): 8.6 TABLET ORAL at 21:40

## 2022-12-02 RX ADMIN — OXYCODONE HYDROCHLORIDE 10 MILLIGRAM(S): 5 TABLET ORAL at 01:00

## 2022-12-02 RX ADMIN — PANTOPRAZOLE SODIUM 40 MILLIGRAM(S): 20 TABLET, DELAYED RELEASE ORAL at 08:10

## 2022-12-02 RX ADMIN — INSULIN GLARGINE 32 UNIT(S): 100 INJECTION, SOLUTION SUBCUTANEOUS at 21:40

## 2022-12-02 RX ADMIN — SODIUM CHLORIDE 3 MILLILITER(S): 9 INJECTION INTRAMUSCULAR; INTRAVENOUS; SUBCUTANEOUS at 13:02

## 2022-12-02 RX ADMIN — AMIODARONE HYDROCHLORIDE 200 MILLIGRAM(S): 400 TABLET ORAL at 05:20

## 2022-12-02 RX ADMIN — DAPAGLIFLOZIN 10 MILLIGRAM(S): 10 TABLET, FILM COATED ORAL at 05:20

## 2022-12-02 RX ADMIN — OXYCODONE HYDROCHLORIDE 5 MILLIGRAM(S): 5 TABLET ORAL at 05:20

## 2022-12-02 RX ADMIN — Medication 8 UNIT(S): at 17:53

## 2022-12-02 RX ADMIN — Medication 100 MILLIGRAM(S): at 08:09

## 2022-12-02 RX ADMIN — OXYCODONE HYDROCHLORIDE 10 MILLIGRAM(S): 5 TABLET ORAL at 18:03

## 2022-12-02 RX ADMIN — OXYCODONE HYDROCHLORIDE 10 MILLIGRAM(S): 5 TABLET ORAL at 11:21

## 2022-12-02 RX ADMIN — Medication 20 MILLIEQUIVALENT(S): at 11:21

## 2022-12-02 NOTE — PROGRESS NOTE ADULT - SUBJECTIVE AND OBJECTIVE BOX
INTERVAL EVENTS:  Follow up diabetes management    ROS: Patient denies chest pain, SOB, abd pain, N/V.    MEDICATIONS  (STANDING):  aMIOdarone    Tablet   Oral   aMIOdarone    Tablet 200 milliGRAM(s) Oral daily  ascorbic acid 500 milliGRAM(s) Oral daily  aspirin enteric coated 81 milliGRAM(s) Oral daily  atorvastatin 80 milliGRAM(s) Oral at bedtime  dapagliflozin 10 milliGRAM(s) Oral every 24 hours  dextrose 5%. 1000 milliLiter(s) (50 mL/Hr) IV Continuous <Continuous>  dextrose 5%. 1000 milliLiter(s) (100 mL/Hr) IV Continuous <Continuous>  dextrose 50% Injectable 50 milliLiter(s) IV Push every 15 minutes  dextrose 50% Injectable 25 milliLiter(s) IV Push every 15 minutes  ferrous    sulfate 325 milliGRAM(s) Oral daily  folic acid 1 milliGRAM(s) Oral daily  gabapentin 300 milliGRAM(s) Oral three times a day  glucagon  Injectable 1 milliGRAM(s) IntraMuscular once  insulin glargine Injectable (LANTUS) 32 Unit(s) SubCutaneous at bedtime  insulin lispro (ADMELOG) corrective regimen sliding scale   SubCutaneous three times a day before meals  insulin lispro Injectable (ADMELOG) 8 Unit(s) SubCutaneous three times a day before meals  lidocaine   4% Patch 1 Patch Transdermal daily  melatonin 5 milliGRAM(s) Oral at bedtime  metoprolol tartrate 50 milliGRAM(s) Oral three times a day  mirtazapine 15 milliGRAM(s) Oral at bedtime  pantoprazole    Tablet 40 milliGRAM(s) Oral daily  polyethylene glycol 3350 17 Gram(s) Oral daily  potassium chloride    Tablet ER 20 milliEquivalent(s) Oral daily  senna 2 Tablet(s) Oral at bedtime  sodium chloride 0.9% lock flush 3 milliLiter(s) IV Push every 8 hours  thiamine 100 milliGRAM(s) Oral daily  torsemide 20 milliGRAM(s) Oral <User Schedule>    MEDICATIONS  (PRN):  acetaminophen     Tablet .. 650 milliGRAM(s) Oral every 6 hours PRN Mild Pain (1 - 3)  dextrose Oral Gel 15 Gram(s) Oral once PRN Blood Glucose LESS THAN 70 milliGRAM(s)/deciliter  oxyCODONE    IR 10 milliGRAM(s) Oral every 4 hours PRN Severe Pain (7 - 10)  oxyCODONE    IR 5 milliGRAM(s) Oral every 4 hours PRN Moderate Pain (4 - 6)    Allergies  No Known Allergies    Vital Signs Last 24 Hrs  T(C): 36.7 (02 Dec 2022 07:56), Max: 36.7 (01 Dec 2022 17:19)  T(F): 98 (02 Dec 2022 07:56), Max: 98 (01 Dec 2022 17:19)  HR: 69 (02 Dec 2022 07:56) (69 - 89)  BP: 110/69 (02 Dec 2022 07:56) (110/69 - 134/71)  BP(mean): --  RR: 18 (02 Dec 2022 07:56) (18 - 18)  SpO2: 100% (02 Dec 2022 07:56) (97% - 100%)    Parameters below as of 02 Dec 2022 07:56  Patient On (Oxygen Delivery Method): room air      PHYSICAL EXAM:  General: No apparent distress  Neck: Supple, trachea midline, no thyromegaly  Respiratory: Lungs clear bilaterally, normal rate, effort  Cardiac: +S1, S2, no m/r/g  GI: +BS, soft, non tender, non distended  Extremities: No peripheral edema, no pedal lesions  Neuro: A+O X3, no tremor  Pysch: Affect appropriate   Skin: No acanthosis       LABS:                        9.6    12.67 )-----------( 286      ( 02 Dec 2022 04:54 )             29.4     12-02    137  |  98  |  25.0<H>  ----------------------------<  126<H>  3.8   |  29.0  |  0.95    Ca    9.6      02 Dec 2022 04:54  Mg     1.9     12-02    TPro  5.8<L>  /  Alb  3.1<L>  /  TBili  1.1  /  DBili  x   /  AST  71<H>  /  ALT  41<H>  /  AlkPhos  142<H>  12-02      POCT Blood Glucose.: 139 mg/dL (12-02-22 @ 08:00)  POCT Blood Glucose.: 184 mg/dL (12-01-22 @ 21:00)  POCT Blood Glucose.: 171 mg/dL (12-01-22 @ 17:06)  POCT Blood Glucose.: 133 mg/dL (12-01-22 @ 12:02)    Thyroid Stimulating Hormone, Serum: 1.23 uIU/mL (11-21-22 @ 14:08)  Free Thyroxine, Serum: 1.1 ng/dL (11-21-22 @ 14:08)

## 2022-12-02 NOTE — PROGRESS NOTE ADULT - ASSESSMENT
67 year old male patient former smoker with a medical history of HTN, HLD, type 2 DM (HA1c 7.6 on Metformin and Insulin), CAD s/p PCI to RCA 2007, ETOH cirrhosis, B12/Vit D deficiency, recent hospitalization for GI Bleed 05/2022, admitted on 11/21/22 for repeat cath which confirmed multivessel CAD. Patient underwent CABG X 3 (LIMA-LAD, SVG-OM, SVG-PDA) on 11/22/22 with Dr. Gray. Postoperative course significant for cardiogenic shock and hypotension (resolved s/p volume resuscitation, phenylephrine gtt weaned off), ABLA (stable s/p blood transfusion), hyperglycemia (titrating insulin requirements), and Left pleural effusion (s/p pigtail catheter insertion 11/29, removed 11/30, with residual effusion noted on CT chest 12/1 and IR pigtail placed).

## 2022-12-02 NOTE — PROGRESS NOTE ADULT - SUBJECTIVE AND OBJECTIVE BOX
POD #10 s/p CABG X 3 (LIMA-LAD, SVG-OM, SVG-RPDA)    PAST MEDICAL & SURGICAL HISTORY:  Pancreatitis  Hypertension  Myocardial infarct  Alcohol abuse  Colon cancer  History of colon resection  History of heart surgery  cardiac stent    FAMILY HISTORY:  FH: prostate cancer (Father)  Family history of atherosclerosis (Mother)    Brief Hospital Course: 67 year old male patient former smoker with a medical history of HTN, HLD, type 2 DM (HA1c 7.6 on Metformin and Insulin), CAD s/p PCI to RCA 2007, ETOH cirrhosis, B12/Vit D deficiency, recent hospitalization for GI Bleed 05/2022, admitted on 11/21/22 for repeat cath which confirmed multivessel CAD. Patient underwent CABG X 3 (LIMA-LAD, SVG-OM, SVG-PDA) on 11/22/22 with Dr. Gray. Postoperative course significant for cardiogenic shock and hypotension (resolved s/p volume resuscitation, phenylephrine gtt weaned off), ABLA (stable s/p blood transfusion), hyperglycemia (titrating insulin requirements), and Left pleural effusion (s/p pigtail catheter insertion 11/29, removed 11/30, with residual effusion noted on CT chest 12/1 and IR pigtail placed).    Significant recent/past 24 hr events: No overnight events reported.    Subjective: Patient lying in bed in NAD. +Tolerating diet. +Passing BMs since surgery. +Pain currently controlled. Denies fevers, chills, lightheadedness, dizziness, HA, CP, palpitations, SOB, cough, abdominal pain, N/V, diarrhea, numbness/tingling in extremities, or any other acute complaints. ROS negative x 10 systems except as noted above.    MEDICATIONS  (STANDING):  aMIOdarone    Tablet 200 milliGRAM(s) Oral daily  ascorbic acid 500 milliGRAM(s) Oral daily  aspirin enteric coated 81 milliGRAM(s) Oral daily  atorvastatin 80 milliGRAM(s) Oral at bedtime  dapagliflozin 10 milliGRAM(s) Oral every 24 hours  ferrous    sulfate 325 milliGRAM(s) Oral daily  folic acid 1 milliGRAM(s) Oral daily  gabapentin 300 milliGRAM(s) Oral three times a day  insulin glargine Injectable (LANTUS) 32 Unit(s) SubCutaneous at bedtime  insulin lispro (ADMELOG) corrective regimen sliding scale   SubCutaneous three times a day before meals  insulin lispro Injectable (ADMELOG) 8 Unit(s) SubCutaneous three times a day before meals  lidocaine   4% Patch 1 Patch Transdermal daily  melatonin 5 milliGRAM(s) Oral at bedtime  metoprolol tartrate 50 milliGRAM(s) Oral three times a day  mirtazapine 15 milliGRAM(s) Oral at bedtime  pantoprazole    Tablet 40 milliGRAM(s) Oral daily  polyethylene glycol 3350 17 Gram(s) Oral daily  potassium chloride    Tablet ER 20 milliEquivalent(s) Oral daily  senna 2 Tablet(s) Oral at bedtime  sodium chloride 0.9% lock flush 3 milliLiter(s) IV Push every 8 hours  thiamine 100 milliGRAM(s) Oral daily  torsemide 20 milliGRAM(s) Oral <User Schedule>    MEDICATIONS  (PRN):  acetaminophen     Tablet .. 650 milliGRAM(s) Oral every 6 hours PRN Mild Pain (1 - 3)  dextrose Oral Gel 15 Gram(s) Oral once PRN Blood Glucose LESS THAN 70 milliGRAM(s)/deciliter  oxyCODONE    IR 10 milliGRAM(s) Oral every 4 hours PRN Severe Pain (7 - 10)  oxyCODONE    IR 5 milliGRAM(s) Oral every 4 hours PRN Moderate Pain (4 - 6)    Allergies: No Known Allergies    Vitals   T(C): 36.6 (02 Dec 2022 00:00), Max: 36.8 (01 Dec 2022 05:00)  T(F): 97.9 (02 Dec 2022 00:00), Max: 98.2 (01 Dec 2022 05:00)  HR: 85 (02 Dec 2022 00:00) (82 - 98)  BP: 127/62 (02 Dec 2022 00:00) (117/58 - 134/71)  RR: 18 (02 Dec 2022 00:00) (16 - 18)  SpO2: 97% (02 Dec 2022 00:00) (95% - 98%)  O2 Parameters below as of 02 Dec 2022 00:00  Patient On (Oxygen Delivery Method): room air    I&O's Detail    30 Nov 2022 07:01  -  01 Dec 2022 07:00  --------------------------------------------------------  IN:    Oral Fluid: 240 mL  Total IN: 240 mL    OUT:  Total OUT: 0 mL    Total NET: 240 mL      01 Dec 2022 07:01  -  02 Dec 2022 04:53  --------------------------------------------------------  IN:    Oral Fluid: 380 mL  Total IN: 380 mL    OUT:    Chest Tube (mL): 312 mL    Voided (mL): 1050 mL  Total OUT: 1362 mL    Total NET: -982 mL    Physical Exam  Neuro: A+O x 3, non-focal, speech clear and intact  HEENT:  NCAT, No conjuctival edema or icterus, no thrush.    Neck:  Supple, trachea midline  Pulm: +Diminished BSs Left base, no accessory muscle use noted  Chest: Left pleural pigtail catheter to waterseal with dressing C/D/I, no air leak or subcutaneous emphysema appreciated  CV: regular rate, regular rhythm, +S1S2, no murmur noted  Abd: soft, NT, ND, + BS  Ext: SEGURA x 4, trace LLE edema, +1 RLE edema, no cyanosis, distal motor/neuro/circ intact  Skin: warm, dry, perfused  Incisions: midsternal incision open to air C/D/I, sternum stable, RLE harvest site with minimal serosanguinous drainage    LABS                        10.3   12.92 )-----------( 268      ( 01 Dec 2022 05:58 )             31.5     12-01    137  |  96  |  26.5<H>  ----------------------------<  123<H>  4.5   |  28.0  |  1.00    Ca    10.1      01 Dec 2022 05:58  Mg     2.0     12-01    TPro  6.2<L>  /  Alb  3.3  /  TBili  1.3  /  DBili  x   /  AST  33  /  ALT  26  /  AlkPhos  128<H>  12-01    POCT Blood Glucose.: 184 mg/dL (12-01-22 @ 21:00)  POCT Blood Glucose.: 171 mg/dL (12-01-22 @ 17:06)  POCT Blood Glucose.: 133 mg/dL (12-01-22 @ 12:02)  POCT Blood Glucose.: 118 mg/dL (12-01-22 @ 08:09)      CT Chest:  < from: CT Chest No Cont (12.01.22 @ 13:02) >  IMPRESSION:  Status post CABG.  Multiloculated left hydropneumothorax with minimal air component, and most fluid concentrated in the anterior pleural space, similar to recent chest x-rays from today abdomen one day prior.  1 cm right upper lobe groundglass nodule. 3 month follow-up is recommended to see if this persists.  Acute mildly displaced right third and fourth rib fractures.  < end of copied text >

## 2022-12-02 NOTE — PROGRESS NOTE ADULT - ASSESSMENT
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 Patient was admitted to [facility] initially in May 2022 for GI bleed and found to have EF 30-35%, and multivessel disease but CABG deferred due to hx of alcoholism and patient transferred to rehab. Consult for diabetes management.     1. T2DM, a1c 7.6%  - Bgs well controlled  - Continue admelog 8 units tid with meals  - Continue lantus 32 units qhs  - Farxiga 10mg daily    2. CAD  - S/p CABG, care per primary team    3. HLD  - Continue statin

## 2022-12-03 LAB
ALBUMIN SERPL ELPH-MCNC: 2.9 G/DL — LOW (ref 3.3–5.2)
ALP SERPL-CCNC: 181 U/L — HIGH (ref 40–120)
ALT FLD-CCNC: 53 U/L — HIGH
ANION GAP SERPL CALC-SCNC: 13 MMOL/L — SIGNIFICANT CHANGE UP (ref 5–17)
AST SERPL-CCNC: 85 U/L — HIGH
BILIRUB SERPL-MCNC: 1.2 MG/DL — SIGNIFICANT CHANGE UP (ref 0.4–2)
BUN SERPL-MCNC: 24.1 MG/DL — HIGH (ref 8–20)
CALCIUM SERPL-MCNC: 9.5 MG/DL — SIGNIFICANT CHANGE UP (ref 8.4–10.5)
CHLORIDE SERPL-SCNC: 95 MMOL/L — LOW (ref 96–108)
CO2 SERPL-SCNC: 28 MMOL/L — SIGNIFICANT CHANGE UP (ref 22–29)
CREAT SERPL-MCNC: 1.07 MG/DL — SIGNIFICANT CHANGE UP (ref 0.5–1.3)
EGFR: 77 ML/MIN/1.73M2 — SIGNIFICANT CHANGE UP
GLUCOSE BLDC GLUCOMTR-MCNC: 110 MG/DL — HIGH (ref 70–99)
GLUCOSE BLDC GLUCOMTR-MCNC: 124 MG/DL — HIGH (ref 70–99)
GLUCOSE BLDC GLUCOMTR-MCNC: 134 MG/DL — HIGH (ref 70–99)
GLUCOSE BLDC GLUCOMTR-MCNC: 173 MG/DL — HIGH (ref 70–99)
GLUCOSE SERPL-MCNC: 119 MG/DL — HIGH (ref 70–99)
HCT VFR BLD CALC: 28.9 % — LOW (ref 39–50)
HGB BLD-MCNC: 9.4 G/DL — LOW (ref 13–17)
MAGNESIUM SERPL-MCNC: 1.7 MG/DL — SIGNIFICANT CHANGE UP (ref 1.6–2.6)
MCHC RBC-ENTMCNC: 31.2 PG — SIGNIFICANT CHANGE UP (ref 27–34)
MCHC RBC-ENTMCNC: 32.5 GM/DL — SIGNIFICANT CHANGE UP (ref 32–36)
MCV RBC AUTO: 96 FL — SIGNIFICANT CHANGE UP (ref 80–100)
PLATELET # BLD AUTO: 296 K/UL — SIGNIFICANT CHANGE UP (ref 150–400)
POTASSIUM SERPL-MCNC: 3.5 MMOL/L — SIGNIFICANT CHANGE UP (ref 3.5–5.3)
POTASSIUM SERPL-SCNC: 3.5 MMOL/L — SIGNIFICANT CHANGE UP (ref 3.5–5.3)
PROT SERPL-MCNC: 5.9 G/DL — LOW (ref 6.6–8.7)
RBC # BLD: 3.01 M/UL — LOW (ref 4.2–5.8)
RBC # FLD: 14.8 % — HIGH (ref 10.3–14.5)
SODIUM SERPL-SCNC: 136 MMOL/L — SIGNIFICANT CHANGE UP (ref 135–145)
WBC # BLD: 12.98 K/UL — HIGH (ref 3.8–10.5)
WBC # FLD AUTO: 12.98 K/UL — HIGH (ref 3.8–10.5)

## 2022-12-03 PROCEDURE — 71045 X-RAY EXAM CHEST 1 VIEW: CPT | Mod: 26,76

## 2022-12-03 PROCEDURE — 71045 X-RAY EXAM CHEST 1 VIEW: CPT | Mod: 26,77

## 2022-12-03 PROCEDURE — 99232 SBSQ HOSP IP/OBS MODERATE 35: CPT

## 2022-12-03 RX ORDER — POTASSIUM CHLORIDE 20 MEQ
40 PACKET (EA) ORAL ONCE
Refills: 0 | Status: COMPLETED | OUTPATIENT
Start: 2022-12-03 | End: 2022-12-03

## 2022-12-03 RX ORDER — MAGNESIUM SULFATE 500 MG/ML
2 VIAL (ML) INJECTION ONCE
Refills: 0 | Status: COMPLETED | OUTPATIENT
Start: 2022-12-03 | End: 2022-12-03

## 2022-12-03 RX ADMIN — Medication 81 MILLIGRAM(S): at 11:00

## 2022-12-03 RX ADMIN — OXYCODONE HYDROCHLORIDE 10 MILLIGRAM(S): 5 TABLET ORAL at 12:00

## 2022-12-03 RX ADMIN — OXYCODONE HYDROCHLORIDE 10 MILLIGRAM(S): 5 TABLET ORAL at 06:00

## 2022-12-03 RX ADMIN — DAPAGLIFLOZIN 10 MILLIGRAM(S): 10 TABLET, FILM COATED ORAL at 05:25

## 2022-12-03 RX ADMIN — OXYCODONE HYDROCHLORIDE 10 MILLIGRAM(S): 5 TABLET ORAL at 05:25

## 2022-12-03 RX ADMIN — OXYCODONE HYDROCHLORIDE 10 MILLIGRAM(S): 5 TABLET ORAL at 16:28

## 2022-12-03 RX ADMIN — Medication 100 MILLIGRAM(S): at 11:02

## 2022-12-03 RX ADMIN — Medication 8 UNIT(S): at 12:17

## 2022-12-03 RX ADMIN — ATORVASTATIN CALCIUM 80 MILLIGRAM(S): 80 TABLET, FILM COATED ORAL at 20:37

## 2022-12-03 RX ADMIN — Medication 2: at 17:38

## 2022-12-03 RX ADMIN — Medication 1 MILLIGRAM(S): at 11:01

## 2022-12-03 RX ADMIN — Medication 20 MILLIEQUIVALENT(S): at 11:01

## 2022-12-03 RX ADMIN — SODIUM CHLORIDE 3 MILLILITER(S): 9 INJECTION INTRAMUSCULAR; INTRAVENOUS; SUBCUTANEOUS at 05:49

## 2022-12-03 RX ADMIN — Medication 325 MILLIGRAM(S): at 11:01

## 2022-12-03 RX ADMIN — GABAPENTIN 300 MILLIGRAM(S): 400 CAPSULE ORAL at 15:55

## 2022-12-03 RX ADMIN — OXYCODONE HYDROCHLORIDE 10 MILLIGRAM(S): 5 TABLET ORAL at 10:57

## 2022-12-03 RX ADMIN — APIXABAN 2.5 MILLIGRAM(S): 2.5 TABLET, FILM COATED ORAL at 05:25

## 2022-12-03 RX ADMIN — Medication 50 MILLIGRAM(S): at 20:37

## 2022-12-03 RX ADMIN — PANTOPRAZOLE SODIUM 40 MILLIGRAM(S): 20 TABLET, DELAYED RELEASE ORAL at 11:01

## 2022-12-03 RX ADMIN — Medication 50 MILLIGRAM(S): at 15:55

## 2022-12-03 RX ADMIN — GABAPENTIN 300 MILLIGRAM(S): 400 CAPSULE ORAL at 05:25

## 2022-12-03 RX ADMIN — OXYCODONE HYDROCHLORIDE 10 MILLIGRAM(S): 5 TABLET ORAL at 20:36

## 2022-12-03 RX ADMIN — APIXABAN 2.5 MILLIGRAM(S): 2.5 TABLET, FILM COATED ORAL at 17:37

## 2022-12-03 RX ADMIN — Medication 500 MILLIGRAM(S): at 11:00

## 2022-12-03 RX ADMIN — Medication 8 UNIT(S): at 08:44

## 2022-12-03 RX ADMIN — AMIODARONE HYDROCHLORIDE 200 MILLIGRAM(S): 400 TABLET ORAL at 05:25

## 2022-12-03 RX ADMIN — GABAPENTIN 300 MILLIGRAM(S): 400 CAPSULE ORAL at 20:36

## 2022-12-03 RX ADMIN — INSULIN GLARGINE 32 UNIT(S): 100 INJECTION, SOLUTION SUBCUTANEOUS at 20:37

## 2022-12-03 RX ADMIN — MIRTAZAPINE 15 MILLIGRAM(S): 45 TABLET, ORALLY DISINTEGRATING ORAL at 20:37

## 2022-12-03 RX ADMIN — OXYCODONE HYDROCHLORIDE 10 MILLIGRAM(S): 5 TABLET ORAL at 21:36

## 2022-12-03 RX ADMIN — SODIUM CHLORIDE 3 MILLILITER(S): 9 INJECTION INTRAMUSCULAR; INTRAVENOUS; SUBCUTANEOUS at 14:00

## 2022-12-03 RX ADMIN — Medication 50 MILLIGRAM(S): at 05:25

## 2022-12-03 RX ADMIN — Medication 25 GRAM(S): at 12:16

## 2022-12-03 RX ADMIN — Medication 40 MILLIEQUIVALENT(S): at 12:16

## 2022-12-03 RX ADMIN — Medication 20 MILLIGRAM(S): at 10:10

## 2022-12-03 RX ADMIN — SODIUM CHLORIDE 3 MILLILITER(S): 9 INJECTION INTRAMUSCULAR; INTRAVENOUS; SUBCUTANEOUS at 20:26

## 2022-12-03 RX ADMIN — Medication 8 UNIT(S): at 17:38

## 2022-12-03 RX ADMIN — OXYCODONE HYDROCHLORIDE 10 MILLIGRAM(S): 5 TABLET ORAL at 17:37

## 2022-12-03 NOTE — PROGRESS NOTE ADULT - SUBJECTIVE AND OBJECTIVE BOX
INTERVAL HPI/OVERNIGHT EVENTS:  follow-up for DM management    MEDICATIONS  (STANDING):  aMIOdarone    Tablet 200 milliGRAM(s) Oral daily  aMIOdarone    Tablet   Oral   apixaban 2.5 milliGRAM(s) Oral two times a day  ascorbic acid 500 milliGRAM(s) Oral daily  aspirin enteric coated 81 milliGRAM(s) Oral daily  atorvastatin 80 milliGRAM(s) Oral at bedtime  dapagliflozin 10 milliGRAM(s) Oral every 24 hours  dextrose 5%. 1000 milliLiter(s) (50 mL/Hr) IV Continuous <Continuous>  dextrose 5%. 1000 milliLiter(s) (100 mL/Hr) IV Continuous <Continuous>  dextrose 50% Injectable 50 milliLiter(s) IV Push every 15 minutes  dextrose 50% Injectable 25 milliLiter(s) IV Push every 15 minutes  ferrous    sulfate 325 milliGRAM(s) Oral daily  folic acid 1 milliGRAM(s) Oral daily  gabapentin 300 milliGRAM(s) Oral three times a day  glucagon  Injectable 1 milliGRAM(s) IntraMuscular once  insulin glargine Injectable (LANTUS) 32 Unit(s) SubCutaneous at bedtime  insulin lispro (ADMELOG) corrective regimen sliding scale   SubCutaneous three times a day before meals  insulin lispro Injectable (ADMELOG) 8 Unit(s) SubCutaneous three times a day before meals  lidocaine   4% Patch 1 Patch Transdermal daily  melatonin 5 milliGRAM(s) Oral at bedtime  metoprolol tartrate 50 milliGRAM(s) Oral three times a day  mirtazapine 15 milliGRAM(s) Oral at bedtime  pantoprazole    Tablet 40 milliGRAM(s) Oral daily  polyethylene glycol 3350 17 Gram(s) Oral daily  potassium chloride    Tablet ER 20 milliEquivalent(s) Oral daily  senna 2 Tablet(s) Oral at bedtime  sodium chloride 0.9% lock flush 3 milliLiter(s) IV Push every 8 hours  thiamine 100 milliGRAM(s) Oral daily  torsemide 20 milliGRAM(s) Oral <User Schedule>    MEDICATIONS  (PRN):  acetaminophen     Tablet .. 650 milliGRAM(s) Oral every 6 hours PRN Mild Pain (1 - 3)  dextrose Oral Gel 15 Gram(s) Oral once PRN Blood Glucose LESS THAN 70 milliGRAM(s)/deciliter  oxyCODONE    IR 10 milliGRAM(s) Oral every 4 hours PRN Severe Pain (7 - 10)  oxyCODONE    IR 5 milliGRAM(s) Oral every 4 hours PRN Moderate Pain (4 - 6)    Allergies  No Known Allergies    Review of systems: no shortness of breath, no chest pain, no headache, no nausea, no vomiting      Vital Signs Last 24 Hrs  T(C): 36.8 (03 Dec 2022 12:00), Max: 36.9 (02 Dec 2022 16:05)  T(F): 98.2 (03 Dec 2022 12:00), Max: 98.4 (02 Dec 2022 16:05)  HR: 85 (03 Dec 2022 12:00) (77 - 89)  BP: 122/67 (03 Dec 2022 12:00) (98/58 - 122/67)  BP(mean): 89 (02 Dec 2022 16:05) (89 - 89)  RR: 18 (03 Dec 2022 12:00) (18 - 18)  SpO2: 96% (03 Dec 2022 12:00) (96% - 97%)    Parameters below as of 03 Dec 2022 12:00  Patient On (Oxygen Delivery Method): room air    PHYSICAL EXAM:  General: No apparent distress  Neck: Supple, trachea midline, no thyromegaly  Respiratory: Lungs clear bilaterally, normal rate, effort  Cardiac: +S1, S2, no m/r/g  GI: +BS, soft, non tender, non distended  Extremities: No peripheral edema, no pedal lesions  Neuro: A+O X3, no tremor  Pysch: Affect appropriate   Skin: No acanthosis       LABS:                        9.4    12.98 )-----------( 296      ( 03 Dec 2022 05:53 )             28.9     12-03    136  |  95<L>  |  24.1<H>  ----------------------------<  119<H>  3.5   |  28.0  |  1.07    Ca    9.5      03 Dec 2022 05:53  Mg     1.7     12-03    TPro  5.9<L>  /  Alb  2.9<L>  /  TBili  1.2  /  DBili  x   /  AST  85<H>  /  ALT  53<H>  /  AlkPhos  181<H>  12-03    CAPILLARY BLOOD GLUCOSE      POCT Blood Glucose.: 124 mg/dL (03 Dec 2022 12:07)  POCT Blood Glucose.: 134 mg/dL (03 Dec 2022 08:15)  POCT Blood Glucose.: 121 mg/dL (02 Dec 2022 21:08)  POCT Blood Glucose.: 121 mg/dL (02 Dec 2022 17:13)

## 2022-12-03 NOTE — PROGRESS NOTE ADULT - ASSESSMENT
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 Patient was admitted to [facility] initially in May 2022 for GI bleed and found to have EF 30-35%, and multivessel disease but CABG deferred due to hx of alcoholism and patient transferred to rehab. Consult for diabetes management.     1. T2DM, a1c 7.6%  - Bgs well controlled  - Continue admelog 8 units tid with meals  - Continue lantus 32 units qhs  - Farxiga 10mg daily    2. CAD  - S/p CABG, care per primary team    3. HLD  - Continue statin

## 2022-12-03 NOTE — PROGRESS NOTE ADULT - SUBJECTIVE AND OBJECTIVE BOX
POD #11 s/p CABG X 3 (LIMA-LAD, SVG-OM, SVG-RPDA)    PAST MEDICAL & SURGICAL HISTORY:  Pancreatitis  Hypertension  Myocardial infarct  Alcohol abuse  Colon cancer  History of colon resection  History of heart surgery  cardiac stent    FAMILY HISTORY:  FH: prostate cancer (Father)  Family history of atherosclerosis (Mother)    Brief Hospital Course: 67 year old male patient former smoker with a medical history of HTN, HLD, type 2 DM (HA1c 7.6 on Metformin and Insulin), CAD s/p PCI to RCA 2007, ETOH cirrhosis, B12/Vit D deficiency, recent hospitalization for GI Bleed 05/2022, admitted on 11/21/22 for repeat cath which confirmed multivessel CAD. Patient underwent CABG X 3 (LIMA-LAD, SVG-OM, SVG-PDA) on 11/22/22 with Dr. Gray. Postoperative course significant for cardiogenic shock and hypotension (resolved s/p volume resuscitation, phenylephrine gtt weaned off), ABLA (stable s/p blood transfusion), hyperglycemia (titrating insulin requirements), and Left pleural effusion (s/p pigtail catheter insertion 11/29, removed 11/30, with residual effusion noted on CT chest 12/1 and IR pigtail placed).    Significant recent/past 24 hr events: No overnight events reported.    Subjective: Patient lying in bed in NAD. +Tolerating diet. +Passing BMs since surgery. +Pain currently controlled. Denies fevers, chills, lightheadedness, dizziness, HA, CP, palpitations, SOB, cough, abdominal pain, N/V, diarrhea, numbness/tingling in extremities, or any other acute complaints. ROS negative x 10 systems except as noted above.    MEDICATIONS  (STANDING):  aMIOdarone    Tablet 200 milliGRAM(s) Oral daily  apixaban 2.5 milliGRAM(s) Oral two times a day  ascorbic acid 500 milliGRAM(s) Oral daily  aspirin enteric coated 81 milliGRAM(s) Oral daily  atorvastatin 80 milliGRAM(s) Oral at bedtime  dapagliflozin 10 milliGRAM(s) Oral every 24 hours  ferrous    sulfate 325 milliGRAM(s) Oral daily  folic acid 1 milliGRAM(s) Oral daily  gabapentin 300 milliGRAM(s) Oral three times a day  insulin glargine Injectable (LANTUS) 32 Unit(s) SubCutaneous at bedtime  insulin lispro (ADMELOG) corrective regimen sliding scale   SubCutaneous three times a day before meals  insulin lispro Injectable (ADMELOG) 8 Unit(s) SubCutaneous three times a day before meals  lidocaine   4% Patch 1 Patch Transdermal daily  melatonin 5 milliGRAM(s) Oral at bedtime  metoprolol tartrate 50 milliGRAM(s) Oral three times a day  mirtazapine 15 milliGRAM(s) Oral at bedtime  pantoprazole    Tablet 40 milliGRAM(s) Oral daily  polyethylene glycol 3350 17 Gram(s) Oral daily  potassium chloride    Tablet ER 20 milliEquivalent(s) Oral daily  senna 2 Tablet(s) Oral at bedtime  sodium chloride 0.9% lock flush 3 milliLiter(s) IV Push every 8 hours  thiamine 100 milliGRAM(s) Oral daily  torsemide 20 milliGRAM(s) Oral <User Schedule>    MEDICATIONS  (PRN):  acetaminophen     Tablet .. 650 milliGRAM(s) Oral every 6 hours PRN Mild Pain (1 - 3)  dextrose Oral Gel 15 Gram(s) Oral once PRN Blood Glucose LESS THAN 70 milliGRAM(s)/deciliter  oxyCODONE    IR 10 milliGRAM(s) Oral every 4 hours PRN Severe Pain (7 - 10)  oxyCODONE    IR 5 milliGRAM(s) Oral every 4 hours PRN Moderate Pain (4 - 6)    Allergies: No Known Allergies    Vitals   T(C): 36.5 (03 Dec 2022 00:00), Max: 36.9 (02 Dec 2022 16:05)  T(F): 97.7 (03 Dec 2022 00:00), Max: 98.4 (02 Dec 2022 16:05)  HR: 77 (03 Dec 2022 00:00) (69 - 89)  BP: 98/58 (03 Dec 2022 00:00) (98/58 - 129/60)  BP(mean): 89 (02 Dec 2022 16:05) (89 - 89)  RR: 18 (03 Dec 2022 00:00) (18 - 18)  SpO2: 96% (03 Dec 2022 00:00) (96% - 100%)  O2 Parameters below as of 03 Dec 2022 00:00  Patient On (Oxygen Delivery Method): room air    I&O's Detail    01 Dec 2022 07:01  -  02 Dec 2022 07:00  --------------------------------------------------------  IN:    Oral Fluid: 500 mL  Total IN: 500 mL    OUT:    Chest Tube (mL): 325 mL    Voided (mL): 1350 mL  Total OUT: 1675 mL    Total NET: -1175 mL      02 Dec 2022 07:01  -  03 Dec 2022 04:22  --------------------------------------------------------  IN:    Oral Fluid: 1140 mL  Total IN: 1140 mL    OUT:    Chest Tube (mL): 20 mL    Voided (mL): 1450 mL  Total OUT: 1470 mL    Total NET: -330 mL    Physical Exam  Neuro: A+O x 3, non-focal, speech clear and intact  HEENT:  NCAT, No conjuctival edema or icterus, no thrush.    Neck:  Supple, trachea midline  Pulm: +Diminished BSs Left base, no accessory muscle use noted  Chest: Left pleural pigtail catheter to waterseal with dressing C/D/I, no air leak or subcutaneous emphysema appreciated  CV: regular rate, regular rhythm, +S1S2, no murmur noted  Abd: soft, NT, ND, + BS  Ext: SEGURA x 4, trace LLE edema, +1 RLE edema, no cyanosis, distal motor/neuro/circ intact  Skin: warm, dry, perfused  Incisions: midsternal incision open to air C/D/I, sternum stable, RLE harvest site with minimal serosanguinous drainage    LABS                        9.6    12.67 )-----------( 286      ( 02 Dec 2022 04:54 )             29.4     12-02    137  |  98  |  25.0<H>  ----------------------------<  126<H>  3.8   |  29.0  |  0.95    Ca    9.6      02 Dec 2022 04:54  Mg     1.9     12-02    TPro  5.8<L>  /  Alb  3.1<L>  /  TBili  1.1  /  DBili  x   /  AST  71<H>  /  ALT  41<H>  /  AlkPhos  142<H>  12-02    POCT Blood Glucose.: 121 mg/dL (12-02-22 @ 21:08)  POCT Blood Glucose.: 121 mg/dL (12-02-22 @ 17:13)  POCT Blood Glucose.: 159 mg/dL (12-02-22 @ 12:01)  POCT Blood Glucose.: 139 mg/dL (12-02-22 @ 08:00)      Last CXR:  < from: Xray Chest 1 View- PORTABLE-Routine (Xray Chest 1 View- PORTABLE-Routine in AM.) (12.02.22 @ 09:46) >  Frontal expiratory view ofthe chest shows the heart to be similar in size. Sternal wires are again noted. Left pigtail catheter has been placed.  The lungs show clear right lung with partial clearing of the left lung.   Left pleural effusion is smaller and there is no evidence of pneumothorax nor right pleural effusion.  IMPRESSION:  Smaller left effusion. No pneumothorax.  < end of copied text >    CT Chest:  < from: CT Chest No Cont (12.01.22 @ 13:02) >  IMPRESSION:  Status post CABG.  Multiloculated left hydropneumothorax with minimal air component, and most fluid concentrated in the anterior pleural space, similar to recent chest x-rays from today abdomen one day prior.  1 cm right upper lobe groundglass nodule. 3 month follow-up is recommended to see if this persists.  Acute mildly displaced right third and fourth rib fractures.  < end of copied text >    Last TTE:  < from: TTE Echo Limited or F/U (12.02.22 @ 10:20) >  Summary:   1. Limited for re-eval pericardial effusion s/p CABG.   2. Left ventricular ejection fraction, by visual estimation, is 50 to 55%.   3. Low normal global left ventricular systolic function.   4. Apical septal segment, apical inferior segment, and apex are abnormal as described above.   5. Normal right ventricular size and function.   6. Normal left atrial size.   7. Normal right atrial size.   8. Small pericardial effusion. No evidence of tamponade.  < end of copied text >

## 2022-12-04 LAB
ALBUMIN SERPL ELPH-MCNC: 3 G/DL — LOW (ref 3.3–5.2)
ALP SERPL-CCNC: 198 U/L — HIGH (ref 40–120)
ALT FLD-CCNC: 58 U/L — HIGH
ANION GAP SERPL CALC-SCNC: 12 MMOL/L — SIGNIFICANT CHANGE UP (ref 5–17)
ANION GAP SERPL CALC-SCNC: 14 MMOL/L — SIGNIFICANT CHANGE UP (ref 5–17)
AST SERPL-CCNC: 96 U/L — HIGH
BILIRUB SERPL-MCNC: 1.3 MG/DL — SIGNIFICANT CHANGE UP (ref 0.4–2)
BLD GP AB SCN SERPL QL: SIGNIFICANT CHANGE UP
BUN SERPL-MCNC: 20.9 MG/DL — HIGH (ref 8–20)
BUN SERPL-MCNC: 21.6 MG/DL — HIGH (ref 8–20)
CALCIUM SERPL-MCNC: 9.7 MG/DL — SIGNIFICANT CHANGE UP (ref 8.4–10.5)
CALCIUM SERPL-MCNC: 9.7 MG/DL — SIGNIFICANT CHANGE UP (ref 8.4–10.5)
CHLORIDE SERPL-SCNC: 96 MMOL/L — SIGNIFICANT CHANGE UP (ref 96–108)
CHLORIDE SERPL-SCNC: 96 MMOL/L — SIGNIFICANT CHANGE UP (ref 96–108)
CO2 SERPL-SCNC: 26 MMOL/L — SIGNIFICANT CHANGE UP (ref 22–29)
CO2 SERPL-SCNC: 26 MMOL/L — SIGNIFICANT CHANGE UP (ref 22–29)
CREAT SERPL-MCNC: 1 MG/DL — SIGNIFICANT CHANGE UP (ref 0.5–1.3)
CREAT SERPL-MCNC: 1.34 MG/DL — HIGH (ref 0.5–1.3)
EGFR: 58 ML/MIN/1.73M2 — LOW
EGFR: 83 ML/MIN/1.73M2 — SIGNIFICANT CHANGE UP
GLUCOSE BLDC GLUCOMTR-MCNC: 115 MG/DL — HIGH (ref 70–99)
GLUCOSE BLDC GLUCOMTR-MCNC: 137 MG/DL — HIGH (ref 70–99)
GLUCOSE BLDC GLUCOMTR-MCNC: 147 MG/DL — HIGH (ref 70–99)
GLUCOSE BLDC GLUCOMTR-MCNC: 173 MG/DL — HIGH (ref 70–99)
GLUCOSE SERPL-MCNC: 124 MG/DL — HIGH (ref 70–99)
GLUCOSE SERPL-MCNC: 133 MG/DL — HIGH (ref 70–99)
HCT VFR BLD CALC: 27.9 % — LOW (ref 39–50)
HCT VFR BLD CALC: 29.8 % — LOW (ref 39–50)
HGB BLD-MCNC: 9.2 G/DL — LOW (ref 13–17)
HGB BLD-MCNC: 9.6 G/DL — LOW (ref 13–17)
MAGNESIUM SERPL-MCNC: 1.7 MG/DL — SIGNIFICANT CHANGE UP (ref 1.6–2.6)
MAGNESIUM SERPL-MCNC: 2.1 MG/DL — SIGNIFICANT CHANGE UP (ref 1.6–2.6)
MCHC RBC-ENTMCNC: 30.9 PG — SIGNIFICANT CHANGE UP (ref 27–34)
MCHC RBC-ENTMCNC: 31.5 PG — SIGNIFICANT CHANGE UP (ref 27–34)
MCHC RBC-ENTMCNC: 32.2 GM/DL — SIGNIFICANT CHANGE UP (ref 32–36)
MCHC RBC-ENTMCNC: 33 GM/DL — SIGNIFICANT CHANGE UP (ref 32–36)
MCV RBC AUTO: 93.6 FL — SIGNIFICANT CHANGE UP (ref 80–100)
MCV RBC AUTO: 97.7 FL — SIGNIFICANT CHANGE UP (ref 80–100)
PLATELET # BLD AUTO: 346 K/UL — SIGNIFICANT CHANGE UP (ref 150–400)
PLATELET # BLD AUTO: 370 K/UL — SIGNIFICANT CHANGE UP (ref 150–400)
POTASSIUM SERPL-MCNC: 3.8 MMOL/L — SIGNIFICANT CHANGE UP (ref 3.5–5.3)
POTASSIUM SERPL-MCNC: 3.8 MMOL/L — SIGNIFICANT CHANGE UP (ref 3.5–5.3)
POTASSIUM SERPL-SCNC: 3.8 MMOL/L — SIGNIFICANT CHANGE UP (ref 3.5–5.3)
POTASSIUM SERPL-SCNC: 3.8 MMOL/L — SIGNIFICANT CHANGE UP (ref 3.5–5.3)
PROT SERPL-MCNC: 6.1 G/DL — LOW (ref 6.6–8.7)
RBC # BLD: 2.98 M/UL — LOW (ref 4.2–5.8)
RBC # BLD: 3.05 M/UL — LOW (ref 4.2–5.8)
RBC # FLD: 14.6 % — HIGH (ref 10.3–14.5)
RBC # FLD: 14.9 % — HIGH (ref 10.3–14.5)
SARS-COV-2 RNA SPEC QL NAA+PROBE: SIGNIFICANT CHANGE UP
SODIUM SERPL-SCNC: 134 MMOL/L — LOW (ref 135–145)
SODIUM SERPL-SCNC: 135 MMOL/L — SIGNIFICANT CHANGE UP (ref 135–145)
WBC # BLD: 10.43 K/UL — SIGNIFICANT CHANGE UP (ref 3.8–10.5)
WBC # BLD: 12.94 K/UL — HIGH (ref 3.8–10.5)
WBC # FLD AUTO: 10.43 K/UL — SIGNIFICANT CHANGE UP (ref 3.8–10.5)
WBC # FLD AUTO: 12.94 K/UL — HIGH (ref 3.8–10.5)

## 2022-12-04 PROCEDURE — 71045 X-RAY EXAM CHEST 1 VIEW: CPT | Mod: 26

## 2022-12-04 PROCEDURE — 93926 LOWER EXTREMITY STUDY: CPT | Mod: 26,RT

## 2022-12-04 RX ORDER — POTASSIUM CHLORIDE 20 MEQ
40 PACKET (EA) ORAL DAILY
Refills: 0 | Status: COMPLETED | OUTPATIENT
Start: 2022-12-05 | End: 2022-12-12

## 2022-12-04 RX ORDER — VANCOMYCIN HCL 1 G
1250 VIAL (EA) INTRAVENOUS EVERY 12 HOURS
Refills: 0 | Status: DISCONTINUED | OUTPATIENT
Start: 2022-12-04 | End: 2022-12-05

## 2022-12-04 RX ORDER — PREGABALIN 225 MG/1
1000 CAPSULE ORAL DAILY
Refills: 0 | Status: DISCONTINUED | OUTPATIENT
Start: 2022-12-04 | End: 2022-12-14

## 2022-12-04 RX ORDER — POTASSIUM CHLORIDE 20 MEQ
20 PACKET (EA) ORAL ONCE
Refills: 0 | Status: COMPLETED | OUTPATIENT
Start: 2022-12-04 | End: 2022-12-04

## 2022-12-04 RX ORDER — MAGNESIUM SULFATE 500 MG/ML
2 VIAL (ML) INJECTION ONCE
Refills: 0 | Status: COMPLETED | OUTPATIENT
Start: 2022-12-04 | End: 2022-12-04

## 2022-12-04 RX ORDER — INSULIN LISPRO 100/ML
VIAL (ML) SUBCUTANEOUS
Refills: 0 | Status: DISCONTINUED | OUTPATIENT
Start: 2022-12-04 | End: 2022-12-14

## 2022-12-04 RX ADMIN — Medication 81 MILLIGRAM(S): at 11:17

## 2022-12-04 RX ADMIN — SODIUM CHLORIDE 3 MILLILITER(S): 9 INJECTION INTRAMUSCULAR; INTRAVENOUS; SUBCUTANEOUS at 21:50

## 2022-12-04 RX ADMIN — OXYCODONE HYDROCHLORIDE 10 MILLIGRAM(S): 5 TABLET ORAL at 12:15

## 2022-12-04 RX ADMIN — PANTOPRAZOLE SODIUM 40 MILLIGRAM(S): 20 TABLET, DELAYED RELEASE ORAL at 11:18

## 2022-12-04 RX ADMIN — Medication 25 GRAM(S): at 23:41

## 2022-12-04 RX ADMIN — Medication 50 MILLIGRAM(S): at 05:35

## 2022-12-04 RX ADMIN — APIXABAN 2.5 MILLIGRAM(S): 2.5 TABLET, FILM COATED ORAL at 05:35

## 2022-12-04 RX ADMIN — AMIODARONE HYDROCHLORIDE 200 MILLIGRAM(S): 400 TABLET ORAL at 05:35

## 2022-12-04 RX ADMIN — Medication 5 MILLIGRAM(S): at 20:50

## 2022-12-04 RX ADMIN — GABAPENTIN 300 MILLIGRAM(S): 400 CAPSULE ORAL at 20:50

## 2022-12-04 RX ADMIN — Medication 50 MILLIGRAM(S): at 13:51

## 2022-12-04 RX ADMIN — Medication 1 MILLIGRAM(S): at 11:18

## 2022-12-04 RX ADMIN — OXYCODONE HYDROCHLORIDE 10 MILLIGRAM(S): 5 TABLET ORAL at 22:06

## 2022-12-04 RX ADMIN — Medication 20 MILLIGRAM(S): at 09:47

## 2022-12-04 RX ADMIN — Medication 8 UNIT(S): at 08:50

## 2022-12-04 RX ADMIN — SODIUM CHLORIDE 3 MILLILITER(S): 9 INJECTION INTRAMUSCULAR; INTRAVENOUS; SUBCUTANEOUS at 05:35

## 2022-12-04 RX ADMIN — Medication 2: at 12:20

## 2022-12-04 RX ADMIN — INSULIN GLARGINE 32 UNIT(S): 100 INJECTION, SOLUTION SUBCUTANEOUS at 20:51

## 2022-12-04 RX ADMIN — OXYCODONE HYDROCHLORIDE 10 MILLIGRAM(S): 5 TABLET ORAL at 17:52

## 2022-12-04 RX ADMIN — GABAPENTIN 300 MILLIGRAM(S): 400 CAPSULE ORAL at 13:51

## 2022-12-04 RX ADMIN — Medication 20 MILLIEQUIVALENT(S): at 11:19

## 2022-12-04 RX ADMIN — OXYCODONE HYDROCHLORIDE 10 MILLIGRAM(S): 5 TABLET ORAL at 11:17

## 2022-12-04 RX ADMIN — Medication 20 MILLIEQUIVALENT(S): at 23:41

## 2022-12-04 RX ADMIN — DAPAGLIFLOZIN 10 MILLIGRAM(S): 10 TABLET, FILM COATED ORAL at 05:35

## 2022-12-04 RX ADMIN — SODIUM CHLORIDE 3 MILLILITER(S): 9 INJECTION INTRAMUSCULAR; INTRAVENOUS; SUBCUTANEOUS at 13:45

## 2022-12-04 RX ADMIN — Medication 50 MILLIGRAM(S): at 20:51

## 2022-12-04 RX ADMIN — GABAPENTIN 300 MILLIGRAM(S): 400 CAPSULE ORAL at 05:35

## 2022-12-04 RX ADMIN — Medication 325 MILLIGRAM(S): at 11:18

## 2022-12-04 RX ADMIN — Medication 8 UNIT(S): at 17:40

## 2022-12-04 RX ADMIN — MIRTAZAPINE 15 MILLIGRAM(S): 45 TABLET, ORALLY DISINTEGRATING ORAL at 20:51

## 2022-12-04 RX ADMIN — OXYCODONE HYDROCHLORIDE 10 MILLIGRAM(S): 5 TABLET ORAL at 16:38

## 2022-12-04 RX ADMIN — ATORVASTATIN CALCIUM 80 MILLIGRAM(S): 80 TABLET, FILM COATED ORAL at 20:51

## 2022-12-04 RX ADMIN — OXYCODONE HYDROCHLORIDE 10 MILLIGRAM(S): 5 TABLET ORAL at 06:47

## 2022-12-04 RX ADMIN — OXYCODONE HYDROCHLORIDE 10 MILLIGRAM(S): 5 TABLET ORAL at 21:06

## 2022-12-04 RX ADMIN — Medication 8 UNIT(S): at 12:21

## 2022-12-04 RX ADMIN — Medication 500 MILLIGRAM(S): at 11:18

## 2022-12-04 RX ADMIN — Medication 100 MILLIGRAM(S): at 11:18

## 2022-12-04 RX ADMIN — OXYCODONE HYDROCHLORIDE 10 MILLIGRAM(S): 5 TABLET ORAL at 05:41

## 2022-12-04 NOTE — PROGRESS NOTE ADULT - ASSESSMENT
67 year old male patient former smoker with a medical history of HTN, HLD, type 2 DM (HA1c 7.6 on Metformin and Insulin), CAD s/p PCI to RCA 2007, ETOH cirrhosis, B12/Vit D deficiency, recent hospitalization for GI Bleed 05/2022, admitted on 11/21/22 for repeat cath which confirmed multivessel CAD. Patient underwent CABG X 3 (LIMA-LAD, SVG-OM, SVG-PDA) on 11/22/22 with Dr. Gray. Postoperative course significant for cardiogenic shock and hypotension (resolved s/p volume resuscitation, phenylephrine gtt weaned off), ABLA (stable s/p blood transfusion), hyperglycemia (titrating insulin requirements), and Left pleural effusion (s/p pigtail catheter insertion 11/29, removed 11/30, with residual effusion noted on CT chest with IR pigtail placed 12/1 and removed 12/3).

## 2022-12-04 NOTE — PROGRESS NOTE ADULT - SUBJECTIVE AND OBJECTIVE BOX
POD #12 s/p CABG X 3 (LIMA-LAD, SVG-OM, SVG-RPDA)    PAST MEDICAL & SURGICAL HISTORY:  Pancreatitis  Hypertension  Myocardial infarct  Alcohol abuse  Colon cancer  History of colon resection  History of heart surgery  cardiac stent    FAMILY HISTORY:  FH: prostate cancer (Father)  Family history of atherosclerosis (Mother)    Brief Hospital Course: 67 year old male patient former smoker with a medical history of HTN, HLD, type 2 DM (HA1c 7.6 on Metformin and Insulin), CAD s/p PCI to RCA 2007, ETOH cirrhosis, B12/Vit D deficiency, recent hospitalization for GI Bleed 05/2022, admitted on 11/21/22 for repeat cath which confirmed multivessel CAD. Patient underwent CABG X 3 (LIMA-LAD, SVG-OM, SVG-PDA) on 11/22/22 with Dr. Gray. Postoperative course significant for cardiogenic shock and hypotension (resolved s/p volume resuscitation, phenylephrine gtt weaned off), ABLA (stable s/p blood transfusion), hyperglycemia (titrating insulin requirements), and Left pleural effusion (s/p pigtail catheter insertion 11/29, removed 11/30, with residual effusion noted on CT chest with IR pigtail placed 12/1 and removed 12/3).    Significant recent/past 24 hr events: No overnight events reported.    Subjective: Patient lying in bed in NAD. +Tolerating diet. +Passing BMs since surgery. +Pain currently controlled. Denies fevers, chills, lightheadedness, dizziness, HA, CP, palpitations, SOB, cough, abdominal pain, N/V, diarrhea, numbness/tingling in extremities, or any other acute complaints. ROS negative x 10 systems except as noted above.    MEDICATIONS  (STANDING):  aMIOdarone    Tablet 200 milliGRAM(s) Oral daily  apixaban 2.5 milliGRAM(s) Oral two times a day  ascorbic acid 500 milliGRAM(s) Oral daily  aspirin enteric coated 81 milliGRAM(s) Oral daily  atorvastatin 80 milliGRAM(s) Oral at bedtime  dapagliflozin 10 milliGRAM(s) Oral every 24 hours  ferrous    sulfate 325 milliGRAM(s) Oral daily  folic acid 1 milliGRAM(s) Oral daily  gabapentin 300 milliGRAM(s) Oral three times a day  insulin glargine Injectable (LANTUS) 32 Unit(s) SubCutaneous at bedtime  insulin lispro (ADMELOG) corrective regimen sliding scale   SubCutaneous three times a day before meals  insulin lispro Injectable (ADMELOG) 8 Unit(s) SubCutaneous three times a day before meals  lidocaine   4% Patch 1 Patch Transdermal daily  melatonin 5 milliGRAM(s) Oral at bedtime  metoprolol tartrate 50 milliGRAM(s) Oral three times a day  mirtazapine 15 milliGRAM(s) Oral at bedtime  pantoprazole    Tablet 40 milliGRAM(s) Oral daily  polyethylene glycol 3350 17 Gram(s) Oral daily  potassium chloride    Tablet ER 20 milliEquivalent(s) Oral daily  senna 2 Tablet(s) Oral at bedtime  sodium chloride 0.9% lock flush 3 milliLiter(s) IV Push every 8 hours  thiamine 100 milliGRAM(s) Oral daily  torsemide 20 milliGRAM(s) Oral <User Schedule>    MEDICATIONS  (PRN):  acetaminophen     Tablet .. 650 milliGRAM(s) Oral every 6 hours PRN Mild Pain (1 - 3)  dextrose Oral Gel 15 Gram(s) Oral once PRN Blood Glucose LESS THAN 70 milliGRAM(s)/deciliter  oxyCODONE    IR 10 milliGRAM(s) Oral every 4 hours PRN Severe Pain (7 - 10)  oxyCODONE    IR 5 milliGRAM(s) Oral every 4 hours PRN Moderate Pain (4 - 6)    Allergies: No Known Allergies    Vitals   T(C): 36.9 (03 Dec 2022 20:30), Max: 37.3 (03 Dec 2022 15:58)  T(F): 98.5 (03 Dec 2022 20:30), Max: 99.1 (03 Dec 2022 15:58)  HR: 79 (04 Dec 2022 00:53) (77 - 94)  BP: 118/67 (04 Dec 2022 00:53) (111/70 - 127/80)  RR: 18 (04 Dec 2022 00:53) (18 - 18)  SpO2: 95% (04 Dec 2022 00:53) (95% - 98%)  O2 Parameters below as of 04 Dec 2022 00:53  Patient On (Oxygen Delivery Method): room air    I&O's Detail    02 Dec 2022 07:01  -  03 Dec 2022 07:00  --------------------------------------------------------  IN:    Oral Fluid: 1260 mL  Total IN: 1260 mL    OUT:    Chest Tube (mL): 20 mL    Voided (mL): 2250 mL  Total OUT: 2270 mL    Total NET: -1010 mL      03 Dec 2022 07:01  -  04 Dec 2022 02:25  --------------------------------------------------------  IN:    Oral Fluid: 480 mL  Total IN: 480 mL    OUT:    Voided (mL): 300 mL  Total OUT: 300 mL    Total NET: 180 mL    Physical Exam  Neuro: A+O x 3, non-focal, speech clear and intact  HEENT:  NCAT, No conjuctival edema or icterus, no thrush.    Neck:  Supple, trachea midline  Pulm: +Diminished BSs Left base, no accessory muscle use noted  CV: regular rate, regular rhythm, +S1S2, no murmur noted  Abd: soft, NT, ND, + BS  Ext: SEGURA x 4, trace LLE edema, +1 RLE edema, no cyanosis, distal motor/neuro/circ intact  Skin: warm, dry, perfused  Incisions: midsternal incision open to air C/D/I, sternum stable, RLE harvest site with minimal serosanguinous drainage    LABS                        9.4    12.98 )-----------( 296      ( 03 Dec 2022 05:53 )             28.9     12-03    136  |  95<L>  |  24.1<H>  ----------------------------<  119<H>  3.5   |  28.0  |  1.07    Ca    9.5      03 Dec 2022 05:53  Mg     1.7     12-03    TPro  5.9<L>  /  Alb  2.9<L>  /  TBili  1.2  /  DBili  x   /  AST  85<H>  /  ALT  53<H>  /  AlkPhos  181<H>  12-03    POCT Blood Glucose.: 110 mg/dL (12-03-22 @ 20:19)  POCT Blood Glucose.: 173 mg/dL (12-03-22 @ 17:25)  POCT Blood Glucose.: 124 mg/dL (12-03-22 @ 12:07)  POCT Blood Glucose.: 134 mg/dL (12-03-22 @ 08:15)      Last CXR:  < from: Xray Chest 1 View- PORTABLE-Urgent (Xray Chest 1 View- PORTABLE-Urgent .) (12.03.22 @ 10:50) >  IMPRESSION:  Left pleural pigtail catheter tip over the periphery of the left midlung. No pneumothorax.  Trace left pleural effusion.  Unchanged crescentic opacity projecting over the left perihilar region, possibly loculated pleural fluid within the major fissure.  Continued left lower lung platelike atelectasis.  Left subcutaneous emphysema, not significantly changed.  < end of copied text >

## 2022-12-05 LAB
ALBUMIN SERPL ELPH-MCNC: 3 G/DL — LOW (ref 3.3–5.2)
ALP SERPL-CCNC: 199 U/L — HIGH (ref 40–120)
ALT FLD-CCNC: 55 U/L — HIGH
ANION GAP SERPL CALC-SCNC: 10 MMOL/L — SIGNIFICANT CHANGE UP (ref 5–17)
AST SERPL-CCNC: 71 U/L — HIGH
BILIRUB SERPL-MCNC: 1.1 MG/DL — SIGNIFICANT CHANGE UP (ref 0.4–2)
BUN SERPL-MCNC: 21.3 MG/DL — HIGH (ref 8–20)
CALCIUM SERPL-MCNC: 9.9 MG/DL — SIGNIFICANT CHANGE UP (ref 8.4–10.5)
CHLORIDE SERPL-SCNC: 98 MMOL/L — SIGNIFICANT CHANGE UP (ref 96–108)
CO2 SERPL-SCNC: 29 MMOL/L — SIGNIFICANT CHANGE UP (ref 22–29)
CREAT SERPL-MCNC: 1.09 MG/DL — SIGNIFICANT CHANGE UP (ref 0.5–1.3)
EGFR: 75 ML/MIN/1.73M2 — SIGNIFICANT CHANGE UP
GLUCOSE BLDC GLUCOMTR-MCNC: 128 MG/DL — HIGH (ref 70–99)
GLUCOSE BLDC GLUCOMTR-MCNC: 143 MG/DL — HIGH (ref 70–99)
GLUCOSE BLDC GLUCOMTR-MCNC: 185 MG/DL — HIGH (ref 70–99)
GLUCOSE BLDC GLUCOMTR-MCNC: 187 MG/DL — HIGH (ref 70–99)
GLUCOSE SERPL-MCNC: 151 MG/DL — HIGH (ref 70–99)
HCT VFR BLD CALC: 27.8 % — LOW (ref 39–50)
HGB BLD-MCNC: 9 G/DL — LOW (ref 13–17)
MAGNESIUM SERPL-MCNC: 2 MG/DL — SIGNIFICANT CHANGE UP (ref 1.8–2.6)
MCHC RBC-ENTMCNC: 31.4 PG — SIGNIFICANT CHANGE UP (ref 27–34)
MCHC RBC-ENTMCNC: 32.4 GM/DL — SIGNIFICANT CHANGE UP (ref 32–36)
MCV RBC AUTO: 96.9 FL — SIGNIFICANT CHANGE UP (ref 80–100)
PLATELET # BLD AUTO: 339 K/UL — SIGNIFICANT CHANGE UP (ref 150–400)
POTASSIUM SERPL-MCNC: 4.3 MMOL/L — SIGNIFICANT CHANGE UP (ref 3.5–5.3)
POTASSIUM SERPL-SCNC: 4.3 MMOL/L — SIGNIFICANT CHANGE UP (ref 3.5–5.3)
PREALB SERPL-MCNC: 7 MG/DL — LOW (ref 18–38)
PROT SERPL-MCNC: 6.1 G/DL — LOW (ref 6.6–8.7)
RBC # BLD: 2.87 M/UL — LOW (ref 4.2–5.8)
RBC # FLD: 14.6 % — HIGH (ref 10.3–14.5)
SODIUM SERPL-SCNC: 136 MMOL/L — SIGNIFICANT CHANGE UP (ref 135–145)
WBC # BLD: 10.38 K/UL — SIGNIFICANT CHANGE UP (ref 3.8–10.5)
WBC # FLD AUTO: 10.38 K/UL — SIGNIFICANT CHANGE UP (ref 3.8–10.5)

## 2022-12-05 PROCEDURE — 76882 US LMTD JT/FCL EVL NVASC XTR: CPT | Mod: 26,RT

## 2022-12-05 PROCEDURE — 71045 X-RAY EXAM CHEST 1 VIEW: CPT | Mod: 26

## 2022-12-05 PROCEDURE — 99231 SBSQ HOSP IP/OBS SF/LOW 25: CPT

## 2022-12-05 PROCEDURE — 93010 ELECTROCARDIOGRAM REPORT: CPT

## 2022-12-05 RX ORDER — VANCOMYCIN HCL 1 G
750 VIAL (EA) INTRAVENOUS EVERY 12 HOURS
Refills: 0 | Status: COMPLETED | OUTPATIENT
Start: 2022-12-05 | End: 2022-12-12

## 2022-12-05 RX ADMIN — Medication 650 MILLIGRAM(S): at 10:55

## 2022-12-05 RX ADMIN — PREGABALIN 1000 MICROGRAM(S): 225 CAPSULE ORAL at 12:58

## 2022-12-05 RX ADMIN — Medication 325 MILLIGRAM(S): at 09:54

## 2022-12-05 RX ADMIN — GABAPENTIN 300 MILLIGRAM(S): 400 CAPSULE ORAL at 14:42

## 2022-12-05 RX ADMIN — OXYCODONE HYDROCHLORIDE 10 MILLIGRAM(S): 5 TABLET ORAL at 04:15

## 2022-12-05 RX ADMIN — Medication 1 MILLIGRAM(S): at 09:53

## 2022-12-05 RX ADMIN — APIXABAN 2.5 MILLIGRAM(S): 2.5 TABLET, FILM COATED ORAL at 17:17

## 2022-12-05 RX ADMIN — OXYCODONE HYDROCHLORIDE 10 MILLIGRAM(S): 5 TABLET ORAL at 14:55

## 2022-12-05 RX ADMIN — INSULIN GLARGINE 32 UNIT(S): 100 INJECTION, SOLUTION SUBCUTANEOUS at 22:37

## 2022-12-05 RX ADMIN — MIRTAZAPINE 15 MILLIGRAM(S): 45 TABLET, ORALLY DISINTEGRATING ORAL at 21:18

## 2022-12-05 RX ADMIN — Medication 20 MILLIGRAM(S): at 09:54

## 2022-12-05 RX ADMIN — Medication 50 MILLIGRAM(S): at 14:45

## 2022-12-05 RX ADMIN — Medication 500 MILLIGRAM(S): at 09:53

## 2022-12-05 RX ADMIN — OXYCODONE HYDROCHLORIDE 10 MILLIGRAM(S): 5 TABLET ORAL at 03:15

## 2022-12-05 RX ADMIN — Medication 100 MILLIGRAM(S): at 09:53

## 2022-12-05 RX ADMIN — GABAPENTIN 300 MILLIGRAM(S): 400 CAPSULE ORAL at 21:18

## 2022-12-05 RX ADMIN — Medication 650 MILLIGRAM(S): at 09:53

## 2022-12-05 RX ADMIN — OXYCODONE HYDROCHLORIDE 10 MILLIGRAM(S): 5 TABLET ORAL at 22:19

## 2022-12-05 RX ADMIN — OXYCODONE HYDROCHLORIDE 10 MILLIGRAM(S): 5 TABLET ORAL at 10:55

## 2022-12-05 RX ADMIN — DAPAGLIFLOZIN 10 MILLIGRAM(S): 10 TABLET, FILM COATED ORAL at 05:34

## 2022-12-05 RX ADMIN — Medication 50 MILLIGRAM(S): at 21:18

## 2022-12-05 RX ADMIN — OXYCODONE HYDROCHLORIDE 10 MILLIGRAM(S): 5 TABLET ORAL at 21:19

## 2022-12-05 RX ADMIN — OXYCODONE HYDROCHLORIDE 10 MILLIGRAM(S): 5 TABLET ORAL at 14:42

## 2022-12-05 RX ADMIN — Medication 2: at 13:00

## 2022-12-05 RX ADMIN — Medication 50 MILLIGRAM(S): at 05:34

## 2022-12-05 RX ADMIN — AMIODARONE HYDROCHLORIDE 200 MILLIGRAM(S): 400 TABLET ORAL at 05:34

## 2022-12-05 RX ADMIN — ATORVASTATIN CALCIUM 80 MILLIGRAM(S): 80 TABLET, FILM COATED ORAL at 21:18

## 2022-12-05 RX ADMIN — Medication 8 UNIT(S): at 13:00

## 2022-12-05 RX ADMIN — Medication 5 MILLIGRAM(S): at 21:19

## 2022-12-05 RX ADMIN — Medication 166.67 MILLIGRAM(S): at 01:41

## 2022-12-05 RX ADMIN — Medication 81 MILLIGRAM(S): at 09:53

## 2022-12-05 RX ADMIN — Medication 8 UNIT(S): at 08:16

## 2022-12-05 RX ADMIN — Medication 40 MILLIEQUIVALENT(S): at 09:53

## 2022-12-05 RX ADMIN — SODIUM CHLORIDE 3 MILLILITER(S): 9 INJECTION INTRAMUSCULAR; INTRAVENOUS; SUBCUTANEOUS at 05:40

## 2022-12-05 RX ADMIN — GABAPENTIN 300 MILLIGRAM(S): 400 CAPSULE ORAL at 05:34

## 2022-12-05 RX ADMIN — SODIUM CHLORIDE 3 MILLILITER(S): 9 INJECTION INTRAMUSCULAR; INTRAVENOUS; SUBCUTANEOUS at 14:29

## 2022-12-05 RX ADMIN — Medication 250 MILLIGRAM(S): at 15:27

## 2022-12-05 RX ADMIN — Medication 2: at 22:37

## 2022-12-05 RX ADMIN — SODIUM CHLORIDE 3 MILLILITER(S): 9 INJECTION INTRAMUSCULAR; INTRAVENOUS; SUBCUTANEOUS at 20:58

## 2022-12-05 RX ADMIN — Medication 8 UNIT(S): at 17:16

## 2022-12-05 RX ADMIN — OXYCODONE HYDROCHLORIDE 10 MILLIGRAM(S): 5 TABLET ORAL at 09:54

## 2022-12-05 RX ADMIN — PANTOPRAZOLE SODIUM 40 MILLIGRAM(S): 20 TABLET, DELAYED RELEASE ORAL at 09:54

## 2022-12-05 NOTE — PROGRESS NOTE ADULT - ASSESSMENT
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 Patient was admitted to [facility] initially in May 2022 for GI bleed and found to have EF 30-35%, and multivessel disease but CABG deferred due to hx of alcoholism and patient transferred to rehab. Consult for diabetes management.     1. T2DM, a1c 7.6%  - Bgs well controlled  - Continue admelog 8 units tid with meals  - Continue lantus 32 units qhs  - Farxiga 10mg daily    2. CAD  - S/p CABG, care per primary team    3. HLD  - Continue statin    4. R femoral occlusion   - Vascular checks, vascular team following

## 2022-12-05 NOTE — PROGRESS NOTE ADULT - NS ATTEND OPT1A GEN_ALL_CORE
Exam/Medical decision making
Medical decision making
History/Exam/Medical decision making
History/Exam/Medical decision making
Exam/Medical decision making

## 2022-12-05 NOTE — PROGRESS NOTE ADULT - SUBJECTIVE AND OBJECTIVE BOX
SUBJECTIVE   "i think they had done something on that leg"   admits right leg pain he thinks since possible surgery   having a difficult time decerning a time line at this time     INTERIM HISTORY SIGNIFICANT FOR   arterial duplex significant for right femoral occlusion   seen by vascular surgery - likely not acute   can continue anicoagulaion as per note     Patient is a 67y old  Male who presents with a chief complaint of CAD (29 Nov 2022 03:38)    HPI:  66 yo male former smoker with h/o HTN , CAD, s/p PCI (LEELA x 1 pRCA 2007), ACC/AHA stage C, NYHA functional class 3, HFrEF, depression who was admitted to [facility] in May 2022 for GI bleed. During the admission he had a cardiac work up which included an echo which showed and EF of 30-35%. He had a LHC which showed a  of the mLAD, 50% mLCX stenosis, and an 80% OM2 stenosis. He was referred to CT surgery for CABG, but surgery was deferred due to alcoholism and needed hepatology and gastroenterology consultation. He was worked up by GI and Hepatology, and is now proceeding with CABG, scheduled for tomorrow. Now he presents for [facility] prior to surgical intervention.    LHC: 5/12/2022  CORONARY VESSELS: The coronary circulation is right dominant.  LM:     --  LM: The vessel was large sized. Angiography showed mild atherosclerosis with no flow limiting lesions.  LAD:     --  Mid LAD: There was a 100 % stenosis. The lesion was moderately calcified. This lesion is a chronic total occlusion.  --  Distal LAD: This is an excellent target for bypass.  CX:     --  Mid circumflex: There was a 50 % stenosis.  --  OM2: There was a 80 % stenosis. This is an excellent target for bypass.  RCA:     --  RCA: The vessel was large sized (dominant).  --  Proximal RCA: patent stent.  --  Mid RCA: Patent stent.    Symptoms:        Angina (Class): N/A       Ischemic Symptoms: BOLES (CCS class 3 anginal equivalent)    Heart Failure: ACC/AHA stage C, NYHA functional class 3, HFrEF    Assessment of LVEF:       EF: 30-35%       Assessed by: Echo       Date: 7/18/2022    Prior Cardiac Interventions:       PCI's: LEELA of pRCA in 2007       CABG: N/A    Noninvasive Testing:   Stress Test: N/A    Echo: 7/18/2022  Left Ventricle: Endocardial visualization was enhanced with intravenous echo contrast. The left ventricular internal cavity size is normal. Global LV systolic function was moderately to severely decreased. Left ventricular ejection fraction, by visual estimation, is 30 to 35%. Spectral Doppler shows impaired relaxation pattern of left ventricular myocardial filling (Grade I diastolic dysfunction). Normal LV filling pressures. Segmental wall motion abnormalities in Mid LAD territory.  LV Wall Scoring: The entire apex, mid and apical anterior wall, and mid and apical anteriorseptum are akinetic.  Right Ventricle: Normal right ventricular size and function. TV S' 0.1 m/s.  Left Atrium: The left atrium is normal in size.  Right Atrium: The right atrium is normal in size.  Pericardium: There is no evidence of pericardial effusion.  Mitral Valve: Mild thickening of the anterior and posterior mitral valve leaflets. Trace mitral valve regurgitation is seen.  Tricuspid Valve: Trivial tricuspid regurgitation is visualized.  Aortic Valve: The aortic valve is trileaflet. No evidence of aortic valve regurgitation is seen.  Pulmonic Valve: Structurally normal pulmonic valve, with normal leaflet excursion. Trace pulmonic valve regurgitation.  Aorta: The aortic root and ascending aorta are structurally normal, with no evidence of dilitation.  Pulmonary Artery: The main pulmonary artery is normal in size.  Venous: The inferior vena cava was normal sized, with respiratory size variation greater than 50%.    Antianginal Therapies:        Beta Blockers: Toprol 50 mg daily       Calcium Channel Blockers: N/A       Long Acting Nitrates: N/A       Ranexa: N/A    Associated Risk Factors:        Frailty: N/A       Cerebrovascular Disease: N/A       Chronic Lung Disease: N/A       Peripheral Arterial Disease: N/A       Chronic Kidney Disease (if yes, what is GFR): N/A       Uncontrolled Diabetes (if yes, what is HgbA1C or FBS): N/A       Poorly Controlled Hypertension (if yes, what is SBP): N/A       Morbid Obesity (if yes, what is BMI): N/A       History of Recent Ventricular Arrhythmia: N/A       Inability to Ambulate Safely: N/A       Need for Therapeutic Anticoagulation: N/A       Antiplatelet or Contrast Allergy: N/A    Social History:        Marital:        Tobacco: Former 1 ppd smoker for 38 years, quit 3 years ago.       ETOH: Denies       Drugs: Denies       Caffeine: 1 cup coffee and 3 cans soda daily.    ROS:   General: No fevers/chills. + fatigue  HEENT: + hearing loss (right > left). No visual disturbances. No headaches. No epistaxis.  Pulmonary: No dyspnea. No wheeze. No cough.  CV: No chest pain. + BOLES. No palpitations. No orthopnea. No PND. No edema.  GI: No BRBPR. No melena. No nausea. + hematemesis in May 2022.  : No hematuria.  Neuro: No weakness. No paresthesia. No syncope.. + numbness soles of feet.  Heme: + easy bruising    T(C): 36.6 (11-21-22 @ 08:38), Max: 36.6 (11-21-22 @ 08:38)  HR: 79 (11-21-22 @ 08:38) (79 - 79)  BP: 149/74 (11-21-22 @ 08:38) (149/74 - 149/74)  RR: 16 (11-21-22 @ 08:38) (16 - 16)  SpO2: 97% (11-21-22 @ 08:38) (97% - 97%)  Physical Exam:   General: Awake, alert, speech clear, no acute distress.  Neck: No bruit, no JVD.  Chest: S1, S2. + grade 3/6 systolic murmur. CTA.  Abdomen: Soft. Nondistended.  Extremities: No edema. Pulses: DP: Right: + by doppler, Left: + by doppler, Radial: Right: 2+, Left: 2+ (18 Nov 2022 12:49)    OBJECTIVE  PAST MEDICAL & SURGICAL HISTORY:  Pancreatitis      Hypertension      Myocardial infarct      Alcohol abuse      Colon cancer      History of colon resection      History of heart surgery  cardiac stent        No Known Allergies    Home Medications:  acetaminophen 325 mg oral tablet: 2 tab(s) orally every 6 hours, As needed, Temp greater or equal to 38C (100.4F), Mild Pain (1 - 3) (23 Nov 2022 08:21)  cholecalciferol 1250 mcg (50,000 intl units) oral capsule: 1 cap(s) orally once a week (23 Nov 2022 08:21)  diphenhydrAMINE 50 mg oral capsule: 1 cap(s) orally once a day (at bedtime), As needed, Insomnia (23 Nov 2022 08:21)  Entresto 49 mg-51 mg oral tablet: 1 tab(s) orally 2 times a day (23 Nov 2022 08:21)  folic acid 1 mg oral tablet: 1 tab(s) orally once a day (23 Nov 2022 08:21)  Melatonin 5 mg oral tablet: 1 tab(s) orally once a day (at bedtime) (23 Nov 2022 08:21)  metoprolol succinate 50 mg oral tablet, extended release: 1 tab(s) orally once a day (23 Nov 2022 08:21)  mirtazapine 7.5 mg oral tablet: 2 tab(s) orally once a day (at bedtime) (23 Nov 2022 08:21)  pantoprazole 40 mg oral delayed release tablet: 1 tab(s) orally once a day (before a meal) (23 Nov 2022 08:21)  sertraline 100 mg oral tablet: 1 tab(s) orally once a day (23 Nov 2022 08:21)  simvastatin 40 mg oral tablet: 1 tab(s) orally once a day (at bedtime) (23 Nov 2022 08:21)  thiamine 100 mg oral tablet: 1 tab(s) orally once a day (23 Nov 2022 08:21)    VITALS  Currently in sinus rhythm with vitals as below  ICU Vital Signs Last 24 Hrs  T(C): 36.9 (05 Dec 2022 00:00), Max: 37.1 (04 Dec 2022 12:00)  T(F): 98.5 (05 Dec 2022 00:00), Max: 98.7 (04 Dec 2022 12:00)  HR: 79 (05 Dec 2022 00:00) (79 - 94)  BP: 118/70 (05 Dec 2022 00:00) (102/66 - 160/77)  BP(mean): --  ABP: --  ABP(mean): --  RR: 18 (05 Dec 2022 00:00) (18 - 18)  SpO2: 94% (05 Dec 2022 00:00) (94% - 98%)    O2 Parameters below as of 05 Dec 2022 00:00  Patient On (Oxygen Delivery Method): room air          Adult Advanced Hemodynamics Last 24 Hrs  CVP(mm Hg): --  CVP(cm H2O): --  CO: --  CI: --  PA: --  PA(mean): --  PCWP: --  SVR: --  SVRI: --  PVR: --  PVRI: --  LABS                        9.2    10.43 )-----------( 370      ( 04 Dec 2022 17:40 )             27.9   12-04    135  |  96  |  20.9<H>  ----------------------------<  124<H>  3.8   |  26.0  |  1.34<H>    Ca    9.7      04 Dec 2022 22:00  Mg     1.7     12-04    TPro  6.1<L>  /  Alb  3.0<L>  /  TBili  1.3  /  DBili  x   /  AST  96<H>  /  ALT  58<H>  /  AlkPhos  198<H>  12-04  CAPILLARY BLOOD GLUCOSE      POCT Blood Glucose.: 147 mg/dL (04 Dec 2022 20:38)          IN/OUT    12-03-22 @ 07:01  -  12-04-22 @ 07:00  --------------------------------------------------------  IN: 720 mL / OUT: 300 mL / NET: 420 mL    12-04-22 @ 07:01  -  12-05-22 @ 03:15  --------------------------------------------------------  IN: 240 mL / OUT: 0 mL / NET: 240 mL      IMAGING  personally reviewed imaging   Xray Chest 1 View- PORTABLE-Routine:   ACC: [id number] EXAM:  XR CHEST PORTABLE ROUTINE 1V                        ACC: [id number] EXAM:  XR CHEST PORTABLE URGENT 1V                          PROCEDURE DATE:  12/03/2022          INTERPRETATION:  HISTORY: Admitting Dxs: I25.10 ATHSCL HEART DISEASE OF   NATIVE CORONARY ARTERY W/O ANG PCTRS; ; chest tube removal;  TECHNIQUE: Serial portable chest x-rays, 2 studies.  COMPARISON: 10:31 AM.  FINDINGS/  IMPRESSION:    First study is from 4:27 PM and shows an enlarged heart. There is a   persistent opacity at the left base compatible with infiltrate and left   effusion. The right lung remains clear. Sternal wires are present.. No   pneumothorax    The follow-up is from today at 4 1:00 AM and shows no significant change.    --- End of Report ---            SAV WHITE MD; Attending Interventional Radiologist  This document has been electronically signed. Dec  4 2022  9:23AM (12-04-22 @ 05:00)    CURRENT MEDICATIONS  MEDICATIONS  (STANDING):  aMIOdarone    Tablet   Oral   aMIOdarone    Tablet 200 milliGRAM(s) Oral daily  apixaban 2.5 milliGRAM(s) Oral two times a day  ascorbic acid 500 milliGRAM(s) Oral daily  aspirin enteric coated 81 milliGRAM(s) Oral daily  atorvastatin 80 milliGRAM(s) Oral at bedtime  cyanocobalamin 1000 MICROGram(s) Oral daily  dapagliflozin 10 milliGRAM(s) Oral every 24 hours  ferrous    sulfate 325 milliGRAM(s) Oral daily  folic acid 1 milliGRAM(s) Oral daily  gabapentin 300 milliGRAM(s) Oral three times a day  glucagon  Injectable 1 milliGRAM(s) IntraMuscular once  insulin glargine Injectable (LANTUS) 32 Unit(s) SubCutaneous at bedtime  insulin lispro (ADMELOG) corrective regimen sliding scale   SubCutaneous Before meals and at bedtime  insulin lispro Injectable (ADMELOG) 8 Unit(s) SubCutaneous three times a day before meals  lidocaine   4% Patch 1 Patch Transdermal daily  melatonin 5 milliGRAM(s) Oral at bedtime  metoprolol tartrate 50 milliGRAM(s) Oral three times a day  mirtazapine 15 milliGRAM(s) Oral at bedtime  pantoprazole    Tablet 40 milliGRAM(s) Oral daily  polyethylene glycol 3350 17 Gram(s) Oral daily  potassium chloride    Tablet ER 40 milliEquivalent(s) Oral daily  senna 2 Tablet(s) Oral at bedtime  sodium chloride 0.9% lock flush 3 milliLiter(s) IV Push every 8 hours  thiamine 100 milliGRAM(s) Oral daily  torsemide 20 milliGRAM(s) Oral <User Schedule>  vancomycin  IVPB 1250 milliGRAM(s) IV Intermittent every 12 hours    MEDICATIONS  (PRN):  acetaminophen     Tablet .. 650 milliGRAM(s) Oral every 6 hours PRN Mild Pain (1 - 3)  oxyCODONE    IR 10 milliGRAM(s) Oral every 4 hours PRN Severe Pain (7 - 10)  oxyCODONE    IR 5 milliGRAM(s) Oral every 4 hours PRN Moderate Pain (4 - 6)

## 2022-12-05 NOTE — PROGRESS NOTE ADULT - ASSESSMENT
67 year old male patient former smoker with a medical history of HTN, HLD, type 2 DM (HA1c 7.6 on Metformin and Insulin), CAD s/p PCI to RCA 2007, ETOH cirrhosis, B12/Vit D deficiency, recent hospitalization for GI Bleed 05/2022, admitted on 11/21/22 for repeat cath which confirmed multivessel CAD. Patient underwent CABG X 3 (LIMA-LAD, SVG-OM, SVG-PDA) on 11/22/22 with Dr. Gray. Postoperative course significant for cardiogenic shock and hypotension (resolved s/p volume resuscitation, phenylephrine gtt weaned off), ABLA (stable s/p blood transfusion), hyperglycemia (titrating insulin requirements), and Left pleural effusion (s/p pigtail catheter insertion 11/29, removed 11/30, with residual effusion noted on CT chest with IR pigtail placed 12/1 and removed 12/3). now with right femoral occlusion found on arterial duplex - seen by vascular surgery - likely not acute

## 2022-12-05 NOTE — PROGRESS NOTE ADULT - SUBJECTIVE AND OBJECTIVE BOX
INTERVAL EVENTS:  Follow up diabetes management    ROS: Patient denies chest pain, SOB. +right leg pain    MEDICATIONS  (STANDING):  aMIOdarone    Tablet   Oral   aMIOdarone    Tablet 200 milliGRAM(s) Oral daily  apixaban 2.5 milliGRAM(s) Oral two times a day  ascorbic acid 500 milliGRAM(s) Oral daily  aspirin enteric coated 81 milliGRAM(s) Oral daily  atorvastatin 80 milliGRAM(s) Oral at bedtime  cyanocobalamin 1000 MICROGram(s) Oral daily  dapagliflozin 10 milliGRAM(s) Oral every 24 hours  ferrous    sulfate 325 milliGRAM(s) Oral daily  folic acid 1 milliGRAM(s) Oral daily  gabapentin 300 milliGRAM(s) Oral three times a day  glucagon  Injectable 1 milliGRAM(s) IntraMuscular once  insulin glargine Injectable (LANTUS) 32 Unit(s) SubCutaneous at bedtime  insulin lispro (ADMELOG) corrective regimen sliding scale   SubCutaneous Before meals and at bedtime  insulin lispro Injectable (ADMELOG) 8 Unit(s) SubCutaneous three times a day before meals  lidocaine   4% Patch 1 Patch Transdermal daily  melatonin 5 milliGRAM(s) Oral at bedtime  metoprolol tartrate 50 milliGRAM(s) Oral three times a day  mirtazapine 15 milliGRAM(s) Oral at bedtime  pantoprazole    Tablet 40 milliGRAM(s) Oral daily  polyethylene glycol 3350 17 Gram(s) Oral daily  potassium chloride    Tablet ER 40 milliEquivalent(s) Oral daily  senna 2 Tablet(s) Oral at bedtime  sodium chloride 0.9% lock flush 3 milliLiter(s) IV Push every 8 hours  thiamine 100 milliGRAM(s) Oral daily  torsemide 20 milliGRAM(s) Oral <User Schedule>  vancomycin  IVPB 750 milliGRAM(s) IV Intermittent every 12 hours    MEDICATIONS  (PRN):  acetaminophen     Tablet .. 650 milliGRAM(s) Oral every 6 hours PRN Mild Pain (1 - 3)  oxyCODONE    IR 10 milliGRAM(s) Oral every 4 hours PRN Severe Pain (7 - 10)  oxyCODONE    IR 5 milliGRAM(s) Oral every 4 hours PRN Moderate Pain (4 - 6)    Allergies  No Known Allergies    Vital Signs Last 24 Hrs  T(C): 36.7 (05 Dec 2022 09:37), Max: 37 (04 Dec 2022 20:00)  T(F): 98.1 (05 Dec 2022 09:37), Max: 98.6 (04 Dec 2022 20:00)  HR: 72 (05 Dec 2022 09:37) (72 - 86)  BP: 109/66 (05 Dec 2022 09:37) (104/57 - 122/74)  BP(mean): --  RR: 16 (05 Dec 2022 09:37) (16 - 18)  SpO2: 97% (05 Dec 2022 09:37) (94% - 97%)    Parameters below as of 05 Dec 2022 09:37  Patient On (Oxygen Delivery Method): room air      PHYSICAL EXAM:  General: No apparent distress  Neck: Supple, trachea midline, no thyromegaly  Respiratory: Lungs clear bilaterally, normal rate, effort  Cardiac: +S1, S2, no m/r/g  GI: +BS, soft, non tender, non distended  Extremities: No peripheral edema, no pedal lesions  Neuro: A+O X3, no tremor  Pysch: Affect appropriate   Skin: No acanthosis     LABS:                        9.0    10.38 )-----------( 339      ( 05 Dec 2022 05:30 )             27.8     12-05    136  |  98  |  21.3<H>  ----------------------------<  151<H>  4.3   |  29.0  |  1.09    Ca    9.9      05 Dec 2022 05:30  Mg     2.0     12-05    TPro  6.1<L>  /  Alb  3.0<L>  /  TBili  1.1  /  DBili  x   /  AST  71<H>  /  ALT  55<H>  /  AlkPhos  199<H>  12-05    POCT Blood Glucose.: 187 mg/dL (12-05-22 @ 12:12)  POCT Blood Glucose.: 143 mg/dL (12-05-22 @ 08:08)  POCT Blood Glucose.: 147 mg/dL (12-04-22 @ 20:38)  POCT Blood Glucose.: 137 mg/dL (12-04-22 @ 17:08)    Thyroid Stimulating Hormone, Serum: 1.23 uIU/mL (11-21-22 @ 14:08)  Free Thyroxine, Serum: 1.1 ng/dL (11-21-22 @ 14:08)

## 2022-12-06 LAB
ANION GAP SERPL CALC-SCNC: 12 MMOL/L — SIGNIFICANT CHANGE UP (ref 5–17)
BUN SERPL-MCNC: 18.4 MG/DL — SIGNIFICANT CHANGE UP (ref 8–20)
CALCIUM SERPL-MCNC: 10.2 MG/DL — SIGNIFICANT CHANGE UP (ref 8.4–10.5)
CHLORIDE SERPL-SCNC: 96 MMOL/L — SIGNIFICANT CHANGE UP (ref 96–108)
CO2 SERPL-SCNC: 28 MMOL/L — SIGNIFICANT CHANGE UP (ref 22–29)
CREAT SERPL-MCNC: 1.13 MG/DL — SIGNIFICANT CHANGE UP (ref 0.5–1.3)
CULTURE RESULTS: SIGNIFICANT CHANGE UP
EGFR: 72 ML/MIN/1.73M2 — SIGNIFICANT CHANGE UP
GLUCOSE BLDC GLUCOMTR-MCNC: 156 MG/DL — HIGH (ref 70–99)
GLUCOSE BLDC GLUCOMTR-MCNC: 159 MG/DL — HIGH (ref 70–99)
GLUCOSE BLDC GLUCOMTR-MCNC: 189 MG/DL — HIGH (ref 70–99)
GLUCOSE BLDC GLUCOMTR-MCNC: 200 MG/DL — HIGH (ref 70–99)
GLUCOSE SERPL-MCNC: 136 MG/DL — HIGH (ref 70–99)
HCT VFR BLD CALC: 29.3 % — LOW (ref 39–50)
HGB BLD-MCNC: 9.4 G/DL — LOW (ref 13–17)
MAGNESIUM SERPL-MCNC: 1.9 MG/DL — SIGNIFICANT CHANGE UP (ref 1.6–2.6)
MCHC RBC-ENTMCNC: 31.6 PG — SIGNIFICANT CHANGE UP (ref 27–34)
MCHC RBC-ENTMCNC: 32.1 GM/DL — SIGNIFICANT CHANGE UP (ref 32–36)
MCV RBC AUTO: 98.7 FL — SIGNIFICANT CHANGE UP (ref 80–100)
PLATELET # BLD AUTO: 383 K/UL — SIGNIFICANT CHANGE UP (ref 150–400)
POTASSIUM SERPL-MCNC: 4.2 MMOL/L — SIGNIFICANT CHANGE UP (ref 3.5–5.3)
POTASSIUM SERPL-SCNC: 4.2 MMOL/L — SIGNIFICANT CHANGE UP (ref 3.5–5.3)
RBC # BLD: 2.97 M/UL — LOW (ref 4.2–5.8)
RBC # FLD: 14.8 % — HIGH (ref 10.3–14.5)
SODIUM SERPL-SCNC: 136 MMOL/L — SIGNIFICANT CHANGE UP (ref 135–145)
SPECIMEN SOURCE: SIGNIFICANT CHANGE UP
VANCOMYCIN TROUGH SERPL-MCNC: 10.5 UG/ML — SIGNIFICANT CHANGE UP (ref 10–20)
WBC # BLD: 12 K/UL — HIGH (ref 3.8–10.5)
WBC # FLD AUTO: 12 K/UL — HIGH (ref 3.8–10.5)

## 2022-12-06 PROCEDURE — 99024 POSTOP FOLLOW-UP VISIT: CPT

## 2022-12-06 PROCEDURE — 99232 SBSQ HOSP IP/OBS MODERATE 35: CPT

## 2022-12-06 RX ADMIN — PANTOPRAZOLE SODIUM 40 MILLIGRAM(S): 20 TABLET, DELAYED RELEASE ORAL at 08:43

## 2022-12-06 RX ADMIN — OXYCODONE HYDROCHLORIDE 10 MILLIGRAM(S): 5 TABLET ORAL at 18:40

## 2022-12-06 RX ADMIN — Medication 8 UNIT(S): at 17:44

## 2022-12-06 RX ADMIN — APIXABAN 2.5 MILLIGRAM(S): 2.5 TABLET, FILM COATED ORAL at 05:38

## 2022-12-06 RX ADMIN — OXYCODONE HYDROCHLORIDE 10 MILLIGRAM(S): 5 TABLET ORAL at 06:45

## 2022-12-06 RX ADMIN — DAPAGLIFLOZIN 10 MILLIGRAM(S): 10 TABLET, FILM COATED ORAL at 05:38

## 2022-12-06 RX ADMIN — Medication 20 MILLIGRAM(S): at 08:42

## 2022-12-06 RX ADMIN — Medication 40 MILLIEQUIVALENT(S): at 08:42

## 2022-12-06 RX ADMIN — SODIUM CHLORIDE 3 MILLILITER(S): 9 INJECTION INTRAMUSCULAR; INTRAVENOUS; SUBCUTANEOUS at 05:34

## 2022-12-06 RX ADMIN — Medication 250 MILLIGRAM(S): at 01:00

## 2022-12-06 RX ADMIN — MIRTAZAPINE 15 MILLIGRAM(S): 45 TABLET, ORALLY DISINTEGRATING ORAL at 21:28

## 2022-12-06 RX ADMIN — Medication 2: at 21:32

## 2022-12-06 RX ADMIN — AMIODARONE HYDROCHLORIDE 200 MILLIGRAM(S): 400 TABLET ORAL at 05:38

## 2022-12-06 RX ADMIN — GABAPENTIN 300 MILLIGRAM(S): 400 CAPSULE ORAL at 05:38

## 2022-12-06 RX ADMIN — ATORVASTATIN CALCIUM 80 MILLIGRAM(S): 80 TABLET, FILM COATED ORAL at 21:28

## 2022-12-06 RX ADMIN — Medication 250 MILLIGRAM(S): at 14:36

## 2022-12-06 RX ADMIN — PREGABALIN 1000 MICROGRAM(S): 225 CAPSULE ORAL at 08:43

## 2022-12-06 RX ADMIN — OXYCODONE HYDROCHLORIDE 10 MILLIGRAM(S): 5 TABLET ORAL at 13:01

## 2022-12-06 RX ADMIN — Medication 50 MILLIGRAM(S): at 21:28

## 2022-12-06 RX ADMIN — Medication 5 MILLIGRAM(S): at 21:28

## 2022-12-06 RX ADMIN — Medication 8 UNIT(S): at 08:43

## 2022-12-06 RX ADMIN — OXYCODONE HYDROCHLORIDE 10 MILLIGRAM(S): 5 TABLET ORAL at 12:03

## 2022-12-06 RX ADMIN — Medication 325 MILLIGRAM(S): at 08:43

## 2022-12-06 RX ADMIN — OXYCODONE HYDROCHLORIDE 10 MILLIGRAM(S): 5 TABLET ORAL at 22:14

## 2022-12-06 RX ADMIN — Medication 2: at 12:28

## 2022-12-06 RX ADMIN — Medication 2: at 08:43

## 2022-12-06 RX ADMIN — Medication 81 MILLIGRAM(S): at 08:42

## 2022-12-06 RX ADMIN — INSULIN GLARGINE 32 UNIT(S): 100 INJECTION, SOLUTION SUBCUTANEOUS at 21:31

## 2022-12-06 RX ADMIN — GABAPENTIN 300 MILLIGRAM(S): 400 CAPSULE ORAL at 21:28

## 2022-12-06 RX ADMIN — GABAPENTIN 300 MILLIGRAM(S): 400 CAPSULE ORAL at 13:22

## 2022-12-06 RX ADMIN — Medication 50 MILLIGRAM(S): at 05:38

## 2022-12-06 RX ADMIN — Medication 500 MILLIGRAM(S): at 08:42

## 2022-12-06 RX ADMIN — APIXABAN 2.5 MILLIGRAM(S): 2.5 TABLET, FILM COATED ORAL at 17:43

## 2022-12-06 RX ADMIN — OXYCODONE HYDROCHLORIDE 10 MILLIGRAM(S): 5 TABLET ORAL at 17:49

## 2022-12-06 RX ADMIN — Medication 1 MILLIGRAM(S): at 08:42

## 2022-12-06 RX ADMIN — Medication 8 UNIT(S): at 12:28

## 2022-12-06 RX ADMIN — Medication 50 MILLIGRAM(S): at 13:23

## 2022-12-06 RX ADMIN — OXYCODONE HYDROCHLORIDE 10 MILLIGRAM(S): 5 TABLET ORAL at 07:40

## 2022-12-06 RX ADMIN — SODIUM CHLORIDE 3 MILLILITER(S): 9 INJECTION INTRAMUSCULAR; INTRAVENOUS; SUBCUTANEOUS at 13:10

## 2022-12-06 RX ADMIN — OXYCODONE HYDROCHLORIDE 10 MILLIGRAM(S): 5 TABLET ORAL at 22:44

## 2022-12-06 RX ADMIN — Medication 2: at 17:44

## 2022-12-06 RX ADMIN — Medication 100 MILLIGRAM(S): at 08:42

## 2022-12-06 NOTE — PROGRESS NOTE ADULT - SUBJECTIVE AND OBJECTIVE BOX
INTERVAL EVENTS:  Follow up diabetes management    ROS: Patient denies chest pain, SOB, abd pain, N/V.    MEDICATIONS  (STANDING):  aMIOdarone    Tablet   Oral   aMIOdarone    Tablet 200 milliGRAM(s) Oral daily  apixaban 2.5 milliGRAM(s) Oral two times a day  ascorbic acid 500 milliGRAM(s) Oral daily  aspirin enteric coated 81 milliGRAM(s) Oral daily  atorvastatin 80 milliGRAM(s) Oral at bedtime  cyanocobalamin 1000 MICROGram(s) Oral daily  dapagliflozin 10 milliGRAM(s) Oral every 24 hours  ferrous    sulfate 325 milliGRAM(s) Oral daily  folic acid 1 milliGRAM(s) Oral daily  gabapentin 300 milliGRAM(s) Oral three times a day  glucagon  Injectable 1 milliGRAM(s) IntraMuscular once  insulin glargine Injectable (LANTUS) 32 Unit(s) SubCutaneous at bedtime  insulin lispro (ADMELOG) corrective regimen sliding scale   SubCutaneous Before meals and at bedtime  insulin lispro Injectable (ADMELOG) 8 Unit(s) SubCutaneous three times a day before meals  lidocaine   4% Patch 1 Patch Transdermal daily  melatonin 5 milliGRAM(s) Oral at bedtime  metoprolol tartrate 50 milliGRAM(s) Oral three times a day  mirtazapine 15 milliGRAM(s) Oral at bedtime  pantoprazole    Tablet 40 milliGRAM(s) Oral daily  polyethylene glycol 3350 17 Gram(s) Oral daily  potassium chloride    Tablet ER 40 milliEquivalent(s) Oral daily  senna 2 Tablet(s) Oral at bedtime  sodium chloride 0.9% lock flush 3 milliLiter(s) IV Push every 8 hours  thiamine 100 milliGRAM(s) Oral daily  torsemide 20 milliGRAM(s) Oral <User Schedule>  vancomycin  IVPB 750 milliGRAM(s) IV Intermittent every 12 hours    MEDICATIONS  (PRN):  acetaminophen     Tablet .. 650 milliGRAM(s) Oral every 6 hours PRN Mild Pain (1 - 3)  oxyCODONE    IR 10 milliGRAM(s) Oral every 4 hours PRN Severe Pain (7 - 10)  oxyCODONE    IR 5 milliGRAM(s) Oral every 4 hours PRN Moderate Pain (4 - 6)    Allergies  No Known Allergies    Vital Signs Last 24 Hrs  T(C): 36.7 (06 Dec 2022 12:00), Max: 37.1 (06 Dec 2022 00:00)  T(F): 98 (06 Dec 2022 12:00), Max: 98.7 (06 Dec 2022 00:00)  HR: 78 (06 Dec 2022 12:00) (78 - 90)  BP: 108/62 (06 Dec 2022 12:00) (100/66 - 134/79)  BP(mean): --  RR: 16 (06 Dec 2022 12:00) (16 - 16)  SpO2: 96% (06 Dec 2022 12:00) (94% - 98%)    Parameters below as of 06 Dec 2022 12:00  Patient On (Oxygen Delivery Method): room air      PHYSICAL EXAM:  General: No apparent distress  Neck: Supple, trachea midline, no thyromegaly  Respiratory: Lungs clear bilaterally, normal rate, effort  Cardiac: +S1, S2, no m/r/g  GI: +BS, soft, non tender, non distended  Extremities: No peripheral edema, no pedal lesions  Neuro: A+O X3, no tremor  Pysch: Affect appropriate   Skin: No acanthosis         LABS:                        9.4    12.00 )-----------( 383      ( 06 Dec 2022 04:54 )             29.3     12-06    136  |  96  |  18.4  ----------------------------<  136<H>  4.2   |  28.0  |  1.13    Ca    10.2      06 Dec 2022 04:54  Mg     1.9     12-06    TPro  6.1<L>  /  Alb  3.0<L>  /  TBili  1.1  /  DBili  x   /  AST  71<H>  /  ALT  55<H>  /  AlkPhos  199<H>  12-05      POCT Blood Glucose.: 189 mg/dL (12-06-22 @ 12:05)  POCT Blood Glucose.: 159 mg/dL (12-06-22 @ 08:11)  POCT Blood Glucose.: 185 mg/dL (12-05-22 @ 22:33)  POCT Blood Glucose.: 128 mg/dL (12-05-22 @ 17:14)    Thyroid Stimulating Hormone, Serum: 1.23 uIU/mL (11-21-22 @ 14:08)  Free Thyroxine, Serum: 1.1 ng/dL (11-21-22 @ 14:08)

## 2022-12-06 NOTE — PROGRESS NOTE ADULT - ASSESSMENT
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 Patient was admitted to [facility] initially in May 2022 for GI bleed and found to have EF 30-35%, and multivessel disease but CABG deferred due to hx of alcoholism and patient transferred to rehab. Consult for diabetes management.     1. T2DM, a1c 7.6%  - Bgs well controlled  - Continue admelog 8 units tid with meals  - Continue lantus 32 units qhs  - Farxiga 10mg daily    2. CAD  - S/p CABG, care per primary team    3. HLD  - Continue statin    4. R femoral occlusion   - Vascular checks, vascular team following

## 2022-12-06 NOTE — PROGRESS NOTE ADULT - SUBJECTIVE AND OBJECTIVE BOX
Subjective: Patient seen and assessed s/p CABG. Patient states "I'm good how was your night?" At time of exam, Pt denies chest pain, palpitations, dizziness, headache, shortness of breath, abdominal pain or N/V/D/C.     Pertinent events of the past 24 hours: Uneventful, recovering as expected    VITAL SIGNS  Vital Signs Last 24 Hrs  T(C): 36.9 (12-06-22 @ 05:00), Max: 37.1 (12-06-22 @ 00:00)  T(F): 98.4 (12-06-22 @ 05:00), Max: 98.7 (12-06-22 @ 00:00)  HR: 82 (12-06-22 @ 05:00) (72 - 86)  BP: 124/75 (12-06-22 @ 05:00) (106/64 - 134/79)  RR: 16 (12-06-22 @ 05:00) (16 - 18)  SpO2: 94% (12-06-22 @ 05:00) (94% - 98%)  on (O2)              Telemetry/Alarms:  NSR 70-80s    MEDICATIONS  acetaminophen     Tablet .. 650 milliGRAM(s) Oral every 6 hours PRN  aMIOdarone    Tablet 200 milliGRAM(s) Oral daily  aMIOdarone    Tablet   Oral   apixaban 2.5 milliGRAM(s) Oral two times a day  ascorbic acid 500 milliGRAM(s) Oral daily  aspirin enteric coated 81 milliGRAM(s) Oral daily  atorvastatin 80 milliGRAM(s) Oral at bedtime  cyanocobalamin 1000 MICROGram(s) Oral daily  dapagliflozin 10 milliGRAM(s) Oral every 24 hours  ferrous    sulfate 325 milliGRAM(s) Oral daily  folic acid 1 milliGRAM(s) Oral daily  gabapentin 300 milliGRAM(s) Oral three times a day  glucagon  Injectable 1 milliGRAM(s) IntraMuscular once  insulin glargine Injectable (LANTUS) 32 Unit(s) SubCutaneous at bedtime  insulin lispro (ADMELOG) corrective regimen sliding scale   SubCutaneous Before meals and at bedtime  insulin lispro Injectable (ADMELOG) 8 Unit(s) SubCutaneous three times a day before meals  lidocaine   4% Patch 1 Patch Transdermal daily  melatonin 5 milliGRAM(s) Oral at bedtime  metoprolol tartrate 50 milliGRAM(s) Oral three times a day  mirtazapine 15 milliGRAM(s) Oral at bedtime  oxyCODONE    IR 10 milliGRAM(s) Oral every 4 hours PRN  oxyCODONE    IR 5 milliGRAM(s) Oral every 4 hours PRN  pantoprazole    Tablet 40 milliGRAM(s) Oral daily  polyethylene glycol 3350 17 Gram(s) Oral daily  potassium chloride    Tablet ER 40 milliEquivalent(s) Oral daily  senna 2 Tablet(s) Oral at bedtime  sodium chloride 0.9% lock flush 3 milliLiter(s) IV Push every 8 hours  thiamine 100 milliGRAM(s) Oral daily  torsemide 20 milliGRAM(s) Oral <User Schedule>  vancomycin  IVPB 750 milliGRAM(s) IV Intermittent every 12 hours    PHYSICAL EXAM  Constitutional: NAD  Neuro: A+O x 3, non-focal, speech clear and intact  CV: regular rate, regular rhythm, +S1S2, no murmurs or rub  Pulm/chest: lung sounds diminished at left base, otherwise clear to asculatation, no accessory muscle use noted  Abd: +BS, soft, NT, ND  Ext: SEGURA x 4, no C/C/E, +palpable pedal pulses  Skin: warm, well perfused  Psych: calm, appropriate affect  Incision: midline sternal incision open to air, healing appropriately, no audible sternal click, RLE vein harvest site draining serous fluid from upper and lower incision sites.     Intake and Output  12-04 @ 07:01  -  12-05 @ 07:00  --------------------------------------------------------  IN: 240 mL / OUT: 0 mL / NET: 240 mL    12-05 @ 07:01  -  12-06 @ 05:38  --------------------------------------------------------  IN: 360 mL / OUT: 1400 mL / NET: -1040 mL    Weights:  Daily     Daily   Admit Wt: Drug Dosing Weight  Height (cm): 182.9 (22 Nov 2022 05:48)  Weight (kg): 82.6 (22 Nov 2022 05:48)  BMI (kg/m2): 24.7 (22 Nov 2022 05:48)  BSA (m2): 2.05 (22 Nov 2022 05:48)    All laboratory results, radiology and medications reviewed.    LABS  12-05    136  |  98  |  21.3<H>  ----------------------------<  151<H>  4.3   |  29.0  |  1.09    Ca    9.9      05 Dec 2022 05:30  Mg     2.0     12-05    TPro  6.1<L>  /  Alb  3.0<L>  /  TBili  1.1  /  DBili  x   /  AST  71<H>  /  ALT  55<H>  /  AlkPhos  199<H>  12-05                                 9.0    10.38 )-----------( 339      ( 05 Dec 2022 05:30 )             27.8            CAPILLARY BLOOD GLUCOSE  POCT Blood Glucose.: 185 mg/dL (05 Dec 2022 22:33)  POCT Blood Glucose.: 128 mg/dL (05 Dec 2022 17:14)  POCT Blood Glucose.: 187 mg/dL (05 Dec 2022 12:12)  POCT Blood Glucose.: 143 mg/dL (05 Dec 2022 08:08)           < from: Xray Chest 1 View- PORTABLE-Routine (Xray Chest 1 View- PORTABLE-Routine in AM.) (12.05.22 @ 05:30) >    IMPRESSION:  Partial clearing, left base.    < end of copied text >    < from: US Duplex Arterial Lower Ext Ltd, Right (12.04.22 @ 18:14) >    IMPRESSION:    No right groin hematoma.    Occluded right femoral artery.    < end of copied text >    < from: US Extremity Nonvasc Limited, Right (12.05.22 @ 09:33) >    COMPARISON: None    Multiple transverse and longitudinal high-resolution images of the   posterior medial right thigh were obtained with a linear transducer,   including color Doppler evaluation.    In the region of prior surgical intervention there are 2 complex fluid   collections identified, without evidence for internal vascularity   measuring 26.7 x 1.0 cm and 12.0 x 2.2 cm, which may be related to post   surgical intervention hematoma/seroma. Subcutaneous edema is identified.    IMPRESSION:  As above.    < end of copied text >      < from: TTE Echo Limited or F/U (12.02.22 @ 10:20) >    Summary:   1. Limited for re-eval pericardial effusion s/p CABG.   2. Left ventricular ejection fraction, by visual estimation, is 50 to   55%.   3. Low normal global left ventricular systolic function.   4. Apical septal segment, apical inferior segment, and apex are abnormal   as described above.   5. Normal right ventricular size and function.   6. Normal left atrial size.   7. Normal right atrial size.   8. Small pericardialeffusion. No evidence of tamponade.    < end of copied text >    < from: 12 Lead ECG (12.01.22 @ 07:35) >    Ventricular Rate 76 BPM    Atrial Rate 76 BPM    P-R Interval 158 ms    QRS Duration 122 ms    Q-T Interval 422 ms    QTC Calculation(Bazett) 474 ms    P Axis 49 degrees    R Axis 32 degrees    T Axis 34 degrees    Diagnosis Line *** Poor data quality, interpretation may be adversely affected  Sinus rhythm with Premature supraventricular complexes  Right bundle branch block  Inferior infarct , age undetermined  Anterior infarct , age undetermined  Abnormal ECG    < end of copied text >

## 2022-12-07 LAB
ANION GAP SERPL CALC-SCNC: 11 MMOL/L — SIGNIFICANT CHANGE UP (ref 5–17)
BUN SERPL-MCNC: 17.8 MG/DL — SIGNIFICANT CHANGE UP (ref 8–20)
CALCIUM SERPL-MCNC: 9.7 MG/DL — SIGNIFICANT CHANGE UP (ref 8.4–10.5)
CHLORIDE SERPL-SCNC: 98 MMOL/L — SIGNIFICANT CHANGE UP (ref 96–108)
CO2 SERPL-SCNC: 29 MMOL/L — SIGNIFICANT CHANGE UP (ref 22–29)
CREAT SERPL-MCNC: 1.03 MG/DL — SIGNIFICANT CHANGE UP (ref 0.5–1.3)
EGFR: 80 ML/MIN/1.73M2 — SIGNIFICANT CHANGE UP
GLUCOSE BLDC GLUCOMTR-MCNC: 120 MG/DL — HIGH (ref 70–99)
GLUCOSE BLDC GLUCOMTR-MCNC: 166 MG/DL — HIGH (ref 70–99)
GLUCOSE BLDC GLUCOMTR-MCNC: 175 MG/DL — HIGH (ref 70–99)
GLUCOSE BLDC GLUCOMTR-MCNC: 196 MG/DL — HIGH (ref 70–99)
GLUCOSE SERPL-MCNC: 142 MG/DL — HIGH (ref 70–99)
HCT VFR BLD CALC: 26.9 % — LOW (ref 39–50)
HGB BLD-MCNC: 8.8 G/DL — LOW (ref 13–17)
MAGNESIUM SERPL-MCNC: 1.8 MG/DL — SIGNIFICANT CHANGE UP (ref 1.8–2.6)
MCHC RBC-ENTMCNC: 31.3 PG — SIGNIFICANT CHANGE UP (ref 27–34)
MCHC RBC-ENTMCNC: 32.7 GM/DL — SIGNIFICANT CHANGE UP (ref 32–36)
MCV RBC AUTO: 95.7 FL — SIGNIFICANT CHANGE UP (ref 80–100)
PLATELET # BLD AUTO: 383 K/UL — SIGNIFICANT CHANGE UP (ref 150–400)
POTASSIUM SERPL-MCNC: 3.8 MMOL/L — SIGNIFICANT CHANGE UP (ref 3.5–5.3)
POTASSIUM SERPL-SCNC: 3.8 MMOL/L — SIGNIFICANT CHANGE UP (ref 3.5–5.3)
RBC # BLD: 2.81 M/UL — LOW (ref 4.2–5.8)
RBC # FLD: 14.9 % — HIGH (ref 10.3–14.5)
SODIUM SERPL-SCNC: 137 MMOL/L — SIGNIFICANT CHANGE UP (ref 135–145)
VANCOMYCIN TROUGH SERPL-MCNC: 12.7 UG/ML — SIGNIFICANT CHANGE UP (ref 10–20)
WBC # BLD: 10.13 K/UL — SIGNIFICANT CHANGE UP (ref 3.8–10.5)
WBC # FLD AUTO: 10.13 K/UL — SIGNIFICANT CHANGE UP (ref 3.8–10.5)

## 2022-12-07 PROCEDURE — 99232 SBSQ HOSP IP/OBS MODERATE 35: CPT

## 2022-12-07 PROCEDURE — 71045 X-RAY EXAM CHEST 1 VIEW: CPT | Mod: 26

## 2022-12-07 PROCEDURE — 99223 1ST HOSP IP/OBS HIGH 75: CPT

## 2022-12-07 PROCEDURE — 99024 POSTOP FOLLOW-UP VISIT: CPT

## 2022-12-07 RX ORDER — KETOROLAC TROMETHAMINE 30 MG/ML
15 SYRINGE (ML) INJECTION ONCE
Refills: 0 | Status: DISCONTINUED | OUTPATIENT
Start: 2022-12-07 | End: 2022-12-07

## 2022-12-07 RX ORDER — POTASSIUM CHLORIDE 20 MEQ
40 PACKET (EA) ORAL ONCE
Refills: 0 | Status: COMPLETED | OUTPATIENT
Start: 2022-12-07 | End: 2022-12-07

## 2022-12-07 RX ORDER — OXYCODONE HYDROCHLORIDE 5 MG/1
5 TABLET ORAL EVERY 4 HOURS
Refills: 0 | Status: DISCONTINUED | OUTPATIENT
Start: 2022-12-07 | End: 2022-12-07

## 2022-12-07 RX ORDER — CEFAZOLIN SODIUM 1 G
2000 VIAL (EA) INJECTION EVERY 8 HOURS
Refills: 0 | Status: DISCONTINUED | OUTPATIENT
Start: 2022-12-07 | End: 2022-12-12

## 2022-12-07 RX ORDER — OXYCODONE HYDROCHLORIDE 5 MG/1
10 TABLET ORAL EVERY 4 HOURS
Refills: 0 | Status: DISCONTINUED | OUTPATIENT
Start: 2022-12-07 | End: 2022-12-14

## 2022-12-07 RX ORDER — MAGNESIUM SULFATE 500 MG/ML
2 VIAL (ML) INJECTION ONCE
Refills: 0 | Status: COMPLETED | OUTPATIENT
Start: 2022-12-07 | End: 2022-12-07

## 2022-12-07 RX ADMIN — Medication 1 MILLIGRAM(S): at 12:06

## 2022-12-07 RX ADMIN — OXYCODONE HYDROCHLORIDE 5 MILLIGRAM(S): 5 TABLET ORAL at 02:33

## 2022-12-07 RX ADMIN — Medication 15 MILLIGRAM(S): at 12:04

## 2022-12-07 RX ADMIN — Medication 25 GRAM(S): at 11:57

## 2022-12-07 RX ADMIN — GABAPENTIN 300 MILLIGRAM(S): 400 CAPSULE ORAL at 21:26

## 2022-12-07 RX ADMIN — Medication 100 MILLIGRAM(S): at 15:17

## 2022-12-07 RX ADMIN — OXYCODONE HYDROCHLORIDE 5 MILLIGRAM(S): 5 TABLET ORAL at 09:22

## 2022-12-07 RX ADMIN — DAPAGLIFLOZIN 10 MILLIGRAM(S): 10 TABLET, FILM COATED ORAL at 05:19

## 2022-12-07 RX ADMIN — Medication 2: at 12:08

## 2022-12-07 RX ADMIN — Medication 40 MILLIEQUIVALENT(S): at 13:45

## 2022-12-07 RX ADMIN — APIXABAN 2.5 MILLIGRAM(S): 2.5 TABLET, FILM COATED ORAL at 17:25

## 2022-12-07 RX ADMIN — INSULIN GLARGINE 32 UNIT(S): 100 INJECTION, SOLUTION SUBCUTANEOUS at 22:26

## 2022-12-07 RX ADMIN — ATORVASTATIN CALCIUM 80 MILLIGRAM(S): 80 TABLET, FILM COATED ORAL at 21:26

## 2022-12-07 RX ADMIN — GABAPENTIN 300 MILLIGRAM(S): 400 CAPSULE ORAL at 05:19

## 2022-12-07 RX ADMIN — Medication 650 MILLIGRAM(S): at 17:26

## 2022-12-07 RX ADMIN — Medication 50 MILLIGRAM(S): at 13:45

## 2022-12-07 RX ADMIN — Medication 50 MILLIGRAM(S): at 21:26

## 2022-12-07 RX ADMIN — OXYCODONE HYDROCHLORIDE 5 MILLIGRAM(S): 5 TABLET ORAL at 15:17

## 2022-12-07 RX ADMIN — PREGABALIN 1000 MICROGRAM(S): 225 CAPSULE ORAL at 12:05

## 2022-12-07 RX ADMIN — OXYCODONE HYDROCHLORIDE 5 MILLIGRAM(S): 5 TABLET ORAL at 10:05

## 2022-12-07 RX ADMIN — Medication 500 MILLIGRAM(S): at 12:06

## 2022-12-07 RX ADMIN — LIDOCAINE 1 PATCH: 4 CREAM TOPICAL at 18:43

## 2022-12-07 RX ADMIN — POLYETHYLENE GLYCOL 3350 17 GRAM(S): 17 POWDER, FOR SOLUTION ORAL at 13:45

## 2022-12-07 RX ADMIN — SODIUM CHLORIDE 3 MILLILITER(S): 9 INJECTION INTRAMUSCULAR; INTRAVENOUS; SUBCUTANEOUS at 13:55

## 2022-12-07 RX ADMIN — Medication 100 MILLIGRAM(S): at 21:25

## 2022-12-07 RX ADMIN — Medication 2: at 17:47

## 2022-12-07 RX ADMIN — Medication 8 UNIT(S): at 17:47

## 2022-12-07 RX ADMIN — Medication 50 MILLIGRAM(S): at 05:19

## 2022-12-07 RX ADMIN — Medication 5 MILLIGRAM(S): at 21:26

## 2022-12-07 RX ADMIN — SODIUM CHLORIDE 3 MILLILITER(S): 9 INJECTION INTRAMUSCULAR; INTRAVENOUS; SUBCUTANEOUS at 05:18

## 2022-12-07 RX ADMIN — Medication 15 MILLIGRAM(S): at 19:08

## 2022-12-07 RX ADMIN — OXYCODONE HYDROCHLORIDE 5 MILLIGRAM(S): 5 TABLET ORAL at 19:07

## 2022-12-07 RX ADMIN — GABAPENTIN 300 MILLIGRAM(S): 400 CAPSULE ORAL at 13:45

## 2022-12-07 RX ADMIN — LIDOCAINE 1 PATCH: 4 CREAM TOPICAL at 13:45

## 2022-12-07 RX ADMIN — Medication 325 MILLIGRAM(S): at 12:07

## 2022-12-07 RX ADMIN — APIXABAN 2.5 MILLIGRAM(S): 2.5 TABLET, FILM COATED ORAL at 05:19

## 2022-12-07 RX ADMIN — MIRTAZAPINE 15 MILLIGRAM(S): 45 TABLET, ORALLY DISINTEGRATING ORAL at 21:27

## 2022-12-07 RX ADMIN — Medication 40 MILLIEQUIVALENT(S): at 11:57

## 2022-12-07 RX ADMIN — Medication 81 MILLIGRAM(S): at 12:06

## 2022-12-07 RX ADMIN — Medication 8 UNIT(S): at 12:07

## 2022-12-07 RX ADMIN — Medication 20 MILLIGRAM(S): at 09:22

## 2022-12-07 RX ADMIN — Medication 250 MILLIGRAM(S): at 13:46

## 2022-12-07 RX ADMIN — Medication 250 MILLIGRAM(S): at 02:07

## 2022-12-07 RX ADMIN — Medication 2: at 22:26

## 2022-12-07 RX ADMIN — PANTOPRAZOLE SODIUM 40 MILLIGRAM(S): 20 TABLET, DELAYED RELEASE ORAL at 13:45

## 2022-12-07 RX ADMIN — AMIODARONE HYDROCHLORIDE 200 MILLIGRAM(S): 400 TABLET ORAL at 05:19

## 2022-12-07 RX ADMIN — OXYCODONE HYDROCHLORIDE 5 MILLIGRAM(S): 5 TABLET ORAL at 02:03

## 2022-12-07 RX ADMIN — SODIUM CHLORIDE 3 MILLILITER(S): 9 INJECTION INTRAMUSCULAR; INTRAVENOUS; SUBCUTANEOUS at 00:43

## 2022-12-07 RX ADMIN — OXYCODONE HYDROCHLORIDE 5 MILLIGRAM(S): 5 TABLET ORAL at 16:17

## 2022-12-07 NOTE — PROGRESS NOTE ADULT - SUBJECTIVE AND OBJECTIVE BOX
INTERVAL EVENTS:  Follow up diabetes management    ROS: Patient denies chest pain, SOB, abd pain, N/V.    MEDICATIONS  (STANDING):  aMIOdarone    Tablet   Oral   aMIOdarone    Tablet 200 milliGRAM(s) Oral daily  apixaban 2.5 milliGRAM(s) Oral two times a day  ascorbic acid 500 milliGRAM(s) Oral daily  aspirin enteric coated 81 milliGRAM(s) Oral daily  atorvastatin 80 milliGRAM(s) Oral at bedtime  cyanocobalamin 1000 MICROGram(s) Oral daily  dapagliflozin 10 milliGRAM(s) Oral every 24 hours  ferrous    sulfate 325 milliGRAM(s) Oral daily  folic acid 1 milliGRAM(s) Oral daily  gabapentin 300 milliGRAM(s) Oral three times a day  glucagon  Injectable 1 milliGRAM(s) IntraMuscular once  insulin glargine Injectable (LANTUS) 32 Unit(s) SubCutaneous at bedtime  insulin lispro (ADMELOG) corrective regimen sliding scale   SubCutaneous Before meals and at bedtime  insulin lispro Injectable (ADMELOG) 8 Unit(s) SubCutaneous three times a day before meals  lidocaine   4% Patch 1 Patch Transdermal daily  melatonin 5 milliGRAM(s) Oral at bedtime  metoprolol tartrate 50 milliGRAM(s) Oral three times a day  mirtazapine 15 milliGRAM(s) Oral at bedtime  pantoprazole    Tablet 40 milliGRAM(s) Oral daily  polyethylene glycol 3350 17 Gram(s) Oral daily  potassium chloride    Tablet ER 40 milliEquivalent(s) Oral daily  senna 2 Tablet(s) Oral at bedtime  sodium chloride 0.9% lock flush 3 milliLiter(s) IV Push every 8 hours  thiamine 100 milliGRAM(s) Oral daily  torsemide 20 milliGRAM(s) Oral <User Schedule>  vancomycin  IVPB 750 milliGRAM(s) IV Intermittent every 12 hours    MEDICATIONS  (PRN):  acetaminophen     Tablet .. 650 milliGRAM(s) Oral every 6 hours PRN Mild Pain (1 - 3)  oxyCODONE    IR 5 milliGRAM(s) Oral every 4 hours PRN Moderate Pain (4 - 6)    Allergies  No Known Allergies    Vital Signs Last 24 Hrs  T(C): 36.7 (07 Dec 2022 12:00), Max: 37.1 (06 Dec 2022 21:00)  T(F): 98 (07 Dec 2022 12:00), Max: 98.8 (06 Dec 2022 21:00)  HR: 72 (07 Dec 2022 12:00) (71 - 92)  BP: 131/76 (07 Dec 2022 12:00) (98/58 - 133/70)  BP(mean): --  RR: 18 (07 Dec 2022 12:00) (16 - 18)  SpO2: 97% (07 Dec 2022 12:00) (94% - 97%)    Parameters below as of 07 Dec 2022 12:00  Patient On (Oxygen Delivery Method): room air    PHYSICAL EXAM:  General: No apparent distress  Neck: Supple, trachea midline, no thyromegaly  Respiratory: Lungs clear bilaterally, normal rate, effort  Cardiac: +S1, S2, no m/r/g  GI: +BS, soft, non tender, non distended  Extremities: No peripheral edema, no pedal lesions  Neuro: A+O X3, no tremor  Pysch: Affect appropriate   Skin: No acanthosis       LABS:                        8.8    10.13 )-----------( 383      ( 07 Dec 2022 04:47 )             26.9     12-07    137  |  98  |  17.8  ----------------------------<  142<H>  3.8   |  29.0  |  1.03    Ca    9.7      07 Dec 2022 04:47  Mg     1.8     12-07      POCT Blood Glucose.: 166 mg/dL (12-07-22 @ 12:03)  POCT Blood Glucose.: 120 mg/dL (12-07-22 @ 07:58)  POCT Blood Glucose.: 156 mg/dL (12-06-22 @ 20:57)  POCT Blood Glucose.: 200 mg/dL (12-06-22 @ 16:52)    Thyroid Stimulating Hormone, Serum: 1.23 uIU/mL (11-21-22 @ 14:08)  Free Thyroxine, Serum: 1.1 ng/dL (11-21-22 @ 14:08)

## 2022-12-07 NOTE — CONSULT NOTE ADULT - ASSESSMENT
67y  Male former smoker with h/o HTN , CAD, s/p PCI (LEELA x 1 pRCA 2007), ACC/AHA stage C, NYHA functional class 3, HFrEF, depression. Patient was admitted to [facility] in May 2022 for GI bleed and also had a cardiac work up which included an echo which showed and EF of 30-35%; a LHC which showed occlusion of the mLAD, 50% mLCX stenosis, and an 80% OM2 stenosis. He was referred to CT surgery for CABG. Admit on 11/21 for repeat cath, CABG 11/22 C3L (LIMA-LAD, SVG-OM, SVG-PDA) by Dr Gray. Post was initially in CTICU and downgraded to cardiac floor once he was stable. Post op course complicated by blood transfusion, A fib with RVR, pleural effusion requiring chest tube on 11/29 and removed 11/30, B/L LE edema, residual effusion noted on CT chest 12/1 and IR pigtail placement and removed 12/3; On 12/4 noted tohave worsening swelling of RLE, was found to have Occluded right femoral artery at the mid portion with reconstitution of flow in the distal portion;  was seen by vascular surgery and did not require any surgical intervention. On 12/5 RLE vein harvest site opened to allow for collection of fluid in thigh to drain. Patient was started on Vancomycin on 12/4 - Present.       RLE swelling and tenderness  Possible cellulitis   RLE collections  CAD   s/p CABG 11/22 C3L (LIMA-LAD, SVG-OM, SVG-PDA) by Dr Gray 11/21      - Blood cultures not done  - Check blood cultures if febrile   - CXR  - US with complex fluid collections  - Start Cefazolin   - Continue Vancomycin  - Monitor trough  - Monitor for Vancomycin toxicity   - Vancomycin required at this time pending culture results  - leg elevation  - On diuretics   - If no improvement in next 24 hours would obtain CT scan   - Follow up cultures  - Trend Fever  - Trend WBC      Will Follow    d/w CT Surgery and vascular surgery     
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 Patient was admitted to [facility] initially in May 2022 for GI bleed and found to have EF 30-35%, and multivessel disease but CABG deferred due to hx of alcoholism and patient transferred to rehab.   Consult for diabetes mgmt. A1c 7.6    T2DM in setting of hx of pancreatitis due to EtOH abuse- FS well controlled on insulin gtt  -A1c 7.6  -check sugars AC and bedtime  -ensure diabetic diet  -start lantus 40 units QHS  -start admelog 10 units TID  -continue with insulin sliding scale  -new to insulin, needs to be seen by cde  -EF 30-35%, cannot be started on metformin, consider adding farxiga 5mg daily when downgraded  -check cpeptide and SUKHWINDER due to hx of pancreatitis    CAD- s/p CABG, care per primary team    HLD- continue statin

## 2022-12-07 NOTE — CONSULT NOTE ADULT - CONSULT REQUESTED BY NAME
Recommendation Preamble: During the current COVID-19 epidemic, medical and surgical guidelines have been issued by our state and federal governments as well as our specialty societies.  Our medical and surgical recommendations, for non-emergent care, might be delayed due to the rescheduling of in-office treatments according to these guidelines until the guidelines have eased the current restrictions. In the meantime, the patient has been encouraged to contact us with any concerns and especially if the condition or problem worsens or changes.  Telehealth visits and in-office urgent and emergent visits are available.\\n\\nA synchronous two-way audio-visual communication telehealth visit was performed due to the national COVID-19 emergency and recommended social distancing.
Detail Level: Simple
CT Surg
Dr. Gray
Dr Gray

## 2022-12-07 NOTE — CONSULT NOTE ADULT - SUBJECTIVE AND OBJECTIVE BOX
VASCULAR SURGERY CONSULT     HPI:     67 year old male patient former smoker with a medical history of HTN, HLD, type 2 DM (HA1c 7.6 on Metformin and Insulin), CAD s/p PCI to RCA 2007, ETOH cirrhosis, B12/Vit D deficiency, recent hospitalization for GI Bleed 05/2022, admitted on 11/21/22 for repeat cath which confirmed multivessel CAD. Patient underwent CABG X 3 (LIMA-LAD, SVG-OM, SVG-PDA) on 11/22/22 with Dr. Gray. Postoperative course significant for cardiogenic shock and hypotension (resolved s/p volume resuscitation, phenylephrine gtt weaned off), ABLA (stable s/p blood transfusion), hyperglycemia (titrating insulin requirements), and Left pleural effusion (s/p pigtail catheter insertion 11/29, removed 11/30, with residual effusion noted on CT chest with IR pigtail placed 12/1 and removed 12/3). ecchymosis and pain in right thigh site of vein harvest, US showed no flow in mid SFA, however no pain in calf, no motor or sensory changes below with warm foot,    ROS: 10-system review is otherwise negative except HPI above.      PAST MEDICAL & SURGICAL HISTORY:  Pancreatitis      Hypertension      Myocardial infarct      Alcohol abuse      Colon cancer      History of colon resection      History of heart surgery  cardiac stent        FAMILY HISTORY:  FH: prostate cancer (Father)    Family history of atherosclerosis (Mother)      Family history not pertinent as reviewed with the patient.    SOCIAL HISTORY:  Denies any toxic habits    ALLERGIES: NKA No Known Allergies      HOME MEDICATIONS: ***  Home Medications:  acetaminophen 325 mg oral tablet: 2 tab(s) orally every 6 hours, As needed, Temp greater or equal to 38C (100.4F), Mild Pain (1 - 3) (23 Nov 2022 08:21)  cholecalciferol 1250 mcg (50,000 intl units) oral capsule: 1 cap(s) orally once a week (23 Nov 2022 08:21)  diphenhydrAMINE 50 mg oral capsule: 1 cap(s) orally once a day (at bedtime), As needed, Insomnia (23 Nov 2022 08:21)  Entresto 49 mg-51 mg oral tablet: 1 tab(s) orally 2 times a day (23 Nov 2022 08:21)  folic acid 1 mg oral tablet: 1 tab(s) orally once a day (23 Nov 2022 08:21)  Melatonin 5 mg oral tablet: 1 tab(s) orally once a day (at bedtime) (23 Nov 2022 08:21)  metoprolol succinate 50 mg oral tablet, extended release: 1 tab(s) orally once a day (23 Nov 2022 08:21)  mirtazapine 7.5 mg oral tablet: 2 tab(s) orally once a day (at bedtime) (23 Nov 2022 08:21)  pantoprazole 40 mg oral delayed release tablet: 1 tab(s) orally once a day (before a meal) (23 Nov 2022 08:21)  sertraline 100 mg oral tablet: 1 tab(s) orally once a day (23 Nov 2022 08:21)  simvastatin 40 mg oral tablet: 1 tab(s) orally once a day (at bedtime) (23 Nov 2022 08:21)  thiamine 100 mg oral tablet: 1 tab(s) orally once a day (23 Nov 2022 08:21)      --------------------------------------------------------------------------------------------  Neuro: A+O x 3, non-focal, speech clear and intact  HEENT:  NCAT, No conjuctival edema or icterus, no thrush.    Neck:  Supple, trachea midline  Pulm: +Diminished BSs Left base, no accessory muscle use noted  CV: regular rate, regular rhythm, +S1S2, no murmur noted  Abd: soft, NT, ND, + BS  Ext: SEGURA x 4, trace LLE edema, +1 RLE edema, no cyanosis, distal motor/neuro/circ intact  Skin: warm, dry, perfused  Incisions:  RLE harvest site no obvious discharge.  Vascular: Palp femorals bl doppler Dp/AT, Pop  --------------------------------------------------------------------------------------------    LABS                 9.2    10.43  )----------(  370       ( 04 Dec 2022 17:40 )               27.9      135    |  96     |  20.9   ----------------------------<  124        ( 04 Dec 2022 22:00 )  3.8     |  26.0   |  1.34     Ca    9.7        ( 04 Dec 2022 22:00 )  Mg     1.7       ( 04 Dec 2022 22:00 )    TPro  6.1    /  Alb  3.0    /  TBili  1.3    /  DBili  x      /  AST  96     /  ALT  58     /  AlkPhos  198    ( 04 Dec 2022 04:39 )    LIVER FUNCTIONS - ( 04 Dec 2022 04:39 )  Alb: 3.0 g/dL / Pro: 6.1 g/dL / ALK PHOS: 198 U/L / ALT: 58 U/L / AST: 96 U/L / GGT: x               CAPILLARY BLOOD GLUCOSE              --------------------------------------------------------------------------------------------  IMAGING  reviewed    ASSESSMENT: Patient is a 67y old male with CAD s/p CABG concern for thigh hematoma at site of harvest however US showed no flow in mid SFA, likely not acute given warm leg, no change in vascular exam, no recent exam for comparison, however US performed was not soft tissue evaluating for hematoma along all tract of previous harvest site, and [facility] did not evaluate flow to leg and beyond, at this moment, no contraindication for AC per vascular perspective, trend Hgb consider soft tissue US vs CT (ideally CT A but in acute setting non con is acceptable looking for hematoma), will continue to follow    PLAN:    - No contraindication from vascular perspective for AC pending stable H/H no obvious hematoma on imaging  - continue vascular checks  - will follow, will likely complete further workup for SFA once medically stable from heart perspective
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 Patient was admitted to [facility] initially in May 2022 for GI bleed and found to have EF 30-35%, and multivessel disease but CABG deferred due to hx of alcoholism and patient transferred to rehab.   Consult for diabetes mgmt. A1c 7.6    reports poor diet with junk food for which this was how he was found to have diabetes this year  was started on insulin at rehab  fhx of diabetes in mother  currently on 5 units/hr on insulin gtt    PAST MEDICAL & SURGICAL HISTORY:  Pancreatitis    Hypertension    Myocardial infarct    Alcohol abuse    Colon cancer    History of colon resection    History of heart surgery  cardiac stent      Social History:        Marital:        Tobacco: Former 1 ppd smoker for 38 years, quit 3 years ago.       ETOH: Denies       Drugs: Denies       Caffeine: 1 cup coffee and 3 cans soda daily.    FAMILY HISTORY:  FH: prostate cancer (Father)    Family history of atherosclerosis (Mother)          Allergies    No Known Allergies    Intolerances        REVIEW OF SYSTEMS:    CONSTITUTIONAL: No fever, weight loss, or fatigue  EYES: No eye pain, visual disturbances, or discharge  ENMT:  No difficulty hearing, tinnitus, vertigo; No sinus or throat pain  NECK: No pain or stiffness  RESPIRATORY: No cough, wheezing, chills or hemoptysis; No shortness of breath  CARDIOVASCULAR: No chest pain, palpitations, dizziness, or leg swelling  GASTROINTESTINAL: No abdominal or epigastric pain. No nausea, vomiting, or hematemesis; No diarrhea or constipation. No melena or hematochezia.  NEUROLOGICAL: No headaches, memory loss, loss of strength, numbness, or tremors  SKIN: No itching, burning, rashes, or lesions   MUSCULOSKELETAL: No joint pain or swelling; No muscle, back, or extremity pain  PSYCHIATRIC: No depression, anxiety, mood swings, or difficulty sleeping        MEDICATIONS  (STANDING):  ascorbic acid 500 milliGRAM(s) Oral daily  aspirin enteric coated 81 milliGRAM(s) Oral daily  atorvastatin 80 milliGRAM(s) Oral at bedtime  cefuroxime  IVPB 1500 milliGRAM(s) IV Intermittent every 8 hours  chlorhexidine 2% Cloths 1 Application(s) Topical daily  dextrose 50% Injectable 50 milliLiter(s) IV Push every 15 minutes  dextrose 50% Injectable 25 milliLiter(s) IV Push every 15 minutes  ferrous    sulfate 325 milliGRAM(s) Oral daily  folic acid 1 milliGRAM(s) Oral daily  gabapentin 300 milliGRAM(s) Oral three times a day  insulin regular Infusion 2 Unit(s)/Hr (2 mL/Hr) IV Continuous <Continuous>  iron sucrose IVPB 100 milliGRAM(s) IV Intermittent every 24 hours  lidocaine   4% Patch 1 Patch Transdermal daily  melatonin 5 milliGRAM(s) Oral at bedtime  metoprolol tartrate 25 milliGRAM(s) Oral two times a day  pantoprazole    Tablet 40 milliGRAM(s) Oral daily  polyethylene glycol 3350 17 Gram(s) Oral daily  potassium chloride  10 mEq/50 mL IVPB 10 milliEquivalent(s) IV Intermittent every 1 hour  potassium chloride  10 mEq/50 mL IVPB 10 milliEquivalent(s) IV Intermittent every 1 hour  potassium chloride  10 mEq/50 mL IVPB 10 milliEquivalent(s) IV Intermittent every 1 hour  senna 2 Tablet(s) Oral at bedtime  sodium chloride 0.9%. 1000 milliLiter(s) (10 mL/Hr) IV Continuous <Continuous>  sodium chloride 0.9%. 1000 milliLiter(s) (5 mL/Hr) IV Continuous <Continuous>  vancomycin  IVPB 1250 milliGRAM(s) IV Intermittent every 12 hours    MEDICATIONS  (PRN):  acetaminophen     Tablet .. 650 milliGRAM(s) Oral every 6 hours PRN Mild Pain (1 - 3)  oxyCODONE    IR 5 milliGRAM(s) Oral every 4 hours PRN Moderate Pain (4 - 6)  oxyCODONE    IR 10 milliGRAM(s) Oral every 4 hours PRN Severe Pain (7 - 10)      Vital Signs Last 24 Hrs  T(C): 36.6 (23 Nov 2022 12:01), Max: 37.2 (22 Nov 2022 16:00)  T(F): 97.8 (23 Nov 2022 12:01), Max: 99 (22 Nov 2022 16:00)  HR: 100 (23 Nov 2022 14:00) (93 - 117)  BP: 117/65 (23 Nov 2022 13:00) (90/62 - 146/67)  BP(mean): 85 (23 Nov 2022 13:00) (73 - 103)  RR: 25 (23 Nov 2022 14:00) (10 - 40)  SpO2: 100% (23 Nov 2022 14:00) (94% - 100%)    Parameters below as of 23 Nov 2022 14:00  Patient On (Oxygen Delivery Method): nasal cannula  O2 Flow (L/min): 2        Physical Exam:    Constitutional: NAD, well-developed  HEENT: EOMI, no exophalmos  Respiratory: CTAB, normal respirations, bandaged anterior chest wound  Cardiovascular: S1 and S2, RRR  Gastrointestinal: BS+, soft, ntnd  Extremities: +peripheral edema, bandaged b/l LE  Neurological: AOx3, no focal deficits  Psychiatric: Normal mood and normal affect  Skin: no rashes, no acanthosis    LABS  11-23    141  |  107  |  19.8  ----------------------------<  118<H>  4.6   |  24.0  |  0.82    Ca    8.9      23 Nov 2022 02:00  Phos  1.0     11-22  Mg     1.6     11-23    TPro  5.4<L>  /  Alb  4.0  /  TBili  0.4  /  DBili  x   /  AST  29  /  ALT  11  /  AlkPhos  30<L>  11-23                          8.5    12.12 )-----------( 114      ( 23 Nov 2022 02:00 )             24.5       A1C with Estimated Average Glucose Result: 7.6 % (11-21-22 @ 14:08)  A1C with Estimated Average Glucose Result: 7.5 % (11-21-22 @ 08:15)        Ketone - Urine: Negative (11-21 @ 16:00)    Albumin, Serum: 4.0 g/dL (11-23-22 @ 02:00)  Aspartate Aminotransferase (AST/SGOT): 29 U/L (11-23-22 @ 02:00)  Alkaline Phosphatase, Serum: 30 U/L (11-23-22 @ 02:00)  Alanine Aminotransferase (ALT/SGPT): 11 U/L (11-23-22 @ 02:00)  Alanine Aminotransferase (ALT/SGPT): 11 U/L (11-22-22 @ 13:20)  Albumin, Serum: 3.1 g/dL (11-22-22 @ 13:20)  Alkaline Phosphatase, Serum: 38 U/L (11-22-22 @ 13:20)  Aspartate Aminotransferase (AST/SGOT): 25 U/L (11-22-22 @ 13:20)  Albumin, Serum: 4.4 g/dL (11-22-22 @ 05:34)  Aspartate Aminotransferase (AST/SGOT): 17 U/L (11-22-22 @ 05:34)  Alanine Aminotransferase (ALT/SGPT): 15 U/L (11-22-22 @ 05:34)  Alkaline Phosphatase, Serum: 68 U/L (11-22-22 @ 05:34)    Thyroid Stimulating Hormone, Serum: 1.23 uIU/mL (11-21-22 @ 14:08)  Free Thyroxine, Serum: 1.1 ng/dL (11-21-22 @ 14:08)        CAPILLARY BLOOD GLUCOSE      POCT Blood Glucose.: 164 mg/dL (23 Nov 2022 14:03)  POCT Blood Glucose.: 195 mg/dL (23 Nov 2022 11:43)  POCT Blood Glucose.: 129 mg/dL (23 Nov 2022 09:08)  POCT Blood Glucose.: 127 mg/dL (23 Nov 2022 05:57)  POCT Blood Glucose.: 130 mg/dL (23 Nov 2022 04:48)  POCT Blood Glucose.: 120 mg/dL (23 Nov 2022 03:13)  POCT Blood Glucose.: 118 mg/dL (23 Nov 2022 02:15)  POCT Blood Glucose.: 122 mg/dL (22 Nov 2022 23:54)  POCT Blood Glucose.: 153 mg/dL (22 Nov 2022 21:54)  POCT Blood Glucose.: 142 mg/dL (22 Nov 2022 20:52)  POCT Blood Glucose.: 164 mg/dL (22 Nov 2022 19:53)  POCT Blood Glucose.: 178 mg/dL (22 Nov 2022 18:57)  POCT Blood Glucose.: 201 mg/dL (22 Nov 2022 18:03)  POCT Blood Glucose.: 194 mg/dL (22 Nov 2022 15:58)  POCT Blood Glucose.: 199 mg/dL (22 Nov 2022 15:04)      Imaging    Echo: 7/18/2022  Left Ventricle: Endocardial visualization was enhanced with intravenous echo contrast. The left ventricular internal cavity size is normal. Global LV systolic function was moderately to severely decreased. Left ventricular ejection fraction, by visual estimation, is 30 to 35%. Spectral Doppler shows impaired relaxation pattern of left ventricular myocardial filling (Grade I diastolic dysfunction). Normal LV filling pressures. Segmental wall motion abnormalities in Mid LAD territory.  LV Wall Scoring: The entire apex, mid and apical anterior wall, and mid and apical anteriorseptum are akinetic.  Right Ventricle: Normal right ventricular size and function. TV S' 0.1 m/s.  Left Atrium: The left atrium is normal in size.  Right Atrium: The right atrium is normal in size.  Pericardium: There is no evidence of pericardial effusion.  Mitral Valve: Mild thickening of the anterior and posterior mitral valve leaflets. Trace mitral valve regurgitation is seen.  Tricuspid Valve: Trivial tricuspid regurgitation is visualized.  Aortic Valve: The aortic valve is trileaflet. No evidence of aortic valve regurgitation is seen.  Pulmonic Valve: Structurally normal pulmonic valve, with normal leaflet excursion. Trace pulmonic valve regurgitation.  Aorta: The aortic root and ascending aorta are structurally normal, with no evidence of dilitation.  Pulmonary Artery: The main pulmonary artery is normal in size.  Venous: The inferior vena cava was normal sized, with respiratory size variation greater than 50%.    
[facility]  INFECTIOUS DISEASES at [place] [facility] [place]  =======================================================                               Faustino Shannon MD#   Jonathan Houser MD*                             Isaura Zimmerman MD*   Nessa Titus MD*            Diplomates American Board of Internal Medicine & Infectious Diseases                # [place] Office - Appt - Tel  [phone] Fax [phone]                * [place] Office - Appt - Tel [phone] Fax [phone]                                  Hospital Consult line:  [phone]  =======================================================      [id number]  BRIE WEBB    CC: Patient is a 67y old  Male who presents with a chief complaint of CAD (29 Nov 2022 03:38)      67y  Male former smoker with h/o HTN , CAD, s/p PCI (LEELA x 1 pRCA 2007), ACC/AHA stage C, NYHA functional class 3, HFrEF, depression. Patient was admitted to [facility] in May 2022 for GI bleed and also had a cardiac work up which included an echo which showed and EF of 30-35%; a LHC which showed occlusion of the mLAD, 50% mLCX stenosis, and an 80% OM2 stenosis. He was referred to CT surgery for CABG. Admit on 11/21 for repeat cath, CABG 11/22 C3L (LIMA-LAD, SVG-OM, SVG-PDA) by Dr Gray. Post was initially in CTICU and downgraded to cardiac floor once he was stable. Post op course complicated by blood transfusion, A fib with RVR, pleural effusion requiring chest tube on 11/29 and removed 11/30, B/L LE edema, residual effusion noted on CT chest 12/1 and IR pigtail placement and removed 12/3; On 12/4 noted tohave worsening swelling of RLE, was found to have Occluded right femoral artery at the mid portion with reconstitution of flow in the distal portion;  was seen by vascular surgery and did not require any surgical intervention. On 12/5 RLE vein harvest site opened to allow for collection of fluid in thigh to drain. Patient was started on Vancomycin on 12/4 - Present. ID input requested.       Past Medical & Surgical Hx:  Pancreatitis  Hypertension  Myocardial infarct  Alcohol abuse  Colon cancer  History of colon resection  History of heart surgery cardiac stent      Social Hx:  Former smoker      FAMILY HISTORY:  FH: prostate cancer (Father)  Family history of atherosclerosis (Mother)      Allergies  No Known Allergies       REVIEW OF SYSTEMS:  CONSTITUTIONAL:  No Fever or chills  HEENT:  No diplopia or blurred vision.  No earache, sore throat or runny nose.  CARDIOVASCULAR:  No chest pain  RESPIRATORY:  No cough, shortness of breath  GASTROINTESTINAL:  No nausea, vomiting or diarrhea.  GENITOURINARY:  No dysuria, frequency or urgency.   MUSCULOSKELETAL:  no joint aches, no muscle pain  SKIN:  No change in skin, hair or nails.  NEUROLOGIC:  No Headaches, seizures  PSYCHIATRIC:  No disorder of thought or mood.  ENDOCRINE:  No heat or cold intolerance  HEMATOLOGICAL:  No easy bruising or bleeding.       Physical Exam:  GEN: NAD  HEENT: normocephalic and atraumatic. EOMI. PERRL.    NECK: Supple.   LUNGS: CTA B/L.  HEART: RRR  ABDOMEN: Soft, NT, ND.  +BS.    : No CVA tenderness  EXTREMITIES: RLE  edema  MSK: No joint swelling  NEUROLOGIC: No Focal Deficits   PSYCHIATRIC: Appropriate affect .  SKIN: RLE with swelling, edema and tenderness       Vitals:  T(F): 98 (07 Dec 2022 12:00), Max: 98.8 (06 Dec 2022 21:00)  HR: 72 (07 Dec 2022 12:00)  BP: 131/76 (07 Dec 2022 12:00)  RR: 18 (07 Dec 2022 12:00)  SpO2: 97% (07 Dec 2022 12:00) (94% - 97%)  temp max in last 48H T(F): , Max: 98.8 (12-06-22 @ 21:00)      Current Antibiotics:  vancomycin  IVPB 750 milliGRAM(s) IV Intermittent every 12 hours    Other medications:  aMIOdarone    Tablet   Oral   aMIOdarone    Tablet 200 milliGRAM(s) Oral daily  apixaban 2.5 milliGRAM(s) Oral two times a day  ascorbic acid 500 milliGRAM(s) Oral daily  aspirin enteric coated 81 milliGRAM(s) Oral daily  atorvastatin 80 milliGRAM(s) Oral at bedtime  cyanocobalamin 1000 MICROGram(s) Oral daily  dapagliflozin 10 milliGRAM(s) Oral every 24 hours  ferrous    sulfate 325 milliGRAM(s) Oral daily  folic acid 1 milliGRAM(s) Oral daily  gabapentin 300 milliGRAM(s) Oral three times a day  glucagon  Injectable 1 milliGRAM(s) IntraMuscular once  insulin glargine Injectable (LANTUS) 32 Unit(s) SubCutaneous at bedtime  insulin lispro (ADMELOG) corrective regimen sliding scale   SubCutaneous Before meals and at bedtime  insulin lispro Injectable (ADMELOG) 8 Unit(s) SubCutaneous three times a day before meals  lidocaine   4% Patch 1 Patch Transdermal daily  melatonin 5 milliGRAM(s) Oral at bedtime  metoprolol tartrate 50 milliGRAM(s) Oral three times a day  mirtazapine 15 milliGRAM(s) Oral at bedtime  pantoprazole    Tablet 40 milliGRAM(s) Oral daily  polyethylene glycol 3350 17 Gram(s) Oral daily  potassium chloride    Tablet ER 40 milliEquivalent(s) Oral daily  senna 2 Tablet(s) Oral at bedtime  sodium chloride 0.9% lock flush 3 milliLiter(s) IV Push every 8 hours  thiamine 100 milliGRAM(s) Oral daily  torsemide 20 milliGRAM(s) Oral <User Schedule>                 8.8    10.13 )-----------( 383      ( 07 Dec 2022 04:47 )             26.9     12-07    137  |  98  |  17.8  ----------------------------<  142<H>  3.8   |  29.0  |  1.03    Ca    9.7      07 Dec 2022 04:47  Mg     1.8     12-07      RECENT CULTURES:  12-01 @ 16:30 Pleural Fl Pleural Fluid     No growth at 5 days  polymorphonuclear leukocytes seen  No organisms seen  by cytocentrifuge      WBC Count: 10.13 K/uL (12-07-22 @ 04:47)  WBC Count: 12.00 K/uL (12-06-22 @ 04:54)  WBC Count: 10.38 K/uL (12-05-22 @ 05:30)  WBC Count: 10.43 K/uL (12-04-22 @ 17:40)  WBC Count: 12.94 K/uL (12-04-22 @ 04:39)  WBC Count: 12.98 K/uL (12-03-22 @ 05:53)    Creatinine, Serum: 1.03 mg/dL (12-07-22 @ 04:47)  Creatinine, Serum: 1.13 mg/dL (12-06-22 @ 04:54)  Creatinine, Serum: 1.09 mg/dL (12-05-22 @ 05:30)  Creatinine, Serum: 1.34 mg/dL (12-04-22 @ 22:00)  Creatinine, Serum: 1.00 mg/dL (12-04-22 @ 04:39)  Creatinine, Serum: 1.07 mg/dL (12-03-22 @ 05:53)    COVID-19 PCR: NotDetec (12-04-22 @ 06:50)  COVID-19 PCR: NotDetec (11-29-22 @ 14:15)  COVID-19 PCR: NotDetec (11-21-22 @ 10:15)      < from: US Extremity Nonvasc Limited, Right (12.05.22 @ 09:33) >  ACC: [id number] EXAM:  US NONVASC EXT LTD RT                          PROCEDURE DATE:  12/05/2022      INTERPRETATION:  CLINICAL HISTORY: Status post greater saphenous vein   harvest now with thigh hematoma.    COMPARISON: None    Multiple transverse and longitudinal high-resolution images of the   posterior medial right thigh were obtained with a linear transducer,   including color Doppler evaluation.    In the region of prior surgical intervention there are 2 complex fluid   collections identified, without evidence for internal vascularity   measuring 26.7 x 1.0 cm and 12.0 x 2.2 cm, which may be related to post   surgical intervention hematoma/seroma. Subcutaneous edema is identified.    IMPRESSION:  As above.    --- End of Report ---    < end of copied text >

## 2022-12-07 NOTE — PROGRESS NOTE ADULT - SUBJECTIVE AND OBJECTIVE BOX
Subjective: Patient seen and assessed s/p CABG. Patient states "Rosie been better" At time of exam, Pt denies chest pain, palpitations, dizziness, headache, shortness of breath, abdominal pain or N/V/D/C.    Pertinent events of the past 24 hours: Uneventful, recovering as expected    VITAL SIGNS  Vital Signs Last 24 Hrs  T(C): 36.8 (12-07-22 @ 02:09), Max: 37.1 (12-06-22 @ 21:00)  T(F): 98.2 (12-07-22 @ 02:09), Max: 98.8 (12-06-22 @ 21:00)  HR: 92 (12-07-22 @ 02:09) (74 - 92)  BP: 133/70 (12-07-22 @ 02:09) (98/58 - 133/70)  RR: 16 (12-07-22 @ 02:09) (16 - 18)  SpO2: 97% (12-07-22 @ 02:09) (94% - 97%)  on (O2)              Telemetry/Alarms:  NSR 80s    MEDICATIONS  acetaminophen     Tablet .. 650 milliGRAM(s) Oral every 6 hours PRN  aMIOdarone    Tablet   Oral   aMIOdarone    Tablet 200 milliGRAM(s) Oral daily  apixaban 2.5 milliGRAM(s) Oral two times a day  ascorbic acid 500 milliGRAM(s) Oral daily  aspirin enteric coated 81 milliGRAM(s) Oral daily  atorvastatin 80 milliGRAM(s) Oral at bedtime  cyanocobalamin 1000 MICROGram(s) Oral daily  dapagliflozin 10 milliGRAM(s) Oral every 24 hours  ferrous    sulfate 325 milliGRAM(s) Oral daily  folic acid 1 milliGRAM(s) Oral daily  gabapentin 300 milliGRAM(s) Oral three times a day  insulin glargine Injectable (LANTUS) 32 Unit(s) SubCutaneous at bedtime  insulin lispro (ADMELOG) corrective regimen sliding scale   SubCutaneous Before meals and at bedtime  insulin lispro Injectable (ADMELOG) 8 Unit(s) SubCutaneous three times a day before meals  lidocaine   4% Patch 1 Patch Transdermal daily  melatonin 5 milliGRAM(s) Oral at bedtime  metoprolol tartrate 50 milliGRAM(s) Oral three times a day  mirtazapine 15 milliGRAM(s) Oral at bedtime  oxyCODONE    IR 5 milliGRAM(s) Oral every 4 hours PRN  pantoprazole    Tablet 40 milliGRAM(s) Oral daily  polyethylene glycol 3350 17 Gram(s) Oral daily  potassium chloride    Tablet ER 40 milliEquivalent(s) Oral daily  senna 2 Tablet(s) Oral at bedtime  sodium chloride 0.9% lock flush 3 milliLiter(s) IV Push every 8 hours  thiamine 100 milliGRAM(s) Oral daily  torsemide 20 milliGRAM(s) Oral <User Schedule>  vancomycin  IVPB 750 milliGRAM(s) IV Intermittent every 12 hours    PHYSICAL EXAM  Constitutional: NAD  Neuro: A+O x 3, non-focal, speech clear and intact  CV: regular rate, regular rhythm, +S1S2, no murmurs or rub  Pulm/chest: lung sounds diminished at left base, otherwise clear to ascultation no accessory muscle use noted  Abd: +BS, soft, NT, ND  Ext: SEGURA x 4, no C/C/E, +palpable pedal pulses  Skin: warm, well perfused  Psych: calm, appropriate affect  Incision: midline sternal incision open to air, healing appropriately, no audible sternal click, RLE vein harvest site draining serous fluid from upper and lower incision sites.     Intake and Output  12-05 @ 07:01  -  12-06 @ 07:00  --------------------------------------------------------  IN: 360 mL / OUT: 1700 mL / NET: -1340 mL    12-06 @ 07:01  -  12-07 @ 05:48  --------------------------------------------------------  IN: 640 mL / OUT: 800 mL / NET: -160 mL    Weights:  Daily     Daily   Admit Wt: Drug Dosing Weight  Height (cm): 182.9 (22 Nov 2022 05:48)  Weight (kg): 82.6 (22 Nov 2022 05:48)  BMI (kg/m2): 24.7 (22 Nov 2022 05:48)  BSA (m2): 2.05 (22 Nov 2022 05:48)    All laboratory results, radiology and medications reviewed.    LABS  12-06    136  |  96  |  18.4  ----------------------------<  136<H>  4.2   |  28.0  |  1.13    Ca    10.2      06 Dec 2022 04:54  Mg     1.9     12-06                                   9.4    12.00 )-----------( 383      ( 06 Dec 2022 04:54 )             29.3            CAPILLARY BLOOD GLUCOSE  POCT Blood Glucose.: 156 mg/dL (06 Dec 2022 20:57)  POCT Blood Glucose.: 200 mg/dL (06 Dec 2022 16:52)  POCT Blood Glucose.: 189 mg/dL (06 Dec 2022 12:05)  POCT Blood Glucose.: 159 mg/dL (06 Dec 2022 08:11)         < from: Xray Chest 1 View- PORTABLE-Routine (Xray Chest 1 View- PORTABLE-Routine in AM.) (12.05.22 @ 05:30) >    IMPRESSION:  Partial clearing, left base.    < end of copied text >    < from: US Extremity Nonvasc Limited, Right (12.05.22 @ 09:33) >    COMPARISON: None    Multiple transverse and longitudinal high-resolution images of the   posterior medial right thigh were obtained with a linear transducer,   including color Doppler evaluation.    In the region of prior surgical intervention there are 2 complex fluid   collections identified, without evidence for internal vascularity   measuring 26.7 x 1.0 cm and 12.0 x 2.2 cm, which may be related to post   surgical intervention hematoma/seroma. Subcutaneous edema is identified.    IMPRESSION:  As above.    < end of copied text >    < from: US Duplex Arterial Lower Ext Ltd, Right (12.04.22 @ 18:14) >    IMPRESSION:    No right groin hematoma.    Occluded right femoral artery.    < end of copied text >      < from: TTE Echo Limited or F/U (12.02.22 @ 10:20) >    Summary:   1. Limited for re-eval pericardial effusion s/p CABG.   2. Left ventricular ejection fraction, by visual estimation, is 50 to   55%.   3. Low normal global left ventricular systolic function.   4. Apical septal segment, apical inferior segment, and apex are abnormal   as described above.   5. Normal right ventricular size and function.   6. Normal left atrial size.   7. Normal right atrial size.   8. Small pericardialeffusion. No evidence of tamponade.    < end of copied text >    < from: 12 Lead ECG (12.01.22 @ 07:35) >    Ventricular Rate 76 BPM    Atrial Rate 76 BPM    P-R Interval 158 ms    QRS Duration 122 ms    Q-T Interval 422 ms    QTC Calculation(Bazett) 474 ms    P Axis 49 degrees    R Axis 32 degrees    T Axis 34 degrees    Diagnosis Line *** Poor data quality, interpretation may be adversely affected  Sinus rhythm with Premature supraventricular complexes  Right bundle branch block  Inferior infarct , age undetermined  Anterior infarct , age undetermined  Abnormal ECG    < end of copied text >          Subjective: Patient seen and assessed s/p CABG. Patient states "Rosie been better" At time of exam, Pt denies chest pain, palpitations, dizziness, headache, shortness of breath, abdominal pain or N/V/D/C.    Pertinent events of the past 24 hours: Uneventful, recovering as expected    VITAL SIGNS  Vital Signs Last 24 Hrs  T(C): 36.8 (12-07-22 @ 02:09), Max: 37.1 (12-06-22 @ 21:00)  T(F): 98.2 (12-07-22 @ 02:09), Max: 98.8 (12-06-22 @ 21:00)  HR: 92 (12-07-22 @ 02:09) (74 - 92)  BP: 133/70 (12-07-22 @ 02:09) (98/58 - 133/70)  RR: 16 (12-07-22 @ 02:09) (16 - 18)  SpO2: 97% (12-07-22 @ 02:09) (94% - 97%)  on (O2)              Telemetry/Alarms:  NSR 80s    MEDICATIONS  acetaminophen     Tablet .. 650 milliGRAM(s) Oral every 6 hours PRN  aMIOdarone    Tablet   Oral   aMIOdarone    Tablet 200 milliGRAM(s) Oral daily  apixaban 2.5 milliGRAM(s) Oral two times a day  ascorbic acid 500 milliGRAM(s) Oral daily  aspirin enteric coated 81 milliGRAM(s) Oral daily  atorvastatin 80 milliGRAM(s) Oral at bedtime  cyanocobalamin 1000 MICROGram(s) Oral daily  dapagliflozin 10 milliGRAM(s) Oral every 24 hours  ferrous    sulfate 325 milliGRAM(s) Oral daily  folic acid 1 milliGRAM(s) Oral daily  gabapentin 300 milliGRAM(s) Oral three times a day  insulin glargine Injectable (LANTUS) 32 Unit(s) SubCutaneous at bedtime  insulin lispro (ADMELOG) corrective regimen sliding scale   SubCutaneous Before meals and at bedtime  insulin lispro Injectable (ADMELOG) 8 Unit(s) SubCutaneous three times a day before meals  lidocaine   4% Patch 1 Patch Transdermal daily  melatonin 5 milliGRAM(s) Oral at bedtime  metoprolol tartrate 50 milliGRAM(s) Oral three times a day  mirtazapine 15 milliGRAM(s) Oral at bedtime  oxyCODONE    IR 5 milliGRAM(s) Oral every 4 hours PRN  pantoprazole    Tablet 40 milliGRAM(s) Oral daily  polyethylene glycol 3350 17 Gram(s) Oral daily  potassium chloride    Tablet ER 40 milliEquivalent(s) Oral daily  senna 2 Tablet(s) Oral at bedtime  sodium chloride 0.9% lock flush 3 milliLiter(s) IV Push every 8 hours  thiamine 100 milliGRAM(s) Oral daily  torsemide 20 milliGRAM(s) Oral <User Schedule>  vancomycin  IVPB 750 milliGRAM(s) IV Intermittent every 12 hours    PHYSICAL EXAM  Constitutional: NAD  Neuro: A+O x 3, non-focal, speech clear and intact  CV: regular rate, regular rhythm, +S1S2, no murmurs or rub  Pulm/chest: lung sounds diminished at left base, otherwise clear to ascultation no accessory muscle use noted  Abd: +BS, soft, NT, ND  Ext: SEGURA x 4, no C/C/E, +palpable pedal pulses  Skin: warm, well perfused  Psych: calm, appropriate affect  Incision: midline sternal incision open to air, healing appropriately, no audible sternal click, RLE vein harvest site draining serous fluid from upper and lower incision sites, warm hard induated posterior thigh    Intake and Output  12-05 @ 07:01  -  12-06 @ 07:00  --------------------------------------------------------  IN: 360 mL / OUT: 1700 mL / NET: -1340 mL    12-06 @ 07:01  -  12-07 @ 05:48  --------------------------------------------------------  IN: 640 mL / OUT: 800 mL / NET: -160 mL    Weights:  Daily     Daily   Admit Wt: Drug Dosing Weight  Height (cm): 182.9 (22 Nov 2022 05:48)  Weight (kg): 82.6 (22 Nov 2022 05:48)  BMI (kg/m2): 24.7 (22 Nov 2022 05:48)  BSA (m2): 2.05 (22 Nov 2022 05:48)    All laboratory results, radiology and medications reviewed.    LABS  12-06    136  |  96  |  18.4  ----------------------------<  136<H>  4.2   |  28.0  |  1.13    Ca    10.2      06 Dec 2022 04:54  Mg     1.9     12-06                                   9.4    12.00 )-----------( 383      ( 06 Dec 2022 04:54 )             29.3            CAPILLARY BLOOD GLUCOSE  POCT Blood Glucose.: 156 mg/dL (06 Dec 2022 20:57)  POCT Blood Glucose.: 200 mg/dL (06 Dec 2022 16:52)  POCT Blood Glucose.: 189 mg/dL (06 Dec 2022 12:05)  POCT Blood Glucose.: 159 mg/dL (06 Dec 2022 08:11)         < from: Xray Chest 1 View- PORTABLE-Routine (Xray Chest 1 View- PORTABLE-Routine in AM.) (12.05.22 @ 05:30) >    IMPRESSION:  Partial clearing, left base.    < end of copied text >    < from: US Extremity Nonvasc Limited, Right (12.05.22 @ 09:33) >    COMPARISON: None    Multiple transverse and longitudinal high-resolution images of the   posterior medial right thigh were obtained with a linear transducer,   including color Doppler evaluation.    In the region of prior surgical intervention there are 2 complex fluid   collections identified, without evidence for internal vascularity   measuring 26.7 x 1.0 cm and 12.0 x 2.2 cm, which may be related to post   surgical intervention hematoma/seroma. Subcutaneous edema is identified.    IMPRESSION:  As above.    < end of copied text >    < from: US Duplex Arterial Lower Ext Ltd, Right (12.04.22 @ 18:14) >    IMPRESSION:    No right groin hematoma.    Occluded right femoral artery.    < end of copied text >      < from: TTE Echo Limited or F/U (12.02.22 @ 10:20) >    Summary:   1. Limited for re-eval pericardial effusion s/p CABG.   2. Left ventricular ejection fraction, by visual estimation, is 50 to   55%.   3. Low normal global left ventricular systolic function.   4. Apical septal segment, apical inferior segment, and apex are abnormal   as described above.   5. Normal right ventricular size and function.   6. Normal left atrial size.   7. Normal right atrial size.   8. Small pericardialeffusion. No evidence of tamponade.    < end of copied text >    < from: 12 Lead ECG (12.01.22 @ 07:35) >    Ventricular Rate 76 BPM    Atrial Rate 76 BPM    P-R Interval 158 ms    QRS Duration 122 ms    Q-T Interval 422 ms    QTC Calculation(Bazett) 474 ms    P Axis 49 degrees    R Axis 32 degrees    T Axis 34 degrees    Diagnosis Line *** Poor data quality, interpretation may be adversely affected  Sinus rhythm with Premature supraventricular complexes  Right bundle branch block  Inferior infarct , age undetermined  Anterior infarct , age undetermined  Abnormal ECG    < end of copied text >

## 2022-12-08 LAB
ANION GAP SERPL CALC-SCNC: 10 MMOL/L — SIGNIFICANT CHANGE UP (ref 5–17)
BUN SERPL-MCNC: 19.9 MG/DL — SIGNIFICANT CHANGE UP (ref 8–20)
CALCIUM SERPL-MCNC: 9.9 MG/DL — SIGNIFICANT CHANGE UP (ref 8.4–10.5)
CHLORIDE SERPL-SCNC: 98 MMOL/L — SIGNIFICANT CHANGE UP (ref 96–108)
CO2 SERPL-SCNC: 29 MMOL/L — SIGNIFICANT CHANGE UP (ref 22–29)
CREAT SERPL-MCNC: 1.24 MG/DL — SIGNIFICANT CHANGE UP (ref 0.5–1.3)
EGFR: 64 ML/MIN/1.73M2 — SIGNIFICANT CHANGE UP
GLUCOSE BLDC GLUCOMTR-MCNC: 116 MG/DL — HIGH (ref 70–99)
GLUCOSE BLDC GLUCOMTR-MCNC: 148 MG/DL — HIGH (ref 70–99)
GLUCOSE BLDC GLUCOMTR-MCNC: 152 MG/DL — HIGH (ref 70–99)
GLUCOSE BLDC GLUCOMTR-MCNC: 184 MG/DL — HIGH (ref 70–99)
GLUCOSE SERPL-MCNC: 171 MG/DL — HIGH (ref 70–99)
HCT VFR BLD CALC: 29.1 % — LOW (ref 39–50)
HGB BLD-MCNC: 9 G/DL — LOW (ref 13–17)
MAGNESIUM SERPL-MCNC: 2.2 MG/DL — SIGNIFICANT CHANGE UP (ref 1.6–2.6)
MCHC RBC-ENTMCNC: 30.7 PG — SIGNIFICANT CHANGE UP (ref 27–34)
MCHC RBC-ENTMCNC: 30.9 GM/DL — LOW (ref 32–36)
MCV RBC AUTO: 99.3 FL — SIGNIFICANT CHANGE UP (ref 80–100)
PLATELET # BLD AUTO: 394 K/UL — SIGNIFICANT CHANGE UP (ref 150–400)
POTASSIUM SERPL-MCNC: 4.7 MMOL/L — SIGNIFICANT CHANGE UP (ref 3.5–5.3)
POTASSIUM SERPL-SCNC: 4.7 MMOL/L — SIGNIFICANT CHANGE UP (ref 3.5–5.3)
RBC # BLD: 2.93 M/UL — LOW (ref 4.2–5.8)
RBC # FLD: 15 % — HIGH (ref 10.3–14.5)
SODIUM SERPL-SCNC: 137 MMOL/L — SIGNIFICANT CHANGE UP (ref 135–145)
WBC # BLD: 8.35 K/UL — SIGNIFICANT CHANGE UP (ref 3.8–10.5)
WBC # FLD AUTO: 8.35 K/UL — SIGNIFICANT CHANGE UP (ref 3.8–10.5)

## 2022-12-08 PROCEDURE — 71045 X-RAY EXAM CHEST 1 VIEW: CPT | Mod: 26

## 2022-12-08 PROCEDURE — 99232 SBSQ HOSP IP/OBS MODERATE 35: CPT

## 2022-12-08 RX ADMIN — OXYCODONE HYDROCHLORIDE 10 MILLIGRAM(S): 5 TABLET ORAL at 11:59

## 2022-12-08 RX ADMIN — Medication 2: at 17:26

## 2022-12-08 RX ADMIN — GABAPENTIN 300 MILLIGRAM(S): 400 CAPSULE ORAL at 13:41

## 2022-12-08 RX ADMIN — OXYCODONE HYDROCHLORIDE 10 MILLIGRAM(S): 5 TABLET ORAL at 05:39

## 2022-12-08 RX ADMIN — INSULIN GLARGINE 32 UNIT(S): 100 INJECTION, SOLUTION SUBCUTANEOUS at 21:29

## 2022-12-08 RX ADMIN — Medication 50 MILLIGRAM(S): at 21:28

## 2022-12-08 RX ADMIN — Medication 8 UNIT(S): at 08:52

## 2022-12-08 RX ADMIN — APIXABAN 2.5 MILLIGRAM(S): 2.5 TABLET, FILM COATED ORAL at 17:28

## 2022-12-08 RX ADMIN — OXYCODONE HYDROCHLORIDE 10 MILLIGRAM(S): 5 TABLET ORAL at 12:15

## 2022-12-08 RX ADMIN — ATORVASTATIN CALCIUM 80 MILLIGRAM(S): 80 TABLET, FILM COATED ORAL at 21:28

## 2022-12-08 RX ADMIN — Medication 1 MILLIGRAM(S): at 12:00

## 2022-12-08 RX ADMIN — Medication 650 MILLIGRAM(S): at 18:05

## 2022-12-08 RX ADMIN — Medication 5 MILLIGRAM(S): at 21:28

## 2022-12-08 RX ADMIN — OXYCODONE HYDROCHLORIDE 10 MILLIGRAM(S): 5 TABLET ORAL at 22:00

## 2022-12-08 RX ADMIN — OXYCODONE HYDROCHLORIDE 10 MILLIGRAM(S): 5 TABLET ORAL at 21:34

## 2022-12-08 RX ADMIN — LIDOCAINE 1 PATCH: 4 CREAM TOPICAL at 12:01

## 2022-12-08 RX ADMIN — Medication 8 UNIT(S): at 17:26

## 2022-12-08 RX ADMIN — SODIUM CHLORIDE 3 MILLILITER(S): 9 INJECTION INTRAMUSCULAR; INTRAVENOUS; SUBCUTANEOUS at 23:56

## 2022-12-08 RX ADMIN — AMIODARONE HYDROCHLORIDE 200 MILLIGRAM(S): 400 TABLET ORAL at 05:34

## 2022-12-08 RX ADMIN — Medication 250 MILLIGRAM(S): at 13:46

## 2022-12-08 RX ADMIN — Medication 650 MILLIGRAM(S): at 12:15

## 2022-12-08 RX ADMIN — OXYCODONE HYDROCHLORIDE 10 MILLIGRAM(S): 5 TABLET ORAL at 18:05

## 2022-12-08 RX ADMIN — APIXABAN 2.5 MILLIGRAM(S): 2.5 TABLET, FILM COATED ORAL at 05:35

## 2022-12-08 RX ADMIN — PANTOPRAZOLE SODIUM 40 MILLIGRAM(S): 20 TABLET, DELAYED RELEASE ORAL at 12:00

## 2022-12-08 RX ADMIN — Medication 250 MILLIGRAM(S): at 01:59

## 2022-12-08 RX ADMIN — OXYCODONE HYDROCHLORIDE 10 MILLIGRAM(S): 5 TABLET ORAL at 17:32

## 2022-12-08 RX ADMIN — SODIUM CHLORIDE 3 MILLILITER(S): 9 INJECTION INTRAMUSCULAR; INTRAVENOUS; SUBCUTANEOUS at 14:56

## 2022-12-08 RX ADMIN — OXYCODONE HYDROCHLORIDE 10 MILLIGRAM(S): 5 TABLET ORAL at 06:00

## 2022-12-08 RX ADMIN — DAPAGLIFLOZIN 10 MILLIGRAM(S): 10 TABLET, FILM COATED ORAL at 05:34

## 2022-12-08 RX ADMIN — Medication 50 MILLIGRAM(S): at 13:41

## 2022-12-08 RX ADMIN — PREGABALIN 1000 MICROGRAM(S): 225 CAPSULE ORAL at 17:28

## 2022-12-08 RX ADMIN — SENNA PLUS 2 TABLET(S): 8.6 TABLET ORAL at 21:28

## 2022-12-08 RX ADMIN — SODIUM CHLORIDE 3 MILLILITER(S): 9 INJECTION INTRAMUSCULAR; INTRAVENOUS; SUBCUTANEOUS at 01:42

## 2022-12-08 RX ADMIN — SODIUM CHLORIDE 3 MILLILITER(S): 9 INJECTION INTRAMUSCULAR; INTRAVENOUS; SUBCUTANEOUS at 05:30

## 2022-12-08 RX ADMIN — Medication 500 MILLIGRAM(S): at 12:02

## 2022-12-08 RX ADMIN — Medication 2: at 08:52

## 2022-12-08 RX ADMIN — Medication 20 MILLIGRAM(S): at 12:00

## 2022-12-08 RX ADMIN — GABAPENTIN 300 MILLIGRAM(S): 400 CAPSULE ORAL at 21:28

## 2022-12-08 RX ADMIN — Medication 40 MILLIEQUIVALENT(S): at 11:59

## 2022-12-08 RX ADMIN — MIRTAZAPINE 15 MILLIGRAM(S): 45 TABLET, ORALLY DISINTEGRATING ORAL at 21:30

## 2022-12-08 RX ADMIN — GABAPENTIN 300 MILLIGRAM(S): 400 CAPSULE ORAL at 05:35

## 2022-12-08 RX ADMIN — Medication 50 MILLIGRAM(S): at 05:35

## 2022-12-08 RX ADMIN — Medication 81 MILLIGRAM(S): at 12:00

## 2022-12-08 RX ADMIN — Medication 100 MILLIGRAM(S): at 05:36

## 2022-12-08 RX ADMIN — Medication 8 UNIT(S): at 11:59

## 2022-12-08 RX ADMIN — Medication 100 MILLIGRAM(S): at 11:59

## 2022-12-08 RX ADMIN — Medication 100 MILLIGRAM(S): at 13:41

## 2022-12-08 RX ADMIN — Medication 650 MILLIGRAM(S): at 12:01

## 2022-12-08 RX ADMIN — Medication 650 MILLIGRAM(S): at 17:33

## 2022-12-08 RX ADMIN — POLYETHYLENE GLYCOL 3350 17 GRAM(S): 17 POWDER, FOR SOLUTION ORAL at 12:01

## 2022-12-08 RX ADMIN — Medication 100 MILLIGRAM(S): at 21:29

## 2022-12-08 RX ADMIN — Medication 325 MILLIGRAM(S): at 12:00

## 2022-12-08 NOTE — PHARMACOTHERAPY INTERVENTION NOTE - COMMENTS
Dose adjusted to 750mg q12 based on indication and trough ordered prior to 4th dose
Vancomycin level ordered

## 2022-12-08 NOTE — PROGRESS NOTE ADULT - SUBJECTIVE AND OBJECTIVE BOX
INTERVAL EVENTS:  Follow up diabetes management    ROS: Patient denies chest pain, SOB, abd pain, N/V.     MEDICATIONS  (STANDING):  aMIOdarone    Tablet   Oral   aMIOdarone    Tablet 200 milliGRAM(s) Oral daily  apixaban 2.5 milliGRAM(s) Oral two times a day  ascorbic acid 500 milliGRAM(s) Oral daily  aspirin enteric coated 81 milliGRAM(s) Oral daily  atorvastatin 80 milliGRAM(s) Oral at bedtime  ceFAZolin   IVPB 2000 milliGRAM(s) IV Intermittent every 8 hours  cyanocobalamin 1000 MICROGram(s) Oral daily  dapagliflozin 10 milliGRAM(s) Oral every 24 hours  ferrous    sulfate 325 milliGRAM(s) Oral daily  folic acid 1 milliGRAM(s) Oral daily  gabapentin 300 milliGRAM(s) Oral three times a day  glucagon  Injectable 1 milliGRAM(s) IntraMuscular once  insulin glargine Injectable (LANTUS) 32 Unit(s) SubCutaneous at bedtime  insulin lispro (ADMELOG) corrective regimen sliding scale   SubCutaneous Before meals and at bedtime  insulin lispro Injectable (ADMELOG) 8 Unit(s) SubCutaneous three times a day before meals  lidocaine   4% Patch 1 Patch Transdermal daily  melatonin 5 milliGRAM(s) Oral at bedtime  metoprolol tartrate 50 milliGRAM(s) Oral three times a day  mirtazapine 15 milliGRAM(s) Oral at bedtime  pantoprazole    Tablet 40 milliGRAM(s) Oral daily  polyethylene glycol 3350 17 Gram(s) Oral daily  potassium chloride    Tablet ER 40 milliEquivalent(s) Oral daily  senna 2 Tablet(s) Oral at bedtime  sodium chloride 0.9% lock flush 3 milliLiter(s) IV Push every 8 hours  thiamine 100 milliGRAM(s) Oral daily  torsemide 20 milliGRAM(s) Oral <User Schedule>  vancomycin  IVPB 750 milliGRAM(s) IV Intermittent every 12 hours    MEDICATIONS  (PRN):  acetaminophen     Tablet .. 650 milliGRAM(s) Oral every 6 hours PRN Mild Pain (1 - 3)  oxyCODONE    IR 5 milliGRAM(s) Oral every 4 hours PRN Moderate Pain (4 - 6)  oxyCODONE    IR 10 milliGRAM(s) Oral every 4 hours PRN Severe Pain (7 - 10)    Allergies  No Known Allergies    Vital Signs Last 24 Hrs  T(C): 36.7 (08 Dec 2022 12:17), Max: 36.7 (07 Dec 2022 15:00)  T(F): 98 (08 Dec 2022 12:17), Max: 98.1 (07 Dec 2022 15:00)  HR: 76 (08 Dec 2022 12:17) (70 - 78)  BP: 117/76 (08 Dec 2022 12:17) (93/50 - 117/76)  BP(mean): --  RR: 15 (08 Dec 2022 12:17) (15 - 18)  SpO2: 99% (08 Dec 2022 12:17) (93% - 99%)    Parameters below as of 08 Dec 2022 12:17  Patient On (Oxygen Delivery Method): room air      PHYSICAL EXAM:  General: No apparent distress  Neck: Supple, trachea midline, no thyromegaly  Respiratory: Lungs clear bilaterally, normal rate, effort  Cardiac: +S1, S2, no m/r/g  GI: +BS, soft, non tender, non distended  Extremities: RLE edema  Neuro: A+O X3, no tremor  Pysch: Affect appropriate   Skin: No acanthosis         LABS:                        9.0    8.35  )-----------( 394      ( 08 Dec 2022 05:20 )             29.1     12-08    137  |  98  |  19.9  ----------------------------<  171<H>  4.7   |  29.0  |  1.24    Ca    9.9      08 Dec 2022 05:20  Mg     2.2     12-08        POCT Blood Glucose.: 116 mg/dL (12-08-22 @ 11:52)  POCT Blood Glucose.: 152 mg/dL (12-08-22 @ 08:04)  POCT Blood Glucose.: 175 mg/dL (12-07-22 @ 21:39)  POCT Blood Glucose.: 196 mg/dL (12-07-22 @ 17:32)        Thyroid Stimulating Hormone, Serum: 1.23 uIU/mL (11-21-22 @ 14:08)  Free Thyroxine, Serum: 1.1 ng/dL (11-21-22 @ 14:08)

## 2022-12-08 NOTE — PROGRESS NOTE ADULT - SUBJECTIVE AND OBJECTIVE BOX
Subjective: Patient seen and assessed post operatively from CABG. Patient states "I need to tell my family when I'll be out of here" At time of exam, Patient reports pain to Right thigh, otherwise Pt denies chest pain, palpitations, dizziness, headache, shortness of breath, abdominal pain or N/V/D/C.    Pertinent events of the past 24 hours: Uneventful, recovering as expected    VITAL SIGNS  Vital Signs Last 24 Hrs  T(C): 36.4 (22 @ 22:00), Max: 36.8 (22 @ 02:09)  T(F): 97.6 (22 @ 22:00), Max: 98.3 (22 @ 05:00)  HR: 70 (22 @ 22:00) (70 - 92)  BP: 108/67 (22 @ 22:00) (93/50 - 133/70)  RR: 18 (22 @ 22:00) (16 - 18)  SpO2: 96% (22 @ 22:00) (95% - 97%)  on (O2)              Telemetry/Alarms:  NSR 70s    MEDICATIONS  acetaminophen     Tablet .. 650 milliGRAM(s) Oral every 6 hours PRN  aMIOdarone    Tablet   Oral   aMIOdarone    Tablet 200 milliGRAM(s) Oral daily  apixaban 2.5 milliGRAM(s) Oral two times a day  ascorbic acid 500 milliGRAM(s) Oral daily  aspirin enteric coated 81 milliGRAM(s) Oral daily  atorvastatin 80 milliGRAM(s) Oral at bedtime  ceFAZolin   IVPB 2000 milliGRAM(s) IV Intermittent every 8 hours  cyanocobalamin 1000 MICROGram(s) Oral daily  dapagliflozin 10 milliGRAM(s) Oral every 24 hours  ferrous    sulfate 325 milliGRAM(s) Oral daily  folic acid 1 milliGRAM(s) Oral daily  gabapentin 300 milliGRAM(s) Oral three times a day  glucagon  Injectable 1 milliGRAM(s) IntraMuscular once  insulin glargine Injectable (LANTUS) 32 Unit(s) SubCutaneous at bedtime  insulin lispro (ADMELOG) corrective regimen sliding scale   SubCutaneous Before meals and at bedtime  insulin lispro Injectable (ADMELOG) 8 Unit(s) SubCutaneous three times a day before meals  lidocaine   4% Patch 1 Patch Transdermal daily  melatonin 5 milliGRAM(s) Oral at bedtime  metoprolol tartrate 50 milliGRAM(s) Oral three times a day  mirtazapine 15 milliGRAM(s) Oral at bedtime  oxyCODONE    IR 5 milliGRAM(s) Oral every 4 hours PRN  oxyCODONE    IR 10 milliGRAM(s) Oral every 4 hours PRN  pantoprazole    Tablet 40 milliGRAM(s) Oral daily  polyethylene glycol 3350 17 Gram(s) Oral daily  potassium chloride    Tablet ER 40 milliEquivalent(s) Oral daily  senna 2 Tablet(s) Oral at bedtime  sodium chloride 0.9% lock flush 3 milliLiter(s) IV Push every 8 hours  thiamine 100 milliGRAM(s) Oral daily  torsemide 20 milliGRAM(s) Oral <User Schedule>  vancomycin  IVPB 750 milliGRAM(s) IV Intermittent every 12 hours    PHYSICAL EXAM  Constitutional: NAD  Neuro: A+O x 3, non-focal, speech clear and intact  CV: regular rate, regular rhythm, +S1S2, no murmurs or rub  Pulm/chest: lung sounds diminished at left base, otherwise clear to ascultation no accessory muscle use noted  Abd: +BS, soft, NT, ND  Ext: SEGURA x 4, no C/C/E, +palpable pedal pulses  Skin: warm, well perfused  Psych: calm, appropriate affect  Incision: midline sternal incision open to air, healing appropriately, no audible sternal click, RLE vein harvest site draining serous fluid from upper and lower incision sites, warm hard indurated posterior thigh    Intake and Output   @ : @ 07:00  --------------------------------------------------------  IN: 640 mL / OUT: 1300 mL / NET: -660 mL     @ 07:  -   @ 00:42  --------------------------------------------------------  IN: 640 mL / OUT: 250 mL / NET: 390 mL    Weights:  Daily     Daily Weight in k.3 (07 Dec 2022 05:49)  Admit Wt: Drug Dosing Weight  Height (cm): 182.9 (2022 05:48)  Weight (kg): 82.6 (2022 05:48)  BMI (kg/m2): 24.7 (2022 05:48)  BSA (m2): 2.05 (2022 05:48)    All laboratory results, radiology and medications reviewed.    LABS      137  |  98  |  17.8  ----------------------------<  142<H>  3.8   |  29.0  |  1.03    Ca    9.7      07 Dec 2022 04:47  Mg     1.8                                        8.8    10.13 )-----------( 383      ( 07 Dec 2022 04:47 )             26.9              CAPILLARY BLOOD GLUCOSE  POCT Blood Glucose.: 175 mg/dL (07 Dec 2022 21:39)  POCT Blood Glucose.: 196 mg/dL (07 Dec 2022 17:32)  POCT Blood Glucose.: 166 mg/dL (07 Dec 2022 12:03)  POCT Blood Glucose.: 120 mg/dL (07 Dec 2022 07:58)         < from: Xray Chest 1 View- PORTABLE-Routine (Xray Chest 1 View- PORTABLE-Routine in AM.) (22 @ 05:30) >    IMPRESSION:  Partial clearing, left base.    < end of copied text >    < from: US Duplex Arterial Lower Ext Ltd, Right (22 @ 18:14) >    IMPRESSION:    No right groin hematoma.    Occluded right femoral artery.    < end of copied text >    < from: TTE Echo Limited or F/U (22 @ 10:20) >    Summary:   1. Limited for re-eval pericardial effusion s/p CABG.   2. Left ventricular ejection fraction, by visual estimation, is 50 to   55%.   3. Low normal global left ventricular systolic function.   4. Apical septal segment, apical inferior segment, and apex are abnormal   as described above.   5. Normal right ventricular size and function.   6. Normal left atrial size.   7. Normal right atrial size.   8. Small pericardialeffusion. No evidence of tamponade.    < end of copied text >    < from: 12 Lead ECG (22 @ 07:35) >    Ventricular Rate 76 BPM    Atrial Rate 76 BPM    P-R Interval 158 ms    QRS Duration 122 ms    Q-T Interval 422 ms    QTC Calculation(Bazett) 474 ms    P Axis 49 degrees    R Axis 32 degrees    T Axis 34 degrees    Diagnosis Line *** Poor data quality, interpretation may be adversely affected  Sinus rhythm with Premature supraventricular complexes  Right bundle branch block  Inferior infarct , age undetermined  Anterior infarct , age undetermined  Abnormal ECG    < end of copied text >

## 2022-12-08 NOTE — PROGRESS NOTE ADULT - ASSESSMENT
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 Patient was admitted to [facility] initially in May 2022 for GI bleed and found to have EF 30-35%, and multivessel disease but CABG deferred due to hx of alcoholism and patient transferred to rehab. Consult for diabetes management.     1. T2DM, a1c 7.6%  - Bgs well controlled  - Continue admelog 8 units tid with meals  - Continue lantus 32 units qhs  - Farxiga 10mg daily    2. CAD  - S/p CABG, care per primary team    3. HLD  - Continue statin

## 2022-12-08 NOTE — PROGRESS NOTE ADULT - SUBJECTIVE AND OBJECTIVE BOX
[facility]  INFECTIOUS DISEASES at [place] and [place]  =======================================================                               Faustino Shannon MD#   Jonathan Houser MD*                             Isaura Zimmerman MD*   Nessa Titus MD*            Diplomates American Board of Internal Medicine & Infectious Diseases                # [place] Office - Appt - Tel  [phone] Fax [phone]                * [place] Office - Appt - Tel [phone] Fax [phone]                                  Hospital Consult line:  [phone]  =======================================================    BRIE WEBB [id number]    Follow up: cellulitis     No fever or chills  No complaints      Allergies:  No Known Allergies       REVIEW OF SYSTEMS:  CONSTITUTIONAL:  No Fever or chills  HEENT:   No diplopia or blurred vision.  No earache, sore throat or runny nose.  CARDIOVASCULAR:  No Chest Pain  RESPIRATORY:  No cough, shortness of breath  GASTROINTESTINAL:  No nausea, vomiting or diarrhea.  GENITOURINARY:  No dysuria, frequency or urgency. No Blood in urine  MUSCULOSKELETAL:  no joint aches, no muscle pain  SKIN:  No change in skin, hair or nails.  NEUROLOGIC:  No Headaches, seizures   PSYCHIATRIC:  No disorder of thought or mood.  ENDOCRINE:  No heat or cold intolerance  HEMATOLOGICAL:  No easy bruising or bleeding.       Physical Exam:  GEN: NAD  HEENT: normocephalic and atraumatic. EOMI. PERRL.    NECK: Supple.   LUNGS: CTA B/L.  HEART: RRR  ABDOMEN: Soft, NT, ND.  +BS.    : No CVA tenderness  EXTREMITIES: Without  edema.  MSK: No joint swelling  NEUROLOGIC: No Focal Deficits   PSYCHIATRIC: Appropriate affect .  SKIN: No rash      Vitals:  T(F): 98 (08 Dec 2022 12:17), Max: 98.1 (07 Dec 2022 15:00)  HR: 76 (08 Dec 2022 12:17)  BP: 117/76 (08 Dec 2022 12:17)  RR: 15 (08 Dec 2022 12:17)  SpO2: 99% (08 Dec 2022 12:17) (93% - 99%)  temp max in last 48H T(F): , Max: 98.8 (12-06-22 @ 21:00)      Current Antibiotics:  ceFAZolin   IVPB 2000 milliGRAM(s) IV Intermittent every 8 hours  vancomycin  IVPB 750 milliGRAM(s) IV Intermittent every 12 hours    Other medications:  aMIOdarone    Tablet   Oral   aMIOdarone    Tablet 200 milliGRAM(s) Oral daily  apixaban 2.5 milliGRAM(s) Oral two times a day  ascorbic acid 500 milliGRAM(s) Oral daily  aspirin enteric coated 81 milliGRAM(s) Oral daily  atorvastatin 80 milliGRAM(s) Oral at bedtime  cyanocobalamin 1000 MICROGram(s) Oral daily  dapagliflozin 10 milliGRAM(s) Oral every 24 hours  ferrous    sulfate 325 milliGRAM(s) Oral daily  folic acid 1 milliGRAM(s) Oral daily  gabapentin 300 milliGRAM(s) Oral three times a day  glucagon  Injectable 1 milliGRAM(s) IntraMuscular once  insulin glargine Injectable (LANTUS) 32 Unit(s) SubCutaneous at bedtime  insulin lispro (ADMELOG) corrective regimen sliding scale   SubCutaneous Before meals and at bedtime  insulin lispro Injectable (ADMELOG) 8 Unit(s) SubCutaneous three times a day before meals  lidocaine   4% Patch 1 Patch Transdermal daily  melatonin 5 milliGRAM(s) Oral at bedtime  metoprolol tartrate 50 milliGRAM(s) Oral three times a day  mirtazapine 15 milliGRAM(s) Oral at bedtime  pantoprazole    Tablet 40 milliGRAM(s) Oral daily  polyethylene glycol 3350 17 Gram(s) Oral daily  potassium chloride    Tablet ER 40 milliEquivalent(s) Oral daily  senna 2 Tablet(s) Oral at bedtime  sodium chloride 0.9% lock flush 3 milliLiter(s) IV Push every 8 hours  thiamine 100 milliGRAM(s) Oral daily  torsemide 20 milliGRAM(s) Oral <User Schedule>                            9.0    8.35  )-----------( 394      ( 08 Dec 2022 05:20 )             29.1     12-08    137  |  98  |  19.9  ----------------------------<  171<H>  4.7   |  29.0  |  1.24    Ca    9.9      08 Dec 2022 05:20  Mg     2.2     12-08      RECENT CULTURES:  12-01 @ 16:30 Pleural Fl Pleural Fluid     No growth at 5 days    polymorphonuclear leukocytes seen  No organisms seen  by cytocentrifuge      WBC Count: 8.35 K/uL (12-08-22 @ 05:20)  WBC Count: 10.13 K/uL (12-07-22 @ 04:47)  WBC Count: 12.00 K/uL (12-06-22 @ 04:54)  WBC Count: 10.38 K/uL (12-05-22 @ 05:30)  WBC Count: 10.43 K/uL (12-04-22 @ 17:40)  WBC Count: 12.94 K/uL (12-04-22 @ 04:39)    Creatinine, Serum: 1.24 mg/dL (12-08-22 @ 05:20)  Creatinine, Serum: 1.03 mg/dL (12-07-22 @ 04:47)  Creatinine, Serum: 1.13 mg/dL (12-06-22 @ 04:54)  Creatinine, Serum: 1.09 mg/dL (12-05-22 @ 05:30)  Creatinine, Serum: 1.34 mg/dL (12-04-22 @ 22:00)  Creatinine, Serum: 1.00 mg/dL (12-04-22 @ 04:39)    COVID-19 PCR: NotDetec (12-04-22 @ 06:50)  COVID-19 PCR: NotDetec (11-29-22 @ 14:15)  COVID-19 PCR: NotDetec (11-21-22 @ 10:15)      < from: Xray Chest 1 View- PORTABLE-Routine (Xray Chest 1 View- PORTABLE-Routine in AM.) (12.08.22 @ 05:24) >  ACC: [id number] EXAM:  XR CHEST PORTABLE ROUTINE 1V                        ACC: [id number] EXAM:  XR CHEST PORTABLE ROUTINE 1V                          PROCEDURE DATE:  12/07/2022      INTERPRETATION:  TIME OF EXAM: December 7, 2022 at 3:40 AM.    CLINICAL INFORMATION: Status post open heart surgery.    COMPARISON:  December 5, 2022.    TECHNIQUE:   AP Portable chest x-ray.    INTERPRETATION:    Heart size and the mediastinum cannot be accurately evaluated on this   projection. Median sternotomy sutures and surgical clips again seen.  Elevated left hemidiaphragm.  The right lung is clear.  Left mid and lower lung opacities again noted, possibly subsegmental and   platelike atelectasis.  No right pleural effusion. Trace left pleural effusion, unchanged.  No pneumothorax.  No acute bony abnormality.    AP portable chest x-ray from December 8, 2022 at 4:10 AM:    CLINICAL INFORMATION: Status post open heart surgery.    INTERPRETATION:    There is no significant interval change.    IMPRESSION:  Continued elevation of left hemidiaphragm.    No significant change in left mid and lower lung opacities, possibly   subsegmental and platelike atelectasis.    Stable trace left pleural effusion.    --- End of Report ---  < end of copied text >

## 2022-12-08 NOTE — PHARMACOTHERAPY INTERVENTION NOTE - NSPHARMCOMMASP
ASP - Lab/ test recommended
ASP - Dose optimization/Non-Renal dose adjustment
ASP - Lab/ test recommended

## 2022-12-08 NOTE — PROGRESS NOTE ADULT - ASSESSMENT
67y  Male former smoker with h/o HTN , CAD, s/p PCI (LEELA x 1 pRCA 2007), ACC/AHA stage C, NYHA functional class 3, HFrEF, depression. Patient was admitted to [facility] in May 2022 for GI bleed and also had a cardiac work up which included an echo which showed and EF of 30-35%; a LHC which showed occlusion of the mLAD, 50% mLCX stenosis, and an 80% OM2 stenosis. He was referred to CT surgery for CABG. Admit on 11/21 for repeat cath, CABG 11/22 C3L (LIMA-LAD, SVG-OM, SVG-PDA) by Dr Gray. Post was initially in CTICU and downgraded to cardiac floor once he was stable. Post op course complicated by blood transfusion, A fib with RVR, pleural effusion requiring chest tube on 11/29 and removed 11/30, B/L LE edema, residual effusion noted on CT chest 12/1 and IR pigtail placement and removed 12/3; On 12/4 noted tohave worsening swelling of RLE, was found to have Occluded right femoral artery at the mid portion with reconstitution of flow in the distal portion;  was seen by vascular surgery and did not require any surgical intervention. On 12/5 RLE vein harvest site opened to allow for collection of fluid in thigh to drain. Patient was started on Vancomycin on 12/4 - Present.       RLE swelling and tenderness  Possible cellulitis   RLE collections  CAD   s/p CABG 11/22 C3L (LIMA-LAD, SVG-OM, SVG-PDA) by Dr Gray 11/21      - Blood cultures not done  - Check blood cultures if febrile   - US with complex fluid collections  - Continue Cefazolin   - Continue Vancomycin  - Monitor trough  - Monitor for Vancomycin toxicity   - Vancomycin required at this time pending culture results  - leg elevation  - On diuretics   - If no improvement in next 12 hours would obtain CT scan   - Follow up cultures  - Trend Fever  - Trend WBC      Will Follow    d/w CT Surgery and vascular surgery

## 2022-12-09 LAB
ANION GAP SERPL CALC-SCNC: 11 MMOL/L — SIGNIFICANT CHANGE UP (ref 5–17)
BUN SERPL-MCNC: 18.5 MG/DL — SIGNIFICANT CHANGE UP (ref 8–20)
CALCIUM SERPL-MCNC: 9.7 MG/DL — SIGNIFICANT CHANGE UP (ref 8.4–10.5)
CHLORIDE SERPL-SCNC: 100 MMOL/L — SIGNIFICANT CHANGE UP (ref 96–108)
CO2 SERPL-SCNC: 27 MMOL/L — SIGNIFICANT CHANGE UP (ref 22–29)
CREAT SERPL-MCNC: 1.04 MG/DL — SIGNIFICANT CHANGE UP (ref 0.5–1.3)
EGFR: 79 ML/MIN/1.73M2 — SIGNIFICANT CHANGE UP
GLUCOSE BLDC GLUCOMTR-MCNC: 136 MG/DL — HIGH (ref 70–99)
GLUCOSE BLDC GLUCOMTR-MCNC: 146 MG/DL — HIGH (ref 70–99)
GLUCOSE BLDC GLUCOMTR-MCNC: 153 MG/DL — HIGH (ref 70–99)
GLUCOSE BLDC GLUCOMTR-MCNC: 185 MG/DL — HIGH (ref 70–99)
GLUCOSE SERPL-MCNC: 227 MG/DL — HIGH (ref 70–99)
HCT VFR BLD CALC: 29.4 % — LOW (ref 39–50)
HGB BLD-MCNC: 9.1 G/DL — LOW (ref 13–17)
MAGNESIUM SERPL-MCNC: 2 MG/DL — SIGNIFICANT CHANGE UP (ref 1.8–2.6)
MCHC RBC-ENTMCNC: 30.7 PG — SIGNIFICANT CHANGE UP (ref 27–34)
MCHC RBC-ENTMCNC: 31 GM/DL — LOW (ref 32–36)
MCV RBC AUTO: 99.3 FL — SIGNIFICANT CHANGE UP (ref 80–100)
PLATELET # BLD AUTO: 428 K/UL — HIGH (ref 150–400)
POTASSIUM SERPL-MCNC: 4.4 MMOL/L — SIGNIFICANT CHANGE UP (ref 3.5–5.3)
POTASSIUM SERPL-SCNC: 4.4 MMOL/L — SIGNIFICANT CHANGE UP (ref 3.5–5.3)
RBC # BLD: 2.96 M/UL — LOW (ref 4.2–5.8)
RBC # FLD: 15 % — HIGH (ref 10.3–14.5)
SODIUM SERPL-SCNC: 138 MMOL/L — SIGNIFICANT CHANGE UP (ref 135–145)
VANCOMYCIN TROUGH SERPL-MCNC: 12.5 UG/ML — SIGNIFICANT CHANGE UP (ref 10–20)
WBC # BLD: 9.84 K/UL — SIGNIFICANT CHANGE UP (ref 3.8–10.5)
WBC # FLD AUTO: 9.84 K/UL — SIGNIFICANT CHANGE UP (ref 3.8–10.5)

## 2022-12-09 PROCEDURE — 71045 X-RAY EXAM CHEST 1 VIEW: CPT | Mod: 26

## 2022-12-09 PROCEDURE — 99232 SBSQ HOSP IP/OBS MODERATE 35: CPT

## 2022-12-09 RX ADMIN — Medication 2: at 08:27

## 2022-12-09 RX ADMIN — Medication 325 MILLIGRAM(S): at 08:28

## 2022-12-09 RX ADMIN — OXYCODONE HYDROCHLORIDE 10 MILLIGRAM(S): 5 TABLET ORAL at 17:21

## 2022-12-09 RX ADMIN — Medication 50 MILLIGRAM(S): at 21:40

## 2022-12-09 RX ADMIN — Medication 8 UNIT(S): at 17:19

## 2022-12-09 RX ADMIN — SODIUM CHLORIDE 3 MILLILITER(S): 9 INJECTION INTRAMUSCULAR; INTRAVENOUS; SUBCUTANEOUS at 22:01

## 2022-12-09 RX ADMIN — Medication 81 MILLIGRAM(S): at 08:28

## 2022-12-09 RX ADMIN — SODIUM CHLORIDE 3 MILLILITER(S): 9 INJECTION INTRAMUSCULAR; INTRAVENOUS; SUBCUTANEOUS at 13:27

## 2022-12-09 RX ADMIN — PANTOPRAZOLE SODIUM 40 MILLIGRAM(S): 20 TABLET, DELAYED RELEASE ORAL at 08:28

## 2022-12-09 RX ADMIN — Medication 250 MILLIGRAM(S): at 12:00

## 2022-12-09 RX ADMIN — Medication 40 MILLIEQUIVALENT(S): at 08:27

## 2022-12-09 RX ADMIN — APIXABAN 2.5 MILLIGRAM(S): 2.5 TABLET, FILM COATED ORAL at 05:27

## 2022-12-09 RX ADMIN — Medication 100 MILLIGRAM(S): at 09:50

## 2022-12-09 RX ADMIN — APIXABAN 2.5 MILLIGRAM(S): 2.5 TABLET, FILM COATED ORAL at 17:18

## 2022-12-09 RX ADMIN — OXYCODONE HYDROCHLORIDE 10 MILLIGRAM(S): 5 TABLET ORAL at 18:21

## 2022-12-09 RX ADMIN — Medication 2: at 17:19

## 2022-12-09 RX ADMIN — Medication 8 UNIT(S): at 08:26

## 2022-12-09 RX ADMIN — OXYCODONE HYDROCHLORIDE 10 MILLIGRAM(S): 5 TABLET ORAL at 01:00

## 2022-12-09 RX ADMIN — Medication 250 MILLIGRAM(S): at 01:53

## 2022-12-09 RX ADMIN — OXYCODONE HYDROCHLORIDE 10 MILLIGRAM(S): 5 TABLET ORAL at 08:27

## 2022-12-09 RX ADMIN — MIRTAZAPINE 15 MILLIGRAM(S): 45 TABLET, ORALLY DISINTEGRATING ORAL at 21:41

## 2022-12-09 RX ADMIN — Medication 20 MILLIGRAM(S): at 09:50

## 2022-12-09 RX ADMIN — Medication 500 MILLIGRAM(S): at 08:28

## 2022-12-09 RX ADMIN — DAPAGLIFLOZIN 10 MILLIGRAM(S): 10 TABLET, FILM COATED ORAL at 05:27

## 2022-12-09 RX ADMIN — PREGABALIN 1000 MICROGRAM(S): 225 CAPSULE ORAL at 17:18

## 2022-12-09 RX ADMIN — Medication 100 MILLIGRAM(S): at 21:39

## 2022-12-09 RX ADMIN — Medication 8 UNIT(S): at 11:58

## 2022-12-09 RX ADMIN — OXYCODONE HYDROCHLORIDE 10 MILLIGRAM(S): 5 TABLET ORAL at 01:48

## 2022-12-09 RX ADMIN — Medication 100 MILLIGRAM(S): at 05:31

## 2022-12-09 RX ADMIN — GABAPENTIN 300 MILLIGRAM(S): 400 CAPSULE ORAL at 14:02

## 2022-12-09 RX ADMIN — Medication 100 MILLIGRAM(S): at 13:53

## 2022-12-09 RX ADMIN — AMIODARONE HYDROCHLORIDE 200 MILLIGRAM(S): 400 TABLET ORAL at 05:27

## 2022-12-09 RX ADMIN — Medication 1 MILLIGRAM(S): at 08:28

## 2022-12-09 RX ADMIN — SODIUM CHLORIDE 3 MILLILITER(S): 9 INJECTION INTRAMUSCULAR; INTRAVENOUS; SUBCUTANEOUS at 06:18

## 2022-12-09 RX ADMIN — OXYCODONE HYDROCHLORIDE 10 MILLIGRAM(S): 5 TABLET ORAL at 22:02

## 2022-12-09 RX ADMIN — Medication 50 MILLIGRAM(S): at 14:02

## 2022-12-09 RX ADMIN — INSULIN GLARGINE 32 UNIT(S): 100 INJECTION, SOLUTION SUBCUTANEOUS at 21:41

## 2022-12-09 RX ADMIN — Medication 5 MILLIGRAM(S): at 21:36

## 2022-12-09 RX ADMIN — ATORVASTATIN CALCIUM 80 MILLIGRAM(S): 80 TABLET, FILM COATED ORAL at 21:36

## 2022-12-09 RX ADMIN — OXYCODONE HYDROCHLORIDE 10 MILLIGRAM(S): 5 TABLET ORAL at 09:27

## 2022-12-09 RX ADMIN — Medication 50 MILLIGRAM(S): at 05:27

## 2022-12-09 RX ADMIN — OXYCODONE HYDROCHLORIDE 10 MILLIGRAM(S): 5 TABLET ORAL at 21:33

## 2022-12-09 RX ADMIN — GABAPENTIN 300 MILLIGRAM(S): 400 CAPSULE ORAL at 05:27

## 2022-12-09 RX ADMIN — GABAPENTIN 300 MILLIGRAM(S): 400 CAPSULE ORAL at 21:34

## 2022-12-09 NOTE — PROGRESS NOTE ADULT - SUBJECTIVE AND OBJECTIVE BOX
[facility]  INFECTIOUS DISEASES at [place] and [place]  =======================================================                               Faustino Shannon MD#   Jonathan Houser MD*                             Isaura Zimmerman MD*   Nessa Titus MD*            Diplomates American Board of Internal Medicine & Infectious Diseases                # [place] Office - Appt - Tel  [phone] Fax [phone]                * [place] Office - Appt - Tel [phone] Fax [phone]                                  Hospital Consult line:  [phone]  =======================================================    BRIE WEBB [id number]    Follow up: cellulitis     No fever or chills  No complaints      Allergies:  No Known Allergies       REVIEW OF SYSTEMS:  CONSTITUTIONAL:  No Fever or chills  HEENT:   No diplopia or blurred vision.  No earache, sore throat or runny nose.  CARDIOVASCULAR:  No Chest Pain  RESPIRATORY:  No cough, shortness of breath  GASTROINTESTINAL:  No nausea, vomiting or diarrhea.  GENITOURINARY:  No dysuria, frequency or urgency. No Blood in urine  MUSCULOSKELETAL:  no joint aches, no muscle pain  SKIN:  No change in skin, hair or nails.  NEUROLOGIC:  No Headaches, seizures   PSYCHIATRIC:  No disorder of thought or mood.  ENDOCRINE:  No heat or cold intolerance  HEMATOLOGICAL:  No easy bruising or bleeding.       Physical Exam:  GEN: NAD  HEENT: normocephalic and atraumatic. EOMI. PERRL.    NECK: Supple.   LUNGS: CTA B/L.  HEART: RRR  ABDOMEN: Soft, NT, ND.  +BS.    : No CVA tenderness  EXTREMITIES: Without  edema.  MSK: No joint swelling  NEUROLOGIC: No Focal Deficits   PSYCHIATRIC: Appropriate affect .  SKIN: No rash      Vitals:  T(F): 97.9 (09 Dec 2022 10:34), Max: 98.2 (08 Dec 2022 21:00)  HR: 71 (09 Dec 2022 08:21)  BP: 128/71 (09 Dec 2022 08:21)  RR: 18 (09 Dec 2022 08:21)  SpO2: 99% (09 Dec 2022 08:21) (98% - 99%)  temp max in last 48H T(F): , Max: 98.2 (12-08-22 @ 21:00)      Current Antibiotics:  ceFAZolin   IVPB 2000 milliGRAM(s) IV Intermittent every 8 hours  vancomycin  IVPB 750 milliGRAM(s) IV Intermittent every 12 hours    Other medications:  aMIOdarone    Tablet   Oral   aMIOdarone    Tablet 200 milliGRAM(s) Oral daily  apixaban 2.5 milliGRAM(s) Oral two times a day  ascorbic acid 500 milliGRAM(s) Oral daily  aspirin enteric coated 81 milliGRAM(s) Oral daily  atorvastatin 80 milliGRAM(s) Oral at bedtime  cyanocobalamin 1000 MICROGram(s) Oral daily  dapagliflozin 10 milliGRAM(s) Oral every 24 hours  ferrous    sulfate 325 milliGRAM(s) Oral daily  folic acid 1 milliGRAM(s) Oral daily  gabapentin 300 milliGRAM(s) Oral three times a day  glucagon  Injectable 1 milliGRAM(s) IntraMuscular once  insulin glargine Injectable (LANTUS) 32 Unit(s) SubCutaneous at bedtime  insulin lispro (ADMELOG) corrective regimen sliding scale   SubCutaneous Before meals and at bedtime  insulin lispro Injectable (ADMELOG) 8 Unit(s) SubCutaneous three times a day before meals  lidocaine   4% Patch 1 Patch Transdermal daily  melatonin 5 milliGRAM(s) Oral at bedtime  metoprolol tartrate 50 milliGRAM(s) Oral three times a day  mirtazapine 15 milliGRAM(s) Oral at bedtime  pantoprazole    Tablet 40 milliGRAM(s) Oral daily  polyethylene glycol 3350 17 Gram(s) Oral daily  potassium chloride    Tablet ER 40 milliEquivalent(s) Oral daily  senna 2 Tablet(s) Oral at bedtime  sodium chloride 0.9% lock flush 3 milliLiter(s) IV Push every 8 hours  thiamine 100 milliGRAM(s) Oral daily  torsemide 20 milliGRAM(s) Oral <User Schedule>                 9.1    9.84  )-----------( 428      ( 09 Dec 2022 05:02 )             29.4     12-09    138  |  100  |  18.5  ----------------------------<  227<H>  4.4   |  27.0  |  1.04    Ca    9.7      09 Dec 2022 05:02  Mg     2.0     12-09      RECENT CULTURES:  12-01 @ 16:30 Pleural Fl Pleural Fluid     No growth at 5 days  polymorphonuclear leukocytes seen  No organisms seen  by cytocentrifuge      WBC Count: 9.84 K/uL (12-09-22 @ 05:02)  WBC Count: 8.35 K/uL (12-08-22 @ 05:20)  WBC Count: 10.13 K/uL (12-07-22 @ 04:47)  WBC Count: 12.00 K/uL (12-06-22 @ 04:54)  WBC Count: 10.38 K/uL (12-05-22 @ 05:30)  WBC Count: 10.43 K/uL (12-04-22 @ 17:40)    Creatinine, Serum: 1.04 mg/dL (12-09-22 @ 05:02)  Creatinine, Serum: 1.24 mg/dL (12-08-22 @ 05:20)  Creatinine, Serum: 1.03 mg/dL (12-07-22 @ 04:47)  Creatinine, Serum: 1.13 mg/dL (12-06-22 @ 04:54)  Creatinine, Serum: 1.09 mg/dL (12-05-22 @ 05:30)  Creatinine, Serum: 1.34 mg/dL (12-04-22 @ 22:00)    COVID-19 PCR: NotDetec (12-04-22 @ 06:50)  COVID-19 PCR: NotDetec (11-29-22 @ 14:15)  COVID-19 PCR: NotDetec (11-21-22 @ 10:15)

## 2022-12-09 NOTE — PROGRESS NOTE ADULT - SUBJECTIVE AND OBJECTIVE BOX
POD 17 s/p C3L (LIMA-LAD, SVG-OM, SVG-RPDA)     Subjective: c/o incisional pain denies CP, palpitations, SOB, cough, fever, chills, itchiness/rash, diaphoresis, vision changes, HA, dizziness/lightheadedness, numbness/tingling, abd pain, N/V     T(C): 37.2 (12-09-22 @ 16:17), Max: 37.2 (12-09-22 @ 16:17)  HR: 79 (12-09-22 @ 16:17) (71 - 88)  BP: 106/79 (12-09-22 @ 16:17) (106/79 - 130/75)  RR: 18 (12-09-22 @ 16:17) (15 - 18)  SpO2: 98% (12-09-22 @ 16:17) (97% - 99%)    12-09    138  |  100  |  18.5  ----------------------------<  227<H>  4.4   |  27.0  |  1.04    Ca    9.7      09 Dec 2022 05:02  Mg     2.0     12-09                                 9.1    9.84  )-----------( 428      ( 09 Dec 2022 05:02 )             29.4                    CAPILLARY BLOOD GLUCOSE  POCT Blood Glucose.: 136 mg/dL (09 Dec 2022 11:54)  POCT Blood Glucose.: 185 mg/dL (09 Dec 2022 07:54)  POCT Blood Glucose.: 148 mg/dL (08 Dec 2022 21:11)  POCT Blood Glucose.: 184 mg/dL (08 Dec 2022 17:23)    I&O's Detail    08 Dec 2022 07:01  -  09 Dec 2022 07:00  --------------------------------------------------------  IN:    Oral Fluid: 760 mL  Total IN: 760 mL    OUT:    Voided (mL): 2200 mL  Total OUT: 2200 mL  Total NET: -1440 mL    Drug Dosing Weight  Height (cm): 182.9 (22 Nov 2022 05:48)  Weight (kg): 82.6 (22 Nov 2022 05:48)  BMI (kg/m2): 24.7 (22 Nov 2022 05:48)  BSA (m2): 2.05 (22 Nov 2022 05:48)    MEDICATIONS  (STANDING):  aMIOdarone    Tablet   Oral   aMIOdarone    Tablet 200 milliGRAM(s) Oral daily  apixaban 2.5 milliGRAM(s) Oral two times a day  ascorbic acid 500 milliGRAM(s) Oral daily  aspirin enteric coated 81 milliGRAM(s) Oral daily  atorvastatin 80 milliGRAM(s) Oral at bedtime  ceFAZolin   IVPB 2000 milliGRAM(s) IV Intermittent every 8 hours  cyanocobalamin 1000 MICROGram(s) Oral daily  dapagliflozin 10 milliGRAM(s) Oral every 24 hours  ferrous    sulfate 325 milliGRAM(s) Oral daily  folic acid 1 milliGRAM(s) Oral daily  gabapentin 300 milliGRAM(s) Oral three times a day  glucagon  Injectable 1 milliGRAM(s) IntraMuscular once  insulin glargine Injectable (LANTUS) 32 Unit(s) SubCutaneous at bedtime  insulin lispro (ADMELOG) corrective regimen sliding scale   SubCutaneous Before meals and at bedtime  insulin lispro Injectable (ADMELOG) 8 Unit(s) SubCutaneous three times a day before meals  lidocaine   4% Patch 1 Patch Transdermal daily  melatonin 5 milliGRAM(s) Oral at bedtime  metoprolol tartrate 50 milliGRAM(s) Oral three times a day  mirtazapine 15 milliGRAM(s) Oral at bedtime  pantoprazole    Tablet 40 milliGRAM(s) Oral daily  polyethylene glycol 3350 17 Gram(s) Oral daily  potassium chloride    Tablet ER 40 milliEquivalent(s) Oral daily  senna 2 Tablet(s) Oral at bedtime  sodium chloride 0.9% lock flush 3 milliLiter(s) IV Push every 8 hours  thiamine 100 milliGRAM(s) Oral daily  torsemide 20 milliGRAM(s) Oral <User Schedule>  vancomycin  IVPB 750 milliGRAM(s) IV Intermittent every 12 hours    MEDICATIONS  (PRN):  acetaminophen     Tablet .. 650 milliGRAM(s) Oral every 6 hours PRN Mild Pain (1 - 3)  oxyCODONE    IR 5 milliGRAM(s) Oral every 4 hours PRN Moderate Pain (4 - 6)  oxyCODONE    IR 10 milliGRAM(s) Oral every 4 hours PRN Severe Pain (7 - 10)    Physical Exam  Gen: NAD  Neuro: A&Ox3 non focal speech clear and intact   Pulm: CTA b/l no wheezing  CV: S1S2 RRR no murmur  Abd: +BS soft NT ND  Extrem/MS: 1-2+ b/l LE edema R>L   Incision(s): mid sternal inc C/D/I, RLE inc intact, ecchymosis and erythema improving

## 2022-12-09 NOTE — PROGRESS NOTE ADULT - ASSESSMENT
67M, former smoker with a medical history of HTN, HLD, type 2 DM (HA1c 7.6 on Metformin and Insulin), CAD s/p PCI to RCA 2007, ETOH cirrhosis, B12/Vit D deficiency, recent hospitalization for GI Bleed 05/2022, admitted on 11/21/22 for repeat cath which confirmed multivessel CAD. s/p CABG X 3 (LIMA-LAD, SVG-OM, SVG-PDA) on 11/22/22 with Dr. Gray. Postoperative course significant for cardiogenic shock and hypotension (resolved s/p volume resuscitation, phenylephrine gtt weaned off), ABLA (stable s/p blood transfusion), hyperglycemia (titrating insulin requirements), and Left pleural effusion (s/p pigtail catheter insertion 11/29, removed 11/30, with residual effusion noted on CT chest with IR pigtail placed 12/1 and removed 12/3). now with right femoral occlusion found on arterial duplex - seen by vascular surgery - likely not acute, R thigh hematoma from EVH removal, now on IV antibiotics for possible cellulitis.

## 2022-12-09 NOTE — PROGRESS NOTE ADULT - ASSESSMENT
67y  Male former smoker with h/o HTN , CAD, s/p PCI (LEELA x 1 pRCA 2007), ACC/AHA stage C, NYHA functional class 3, HFrEF, depression. Patient was admitted to [facility] in May 2022 for GI bleed and also had a cardiac work up which included an echo which showed and EF of 30-35%; a LHC which showed occlusion of the mLAD, 50% mLCX stenosis, and an 80% OM2 stenosis. He was referred to CT surgery for CABG. Admit on 11/21 for repeat cath, CABG 11/22 C3L (LIMA-LAD, SVG-OM, SVG-PDA) by Dr Gray. Post was initially in CTICU and downgraded to cardiac floor once he was stable. Post op course complicated by blood transfusion, A fib with RVR, pleural effusion requiring chest tube on 11/29 and removed 11/30, B/L LE edema, residual effusion noted on CT chest 12/1 and IR pigtail placement and removed 12/3; On 12/4 noted tohave worsening swelling of RLE, was found to have Occluded right femoral artery at the mid portion with reconstitution of flow in the distal portion;  was seen by vascular surgery and did not require any surgical intervention. On 12/5 RLE vein harvest site opened to allow for collection of fluid in thigh to drain. Patient was started on Vancomycin on 12/4 - Present.       RLE swelling and tenderness  Possible cellulitis   RLE collections  CAD   s/p CABG 11/22 C3L (LIMA-LAD, SVG-OM, SVG-PDA) by Dr Gray 11/21      - Check blood cultures if febrile   - US with complex fluid collections  - Continue Cefazolin   - Continue Vancomycin  - Monitor trough  - Monitor for Vancomycin toxicity   - Vancomycin required at this time pending culture results  - leg elevation  - On diuretics   - Improvement noted, continue leg elevation and antibiotics at this time    - Follow up cultures  - Trend Fever  - Trend WBC      Will Follow    d/w CT Surgery and vascular surgery

## 2022-12-10 DIAGNOSIS — I31.39 OTHER PERICARDIAL EFFUSION (NONINFLAMMATORY): ICD-10-CM

## 2022-12-10 DIAGNOSIS — L03.115 CELLULITIS OF RIGHT LOWER LIMB: ICD-10-CM

## 2022-12-10 LAB
ANION GAP SERPL CALC-SCNC: 13 MMOL/L — SIGNIFICANT CHANGE UP (ref 5–17)
BUN SERPL-MCNC: 14.5 MG/DL — SIGNIFICANT CHANGE UP (ref 8–20)
CALCIUM SERPL-MCNC: 9.8 MG/DL — SIGNIFICANT CHANGE UP (ref 8.4–10.5)
CHLORIDE SERPL-SCNC: 97 MMOL/L — SIGNIFICANT CHANGE UP (ref 96–108)
CO2 SERPL-SCNC: 28 MMOL/L — SIGNIFICANT CHANGE UP (ref 22–29)
CREAT SERPL-MCNC: 1.07 MG/DL — SIGNIFICANT CHANGE UP (ref 0.5–1.3)
EGFR: 77 ML/MIN/1.73M2 — SIGNIFICANT CHANGE UP
GLUCOSE BLDC GLUCOMTR-MCNC: 174 MG/DL — HIGH (ref 70–99)
GLUCOSE BLDC GLUCOMTR-MCNC: 195 MG/DL — HIGH (ref 70–99)
GLUCOSE BLDC GLUCOMTR-MCNC: 212 MG/DL — HIGH (ref 70–99)
GLUCOSE BLDC GLUCOMTR-MCNC: 236 MG/DL — HIGH (ref 70–99)
GLUCOSE SERPL-MCNC: 147 MG/DL — HIGH (ref 70–99)
HCT VFR BLD CALC: 33 % — LOW (ref 39–50)
HGB BLD-MCNC: 10.1 G/DL — LOW (ref 13–17)
MAGNESIUM SERPL-MCNC: 2.2 MG/DL — SIGNIFICANT CHANGE UP (ref 1.6–2.6)
MCHC RBC-ENTMCNC: 30.6 GM/DL — LOW (ref 32–36)
MCHC RBC-ENTMCNC: 30.7 PG — SIGNIFICANT CHANGE UP (ref 27–34)
MCV RBC AUTO: 100.3 FL — HIGH (ref 80–100)
PLATELET # BLD AUTO: 259 K/UL — SIGNIFICANT CHANGE UP (ref 150–400)
POTASSIUM SERPL-MCNC: 4.3 MMOL/L — SIGNIFICANT CHANGE UP (ref 3.5–5.3)
POTASSIUM SERPL-SCNC: 4.3 MMOL/L — SIGNIFICANT CHANGE UP (ref 3.5–5.3)
RBC # BLD: 3.29 M/UL — LOW (ref 4.2–5.8)
RBC # FLD: 15 % — HIGH (ref 10.3–14.5)
SODIUM SERPL-SCNC: 138 MMOL/L — SIGNIFICANT CHANGE UP (ref 135–145)
WBC # BLD: 10.74 K/UL — HIGH (ref 3.8–10.5)
WBC # FLD AUTO: 10.74 K/UL — HIGH (ref 3.8–10.5)

## 2022-12-10 PROCEDURE — 71045 X-RAY EXAM CHEST 1 VIEW: CPT | Mod: 26

## 2022-12-10 RX ADMIN — ATORVASTATIN CALCIUM 80 MILLIGRAM(S): 80 TABLET, FILM COATED ORAL at 21:35

## 2022-12-10 RX ADMIN — Medication 20 MILLIGRAM(S): at 09:09

## 2022-12-10 RX ADMIN — SENNA PLUS 2 TABLET(S): 8.6 TABLET ORAL at 21:36

## 2022-12-10 RX ADMIN — Medication 8 UNIT(S): at 12:45

## 2022-12-10 RX ADMIN — AMIODARONE HYDROCHLORIDE 200 MILLIGRAM(S): 400 TABLET ORAL at 05:20

## 2022-12-10 RX ADMIN — Medication 50 MILLIGRAM(S): at 05:23

## 2022-12-10 RX ADMIN — OXYCODONE HYDROCHLORIDE 10 MILLIGRAM(S): 5 TABLET ORAL at 01:38

## 2022-12-10 RX ADMIN — Medication 100 MILLIGRAM(S): at 07:55

## 2022-12-10 RX ADMIN — APIXABAN 2.5 MILLIGRAM(S): 2.5 TABLET, FILM COATED ORAL at 17:00

## 2022-12-10 RX ADMIN — Medication 50 MILLIGRAM(S): at 21:35

## 2022-12-10 RX ADMIN — OXYCODONE HYDROCHLORIDE 10 MILLIGRAM(S): 5 TABLET ORAL at 11:28

## 2022-12-10 RX ADMIN — Medication 4: at 07:56

## 2022-12-10 RX ADMIN — OXYCODONE HYDROCHLORIDE 10 MILLIGRAM(S): 5 TABLET ORAL at 02:10

## 2022-12-10 RX ADMIN — Medication 8 UNIT(S): at 16:55

## 2022-12-10 RX ADMIN — Medication 325 MILLIGRAM(S): at 07:55

## 2022-12-10 RX ADMIN — Medication 1 MILLIGRAM(S): at 07:55

## 2022-12-10 RX ADMIN — Medication 20 MILLIGRAM(S): at 11:26

## 2022-12-10 RX ADMIN — Medication 81 MILLIGRAM(S): at 07:55

## 2022-12-10 RX ADMIN — Medication 40 MILLIEQUIVALENT(S): at 07:55

## 2022-12-10 RX ADMIN — Medication 100 MILLIGRAM(S): at 05:17

## 2022-12-10 RX ADMIN — SODIUM CHLORIDE 3 MILLILITER(S): 9 INJECTION INTRAMUSCULAR; INTRAVENOUS; SUBCUTANEOUS at 21:44

## 2022-12-10 RX ADMIN — APIXABAN 2.5 MILLIGRAM(S): 2.5 TABLET, FILM COATED ORAL at 05:25

## 2022-12-10 RX ADMIN — OXYCODONE HYDROCHLORIDE 10 MILLIGRAM(S): 5 TABLET ORAL at 05:28

## 2022-12-10 RX ADMIN — DAPAGLIFLOZIN 10 MILLIGRAM(S): 10 TABLET, FILM COATED ORAL at 05:20

## 2022-12-10 RX ADMIN — Medication 100 MILLIGRAM(S): at 13:19

## 2022-12-10 RX ADMIN — GABAPENTIN 300 MILLIGRAM(S): 400 CAPSULE ORAL at 13:19

## 2022-12-10 RX ADMIN — Medication 250 MILLIGRAM(S): at 02:20

## 2022-12-10 RX ADMIN — Medication 4: at 16:55

## 2022-12-10 RX ADMIN — OXYCODONE HYDROCHLORIDE 10 MILLIGRAM(S): 5 TABLET ORAL at 10:28

## 2022-12-10 RX ADMIN — Medication 8 UNIT(S): at 07:56

## 2022-12-10 RX ADMIN — Medication 2: at 12:45

## 2022-12-10 RX ADMIN — Medication 5 MILLIGRAM(S): at 21:35

## 2022-12-10 RX ADMIN — INSULIN GLARGINE 32 UNIT(S): 100 INJECTION, SOLUTION SUBCUTANEOUS at 21:36

## 2022-12-10 RX ADMIN — Medication 50 MILLIGRAM(S): at 13:19

## 2022-12-10 RX ADMIN — OXYCODONE HYDROCHLORIDE 10 MILLIGRAM(S): 5 TABLET ORAL at 21:35

## 2022-12-10 RX ADMIN — GABAPENTIN 300 MILLIGRAM(S): 400 CAPSULE ORAL at 21:36

## 2022-12-10 RX ADMIN — OXYCODONE HYDROCHLORIDE 10 MILLIGRAM(S): 5 TABLET ORAL at 22:35

## 2022-12-10 RX ADMIN — MIRTAZAPINE 15 MILLIGRAM(S): 45 TABLET, ORALLY DISINTEGRATING ORAL at 21:36

## 2022-12-10 RX ADMIN — GABAPENTIN 300 MILLIGRAM(S): 400 CAPSULE ORAL at 06:15

## 2022-12-10 RX ADMIN — OXYCODONE HYDROCHLORIDE 10 MILLIGRAM(S): 5 TABLET ORAL at 16:57

## 2022-12-10 RX ADMIN — Medication 100 MILLIGRAM(S): at 21:37

## 2022-12-10 RX ADMIN — PANTOPRAZOLE SODIUM 40 MILLIGRAM(S): 20 TABLET, DELAYED RELEASE ORAL at 07:56

## 2022-12-10 RX ADMIN — PREGABALIN 1000 MICROGRAM(S): 225 CAPSULE ORAL at 10:28

## 2022-12-10 RX ADMIN — OXYCODONE HYDROCHLORIDE 10 MILLIGRAM(S): 5 TABLET ORAL at 17:57

## 2022-12-10 RX ADMIN — Medication 2: at 21:37

## 2022-12-10 RX ADMIN — SODIUM CHLORIDE 3 MILLILITER(S): 9 INJECTION INTRAMUSCULAR; INTRAVENOUS; SUBCUTANEOUS at 13:04

## 2022-12-10 RX ADMIN — Medication 250 MILLIGRAM(S): at 13:18

## 2022-12-10 RX ADMIN — Medication 500 MILLIGRAM(S): at 07:55

## 2022-12-10 NOTE — PROGRESS NOTE ADULT - ASSESSMENT
67M, former smoker with a medical history of HTN, HLD, type 2 DM (HA1c 7.6 on Metformin and Insulin), CAD s/p PCI to RCA 2007, ETOH cirrhosis, B12/Vit D deficiency, recent hospitalization for GI Bleed 05/2022, admitted on 11/21/22 for repeat cath which confirmed multivessel CAD. s/p CABG X 3 (LIMA-LAD, SVG-OM, SVG-PDA) on 11/22/22 with Dr. Gray. Postoperative course significant for cardiogenic shock and hypotension (resolved s/p volume resuscitation, phenylephrine gtt weaned off), ABLA (stable s/p blood transfusion), hyperglycemia (titrating insulin requirements), and Left pleural effusion (s/p pigtail catheter insertion 11/29, removed 11/30, with residual effusion noted on CT chest with IR pigtail placed 12/1 and removed 12/3). Patient with right femoral occlusion found on arterial duplex 12/4 (seen by vascular surgery, likely not acute), Right thigh hematoma (from EVH removal), and RLE cellulitis (improving, remains on IV antibiotics as per ID).

## 2022-12-10 NOTE — PROGRESS NOTE ADULT - PROBLEM SELECTOR PLAN 4
Left pleural effusion likely related to surgery  S/p pigtail catheter insertion 11/29 and removal 11/30.  Residual effusion noted on CT chest with IR pigtail placed 12/1 and removed 12/3.   PRN CXR.

## 2022-12-10 NOTE — PROGRESS NOTE ADULT - SUBJECTIVE AND OBJECTIVE BOX
POD #18 s/p CABG X 3 (LIMA-LAD, SVG-OM, SVG-RPDA)    PAST MEDICAL & SURGICAL HISTORY:  Pancreatitis  Hypertension  Myocardial infarct  Alcohol abuse  Colon cancer  History of colon resection  History of heart surgery  cardiac stent    FAMILY HISTORY:  FH: prostate cancer (Father)  Family history of atherosclerosis (Mother)    Brief Hospital Course: 67M, former smoker with a medical history of HTN, HLD, type 2 DM (HA1c 7.6 on Metformin and Insulin), CAD s/p PCI to RCA 2007, ETOH cirrhosis, B12/Vit D deficiency, recent hospitalization for GI Bleed 05/2022, admitted on 11/21/22 for repeat cath which confirmed multivessel CAD. s/p CABG X 3 (LIMA-LAD, SVG-OM, SVG-PDA) on 11/22/22 with Dr. Gray. Postoperative course significant for cardiogenic shock and hypotension (resolved s/p volume resuscitation, phenylephrine gtt weaned off), ABLA (stable s/p blood transfusion), hyperglycemia (titrating insulin requirements), and Left pleural effusion (s/p pigtail catheter insertion 11/29, removed 11/30, with residual effusion noted on CT chest with IR pigtail placed 12/1 and removed 12/3). Patient with right femoral occlusion found on arterial duplex 12/4 (seen by vascular surgery, likely not acute), Right thigh hematoma (from EVH removal), and RLE cellulitis (improving, remains on IV antibiotics as per ID).     Significant recent/past 24 hr events: No overnight events reported.    Subjective: Patient lying in bed in NAD. +Tolerating diet. +Passing BMs. +Pain currently controlled. Right leg wrapped due to serous drainage from harvest site. Denies fevers, chills, lightheadedness, dizziness, HA, CP, palpitations, SOB, cough, abdominal pain, N/V, diarrhea, numbness/tingling in extremities, or any other acute complaints. ROS negative x 10 systems except as noted above.    MEDICATIONS  (STANDING):  aMIOdarone    Tablet 200 milliGRAM(s) Oral daily  apixaban 2.5 milliGRAM(s) Oral two times a day  ascorbic acid 500 milliGRAM(s) Oral daily  aspirin enteric coated 81 milliGRAM(s) Oral daily  atorvastatin 80 milliGRAM(s) Oral at bedtime  ceFAZolin   IVPB 2000 milliGRAM(s) IV Intermittent every 8 hours  cyanocobalamin 1000 MICROGram(s) Oral daily  dapagliflozin 10 milliGRAM(s) Oral every 24 hours  ferrous    sulfate 325 milliGRAM(s) Oral daily  folic acid 1 milliGRAM(s) Oral daily  gabapentin 300 milliGRAM(s) Oral three times a day  insulin glargine Injectable (LANTUS) 32 Unit(s) SubCutaneous at bedtime  insulin lispro (ADMELOG) corrective regimen sliding scale   SubCutaneous Before meals and at bedtime  insulin lispro Injectable (ADMELOG) 8 Unit(s) SubCutaneous three times a day before meals  lidocaine   4% Patch 1 Patch Transdermal daily  melatonin 5 milliGRAM(s) Oral at bedtime  metoprolol tartrate 50 milliGRAM(s) Oral three times a day  mirtazapine 15 milliGRAM(s) Oral at bedtime  pantoprazole    Tablet 40 milliGRAM(s) Oral daily  polyethylene glycol 3350 17 Gram(s) Oral daily  potassium chloride    Tablet ER 40 milliEquivalent(s) Oral daily  senna 2 Tablet(s) Oral at bedtime  sodium chloride 0.9% lock flush 3 milliLiter(s) IV Push every 8 hours  thiamine 100 milliGRAM(s) Oral daily  torsemide 20 milliGRAM(s) Oral <User Schedule>  vancomycin  IVPB 750 milliGRAM(s) IV Intermittent every 12 hours    MEDICATIONS  (PRN):  acetaminophen     Tablet .. 650 milliGRAM(s) Oral every 6 hours PRN Mild Pain (1 - 3)  oxyCODONE    IR 5 milliGRAM(s) Oral every 4 hours PRN Moderate Pain (4 - 6)  oxyCODONE    IR 10 milliGRAM(s) Oral every 4 hours PRN Severe Pain (7 - 10)    Allergies: No Known Allergies    Vitals   T(C): 36.7 (10 Dec 2022 04:30), Max: 37.2 (09 Dec 2022 16:17)  T(F): 98 (10 Dec 2022 04:30), Max: 98.9 (09 Dec 2022 16:17)  HR: 83 (10 Dec 2022 04:30) (71 - 88)  BP: 134/81 (10 Dec 2022 04:30) (106/79 - 134/81)  RR: 18 (10 Dec 2022 04:30) (17 - 18)  SpO2: 98% (10 Dec 2022 04:30) (97% - 99%)  O2 Parameters below as of 10 Dec 2022 04:30  Patient On (Oxygen Delivery Method): room air    I&O's Detail    08 Dec 2022 07:01  -  09 Dec 2022 07:00  --------------------------------------------------------  IN:    Oral Fluid: 760 mL  Total IN: 760 mL    OUT:    Voided (mL): 2200 mL  Total OUT: 2200 mL    Total NET: -1440 mL      09 Dec 2022 07:01  -  10 Dec 2022 06:33  --------------------------------------------------------  IN:    IV PiggyBack: 250 mL    IV PiggyBack: 50 mL    Oral Fluid: 960 mL  Total IN: 1260 mL    OUT:  Total OUT: 0 mL    Total NET: 1260 mL    Physical Exam  Neuro: A+O x 3, non-focal, speech clear and intact  HEENT:  NCAT, No conjuctival edema or icterus, no thrush.    Neck:  Supple, trachea midline  Pulm: +Diminished BSs Left base, no accessory muscle use noted  CV: regular rate, regular rhythm, +S1S2, no murmur noted  Abd: soft, NT, ND, + BS  Ext: SEGURA x 4, trace LLE edema, +1 RLE edema, no cyanosis, distal motor/neuro/circ intact  Skin: warm, dry, perfused  Incisions: midsternal incision open to air C/D/I, sternum stable, RLE harvest site with mild/moderate serous drainage    LABS                        10.1   10.74 )-----------( 259      ( 10 Dec 2022 05:02 )             33.0     12-10    138  |  97  |  14.5  ----------------------------<  147<H>  4.3   |  28.0  |  1.07    Ca    9.8      10 Dec 2022 05:02  Mg     2.2     12-10    POCT Blood Glucose.: 146 mg/dL (12-09-22 @ 21:13)  POCT Blood Glucose.: 153 mg/dL (12-09-22 @ 17:07)  POCT Blood Glucose.: 136 mg/dL (12-09-22 @ 11:54)  POCT Blood Glucose.: 185 mg/dL (12-09-22 @ 07:54)    Last CXR:  < from: Xray Chest 1 View- PORTABLE-Routine (Xray Chest 1 View- PORTABLE-Routine in AM.) (12.09.22 @ 05:10) >  IMPRESSION:  Status post cardiac surgery.  No interval change.  < end of copied text >

## 2022-12-11 LAB
ANION GAP SERPL CALC-SCNC: 11 MMOL/L — SIGNIFICANT CHANGE UP (ref 5–17)
BUN SERPL-MCNC: 14 MG/DL — SIGNIFICANT CHANGE UP (ref 8–20)
CALCIUM SERPL-MCNC: 9.6 MG/DL — SIGNIFICANT CHANGE UP (ref 8.4–10.5)
CHLORIDE SERPL-SCNC: 95 MMOL/L — LOW (ref 96–108)
CO2 SERPL-SCNC: 30 MMOL/L — HIGH (ref 22–29)
CREAT SERPL-MCNC: 1.03 MG/DL — SIGNIFICANT CHANGE UP (ref 0.5–1.3)
EGFR: 80 ML/MIN/1.73M2 — SIGNIFICANT CHANGE UP
GLUCOSE BLDC GLUCOMTR-MCNC: 146 MG/DL — HIGH (ref 70–99)
GLUCOSE BLDC GLUCOMTR-MCNC: 149 MG/DL — HIGH (ref 70–99)
GLUCOSE BLDC GLUCOMTR-MCNC: 182 MG/DL — HIGH (ref 70–99)
GLUCOSE BLDC GLUCOMTR-MCNC: 196 MG/DL — HIGH (ref 70–99)
GLUCOSE BLDC GLUCOMTR-MCNC: 208 MG/DL — HIGH (ref 70–99)
GLUCOSE SERPL-MCNC: 215 MG/DL — HIGH (ref 70–99)
HCT VFR BLD CALC: 30.4 % — LOW (ref 39–50)
HGB BLD-MCNC: 9.5 G/DL — LOW (ref 13–17)
MAGNESIUM SERPL-MCNC: 1.9 MG/DL — SIGNIFICANT CHANGE UP (ref 1.6–2.6)
MCHC RBC-ENTMCNC: 30.7 PG — SIGNIFICANT CHANGE UP (ref 27–34)
MCHC RBC-ENTMCNC: 31.3 GM/DL — LOW (ref 32–36)
MCV RBC AUTO: 98.4 FL — SIGNIFICANT CHANGE UP (ref 80–100)
PLATELET # BLD AUTO: 431 K/UL — HIGH (ref 150–400)
POTASSIUM SERPL-MCNC: 3.5 MMOL/L — SIGNIFICANT CHANGE UP (ref 3.5–5.3)
POTASSIUM SERPL-SCNC: 3.5 MMOL/L — SIGNIFICANT CHANGE UP (ref 3.5–5.3)
RBC # BLD: 3.09 M/UL — LOW (ref 4.2–5.8)
RBC # FLD: 15 % — HIGH (ref 10.3–14.5)
SODIUM SERPL-SCNC: 136 MMOL/L — SIGNIFICANT CHANGE UP (ref 135–145)
VANCOMYCIN TROUGH SERPL-MCNC: 13.2 UG/ML — SIGNIFICANT CHANGE UP (ref 10–20)
WBC # BLD: 9.2 K/UL — SIGNIFICANT CHANGE UP (ref 3.8–10.5)
WBC # FLD AUTO: 9.2 K/UL — SIGNIFICANT CHANGE UP (ref 3.8–10.5)

## 2022-12-11 PROCEDURE — 71045 X-RAY EXAM CHEST 1 VIEW: CPT | Mod: 26

## 2022-12-11 PROCEDURE — 99232 SBSQ HOSP IP/OBS MODERATE 35: CPT

## 2022-12-11 PROCEDURE — 93308 TTE F-UP OR LMTD: CPT | Mod: 26

## 2022-12-11 RX ORDER — MAGNESIUM SULFATE 500 MG/ML
2 VIAL (ML) INJECTION ONCE
Refills: 0 | Status: COMPLETED | OUTPATIENT
Start: 2022-12-11 | End: 2022-12-11

## 2022-12-11 RX ORDER — POTASSIUM CHLORIDE 20 MEQ
40 PACKET (EA) ORAL ONCE
Refills: 0 | Status: COMPLETED | OUTPATIENT
Start: 2022-12-11 | End: 2022-12-11

## 2022-12-11 RX ADMIN — Medication 100 MILLIGRAM(S): at 05:31

## 2022-12-11 RX ADMIN — OXYCODONE HYDROCHLORIDE 10 MILLIGRAM(S): 5 TABLET ORAL at 17:10

## 2022-12-11 RX ADMIN — Medication 8 UNIT(S): at 17:06

## 2022-12-11 RX ADMIN — SENNA PLUS 2 TABLET(S): 8.6 TABLET ORAL at 21:28

## 2022-12-11 RX ADMIN — Medication 81 MILLIGRAM(S): at 09:19

## 2022-12-11 RX ADMIN — OXYCODONE HYDROCHLORIDE 10 MILLIGRAM(S): 5 TABLET ORAL at 22:02

## 2022-12-11 RX ADMIN — Medication 325 MILLIGRAM(S): at 09:21

## 2022-12-11 RX ADMIN — APIXABAN 2.5 MILLIGRAM(S): 2.5 TABLET, FILM COATED ORAL at 05:30

## 2022-12-11 RX ADMIN — OXYCODONE HYDROCHLORIDE 10 MILLIGRAM(S): 5 TABLET ORAL at 12:10

## 2022-12-11 RX ADMIN — Medication 40 MILLIEQUIVALENT(S): at 09:19

## 2022-12-11 RX ADMIN — Medication 8 UNIT(S): at 09:18

## 2022-12-11 RX ADMIN — Medication 250 MILLIGRAM(S): at 14:29

## 2022-12-11 RX ADMIN — OXYCODONE HYDROCHLORIDE 10 MILLIGRAM(S): 5 TABLET ORAL at 18:10

## 2022-12-11 RX ADMIN — Medication 250 MILLIGRAM(S): at 00:13

## 2022-12-11 RX ADMIN — Medication 25 GRAM(S): at 10:29

## 2022-12-11 RX ADMIN — Medication 8 UNIT(S): at 12:07

## 2022-12-11 RX ADMIN — GABAPENTIN 300 MILLIGRAM(S): 400 CAPSULE ORAL at 21:28

## 2022-12-11 RX ADMIN — AMIODARONE HYDROCHLORIDE 200 MILLIGRAM(S): 400 TABLET ORAL at 05:30

## 2022-12-11 RX ADMIN — SODIUM CHLORIDE 3 MILLILITER(S): 9 INJECTION INTRAMUSCULAR; INTRAVENOUS; SUBCUTANEOUS at 05:25

## 2022-12-11 RX ADMIN — Medication 50 MILLIGRAM(S): at 05:31

## 2022-12-11 RX ADMIN — SODIUM CHLORIDE 3 MILLILITER(S): 9 INJECTION INTRAMUSCULAR; INTRAVENOUS; SUBCUTANEOUS at 21:22

## 2022-12-11 RX ADMIN — Medication 100 MILLIGRAM(S): at 21:29

## 2022-12-11 RX ADMIN — Medication 4: at 21:30

## 2022-12-11 RX ADMIN — APIXABAN 2.5 MILLIGRAM(S): 2.5 TABLET, FILM COATED ORAL at 17:05

## 2022-12-11 RX ADMIN — Medication 2: at 17:06

## 2022-12-11 RX ADMIN — Medication 100 MILLIGRAM(S): at 13:33

## 2022-12-11 RX ADMIN — OXYCODONE HYDROCHLORIDE 10 MILLIGRAM(S): 5 TABLET ORAL at 04:53

## 2022-12-11 RX ADMIN — Medication 100 MILLIGRAM(S): at 09:19

## 2022-12-11 RX ADMIN — Medication 40 MILLIEQUIVALENT(S): at 10:28

## 2022-12-11 RX ADMIN — POLYETHYLENE GLYCOL 3350 17 GRAM(S): 17 POWDER, FOR SOLUTION ORAL at 09:19

## 2022-12-11 RX ADMIN — GABAPENTIN 300 MILLIGRAM(S): 400 CAPSULE ORAL at 13:34

## 2022-12-11 RX ADMIN — INSULIN GLARGINE 32 UNIT(S): 100 INJECTION, SOLUTION SUBCUTANEOUS at 21:29

## 2022-12-11 RX ADMIN — Medication 500 MILLIGRAM(S): at 09:19

## 2022-12-11 RX ADMIN — GABAPENTIN 300 MILLIGRAM(S): 400 CAPSULE ORAL at 05:31

## 2022-12-11 RX ADMIN — OXYCODONE HYDROCHLORIDE 10 MILLIGRAM(S): 5 TABLET ORAL at 11:10

## 2022-12-11 RX ADMIN — Medication 50 MILLIGRAM(S): at 21:29

## 2022-12-11 RX ADMIN — Medication 1 MILLIGRAM(S): at 09:19

## 2022-12-11 RX ADMIN — Medication 40 MILLIGRAM(S): at 05:31

## 2022-12-11 RX ADMIN — SODIUM CHLORIDE 3 MILLILITER(S): 9 INJECTION INTRAMUSCULAR; INTRAVENOUS; SUBCUTANEOUS at 13:34

## 2022-12-11 RX ADMIN — OXYCODONE HYDROCHLORIDE 10 MILLIGRAM(S): 5 TABLET ORAL at 21:32

## 2022-12-11 RX ADMIN — OXYCODONE HYDROCHLORIDE 10 MILLIGRAM(S): 5 TABLET ORAL at 03:53

## 2022-12-11 RX ADMIN — Medication 5 MILLIGRAM(S): at 21:29

## 2022-12-11 RX ADMIN — ATORVASTATIN CALCIUM 80 MILLIGRAM(S): 80 TABLET, FILM COATED ORAL at 21:28

## 2022-12-11 RX ADMIN — PREGABALIN 1000 MICROGRAM(S): 225 CAPSULE ORAL at 09:19

## 2022-12-11 RX ADMIN — Medication 50 MILLIGRAM(S): at 13:34

## 2022-12-11 RX ADMIN — PANTOPRAZOLE SODIUM 40 MILLIGRAM(S): 20 TABLET, DELAYED RELEASE ORAL at 09:19

## 2022-12-11 RX ADMIN — DAPAGLIFLOZIN 10 MILLIGRAM(S): 10 TABLET, FILM COATED ORAL at 05:31

## 2022-12-11 RX ADMIN — MIRTAZAPINE 15 MILLIGRAM(S): 45 TABLET, ORALLY DISINTEGRATING ORAL at 21:30

## 2022-12-11 NOTE — PROGRESS NOTE ADULT - SUBJECTIVE AND OBJECTIVE BOX
[facility]  INFECTIOUS DISEASES at [place] and [place]  ===============================================================                               Faustino Shannon MD*     Isaura Zimmerman MD*                         Jonatahn Houser MD*       Nessa Titus MD*            Diplomates American Board of Internal Medicine & Infectious Diseases                * [place] Office - Appt - Tel  [phone] Fax [phone]                * [place] Office - Appt - Tel [phone] Fax [phone]                                  Hospital Consult line:  [phone]  ==============================================================    BRIE WEBB [id number]    Follow up: wound infection     No acute events  Afebrile     I have personally reviewed the labs and data; pertinent labs and data are listed in this note; please see below.     _______________________________________________________________  REVIEW OF SYSTEMS  Feeling well, offer no complaints. Tolerating abx w/o noticeable side effects. Pain controlled   ________________________________________________________________  Allergies:  No Known Allergies    ________________________________________________________________  PHYSICAL EXAM  GEN: NAD, lying in bed.   HEENT:  Moist mucous membranes. No mucosal lesions.   LUNGS: eupneic.   :  No Zhu catheter  EXTREMITIES:  RLE harvest site - no erythema or purulent discharge, fluctuance posteriorly   PSYCHIATRIC: Appropriate affect and mood  SKIN: as above  LINES: PIV   ________________________________________________________________  Vitals:  T(F): 98.7 (11 Dec 2022 11:09), Max: 98.7 (11 Dec 2022 11:09)  HR: 85 (11 Dec 2022 11:09)  BP: 125/74 (11 Dec 2022 11:09)  RR: 18 (11 Dec 2022 11:09)  SpO2: 97% (11 Dec 2022 11:09) (93% - 97%)  temp max in last 48H T(F): , Max: 98.9 (12-09-22 @ 16:17)    Current Antibiotics:  ceFAZolin   IVPB 2000 milliGRAM(s) IV Intermittent every 8 hours  vancomycin  IVPB 750 milliGRAM(s) IV Intermittent every 12 hours    Other medications:  aMIOdarone    Tablet   Oral   aMIOdarone    Tablet 200 milliGRAM(s) Oral daily  apixaban 2.5 milliGRAM(s) Oral two times a day  ascorbic acid 500 milliGRAM(s) Oral daily  aspirin enteric coated 81 milliGRAM(s) Oral daily  atorvastatin 80 milliGRAM(s) Oral at bedtime  cyanocobalamin 1000 MICROGram(s) Oral daily  dapagliflozin 10 milliGRAM(s) Oral every 24 hours  ferrous    sulfate 325 milliGRAM(s) Oral daily  folic acid 1 milliGRAM(s) Oral daily  gabapentin 300 milliGRAM(s) Oral three times a day  glucagon  Injectable 1 milliGRAM(s) IntraMuscular once  insulin glargine Injectable (LANTUS) 32 Unit(s) SubCutaneous at bedtime  insulin lispro (ADMELOG) corrective regimen sliding scale   SubCutaneous Before meals and at bedtime  insulin lispro Injectable (ADMELOG) 8 Unit(s) SubCutaneous three times a day before meals  lidocaine   4% Patch 1 Patch Transdermal daily  melatonin 5 milliGRAM(s) Oral at bedtime  metoprolol tartrate 50 milliGRAM(s) Oral three times a day  mirtazapine 15 milliGRAM(s) Oral at bedtime  pantoprazole    Tablet 40 milliGRAM(s) Oral daily  polyethylene glycol 3350 17 Gram(s) Oral daily  potassium chloride    Tablet ER 40 milliEquivalent(s) Oral daily  senna 2 Tablet(s) Oral at bedtime  sodium chloride 0.9% lock flush 3 milliLiter(s) IV Push every 8 hours  thiamine 100 milliGRAM(s) Oral daily  torsemide 40 milliGRAM(s) Oral daily                            9.5    9.20  )-----------( 431      ( 11 Dec 2022 06:49 )             30.4     12-11    136  |  95<L>  |  14.0  ----------------------------<  215<H>  3.5   |  30.0<H>  |  1.03    Ca    9.6      11 Dec 2022 06:49  Mg     1.9     12-11      RECENT CULTURES:  12-01 @ 16:30 Pleural Fl Pleural Fluid     No growth at 5 days    polymorphonuclear leukocytes seen  No organisms seen  by cytocentrifuge      WBC Count: 9.20 K/uL (12-11-22 @ 06:49)  WBC Count: 10.74 K/uL (12-10-22 @ 05:02)  WBC Count: 9.84 K/uL (12-09-22 @ 05:02)  WBC Count: 8.35 K/uL (12-08-22 @ 05:20)  WBC Count: 10.13 K/uL (12-07-22 @ 04:47)    Creatinine, Serum: 1.03 mg/dL (12-11-22 @ 06:49)  Creatinine, Serum: 1.07 mg/dL (12-10-22 @ 05:02)  Creatinine, Serum: 1.04 mg/dL (12-09-22 @ 05:02)  Creatinine, Serum: 1.24 mg/dL (12-08-22 @ 05:20)  Creatinine, Serum: 1.03 mg/dL (12-07-22 @ 04:47)      COVID-19 PCR: NotDetec (12-04-22 @ 06:50)  COVID-19 PCR: NotDetec (11-29-22 @ 14:15)  COVID-19 PCR: NotDetec (11-21-22 @ 10:15)    ________________________________________________________________  RADIOLOGY  < from: US Extremity Nonvasc Limited, Right (12.05.22 @ 09:33) >  INTERPRETATION:  CLINICAL HISTORY: Status post greater saphenous vein   harvest now with thigh hematoma.    COMPARISON: None    Multiple transverse and longitudinal high-resolution images of the   posterior medial right thigh were obtained with a linear transducer,   including color Doppler evaluation.    In the region of prior surgical intervention there are 2 complex fluid   collections identified, without evidence for internal vascularity   measuring 26.7 x 1.0 cm and 12.0 x 2.2 cm, which may be related to post   surgical intervention hematoma/seroma. Subcutaneous edema is identified.    IMPRESSION:  As above.    < end of copied text >

## 2022-12-11 NOTE — PROGRESS NOTE ADULT - SUBJECTIVE AND OBJECTIVE BOX
POD #19 s/p CABG X 3 (LIMA-LAD, SVG-OM, SVG-RPDA)    PAST MEDICAL & SURGICAL HISTORY:  Pancreatitis  Hypertension  Myocardial infarct  Alcohol abuse  Colon cancer  History of colon resection  History of heart surgery  cardiac stent    FAMILY HISTORY:  FH: prostate cancer (Father)  Family history of atherosclerosis (Mother)    Brief Hospital Course: 67M, former smoker with a medical history of HTN, HLD, type 2 DM (HA1c 7.6 on Metformin and Insulin), CAD s/p PCI to RCA 2007, ETOH cirrhosis, B12/Vit D deficiency, recent hospitalization for GI Bleed 05/2022, admitted on 11/21/22 for repeat cath which confirmed multivessel CAD. s/p CABG X 3 (LIMA-LAD, SVG-OM, SVG-PDA) on 11/22/22 with Dr. Gray. Postoperative course significant for cardiogenic shock and hypotension (resolved s/p volume resuscitation, phenylephrine gtt weaned off), ABLA (stable s/p blood transfusion), hyperglycemia (titrating insulin requirements), and Left pleural effusion (s/p pigtail catheter insertion 11/29, removed 11/30, with residual effusion noted on CT chest with IR pigtail placed 12/1 and removed 12/3). Patient with right femoral occlusion found on arterial duplex 12/4 (seen by vascular surgery, likely not acute), Right thigh hematoma (from EVH removal), and RLE cellulitis (improving, remains on IV antibiotics as per ID).     Significant recent/past 24 hr events: No overnight events reported.    Subjective: Patient lying in bed in NAD. +Tolerating diet. +Passing BMs. +Pain currently controlled. Right leg wrapped due to serous drainage from harvest site. Denies fevers, chills, lightheadedness, dizziness, HA, CP, palpitations, SOB, cough, abdominal pain, N/V, diarrhea, numbness/tingling in extremities, or any other acute complaints. ROS negative x 10 systems except as noted above.    MEDICATIONS  (STANDING):  aMIOdarone    Tablet 200 milliGRAM(s) Oral daily  apixaban 2.5 milliGRAM(s) Oral two times a day  ascorbic acid 500 milliGRAM(s) Oral daily  aspirin enteric coated 81 milliGRAM(s) Oral daily  atorvastatin 80 milliGRAM(s) Oral at bedtime  ceFAZolin   IVPB 2000 milliGRAM(s) IV Intermittent every 8 hours  cyanocobalamin 1000 MICROGram(s) Oral daily  dapagliflozin 10 milliGRAM(s) Oral every 24 hours  ferrous    sulfate 325 milliGRAM(s) Oral daily  folic acid 1 milliGRAM(s) Oral daily  gabapentin 300 milliGRAM(s) Oral three times a day  insulin glargine Injectable (LANTUS) 32 Unit(s) SubCutaneous at bedtime  insulin lispro (ADMELOG) corrective regimen sliding scale   SubCutaneous Before meals and at bedtime  insulin lispro Injectable (ADMELOG) 8 Unit(s) SubCutaneous three times a day before meals  lidocaine   4% Patch 1 Patch Transdermal daily  melatonin 5 milliGRAM(s) Oral at bedtime  metoprolol tartrate 50 milliGRAM(s) Oral three times a day  mirtazapine 15 milliGRAM(s) Oral at bedtime  pantoprazole    Tablet 40 milliGRAM(s) Oral daily  polyethylene glycol 3350 17 Gram(s) Oral daily  potassium chloride    Tablet ER 40 milliEquivalent(s) Oral daily  senna 2 Tablet(s) Oral at bedtime  sodium chloride 0.9% lock flush 3 milliLiter(s) IV Push every 8 hours  thiamine 100 milliGRAM(s) Oral daily  torsemide 40 milliGRAM(s) Oral daily  vancomycin  IVPB 750 milliGRAM(s) IV Intermittent every 12 hours    MEDICATIONS  (PRN):  acetaminophen     Tablet .. 650 milliGRAM(s) Oral every 6 hours PRN Mild Pain (1 - 3)  oxyCODONE    IR 5 milliGRAM(s) Oral every 4 hours PRN Moderate Pain (4 - 6)  oxyCODONE    IR 10 milliGRAM(s) Oral every 4 hours PRN Severe Pain (7 - 10)    Allergies: No Known Allergies    Vitals   T(C): 36.8 (10 Dec 2022 21:24), Max: 37.1 (10 Dec 2022 10:00)  T(F): 98.3 (10 Dec 2022 21:24), Max: 98.7 (10 Dec 2022 10:00)  HR: 81 (11 Dec 2022 03:50) (77 - 94)  BP: 121/60 (11 Dec 2022 03:50) (107/61 - 133/70)  RR: 18 (11 Dec 2022 03:50) (15 - 18)  SpO2: 97% (11 Dec 2022 03:50) (93% - 98%)  O2 Parameters below as of 11 Dec 2022 03:50  Patient On (Oxygen Delivery Method): room air    I&O's Detail    09 Dec 2022 07:01  -  10 Dec 2022 07:00  --------------------------------------------------------  IN:    IV PiggyBack: 250 mL    IV PiggyBack: 50 mL    Oral Fluid: 960 mL  Total IN: 1260 mL    OUT:  Total OUT: 0 mL    Total NET: 1260 mL      10 Dec 2022 07:01  -  11 Dec 2022 05:25  --------------------------------------------------------  IN:    IV PiggyBack: 250 mL    IV PiggyBack: 50 mL    Oral Fluid: 960 mL  Total IN: 1260 mL    OUT:    Voided (mL): 850 mL  Total OUT: 850 mL    Total NET: 410 mL    Physical Exam  Neuro: A+O x 3, non-focal, speech clear and intact  HEENT:  NCAT, No conjuctival edema or icterus, no thrush.    Neck:  Supple, trachea midline  Pulm: +Diminished BSs Left base, no accessory muscle use noted  CV: regular rate, regular rhythm, +S1S2, no murmur noted  Abd: soft, NT, ND, + BS  Ext: SEGURA x 4, trace LLE edema, +1 RLE edema, no cyanosis, distal motor/neuro/circ intact  Skin: warm, dry, perfused  Incisions: midsternal incision open to air C/D/I, sternum stable, RLE harvest site with mild serous drainage    LABS                        10.1   10.74 )-----------( 259      ( 10 Dec 2022 05:02 )             33.0     12-10    138  |  97  |  14.5  ----------------------------<  147<H>  4.3   |  28.0  |  1.07    Ca    9.8      10 Dec 2022 05:02  Mg     2.2     12-10    POCT Blood Glucose.: 174 mg/dL (12-10-22 @ 21:33)  POCT Blood Glucose.: 236 mg/dL (12-10-22 @ 16:48)  POCT Blood Glucose.: 195 mg/dL (12-10-22 @ 11:50)  POCT Blood Glucose.: 212 mg/dL (12-10-22 @ 07:43)      Last CXR:  < from: Xray Chest 1 View- PORTABLE-Routine (Xray Chest 1 View- PORTABLE-Routine in AM.) (12.09.22 @ 05:10) >  IMPRESSION:  Status post cardiac surgery.  No interval change.  < end of copied text >

## 2022-12-11 NOTE — PROGRESS NOTE ADULT - ASSESSMENT
67y  Male former smoker with h/o HTN , CAD, s/p PCI (LEELA x 1 pRCA 2007), ACC/AHA stage C, NYHA functional class 3, HFrEF, depression. Patient was admitted to [facility] in May 2022 for GI bleed and also had a cardiac work up which included an echo which showed and EF of 30-35%; a LHC which showed occlusion of the mLAD, 50% mLCX stenosis, and an 80% OM2 stenosis. He was referred to CT surgery for CABG. Admit on 11/21 for repeat cath, CABG 11/22 C3L (LIMA-LAD, SVG-OM, SVG-PDA) by Dr Gray. Post was initially in CTICU and downgraded to cardiac floor once he was stable. Post op course complicated by blood transfusion, A fib with RVR, pleural effusion requiring chest tube on 11/29 and removed 11/30, B/L LE edema, residual effusion noted on CT chest 12/1 and IR pigtail placement and removed 12/3; On 12/4 noted tohave worsening swelling of RLE, was found to have Occluded right femoral artery at the mid portion with reconstitution of flow in the distal portion;  was seen by vascular surgery and did not require any surgical intervention. On 12/5 RLE vein harvest site opened to allow for collection of fluid in thigh to drain. Patient was started on Vancomycin on 12/4 - Present.       RLE swelling and tenderness  Possible cellulitis   RLE collections  CAD   s/p CABG 11/22 C3L (LIMA-LAD, SVG-OM, SVG-PDA) by Dr Gray 11/21      - Has remained afebrile, no leukocytosis  - Area without erythema, draining serous fluid   - 12/5 US with complex fluid collections  - Can continue vanco and cefazolin while hospitalized, monitoring vanco through   - Upon discharge, transition to oral doxycycline 100 mg PO q12h to complete total 10 days (through 12/14)  - continue wound care  - Per patient, he will be discharged to [facility]    Please call with questions

## 2022-12-12 LAB
ALBUMIN SERPL ELPH-MCNC: 2.9 G/DL — LOW (ref 3.3–5.2)
ALP SERPL-CCNC: 154 U/L — HIGH (ref 40–120)
ALT FLD-CCNC: 11 U/L — SIGNIFICANT CHANGE UP
ANION GAP SERPL CALC-SCNC: 11 MMOL/L — SIGNIFICANT CHANGE UP (ref 5–17)
AST SERPL-CCNC: 26 U/L — SIGNIFICANT CHANGE UP
BILIRUB SERPL-MCNC: 0.5 MG/DL — SIGNIFICANT CHANGE UP (ref 0.4–2)
BUN SERPL-MCNC: 14.4 MG/DL — SIGNIFICANT CHANGE UP (ref 8–20)
CALCIUM SERPL-MCNC: 9.8 MG/DL — SIGNIFICANT CHANGE UP (ref 8.4–10.5)
CHLORIDE SERPL-SCNC: 94 MMOL/L — LOW (ref 96–108)
CO2 SERPL-SCNC: 31 MMOL/L — HIGH (ref 22–29)
CREAT SERPL-MCNC: 0.92 MG/DL — SIGNIFICANT CHANGE UP (ref 0.5–1.3)
EGFR: 92 ML/MIN/1.73M2 — SIGNIFICANT CHANGE UP
GLUCOSE BLDC GLUCOMTR-MCNC: 171 MG/DL — HIGH (ref 70–99)
GLUCOSE BLDC GLUCOMTR-MCNC: 178 MG/DL — HIGH (ref 70–99)
GLUCOSE BLDC GLUCOMTR-MCNC: 191 MG/DL — HIGH (ref 70–99)
GLUCOSE BLDC GLUCOMTR-MCNC: 198 MG/DL — HIGH (ref 70–99)
GLUCOSE SERPL-MCNC: 178 MG/DL — HIGH (ref 70–99)
HCT VFR BLD CALC: 27.6 % — LOW (ref 39–50)
HGB BLD-MCNC: 8.8 G/DL — LOW (ref 13–17)
MAGNESIUM SERPL-MCNC: 2 MG/DL — SIGNIFICANT CHANGE UP (ref 1.6–2.6)
MCHC RBC-ENTMCNC: 31.1 PG — SIGNIFICANT CHANGE UP (ref 27–34)
MCHC RBC-ENTMCNC: 31.9 GM/DL — LOW (ref 32–36)
MCV RBC AUTO: 97.5 FL — SIGNIFICANT CHANGE UP (ref 80–100)
PLATELET # BLD AUTO: 448 K/UL — HIGH (ref 150–400)
POTASSIUM SERPL-MCNC: 3.5 MMOL/L — SIGNIFICANT CHANGE UP (ref 3.5–5.3)
POTASSIUM SERPL-SCNC: 3.5 MMOL/L — SIGNIFICANT CHANGE UP (ref 3.5–5.3)
PROT SERPL-MCNC: 5.8 G/DL — LOW (ref 6.6–8.7)
RBC # BLD: 2.83 M/UL — LOW (ref 4.2–5.8)
RBC # FLD: 14.6 % — HIGH (ref 10.3–14.5)
SODIUM SERPL-SCNC: 136 MMOL/L — SIGNIFICANT CHANGE UP (ref 135–145)
WBC # BLD: 9.74 K/UL — SIGNIFICANT CHANGE UP (ref 3.8–10.5)
WBC # FLD AUTO: 9.74 K/UL — SIGNIFICANT CHANGE UP (ref 3.8–10.5)

## 2022-12-12 PROCEDURE — 71045 X-RAY EXAM CHEST 1 VIEW: CPT | Mod: 26

## 2022-12-12 PROCEDURE — 99232 SBSQ HOSP IP/OBS MODERATE 35: CPT

## 2022-12-12 RX ORDER — ACETAZOLAMIDE 250 MG/1
500 TABLET ORAL EVERY 12 HOURS
Refills: 0 | Status: COMPLETED | OUTPATIENT
Start: 2022-12-12 | End: 2022-12-13

## 2022-12-12 RX ORDER — POTASSIUM CHLORIDE 20 MEQ
40 PACKET (EA) ORAL ONCE
Refills: 0 | Status: COMPLETED | OUTPATIENT
Start: 2022-12-12 | End: 2022-12-12

## 2022-12-12 RX ORDER — SACCHAROMYCES BOULARDII 250 MG
250 POWDER IN PACKET (EA) ORAL
Refills: 0 | Status: DISCONTINUED | OUTPATIENT
Start: 2022-12-12 | End: 2022-12-14

## 2022-12-12 RX ADMIN — OXYCODONE HYDROCHLORIDE 10 MILLIGRAM(S): 5 TABLET ORAL at 12:29

## 2022-12-12 RX ADMIN — Medication 100 MILLIGRAM(S): at 10:12

## 2022-12-12 RX ADMIN — Medication 8 UNIT(S): at 08:13

## 2022-12-12 RX ADMIN — OXYCODONE HYDROCHLORIDE 10 MILLIGRAM(S): 5 TABLET ORAL at 02:19

## 2022-12-12 RX ADMIN — Medication 100 MILLIGRAM(S): at 11:31

## 2022-12-12 RX ADMIN — GABAPENTIN 300 MILLIGRAM(S): 400 CAPSULE ORAL at 21:20

## 2022-12-12 RX ADMIN — Medication 250 MILLIGRAM(S): at 09:26

## 2022-12-12 RX ADMIN — Medication 2: at 08:13

## 2022-12-12 RX ADMIN — Medication 500 MILLIGRAM(S): at 11:31

## 2022-12-12 RX ADMIN — Medication 50 MILLIGRAM(S): at 05:09

## 2022-12-12 RX ADMIN — SODIUM CHLORIDE 3 MILLILITER(S): 9 INJECTION INTRAMUSCULAR; INTRAVENOUS; SUBCUTANEOUS at 05:09

## 2022-12-12 RX ADMIN — OXYCODONE HYDROCHLORIDE 10 MILLIGRAM(S): 5 TABLET ORAL at 21:20

## 2022-12-12 RX ADMIN — Medication 40 MILLIEQUIVALENT(S): at 09:27

## 2022-12-12 RX ADMIN — Medication 100 MILLIGRAM(S): at 05:08

## 2022-12-12 RX ADMIN — Medication 8 UNIT(S): at 17:39

## 2022-12-12 RX ADMIN — Medication 2: at 17:40

## 2022-12-12 RX ADMIN — INSULIN GLARGINE 32 UNIT(S): 100 INJECTION, SOLUTION SUBCUTANEOUS at 21:26

## 2022-12-12 RX ADMIN — Medication 250 MILLIGRAM(S): at 00:33

## 2022-12-12 RX ADMIN — GABAPENTIN 300 MILLIGRAM(S): 400 CAPSULE ORAL at 13:17

## 2022-12-12 RX ADMIN — OXYCODONE HYDROCHLORIDE 10 MILLIGRAM(S): 5 TABLET ORAL at 21:50

## 2022-12-12 RX ADMIN — Medication 250 MILLIGRAM(S): at 17:40

## 2022-12-12 RX ADMIN — OXYCODONE HYDROCHLORIDE 10 MILLIGRAM(S): 5 TABLET ORAL at 07:15

## 2022-12-12 RX ADMIN — DAPAGLIFLOZIN 10 MILLIGRAM(S): 10 TABLET, FILM COATED ORAL at 05:08

## 2022-12-12 RX ADMIN — Medication 50 MILLIGRAM(S): at 13:17

## 2022-12-12 RX ADMIN — AMIODARONE HYDROCHLORIDE 200 MILLIGRAM(S): 400 TABLET ORAL at 05:08

## 2022-12-12 RX ADMIN — POLYETHYLENE GLYCOL 3350 17 GRAM(S): 17 POWDER, FOR SOLUTION ORAL at 11:30

## 2022-12-12 RX ADMIN — Medication 40 MILLIGRAM(S): at 05:08

## 2022-12-12 RX ADMIN — Medication 40 MILLIEQUIVALENT(S): at 11:30

## 2022-12-12 RX ADMIN — SODIUM CHLORIDE 3 MILLILITER(S): 9 INJECTION INTRAMUSCULAR; INTRAVENOUS; SUBCUTANEOUS at 22:04

## 2022-12-12 RX ADMIN — SENNA PLUS 2 TABLET(S): 8.6 TABLET ORAL at 21:21

## 2022-12-12 RX ADMIN — OXYCODONE HYDROCHLORIDE 10 MILLIGRAM(S): 5 TABLET ORAL at 11:30

## 2022-12-12 RX ADMIN — Medication 325 MILLIGRAM(S): at 11:31

## 2022-12-12 RX ADMIN — PREGABALIN 1000 MICROGRAM(S): 225 CAPSULE ORAL at 11:31

## 2022-12-12 RX ADMIN — OXYCODONE HYDROCHLORIDE 10 MILLIGRAM(S): 5 TABLET ORAL at 06:45

## 2022-12-12 RX ADMIN — SODIUM CHLORIDE 3 MILLILITER(S): 9 INJECTION INTRAMUSCULAR; INTRAVENOUS; SUBCUTANEOUS at 15:19

## 2022-12-12 RX ADMIN — MIRTAZAPINE 15 MILLIGRAM(S): 45 TABLET, ORALLY DISINTEGRATING ORAL at 21:21

## 2022-12-12 RX ADMIN — PANTOPRAZOLE SODIUM 40 MILLIGRAM(S): 20 TABLET, DELAYED RELEASE ORAL at 11:30

## 2022-12-12 RX ADMIN — GABAPENTIN 300 MILLIGRAM(S): 400 CAPSULE ORAL at 05:08

## 2022-12-12 RX ADMIN — Medication 50 MILLIGRAM(S): at 21:21

## 2022-12-12 RX ADMIN — OXYCODONE HYDROCHLORIDE 10 MILLIGRAM(S): 5 TABLET ORAL at 15:57

## 2022-12-12 RX ADMIN — Medication 8 UNIT(S): at 12:16

## 2022-12-12 RX ADMIN — Medication 2: at 12:18

## 2022-12-12 RX ADMIN — Medication 2: at 21:25

## 2022-12-12 RX ADMIN — Medication 5 MILLIGRAM(S): at 21:21

## 2022-12-12 RX ADMIN — OXYCODONE HYDROCHLORIDE 10 MILLIGRAM(S): 5 TABLET ORAL at 16:50

## 2022-12-12 RX ADMIN — Medication 100 MILLIGRAM(S): at 21:21

## 2022-12-12 RX ADMIN — Medication 81 MILLIGRAM(S): at 11:32

## 2022-12-12 RX ADMIN — ATORVASTATIN CALCIUM 80 MILLIGRAM(S): 80 TABLET, FILM COATED ORAL at 21:21

## 2022-12-12 RX ADMIN — ACETAZOLAMIDE 500 MILLIGRAM(S): 250 TABLET ORAL at 13:36

## 2022-12-12 RX ADMIN — OXYCODONE HYDROCHLORIDE 10 MILLIGRAM(S): 5 TABLET ORAL at 02:49

## 2022-12-12 RX ADMIN — Medication 1 MILLIGRAM(S): at 11:31

## 2022-12-12 NOTE — PROGRESS NOTE ADULT - SUBJECTIVE AND OBJECTIVE BOX
INTERVAL EVENTS:  Follow up diabetes management     ROS: Patient denies chest pain, SOB, abd pain.    MEDICATIONS  (STANDING):  acetaZOLAMIDE    Tablet 500 milliGRAM(s) Oral every 12 hours  aMIOdarone    Tablet   Oral   aMIOdarone    Tablet 200 milliGRAM(s) Oral daily  ascorbic acid 500 milliGRAM(s) Oral daily  aspirin enteric coated 81 milliGRAM(s) Oral daily  atorvastatin 80 milliGRAM(s) Oral at bedtime  cyanocobalamin 1000 MICROGram(s) Oral daily  dapagliflozin 10 milliGRAM(s) Oral every 24 hours  doxycycline monohydrate Capsule 100 milliGRAM(s) Oral every 12 hours  ferrous    sulfate 325 milliGRAM(s) Oral daily  folic acid 1 milliGRAM(s) Oral daily  gabapentin 300 milliGRAM(s) Oral three times a day  glucagon  Injectable 1 milliGRAM(s) IntraMuscular once  insulin glargine Injectable (LANTUS) 32 Unit(s) SubCutaneous at bedtime  insulin lispro (ADMELOG) corrective regimen sliding scale   SubCutaneous Before meals and at bedtime  insulin lispro Injectable (ADMELOG) 8 Unit(s) SubCutaneous three times a day before meals  lidocaine   4% Patch 1 Patch Transdermal daily  melatonin 5 milliGRAM(s) Oral at bedtime  metoprolol tartrate 50 milliGRAM(s) Oral three times a day  mirtazapine 15 milliGRAM(s) Oral at bedtime  pantoprazole    Tablet 40 milliGRAM(s) Oral daily  polyethylene glycol 3350 17 Gram(s) Oral daily  saccharomyces boulardii 250 milliGRAM(s) Oral two times a day  senna 2 Tablet(s) Oral at bedtime  sodium chloride 0.9% lock flush 3 milliLiter(s) IV Push every 8 hours  thiamine 100 milliGRAM(s) Oral daily    MEDICATIONS  (PRN):  acetaminophen     Tablet .. 650 milliGRAM(s) Oral every 6 hours PRN Mild Pain (1 - 3)  oxyCODONE    IR 5 milliGRAM(s) Oral every 4 hours PRN Moderate Pain (4 - 6)  oxyCODONE    IR 10 milliGRAM(s) Oral every 4 hours PRN Severe Pain (7 - 10)    Allergies  No Known Allergies    Vital Signs Last 24 Hrs  T(C): 37 (12 Dec 2022 11:34), Max: 37 (11 Dec 2022 17:00)  T(F): 98.6 (12 Dec 2022 11:34), Max: 98.6 (11 Dec 2022 17:00)  HR: 90 (12 Dec 2022 11:34) (79 - 90)  BP: 121/72 (12 Dec 2022 11:34) (114/68 - 137/74)  BP(mean): --  RR: 18 (12 Dec 2022 11:34) (18 - 18)  SpO2: 95% (12 Dec 2022 11:34) (94% - 95%)    Parameters below as of 12 Dec 2022 11:34  Patient On (Oxygen Delivery Method): room air      PHYSICAL EXAM:  General: No apparent distress  Neck: Supple, trachea midline, no thyromegaly  Respiratory: Lungs clear bilaterally, normal rate, effort  Cardiac: +S1, S2, no m/r/g  GI: +BS, soft, non tender, non distended  Extremities: RLE edema  Neuro: A+O X3, no tremor  Pysch: Affect appropriate   Skin: No acanthosis         LABS:                        8.8    9.74  )-----------( 448      ( 12 Dec 2022 04:50 )             27.6     12-12    136  |  94<L>  |  14.4  ----------------------------<  178<H>  3.5   |  31.0<H>  |  0.92    Ca    9.8      12 Dec 2022 04:50  Mg     2.0     12-12    TPro  5.8<L>  /  Alb  2.9<L>  /  TBili  0.5  /  DBili  x   /  AST  26  /  ALT  11  /  AlkPhos  154<H>  12-12      POCT Blood Glucose.: 171 mg/dL (12-12-22 @ 11:59)  POCT Blood Glucose.: 178 mg/dL (12-12-22 @ 07:56)  POCT Blood Glucose.: 208 mg/dL (12-11-22 @ 21:09)  POCT Blood Glucose.: 196 mg/dL (12-11-22 @ 17:04)    Thyroid Stimulating Hormone, Serum: 1.23 uIU/mL (11-21-22 @ 14:08)  Free Thyroxine, Serum: 1.1 ng/dL (11-21-22 @ 14:08)

## 2022-12-12 NOTE — PROGRESS NOTE ADULT - ASSESSMENT
67M, former smoker with a medical history of HTN, HLD, type 2 DM (HA1c 7.6 on Metformin and Insulin), CAD s/p PCI to RCA 2007, ETOH cirrhosis, B12/Vit D deficiency, recent hospitalization for GI Bleed 05/2022, admitted on 11/21/22 for repeat cath which confirmed multivessel CAD. s/p CABG X 3 (LIMA-LAD, SVG-OM, SVG-PDA) on 11/22/22 with Dr. Gray. Postoperative course significant for cardiogenic shock and hypotension (resolved s/p volume resuscitation, phenylephrine gtt weaned off), ABLA (stable s/p blood transfusion), hyperglycemia (titrating insulin requirements), and Left pleural effusion (s/p pigtail catheter insertion 11/29, removed 11/30, with residual effusion noted on CT chest with IR pigtail placed 12/1 and removed 12/3). Patient with right femoral occlusion found on arterial duplex 12/4 (seen by vascular surgery, likely not acute), Right thigh hematoma (from EVH removal), RLE cellulitis (improving, remains on IV antibiotics as per ID), increasing pericardial effusion in setting of eliquis;

## 2022-12-12 NOTE — PROGRESS NOTE ADULT - SUBJECTIVE AND OBJECTIVE BOX
POD 20 s/p C3L (LIMA-LAD, SVG-OM, SVG-RPDA)     Subjective: no complaints denies CP, palpitations, SOB, cough, fever, chills, itchiness/rash, diaphoresis, vision changes, HA, dizziness/lightheadedness, numbness/tingling, abd pain, N/V     T(C): 37 (12-12-22 @ 00:26), Max: 37.1 (12-11-22 @ 11:09)  HR: 81 (12-12-22 @ 02:17) (79 - 90)  BP: 137/74 (12-12-22 @ 02:17) (114/68 - 137/74)  RR: 18 (12-12-22 @ 02:17) (16 - 18)  SpO2: 94% (12-12-22 @ 02:17) (94% - 97%)    12-11    136  |  95<L>  |  14.0  ----------------------------<  215<H>  3.5   |  30.0<H>  |  1.03    Ca    9.6      11 Dec 2022 06:49  Mg     1.9     12-11                                 9.5    9.20  )-----------( 431      ( 11 Dec 2022 06:49 )             30.4                    CAPILLARY BLOOD GLUCOSE  POCT Blood Glucose.: 208 mg/dL (11 Dec 2022 21:09)  POCT Blood Glucose.: 196 mg/dL (11 Dec 2022 17:04)  POCT Blood Glucose.: 149 mg/dL (11 Dec 2022 12:03)  POCT Blood Glucose.: 146 mg/dL (11 Dec 2022 09:15)  POCT Blood Glucose.: 182 mg/dL (11 Dec 2022 08:05)    I&O's Detail    10 Dec 2022 07:01  -  11 Dec 2022 07:00  --------------------------------------------------------  IN:    IV PiggyBack: 250 mL    IV PiggyBack: 50 mL    Oral Fluid: 960 mL  Total IN: 1260 mL    OUT:    Voided (mL): 850 mL  Total OUT: 850 mL  Total NET: 410 mL    11 Dec 2022 07:01  -  12 Dec 2022 02:19  --------------------------------------------------------  IN:    IV PiggyBack: 50 mL    IV PiggyBack: 250 mL    Oral Fluid: 960 mL  Total IN: 1260 mL    OUT:    Voided (mL): 1100 mL  Total OUT: 1100 mL  Total NET: 160 mL    Drug Dosing Weight  Height (cm): 182.9 (22 Nov 2022 05:48)  Weight (kg): 82.6 (22 Nov 2022 05:48)  BMI (kg/m2): 24.7 (22 Nov 2022 05:48)  BSA (m2): 2.05 (22 Nov 2022 05:48)    MEDICATIONS  (STANDING):  aMIOdarone    Tablet   Oral   aMIOdarone    Tablet 200 milliGRAM(s) Oral daily  ascorbic acid 500 milliGRAM(s) Oral daily  aspirin enteric coated 81 milliGRAM(s) Oral daily  atorvastatin 80 milliGRAM(s) Oral at bedtime  ceFAZolin   IVPB 2000 milliGRAM(s) IV Intermittent every 8 hours  cyanocobalamin 1000 MICROGram(s) Oral daily  dapagliflozin 10 milliGRAM(s) Oral every 24 hours  ferrous    sulfate 325 milliGRAM(s) Oral daily  folic acid 1 milliGRAM(s) Oral daily  gabapentin 300 milliGRAM(s) Oral three times a day  glucagon  Injectable 1 milliGRAM(s) IntraMuscular once  insulin glargine Injectable (LANTUS) 32 Unit(s) SubCutaneous at bedtime  insulin lispro (ADMELOG) corrective regimen sliding scale   SubCutaneous Before meals and at bedtime  insulin lispro Injectable (ADMELOG) 8 Unit(s) SubCutaneous three times a day before meals  lidocaine   4% Patch 1 Patch Transdermal daily  melatonin 5 milliGRAM(s) Oral at bedtime  metoprolol tartrate 50 milliGRAM(s) Oral three times a day  mirtazapine 15 milliGRAM(s) Oral at bedtime  pantoprazole    Tablet 40 milliGRAM(s) Oral daily  polyethylene glycol 3350 17 Gram(s) Oral daily  potassium chloride    Tablet ER 40 milliEquivalent(s) Oral daily  senna 2 Tablet(s) Oral at bedtime  sodium chloride 0.9% lock flush 3 milliLiter(s) IV Push every 8 hours  thiamine 100 milliGRAM(s) Oral daily  torsemide 40 milliGRAM(s) Oral daily    MEDICATIONS  (PRN):  acetaminophen     Tablet .. 650 milliGRAM(s) Oral every 6 hours PRN Mild Pain (1 - 3)  oxyCODONE    IR 10 milliGRAM(s) Oral every 4 hours PRN Severe Pain (7 - 10)  oxyCODONE    IR 5 milliGRAM(s) Oral every 4 hours PRN Moderate Pain (4 - 6)    Physical Exam  Gen: NAD  Neuro: A&Ox3 non focal speech clear and intact  Pulm: CTA b/l no wheezing  CV: S1S2 RRR no murmurs  Abd: +BS soft NT ND  Extrem/MS: + b/l LE edema R>L, no cyanosis  Incision(s): mid sternal inc C/D/I, stable no click, RLE inc C/D/I, hematoma improved

## 2022-12-12 NOTE — PROGRESS NOTE ADULT - ASSESSMENT
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 Patient was admitted to [facility] initially in May 2022 for GI bleed and found to have EF 30-35%, and multivessel disease but CABG deferred due to hx of alcoholism and patient transferred to rehab. Consult for diabetes management.     1. T2DM, a1c 7.6%  - Bgs well controlled  - Continue admelog 8 units tid with meals  - Continue lantus 32 units qhs  - Farxiga 10mg daily    2. CAD  - S/p CABG, care per primary team    3. HLD  - Continue statin

## 2022-12-13 LAB
ANION GAP SERPL CALC-SCNC: 12 MMOL/L — SIGNIFICANT CHANGE UP (ref 5–17)
ANION GAP SERPL CALC-SCNC: 8 MMOL/L — SIGNIFICANT CHANGE UP (ref 5–17)
BUN SERPL-MCNC: 14.2 MG/DL — SIGNIFICANT CHANGE UP (ref 8–20)
BUN SERPL-MCNC: 14.3 MG/DL — SIGNIFICANT CHANGE UP (ref 8–20)
CALCIUM SERPL-MCNC: 10.5 MG/DL — SIGNIFICANT CHANGE UP (ref 8.4–10.5)
CALCIUM SERPL-MCNC: 9.8 MG/DL — SIGNIFICANT CHANGE UP (ref 8.4–10.5)
CHLORIDE SERPL-SCNC: 100 MMOL/L — SIGNIFICANT CHANGE UP (ref 96–108)
CHLORIDE SERPL-SCNC: 96 MMOL/L — SIGNIFICANT CHANGE UP (ref 96–108)
CO2 SERPL-SCNC: 28 MMOL/L — SIGNIFICANT CHANGE UP (ref 22–29)
CO2 SERPL-SCNC: 28 MMOL/L — SIGNIFICANT CHANGE UP (ref 22–29)
CREAT SERPL-MCNC: 1.12 MG/DL — SIGNIFICANT CHANGE UP (ref 0.5–1.3)
CREAT SERPL-MCNC: 1.22 MG/DL — SIGNIFICANT CHANGE UP (ref 0.5–1.3)
EGFR: 65 ML/MIN/1.73M2 — SIGNIFICANT CHANGE UP
EGFR: 72 ML/MIN/1.73M2 — SIGNIFICANT CHANGE UP
GLUCOSE BLDC GLUCOMTR-MCNC: 128 MG/DL — HIGH (ref 70–99)
GLUCOSE BLDC GLUCOMTR-MCNC: 167 MG/DL — HIGH (ref 70–99)
GLUCOSE BLDC GLUCOMTR-MCNC: 181 MG/DL — HIGH (ref 70–99)
GLUCOSE BLDC GLUCOMTR-MCNC: 226 MG/DL — HIGH (ref 70–99)
GLUCOSE SERPL-MCNC: 131 MG/DL — HIGH (ref 70–99)
GLUCOSE SERPL-MCNC: 141 MG/DL — HIGH (ref 70–99)
HCT VFR BLD CALC: 29.8 % — LOW (ref 39–50)
HGB BLD-MCNC: 9.3 G/DL — LOW (ref 13–17)
MAGNESIUM SERPL-MCNC: 2.1 MG/DL — SIGNIFICANT CHANGE UP (ref 1.6–2.6)
MCHC RBC-ENTMCNC: 30.8 PG — SIGNIFICANT CHANGE UP (ref 27–34)
MCHC RBC-ENTMCNC: 31.2 GM/DL — LOW (ref 32–36)
MCV RBC AUTO: 98.7 FL — SIGNIFICANT CHANGE UP (ref 80–100)
PLATELET # BLD AUTO: 460 K/UL — HIGH (ref 150–400)
POTASSIUM SERPL-MCNC: 3 MMOL/L — LOW (ref 3.5–5.3)
POTASSIUM SERPL-MCNC: 4.2 MMOL/L — SIGNIFICANT CHANGE UP (ref 3.5–5.3)
POTASSIUM SERPL-SCNC: 3 MMOL/L — LOW (ref 3.5–5.3)
POTASSIUM SERPL-SCNC: 4.2 MMOL/L — SIGNIFICANT CHANGE UP (ref 3.5–5.3)
RBC # BLD: 3.02 M/UL — LOW (ref 4.2–5.8)
RBC # FLD: 14.6 % — HIGH (ref 10.3–14.5)
SARS-COV-2 RNA SPEC QL NAA+PROBE: SIGNIFICANT CHANGE UP
SODIUM SERPL-SCNC: 136 MMOL/L — SIGNIFICANT CHANGE UP (ref 135–145)
SODIUM SERPL-SCNC: 136 MMOL/L — SIGNIFICANT CHANGE UP (ref 135–145)
WBC # BLD: 10.14 K/UL — SIGNIFICANT CHANGE UP (ref 3.8–10.5)
WBC # FLD AUTO: 10.14 K/UL — SIGNIFICANT CHANGE UP (ref 3.8–10.5)

## 2022-12-13 PROCEDURE — 71045 X-RAY EXAM CHEST 1 VIEW: CPT | Mod: 26

## 2022-12-13 RX ORDER — POTASSIUM CHLORIDE 20 MEQ
40 PACKET (EA) ORAL
Refills: 0 | Status: COMPLETED | OUTPATIENT
Start: 2022-12-13 | End: 2022-12-13

## 2022-12-13 RX ADMIN — Medication 100 MILLIGRAM(S): at 17:20

## 2022-12-13 RX ADMIN — OXYCODONE HYDROCHLORIDE 10 MILLIGRAM(S): 5 TABLET ORAL at 09:28

## 2022-12-13 RX ADMIN — Medication 50 MILLIGRAM(S): at 13:51

## 2022-12-13 RX ADMIN — Medication 325 MILLIGRAM(S): at 12:28

## 2022-12-13 RX ADMIN — POLYETHYLENE GLYCOL 3350 17 GRAM(S): 17 POWDER, FOR SOLUTION ORAL at 12:29

## 2022-12-13 RX ADMIN — Medication 5 MILLIGRAM(S): at 21:27

## 2022-12-13 RX ADMIN — Medication 500 MILLIGRAM(S): at 12:28

## 2022-12-13 RX ADMIN — AMIODARONE HYDROCHLORIDE 200 MILLIGRAM(S): 400 TABLET ORAL at 05:42

## 2022-12-13 RX ADMIN — INSULIN GLARGINE 32 UNIT(S): 100 INJECTION, SOLUTION SUBCUTANEOUS at 21:28

## 2022-12-13 RX ADMIN — GABAPENTIN 300 MILLIGRAM(S): 400 CAPSULE ORAL at 05:42

## 2022-12-13 RX ADMIN — Medication 8 UNIT(S): at 08:12

## 2022-12-13 RX ADMIN — Medication 100 MILLIGRAM(S): at 13:52

## 2022-12-13 RX ADMIN — MIRTAZAPINE 15 MILLIGRAM(S): 45 TABLET, ORALLY DISINTEGRATING ORAL at 21:27

## 2022-12-13 RX ADMIN — SODIUM CHLORIDE 3 MILLILITER(S): 9 INJECTION INTRAMUSCULAR; INTRAVENOUS; SUBCUTANEOUS at 05:44

## 2022-12-13 RX ADMIN — Medication 8 UNIT(S): at 17:18

## 2022-12-13 RX ADMIN — Medication 50 MILLIGRAM(S): at 21:27

## 2022-12-13 RX ADMIN — ACETAZOLAMIDE 500 MILLIGRAM(S): 250 TABLET ORAL at 00:39

## 2022-12-13 RX ADMIN — Medication 250 MILLIGRAM(S): at 05:42

## 2022-12-13 RX ADMIN — Medication 2: at 08:13

## 2022-12-13 RX ADMIN — Medication 2: at 21:29

## 2022-12-13 RX ADMIN — OXYCODONE HYDROCHLORIDE 10 MILLIGRAM(S): 5 TABLET ORAL at 17:25

## 2022-12-13 RX ADMIN — Medication 50 MILLIGRAM(S): at 05:43

## 2022-12-13 RX ADMIN — OXYCODONE HYDROCHLORIDE 10 MILLIGRAM(S): 5 TABLET ORAL at 21:32

## 2022-12-13 RX ADMIN — SODIUM CHLORIDE 3 MILLILITER(S): 9 INJECTION INTRAMUSCULAR; INTRAVENOUS; SUBCUTANEOUS at 21:33

## 2022-12-13 RX ADMIN — DAPAGLIFLOZIN 10 MILLIGRAM(S): 10 TABLET, FILM COATED ORAL at 05:42

## 2022-12-13 RX ADMIN — Medication 8 UNIT(S): at 12:27

## 2022-12-13 RX ADMIN — PANTOPRAZOLE SODIUM 40 MILLIGRAM(S): 20 TABLET, DELAYED RELEASE ORAL at 12:28

## 2022-12-13 RX ADMIN — GABAPENTIN 300 MILLIGRAM(S): 400 CAPSULE ORAL at 21:27

## 2022-12-13 RX ADMIN — Medication 0: at 12:30

## 2022-12-13 RX ADMIN — OXYCODONE HYDROCHLORIDE 10 MILLIGRAM(S): 5 TABLET ORAL at 02:23

## 2022-12-13 RX ADMIN — OXYCODONE HYDROCHLORIDE 10 MILLIGRAM(S): 5 TABLET ORAL at 08:28

## 2022-12-13 RX ADMIN — Medication 1 MILLIGRAM(S): at 12:28

## 2022-12-13 RX ADMIN — OXYCODONE HYDROCHLORIDE 10 MILLIGRAM(S): 5 TABLET ORAL at 13:30

## 2022-12-13 RX ADMIN — ACETAZOLAMIDE 500 MILLIGRAM(S): 250 TABLET ORAL at 17:19

## 2022-12-13 RX ADMIN — OXYCODONE HYDROCHLORIDE 10 MILLIGRAM(S): 5 TABLET ORAL at 22:30

## 2022-12-13 RX ADMIN — PREGABALIN 1000 MICROGRAM(S): 225 CAPSULE ORAL at 12:29

## 2022-12-13 RX ADMIN — SODIUM CHLORIDE 3 MILLILITER(S): 9 INJECTION INTRAMUSCULAR; INTRAVENOUS; SUBCUTANEOUS at 13:47

## 2022-12-13 RX ADMIN — Medication 100 MILLIGRAM(S): at 05:43

## 2022-12-13 RX ADMIN — Medication 40 MILLIEQUIVALENT(S): at 09:25

## 2022-12-13 RX ADMIN — Medication 40 MILLIEQUIVALENT(S): at 08:09

## 2022-12-13 RX ADMIN — OXYCODONE HYDROCHLORIDE 10 MILLIGRAM(S): 5 TABLET ORAL at 12:28

## 2022-12-13 RX ADMIN — OXYCODONE HYDROCHLORIDE 10 MILLIGRAM(S): 5 TABLET ORAL at 18:30

## 2022-12-13 RX ADMIN — OXYCODONE HYDROCHLORIDE 10 MILLIGRAM(S): 5 TABLET ORAL at 02:53

## 2022-12-13 RX ADMIN — ATORVASTATIN CALCIUM 80 MILLIGRAM(S): 80 TABLET, FILM COATED ORAL at 21:27

## 2022-12-13 RX ADMIN — Medication 81 MILLIGRAM(S): at 12:28

## 2022-12-13 RX ADMIN — Medication 250 MILLIGRAM(S): at 17:24

## 2022-12-13 RX ADMIN — ACETAZOLAMIDE 500 MILLIGRAM(S): 250 TABLET ORAL at 05:42

## 2022-12-13 RX ADMIN — GABAPENTIN 300 MILLIGRAM(S): 400 CAPSULE ORAL at 13:51

## 2022-12-13 RX ADMIN — Medication 4: at 17:19

## 2022-12-13 NOTE — PROGRESS NOTE ADULT - ASSESSMENT
67M, former smoker with a medical history of HTN, HLD, type 2 DM (HA1c 7.6 on Metformin and Insulin), CAD s/p PCI to RCA 2007, ETOH cirrhosis, B12/Vit D deficiency, recent hospitalization for GI Bleed 05/2022, admitted on 11/21/22 for repeat cath which confirmed multivessel CAD. s/p CABG X 3 (LIMA-LAD, SVG-OM, SVG-PDA) on 11/22/22 with Dr. Gray. Postoperative course significant for cardiogenic shock and hypotension (resolved s/p volume resuscitation, phenylephrine gtt weaned off), ABLA (stable s/p blood transfusion), hyperglycemia (titrating insulin requirements), and Left pleural effusion (s/p pigtail catheter insertion 11/29, removed 11/30, with residual effusion noted on CT chest with IR pigtail placed 12/1 and removed 12/3). Patient with right femoral occlusion found on arterial duplex 12/4 (seen by vascular surgery, likely not acute), Right thigh hematoma (from EVH removal), RLE cellulitis (improving, remains on IV antibiotics as per ID), increasing pericardial effusion in setting of eliquis, eliquis d/c'd;

## 2022-12-13 NOTE — CHART NOTE - NSCHARTNOTESELECT_GEN_ALL_CORE
FEMORAL ARTERY OCCLUSION/Event Note
Anticoagulation/Event Note
CT surgery/Event Note
Registered Dietitian
Registered Dietitian

## 2022-12-13 NOTE — PROGRESS NOTE ADULT - SUBJECTIVE AND OBJECTIVE BOX
POD 21 s/p C3L (LIMA-LAD, SVG-OM, SVG-RPDA)    Subjective: no complaints denies CP, palpitations, SOB, cough, fever, chills, itchiness/rash, diaphoresis, vision changes, HA, dizziness/lightheadedness, numbness/tingling, abd pain, N/V     T(C): 36.8 (12-12-22 @ 21:18), Max: 37 (12-12-22 @ 11:34)  HR: 92 (12-13-22 @ 00:44) (79 - 92)  BP: 123/66 (12-13-22 @ 00:44) (116/64 - 137/74)  RR: 18 (12-13-22 @ 00:44) (18 - 18)  SpO2: 95% (12-13-22 @ 00:44) (94% - 96%)    12-12    136  |  94<L>  |  14.4  ----------------------------<  178<H>  3.5   |  31.0<H>  |  0.92    Ca    9.8      12 Dec 2022 04:50  Mg     2.0     12-12    TPro  5.8<L>  /  Alb  2.9<L>  /  TBili  0.5  /  DBili  x   /  AST  26  /  ALT  11  /  AlkPhos  154<H>  12-12                               8.8    9.74  )-----------( 448      ( 12 Dec 2022 04:50 )             27.6                    CAPILLARY BLOOD GLUCOSE  POCT Blood Glucose.: 198 mg/dL (12 Dec 2022 21:02)  POCT Blood Glucose.: 191 mg/dL (12 Dec 2022 17:09)  POCT Blood Glucose.: 171 mg/dL (12 Dec 2022 11:59)  POCT Blood Glucose.: 178 mg/dL (12 Dec 2022 07:56    I&O's Detail    11 Dec 2022 07:01  -  12 Dec 2022 07:00  --------------------------------------------------------  IN:    IV PiggyBack: 50 mL    IV PiggyBack: 500 mL    Oral Fluid: 1035 mL  Total IN: 1585 mL    OUT:    Voided (mL): 1100 mL  Total OUT: 1100 mL  Total NET: 485 mL    12 Dec 2022 07:01  -  13 Dec 2022 01:17  --------------------------------------------------------  IN:    Oral Fluid: 725 mL  Total IN: 725 mL    OUT:    Voided (mL): 700 mL  Total OUT: 700 mL  Total NET: 25 mL    Drug Dosing Weight  Height (cm): 182.9 (22 Nov 2022 05:48)  Weight (kg): 82.6 (22 Nov 2022 05:48)  BMI (kg/m2): 24.7 (22 Nov 2022 05:48)  BSA (m2): 2.05 (22 Nov 2022 05:48)    MEDICATIONS  (STANDING):  acetaZOLAMIDE    Tablet 500 milliGRAM(s) Oral every 12 hours  aMIOdarone    Tablet 200 milliGRAM(s) Oral daily  aMIOdarone    Tablet   Oral   ascorbic acid 500 milliGRAM(s) Oral daily  aspirin enteric coated 81 milliGRAM(s) Oral daily  atorvastatin 80 milliGRAM(s) Oral at bedtime  cyanocobalamin 1000 MICROGram(s) Oral daily  dapagliflozin 10 milliGRAM(s) Oral every 24 hours  doxycycline monohydrate Capsule 100 milliGRAM(s) Oral every 12 hours  ferrous    sulfate 325 milliGRAM(s) Oral daily  folic acid 1 milliGRAM(s) Oral daily  gabapentin 300 milliGRAM(s) Oral three times a day  glucagon  Injectable 1 milliGRAM(s) IntraMuscular once  insulin glargine Injectable (LANTUS) 32 Unit(s) SubCutaneous at bedtime  insulin lispro (ADMELOG) corrective regimen sliding scale   SubCutaneous Before meals and at bedtime  insulin lispro Injectable (ADMELOG) 8 Unit(s) SubCutaneous three times a day before meals  lidocaine   4% Patch 1 Patch Transdermal daily  melatonin 5 milliGRAM(s) Oral at bedtime  metoprolol tartrate 50 milliGRAM(s) Oral three times a day  mirtazapine 15 milliGRAM(s) Oral at bedtime  pantoprazole    Tablet 40 milliGRAM(s) Oral daily  polyethylene glycol 3350 17 Gram(s) Oral daily  saccharomyces boulardii 250 milliGRAM(s) Oral two times a day  senna 2 Tablet(s) Oral at bedtime  sodium chloride 0.9% lock flush 3 milliLiter(s) IV Push every 8 hours  thiamine 100 milliGRAM(s) Oral daily    MEDICATIONS  (PRN):  acetaminophen     Tablet .. 650 milliGRAM(s) Oral every 6 hours PRN Mild Pain (1 - 3)  oxyCODONE    IR 5 milliGRAM(s) Oral every 4 hours PRN Moderate Pain (4 - 6)  oxyCODONE    IR 10 milliGRAM(s) Oral every 4 hours PRN Severe Pain (7 - 10)    Physical Exam  Gen: NAD  Neuro: A&Ox3 non focal speech clear and intact  Pulm: CTA b/l no wheezing  CV: S1S2 RRR no murmurs  Abd: +BS soft NT ND  Extrem/MS: + b/l LE edema R>L, no cyanosis  Incision(s): mid sternal inc C/D/I, stable no click, RLE inc C/D/I, hematoma improved

## 2022-12-13 NOTE — CHART NOTE - NSCHARTNOTEFT_GEN_A_CORE
Current Diet: Diet, Regular:   Consistent Carbohydrate {No Snacks} (CSTCHO)  DASH/TLC {Sodium & Cholesterol Restricted} (DASH) (11-23-22 @ 07:57)    PO intake:  < 50% [ ]   50-75%  [ ]   % [ x ]    Source for PO intake [ x ] Patient [ ] family [ ] chart [ ] staff [ ] other    Weight History:  (12/2) 189.1lbs  (12/1) 192.4lbs  (11/30) 189.8lbs  (11/29) 194.6lbs  (11/28) 197lbs  (11/27) 198.1lbs  (11/26) 198.4lbs  (11/25) 202.4lbs    % Weight Change: will monitor trend    Pertinent Medications: MEDICATIONS  (STANDING):  aMIOdarone    Tablet   Oral   aMIOdarone    Tablet 200 milliGRAM(s) Oral daily  apixaban 2.5 milliGRAM(s) Oral two times a day  ascorbic acid 500 milliGRAM(s) Oral daily  aspirin enteric coated 81 milliGRAM(s) Oral daily  atorvastatin 80 milliGRAM(s) Oral at bedtime  dapagliflozin 10 milliGRAM(s) Oral every 24 hours  dextrose 5%. 1000 milliLiter(s) (50 mL/Hr) IV Continuous <Continuous>  dextrose 5%. 1000 milliLiter(s) (100 mL/Hr) IV Continuous <Continuous>  dextrose 50% Injectable 50 milliLiter(s) IV Push every 15 minutes  dextrose 50% Injectable 25 milliLiter(s) IV Push every 15 minutes  ferrous    sulfate 325 milliGRAM(s) Oral daily  folic acid 1 milliGRAM(s) Oral daily  gabapentin 300 milliGRAM(s) Oral three times a day  glucagon  Injectable 1 milliGRAM(s) IntraMuscular once  insulin glargine Injectable (LANTUS) 32 Unit(s) SubCutaneous at bedtime  insulin lispro (ADMELOG) corrective regimen sliding scale   SubCutaneous three times a day before meals  insulin lispro Injectable (ADMELOG) 8 Unit(s) SubCutaneous three times a day before meals  lidocaine   4% Patch 1 Patch Transdermal daily  melatonin 5 milliGRAM(s) Oral at bedtime  metoprolol tartrate 50 milliGRAM(s) Oral three times a day  mirtazapine 15 milliGRAM(s) Oral at bedtime  pantoprazole    Tablet 40 milliGRAM(s) Oral daily  polyethylene glycol 3350 17 Gram(s) Oral daily  potassium chloride    Tablet ER 20 milliEquivalent(s) Oral daily  senna 2 Tablet(s) Oral at bedtime  sodium chloride 0.9% lock flush 3 milliLiter(s) IV Push every 8 hours  thiamine 100 milliGRAM(s) Oral daily  torsemide 20 milliGRAM(s) Oral <User Schedule>    MEDICATIONS  (PRN):  acetaminophen     Tablet .. 650 milliGRAM(s) Oral every 6 hours PRN Mild Pain (1 - 3)  dextrose Oral Gel 15 Gram(s) Oral once PRN Blood Glucose LESS THAN 70 milliGRAM(s)/deciliter  oxyCODONE    IR 10 milliGRAM(s) Oral every 4 hours PRN Severe Pain (7 - 10)  oxyCODONE    IR 5 milliGRAM(s) Oral every 4 hours PRN Moderate Pain (4 - 6)    Pertinent Labs: CBC Full  -  ( 02 Dec 2022 04:54 )  WBC Count : 12.67 K/uL  RBC Count : 3.01 M/uL  Hemoglobin : 9.6 g/dL  Hematocrit : 29.4 %  Platelet Count - Automated : 286 K/uL  Mean Cell Volume : 97.7 fl  Mean Cell Hemoglobin : 31.9 pg  Mean Cell Hemoglobin Concentration : 32.7 gm/dL    Nutrition Related Labs: 12-02 Na137 mmol/L Glu 126 mg/dL<H> K+ 3.8 mmol/L Cr  0.95 mg/dL BUN 25.0 mg/dL<H> Phos n/a   Alb 3.1 g/dL<L> PAB n/a       Skin: no breakdown or edema noted    Estimated Needs:   [ x ] no change since previous assessment  [ ] recalculated:     CURRENT NUTRITION DIAGNOSIS: Patient remains at nutrition risk secondary to Increased Nutrient Needs related to increased physiologic demand for nutrient as evidenced by s/p C3L, pleural effusion, HLD, CAD. Patient fatigued at interview. Reports good PO intake at meals. Utilizes phone to call kitchen / make needs known. Last assessment shows history of wt increase 2/2 eating more consistently at [facility]. Likely beneficial gain. Will continue to monitor, RD to remain available.      RECOMMENDATIONS:  1) Continue supplementation (MVI, thiamin, Vit C, Folic Acid, Ferrous Sulfate)  2) Encourage HBV proteins at meals  3) Monitor nutrition related labs and hydration status  4) Monitor PO intake and wts    Monitoring and Evaluation:   [ x ] PO intake [ x ] Tolerance to diet prescription [X] Weights  [X] Follow up per protocol [X] Labs:
Patient examined at bedside.  Right leg endoscopic vein harvest site with drainage from ZAHRAA entry site.  I was able to expel serosanguinous fluid from groin site as well as calf ZAHRAA site.  Patient tolerated procedure well.  Site does not look infected, not warm to the touch.  Dressing applied to groin and calf site.  Will continue to monitor incisions closely.
Source: Patient [ x ]  Family [ ]   other [ ]    Current Diet: Diet, Regular:   Consistent Carbohydrate {No Snacks} (CSTCHO)  DASH/TLC {Sodium & Cholesterol Restricted} (DASH) (11-23-22 @ 07:57)    PO intake:  < 50% [ ]   50-75%  [ ]   %  [ x ]  other :    Current Weight:   (12/13) 193.5lbs  (12/12) 195.9lbs  (12/11) 193.5lbs  (12/10) 198.1lbs  (12/9) 194.8lbs  (12/8) 197.7lbs  (12/7) 194.6lbs  (12/6) 192.4lbs  (12/2) 189.1lbs  (12/1) 192.4lbs  (11/30) 189.8lbs  (11/29) 194.6lbs  (11/28) 197lbs  (11/27) 198.1lbs  (11/26) 198.4lbs  (11/25) 202.4lbs  * will monitor wt trends        Pertinent Medications: MEDICATIONS  (STANDING):  acetaZOLAMIDE    Tablet 500 milliGRAM(s) Oral every 12 hours  aMIOdarone    Tablet   Oral   aMIOdarone    Tablet 200 milliGRAM(s) Oral daily  ascorbic acid 500 milliGRAM(s) Oral daily  aspirin enteric coated 81 milliGRAM(s) Oral daily  atorvastatin 80 milliGRAM(s) Oral at bedtime  cyanocobalamin 1000 MICROGram(s) Oral daily  dapagliflozin 10 milliGRAM(s) Oral every 24 hours  doxycycline monohydrate Capsule 100 milliGRAM(s) Oral every 12 hours  ferrous    sulfate 325 milliGRAM(s) Oral daily  folic acid 1 milliGRAM(s) Oral daily  gabapentin 300 milliGRAM(s) Oral three times a day  glucagon  Injectable 1 milliGRAM(s) IntraMuscular once  insulin glargine Injectable (LANTUS) 32 Unit(s) SubCutaneous at bedtime  insulin lispro (ADMELOG) corrective regimen sliding scale   SubCutaneous Before meals and at bedtime  insulin lispro Injectable (ADMELOG) 8 Unit(s) SubCutaneous three times a day before meals  lidocaine   4% Patch 1 Patch Transdermal daily  melatonin 5 milliGRAM(s) Oral at bedtime  metoprolol tartrate 50 milliGRAM(s) Oral three times a day  mirtazapine 15 milliGRAM(s) Oral at bedtime  pantoprazole    Tablet 40 milliGRAM(s) Oral daily  polyethylene glycol 3350 17 Gram(s) Oral daily  saccharomyces boulardii 250 milliGRAM(s) Oral two times a day  senna 2 Tablet(s) Oral at bedtime  sodium chloride 0.9% lock flush 3 milliLiter(s) IV Push every 8 hours  thiamine 100 milliGRAM(s) Oral daily    MEDICATIONS  (PRN):  acetaminophen     Tablet .. 650 milliGRAM(s) Oral every 6 hours PRN Mild Pain (1 - 3)  oxyCODONE    IR 5 milliGRAM(s) Oral every 4 hours PRN Moderate Pain (4 - 6)  oxyCODONE    IR 10 milliGRAM(s) Oral every 4 hours PRN Severe Pain (7 - 10)    Pertinent Labs: CBC Full  -  ( 13 Dec 2022 04:44 )  WBC Count : 10.14 K/uL  RBC Count : 3.02 M/uL  Hemoglobin : 9.3 g/dL  Hematocrit : 29.8 %  Platelet Count - Automated : 460 K/uL  Mean Cell Volume : 98.7 fl  Mean Cell Hemoglobin : 30.8 pg  Mean Cell Hemoglobin Concentration : 31.2 gm/dL    Nutrition Related Labs: 12-13 Na136 mmol/L Glu 131 mg/dL<H> K+ 4.2 mmol/L Cr  1.12 mg/dL BUN 14.3 mg/dL Phos n/a   Alb n/a   PAB n/a         Estimated Needs:   [ x ] no change since previous assessment  [ ] recalculated:       CURRENT NUTRITION DIAGNOSIS: Patient remains at nutrition risk secondary to Increased Nutrient Needs related to increased physiologic demand for nutrient as evidenced by s/p C3L, pleural effusion, HLD, CAD. Patient sleeping at interview. Nutrition status with no significant changes. Continue to encourage adherence to DM carb controlled nutrition therapy. Rd=D to remain available.       RECOMMENDATIONS:  1) Continue supplementation (MVI, thiamin, Vit C, Folic Acid, Ferrous Sulfate)  2) Encourage HBV proteins at meals  3) Monitor nutrition related labs and hydration status  4) Monitor PO intake and wts    Monitoring and Evaluation:   [ x ] PO intake [ x ] Tolerance to diet prescription [X] Weights  [X] Follow up per protocol [X] Labs:
s/p CABG 3 11/22 with post op pAFib/Flutter. Now maintaining NSR for >1 week. Post operative course notable for hematoma to RLE and pericardial effusion. Given these factors, patient deemed high risk for anticoagulation. Decision made not to resume AC treatment. Plan discussed with Dr. Gray.
Patient currently on service s/p CABG   post op with right leg discomfort worsening over the last 24 hours - wrapped with concern for right thigh hematoma (at previous evh site)   pain continued and thus d.w team during day as per sign out warranted US of leg to eval hematoma     < from: US Duplex Arterial Lower Ext Ltd, Right (12.04.22 @ 18:14) >    FINDINGS: Occluded right femoral artery at the mid portion with   reconstitution of flow in the distal portion. Right external iliac,   common femoral, and deep femoral arteries are patent. No right groin   hematoma.    IMPRESSION:    No right groin hematoma.    Occluded right femoral artery.    < end of copied text >    Right leg with mild to moderate thigh ecchymosis notably firm with mild tenderness to palpation   foot with light ecchymosis to medial aspect of foot   notably warm to touch - + DP/PT pulses without doppler at this time   d/w on call surgeon HF - vascular surgery consult called and currently pending   will continue to hold eliquis pending vascular plan   no previous imaging of leg that could be seen at this time for comparison (unknown chronicity)   patient is unable to illicit a proper history - unknown hx PAD, pt is a previous smoker   previous cath as per documentation was performed in [facility]?

## 2022-12-14 ENCOUNTER — TRANSCRIPTION ENCOUNTER (OUTPATIENT)
Age: 67
End: 2022-12-14

## 2022-12-14 VITALS — SYSTOLIC BLOOD PRESSURE: 112 MMHG | DIASTOLIC BLOOD PRESSURE: 60 MMHG

## 2022-12-14 LAB
ANION GAP SERPL CALC-SCNC: 12 MMOL/L — SIGNIFICANT CHANGE UP (ref 5–17)
BUN SERPL-MCNC: 19.7 MG/DL — SIGNIFICANT CHANGE UP (ref 8–20)
CALCIUM SERPL-MCNC: 10.1 MG/DL — SIGNIFICANT CHANGE UP (ref 8.4–10.5)
CHLORIDE SERPL-SCNC: 98 MMOL/L — SIGNIFICANT CHANGE UP (ref 96–108)
CO2 SERPL-SCNC: 25 MMOL/L — SIGNIFICANT CHANGE UP (ref 22–29)
CREAT SERPL-MCNC: 1.11 MG/DL — SIGNIFICANT CHANGE UP (ref 0.5–1.3)
EGFR: 73 ML/MIN/1.73M2 — SIGNIFICANT CHANGE UP
GLUCOSE BLDC GLUCOMTR-MCNC: 107 MG/DL — HIGH (ref 70–99)
GLUCOSE BLDC GLUCOMTR-MCNC: 151 MG/DL — HIGH (ref 70–99)
GLUCOSE SERPL-MCNC: 164 MG/DL — HIGH (ref 70–99)
HCT VFR BLD CALC: 30.2 % — LOW (ref 39–50)
HGB BLD-MCNC: 9.5 G/DL — LOW (ref 13–17)
MAGNESIUM SERPL-MCNC: 2 MG/DL — SIGNIFICANT CHANGE UP (ref 1.6–2.6)
MCHC RBC-ENTMCNC: 31.1 PG — SIGNIFICANT CHANGE UP (ref 27–34)
MCHC RBC-ENTMCNC: 31.5 GM/DL — LOW (ref 32–36)
MCV RBC AUTO: 99 FL — SIGNIFICANT CHANGE UP (ref 80–100)
PLATELET # BLD AUTO: 472 K/UL — HIGH (ref 150–400)
POTASSIUM SERPL-MCNC: 3.5 MMOL/L — SIGNIFICANT CHANGE UP (ref 3.5–5.3)
POTASSIUM SERPL-SCNC: 3.5 MMOL/L — SIGNIFICANT CHANGE UP (ref 3.5–5.3)
RBC # BLD: 3.05 M/UL — LOW (ref 4.2–5.8)
RBC # FLD: 14.8 % — HIGH (ref 10.3–14.5)
SODIUM SERPL-SCNC: 135 MMOL/L — SIGNIFICANT CHANGE UP (ref 135–145)
WBC # BLD: 11.67 K/UL — HIGH (ref 3.8–10.5)
WBC # FLD AUTO: 11.67 K/UL — HIGH (ref 3.8–10.5)

## 2022-12-14 PROCEDURE — 80048 BASIC METABOLIC PNL TOTAL CA: CPT

## 2022-12-14 PROCEDURE — P9045: CPT

## 2022-12-14 PROCEDURE — 81003 URINALYSIS AUTO W/O SCOPE: CPT

## 2022-12-14 PROCEDURE — P9012: CPT

## 2022-12-14 PROCEDURE — 83036 HEMOGLOBIN GLYCOSYLATED A1C: CPT

## 2022-12-14 PROCEDURE — P9016: CPT

## 2022-12-14 PROCEDURE — 76942 ECHO GUIDE FOR BIOPSY: CPT

## 2022-12-14 PROCEDURE — 93926 LOWER EXTREMITY STUDY: CPT

## 2022-12-14 PROCEDURE — 84443 ASSAY THYROID STIM HORMONE: CPT

## 2022-12-14 PROCEDURE — 87205 SMEAR GRAM STAIN: CPT

## 2022-12-14 PROCEDURE — 86965 POOLING BLOOD PLATELETS: CPT

## 2022-12-14 PROCEDURE — C1729: CPT

## 2022-12-14 PROCEDURE — P9037: CPT

## 2022-12-14 PROCEDURE — 82435 ASSAY OF BLOOD CHLORIDE: CPT

## 2022-12-14 PROCEDURE — 83605 ASSAY OF LACTIC ACID: CPT

## 2022-12-14 PROCEDURE — 84157 ASSAY OF PROTEIN OTHER: CPT

## 2022-12-14 PROCEDURE — 83880 ASSAY OF NATRIURETIC PEPTIDE: CPT

## 2022-12-14 PROCEDURE — 93308 TTE F-UP OR LMTD: CPT

## 2022-12-14 PROCEDURE — 80053 COMPREHEN METABOLIC PANEL: CPT

## 2022-12-14 PROCEDURE — 94002 VENT MGMT INPAT INIT DAY: CPT

## 2022-12-14 PROCEDURE — 85610 PROTHROMBIN TIME: CPT

## 2022-12-14 PROCEDURE — 97163 PT EVAL HIGH COMPLEX 45 MIN: CPT

## 2022-12-14 PROCEDURE — C1894: CPT

## 2022-12-14 PROCEDURE — 93320 DOPPLER ECHO COMPLETE: CPT

## 2022-12-14 PROCEDURE — 82945 GLUCOSE OTHER FLUID: CPT

## 2022-12-14 PROCEDURE — U0005: CPT

## 2022-12-14 PROCEDURE — 71250 CT THORAX DX C-: CPT

## 2022-12-14 PROCEDURE — P9100: CPT

## 2022-12-14 PROCEDURE — 83615 LACTATE (LD) (LDH) ENZYME: CPT

## 2022-12-14 PROCEDURE — 36430 TRANSFUSION BLD/BLD COMPNT: CPT

## 2022-12-14 PROCEDURE — 89051 BODY FLUID CELL COUNT: CPT

## 2022-12-14 PROCEDURE — 86923 COMPATIBILITY TEST ELECTRIC: CPT

## 2022-12-14 PROCEDURE — C1889: CPT

## 2022-12-14 PROCEDURE — 80061 LIPID PANEL: CPT

## 2022-12-14 PROCEDURE — 87102 FUNGUS ISOLATION CULTURE: CPT

## 2022-12-14 PROCEDURE — 85025 COMPLETE CBC W/AUTO DIFF WBC: CPT

## 2022-12-14 PROCEDURE — 84132 ASSAY OF SERUM POTASSIUM: CPT

## 2022-12-14 PROCEDURE — 86850 RBC ANTIBODY SCREEN: CPT

## 2022-12-14 PROCEDURE — 84295 ASSAY OF SERUM SODIUM: CPT

## 2022-12-14 PROCEDURE — 99233 SBSQ HOSP IP/OBS HIGH 50: CPT

## 2022-12-14 PROCEDURE — 82803 BLOOD GASES ANY COMBINATION: CPT

## 2022-12-14 PROCEDURE — 94010 BREATHING CAPACITY TEST: CPT

## 2022-12-14 PROCEDURE — 83986 ASSAY PH BODY FLUID NOS: CPT

## 2022-12-14 PROCEDURE — 93312 ECHO TRANSESOPHAGEAL: CPT

## 2022-12-14 PROCEDURE — C1769: CPT

## 2022-12-14 PROCEDURE — 77012 CT SCAN FOR NEEDLE BIOPSY: CPT

## 2022-12-14 PROCEDURE — 86891 AUTOLOGOUS BLOOD OP SALVAGE: CPT

## 2022-12-14 PROCEDURE — 94760 N-INVAS EAR/PLS OXIMETRY 1: CPT

## 2022-12-14 PROCEDURE — 93325 DOPPLER ECHO COLOR FLOW MAPG: CPT

## 2022-12-14 PROCEDURE — 84484 ASSAY OF TROPONIN QUANT: CPT

## 2022-12-14 PROCEDURE — 86341 ISLET CELL ANTIBODY: CPT

## 2022-12-14 PROCEDURE — 84134 ASSAY OF PREALBUMIN: CPT

## 2022-12-14 PROCEDURE — 71045 X-RAY EXAM CHEST 1 VIEW: CPT

## 2022-12-14 PROCEDURE — 85730 THROMBOPLASTIN TIME PARTIAL: CPT

## 2022-12-14 PROCEDURE — 85014 HEMATOCRIT: CPT

## 2022-12-14 PROCEDURE — 82553 CREATINE MB FRACTION: CPT

## 2022-12-14 PROCEDURE — 82330 ASSAY OF CALCIUM: CPT

## 2022-12-14 PROCEDURE — 86900 BLOOD TYPING SEROLOGIC ABO: CPT

## 2022-12-14 PROCEDURE — 80202 ASSAY OF VANCOMYCIN: CPT

## 2022-12-14 PROCEDURE — 83735 ASSAY OF MAGNESIUM: CPT

## 2022-12-14 PROCEDURE — 87075 CULTR BACTERIA EXCEPT BLOOD: CPT

## 2022-12-14 PROCEDURE — 36415 COLL VENOUS BLD VENIPUNCTURE: CPT

## 2022-12-14 PROCEDURE — 71045 X-RAY EXAM CHEST 1 VIEW: CPT | Mod: 26

## 2022-12-14 PROCEDURE — 85027 COMPLETE CBC AUTOMATED: CPT

## 2022-12-14 PROCEDURE — 82962 GLUCOSE BLOOD TEST: CPT

## 2022-12-14 PROCEDURE — 76882 US LMTD JT/FCL EVL NVASC XTR: CPT

## 2022-12-14 PROCEDURE — 74018 RADEX ABDOMEN 1 VIEW: CPT

## 2022-12-14 PROCEDURE — 86901 BLOOD TYPING SEROLOGIC RH(D): CPT

## 2022-12-14 PROCEDURE — 84681 ASSAY OF C-PEPTIDE: CPT

## 2022-12-14 PROCEDURE — 82042 OTHER SOURCE ALBUMIN QUAN EA: CPT

## 2022-12-14 PROCEDURE — 93454 CORONARY ARTERY ANGIO S&I: CPT

## 2022-12-14 PROCEDURE — 84439 ASSAY OF FREE THYROXINE: CPT

## 2022-12-14 PROCEDURE — 93005 ELECTROCARDIOGRAM TRACING: CPT

## 2022-12-14 PROCEDURE — 87641 MR-STAPH DNA AMP PROBE: CPT

## 2022-12-14 PROCEDURE — 99024 POSTOP FOLLOW-UP VISIT: CPT

## 2022-12-14 PROCEDURE — C1887: CPT

## 2022-12-14 PROCEDURE — 87070 CULTURE OTHR SPECIMN AEROBIC: CPT

## 2022-12-14 PROCEDURE — 87640 STAPH A DNA AMP PROBE: CPT

## 2022-12-14 PROCEDURE — U0003: CPT

## 2022-12-14 PROCEDURE — 84100 ASSAY OF PHOSPHORUS: CPT

## 2022-12-14 PROCEDURE — 82947 ASSAY GLUCOSE BLOOD QUANT: CPT

## 2022-12-14 PROCEDURE — C8929: CPT

## 2022-12-14 PROCEDURE — 85018 HEMOGLOBIN: CPT

## 2022-12-14 PROCEDURE — 97116 GAIT TRAINING THERAPY: CPT

## 2022-12-14 PROCEDURE — 82550 ASSAY OF CK (CPK): CPT

## 2022-12-14 PROCEDURE — 97530 THERAPEUTIC ACTIVITIES: CPT

## 2022-12-14 RX ORDER — PREGABALIN 225 MG/1
1 CAPSULE ORAL
Qty: 0 | Refills: 0 | DISCHARGE
Start: 2022-12-14

## 2022-12-14 RX ORDER — METOPROLOL TARTRATE 50 MG
1 TABLET ORAL
Qty: 0 | Refills: 0 | DISCHARGE
Start: 2022-12-14

## 2022-12-14 RX ORDER — ASPIRIN/CALCIUM CARB/MAGNESIUM 324 MG
1 TABLET ORAL
Qty: 0 | Refills: 0 | DISCHARGE
Start: 2022-12-14

## 2022-12-14 RX ORDER — SIMVASTATIN 20 MG/1
1 TABLET, FILM COATED ORAL
Qty: 0 | Refills: 0 | DISCHARGE

## 2022-12-14 RX ORDER — ACETAMINOPHEN 500 MG
2 TABLET ORAL
Qty: 0 | Refills: 0 | DISCHARGE
Start: 2022-12-14

## 2022-12-14 RX ORDER — INSULIN GLARGINE 100 [IU]/ML
32 INJECTION, SOLUTION SUBCUTANEOUS
Qty: 0 | Refills: 0 | DISCHARGE
Start: 2022-12-14

## 2022-12-14 RX ORDER — SACUBITRIL AND VALSARTAN 24; 26 MG/1; MG/1
1 TABLET, FILM COATED ORAL
Qty: 0 | Refills: 0 | DISCHARGE

## 2022-12-14 RX ORDER — ASCORBIC ACID 60 MG
1 TABLET,CHEWABLE ORAL
Qty: 0 | Refills: 0 | DISCHARGE
Start: 2022-12-14

## 2022-12-14 RX ORDER — INSULIN LISPRO 100/ML
8 VIAL (ML) SUBCUTANEOUS
Qty: 0 | Refills: 0 | DISCHARGE
Start: 2022-12-14

## 2022-12-14 RX ORDER — DAPAGLIFLOZIN 10 MG/1
1 TABLET, FILM COATED ORAL
Qty: 0 | Refills: 0 | DISCHARGE
Start: 2022-12-14

## 2022-12-14 RX ORDER — AMIODARONE HYDROCHLORIDE 400 MG/1
1 TABLET ORAL
Qty: 0 | Refills: 0 | DISCHARGE
Start: 2022-12-14

## 2022-12-14 RX ORDER — SENNA PLUS 8.6 MG/1
2 TABLET ORAL
Qty: 0 | Refills: 0 | DISCHARGE
Start: 2022-12-14

## 2022-12-14 RX ORDER — CHOLECALCIFEROL (VITAMIN D3) 125 MCG
1 CAPSULE ORAL
Qty: 0 | Refills: 0 | DISCHARGE

## 2022-12-14 RX ORDER — OXYCODONE HYDROCHLORIDE 5 MG/1
1 TABLET ORAL
Qty: 0 | Refills: 0 | DISCHARGE
Start: 2022-12-14

## 2022-12-14 RX ORDER — FERROUS SULFATE 325(65) MG
1 TABLET ORAL
Qty: 0 | Refills: 0 | DISCHARGE
Start: 2022-12-14

## 2022-12-14 RX ORDER — ATORVASTATIN CALCIUM 80 MG/1
1 TABLET, FILM COATED ORAL
Qty: 0 | Refills: 0 | DISCHARGE
Start: 2022-12-14

## 2022-12-14 RX ADMIN — PANTOPRAZOLE SODIUM 40 MILLIGRAM(S): 20 TABLET, DELAYED RELEASE ORAL at 08:38

## 2022-12-14 RX ADMIN — OXYCODONE HYDROCHLORIDE 10 MILLIGRAM(S): 5 TABLET ORAL at 10:22

## 2022-12-14 RX ADMIN — DAPAGLIFLOZIN 10 MILLIGRAM(S): 10 TABLET, FILM COATED ORAL at 05:01

## 2022-12-14 RX ADMIN — Medication 100 MILLIGRAM(S): at 05:03

## 2022-12-14 RX ADMIN — Medication 250 MILLIGRAM(S): at 05:03

## 2022-12-14 RX ADMIN — SODIUM CHLORIDE 3 MILLILITER(S): 9 INJECTION INTRAMUSCULAR; INTRAVENOUS; SUBCUTANEOUS at 13:43

## 2022-12-14 RX ADMIN — SODIUM CHLORIDE 3 MILLILITER(S): 9 INJECTION INTRAMUSCULAR; INTRAVENOUS; SUBCUTANEOUS at 05:05

## 2022-12-14 RX ADMIN — OXYCODONE HYDROCHLORIDE 10 MILLIGRAM(S): 5 TABLET ORAL at 05:03

## 2022-12-14 RX ADMIN — Medication 8 UNIT(S): at 08:37

## 2022-12-14 RX ADMIN — AMIODARONE HYDROCHLORIDE 200 MILLIGRAM(S): 400 TABLET ORAL at 05:03

## 2022-12-14 RX ADMIN — Medication 500 MILLIGRAM(S): at 08:38

## 2022-12-14 RX ADMIN — GABAPENTIN 300 MILLIGRAM(S): 400 CAPSULE ORAL at 05:03

## 2022-12-14 RX ADMIN — OXYCODONE HYDROCHLORIDE 10 MILLIGRAM(S): 5 TABLET ORAL at 06:03

## 2022-12-14 RX ADMIN — Medication 50 MILLIGRAM(S): at 05:03

## 2022-12-14 RX ADMIN — Medication 2: at 08:38

## 2022-12-14 RX ADMIN — Medication 325 MILLIGRAM(S): at 08:38

## 2022-12-14 RX ADMIN — Medication 81 MILLIGRAM(S): at 08:38

## 2022-12-14 RX ADMIN — Medication 8 UNIT(S): at 12:17

## 2022-12-14 NOTE — PROGRESS NOTE ADULT - PROBLEM SELECTOR PLAN 2
Postop TTE 12/1 while on Eliquis with small pericardial effusion noted slightly improved on repeat TTE 12/2. Plan to repeat a TTE today 12/11 again prior to discharge.  Continue Eliquis for intermittent episodes of PAF on telemetry.  Plan to increase Eliquis from 2.5MG BID to 5mg BID on Friday 12/16.
New post op atrial fib/flutter noted.  Continue amiodarone and lopressor as tolerated by HR/BP.  Eliquis restarted at 2.5BID due to intermittent episodes of PAF on telemetry.
- Pain control with oxy ir and tylenol prn  - Encourage DBE/IS, wean NC as tolerated   - Daily CXR  - consider lopressor, hold for SBP less than 100 or HR less than 60    - Asa therapy for graft patency  - Statin therapy for chronic graft occlusion ppx   - Protonix for GI ppx  - Strict i/o's  - Chemical DVT ppx with lovenox, maintain SCD's for mechanical DVT ppx
Postop TTE 12/1 while on Eliquis with small pericardial effusion noted slightly improved on repeat TTE 12/2. P  now increased on repeat 12/11, eliquis d/c'd for now
Continue lopressor as tolerated by HR and BP.
- Pain control with oxy ir and tylenol prn  - Encourage DBE/IS, wean NC as tolerated   - Daily CXR  - Asa therapy for graft patency  - Statin therapy for chronic graft occlusion ppx   - Protonix for GI ppx  - Strict i/o's  - Chemical DVT ppx with lovenox, maintain SCD's for mechanical DVT ppx
New post op atrial fib/flutter noted.  Continue amiodarone and lopressor as tolerated by HR/BP.  Remains NSR.  Continue Eliquis 5mg BID for chemical anticoagulation.   Follow up TTE prior to discharge.
Postop TTE 12/1 while on Eliquis with small pericardial effusion noted slightly improved on repeat TTE 12/2. P  now increased on repeat 12/11, no further AC per Dr. guillermo
- Pain control with oxy ir and tylenol prn  - Encourage DBE/IS, wean NC as tolerated   - Daily CXR  - Asa therapy for graft patency  - Statin therapy for chronic graft occlusion ppx   - Protonix for GI ppx  - Strict i/o's  - Chemical DVT ppx with lovenox, maintain SCD's for mechanical DVT ppx
New post op atrial fib/flutter noted.  Continue amiodarone and lopressor as tolerated by HR/BP.  Eliquis restarted at 2.5BID due to intermittent episodes of PAF on telemetry.
New post op atrial fib/flutter noted.  Continue amiodarone and lopressor as tolerated by HR/BP.  Eliquis restarted at 2.5BID due to intermittent episodes of PAF on telemetry.
Discontinue Entresto in preoperative period  Continue metoprolol.
Postop TTE 12/1 while on Eliquis with small pericardial effusion noted slightly improved on repeat TTE 12/2. P  now increased on repeat 12/11, no further AC per Dr. guillermo
Continue lopressor as tolerated by HR and BP.
Currently NSR  New post op atrial fib/flutter noted.  Continue amiodarone and lopressor as tolerated by HR/BP.  Eliquis restarted at 2.5BID due to intermittent episodes of PAF on telemetry.
Currently NSR  New post op atrial fib/flutter noted.  Continue amiodarone and lopressor as tolerated by HR/BP.  cont sandy d/w Dr. Gray when to increase to 5 BID
New post op atrial fib/flutter noted   S/p IV lopressor, IV amio bolus and and PO amio load  Continue amiodarone and lopressor as tolerated by HR/BP  EKG in AM  ?flutter  NPO for potential EP eval later today
New post op atrial fib/flutter noted.  Continue amiodarone and lopressor as tolerated by HR/BP.  Remains NSR.  Chemical anticoagulation held at this time due to TTE 12/1 revealing a small pericardial effusion.
New post op atrial fib/flutter noted   S/p IV lopressor, IV amio bolus and and PO amio load  Continue amiodarone and lopressor as tolerated by HR/BP  Now NSR
Postop TTE 12/1 while on Eliquis with small pericardial effusion noted slightly improved on repeat TTE 12/2. Plan to repeat a TTE Sunday 12/11 again prior to discharge.  Continue Eliquis for intermittent episodes of PAF on telemetry.  Plan to increase Eliquis from 2.5MG BID to 5mg BID on Friday 12/16.
New post op atrial fib/flutter noted   S/p IV lopressor, IV amio bolus and and PO amio load  Continue amiodarone and lopressor as tolerated by HR/BP  Now NSR
New post op atrial fib/flutter noted.  Continue amiodarone and lopressor as tolerated by HR/BP.  Eliquis restarted at 2.5BID due to intermittent episodes of PAF on telemetry.
New post op atrial fib/flutter noted.  Continue amiodarone and lopressor as tolerated by HR/BP.  Eliquis restarted at 2.5BID due to intermittent episodes of PAF on telemetry.

## 2022-12-14 NOTE — PROGRESS NOTE ADULT - PROBLEM SELECTOR PROBLEM 4
Hypertension
Hypertension
Diabetes mellitus
HLD (hyperlipidemia)
Hypertension
Diabetes mellitus
Hypertension
Diabetes mellitus
Pleural effusion
Diabetes mellitus
HLD (hyperlipidemia)
Pleural effusion
Diabetes mellitus
Diabetes mellitus
Hypertension
Pleural effusion
Hypertension
Pleural effusion
Hypertension
Pleural effusion

## 2022-12-14 NOTE — PROGRESS NOTE ADULT - PROBLEM SELECTOR PLAN 9
SCDs and Eliquis for DVT prophylaxis.   Protonix for GI prophylaxis.    Dispo: Discharge to [place] possible for Monday (Typically, [place] a house boat) family wants to transition to assisted living.
SCDs and Eliquis for DVT prophylaxis.   Protonix for GI prophylaxis.    Dispo: Discharge to [place] (Typically, Lives [place] a house boat) family wants to transition to assisted living.
SCDs and Eliquis for DVT prophylaxis.   Protonix for GI prophylaxis.    Dispo: Discharge to [place] possible for Monday (Typically, Lives [place] a house boat) family wants to transition to assisted living.
SCDs for DVT prophylaxis.   Protonix for GI prophylaxis.    Dispo: Discharge to [place] (Typically, Lives [place] a house boat) family wants to transition to assisted living.
SCDs for DVT prophylaxis.   Protonix for GI prophylaxis.    Dispo: Discharge to [place] (Typically, Lives on a house boat) family wants to transition to assisted living.

## 2022-12-14 NOTE — PROGRESS NOTE ADULT - PROBLEM SELECTOR PROBLEM 1
CAD (coronary artery disease)
Hypertension
CAD (coronary artery disease)

## 2022-12-14 NOTE — DISCHARGE NOTE NURSING/CASE MANAGEMENT/SOCIAL WORK - NSDCVIVACCINE_GEN_ALL_CORE_FT
Tdap; 02-Sep-2016 07:30; Syl Snow (RN); [employer]; [id number]; IntraMuscular; Deltoid Left.; 0.5 milliLiter(s); VIS (VIS Published: 09-May-2013, VIS Presented: 02-Sep-2016);

## 2022-12-14 NOTE — PROGRESS NOTE ADULT - PROBLEM SELECTOR PLAN 4
Left pleural effusion likely related to surgery  S/p pigtail catheter insertion 11/29 and removal 11/30.  Residual effusion noted on CT chest with IR pigtail placed 12/1 and removed 12/3.   PRN CXR. CVA/TIA

## 2022-12-14 NOTE — PROGRESS NOTE ADULT - PROBLEM SELECTOR PROBLEM 6
Diabetes mellitus
Hypertension
Hypertension
Diabetes mellitus
Need for prophylactic measure
Need for prophylactic measure
Diabetes mellitus
Diabetes mellitus
Hypertension
Hypertension
Diabetes mellitus
Hypertension

## 2022-12-14 NOTE — DISCHARGE NOTE NURSING/CASE MANAGEMENT/SOCIAL WORK - NSDCFUADDAPPT_GEN_ALL_CORE_FT
Upon discharge from rehab, make a follow up appointment with Dr Gray by calling [phone].  Follow up with your cardiologist and primary care doctor within 7-10 days after discharge. Sternal wound precautions, Blood glucose fingerstick checks qAC/HS, daily weights, DASH diet, ensure supplements bid, daily shower,  PT/OT Rehab  per rehab facility. BMP, CBC twice a week. CXR weekly. Vitals per routine.    When seeing Dr. Gray:   **Please arrive at [facility] ONE HOUR PRIOR TO APPOINTMENT TIME to get a CHEST XRAY (script in folder). Go directly to the Radiology Department.***    Follow up with your cardiologist (Dr. Huerta), endocrinologist and your primary care provider in 2-4 weeks

## 2022-12-14 NOTE — PROGRESS NOTE ADULT - PROBLEM SELECTOR PLAN 3
new postop  Currently NSR.--> no further AF/flutter in sevearl days   Continue amiodarone and lopressor as tolerated by HR/BP.  eliquis on d/c'd for increased pericardial effusion on TTE 12/11
Left pleural effusion likely related to surgery s/p   s/p pigtail catheter insertion 11/29 and removal 11/30.  Follow up AM CXR.
Continue lopressor as tolerated by HR and BP.
Currently NSR.  New post op atrial fib/flutter noted.  Continue amiodarone and lopressor as tolerated by HR/BP.  Continue Eliquis for intermittent episodes of PAF on telemetry.    Plan to increase Eliquis from 2.5MG BID to 5mg BID on Friday 12/16.
Currently NSR.  New post op atrial fib/flutter noted.  Continue amiodarone and lopressor as tolerated by HR/BP.  Continue Eliquis for intermittent episodes of PAF on telemetry.    Plan to increase Eliquis from 2.5MG BID to 5mg BID on Friday 12/16.
Left pleural effusion likely related to surgery  S/p pigtail catheter insertion 11/29 and removal 11/30.  Residual effusion noted on CT chest with IR pigtail placed 12/1 and removed 12/3.   Follow up AM CXR.
continue statin therapy
Left pleural effusion likely related to surgery  S/p pigtail catheter insertion 11/29 and removal 11/30.  Residual effusion noted on CT chest with IR pigtail placed 12/1 and removed 12/3.   Follow up AM CXR.
continue statin therapy
Left pleural effusion likely related to surgery  S/p pigtail catheter insertion 11/29 and removal 11/30.  Residual effusion noted on CT chest with IR pigtail placed 12/1 and removed 12/3.   Follow up AM CXR.
Continue statin therapy.
new postop  Currently NSR.--> no further AF/flutter in sevearl days   Continue amiodarone and lopressor as tolerated by HR/BP.  eliquis on d/c'd for increased pericardial effusion on TTE 12/11
Continue statin therapy.
Left pleural effusion likely related to surgery  S/p pigtail catheter insertion 11/29 and removal 11/30.  Residual effusion noted on CT chest 12/1 with IR pigtail placed.  Follow up AM CXR.
Continue lopressor as tolerated by HR and BP.
continue statin therapy
Continue atorvastatin
Left pleural effusion likely related to surgery  S/p pigtail catheter insertion 11/29 and removal 11/30.  Residual effusion noted on CT chest with IR pigtail placed 12/1 and removed 12/3.   PRN CXR
Currently NSR.  New post op atrial fib/flutter noted.  Continue amiodarone and lopressor as tolerated by HR/BP.  eliquis on hold for incrased pericardial effusion on TTE 12/11
Left pleural effusion likely related to surgery  S/p pigtail catheter insertion 11/29 and removal 11/30.  Residual effusion noted on CT chest with IR pigtail placed 12/1 and removed 12/3.   Follow up AM CXR.
Left pleural effusion likely related to surgery  S/p pigtail catheter insertion 11/29 and removal 11/30.  Residual effusion noted on CT chest 12/1 with IR pigtail placed.  Plan for Left pigtail removal today if output remains minimal.   Follow up AM CXR.
left pleural effusion likely related to surgery  s/p PTC insertion  f/u AM CXR  plan for removal later today if output remains minimal overnight
Left pleural effusion likely related to surgery  S/p pigtail catheter insertion 11/29 and removal 11/30.  Residual effusion noted on CT chest with IR pigtail placed 12/1 and removed 12/3.   Follow up AM CXR.

## 2022-12-14 NOTE — PROGRESS NOTE ADULT - PROBLEM SELECTOR PROBLEM 2
Atrial fibrillation and flutter
Atrial fibrillation and flutter
CAD (coronary artery disease)
CAD (coronary artery disease)
Atrial fibrillation and flutter
Hypertension
Atrial fibrillation and flutter
CAD (coronary artery disease)
Pericardial effusion
Pericardial effusion
Hypertension
Atrial fibrillation and flutter
Pericardial effusion
Atrial fibrillation and flutter
Pericardial effusion
Atrial fibrillation and flutter
Hypertension
Atrial fibrillation and flutter
Pericardial effusion

## 2022-12-14 NOTE — PROGRESS NOTE ADULT - PROVIDER SPECIALTY LIST ADULT
Critical Care
Endocrinology
Intervent Radiology
CT Surgery
Critical Care
Endocrinology
Infectious Disease
Infectious Disease
CT Surgery
CT Surgery
Endocrinology
Infectious Disease
Intervent Cardiology
CT Surgery

## 2022-12-14 NOTE — PROGRESS NOTE ADULT - PROBLEM SELECTOR PLAN 5
Continue statin therapy.
Continue statin therapy.
HA1c 7.6 on Metformin and Insulin as an outpatient.   Postop hyperglycemia noted.   Continue fingersticks AC/HS with Lantus, premeal, and ISS for BG coverage.   Farxiga added to regimen 11/26.  Endocrine following.
RLE vein harvest site opened 12/5 to allow for collection of fluid in thigh to drain, keep site clean and dry with gauze and tape.   Warm compresses to RLE.  Continue IV antibiotics (Vanco, Ancef) per ID. Elevate leg.
Continue statin therapy.
RLE vein harvest site opened 12/5 to allow for collection of fluid in thigh to drain, keep site clean and dry with gauze and tape.   Warm compresses to RLE.  ID consult appreciated  Continue IV antibiotics (Vanco, Ancef) per ID. Elevate leg.  antibiotics through 12/14, can transition to doxy if discharged
RLE vein harvest site opened 12/5 to allow for collection of fluid in thigh to drain, keep site clean and dry with gauze and tape.   Warm compresses to RLE.  ID consult appreciated  IV abx changed to doxy to complete course through 12/14, Elevate leg.
Lovenox and SCDs for DVT prophylaxis.  Protonix for GI prophylaxis.    Plan to be discussed / reviewed with CT Surgery attending / team during AM rounds.
Continue statin therapy.
Continue statin therapy.
Lovenox and SCDs for DVT prophylaxis.  Protonix for GI prophylaxis.    Dispo: Discharge to [place] in [place] pending Auth/ bed. ([place] a house boat).   Plan to be discussed / reviewed with CT Surgery attending / team during AM rounds.
Continue statin therapy.
HA1c 7.6 on Metformin and Insulin as an outpatient.   Postop hyperglycemia noted.   Continue fingersticks AC/HS with Lantus, premeal, and ISS for BG coverage.   Farxiga added to regimen 11/26.  Endocrine following.
RLE vein harvest site opened 12/5 to allow for collection of fluid in thigh to drain, keep site clean and dry with gauze and tape.   Warm compresses to RLE.  Continue IV antibiotics (Vanco, Ancef) per ID. Elevate leg.
RLE vein harvest site opened 12/5 to allow for collection of fluid in thigh to drain, keep site clean and dry with gauze and tape.   Warm compresses to RLE.  ID consult appreciated  IV abx changed to doxy to complete course through 12/14, Elevate leg.
Continue statin therapy.

## 2022-12-14 NOTE — PROGRESS NOTE ADULT - PROBLEM SELECTOR PLAN 6
HA1c 7.6 on Metformin and Insulin as an outpatient.   Postop hyperglycemia noted.   Continue fingersticks AC/HS with Lantus, premeal, and ISS for BG coverage.   Farxiga added to regimen 11/26.  Endocrine following.
Continue lopressor as tolerated by HR and BP.
HA1c 7.6 on Metformin and Insulin as an outpatient.   Postop hyperglycemia noted.   Continue fingersticks AC/HS with Lantus, premeal, and ISS for BG coverage.   Farxiga added to regimen 11/26.  Endocrine following.
Lovenox and SCDs for DVT prophylaxis.  Protonix for GI prophylaxis.    Dispo: Discharged to [place] in May- currently pending Auth/ bed. ([place] a house boat).   Plan to be discussed / reviewed with CT Surgery attending / team during AM rounds.
HA1c 7.6 on Metformin and Insulin as an outpatient.   Postop hyperglycemia noted.   Continue fingersticks AC/HS with Lantus, premeal, and ISS for BG coverage.   Farxiga added to regimen 11/26.  Endocrine following.
Continue lopressor as tolerated by HR and BP.
Continue lopressor as tolerated by HR and BP.
HA1c 7.6 on Metformin and Insulin as an outpatient.   Postop hyperglycemia noted.   Continue fingersticks AC/HS with Lantus, premeal, and ISS for BG coverage.   Farxiga added to regimen 11/26.  Endocrine following.
HA1c 7.6 on Metformin and Insulin as an outpatient.   Postop hyperglycemia noted.   Continue fingersticks AC/HS with Lantus, premeal, and ISS for BG coverage.   Farxiga added to regimen 11/26.  BG controlled
Lovenox and SCDs for DVT prophylaxis.  Protonix for GI prophylaxis.    Dispo: Discharged to [place] in May- currently pending Auth/ bed. ([place] house boat).   Plan to be discussed / reviewed with CT Surgery attending / team during AM rounds.
HA1c 7.6 on Metformin and Insulin as an outpatient.   Postop hyperglycemia noted.   Continue fingersticks AC/HS with Lantus, premeal, and ISS for BG coverage.   Farxiga added to regimen 11/26.  Endocrine following.
Continue lopressor as tolerated by HR and BP.
Continue lopressor as tolerated by HR and BP.

## 2022-12-14 NOTE — PROGRESS NOTE ADULT - SUBJECTIVE AND OBJECTIVE BOX
INTERVAL EVENTS:  Follow up diabetes management     ROS: Unchanged arm pain, denies SOB, abd pain.    MEDICATIONS  (STANDING):  aMIOdarone    Tablet   Oral   aMIOdarone    Tablet 200 milliGRAM(s) Oral daily  ascorbic acid 500 milliGRAM(s) Oral daily  aspirin enteric coated 81 milliGRAM(s) Oral daily  atorvastatin 80 milliGRAM(s) Oral at bedtime  cyanocobalamin 1000 MICROGram(s) Oral daily  dapagliflozin 10 milliGRAM(s) Oral every 24 hours  doxycycline monohydrate Capsule 100 milliGRAM(s) Oral every 12 hours  ferrous    sulfate 325 milliGRAM(s) Oral daily  folic acid 1 milliGRAM(s) Oral daily  gabapentin 300 milliGRAM(s) Oral three times a day  glucagon  Injectable 1 milliGRAM(s) IntraMuscular once  insulin glargine Injectable (LANTUS) 32 Unit(s) SubCutaneous at bedtime  insulin lispro (ADMELOG) corrective regimen sliding scale   SubCutaneous Before meals and at bedtime  insulin lispro Injectable (ADMELOG) 8 Unit(s) SubCutaneous three times a day before meals  lidocaine   4% Patch 1 Patch Transdermal daily  melatonin 5 milliGRAM(s) Oral at bedtime  metoprolol tartrate 50 milliGRAM(s) Oral three times a day  mirtazapine 15 milliGRAM(s) Oral at bedtime  pantoprazole    Tablet 40 milliGRAM(s) Oral daily  polyethylene glycol 3350 17 Gram(s) Oral daily  saccharomyces boulardii 250 milliGRAM(s) Oral two times a day  senna 2 Tablet(s) Oral at bedtime  sodium chloride 0.9% lock flush 3 milliLiter(s) IV Push every 8 hours  thiamine 100 milliGRAM(s) Oral daily    MEDICATIONS  (PRN):  acetaminophen     Tablet .. 650 milliGRAM(s) Oral every 6 hours PRN Mild Pain (1 - 3)  oxyCODONE    IR 5 milliGRAM(s) Oral every 4 hours PRN Moderate Pain (4 - 6)  oxyCODONE    IR 10 milliGRAM(s) Oral every 4 hours PRN Severe Pain (7 - 10)    Allergies  No Known Allergies    Vital Signs Last 24 Hrs  T(C): 36.8 (14 Dec 2022 10:00), Max: 37.2 (13 Dec 2022 16:15)  T(F): 98.2 (14 Dec 2022 10:00), Max: 98.9 (13 Dec 2022 16:15)  HR: 76 (14 Dec 2022 10:00) (71 - 77)  BP: 106/59 (14 Dec 2022 10:00) (106/59 - 130/77)  BP(mean): --  RR: 16 (14 Dec 2022 10:00) (16 - 18)  SpO2: 98% (14 Dec 2022 10:00) (95% - 99%)    Parameters below as of 14 Dec 2022 10:00  Patient On (Oxygen Delivery Method): room air      PHYSICAL EXAM:  General: No apparent distress  Neck: Supple, trachea midline, no thyromegaly  Respiratory: Lungs clear bilaterally, normal rate, effort  Cardiac: +S1, S2, no m/r/g  GI: +BS, soft, non tender, non distended  Extremities: RLE edema  Neuro: A+O X3, no tremor  Pysch: Affect appropriate   Skin: No acanthosis       LABS:                        9.5    11.67 )-----------( 472      ( 14 Dec 2022 04:49 )             30.2     12-14    135  |  98  |  19.7  ----------------------------<  164<H>  3.5   |  25.0  |  1.11    Ca    10.1      14 Dec 2022 04:49  Mg     2.0     12-14    POCT Blood Glucose.: 107 mg/dL (12-14-22 @ 12:00)  POCT Blood Glucose.: 151 mg/dL (12-14-22 @ 08:06)  POCT Blood Glucose.: 167 mg/dL (12-13-22 @ 21:03)  POCT Blood Glucose.: 226 mg/dL (12-13-22 @ 17:12)    Thyroid Stimulating Hormone, Serum: 1.23 uIU/mL (11-21-22 @ 14:08)  Free Thyroxine, Serum: 1.1 ng/dL (11-21-22 @ 14:08)

## 2022-12-14 NOTE — PROGRESS NOTE ADULT - NS ATTEND AMEND GEN_ALL_CORE FT
I agree w plan above. Bgs not bad. continue lantus , meal insulin, farxiga .
I agree w plan above. cont basal -bolus insulin. Bgs very well controlled.
I agree with plan above and discussed with NP.  cont basal-bolus insulin.
Patient seen and examined at bedside with NP. Agree with the above plan.
I agree w plan above. Bgs slightly lower side. Will decrease meal insulins to 8 u tID  w meals.
will continue to monitor glycemic status

## 2022-12-14 NOTE — DISCHARGE NOTE NURSING/CASE MANAGEMENT/SOCIAL WORK - PATIENT PORTAL LINK FT
You can access the FollowMyHealth Patient Portal offered by [facility] by registering at the following website: [email or web address]. By joining [facility]’s FollowMyHealth portal, you will also be able to view your health information using other applications (apps) compatible with our system.

## 2022-12-14 NOTE — PROGRESS NOTE ADULT - ASSESSMENT
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 Patient was admitted to [facility] initially in May 2022 for GI bleed and found to have EF 30-35%, and multivessel disease but CABG deferred due to hx of alcoholism and patient transferred to rehab. Consult for diabetes management.     1. T2DM, a1c 7.6%  - Bgs well controlled  - Continue admelog 8 units tid with meals  - Continue lantus 32 units qhs  - Farxiga 10mg daily    2. CAD  - S/p CABG, care per primary team    3. HLD  - Continue statin

## 2022-12-14 NOTE — PROGRESS NOTE ADULT - PROBLEM SELECTOR PLAN 1
S/p CABG X 3 (LIMA-LAD, SVG-OM, SVG-PDA) on 11/22/22 with Dr. Gray.  Neurologically intact.  Hemodynamically stable.  Postop ABLA, now stable, trending H/H.   Continue lopressor as tolerated by HR and BP.   Continue aspirin for acute graft closure prophylaxis.  Continue statin for chronic graft patency prophylaxis.  Oxygenating well, coughing and deep breathing exercises/incentive spirometry encouraged.  Continue diuretic with Torsemide daily, monitor strict I/Os, replenish electrolytes PRN to keep K>4 and Mg>2.   Continue with PT, increase activity as tolerated.  FS AC+HS with coverage for glycemic control.  Tylenol, Oxy PRN for analgesia.  Continue bowel regimen PRN constipation.   Postop TTE 12/1 while on Eliquis with small pericardial effusion noted.   Eliquis d/c'd.   Case and plan to be reviewed / discussed with CT Surgery attending / team during AM rounds.
S/p CABG X 3 (LIMA-LAD, SVG-OM, SVG-PDA) on 11/22/22 with Dr. Gray.  Continue lopressor as tolerated by HR and BP.   Continue aspirin for acute graft closure prophylaxis.  Continue statin for chronic graft patency prophylaxis.  Oxygenating well, coughing and deep breathing exercises/incentive spirometry encouraged.  Continue diuretic with Torsemide daily, monitor strict I/Os, replenish electrolytes PRN to keep K>4 and Mg>2.   FS AC+HS with coverage for glycemic control.  Tylenol, Oxy PRN for analgesia.  Continue bowel regimen PRN constipation.   Encourage OOB and increased ambulation.  Postop TTE 12/1 while on Eliquis with small pericardial effusion noted slightly improved on repeat TTE 12/2. Plan to repeat a TTE Sunday 12/11 again prior to discharge.  Continue Eliquis for intermittent episodes of PAF on telemetry.    RLE vein harvest site opened 12/5 to allow for collection of fluid in thigh to drain, keep site clean and dry with gauze and tape.   Warm compresses to RLE.  Continue IV antibiotics (Vanco, Ancef) per ID. Elevate leg.   Dispo: Continue IV antibiotics through weekend, possible d/c back to rehab Monday.  Plan to be discussed / reviewed with CT Surgery attending / team during AM rounds.
S/p CABG X 3 (LIMA-LAD, SVG-OM, SVG-PDA) on 11/22/22 with Dr. Gray.  Continue lopressor as tolerated by HR and BP.   Continue aspirin for acute graft closure prophylaxis.  Continue statin for chronic graft patency prophylaxis.  Oxygenating well, coughing and deep breathing exercises/incentive spirometry encouraged.  Continue diuretic with Torsemide daily, monitor strict I/Os, replenish electrolytes PRN to keep K>4 and Mg>2.   FS AC+HS with coverage for glycemic control.  Tylenol, Oxy PRN for analgesia.  Continue bowel regimen PRN constipation.   Encourage OOB and increased ambulation.  Postop TTE 12/1 while on Eliquis with small pericardial effusion noted slightly improved on repeat TTE 12/2. Plan to repeat a TTE today 12/11 again prior to discharge.  Continue Eliquis for intermittent episodes of PAF on telemetry.    RLE vein harvest site opened 12/5 to allow for collection of fluid in thigh to drain, keep site clean and dry with gauze and tape.   Warm compresses to RLE.  Continue IV antibiotics (Vanco, Ancef) per ID. Elevate leg.   Dispo: Continue IV antibiotics through weekend, possible d/c back to rehab Monday 12/12.  Plan to be discussed / reviewed with CT Surgery attending / team during AM rounds.
S/p CABG X 3 (LIMA-LAD, SVG-OM, SVG-PDA) on 11/22/22 with Dr. Gray.  Neurologically intact.  Hemodynamically stable.  Postop ABLA, now stable, trending H/H.   Continue lopressor as tolerated by HR and BP.   Continue aspirin for acute graft closure prophylaxis.  Continue statin for chronic graft patency prophylaxis.  Oxygenating well, coughing and deep breathing exercises/incentive spirometry encouraged.  Continue diuresis PRN, monitor strict I/Os, replenish electrolytes PRN to keep K>4 and Mg>2.   Continue with PT, increase activity as tolerated.  FS AC+HS with coverage for glycemic control.  Tylenol, Oxy PRN for analgesia.  Continue bowel regimen PRN constipation.   Case and plan to be reviewed / discussed with CT Surgery attending / team during AM rounds.
S/p CABG X 3 (LIMA-LAD, SVG-OM, SVG-PDA) on 11/22/22 with Dr. Gray.  Continue lopressor as tolerated by HR and BP.   Continue aspirin for acute graft closure prophylaxis.  Continue statin for chronic graft patency prophylaxis.  Oxygenating well, coughing and deep breathing exercises/incentive spirometry encouraged.  Continue diuretic with Torsemide daily, monitor strict I/Os, replenish electrolytes PRN to keep K>4 and Mg>2.   FS AC+HS with coverage for glycemic control.  Tylenol, Oxy PRN for analgesia.  Continue bowel regimen PRN constipation.   Encourage OOB and increased ambulation.  Postop TTE 12/1 while on Eliquis with small pericardial effusion noted slightly improved on repeat TTE 12/2. Rpeeat TTE 12/11 with increased size of effusion, eliquis d/c'd for Dr. Gray to review  RLE vein harvest site opened 12/5 to allow for collection of fluid in thigh to drain, keep site clean and dry with gauze and tape.   Warm compresses to RLE.  Continue IV antibiotics (Vanco, Ancef) per ID. Elevate leg.   Dispo: rehab in next few days, will need insurance authorization  Plan to be discussed / reviewed with CT Surgery attending / team during AM rounds.
S/p CABG X 3 (LIMA-LAD, SVG-OM, SVG-PDA) on 11/22/22 with Dr. Gray.  Neurologically intact.  Hemodynamically stable.  Postop ABLA, now stable, trending H/H.   Continue lopressor as tolerated by HR and BP.   Continue aspirin for acute graft closure prophylaxis.  Continue statin for chronic graft patency prophylaxis.  Oxygenating well, coughing and deep breathing exercises/incentive spirometry encouraged.  Will discuss starting diuretic in AM rounds, monitor strict I/Os, replenish electrolytes PRN to keep K>4 and Mg>2.   Continue with PT, increase activity as tolerated.  FS AC+HS with coverage for glycemic control.  Tylenol, Oxy PRN for analgesia.  Continue bowel regimen PRN constipation.   Case and plan to be reviewed / discussed with CT Surgery attending / team during AM rounds.
S/p CABG X 3 (LIMA-LAD, SVG-OM, SVG-PDA) on 11/22/22 with Dr. Gray.  Continue lopressor as tolerated by HR and BP.   Continue aspirin for acute graft closure prophylaxis.  Continue statin for chronic graft patency prophylaxis.  Oxygenating well, coughing and deep breathing exercises/incentive spirometry encouraged.  torsemide d/c'd 12/12 for rising bicarb, cont diamox x 48 hours,, monitor strict I/Os, replenish electrolytes PRN to keep K>4 and Mg>2.   FS AC+HS with coverage for glycemic control.  Tylenol, Oxy PRN for analgesia.  Continue bowel regimen PRN constipation.   Encourage OOB and increased ambulation.  Postop TTE 12/1 while on Eliquis with small pericardial effusion noted slightly improved on repeat TTE 12/2. Rpeeat TTE 12/11 with increased size of effusion, eliquis d/c'd, decision made to no longer anticoagulate pt given effusion and RLE hematoma, no AF in several days on tele review  RLE vein harvest site opened 12/5 to allow for collection of fluid in thigh to drain, keep site clean and dry with gauze and tape.   Warm compresses to RLE PRN   vanco/ancef changed to doxy to complete course javi 12/14  Dispo: rehab in next few days pending insurance authorization  Plan to be discussed / reviewed with CT Surgery attending / team during AM rounds.
continuer lopressor
continuer lopressor
S/p CABG X 3 (LIMA-LAD, SVG-OM, SVG-PDA) on 11/22/22 with Dr. Gray.  Neurologically intact.  Hemodynamically stable.  Postop ABLA, now stable, trending H/H.   Continue lopressor as tolerated by HR and BP.   Continue aspirin for acute graft closure prophylaxis.  Continue statin for chronic graft patency prophylaxis.  Oxygenating well, coughing and deep breathing exercises/incentive spirometry encouraged.  Continue diuretic with Torsemide daily, monitor strict I/Os, replenish electrolytes PRN to keep K>4 and Mg>2.   Continue with PT, increase activity as tolerated.  FS AC+HS with coverage for glycemic control.  Tylenol, Oxy PRN for analgesia.  Continue bowel regimen PRN constipation.   Postop TTE 12/1 while on Eliquis with small pericardial effusion noted.   Repeat TTE with slightly improved small pericardial effusion.   Eliquis restarted at 2.5BID due to intermittent episodes of PAF on telemetry.    Case and plan to be reviewed / discussed with CT Surgery attending / team during AM rounds.
S/p CABG X 3 (LIMA-LAD, SVG-OM, SVG-PDA) on 11/22/22 with Dr. Gray.  Neurologically intact.  Hemodynamically stable.  Postop ABLA, now stable, trending H/H.   Continue lopressor as tolerated by HR and BP.   Continue aspirin for acute graft closure prophylaxis.  Continue statin for chronic graft patency prophylaxis.  Oxygenating well, coughing and deep breathing exercises/incentive spirometry encouraged.  Continue diuretic with Torsemide daily, monitor strict I/Os, replenish electrolytes PRN to keep K>4 and Mg>2.   Continue with PT, increase activity as tolerated.  FS AC+HS with coverage for glycemic control.  Tylenol, Oxy PRN for analgesia.  Continue bowel regimen PRN constipation.   Postop TTE 12/1 while on Eliquis with small pericardial effusion noted.   Repeat TTE with slightly improved small pericardial effusion.   Eliquis restarted at 2.5BID due to intermittent episodes of PAF on telemetry.    LLE vein harvest site opened 12/5 to allow for collection of fluid in thigh to drain, keep site clean and dry with gauze and tape   Case and plan to be reviewed / discussed with CT Surgery attending / team during AM rounds.
S/p CABG X 3 (LIMA-LAD, SVG-OM, SVG-PDA) on 11/22/22 with Dr. Gray.  Neurologically intact.  Hemodynamically stable.  Postop ABLA, now stable, trending H/H.   Continue lopressor as tolerated by HR and BP.   Continue aspirin for acute graft closure prophylaxis.  Continue statin for chronic graft patency prophylaxis.  Oxygenating well, coughing and deep breathing exercises/incentive spirometry encouraged.  Continue diuretic with Torsemide daily, monitor strict I/Os, replenish electrolytes PRN to keep K>4 and Mg>2.   Continue with PT, increase activity as tolerated.  FS AC+HS with coverage for glycemic control.  Tylenol, Oxy PRN for analgesia.  Continue bowel regimen PRN constipation.   Postop TTE 12/1 while on Eliquis with small pericardial effusion noted.   Repeat TTE with slightly improved small pericardial effusion.   Eliquis restarted at 2.5BID due to intermittent episodes of PAF on telemetry.    RLE vein harvest site opened 12/5 to allow for collection of fluid in thigh to drain, keep site clean and dry with gauze and tape   Warm compresses to RLE   Continue antibiotics per ID   Case and plan to be reviewed / discussed with CT Surgery attending / team during AM rounds.
S/p CABG X 3 (LIMA-LAD, SVG-OM, SVG-PDA) on 11/22/22 with Dr. Gray.  Neurologically intact.  Hemodynamically stable.  Postop ABLA, now stable, trending H/H.   Continue lopressor as tolerated by HR and BP.   Continue aspirin for acute graft closure prophylaxis.  Continue statin for chronic graft patency prophylaxis.  Oxygenating well, coughing and deep breathing exercises/incentive spirometry encouraged.  Will discuss starting diuretic in AM rounds, monitor strict I/Os, replenish electrolytes PRN to keep K>4 and Mg>2.   Continue with PT, increase activity as tolerated.  FS AC+HS with coverage for glycemic control.  Tylenol, Oxy PRN for analgesia.  Continue bowel regimen PRN constipation.   Case and plan to be reviewed / discussed with CT Surgery attending / team during AM rounds.
S/p [facility] 11/21 with progressive disease.  Plan for OR 11/22 for CABG with Dr. Gray.  Pending PFTs to complete preoperative workup.  Continue beta blocker and statin.    Plan discussed with Dr. Gray.
S/p CABG X 3 (LIMA-LAD, SVG-OM, SVG-PDA) on 11/22/22 with Dr. Gray.  Neurologically intact.  Hemodynamically stable.  Postop ABLA, now stable, trending H/H.   Continue lopressor as tolerated by HR and BP.   Continue aspirin for acute graft closure prophylaxis.  Continue statin for chronic graft patency prophylaxis.  Oxygenating well, coughing and deep breathing exercises/incentive spirometry encouraged.  Continue diuretic with Torsemide daily, monitor strict I/Os, replenish electrolytes PRN to keep K>4 and Mg>2.   Continue with PT, increase activity as tolerated.  FS AC+HS with coverage for glycemic control.  Tylenol, Oxy PRN for analgesia.  Continue bowel regimen PRN constipation.   Postop TTE 12/1 while on Eliquis with small pericardial effusion noted.   Repeat TTE with slightly improved small pericardial effusion.   Eliquis restarted at 2.5BID due to intermittent episodes of PAF on telemetry.    RLE vein harvest site opened 12/5 to allow for collection of fluid in thigh to drain, keep site clean and dry with gauze and tape   Warm compresses to RLE   Case and plan to be reviewed / discussed with CT Surgery attending / team during AM rounds.
consider lopressor in AM
S/p CABG X 3 (LIMA-LAD, SVG-OM, SVG-PDA) on 11/22/22 with Dr. Gray.  oob, ambulate as tolerated  Continue lopressor as tolerated by HR and BP.   Continue aspirin for acute graft closure prophylaxis.  Continue statin for chronic graft patency prophylaxis.  Oxygenating well, coughing and deep breathing exercises/incentive spirometry encouraged.  Continue diuretic with Torsemide daily, monitor strict I/Os, replenish electrolytes PRN to keep K>4 and Mg>2.   FS AC+HS with coverage for glycemic control.  Tylenol, Oxy PRN for analgesia.  Continue bowel regimen PRN constipation.   Postop TTE 12/1 while on Eliquis with small pericardial effusion noted slightly improved on repeat TTE 12/2, repeat sunday 12/11 again prior to discharge  cont eliquis for intermittent episodes of PAF on telemetry.    RLE vein harvest site opened 12/5 to allow for collection of fluid in thigh to drain, keep site clean and dry with gauze and tape   Warm compresses to RLE   Continue antibiotics per ID, elevate leg,   hold off on increasing diuretics at this time per Dr Gray  plan d/w dr. Gray in AM rounds  dispo: cont IV antibiotics through weekend, possible d/c back to rehab monday
S/p CABG X 3 (LIMA-LAD, SVG-OM, SVG-PDA) on 11/22/22 with Dr. Gray.  Neurologically intact.  Hemodynamically stable.  Postop ABLA, now stable, trending H/H.   Continue lopressor as tolerated by HR and BP.   Continue aspirin for acute graft closure prophylaxis.  Continue statin for chronic graft patency prophylaxis.  Oxygenating well, coughing and deep breathing exercises/incentive spirometry encouraged.  Continue diuretic with Torsemide daily, monitor strict I/Os, replenish electrolytes PRN to keep K>4 and Mg>2.   Continue with PT, increase activity as tolerated.  FS AC+HS with coverage for glycemic control.  Tylenol, Oxy PRN for analgesia.  Continue bowel regimen PRN constipation.   Postop TTE 12/1 while on Eliquis with small pericardial effusion noted.   Repeat TTE with slightly improved small pericardial effusion.   Eliquis restarted at 2.5BID due to intermittent episodes of PAF on telemetry.    Case and plan to be reviewed / discussed with CT Surgery attending / team during AM rounds.
S/p CABG X 3 (LIMA-LAD, SVG-OM, SVG-PDA) on 11/22/22 with Dr. Gray.  Continue lopressor as tolerated by HR and BP.   Continue aspirin for acute graft closure prophylaxis.  Continue statin for chronic graft patency prophylaxis.  Oxygenating well, coughing and deep breathing exercises/incentive spirometry encouraged.  torsemide d/c'd 12/12 for rising bicarb, cont diamox x 48 hours,, monitor strict I/Os, replenish electrolytes PRN to keep K>4 and Mg>2.   FS AC+HS with coverage for glycemic control.  Tylenol, Oxy PRN for analgesia.  Continue bowel regimen PRN constipation.   Encourage OOB and increased ambulation.  Postop TTE 12/1 while on Eliquis with small pericardial effusion noted slightly improved on repeat TTE 12/2. Rpeeat TTE 12/11 with increased size of effusion, eliquis d/c'd, decision made to no longer anticoagulate pt given effusion and RLE hematoma, no AF in several days on tele review  RLE vein harvest site opened 12/5 to allow for collection of fluid in thigh to drain, keep site clean and dry with gauze and tape.   Warm compresses to RLE PRN   vanco/ancef changed to doxy to complete course javi 12/14  Dispo: rehab in next few days pending insurance authorization  Plan to be discussed / reviewed with CT Surgery attending / team during AM rounds.
S/p CABG X 3 (LIMA-LAD, SVG-OM, SVG-PDA) on 11/22/22 with Dr. Gray.  Neurologically intact.  Hemodynamically stable.  Postop ABLA, now stable, trending H/H.   Continue lopressor as tolerated by HR and BP.   Continue aspirin for acute graft closure prophylaxis.  Continue statin for chronic graft patency prophylaxis.  Oxygenating well, coughing and deep breathing exercises/incentive spirometry encouraged.  Continue diuretic with Torsemide daily, monitor strict I/Os, replenish electrolytes PRN to keep K>4 and Mg>2.   Continue with PT, increase activity as tolerated.  FS AC+HS with coverage for glycemic control.  Tylenol, Oxy PRN for analgesia.  Continue bowel regimen PRN constipation.   Case and plan to be reviewed / discussed with CT Surgery attending / team during AM rounds.
S/p CABG X 3 (LIMA-LAD, SVG-OM, SVG-PDA) on 11/22/22 with Dr. Gray.  Neurologically intact.  Hemodynamically stable.  Postop ABLA, now stable, trending H/H.   Continue lopressor as tolerated by HR and BP.   Continue aspirin for acute graft closure prophylaxis.  Continue statin for chronic graft patency prophylaxis.  Oxygenating well, coughing and deep breathing exercises/incentive spirometry encouraged.  Continue to assess need for diuretic, monitor strict I/Os, replenish electrolytes PRN to keep K>4 and Mg>2.   Continue with PT, increase activity as tolerated.  FS AC+HS with coverage for glycemic control.  Tylenol, Oxy PRN for analgesia.  Continue bowel regimen PRN constipation.   Case and plan to be reviewed / discussed with CT Surgery attending / team during AM rounds.
S/p CABG X 3 (LIMA-LAD, SVG-OM, SVG-PDA) on 11/22/22 with Dr. Gray.  Neurologically intact.  Hemodynamically stable.  Postop ABLA, now stable, trending H/H.   Continue lopressor as tolerated by HR and BP.   Continue aspirin for acute graft closure prophylaxis.  Continue statin for chronic graft patency prophylaxis.  Oxygenating well, coughing and deep breathing exercises/incentive spirometry encouraged.  Continue to assess need for diuretic, monitor strict I/Os, replenish electrolytes PRN to keep K>4 and Mg>2.   Continue with PT, increase activity as tolerated.  FS AC+HS with coverage for glycemic control.  Tylenol, Oxy PRN for analgesia.  Continue bowel regimen PRN constipation.   Case and plan to be reviewed / discussed with CT Surgery attending / team during AM rounds.
S/p CABG X 3 (LIMA-LAD, SVG-OM, SVG-PDA) on 11/22/22 with Dr. Gray.  Neurologically intact.  Hemodynamically stable.  Postop ABLA, now stable, trending H/H.   Continue lopressor as tolerated by HR and BP.   Continue aspirin for acute graft closure prophylaxis.  Continue statin for chronic graft patency prophylaxis.  Oxygenating well, coughing and deep breathing exercises/incentive spirometry encouraged.  Continue diuretic with Torsemide daily, monitor strict I/Os, replenish electrolytes PRN to keep K>4 and Mg>2.   Continue with PT, increase activity as tolerated.  FS AC+HS with coverage for glycemic control.  Tylenol, Oxy PRN for analgesia.  Continue bowel regimen PRN constipation.   Postop TTE 12/1 while on Eliquis with small pericardial effusion noted.   Repeat TTE with slightly improved small pericardial effusion.   Eliquis restarted at 2.5BID due to intermittent episodes of PAF on telemetry.    Case and plan to be reviewed / discussed with CT Surgery attending / team during AM rounds.

## 2022-12-14 NOTE — PROGRESS NOTE ADULT - SUBJECTIVE AND OBJECTIVE BOX
Subjective - patient seen and evaluated bedside. Sitting comfortably in bed. Denies CP, SOB, HA, dizziness, n/v/d    Review of Systems: negative x 10 systems except as noted above    Brief summary:  67yMale POD# 22 CABG x3    Significant/[id number] events: none. pending discharge to rehab      PAST MEDICAL & SURGICAL HISTORY:  Pancreatitis      Hypertension      Myocardial infarct      Alcohol abuse      Colon cancer      History of colon resection      History of heart surgery  cardiac stent            acetaminophen     Tablet .. 650 milliGRAM(s) Oral every 6 hours PRN  aMIOdarone    Tablet 200 milliGRAM(s) Oral daily  aMIOdarone    Tablet   Oral   ascorbic acid 500 milliGRAM(s) Oral daily  aspirin enteric coated 81 milliGRAM(s) Oral daily  atorvastatin 80 milliGRAM(s) Oral at bedtime  cyanocobalamin 1000 MICROGram(s) Oral daily  dapagliflozin 10 milliGRAM(s) Oral every 24 hours  doxycycline monohydrate Capsule 100 milliGRAM(s) Oral every 12 hours  ferrous    sulfate 325 milliGRAM(s) Oral daily  folic acid 1 milliGRAM(s) Oral daily  gabapentin 300 milliGRAM(s) Oral three times a day  glucagon  Injectable 1 milliGRAM(s) IntraMuscular once  insulin glargine Injectable (LANTUS) 32 Unit(s) SubCutaneous at bedtime  insulin lispro (ADMELOG) corrective regimen sliding scale   SubCutaneous Before meals and at bedtime  insulin lispro Injectable (ADMELOG) 8 Unit(s) SubCutaneous three times a day before meals  lidocaine   4% Patch 1 Patch Transdermal daily  melatonin 5 milliGRAM(s) Oral at bedtime  metoprolol tartrate 50 milliGRAM(s) Oral three times a day  mirtazapine 15 milliGRAM(s) Oral at bedtime  oxyCODONE    IR 5 milliGRAM(s) Oral every 4 hours PRN  oxyCODONE    IR 10 milliGRAM(s) Oral every 4 hours PRN  pantoprazole    Tablet 40 milliGRAM(s) Oral daily  polyethylene glycol 3350 17 Gram(s) Oral daily  saccharomyces boulardii 250 milliGRAM(s) Oral two times a day  senna 2 Tablet(s) Oral at bedtime  sodium chloride 0.9% lock flush 3 milliLiter(s) IV Push every 8 hours  thiamine 100 milliGRAM(s) Oral daily  MEDICATIONS  (PRN):  acetaminophen     Tablet .. 650 milliGRAM(s) Oral every 6 hours PRN Mild Pain (1 - 3)  oxyCODONE    IR 5 milliGRAM(s) Oral every 4 hours PRN Moderate Pain (4 - 6)  oxyCODONE    IR 10 milliGRAM(s) Oral every 4 hours PRN Severe Pain (7 - 10)      Daily     Daily Weight in k.8 (13 Dec 2022 04:00)                              9.3    10.14 )-----------( 460      ( 13 Dec 2022 04:44 )             29.8       136  |  100  |  14.3  ----------------------------<  131<H>  4.2   |  28.0  |  1.12    Ca    10.5      13 Dec 2022 12:38  Mg     2.1         TPro  5.8<L>  /  Alb  2.9<L>  /  TBili  0.5  /  DBili  x   /  AST  26  /  ALT  11  /  AlkPhos  154<H>              Objective:  T(C): 36.7 (22 @ 00:26), Max: 37.2 (22 @ 16:15)  HR: 74 (22 @ 00:26) (71 - 84)  BP: 119/70 (22 @ 00:26) (103/61 - 130/77)  RR: 16 (22 @ 00:26) (16 - 18)  SpO2: 95% (22 @ 00:26) (95% - 99%)  Wt(kg): --CAPILLARY BLOOD GLUCOSE      POCT Blood Glucose.: 167 mg/dL (13 Dec 2022 21:03)  POCT Blood Glucose.: 226 mg/dL (13 Dec 2022 17:12)  POCT Blood Glucose.: 128 mg/dL (13 Dec 2022 12:09)  POCT Blood Glucose.: 181 mg/dL (13 Dec 2022 07:51)  I&O's Summary    12 Dec 2022 07:01  -  13 Dec 2022 07:00  --------------------------------------------------------  IN: 725 mL / OUT: 1200 mL / NET: -475 mL    13 Dec 2022 07:01  -  14 Dec 2022 01:04  --------------------------------------------------------  IN: 120 mL / OUT: 0 mL / NET: 120 mL        Physical Exam  General: NAD  Neuro: A+O x 3, non-focal, speech clear and intact  Psych: Appropriate affect  HEENT:  NCAT, No conjuctival edema or icterus, no thrush.  Pulm: CTA, equal bilaterally  CV: RRR,  +S1S2  Abd: soft, NT, ND, +BS  Ext: +DP Pulses b/l, no edema, RT thigh c/d/i no hematoma  Skin: Warm, dry, intact  Inc: MSI C/D/I/stable open to air          Imaging:  < from: Xray Chest 1 View- PORTABLE-Routine (Xray Chest 1 View- PORTABLE-Routine in AM.) (22 @ 06:13) >    INTERPRETATION:  Chest one view 2022 4:20 AM    HISTORY: Postop    COMPARISON STUDY: 2022    Frontal expiratory view of the chest shows the heart to be similar in   size. Sternal wires are again noted.    The lungs show mild left atelectasis with similar small effusion and   there is no evidence of pneumothorax nor right pleural effusion.    Chest one view 2022 4:15 AM  Compared to the prior study, there is no interval change.    IMPRESSION:  Left atelectasis with small effusion.    < end of copied text >          < from: TTE Echo Limited or F/U (22 @ 14:33) >    Summary:   1. Limited follow up study.   2. Left ventricular ejection fraction, by visual estimation, is 50 to   55%.   3. Apical septal segment, apical inferior segment, and apex are mildly   hypokinetic.     4. Normal right ventricular size and function.   5. Small-to-moderate size loculated pericardial effusion measures up to   1.3 cm along the lateral wall, no echocardiographic evidence of tamponade.   6. Compared to the prior TTE study from 22, slight increase in size   of the pericardial effusion.    < end of copied text >         Subjective - patient seen and evaluated bedside. Sitting comfortably in bed. Denies CP, SOB, HA, dizziness, n/v/d    Review of Systems: negative x 10 systems except as noted above    Brief summary:  67yMale POD# 22 CABG x3    Significant/[id number] events: none. pending discharge to rehab      PAST MEDICAL & SURGICAL HISTORY:  Pancreatitis      Hypertension      Myocardial infarct      Alcohol abuse      Colon cancer      History of colon resection      History of heart surgery  cardiac stent            acetaminophen     Tablet .. 650 milliGRAM(s) Oral every 6 hours PRN  aMIOdarone    Tablet 200 milliGRAM(s) Oral daily  aMIOdarone    Tablet   Oral   ascorbic acid 500 milliGRAM(s) Oral daily  aspirin enteric coated 81 milliGRAM(s) Oral daily  atorvastatin 80 milliGRAM(s) Oral at bedtime  cyanocobalamin 1000 MICROGram(s) Oral daily  dapagliflozin 10 milliGRAM(s) Oral every 24 hours  doxycycline monohydrate Capsule 100 milliGRAM(s) Oral every 12 hours  ferrous    sulfate 325 milliGRAM(s) Oral daily  folic acid 1 milliGRAM(s) Oral daily  gabapentin 300 milliGRAM(s) Oral three times a day  glucagon  Injectable 1 milliGRAM(s) IntraMuscular once  insulin glargine Injectable (LANTUS) 32 Unit(s) SubCutaneous at bedtime  insulin lispro (ADMELOG) corrective regimen sliding scale   SubCutaneous Before meals and at bedtime  insulin lispro Injectable (ADMELOG) 8 Unit(s) SubCutaneous three times a day before meals  lidocaine   4% Patch 1 Patch Transdermal daily  melatonin 5 milliGRAM(s) Oral at bedtime  metoprolol tartrate 50 milliGRAM(s) Oral three times a day  mirtazapine 15 milliGRAM(s) Oral at bedtime  oxyCODONE    IR 5 milliGRAM(s) Oral every 4 hours PRN  oxyCODONE    IR 10 milliGRAM(s) Oral every 4 hours PRN  pantoprazole    Tablet 40 milliGRAM(s) Oral daily  polyethylene glycol 3350 17 Gram(s) Oral daily  saccharomyces boulardii 250 milliGRAM(s) Oral two times a day  senna 2 Tablet(s) Oral at bedtime  sodium chloride 0.9% lock flush 3 milliLiter(s) IV Push every 8 hours  thiamine 100 milliGRAM(s) Oral daily  MEDICATIONS  (PRN):  acetaminophen     Tablet .. 650 milliGRAM(s) Oral every 6 hours PRN Mild Pain (1 - 3)  oxyCODONE    IR 5 milliGRAM(s) Oral every 4 hours PRN Moderate Pain (4 - 6)  oxyCODONE    IR 10 milliGRAM(s) Oral every 4 hours PRN Severe Pain (7 - 10)      Daily     Daily Weight in k.8 (13 Dec 2022 04:00)                              9.3    10.14 )-----------( 460      ( 13 Dec 2022 04:44 )             29.8       136  |  100  |  14.3  ----------------------------<  131<H>  4.2   |  28.0  |  1.12    Ca    10.5      13 Dec 2022 12:38  Mg     2.1         TPro  5.8<L>  /  Alb  2.9<L>  /  TBili  0.5  /  DBili  x   /  AST  26  /  ALT  11  /  AlkPhos  154<H>              Objective:  T(C): 36.7 (22 @ 00:26), Max: 37.2 (22 @ 16:15)  HR: 74 (22 @ 00:26) (71 - 84)  BP: 119/70 (22 @ 00:26) (103/61 - 130/77)  RR: 16 (22 @ 00:26) (16 - 18)  SpO2: 95% (22 @ 00:26) (95% - 99%)  Wt(kg): --CAPILLARY BLOOD GLUCOSE      POCT Blood Glucose.: 167 mg/dL (13 Dec 2022 21:03)  POCT Blood Glucose.: 226 mg/dL (13 Dec 2022 17:12)  POCT Blood Glucose.: 128 mg/dL (13 Dec 2022 12:09)  POCT Blood Glucose.: 181 mg/dL (13 Dec 2022 07:51)  I&O's Summary    12 Dec 2022 07:01  -  13 Dec 2022 07:00  --------------------------------------------------------  IN: 725 mL / OUT: 1200 mL / NET: -475 mL    13 Dec 2022 07:01  -  14 Dec 2022 01:04  --------------------------------------------------------  IN: 120 mL / OUT: 0 mL / NET: 120 mL        Physical Exam  General: NAD  Neuro: A+O x 3, non-focal, speech clear and intact  Psych: Appropriate affect  HEENT:  NCAT, No conjuctival edema or icterus, no thrush.  Pulm: CTA, equal bilaterally  CV: RRR,  +S1S2  Abd: soft, NT, ND, +BS  Ext: +DP Pulses b/l, 2+ edema RLE, 1+ edema LLE, RT thigh c/d/i no hematoma  Skin: Warm, dry, intact  Inc: MSI C/D/I/stable open to air          Imaging:  < from: Xray Chest 1 View- PORTABLE-Routine (Xray Chest 1 View- PORTABLE-Routine in AM.) (22 @ 06:13) >    INTERPRETATION:  Chest one view 2022 4:20 AM    HISTORY: Postop    COMPARISON STUDY: 2022    Frontal expiratory view of the chest shows the heart to be similar in   size. Sternal wires are again noted.    The lungs show mild left atelectasis with similar small effusion and   there is no evidence of pneumothorax nor right pleural effusion.    Chest one view 2022 4:15 AM  Compared to the prior study, there is no interval change.    IMPRESSION:  Left atelectasis with small effusion.    < end of copied text >          < from: TTE Echo Limited or F/U (22 @ 14:33) >    Summary:   1. Limited follow up study.   2. Left ventricular ejection fraction, by visual estimation, is 50 to   55%.   3. Apical septal segment, apical inferior segment, and apex are mildly   hypokinetic.     4. Normal right ventricular size and function.   5. Small-to-moderate size loculated pericardial effusion measures up to   1.3 cm along the lateral wall, no echocardiographic evidence of tamponade.   6. Compared to the prior TTE study from 22, slight increase in size   of the pericardial effusion.    < end of copied text >

## 2022-12-14 NOTE — PROGRESS NOTE ADULT - PROBLEM SELECTOR PLAN 8
HA1c 7.6 on Metformin and Insulin as an outpatient.   Postop hyperglycemia noted.   Continue fingersticks AC/HS with Lantus, premeal, and ISS for BG coverage.   Farxiga added to regimen 11/26.  BG controlled.

## 2022-12-14 NOTE — PROGRESS NOTE ADULT - PROBLEM SELECTOR PROBLEM 5
HLD (hyperlipidemia)
Need for prophylactic measure
Cellulitis of leg, right
Diabetes mellitus
Diabetes mellitus
HLD (hyperlipidemia)
Cellulitis of leg, right
Cellulitis of leg, right
HLD (hyperlipidemia)
Need for prophylactic measure
HLD (hyperlipidemia)
HLD (hyperlipidemia)
Cellulitis of leg, right
Cellulitis of leg, right

## 2022-12-14 NOTE — PROGRESS NOTE ADULT - PROBLEM SELECTOR PROBLEM 3
HLD (hyperlipidemia)
Pleural effusion
HLD (hyperlipidemia)
Pleural effusion
HLD (hyperlipidemia)
Atrial fibrillation and flutter
Atrial fibrillation and flutter
Hypertension
HLD (hyperlipidemia)
HLD (hyperlipidemia)
Atrial fibrillation and flutter
Pleural effusion
HLD (hyperlipidemia)
Hypertension
Pleural effusion
Atrial fibrillation and flutter
Pleural effusion
Atrial fibrillation and flutter

## 2022-12-14 NOTE — PROGRESS NOTE ADULT - NS ATTEND OPT1 GEN_ALL_CORE
I independently performed the documented:
I attest my time as attending is greater than 50% of the total combined time spent on qualifying patient care activities by the PA/NP and attending.
I independently performed the documented:
I independently performed the documented:

## 2022-12-14 NOTE — PROGRESS NOTE ADULT - PROBLEM SELECTOR PROBLEM 7
Need for prophylactic measure
HLD (hyperlipidemia)
Need for prophylactic measure
Need for prophylactic measure
HLD (hyperlipidemia)
Need for prophylactic measure
HLD (hyperlipidemia)
Need for prophylactic measure
Need for prophylactic measure
HLD (hyperlipidemia)
HLD (hyperlipidemia)

## 2022-12-14 NOTE — PROGRESS NOTE ADULT - PROBLEM SELECTOR PLAN 7
Continue statin therapy.
SCDs and Eliquis for DVT prophylaxis.   Protonix for GI prophylaxis.    Dispo: Discharge to [place] later today or Monday 12/5 pending bed available. ([place]  Plan to be discussed / reviewed with CT Surgery attending / team during AM rounds.
Continue statin therapy.
SCDs and Eliquis for DVT prophylaxis.   Protonix for GI prophylaxis.    Dispo: Discharge to [place] pending bed available. (Typically, Lives [place] a house boat)  Plan to be discussed / reviewed with CT Surgery attending / team during AM rounds.
Eliquis and SCDs for DVT prophylaxis.  Protonix for GI prophylaxis.    Dispo: Discharge to [place] pending insurance Auth & bed available. (Lives [place] a house boat)  Plan to be discussed / reviewed with CT Surgery attending / team during AM rounds.
SCDs and Eliquis for DVT prophylaxis.   Protonix for GI prophylaxis.    Dispo: Discharge to [place] later today/tomorrow pending bed available. (Typically, Lives [place] a house boat)  Plan to be discussed / reviewed with CT Surgery attending / team during AM rounds.
Lovenox and SCDs for DVT prophylaxis.  Protonix for GI prophylaxis.    Dispo: Discharged to [place] in May- currently pending insurance Auth, bed available. (Lives [place] a house boat).   Plan to be discussed / reviewed with CT Surgery attending / team during AM rounds.
SCDs for DVT prophylaxis. Eliquis held due to small pericardial effusion noted on TTE 12/1.  Protonix for GI prophylaxis.    Dispo: Discharge to [place] pending insurance Auth & bed available. (Lives [place] a house boat)  Plan to be discussed / reviewed with CT Surgery attending / team during AM rounds.
SCDs and Eliquis for DVT prophylaxis.   Protonix for GI prophylaxis.    Dispo: Discharge to [place] potentially Monday 12/5 pending bed available. (Lives on a house boat)  Plan to be discussed / reviewed with CT Surgery attending / team during AM rounds.
SCDs and Eliquis for DVT prophylaxis.   Protonix for GI prophylaxis.    Dispo: Discharge to [place] possible monday (Typically, Lives on a house boat) family wants to transition to assisted living
SCDs and Eliquis for DVT prophylaxis.   Protonix for GI prophylaxis.    Dispo: Discharge to [place] pending bed available. (Typically, Lives on a house boat)  Plan to be discussed / reviewed with CT Surgery attending / team during AM rounds.
SCDs and Eliquis for DVT prophylaxis.   Protonix for GI prophylaxis.    Dispo: Discharge to [place] later today or Monday 12/5 pending bed available. (Lives on a house boat)  Plan to be discussed / reviewed with CT Surgery attending / team during AM rounds.

## 2022-12-14 NOTE — PROGRESS NOTE ADULT - PROBLEM/PLAN-5
DISPLAY PLAN FREE TEXT
Suturegard Body: The suture ends were repeatedly re-tightened and re-clamped to achieve the desired tissue expansion.

## 2022-12-14 NOTE — DISCHARGE NOTE NURSING/CASE MANAGEMENT/SOCIAL WORK - NSDCPEFALRISK_GEN_ALL_CORE
For information on Fall & Injury Prevention, visit: [email or web address] OR  [email or web address] OR  https://www.cdc.gov/steadi/patient.html

## 2022-12-15 ENCOUNTER — TRANSCRIPTION ENCOUNTER (OUTPATIENT)
Age: 67
End: 2022-12-15

## 2022-12-23 ENCOUNTER — TRANSCRIPTION ENCOUNTER (OUTPATIENT)
Age: 67
End: 2022-12-23

## 2022-12-30 ENCOUNTER — TRANSCRIPTION ENCOUNTER (OUTPATIENT)
Age: 67
End: 2022-12-30

## 2022-12-31 LAB
CULTURE RESULTS: SIGNIFICANT CHANGE UP
SPECIMEN SOURCE: SIGNIFICANT CHANGE UP

## 2023-01-04 ENCOUNTER — TRANSCRIPTION ENCOUNTER (OUTPATIENT)
Age: 68
End: 2023-01-04

## 2023-01-05 ENCOUNTER — TRANSCRIPTION ENCOUNTER (OUTPATIENT)
Age: 68
End: 2023-01-05

## 2023-01-06 ENCOUNTER — TRANSCRIPTION ENCOUNTER (OUTPATIENT)
Age: 68
End: 2023-01-06

## 2023-01-10 ENCOUNTER — TRANSCRIPTION ENCOUNTER (OUTPATIENT)
Age: 68
End: 2023-01-10

## 2023-01-13 ENCOUNTER — TRANSCRIPTION ENCOUNTER (OUTPATIENT)
Age: 68
End: 2023-01-13

## 2023-01-20 ENCOUNTER — APPOINTMENT (OUTPATIENT)
Dept: CARDIOLOGY | Facility: CLINIC | Age: 68
End: 2023-01-20

## 2023-02-27 ENCOUNTER — APPOINTMENT (OUTPATIENT)
Dept: CARDIOLOGY | Facility: CLINIC | Age: 68
End: 2023-02-27

## 2023-03-15 RX ORDER — ASPIRIN ENTERIC COATED TABLETS 81 MG 81 MG/1
81 TABLET, DELAYED RELEASE ORAL
Qty: 90 | Refills: 0 | Status: ACTIVE | COMMUNITY
Start: 1900-01-01 | End: 1900-01-01

## 2023-05-08 ENCOUNTER — INPATIENT (INPATIENT)
Facility: HOSPITAL | Age: 68
LOS: 2 days | Discharge: NURSING, REHAB OR LONG-TERM CARE | DRG: 536 | End: 2023-05-11
Attending: INTERNAL MEDICINE | Admitting: INTERNAL MEDICINE
Payer: COMMERCIAL

## 2023-05-08 VITALS
DIASTOLIC BLOOD PRESSURE: 78 MMHG | SYSTOLIC BLOOD PRESSURE: 123 MMHG | RESPIRATION RATE: 18 BRPM | TEMPERATURE: 99 F | WEIGHT: 167.99 LBS | HEART RATE: 74 BPM | OXYGEN SATURATION: 96 %

## 2023-05-08 DIAGNOSIS — I10 ESSENTIAL (PRIMARY) HYPERTENSION: ICD-10-CM

## 2023-05-08 DIAGNOSIS — I48.20 CHRONIC ATRIAL FIBRILLATION, UNSPECIFIED: ICD-10-CM

## 2023-05-08 DIAGNOSIS — Z90.49 ACQUIRED ABSENCE OF OTHER SPECIFIED PARTS OF DIGESTIVE TRACT: Chronic | ICD-10-CM

## 2023-05-08 DIAGNOSIS — F17.200 NICOTINE DEPENDENCE, UNSPECIFIED, UNCOMPLICATED: ICD-10-CM

## 2023-05-08 DIAGNOSIS — E11.9 TYPE 2 DIABETES MELLITUS WITHOUT COMPLICATIONS: ICD-10-CM

## 2023-05-08 DIAGNOSIS — F41.9 ANXIETY DISORDER, UNSPECIFIED: ICD-10-CM

## 2023-05-08 DIAGNOSIS — Z29.9 ENCOUNTER FOR PROPHYLACTIC MEASURES, UNSPECIFIED: ICD-10-CM

## 2023-05-08 DIAGNOSIS — F10.10 ALCOHOL ABUSE, UNCOMPLICATED: ICD-10-CM

## 2023-05-08 DIAGNOSIS — Z98.89 OTHER SPECIFIED POSTPROCEDURAL STATES: Chronic | ICD-10-CM

## 2023-05-08 DIAGNOSIS — S32.592A OTHER SPECIFIED FRACTURE OF LEFT PUBIS, INITIAL ENCOUNTER FOR CLOSED FRACTURE: ICD-10-CM

## 2023-05-08 LAB
ALBUMIN SERPL ELPH-MCNC: 3.4 G/DL — SIGNIFICANT CHANGE UP (ref 3.3–5)
ALP SERPL-CCNC: 160 U/L — HIGH (ref 40–120)
ALT FLD-CCNC: 70 U/L — SIGNIFICANT CHANGE UP (ref 12–78)
ANION GAP SERPL CALC-SCNC: 5 MMOL/L — SIGNIFICANT CHANGE UP (ref 5–17)
APTT BLD: 28.4 SEC — SIGNIFICANT CHANGE UP (ref 27.5–35.5)
AST SERPL-CCNC: 108 U/L — HIGH (ref 15–37)
BASOPHILS # BLD AUTO: 0.07 K/UL — SIGNIFICANT CHANGE UP (ref 0–0.2)
BASOPHILS NFR BLD AUTO: 0.8 % — SIGNIFICANT CHANGE UP (ref 0–2)
BILIRUB SERPL-MCNC: 1.5 MG/DL — HIGH (ref 0.2–1.2)
BUN SERPL-MCNC: 9 MG/DL — SIGNIFICANT CHANGE UP (ref 7–23)
CALCIUM SERPL-MCNC: 9.3 MG/DL — SIGNIFICANT CHANGE UP (ref 8.5–10.1)
CHLORIDE SERPL-SCNC: 108 MMOL/L — SIGNIFICANT CHANGE UP (ref 96–108)
CO2 SERPL-SCNC: 25 MMOL/L — SIGNIFICANT CHANGE UP (ref 22–31)
CREAT SERPL-MCNC: 0.73 MG/DL — SIGNIFICANT CHANGE UP (ref 0.5–1.3)
EGFR: 100 ML/MIN/1.73M2 — SIGNIFICANT CHANGE UP
EOSINOPHIL # BLD AUTO: 0.1 K/UL — SIGNIFICANT CHANGE UP (ref 0–0.5)
EOSINOPHIL NFR BLD AUTO: 1.2 % — SIGNIFICANT CHANGE UP (ref 0–6)
GLUCOSE SERPL-MCNC: 145 MG/DL — HIGH (ref 70–99)
HCT VFR BLD CALC: 37.2 % — LOW (ref 39–50)
HGB BLD-MCNC: 13.4 G/DL — SIGNIFICANT CHANGE UP (ref 13–17)
IMM GRANULOCYTES NFR BLD AUTO: 0.5 % — SIGNIFICANT CHANGE UP (ref 0–0.9)
INR BLD: 1.15 RATIO — SIGNIFICANT CHANGE UP (ref 0.88–1.16)
LYMPHOCYTES # BLD AUTO: 1.38 K/UL — SIGNIFICANT CHANGE UP (ref 1–3.3)
LYMPHOCYTES # BLD AUTO: 16.1 % — SIGNIFICANT CHANGE UP (ref 13–44)
MCHC RBC-ENTMCNC: 35.4 PG — HIGH (ref 27–34)
MCHC RBC-ENTMCNC: 36 GM/DL — SIGNIFICANT CHANGE UP (ref 32–36)
MCV RBC AUTO: 98.4 FL — SIGNIFICANT CHANGE UP (ref 80–100)
MONOCYTES # BLD AUTO: 0.56 K/UL — SIGNIFICANT CHANGE UP (ref 0–0.9)
MONOCYTES NFR BLD AUTO: 6.5 % — SIGNIFICANT CHANGE UP (ref 2–14)
NEUTROPHILS # BLD AUTO: 6.43 K/UL — SIGNIFICANT CHANGE UP (ref 1.8–7.4)
NEUTROPHILS NFR BLD AUTO: 74.9 % — SIGNIFICANT CHANGE UP (ref 43–77)
NRBC # BLD: 0 /100 WBCS — SIGNIFICANT CHANGE UP (ref 0–0)
PLATELET # BLD AUTO: 147 K/UL — LOW (ref 150–400)
POTASSIUM SERPL-MCNC: 3.8 MMOL/L — SIGNIFICANT CHANGE UP (ref 3.5–5.3)
POTASSIUM SERPL-SCNC: 3.8 MMOL/L — SIGNIFICANT CHANGE UP (ref 3.5–5.3)
PROT SERPL-MCNC: 6.8 G/DL — SIGNIFICANT CHANGE UP (ref 6–8.3)
PROTHROM AB SERPL-ACNC: 13.5 SEC — HIGH (ref 10.5–13.4)
RBC # BLD: 3.78 M/UL — LOW (ref 4.2–5.8)
RBC # FLD: 14 % — SIGNIFICANT CHANGE UP (ref 10.3–14.5)
SODIUM SERPL-SCNC: 138 MMOL/L — SIGNIFICANT CHANGE UP (ref 135–145)
WBC # BLD: 8.58 K/UL — SIGNIFICANT CHANGE UP (ref 3.8–10.5)
WBC # FLD AUTO: 8.58 K/UL — SIGNIFICANT CHANGE UP (ref 3.8–10.5)

## 2023-05-08 PROCEDURE — 72125 CT NECK SPINE W/O DYE: CPT | Mod: 26,MA

## 2023-05-08 PROCEDURE — 72192 CT PELVIS W/O DYE: CPT | Mod: 26,MA

## 2023-05-08 PROCEDURE — 93010 ELECTROCARDIOGRAM REPORT: CPT

## 2023-05-08 PROCEDURE — 99285 EMERGENCY DEPT VISIT HI MDM: CPT

## 2023-05-08 PROCEDURE — 71045 X-RAY EXAM CHEST 1 VIEW: CPT | Mod: 26

## 2023-05-08 PROCEDURE — 99223 1ST HOSP IP/OBS HIGH 75: CPT | Mod: GC

## 2023-05-08 PROCEDURE — 76376 3D RENDER W/INTRP POSTPROCES: CPT | Mod: 26

## 2023-05-08 PROCEDURE — 73552 X-RAY EXAM OF FEMUR 2/>: CPT | Mod: 26,LT

## 2023-05-08 PROCEDURE — 73502 X-RAY EXAM HIP UNI 2-3 VIEWS: CPT | Mod: 26,LT

## 2023-05-08 PROCEDURE — 70450 CT HEAD/BRAIN W/O DYE: CPT | Mod: 26,MA

## 2023-05-08 RX ORDER — GLUCAGON INJECTION, SOLUTION 0.5 MG/.1ML
1 INJECTION, SOLUTION SUBCUTANEOUS ONCE
Refills: 0 | Status: DISCONTINUED | OUTPATIENT
Start: 2023-05-08 | End: 2023-05-11

## 2023-05-08 RX ORDER — CLOPIDOGREL BISULFATE 75 MG/1
75 TABLET, FILM COATED ORAL DAILY
Refills: 0 | Status: DISCONTINUED | OUTPATIENT
Start: 2023-05-08 | End: 2023-05-10

## 2023-05-08 RX ORDER — MORPHINE SULFATE 50 MG/1
4 CAPSULE, EXTENDED RELEASE ORAL ONCE
Refills: 0 | Status: DISCONTINUED | OUTPATIENT
Start: 2023-05-08 | End: 2023-05-08

## 2023-05-08 RX ORDER — TRAMADOL HYDROCHLORIDE 50 MG/1
50 TABLET ORAL EVERY 6 HOURS
Refills: 0 | Status: DISCONTINUED | OUTPATIENT
Start: 2023-05-08 | End: 2023-05-10

## 2023-05-08 RX ORDER — DEXTROSE 50 % IN WATER 50 %
25 SYRINGE (ML) INTRAVENOUS ONCE
Refills: 0 | Status: DISCONTINUED | OUTPATIENT
Start: 2023-05-08 | End: 2023-05-11

## 2023-05-08 RX ORDER — MIRTAZAPINE 45 MG/1
2 TABLET, ORALLY DISINTEGRATING ORAL
Qty: 0 | Refills: 0 | DISCHARGE

## 2023-05-08 RX ORDER — ASPIRIN/CALCIUM CARB/MAGNESIUM 324 MG
81 TABLET ORAL DAILY
Refills: 0 | Status: DISCONTINUED | OUTPATIENT
Start: 2023-05-08 | End: 2023-05-11

## 2023-05-08 RX ORDER — LANOLIN ALCOHOL/MO/W.PET/CERES
1 CREAM (GRAM) TOPICAL
Qty: 0 | Refills: 0 | DISCHARGE

## 2023-05-08 RX ORDER — DEXTROSE 50 % IN WATER 50 %
12.5 SYRINGE (ML) INTRAVENOUS ONCE
Refills: 0 | Status: DISCONTINUED | OUTPATIENT
Start: 2023-05-08 | End: 2023-05-11

## 2023-05-08 RX ORDER — ENOXAPARIN SODIUM 100 MG/ML
40 INJECTION SUBCUTANEOUS EVERY 24 HOURS
Refills: 0 | Status: DISCONTINUED | OUTPATIENT
Start: 2023-05-08 | End: 2023-05-11

## 2023-05-08 RX ORDER — DEXTROSE 50 % IN WATER 50 %
15 SYRINGE (ML) INTRAVENOUS ONCE
Refills: 0 | Status: DISCONTINUED | OUTPATIENT
Start: 2023-05-08 | End: 2023-05-11

## 2023-05-08 RX ORDER — SODIUM CHLORIDE 9 MG/ML
1000 INJECTION, SOLUTION INTRAVENOUS
Refills: 0 | Status: DISCONTINUED | OUTPATIENT
Start: 2023-05-08 | End: 2023-05-11

## 2023-05-08 RX ORDER — OXYCODONE HYDROCHLORIDE 5 MG/1
5 TABLET ORAL EVERY 6 HOURS
Refills: 0 | Status: DISCONTINUED | OUTPATIENT
Start: 2023-05-08 | End: 2023-05-10

## 2023-05-08 RX ORDER — INSULIN LISPRO 100/ML
VIAL (ML) SUBCUTANEOUS
Refills: 0 | Status: DISCONTINUED | OUTPATIENT
Start: 2023-05-08 | End: 2023-05-11

## 2023-05-08 RX ORDER — NICOTINE POLACRILEX 2 MG
1 GUM BUCCAL DAILY
Refills: 0 | Status: DISCONTINUED | OUTPATIENT
Start: 2023-05-08 | End: 2023-05-11

## 2023-05-08 RX ORDER — INSULIN LISPRO 100/ML
VIAL (ML) SUBCUTANEOUS AT BEDTIME
Refills: 0 | Status: DISCONTINUED | OUTPATIENT
Start: 2023-05-08 | End: 2023-05-11

## 2023-05-08 RX ORDER — ACETAMINOPHEN 500 MG
650 TABLET ORAL EVERY 6 HOURS
Refills: 0 | Status: DISCONTINUED | OUTPATIENT
Start: 2023-05-08 | End: 2023-05-11

## 2023-05-08 RX ORDER — ATORVASTATIN CALCIUM 80 MG/1
40 TABLET, FILM COATED ORAL AT BEDTIME
Refills: 0 | Status: DISCONTINUED | OUTPATIENT
Start: 2023-05-08 | End: 2023-05-11

## 2023-05-08 RX ORDER — METOPROLOL TARTRATE 50 MG
50 TABLET ORAL THREE TIMES A DAY
Refills: 0 | Status: DISCONTINUED | OUTPATIENT
Start: 2023-05-08 | End: 2023-05-09

## 2023-05-08 RX ORDER — AMIODARONE HYDROCHLORIDE 400 MG/1
200 TABLET ORAL DAILY
Refills: 0 | Status: DISCONTINUED | OUTPATIENT
Start: 2023-05-08 | End: 2023-05-11

## 2023-05-08 RX ADMIN — Medication 1: at 17:48

## 2023-05-08 RX ADMIN — MORPHINE SULFATE 4 MILLIGRAM(S): 50 CAPSULE, EXTENDED RELEASE ORAL at 10:50

## 2023-05-08 RX ADMIN — OXYCODONE HYDROCHLORIDE 5 MILLIGRAM(S): 5 TABLET ORAL at 15:55

## 2023-05-08 RX ADMIN — MORPHINE SULFATE 4 MILLIGRAM(S): 50 CAPSULE, EXTENDED RELEASE ORAL at 11:15

## 2023-05-08 RX ADMIN — ATORVASTATIN CALCIUM 40 MILLIGRAM(S): 80 TABLET, FILM COATED ORAL at 21:35

## 2023-05-08 RX ADMIN — TRAMADOL HYDROCHLORIDE 50 MILLIGRAM(S): 50 TABLET ORAL at 21:51

## 2023-05-08 RX ADMIN — Medication 50 MILLIGRAM(S): at 21:35

## 2023-05-08 RX ADMIN — TRAMADOL HYDROCHLORIDE 50 MILLIGRAM(S): 50 TABLET ORAL at 20:51

## 2023-05-08 NOTE — CONSULT NOTE ADULT - SUBJECTIVE AND OBJECTIVE BOX
67y Male presents with L LC1, L ND Ant wall fx, L ND ant column fx after MF today. Denies numbness/tingling in the affected extremity. Denies head strike/LOC/other orthopedic injuries at this time. Patient ambulates without assistance at baseline. On A81 for CABG x3 11/22. Still drives, lives alone.       PMH: former smoker with h/o HTN , CAD, s/p PCI (LEELA x 1 pRCA 2007), ACC/AHA stage C, NYHA functional class 3, HFrEF, depression. Patient was admitted to [facility] 5/2022 for GI bleed and also had a cardiac work up which included an echo which showed and EF of 30-35%; a LHC which showed occlusion of the mLAD, 50% mLCX stenosis, and an 80% OM2 stenosis. He was referred to CT surgery for CABG. 11/21pt underwent repeat cath, CABG 11/22 C3L (LIMA-LAD, SVG-OM, SVG-PDA) by Dr Gray. Post was initially in CTICU and downgraded to cardiac floor once he was stable. Post op course complicated by acute blood loss anemia, A fib with RVR, pleural effusion requiring chest tube on 11/29 and removed 11/30, B/L LE edema, residual effusion noted on CT chest 12/1 and IR pigtail placement and removed 12/3; On 12/4 noted tohave worsening swelling of RLE, was found to have Occluded right femoral artery at the mid portion with reconstitution of flow in the distal portion;  was seen by vascular surgery and did not require any surgical intervention. On 12/5 RLE vein harvest site opened to allow for collection of fluid in thigh to drain. Patient was started on Vancomycin on 12/4 for wound complications. Seen by ID and managed.       PAST MEDICAL & SURGICAL HISTORY:  Pancreatitis      Hypertension      Myocardial infarct      Alcohol abuse      Colon cancer      History of colon resection      History of heart surgery  cardiac stent        Home Medications:  acetaminophen 325 mg oral tablet: 2 tab(s) orally every 6 hours, As needed, Mild Pain (1 - 3) (14 Dec 2022 10:51)  Admelog SoloStar 100 units/mL injectable solution: 8 unit(s) injectable 3 times a day (before meals) (14 Dec 2022 10:56)  amiodarone 200 mg oral tablet: 1 tab(s) orally once a day (14 Dec 2022 10:51)  ascorbic acid 500 mg oral tablet: 1 tab(s) orally once a day (14 Dec 2022 10:51)  aspirin 81 mg oral delayed release tablet: 1 tab(s) orally once a day (14 Dec 2022 10:51)  atorvastatin 80 mg oral tablet: 1 tab(s) orally once a day (at bedtime) (14 Dec 2022 10:51)  cyanocobalamin 1000 mcg oral tablet: 1 tab(s) orally once a day (14 Dec 2022 10:51)  dapagliflozin 10 mg oral tablet: 1 tab(s) orally every 24 hours (14 Dec 2022 10:51)  doxycycline monohydrate 50 mg oral capsule: 2 cap(s) orally every 12 hours  STOP AFTER 12/14 PM DOSE  (14 Dec 2022 10:51)  ferrous sulfate 325 mg (65 mg elemental iron) oral tablet: 1 tab(s) orally once a day (14 Dec 2022 10:51)  folic acid 1 mg oral tablet: 1 tab(s) orally once a day (23 Nov 2022 08:21)  Melatonin 5 mg oral tablet: 1 tab(s) orally once a day (at bedtime) (23 Nov 2022 08:21)  metoprolol tartrate 50 mg oral tablet: 1 tab(s) orally 3 times a day (14 Dec 2022 10:51)  mirtazapine 7.5 mg oral tablet: 2 tab(s) orally once a day (at bedtime) (23 Nov 2022 08:21)  oxyCODONE 5 mg oral tablet: 1 tab(s) orally every 6 hours, As Needed - 6) STOP AFTER 5 DAYS (14 Dec 2022 10:51)  pantoprazole 40 mg oral delayed release tablet: 1 tab(s) orally once a day (before a meal) (23 Nov 2022 08:21)  senna leaf extract oral tablet: 2 tab(s) orally once a day (at bedtime) (14 Dec 2022 10:51)  sertraline 100 mg oral tablet: 1 tab(s) orally once a day (23 Nov 2022 08:21)  thiamine 100 mg oral tablet: 1 tab(s) orally once a day (23 Nov 2022 08:21)    Allergies    No Known Allergies    Intolerances                              13.4   8.58  )-----------( 147      ( 08 May 2023 10:50 )             37.2     05-08    138  |  108  |  9   ----------------------------<  145<H>  3.8   |  25  |  0.73    Ca    9.3      08 May 2023 10:50    TPro  6.8  /  Alb  3.4  /  TBili  1.5<H>  /  DBili  x   /  AST  108<H>  /  ALT  70  /  AlkPhos  160<H>  05-08    PT/INR - ( 08 May 2023 10:50 )   PT: 13.5 sec;   INR: 1.15 ratio         PTT - ( 08 May 2023 10:50 )  PTT:28.4 sec        Vital Signs Last 24 Hrs  T(C): 37 (08 May 2023 10:18), Max: 37 (08 May 2023 10:18)  T(F): 98.6 (08 May 2023 10:18), Max: 98.6 (08 May 2023 10:18)  HR: 74 (08 May 2023 10:18) (74 - 74)  BP: 123/78 (08 May 2023 10:18) (123/78 - 123/78)  BP(mean): --  RR: 18 (08 May 2023 10:18) (18 - 18)  SpO2: 96% (08 May 2023 10:18) (96% - 96%)    Parameters below as of 08 May 2023 10:18  Patient On (Oxygen Delivery Method): room air        PHYSICAL EXAM  General: NAD, Awake and Alert      LLE  Skin intact, no erythema, ecchymosis, edema  No gross deformity  TTP over the GT   NTTP over the bony prominences of the knee/ankle/foot  Pain with motion of hip  painless passive/active ROM of the knee/ankle/foot  L2-S1 SILT, motor grossly intact throughout hip flexors/quads/hams/TA/EHL/FHL/GSC   + DP/PT pulses  No pain with log roll  + pain on axial loading  compartments soft and compressible, calves nontender        SECONDARY EXAM: SILT throughout, motor grossly intact throughout, no other orthopedic injuries at this time, compartments soft and compressible      IMAGING:  XR L Hip/femur: S/p L Long IMN  XR Pelv ([place]/inlet/outlet) pending  CT pelv: ND ant wall fx, ND ant column fx, L LC1    Assessment/Plan:  67y Male with L LC1, ND Ant wall fx, ND ant column Fx    -TTWB on the LLE w/ assistive devices as needed  -PT/OT  -Pain control as needed  -DVT ppx, per primary   -No acute orthopaedic surgical intervention indicated  -FU w/ Dr. Martinez or prior orthopaedic surgeon for repeat xr in 2 weeks  -Will discuss with attending, and advise if plan changes

## 2023-05-08 NOTE — PATIENT PROFILE ADULT - ABILITY TO HEAR (WITH HEARING AID OR HEARING APPLIANCE IF NORMALLY USED):
Right ear- [place] more than the left/Mildly to Moderately Impaired: difficulty hearing in some environments or speaker may need to increase volume or speak distinctly

## 2023-05-08 NOTE — H&P ADULT - NSHPPHYSICALEXAM_GEN_ALL_CORE
T(C): 37 (05-08-23 @ 10:18), Max: 37 (05-08-23 @ 10:18)  HR: 74 (05-08-23 @ 10:18) (74 - 74)  BP: 123/78 (05-08-23 @ 10:18) (123/78 - 123/78)  RR: 18 (05-08-23 @ 10:18) (18 - 18)  SpO2: 96% (05-08-23 @ 10:18) (96% - 96%)    GENERAL: patient appears well, no acute distress, appropriate, pleasant  EYES: sclera clear, no exudates  ENMT: oropharynx clear without erythema, no exudates, moist mucous membranes  NECK: supple, soft, no thyromegaly noted  LUNGS: good air entry bilaterally, clear to auscultation, symmetric breath sounds, no wheezing or rhonchi appreciated  HEART: soft S1/S2, regular rate and rhythm, no murmurs noted, no lower extremity edema  GASTROINTESTINAL: abdomen is soft, nontender, nondistended, normoactive bowel sounds, no palpable masses  INTEGUMENT: good skin turgor, no lesions noted  MUSCULOSKELETAL: +TTP left hip, exacerbated with movement of hip and left leg, no clubbing or cyanosis, no obvious deformity  NEUROLOGIC: awake, alert, oriented x3, good muscle tone in 4 extremities, no obvious sensory deficits  PSYCHIATRIC: mood is good, affect is congruent, linear and logical thought process  HEME/LYMPH: no palpable supraclavicular nodules, no obvious ecchymosis or petechiae

## 2023-05-08 NOTE — ED PROVIDER NOTE - CLINICAL SUMMARY MEDICAL DECISION MAKING FREE TEXT BOX
67-year-old male with significant past medical history for: CVA, alcohol abuse, MI, hypertension, pancreatitis status post mechanical trip and fall overnight complaining of left hip pain and difficulty ambulating secondary to pain.  Patient does have decreased range of motion and tenderness to palpation in the left hip.  X-ray rule out fracture dislocation, labs for possible preadmission.

## 2023-05-08 NOTE — H&P ADULT - HISTORY OF PRESENT ILLNESS
67yM PMH HTN , CAD, s/p PCI (LEELA x 1 pRCA 2007), ACC/AHA stage C, NYHA functional class 3, HFrEF, depression, nicotine dependence, current alcohol use over recommended limit presents with left hip pain. Pt states that he lives in a basement with no lights and he got up at 2am to use the bathroom and lost his sense of direction resulting in a fall and +head striking. Pt had left hip pain and has had trouble moving around without exacerbation of pain since. Pt unsure if he lost consciousness. Pt uses a walker and cane to assist with moving around since his CABG surgery last year. Pt states that since his CABG, he has become winded and SOB on ambulation. Pt drinks 3 beers every night with last drink yesterday. Pt has never had an issue with withdrawal symptoms and is not concerned about his drinking. Pt does not believe he will have trouble in the hospital without alcohol. Pt uses nicotine patch daily to wean off cigarettes. Pt has no other symptoms at this time.    In the ED  Vitals: T 98.6, HR 74, /78, RR 18, O2 96% on RA  Labs: bili 1.5, alk phos 160,   Received: Morphine 4mg x1  Imaging: CT head: No acute intracranial hemorrhage or mass effect. CT cervical spine: Age-indeterminate fracture of the superior T1 endplate without bony retropulsion, new since 2016. No evidence of acute fracture of the cervical spine. CT bony pelvis: 1.  Acute nondisplaced left pubic fractures with intra-articular extension into the anterior acetabular column. 2.  Chronic intertrochanteric left proximal femoral fracture, status post ORIF without CT evidence for hardware complication. 3.  Moderate left worse than right arthrosis of the hip joints.       67yM PMH HTN , CAD, s/p PCI (LEELA x 1 pRCA 2007), ACC/AHA stage C, NYHA functional class 3, HFrEF, depression, nicotine dependence, current alcohol use over recommended limit presents with left hip pain. Pt states that he lives in a basement with no lights and he got up at 2am to use the bathroom and lost his sense of direction resulting in a fall and +head striking. Pt had left hip pain and has had trouble moving around without exacerbation of pain since. Pt unsure if he lost consciousness. Pt uses a walker and cane to assist with moving around since his CABG surgery last year. Pt states that since his CABG, he has become winded and SOB on ambulation. Pt drinks 3 beers every night with last drink yesterday. Pt has never had an issue with withdrawal symptoms and is not concerned about his drinking. He has gone multiple days without EtOH and not experienced withdrawal symptoms such as tremors, palpitations, diaphoresis Pt does not believe he will have trouble in the hospital without alcohol. Pt uses nicotine patch daily to wean off cigarettes. Pt has no other symptoms at this time.    In the ED  Vitals: T 98.6, HR 74, /78, RR 18, O2 96% on RA  Labs: bili 1.5, alk phos 160,   Received: Morphine 4mg x1  Imaging: CT head: No acute intracranial hemorrhage or mass effect. CT cervical spine: Age-indeterminate fracture of the superior T1 endplate without bony retropulsion, new since 2016. No evidence of acute fracture of the cervical spine. CT bony pelvis: 1.  Acute nondisplaced left pubic fractures with intra-articular extension into the anterior acetabular column. 2.  Chronic intertrochanteric left proximal femoral fracture, status post ORIF without CT evidence for hardware complication. 3.  Moderate left worse than right arthrosis of the hip joints.

## 2023-05-08 NOTE — ED ADULT NURSE NOTE - OBJECTIVE STATEMENT
pt to ED A&Ox4 s/p mechanical trip and fall at 0200 reports he woke up in middle of the night to go to bathroom when he became disoriented since it was dark in room and he is new in his apartment unfamiliar with his surroundings.  reports +LOC after fall. takes 81mg ASA daily. neuro intact denies headache/dizziness. c/o left hip pain, unable to bear weight on left leg. +pedal pulses. evaluated by AVINASH Ortiz and patient medicated as per ordered. care ongoing.

## 2023-05-08 NOTE — ED PROVIDER NOTE - OBJECTIVE STATEMENT
66 yo M PMHx HTN, DM, CAD, Afib BIBEMS from home c/o left hip pain sp mechanical fall last night. Pt states he got up to use the bathroom in the middle of the night, room was completely dark, lost his balance, fell down, +head trauma, reports brief LOC. Pt denies HA, dizziness, neck pain, chest pain, SOB, abd pain, acute numbness/tingling, fever/chills, recent illness.

## 2023-05-08 NOTE — H&P ADULT - NSHPREVIEWOFSYSTEMS_GEN_ALL_CORE
CONSTITUTIONAL: denies fever, chills, fatigue, weakness  HEENT: denies blurred vision, sore throat  SKIN: denies new lesions, rash  CARDIOVASCULAR: denies chest pain, chest pressure, palpitations  RESPIRATORY: + shortness of breath with ambulation, denies sputum production  GASTROINTESTINAL: denies nausea, vomiting, diarrhea, abdominal pain  GENITOURINARY: denies dysuria, discharge  NEUROLOGICAL: denies numbness, headache, focal weakness  MUSCULOSKELETAL: denies new joint pain, muscle aches  HEMATOLOGIC: denies gross bleeding, bruising  LYMPHATICS: denies enlarged lymph nodes, extremity swelling  PSYCHIATRIC: denies recent changes in anxiety, depression  ENDOCRINOLOGIC: denies sweating, cold or heat intolerance CONSTITUTIONAL: denies fever, chills, fatigue, weakness  HEENT: denies blurred vision, sore throat  SKIN: denies new lesions, rash  CARDIOVASCULAR: denies chest pain, chest pressure, palpitations  RESPIRATORY: denies SOB, denies sputum production  GASTROINTESTINAL: denies nausea, vomiting, diarrhea, abdominal pain  GENITOURINARY: denies dysuria, discharge  NEUROLOGICAL: denies numbness, headache, focal weakness  MUSCULOSKELETAL: denies new joint pain, muscle aches  HEMATOLOGIC: denies gross bleeding, bruising  LYMPHATICS: denies enlarged lymph nodes, extremity swelling  PSYCHIATRIC: denies recent changes in anxiety, depression  ENDOCRINOLOGIC: denies sweating, cold or heat intolerance

## 2023-05-08 NOTE — ED ADULT TRIAGE NOTE - CHIEF COMPLAINT QUOTE
blake, presents to the er for a mechanical fall.  denies any loc.  states he injured his left hip.  f

## 2023-05-08 NOTE — H&P ADULT - PROBLEM SELECTOR PLAN 2
Continue home medications -Metoprolol tartrate 50mg tid- with hold parameters  Monitor routine hemodynamics  DASH diet

## 2023-05-08 NOTE — ED ADULT NURSE NOTE - NSICDXFAMILYHX_GEN_ALL_CORE_FT
no
FAMILY HISTORY:  Father  Still living? No  FH: prostate cancer, Age at diagnosis: Age Unknown    Mother  Still living? No  Family history of atherosclerosis, Age at diagnosis: Age Unknown

## 2023-05-08 NOTE — H&P ADULT - PROBLEM SELECTOR PLAN 4
Pt no longer taking oral hypoglycemic agents  Pt taking sliding scale insulin at home  Low dose ISS  hypoglycemia protocol  consistent carb diet

## 2023-05-08 NOTE — ED ADULT NURSE NOTE - NSFALLRSKASSISTTYPE_ED_ALL_ED
Patient called stating Dr. Carrillo is referring him to see Kaitlyn Cifuentes and patient would like to make an appointment.    Please call patient at [phone]   Standing/Walking/Toileting

## 2023-05-08 NOTE — H&P ADULT - PROBLEM SELECTOR PLAN 6
Pt drinks 3 beers every night  No history of withdrawal symptoms or seizures  monitor off CIWA for now  Pt with no concern for withdrawal while in hospital Pt drinks 3 beers every night  No history of withdrawal symptoms or seizures

## 2023-05-08 NOTE — H&P ADULT - PROBLEM SELECTOR PLAN 1
Pt presenting with left hip pain s/p fall with +- LOC and +- head striking  -CT head: No acute intracranial hemorrhage or mass effect  -CT cervical spine: Age-indeterminate fracture of the superior T1 endplate without bony retropulsion, new since 2016. No evidence of acute fracture of the cervical spine.   -CT bony pelvis: Acute nondisplaced left pubic fractures with intra-articular extension into the anterior acetabular column. Chronic intertrochanteric left proximal femoral fracture, status post ORIF without CT evidence for hardware complication. Moderate left worse than right arthrosis of the hip joints.  -PRN Pain control: APAP 650 mild, Tramadol 50mg moderate, Oxy 5 severe  -Ortho consulted, recs appreciated  -TTWB on the LLE w/ assistive devices as needed  -PT/OT  -No acute orthopaedic surgical intervention indicated  -LUC w/ Dr. Martinez or prior orthopaedic surgeon for repeat xr in 2 weeks Pt presenting with left hip pain s/p fall with +- LOC and +- head striking  -CT head: No acute intracranial hemorrhage or mass effect  -CT cervical spine: Age-indeterminate fracture of the superior T1 endplate without bony retropulsion, new since 2016. No evidence of acute fracture of the cervical spine.   -CT bony pelvis: Acute nondisplaced left pubic fractures with intra-articular extension into the anterior acetabular column. Chronic intertrochanteric left proximal femoral fracture, status post ORIF without CT evidence for hardware complication. Moderate left worse than right arthrosis of the hip joints.  -PRN Pain control: APAP 650 mild, Tramadol 50mg moderate, Oxy 5 severe  -Ortho consulted, recs appreciated  -TTWB [toe touch weight bearing] on the LLE w/ assistive devices as needed  -PT/OT  -No acute orthopaedic surgical intervention indicated  -FU w/ Dr. Martinez or prior orthopaedic surgeon for repeat xr in 2 weeks

## 2023-05-08 NOTE — H&P ADULT - PROBLEM SELECTOR PLAN 3
Ct home amiodarone 200mg qd + clopidogrel 75mg qd + ASA 81mg qd Continue home amiodarone 200mg qd + clopidogrel 75mg qd + ASA 81mg qd    -states he is not on Anti-coagulation and does not wish to start.   -f/u with primary cardiologist recommended within 2 weeks of discharge Continue home amiodarone 200mg qd + ASA 81mg qd  -states he is not on Anti-coagulation. As per previous cardio notes from Dec 2022, eliquis was discontinued due to increased size of pericardial effusion.   -f/u with primary cardiologist recommended within 2 weeks of discharge  -cardio consulted

## 2023-05-08 NOTE — PATIENT PROFILE ADULT - FALL HARM RISK - RISK INTERVENTIONS

## 2023-05-08 NOTE — ED ADULT NURSE NOTE - NSFALLRSKASSESSDT_ED_ALL_ED
----- Message from Lashawn Aquino sent at 11/15/2022 11:06 AM CST -----  Contact: [phone]  Pt is calling to see if the office accepts [employer] medicare ppo plan please give return call      08-May-2023 10:53

## 2023-05-08 NOTE — H&P ADULT - ATTENDING COMMENTS
67yM PMH HTN , CAD, s/p PCI (LEELA x 1 pRCA 2007), ACC/AHA stage C, NYHA functional class 3, HFrEF, depression, nicotine dependence, current alcohol use over recommended limit presents with left hip pain admitted for left nondisplaced pubic ramus fracture    T(C): 37 (05-08-23 @ 10:18), Max: 37 (05-08-23 @ 10:18)  HR: 74 (05-08-23 @ 10:18) (74 - 74)  BP: 123/78 (05-08-23 @ 10:18) (123/78 - 123/78)  RR: 18 (05-08-23 @ 10:18) (18 - 18)  SpO2: 96% (05-08-23 @ 10:18) (96% - 96%)  Wt(kg): --    Physical Exam:   GENERAL: well-groomed, well-developed, NAD  HEENT: head NC/AT;  conjunctiva & sclera clear; hearing grossly intact, moist mucous membranes  NECK: supple, no JVD  RESPIRATORY: CTA B/L, no wheezing, rales, rhonchi or rubs  CARDIOVASCULAR: S1&S2, RRR, no murmurs or gallops  ABDOMEN: soft, non-tender, non-distended, + Bowel sounds x4 quadrants, no guarding, rebound or rigidity  MUSCULOSKELETAL:  no clubbing, cyanosis or edema of all 4 extremities  LYMPH: no cervical lymphadenopathy  VASCULAR: Radial pulses 2+ bilaterally, no varicose veins   SKIN: warm and dry, color normal  NEUROLOGIC: AA&O X3, CN2-12 intact w/ no focal deficits, no sensory loss, moving all extremities  Psych: Normal mood and affect, normal behavior    Plan:   Admit for pain control  -ortho consulted due to pubic ramus fracture, T1 fracture, chronic IT left prox femur fracture  -f/u recs  -hx of post op a fib: continue ASA and amio, not on AC.   -HFrEF: chronic, no acute exaceratbion. not on diuretics  -cardio consult    remainder as above 67yM PMH HTN , CAD, s/p PCI (LEELA x 1 pRCA 2007), ACC/AHA stage C, NYHA functional class 3, HFrEF, depression, nicotine dependence, current alcohol use over recommended limit presents with left hip pain admitted for left nondisplaced pubic ramus fracture    T(C): 37 (05-08-23 @ 10:18), Max: 37 (05-08-23 @ 10:18)  HR: 74 (05-08-23 @ 10:18) (74 - 74)  BP: 123/78 (05-08-23 @ 10:18) (123/78 - 123/78)  RR: 18 (05-08-23 @ 10:18) (18 - 18)  SpO2: 96% (05-08-23 @ 10:18) (96% - 96%)  Wt(kg): --    Physical Exam:   GENERAL: well-groomed, well-developed, NAD  HEENT: head NC/AT;  conjunctiva & sclera clear; hearing grossly intact, moist mucous membranes  NECK: supple, no JVD  RESPIRATORY: CTA B/L, no wheezing, rales, rhonchi or rubs  CARDIOVASCULAR: S1&S2, RRR, no murmurs or gallops  ABDOMEN: soft, non-tender, non-distended, + Bowel sounds x4 quadrants, no guarding, rebound or rigidity  MUSCULOSKELETAL:  no clubbing, cyanosis or edema of all 4 extremities  LYMPH: no cervical lymphadenopathy  VASCULAR: Radial pulses 2+ bilaterally, no varicose veins   SKIN: warm and dry, color normal  NEUROLOGIC: AA&O X3, CN2-12 intact w/ no focal deficits, no sensory loss, moving all extremities  Psych: Normal mood and affect, normal behavior    Plan:   Admit for pain control  -ortho consulted due to pubic ramus fracture, T1 fracture, chronic IT left prox femur fracture  -f/u recs  -hx of post op a fib: continue ASA and amio, not on AC.   -HFrEF: chronic, no acute exaceratbion. not on diuretics  -cardio consult  monitor LFT's and Bilirubin    remainder as above

## 2023-05-08 NOTE — H&P ADULT - PROBLEM SELECTOR PLAN 7
Pt was taking multi drug regiment for anxiety and depression but was recently stopped by his cardiologist

## 2023-05-09 ENCOUNTER — TRANSCRIPTION ENCOUNTER (OUTPATIENT)
Age: 68
End: 2023-05-09

## 2023-05-09 DIAGNOSIS — E87.8 OTHER DISORDERS OF ELECTROLYTE AND FLUID BALANCE, NOT ELSEWHERE CLASSIFIED: ICD-10-CM

## 2023-05-09 DIAGNOSIS — I25.10 ATHEROSCLEROTIC HEART DISEASE OF NATIVE CORONARY ARTERY WITHOUT ANGINA PECTORIS: ICD-10-CM

## 2023-05-09 LAB
A1C WITH ESTIMATED AVERAGE GLUCOSE RESULT: 5.9 % — HIGH (ref 4–5.6)
ALBUMIN SERPL ELPH-MCNC: 3 G/DL — LOW (ref 3.3–5)
ALP SERPL-CCNC: 148 U/L — HIGH (ref 40–120)
ALT FLD-CCNC: 56 U/L — SIGNIFICANT CHANGE UP (ref 12–78)
ANION GAP SERPL CALC-SCNC: 4 MMOL/L — LOW (ref 5–17)
AST SERPL-CCNC: 93 U/L — HIGH (ref 15–37)
BASOPHILS # BLD AUTO: 0.07 K/UL — SIGNIFICANT CHANGE UP (ref 0–0.2)
BASOPHILS NFR BLD AUTO: 1.1 % — SIGNIFICANT CHANGE UP (ref 0–2)
BILIRUB SERPL-MCNC: 1.7 MG/DL — HIGH (ref 0.2–1.2)
BUN SERPL-MCNC: 11 MG/DL — SIGNIFICANT CHANGE UP (ref 7–23)
CALCIUM SERPL-MCNC: 8.9 MG/DL — SIGNIFICANT CHANGE UP (ref 8.5–10.1)
CHLORIDE SERPL-SCNC: 107 MMOL/L — SIGNIFICANT CHANGE UP (ref 96–108)
CO2 SERPL-SCNC: 26 MMOL/L — SIGNIFICANT CHANGE UP (ref 22–31)
CREAT SERPL-MCNC: 0.67 MG/DL — SIGNIFICANT CHANGE UP (ref 0.5–1.3)
EGFR: 103 ML/MIN/1.73M2 — SIGNIFICANT CHANGE UP
EOSINOPHIL # BLD AUTO: 0.31 K/UL — SIGNIFICANT CHANGE UP (ref 0–0.5)
EOSINOPHIL NFR BLD AUTO: 4.7 % — SIGNIFICANT CHANGE UP (ref 0–6)
ESTIMATED AVERAGE GLUCOSE: 123 MG/DL — HIGH (ref 68–114)
GGT SERPL-CCNC: 915 U/L — HIGH (ref 9–50)
GLUCOSE SERPL-MCNC: 108 MG/DL — HIGH (ref 70–99)
HCT VFR BLD CALC: 36.4 % — LOW (ref 39–50)
HGB BLD-MCNC: 12.3 G/DL — LOW (ref 13–17)
IMM GRANULOCYTES NFR BLD AUTO: 0.3 % — SIGNIFICANT CHANGE UP (ref 0–0.9)
LYMPHOCYTES # BLD AUTO: 2.78 K/UL — SIGNIFICANT CHANGE UP (ref 1–3.3)
LYMPHOCYTES # BLD AUTO: 42.3 % — SIGNIFICANT CHANGE UP (ref 13–44)
MAGNESIUM SERPL-MCNC: 1.9 MG/DL — SIGNIFICANT CHANGE UP (ref 1.6–2.6)
MCHC RBC-ENTMCNC: 33.8 GM/DL — SIGNIFICANT CHANGE UP (ref 32–36)
MCHC RBC-ENTMCNC: 34.6 PG — HIGH (ref 27–34)
MCV RBC AUTO: 102.5 FL — HIGH (ref 80–100)
MONOCYTES # BLD AUTO: 0.39 K/UL — SIGNIFICANT CHANGE UP (ref 0–0.9)
MONOCYTES NFR BLD AUTO: 5.9 % — SIGNIFICANT CHANGE UP (ref 2–14)
NEUTROPHILS # BLD AUTO: 3 K/UL — SIGNIFICANT CHANGE UP (ref 1.8–7.4)
NEUTROPHILS NFR BLD AUTO: 45.7 % — SIGNIFICANT CHANGE UP (ref 43–77)
NRBC # BLD: 0 /100 WBCS — SIGNIFICANT CHANGE UP (ref 0–0)
PHOSPHATE SERPL-MCNC: 2.4 MG/DL — LOW (ref 2.5–4.5)
PLATELET # BLD AUTO: 123 K/UL — LOW (ref 150–400)
POTASSIUM SERPL-MCNC: 3.4 MMOL/L — LOW (ref 3.5–5.3)
POTASSIUM SERPL-SCNC: 3.4 MMOL/L — LOW (ref 3.5–5.3)
PROT SERPL-MCNC: 6.5 G/DL — SIGNIFICANT CHANGE UP (ref 6–8.3)
RBC # BLD: 3.55 M/UL — LOW (ref 4.2–5.8)
RBC # FLD: 14.1 % — SIGNIFICANT CHANGE UP (ref 10.3–14.5)
SODIUM SERPL-SCNC: 137 MMOL/L — SIGNIFICANT CHANGE UP (ref 135–145)
WBC # BLD: 6.57 K/UL — SIGNIFICANT CHANGE UP (ref 3.8–10.5)
WBC # FLD AUTO: 6.57 K/UL — SIGNIFICANT CHANGE UP (ref 3.8–10.5)

## 2023-05-09 PROCEDURE — 99233 SBSQ HOSP IP/OBS HIGH 50: CPT

## 2023-05-09 RX ORDER — POTASSIUM CHLORIDE 20 MEQ
20 PACKET (EA) ORAL
Refills: 0 | Status: COMPLETED | OUTPATIENT
Start: 2023-05-09 | End: 2023-05-09

## 2023-05-09 RX ORDER — SODIUM,POTASSIUM PHOSPHATES 278-250MG
1 POWDER IN PACKET (EA) ORAL ONCE
Refills: 0 | Status: COMPLETED | OUTPATIENT
Start: 2023-05-09 | End: 2023-05-09

## 2023-05-09 RX ORDER — METOPROLOL TARTRATE 50 MG
50 TABLET ORAL
Refills: 0 | Status: DISCONTINUED | OUTPATIENT
Start: 2023-05-09 | End: 2023-05-11

## 2023-05-09 RX ORDER — DIPHENHYDRAMINE HCL 50 MG
25 CAPSULE ORAL ONCE
Refills: 0 | Status: COMPLETED | OUTPATIENT
Start: 2023-05-09 | End: 2023-05-09

## 2023-05-09 RX ORDER — METOPROLOL TARTRATE 50 MG
1 TABLET ORAL
Qty: 0 | Refills: 0 | DISCHARGE
Start: 2023-05-09

## 2023-05-09 RX ORDER — MORPHINE SULFATE 50 MG/1
2 CAPSULE, EXTENDED RELEASE ORAL EVERY 6 HOURS
Refills: 0 | Status: DISCONTINUED | OUTPATIENT
Start: 2023-05-09 | End: 2023-05-10

## 2023-05-09 RX ADMIN — AMIODARONE HYDROCHLORIDE 200 MILLIGRAM(S): 400 TABLET ORAL at 05:49

## 2023-05-09 RX ADMIN — Medication 1: at 18:01

## 2023-05-09 RX ADMIN — Medication 1: at 12:16

## 2023-05-09 RX ADMIN — ENOXAPARIN SODIUM 40 MILLIGRAM(S): 100 INJECTION SUBCUTANEOUS at 17:18

## 2023-05-09 RX ADMIN — Medication 1 PATCH: at 11:14

## 2023-05-09 RX ADMIN — OXYCODONE HYDROCHLORIDE 5 MILLIGRAM(S): 5 TABLET ORAL at 02:18

## 2023-05-09 RX ADMIN — CLOPIDOGREL BISULFATE 75 MILLIGRAM(S): 75 TABLET, FILM COATED ORAL at 11:14

## 2023-05-09 RX ADMIN — OXYCODONE HYDROCHLORIDE 5 MILLIGRAM(S): 5 TABLET ORAL at 08:21

## 2023-05-09 RX ADMIN — Medication 20 MILLIEQUIVALENT(S): at 11:14

## 2023-05-09 RX ADMIN — Medication 50 MILLIGRAM(S): at 05:48

## 2023-05-09 RX ADMIN — Medication 1 PATCH: at 19:13

## 2023-05-09 RX ADMIN — Medication 20 MILLIEQUIVALENT(S): at 17:05

## 2023-05-09 RX ADMIN — Medication 1 TABLET(S): at 12:15

## 2023-05-09 RX ADMIN — ATORVASTATIN CALCIUM 40 MILLIGRAM(S): 80 TABLET, FILM COATED ORAL at 21:02

## 2023-05-09 RX ADMIN — OXYCODONE HYDROCHLORIDE 5 MILLIGRAM(S): 5 TABLET ORAL at 14:47

## 2023-05-09 RX ADMIN — Medication 25 MILLIGRAM(S): at 20:17

## 2023-05-09 RX ADMIN — OXYCODONE HYDROCHLORIDE 5 MILLIGRAM(S): 5 TABLET ORAL at 09:20

## 2023-05-09 RX ADMIN — OXYCODONE HYDROCHLORIDE 5 MILLIGRAM(S): 5 TABLET ORAL at 15:47

## 2023-05-09 RX ADMIN — Medication 50 MILLIGRAM(S): at 17:18

## 2023-05-09 RX ADMIN — Medication 81 MILLIGRAM(S): at 11:14

## 2023-05-09 RX ADMIN — OXYCODONE HYDROCHLORIDE 5 MILLIGRAM(S): 5 TABLET ORAL at 03:18

## 2023-05-09 RX ADMIN — MORPHINE SULFATE 2 MILLIGRAM(S): 50 CAPSULE, EXTENDED RELEASE ORAL at 21:07

## 2023-05-09 RX ADMIN — MORPHINE SULFATE 2 MILLIGRAM(S): 50 CAPSULE, EXTENDED RELEASE ORAL at 21:22

## 2023-05-09 NOTE — DISCHARGE NOTE PROVIDER - CARE PROVIDER_API CALL
Janes Martinez (DO)  Orthopaedic Surgery  [address] [place] [address]  Phone: [phone]  Fax: [phone]  Follow Up Time: 2 weeks   Janes Martinez (DO)  Orthopaedic Surgery  [address] [place] [address]  Phone: [phone]  Fax: [phone]  Follow Up Time: 2 weeks    Blair Johnson  Phone: (   )    -  Fax: (   )    -  Follow Up Time: 1 week

## 2023-05-09 NOTE — PROGRESS NOTE ADULT - ASSESSMENT
67yM PMH HTN , CAD, s/p PCI (LEELA x 1 pRCA 2007), ACC/AHA stage C, NYHA functional class 3, HFrEF, depression, nicotine dependence, current alcohol use over recommended limit admitted for left nondisplaced pubic ramus fracture. Awaiting DC to [facility], 67yM PMH HTN , CAD, s/p CABG 2022 Dr. Gray, ACC/AHA stage C, NYHA functional class 3, HFrEF, depression, nicotine dependence, current alcohol use over recommended limit admitted for left nondisplaced pubic ramus fracture. Awaiting DC to [facility],

## 2023-05-09 NOTE — PROGRESS NOTE ADULT - PROBLEM SELECTOR PLAN 4
Pt no longer taking oral hypoglycemic agents  Pt taking sliding scale insulin at home  Low dose ISS  hypoglycemia protocol  consistent carb diet s/p CABG 2022 Dr. Gray  continue asa, statin, plavix  Cardio Mookie following, f/u recs

## 2023-05-09 NOTE — CARE COORDINATION ASSESSMENT. - OTHER PERTINENT DISCHARGE PLANNING INFORMATION:
pt is a 67 year old man admitted s/p fall at home. SBIRT complete. sw met with pt at bedside, workers role discussed. pt stated living alone in a basement apt with 10 steep steps to enter. pt has been living in this apt since his dc from [facility] in January s/p CABG in Nov 2022. PT recommending [facility], pt in agreement. ROSELINE requested. pt stated supportive ex wife living nearby and 3 adult children.

## 2023-05-09 NOTE — PATIENT CHOICE NOTE. - NSPTCHOICESTATE_GEN_ALL_CORE

## 2023-05-09 NOTE — CONSULT NOTE ADULT - ASSESSMENT
The patient is a 67 year old male with a history of HTN, HL, CAD s/p PCI and CABG, chronic systolic heart failure (last EF 50-55%), post-operative pericardial effusion, post-operative atrial fibrillation who presents with a fall.    Plan:  - ECG with possible old inferior MI  - Last echo 12/22 with low normal LV systolic function, small/moderate pericardial effusion  - Continue amiodarone 200 mg daily - he can discuss with his cardiologist as outpatient regarding discontinuing the medication as there has been no atrial fibrillation recurrence  - Not on anticoagulation for atrial fibrillation as this was only in the post-operative state and there was concern for worsening pericardial effusion at the time  - Continue aspirin 81 mg daily  - Continue atorvastatin 40 mg daily  - Lower metoprolol tartrate to 50 mg bid  - Ortho follow-up  - Pain control  - PT

## 2023-05-09 NOTE — DISCHARGE NOTE PROVIDER - NSDCCPCAREPLAN_GEN_ALL_CORE_FT
PRINCIPAL DISCHARGE DIAGNOSIS  Diagnosis: Pubic ramus fracture, left, closed, initial encounter  Assessment and Plan of Treatment: Follow up with Ortho Dr. Martinez or prior ortho doctor for a repeat  xray around 5/22.      SECONDARY DISCHARGE DIAGNOSES  Diagnosis: Elevated hemoglobin A1c  Assessment and Plan of Treatment: your hemoglobin a1c or average blood sugars over the past 3 months have been elevated such that you are now considered pre-diabetic. Please make lifestyle changes such as following a consistent carb diabetic diet and exercise multiple times per week in attempt to avoid needing to be put on blood sugar lowering medications     PRINCIPAL DISCHARGE DIAGNOSIS  Diagnosis: Pubic ramus fracture, left, closed, initial encounter  Assessment and Plan of Treatment: Follow up with Ortho Dr. Martinez or prior ortho doctor for a repeat  xray around 5/22.      SECONDARY DISCHARGE DIAGNOSES  Diagnosis: Chronic atrial fibrillation  Assessment and Plan of Treatment: your metoprolol dose was decreased from 3X /day to 2X day. Continue this regimen at home and follow up with your cardiologist within a week of discharge to check your heart rate and rhythm.   You should also have a discussion with your outpatient cardiologist regarding if and when you can stop your amiodarone.   continue to take a baby aspirin daily    Diagnosis: Elevated hemoglobin A1c  Assessment and Plan of Treatment: your hemoglobin a1c or average blood sugars over the past 3 months was 5.9. YOu should continue healthy lifestyle changes such as following a consistent carb diabetic diet and exercising multiple times per week    Diagnosis: CAD (coronary artery disease)  Assessment and Plan of Treatment: continue aspirin , plavix and statin     PRINCIPAL DISCHARGE DIAGNOSIS  Diagnosis: Pubic ramus fracture, left, closed, initial encounter  Assessment and Plan of Treatment: You were evaluated by ortho who recommended no surgical intervention and follow up with Ortho Dr. Martinez for a repeat  xray around 5/22.        SECONDARY DISCHARGE DIAGNOSES  Diagnosis: Elevated hemoglobin A1c  Assessment and Plan of Treatment: your hemoglobin a1c or average blood sugars over the past 3 months was 5.9. YOu should continue healthy lifestyle changes such as following a consistent carb diabetic diet and exercising multiple times per week    Diagnosis: Chronic atrial fibrillation  Assessment and Plan of Treatment: You were seen by cardiologist during your hospital stay who recommended to decreased home metoprolol frequency, NOW TAKE 2 TIMES PER DAY  You should also have a discussion with your outpatient cardiologist regarding if and when you can stop your amiodarone.   Please follow up with your cardiologist within one week of hospital discharge for further medication managment    Diagnosis: HTN (hypertension)  Assessment and Plan of Treatment: You have history of elevated blood pressure TAKE METOPROLOL ONLY 2 TIMES PER DAY until you see your primary care doctor and cardiologist    Diagnosis: Nicotine dependence  Assessment and Plan of Treatment: It is very important for your health that you stop smoking! There are many different ways to do so, nicotine patches, gum, and much more! Please discuss your various options with your Primary care doctor. There are also many online resources and hotlines that you can call. Feel free to request more information.    Diagnosis: CAD (coronary artery disease)  Assessment and Plan of Treatment: continue aspirin and  statin    Diagnosis: Major depression  Assessment and Plan of Treatment: You have history of depression, please restart taking mirtazapine and sertraline as recommended per your primary doctor   We sent medications to your pharmacy.     PRINCIPAL DISCHARGE DIAGNOSIS  Diagnosis: Pubic ramus fracture, left, closed, initial encounter  Assessment and Plan of Treatment: You were evaluated by ortho who recommended no surgical intervention and follow up with Ortho Dr. Martinez for a repeat  xray around 5/22.        SECONDARY DISCHARGE DIAGNOSES  Diagnosis: Elevated hemoglobin A1c  Assessment and Plan of Treatment: your hemoglobin a1c or average blood sugars over the past 3 months was 5.9. YOu should continue healthy lifestyle changes such as following a consistent carb diabetic diet and exercising multiple times per week    Diagnosis: Chronic atrial fibrillation  Assessment and Plan of Treatment: You were seen by cardiologist during your hospital stay who recommended to decreased home metoprolol frequency, NOW TAKE 2 TIMES PER DAY  You should also have a discussion with your outpatient cardiologist regarding if and when you can stop your amiodarone.   Please follow up with your cardiologist within one week of hospital discharge for further medication managment    Diagnosis: CAD (coronary artery disease)  Assessment and Plan of Treatment: continue aspirin and  statin    Diagnosis: Nicotine dependence  Assessment and Plan of Treatment: It is very important for your health that you stop smoking! There are many different ways to do so, nicotine patches, gum, and much more! Please discuss your various options with your Primary care doctor. There are also many online resources and hotlines that you can call. Feel free to request more information.    Diagnosis: HTN (hypertension)  Assessment and Plan of Treatment: You have history of elevated blood pressure TAKE METOPROLOL ONLY 2 TIMES PER DAY until you see your primary care doctor and cardiologist    Diagnosis: Major depression  Assessment and Plan of Treatment: You have history of depression, please restart taking mirtazapine and sertraline as recommended per your primary doctor        PRINCIPAL DISCHARGE DIAGNOSIS  Diagnosis: Pubic ramus fracture, left, closed, initial encounter  Assessment and Plan of Treatment: You were evaluated by ortho who recommended no surgical intervention and follow up with Ortho Dr. Martinez for a repeat  xray around 5/22.  You recieved pain medications and worked with physical therapy during your hospital stay.   Please follow up with orth Dr. Vasquez on 05/22/23      SECONDARY DISCHARGE DIAGNOSES  Diagnosis: Elevated hemoglobin A1c  Assessment and Plan of Treatment: your hemoglobin a1c or average blood sugars over the past 3 months was 5.9, that means you have prediabetes,  is important that follow a  healthy lifestyle changes such as following a consistent carb diabetic diet and exercising multiple times per week    Diagnosis: Chronic atrial fibrillation  Assessment and Plan of Treatment: You have history of atrial fibrillation. You were seen by cardiologist during your hospital stay who recommended to decreased lopressor dose:  NOW TAKE 2 TIMES PER DAY  You should also have a discussion with your outpatient cardiologist regarding if and when you can stop your amiodarone.   Please follow up with your cardiologist within one week of hospital discharge for further medication managment    Diagnosis: HTN (hypertension)  Assessment and Plan of Treatment: You have history of elevated blood pressure TAKE METOPROLOL ONLY 2 TIMES PER DAY until you see your primary care doctor and cardiologist    Diagnosis: Nicotine dependence  Assessment and Plan of Treatment: It is very important for your health that you stop smoking! There are many different ways to do so, nicotine patches, gum, and much more! Please discuss your various options with your Primary care doctor. There are also many online resources and hotlines that you can call. Feel free to request more information.    Diagnosis: CAD (coronary artery disease)  Assessment and Plan of Treatment: continue aspirin and  statin    Diagnosis: Major depression  Assessment and Plan of Treatment: You have history of depression, please restart taking mirtazapine and sertraline as recommended per your primary doctor

## 2023-05-09 NOTE — PROGRESS NOTE ADULT - PROBLEM SELECTOR PLAN 10
DVT prophylaxis: Lovenox 40mg qd      contacted ex wife jazz via phone (# in chart) regarding updated care plan including DC to [facility] pending insurance authorization and change in metoprolol dosing. answered all questions

## 2023-05-09 NOTE — DISCHARGE NOTE PROVIDER - NSDCMRMEDTOKEN_GEN_ALL_CORE_FT
Admelog SoloStar 100 units/mL injectable solution: 8 unit(s) injectable 3 times a day (before meals)  amiodarone 200 mg oral tablet: 1 tab(s) orally once a day  aspirin 81 mg oral delayed release tablet: 1 tab(s) orally once a day  atorvastatin 40 mg oral tablet: 1 orally once a day (at bedtime)  clopidogrel 75 mg oral tablet: 1 orally once a day  metoprolol tartrate 50 mg oral tablet: 1 tab(s) orally 3 times a day   Admelog SoloStar 100 units/mL injectable solution: 8 unit(s) injectable 3 times a day (before meals)  amiodarone 200 mg oral tablet: 1 tab(s) orally once a day  aspirin 81 mg oral delayed release tablet: 1 tab(s) orally once a day  atorvastatin 40 mg oral tablet: 1 orally once a day (at bedtime)  clopidogrel 75 mg oral tablet: 1 orally once a day  metoprolol tartrate 50 mg oral tablet: 1 tab(s) orally 2 times a day   acetaminophen 325 mg oral tablet: 2 tab(s) orally every 6 hours As needed Temp greater or equal to 38C (100.4F), Mild Pain (1 - 3)  Admelog SoloStar 100 units/mL injectable solution: 8 unit(s) injectable 3 times a day (before meals)  amiodarone 200 mg oral tablet: 1 tab(s) orally once a day  aspirin 81 mg oral delayed release tablet: 1 tab(s) orally once a day  atorvastatin 40 mg oral tablet: 1 orally once a day (at bedtime)  clopidogrel 75 mg oral tablet: 1 orally once a day  metoprolol tartrate 50 mg oral tablet: 1 tab(s) orally 2 times a day  oxyCODONE 10 mg oral tablet: 1 tab(s) orally every 4 hours As needed Severe Pain (7 - 10)  oxyCODONE 5 mg oral tablet: 1 tab(s) orally every 4 hours As needed Moderate Pain (4 - 6)   acetaminophen 325 mg oral tablet: 2 tab(s) orally every 6 hours As needed Temp greater or equal to 38C (100.4F), Mild Pain (1 - 3)  Admelog SoloStar 100 units/mL injectable solution: 8 unit(s) injectable 3 times a day (before meals)  amiodarone 200 mg oral tablet: 1 tab(s) orally once a day  aspirin 81 mg oral delayed release tablet: 1 tab(s) orally once a day  atorvastatin 40 mg oral tablet: 1 orally once a day (at bedtime)  enoxaparin: 40 milligram(s) subcutaneous once a day as needed for DVT ppx  metoprolol tartrate 50 mg oral tablet: 1 tab(s) orally 2 times a day  oxyCODONE 10 mg oral tablet: 1 tab(s) orally every 4 hours As needed Severe Pain (7 - 10)  oxyCODONE 5 mg oral tablet: 1 tab(s) orally every 4 hours As needed Moderate Pain (4 - 6)   acetaminophen 325 mg oral tablet: 2 tab(s) orally every 6 hours As needed Temp greater or equal to 38C (100.4F), Mild Pain (1 - 3)  amiodarone 200 mg oral tablet: 1 tab(s) orally once a day  aspirin 81 mg oral delayed release tablet: 1 tab(s) orally once a day  atorvastatin 40 mg oral tablet: 1 orally once a day (at bedtime)  enoxaparin: 40 milligram(s) subcutaneous once a day as needed for DVT ppx  metoprolol tartrate 50 mg oral tablet: 1 tab(s) orally 2 times a day  oxyCODONE 10 mg oral tablet: 1 tab(s) orally every 4 hours As needed Severe Pain (7 - 10)  oxyCODONE 5 mg oral tablet: 1 tab(s) orally every 4 hours As needed Moderate Pain (4 - 6)

## 2023-05-09 NOTE — SOCIAL WORK PROGRESS NOTE - NSSWPROGRESSNOTE_GEN_ALL_CORE
ROSELINE faxed to [facility] as discussed with pt, however out of network with pts insurance co.  spoke with pt, ROSELINE faxed to multiple local facilities, currently awaiting response, in attempted to find an in network facility with bed availbility. pt is aware and inagreement. sw to follow for dc planning for [facility].

## 2023-05-09 NOTE — PROGRESS NOTE ADULT - PROBLEM SELECTOR PLAN 5
Nicotine patch 14mg qd -- causing a skin rxn BL arms Pt no longer taking oral hypoglycemic agents  Pt taking sliding scale insulin at home  Low dose ISS  hypoglycemia protocol  consistent carb diet

## 2023-05-09 NOTE — CARE COORDINATION ASSESSMENT. - NSPASTMEDSURGHISTORY_GEN_ALL_CORE_FT
PAST MEDICAL & SURGICAL HISTORY:  Alcohol abuse      Myocardial infarct      Hypertension      Pancreatitis      History of heart surgery  cardiac stent      History of colon resection      Colon cancer

## 2023-05-09 NOTE — PROGRESS NOTE ADULT - ATTENDING COMMENTS
67yM PMH HTN , CAD, s/p PCI (LEELA x 1 pRCA 2007), ACC/AHA stage C, NYHA functional class 3, HFrEF, depression, nicotine dependence, current alcohol use over recommended limit admitted for left nondisplaced pubic ramus fracture. Awaiting DC to [facility],  Pt seen and examined at the bedside this AM. Pt states that his pain is not relieved by current regimen. Had PT eval this am. adv for [facility].   Adjusted pain regime. no alcohol withdrawal, and c/w nicotine patch.

## 2023-05-09 NOTE — PROGRESS NOTE ADULT - PROBLEM SELECTOR PLAN 9
DVT prophylaxis: Lovenox 40mg qd DVT prophylaxis: Lovenox 40mg qd      contacted ex wife jazz via phone (# in chart) regarding updated care plan including DC to [facility] pending insurance authorization and change in metoprolol dosing. answered all questions Pt was taking multi drug regiment for anxiety and depression but was recently stopped by his cardiologist  Admits to currently being in good spirits despite recent events

## 2023-05-09 NOTE — DISCHARGE NOTE PROVIDER - HOSPITAL COURSE
HPI:  67yM PMH HTN , CAD, s/p PCI (LEELA x 1 pRCA 2007), ACC/AHA stage C, NYHA functional class 3, HFrEF, depression, nicotine dependence, current alcohol use over recommended limit presents with left hip pain. Pt states that he lives in a basement with no lights and he got up at 2am to use the bathroom and lost his sense of direction resulting in a fall and +head striking. Pt had left hip pain and has had trouble moving around without exacerbation of pain since. Pt unsure if he lost consciousness. Pt uses a walker and cane to assist with moving around since his CABG surgery last year. Pt states that since his CABG, he has become winded and SOB on ambulation. Pt drinks 3 beers every night with last drink yesterday. Pt has never had an issue with withdrawal symptoms and is not concerned about his drinking. He has gone multiple days without EtOH and not experienced withdrawal symptoms such as tremors, palpitations, diaphoresis Pt does not believe he will have trouble in the hospital without alcohol. Pt uses nicotine patch daily to wean off cigarettes. Pt has no other symptoms at this time.    In the ED  Vitals: T 98.6, HR 74, /78, RR 18, O2 96% on RA  Labs: bili 1.5, alk phos 160,   Received: Morphine 4mg x1  Imaging: CT head: No acute intracranial hemorrhage or mass effect. CT cervical spine: Age-indeterminate fracture of the superior T1 endplate without bony retropulsion, new since 2016. No evidence of acute fracture of the cervical spine. CT bony pelvis: 1.  Acute nondisplaced left pubic fractures with intra-articular extension into the anterior acetabular column. 2.  Chronic intertrochanteric left proximal femoral fracture, status post ORIF without CT evidence for hardware complication. 3.  Moderate left worse than right arthrosis of the hip joints.       (08 May 2023 14:07)      ---  HOSPITAL COURSE:       a1c 5.9    ---  CONSULTANTS:   Branden Martinez  ---  TIME SPENT:  I, the attending physician, was physically present for the key portions of the evaluation and management (E/M) service provided. The total amount of time spent reviewing the hospital notes, laboratory values, imaging findings, assessing/counseling the patient, discussing with consultant physicians, social work, nursing staff was -- minutes    ---  Primary care provider was made aware of plan for discharge:      [  ] NO     [  ] YES   HPI:  67yM PMH HTN , CAD, s/p PCI (LEELA x 1 pRCA 2007), ACC/AHA stage C, NYHA functional class 3, HFrEF, depression, nicotine dependence, current alcohol use over recommended limit presents with left hip pain. Pt states that he lives in a basement with no lights and he got up at 2am to use the bathroom and lost his sense of direction resulting in a fall and +head striking. Pt had left hip pain and has had trouble moving around without exacerbation of pain since. Pt unsure if he lost consciousness. Pt uses a walker and cane to assist with moving around since his CABG surgery last year. Pt states that since his CABG, he has become winded and SOB on ambulation. Pt drinks 3 beers every night with last drink yesterday. Pt has never had an issue with withdrawal symptoms and is not concerned about his drinking. He has gone multiple days without EtOH and not experienced withdrawal symptoms such as tremors, palpitations, diaphoresis Pt does not believe he will have trouble in the hospital without alcohol. Pt uses nicotine patch daily to wean off cigarettes. Pt has no other symptoms at this time.    In the ED  Vitals: T 98.6, HR 74, /78, RR 18, O2 96% on RA  Labs: bili 1.5, alk phos 160,   Received: Morphine 4mg x1  Imaging: CT head: No acute intracranial hemorrhage or mass effect. CT cervical spine: Age-indeterminate fracture of the superior T1 endplate without bony retropulsion, new since 2016. No evidence of acute fracture of the cervical spine. CT bony pelvis: 1.  Acute nondisplaced left pubic fractures with intra-articular extension into the anterior acetabular column. 2.  Chronic intertrochanteric left proximal femoral fracture, status post ORIF without CT evidence for hardware complication. 3.  Moderate left worse than right arthrosis of the hip joints.       (08 May 2023 14:07)      ---  HOSPITAL COURSE: Pt admitted for left nondisplaced pubic ramus fracture. No acute orthopaedic surgical intervention indicated. Repeat xr ~ May 22. PT recommended [facility]. Cardio consulted who recommended decr metoprolol frequency and possibly DC amiodarone w/outpt cardio [place] as afib occurred post op and there has been no noted recurrence.   Pt found to have a1c 5.9.     Patient showed improvement throughout hospitalization. Patient was seen and examined on day of discharge. Patient was medically optimized for discharge _____ with close outpatient follow up.              ---  CONSULTANTS:   Ortho Dr. Martinez  Cardio Dr. Damico     ---  TIME SPENT:  I, the attending physician, was physically present for the key portions of the evaluation and management (E/M) service provided. The total amount of time spent reviewing the hospital notes, laboratory values, imaging findings, assessing/counseling the patient, discussing with consultant physicians, social work, nursing staff was -- minutes    ---  Primary care provider was made aware of plan for discharge:      [  ] NO     [  ] YES   HPI:  67yM PMH HTN , CAD, s/p PCI (LEELA x 1 pRCA 2007), ACC/AHA stage C, NYHA functional class 3, HFrEF, depression, nicotine dependence, current alcohol use over recommended limit presents with left hip pain. Pt states that he lives in a basement with no lights and he got up at 2am to use the bathroom and lost his sense of direction resulting in a fall and +head striking. Pt had left hip pain and has had trouble moving around without exacerbation of pain since. Pt unsure if he lost consciousness. Pt uses a walker and cane to assist with moving around since his CABG surgery last year. Pt states that since his CABG, he has become winded and SOB on ambulation. Pt drinks 3 beers every night with last drink yesterday. Pt has never had an issue with withdrawal symptoms and is not concerned about his drinking. He has gone multiple days without EtOH and not experienced withdrawal symptoms such as tremors, palpitations, diaphoresis Pt does not believe he will have trouble in the hospital without alcohol. Pt uses nicotine patch daily to wean off cigarettes. Pt has no other symptoms at this time.    In the ED  Vitals: T 98.6, HR 74, /78, RR 18, O2 96% on RA  Labs: bili 1.5, alk phos 160,   Received: Morphine 4mg x1  Imaging: CT head: No acute intracranial hemorrhage or mass effect. CT cervical spine: Age-indeterminate fracture of the superior T1 endplate without bony retropulsion, new since 2016. No evidence of acute fracture of the cervical spine. CT bony pelvis: 1.  Acute nondisplaced left pubic fractures with intra-articular extension into the anterior acetabular column. 2.  Chronic intertrochanteric left proximal femoral fracture, status post ORIF without CT evidence for hardware complication. 3.  Moderate left worse than right arthrosis of the hip joints.       (08 May 2023 14:07)      ---  HOSPITAL COURSE: Pt admitted for left nondisplaced pubic ramus fracture. No acute orthopaedic surgical intervention indicated. Repeat xr ~ May 22. PT recommended [facility]. Cardio consulted who recommended decr metoprolol frequency and possibly DC amiodarone w/outpt cardio [place] as afib occurred post op and there has been no noted recurrence.   Pt found to have a1c 5.9. Patient dc to [facility]     Patient showed improvement throughout hospitalization. Patient was seen and examined on day of discharge. Patient was medically optimized for discharge _____ with close outpatient follow up.    ---  CONSULTANTS:   Ortho Dr. Martinez  Cardio Dr. Damico     ---  TIME SPENT:  I, the attending physician, was physically present for the key portions of the evaluation and management (E/M) service provided. The total amount of time spent reviewing the hospital notes, laboratory values, imaging findings, assessing/counseling the patient, discussing with consultant physicians, social work, nursing staff was -- minutes    ---  Primary care provider was made aware of plan for discharge:      [  ] NO     [  ] YES   HPI:  67yM PMH HTN , CAD, s/p PCI (LEELA x 1 pRCA 2007), ACC/AHA stage C, NYHA functional class 3, HFrEF, depression, nicotine dependence, current alcohol use over recommended limit presents with left hip pain. Pt states that he lives in a basement with no lights and he got up at 2am to use the bathroom and lost his sense of direction resulting in a fall and +head striking. Pt had left hip pain and has had trouble moving around without exacerbation of pain since. Pt unsure if he lost consciousness. Pt uses a walker and cane to assist with moving around since his CABG surgery last year. Pt states that since his CABG, he has become winded and SOB on ambulation. Pt drinks 3 beers every night with last drink yesterday. Pt has never had an issue with withdrawal symptoms and is not concerned about his drinking. He has gone multiple days without EtOH and not experienced withdrawal symptoms such as tremors, palpitations, diaphoresis Pt does not believe he will have trouble in the hospital without alcohol. Pt uses nicotine patch daily to wean off cigarettes. Pt has no other symptoms at this time.    In the ED  Vitals: T 98.6, HR 74, /78, RR 18, O2 96% on RA  Labs: bili 1.5, alk phos 160,   Received: Morphine 4mg x1  Imaging: CT head: No acute intracranial hemorrhage or mass effect. CT cervical spine: Age-indeterminate fracture of the superior T1 endplate without bony retropulsion, new since 2016. No evidence of acute fracture of the cervical spine. CT bony pelvis: 1.  Acute nondisplaced left pubic fractures with intra-articular extension into the anterior acetabular column. 2.  Chronic intertrochanteric left proximal femoral fracture, status post ORIF without CT evidence for hardware complication. 3.  Moderate left worse than right arthrosis of the hip joints.       (08 May 2023 14:07)      ---  HOSPITAL COURSE: Pt admitted for left nondisplaced pubic ramus fracture. No acute orthopaedic surgical intervention indicated. Repeat xr ~ May 22. PT recommended [facility]. Cardio consulted who recommended decr metoprolol frequency and possibly DC amiodarone w/outpt cardio [place] as afib occurred post op and there has been no noted recurrence.   Pt found to have a1c 5.9.   Patient showed improvement throughout hospitalization. Patient was seen and examined on day of discharge. Patient was medically optimized for discharge _____ with close outpatient follow up.    ---  CONSULTANTS:   Ortho Dr. Martinez  Cardio Dr. Damico   PT   ---  TIME SPENT:  I, the attending physician, was physically present for the key portions of the evaluation and management (E/M) service provided. The total amount of time spent reviewing the hospital notes, laboratory values, imaging findings, assessing/counseling the patient, discussing with consultant physicians, social work, nursing staff was -- minutes    ---  Primary care provider was made aware of plan for discharge:      [  ] NO     [  ] YES   HPI:  67yM PMH HTN , CAD, s/p PCI (LEELA x 1 pRCA 2007), ACC/AHA stage C, NYHA functional class 3, HFrEF, depression, nicotine dependence, current alcohol use over recommended limit presents with left hip pain. Pt states that he lives in a basement with no lights and he got up at 2am to use the bathroom and lost his sense of direction resulting in a fall and +head striking. Pt had left hip pain and has had trouble moving around without exacerbation of pain since. Pt unsure if he lost consciousness. Pt uses a walker and cane to assist with moving around since his CABG surgery last year. Pt states that since his CABG, he has become winded and SOB on ambulation. Pt drinks 3 beers every night with last drink yesterday. Pt has never had an issue with withdrawal symptoms and is not concerned about his drinking. He has gone multiple days without EtOH and not experienced withdrawal symptoms such as tremors, palpitations, diaphoresis Pt does not believe he will have trouble in the hospital without alcohol. Pt uses nicotine patch daily to wean off cigarettes. Pt has no other symptoms at this time.    In the ED  Vitals: T 98.6, HR 74, /78, RR 18, O2 96% on RA  Labs: bili 1.5, alk phos 160,   Received: Morphine 4mg x1  Imaging: CT head: No acute intracranial hemorrhage or mass effect. CT cervical spine: Age-indeterminate fracture of the superior T1 endplate without bony retropulsion, new since 2016. No evidence of acute fracture of the cervical spine. CT bony pelvis: 1.  Acute nondisplaced left pubic fractures with intra-articular extension into the anterior acetabular column. 2.  Chronic intertrochanteric left proximal femoral fracture, status post ORIF without CT evidence for hardware complication. 3.  Moderate left worse than right arthrosis of the hip joints.       (08 May 2023 14:07)      ---  HOSPITAL COURSE: Pt admitted for left nondisplaced pubic ramus fracture. No acute orthopaedic surgical intervention indicated. Repeat xr ~ May 22. PT recommended [facility]. Cardio consulted who recommended decr metoprolol frequency and possibly DC amiodarone w/outpt cardio [place] as afib occurred post op and there has been no noted recurrence.   Pt found to have a1c 5.9.   Patient showed improvement throughout hospitalization. Patient was seen and examined on day of discharge. Patient was medically optimized for discharge  with close outpatient follow up.    ---  CONSULTANTS:   Ortho Dr. Martinez  Cardio Dr. Damico   PT   ---  TIME SPENT:  I, the attending physician, was physically present for the key portions of the evaluation and management (E/M) service provided. The total amount of time spent reviewing the hospital notes, laboratory values, imaging findings, assessing/counseling the patient, discussing with consultant physicians, social work, nursing staff was -- minutes    ---  Primary care provider was made aware of plan for discharge:      [  ] NO     [  ] YES   HPI:  67yM PMH HTN , CAD, s/p PCI (LEELA x 1 pRCA 2007), ACC/AHA stage C, NYHA functional class 3, HFrEF, depression, nicotine dependence, current alcohol use over recommended limit presents with left hip pain. Pt states that he lives in a basement with no lights and he got up at 2am to use the bathroom and lost his sense of direction resulting in a fall and +head striking.      ---  HOSPITAL COURSE:   Pt admitted for left nondisplaced pubic ramus fracture. No acute orthopaedic surgical intervention indicated. Repeat xr ~ May 22 recommended. PT recommended [facility]. Cardio consulted who recommended decr metoprolol frequency and possibly DC amiodarone w/outpt cardio [place] as afib occurred post op and there has been no noted recurrence.   Pt found to have a1c 5.9.   Patient showed improvement throughout hospitalization. Patient was seen and examined on day of discharge. Patient was medically optimized for discharge  with close outpatient follow up.    ---  CONSULTANTS:   Ortho Dr. Martinez  Cardio Dr. Damico   PT HPI:  67yM PMH HTN , CAD, s/p PCI (LEELA x 1 pRCA 2007), ACC/AHA stage C, NYHA functional class 3, HFrEF, depression, nicotine dependence, current alcohol use over recommended limit presents with left hip pain. Pt states that he lives in a basement with no lights and he got up at 2am to use the bathroom and lost his sense of direction resulting in a fall and +head striking.      ---  HOSPITAL COURSE:   Pt admitted for left nondisplaced pubic ramus fracture. No acute orthopaedic surgical intervention indicated. Repeat xr ~ May 22 recommended. PT recommended [facility]. Cardio consulted who recommended decr metoprolol frequency and possibly DC amiodarone w/outpt cardio [place] as afib occurred post op and there has been no noted recurrence.   Pt found to have a1c 5.9.   Patient showed improvement throughout hospitalization. Patient was seen and examined on day of discharge. Patient was medically optimized for discharge  with close outpatient follow up.    T(C): 36.7 (05-10-23 @ 05:27), Max: 36.7 (05-10-23 @ 05:27)  HR: 75 (05-10-23 @ 05:27) (65 - 75)  BP: 127/70 (05-10-23 @ 09:42) (115/68 - 127/70)  RR: 18 (05-10-23 @ 05:27) (18 - 18)  SpO2: 92% (05-10-23 @ 05:27) (91% - 92%)      Physical Exam:   GENERAL:  no acute distress, appropriate, pleasant  EYES: sclera clear   LUNGS: good air entry bilaterally, clear to auscultation BL, symmetric breath sounds, no wheezing or rhonchi appreciated, no accessory muscle use  HEART: soft S1/S2, regular rate and rhythm, no murmurs noted, no lower extremity edema  GASTROINTESTINAL: abdomen is soft, nontender, nondistended   INTEGUMENT: good skin turgor   MUSCULOSKELETAL: +TTP left hip, exacerbated with movement of hip and left leg, no clubbing or cyanosis, no obvious deformity  NEUROLOGIC: awake, alert, oriented x3  PSYCHIATRIC: mood is good           ---  CONSULTANTS:   Ortho Dr. Martinez  Cardio Dr. Damico   PT HPI:  67yM PMH HTN , CAD, s/p PCI (LEELA x 1 pRCA 2007), ACC/AHA stage C, NYHA functional class 3, HFrEF, depression, nicotine dependence, current alcohol use over recommended limit presents with left hip pain. Pt states that he lives in a basement with no lights and he got up at 2am to use the bathroom and lost his sense of direction resulting in a fall and +head striking.      ---  HOSPITAL COURSE:   Pt admitted for left nondisplaced pubic ramus fracture. No acute orthopaedic surgical intervention indicated. Repeat xr ~ May 22 recommended. PT recommended [facility]. Cardio consulted who recommended decr metoprolol frequency and possibly DC amiodarone w/outpt cardio [place] as afib occurred post op and there has been no noted recurrence.   Pt found to have a1c 5.9.   Patient showed improvement throughout hospitalization. Patient was seen and examined on day of discharge. Patient was medically optimized for discharge  with close outpatient follow up.      Vital Signs Last 24 Hrs  T(C): 36.4 (05-11-23 @ 05:44), Max: 36.9 (05-10-23 @ 12:47)  T(F): 97.5 (05-11-23 @ 05:44), Max: 98.4 (05-10-23 @ 12:47)  HR: 69 (05-11-23 @ 05:44) (68 - 75)  BP: 144/68 (05-11-23 @ 05:44) (124/62 - 155/71)-  RR: 18 (05-11-23 @ 05:44) (18 - 18)  SpO2: 93% (05-10-23 @ 21:20) (93% - 93%)      Physical Exam:   GENERAL:  no acute distress, appropriate, pleasant  EYES: sclera clear   LUNGS: good air entry bilaterally, clear to auscultation BL, symmetric breath sounds, no wheezing or rhonchi appreciated, no accessory muscle use  HEART: soft S1/S2, regular rate and rhythm, no murmurs noted, no lower extremity edema  GASTROINTESTINAL: abdomen is soft, nontender, nondistended   INTEGUMENT: good skin turgor   MUSCULOSKELETAL: +TTP left hip, exacerbated with movement of hip and left leg, no clubbing or cyanosis, no obvious deformity  NEUROLOGIC: awake, alert, oriented x3  PSYCHIATRIC: mood is good           ---  CONSULTANTS:   Ortho Dr. Martinez  Cardio Dr. Damico   PT HPI:  67yM PMH HTN , CAD, s/p PCI (LEELA x 1 pRCA 2007), ACC/AHA stage C, NYHA functional class 3, HFrEF, depression, nicotine dependence, current alcohol use over recommended limit presents with left hip pain. Pt states that he lives in a basement with no lights and he got up at 2am to use the bathroom and lost his sense of direction resulting in a fall and +head striking.      ---  HOSPITAL COURSE:   Pt admitted for left nondisplaced pubic ramus fracture. No acute orthopaedic surgical intervention indicated  and repeat x-ray 04/22/23 per ortho team. Pain well controlled with pain regiment. Pt was seen by physical therapy who recommended [facility].  Cardio consulted for chronic A-fib, who recommended to decreased Lopressor home dose to 50 mg BID, continued ASA, statin, and amiodarone.   For nicotine dependence started nicotine patch.   Patient showed improvement throughout hospitalization. Patient was seen and examined on day of discharge. Patient was medically optimized for discharge  with close outpatient follow up.      ---  ICU Vital Signs Last 24 Hrs  T(C): 36.4 (11 May 2023 05:44), Max: 36.8 (10 May 2023 21:20)  T(F): 97.5 (11 May 2023 05:44), Max: 98.2 (10 May 2023 21:20)  HR: 69 (11 May 2023 05:44) (68 - 69)  BP: 144/68 (11 May 2023 05:44) (124/62 - 144/68)  BP(mean): --  ABP: --  ABP(mean): --  RR: 18 (11 May 2023 05:44) (18 - 18)  SpO2: 93% (10 May 2023 21:20) (93% - 93%)    O2 Parameters below as of 11 May 2023 05:44  Patient On (Oxygen Delivery Method): room air      Physical Exam:   GENERAL:  no acute distress, appropriate, pleasant  EYES: sclera clear   LUNGS: good air entry bilaterally, clear to auscultation BL, symmetric breath sounds, no wheezing or rhonchi appreciated, no accessory muscle use  HEART: soft S1/S2, regular rate and rhythm, no murmurs noted, no lower extremity edema  GASTROINTESTINAL: abdomen is soft, nontender, nondistended   INTEGUMENT: good skin turgor   MUSCULOSKELETAL: +TTP left hip, exacerbated with movement of hip and left leg, no clubbing or cyanosis, no obvious deformity  NEUROLOGIC: awake, alert, oriented x3  PSYCHIATRIC: mood is good           ---  CONSULTANTS:   Ortho Dr. Martinez  Cardio Dr. Damico   PT      ----

## 2023-05-09 NOTE — PROGRESS NOTE ADULT - PROBLEM SELECTOR PLAN 7
K 3.4 and phos 2.4 today, replete PRN  f/up am lytes Pt drinks 3 beers every night  No history of withdrawal symptoms or seizures

## 2023-05-09 NOTE — CONSULT NOTE ADULT - SUBJECTIVE AND OBJECTIVE BOX
History of Present Illness:    Past Medical/Surgical History:    Medications:    Family History: Non-contributory family history of premature cardiovascular atherosclerotic disease    Social History: No tobacco, alcohol or drug use    Review of Systems:  General: No fevers, chills, weight gain  Skin: No rashes, color changes  Cardiovascular: No chest pain, orthopnea  Respiratory: No shortness of breath, cough  Gastrointestinal: No nausea, abdominal pain  Genitourinary: No incontinence, pain with urination  Musculoskeletal: No pain, swelling, decreased range of motion  Neurological: No headache, weakness  Psychiatric: No depression, anxiety  Endocrine: No weight gain, increased thirst  All other systems are comprehensively negative.    Physical Exam:  Vitals:        Vital Signs Last 24 Hrs  T(C): 36.6 (09 May 2023 05:44), Max: 37 (08 May 2023 10:18)  T(F): 97.9 (09 May 2023 05:44), Max: 98.6 (08 May 2023 10:18)  HR: 69 (09 May 2023 05:44) (69 - 81)  BP: 121/61 (09 May 2023 05:44) (112/68 - 134/77)  BP(mean): --  RR: 18 (09 May 2023 05:44) (18 - 18)  SpO2: 92% (09 May 2023 05:44) (92% - 96%)    Parameters below as of 09 May 2023 05:44  Patient On (Oxygen Delivery Method): room air      General: NAD  HEENT: MMM  Neck: No JVD, no carotid bruit  Lungs: CTAB  CV: RRR, nl S1/S2, no M/R/G  Abdomen: S/NT/ND, +BS  Extremities: No LE edema, no cyanosis  Neuro: AAOx3, non-focal  Skin: No rash    Labs:                        13.4   8.58  )-----------( 147      ( 08 May 2023 10:50 )             37.2     05-08    138  |  108  |  9   ----------------------------<  145<H>  3.8   |  25  |  0.73    Ca    9.3      08 May 2023 10:50    TPro  6.8  /  Alb  3.4  /  TBili  1.5<H>  /  DBili  x   /  AST  108<H>  /  ALT  70  /  AlkPhos  160<H>  05-08        PT/INR - ( 08 May 2023 10:50 )   PT: 13.5 sec;   INR: 1.15 ratio         PTT - ( 08 May 2023 10:50 )  PTT:28.4 sec    ECG/Telemetry:      History of Present Illness: The patient is a 67 year old male with a history of HTN, HL, CAD s/p PCI and CABG, chronic systolic heart failure (last EF 50-55%), post-operative pericardial effusion, post-operative atrial fibrillation who presents with a fall. He states the place he lives in was very dark and he was able to see where he was walking. He fell down. He denies chest pain. He notes stable shortness of breath when going up stairs.    Past Medical/Surgical History:  HTN, HL, CAD s/p PCI and CABG, chronic systolic heart failure (last EF 50-55%), post-operative pericardial effusion, post-operative atrial fibrillation    Medications:  Home Medications:  Admelog SoloStar 100 units/mL injectable solution: 8 unit(s) injectable 3 times a day (before meals) (08 May 2023 13:59)  amiodarone 200 mg oral tablet: 1 tab(s) orally once a day (08 May 2023 13:59)  aspirin 81 mg oral delayed release tablet: 1 tab(s) orally once a day (08 May 2023 13:59)  atorvastatin 40 mg oral tablet: 1 orally once a day (at bedtime) (08 May 2023 13:59)  metoprolol tartrate 50 mg oral tablet: 1 tab(s) orally 3 times a day (08 May 2023 13:59)      Family History: Non-contributory family history of premature cardiovascular atherosclerotic disease    Social History: No tobacco, alcohol or drug use    Review of Systems:  General: No fevers, chills, weight gain  Skin: No rashes, color changes  Cardiovascular: No chest pain, orthopnea  Respiratory: No shortness of breath, cough  Gastrointestinal: No nausea, abdominal pain  Genitourinary: No incontinence, pain with urination  Musculoskeletal: +pain, swelling, decreased range of motion  Neurological: No headache, weakness  Psychiatric: No depression, anxiety  Endocrine: No weight gain, increased thirst  All other systems are comprehensively negative.    Physical Exam:  Vitals:        Vital Signs Last 24 Hrs  T(C): 36.6 (09 May 2023 05:44), Max: 37 (08 May 2023 10:18)  T(F): 97.9 (09 May 2023 05:44), Max: 98.6 (08 May 2023 10:18)  HR: 69 (09 May 2023 05:44) (69 - 81)  BP: 121/61 (09 May 2023 05:44) (112/68 - 134/77)  BP(mean): --  RR: 18 (09 May 2023 05:44) (18 - 18)  SpO2: 92% (09 May 2023 05:44) (92% - 96%)    Parameters below as of 09 May 2023 05:44  Patient On (Oxygen Delivery Method): room air      General: NAD  HEENT: MMM  Neck: No JVD, no carotid bruit  Lungs: CTAB  CV: RRR, nl S1/S2, no M/R/G  Abdomen: S/NT/ND, +BS  Extremities: No LE edema, no cyanosis  Neuro: AAOx3, non-focal  Skin: No rash    Labs:                        13.4   8.58  )-----------( 147      ( 08 May 2023 10:50 )             37.2     05-08    138  |  108  |  9   ----------------------------<  145<H>  3.8   |  25  |  0.73    Ca    9.3      08 May 2023 10:50    TPro  6.8  /  Alb  3.4  /  TBili  1.5<H>  /  DBili  x   /  AST  108<H>  /  ALT  70  /  AlkPhos  160<H>  05-08        PT/INR - ( 08 May 2023 10:50 )   PT: 13.5 sec;   INR: 1.15 ratio         PTT - ( 08 May 2023 10:50 )  PTT:28.4 sec    ECG/Telemetry: NSR, RBBB, inferior Q waves

## 2023-05-09 NOTE — PROGRESS NOTE ADULT - PROBLEM SELECTOR PLAN 8
Pt was taking multi drug regiment for anxiety and depression but was recently stopped by his cardiologist  Admits to currently being in good spirits despite recent events K 3.4 and phos 2.4 today, replete PRN  f/up am lytes

## 2023-05-09 NOTE — CASE MANAGEMENT PROGRESS NOTE - NSCMPROGRESSNOTE_GEN_ALL_CORE
Discussed on rounds this am. CM consult noted for stairs into home.  PT recommends [facility], SW is following for transition to [facility] when medically stable.

## 2023-05-09 NOTE — CARE COORDINATION ASSESSMENT. - NSCAREPROVIDERS_GEN_ALL_CORE_FT
CARE PROVIDERS:  Accepting Physician: Jeremías Tello  Administration: Praful Dozier  Administration: Maricel Fernandez  Administration: Jorge Chapman  Admitting: Jeremías Tello  Attending: Jeremías Tello  Case Management: Melyssa Griffiths  Case Management: Ray Florence  Consultant: Alejandro Tirado  Consultant: Weil, Patricia  Consultant: Syed Damico  Consultant: Addy Yen  ED ACP: Cat Ortiz  ED Attending: Faith Moss  ED Nurse: Andreia Ascencio  Nurse: Marcy Elizalde  Nurse: Tracy Blair  Nurse: Yudith Buchanan  Ordered: ADM, User  Ordered: Doctor, Unknown  Override: Lindy Yin  Override: Tracy Blair  Override: Essence Diaz  Override: Faviola Lyons  Override: Mariela Jeffries  PCA/Nursing Assistant: Yaa Chowdhury  Physical Therapy: Milly Vazquez  Primary Team: Marysol Worthy  Primary Team: Joshua Andrea  Primary Team: Leonila Luna  Primary Team: Bartolome Hermosillo  Primary Team: Edelmira Kuhn  Primary Team: Karen Scott  Respiratory Therapy: Trudy Berumen  : Lottie Linton

## 2023-05-09 NOTE — CONSULT NOTE ADULT - CONSULT REASON
Chronic diastolic heart failure, CAD, atrial fibrillation Chronic systolic heart failure, CAD, atrial fibrillation

## 2023-05-09 NOTE — PROGRESS NOTE ADULT - PROBLEM SELECTOR PLAN 6
Pt drinks 3 beers every night  No history of withdrawal symptoms or seizures Nicotine patch 14mg qd -- causing a skin rxn BL arms

## 2023-05-09 NOTE — DISCHARGE NOTE PROVIDER - PROVIDER TOKENS
PROVIDER:[TOKEN:[[id number]],FOLLOWUP:[2 weeks]] PROVIDER:[TOKEN:[[id number]],FOLLOWUP:[2 weeks]],FREE:[LAST:[Minotis],FIRST:[Blair],PHONE:[(   )    -],FAX:[(   )    -],FOLLOWUP:[1 week]]

## 2023-05-10 ENCOUNTER — TRANSCRIPTION ENCOUNTER (OUTPATIENT)
Age: 68
End: 2023-05-10

## 2023-05-10 LAB
ALBUMIN SERPL ELPH-MCNC: 3.1 G/DL — LOW (ref 3.3–5)
ALP SERPL-CCNC: 164 U/L — HIGH (ref 40–120)
ALT FLD-CCNC: 50 U/L — SIGNIFICANT CHANGE UP (ref 12–78)
ANION GAP SERPL CALC-SCNC: 5 MMOL/L — SIGNIFICANT CHANGE UP (ref 5–17)
AST SERPL-CCNC: 70 U/L — HIGH (ref 15–37)
BILIRUB SERPL-MCNC: 1.3 MG/DL — HIGH (ref 0.2–1.2)
BUN SERPL-MCNC: 12 MG/DL — SIGNIFICANT CHANGE UP (ref 7–23)
CALCIUM SERPL-MCNC: 9.3 MG/DL — SIGNIFICANT CHANGE UP (ref 8.5–10.1)
CHLORIDE SERPL-SCNC: 109 MMOL/L — HIGH (ref 96–108)
CO2 SERPL-SCNC: 26 MMOL/L — SIGNIFICANT CHANGE UP (ref 22–31)
CREAT SERPL-MCNC: 0.77 MG/DL — SIGNIFICANT CHANGE UP (ref 0.5–1.3)
EGFR: 99 ML/MIN/1.73M2 — SIGNIFICANT CHANGE UP
GLUCOSE SERPL-MCNC: 128 MG/DL — HIGH (ref 70–99)
HCT VFR BLD CALC: 36.1 % — LOW (ref 39–50)
HGB BLD-MCNC: 12.1 G/DL — LOW (ref 13–17)
MAGNESIUM SERPL-MCNC: 2 MG/DL — SIGNIFICANT CHANGE UP (ref 1.6–2.6)
MCHC RBC-ENTMCNC: 33.5 GM/DL — SIGNIFICANT CHANGE UP (ref 32–36)
MCHC RBC-ENTMCNC: 34.7 PG — HIGH (ref 27–34)
MCV RBC AUTO: 103.4 FL — HIGH (ref 80–100)
NRBC # BLD: 0 /100 WBCS — SIGNIFICANT CHANGE UP (ref 0–0)
PHOSPHATE SERPL-MCNC: 2.2 MG/DL — LOW (ref 2.5–4.5)
PLATELET # BLD AUTO: 116 K/UL — LOW (ref 150–400)
POTASSIUM SERPL-MCNC: 4.4 MMOL/L — SIGNIFICANT CHANGE UP (ref 3.5–5.3)
POTASSIUM SERPL-SCNC: 4.4 MMOL/L — SIGNIFICANT CHANGE UP (ref 3.5–5.3)
PROT SERPL-MCNC: 6.6 G/DL — SIGNIFICANT CHANGE UP (ref 6–8.3)
RBC # BLD: 3.49 M/UL — LOW (ref 4.2–5.8)
RBC # FLD: 13.9 % — SIGNIFICANT CHANGE UP (ref 10.3–14.5)
SODIUM SERPL-SCNC: 140 MMOL/L — SIGNIFICANT CHANGE UP (ref 135–145)
WBC # BLD: 7.44 K/UL — SIGNIFICANT CHANGE UP (ref 3.8–10.5)
WBC # FLD AUTO: 7.44 K/UL — SIGNIFICANT CHANGE UP (ref 3.8–10.5)

## 2023-05-10 PROCEDURE — 99232 SBSQ HOSP IP/OBS MODERATE 35: CPT | Mod: GC

## 2023-05-10 RX ORDER — ENOXAPARIN SODIUM 100 MG/ML
40 INJECTION SUBCUTANEOUS
Qty: 0 | Refills: 0 | DISCHARGE
Start: 2023-05-10

## 2023-05-10 RX ORDER — OXYCODONE HYDROCHLORIDE 5 MG/1
10 TABLET ORAL EVERY 4 HOURS
Refills: 0 | Status: DISCONTINUED | OUTPATIENT
Start: 2023-05-10 | End: 2023-05-11

## 2023-05-10 RX ORDER — SODIUM,POTASSIUM PHOSPHATES 278-250MG
1 POWDER IN PACKET (EA) ORAL ONCE
Refills: 0 | Status: COMPLETED | OUTPATIENT
Start: 2023-05-10 | End: 2023-05-10

## 2023-05-10 RX ORDER — OXYCODONE HYDROCHLORIDE 5 MG/1
1 TABLET ORAL
Qty: 0 | Refills: 0 | DISCHARGE
Start: 2023-05-10

## 2023-05-10 RX ORDER — CLOPIDOGREL BISULFATE 75 MG/1
1 TABLET, FILM COATED ORAL
Refills: 0 | DISCHARGE

## 2023-05-10 RX ORDER — ACETAMINOPHEN 500 MG
2 TABLET ORAL
Qty: 0 | Refills: 0 | DISCHARGE
Start: 2023-05-10

## 2023-05-10 RX ORDER — INSULIN GLARGINE 100 [IU]/ML
32 INJECTION, SOLUTION SUBCUTANEOUS
Qty: 10 | Refills: 0
Start: 2023-05-10 | End: 2023-06-08

## 2023-05-10 RX ORDER — OXYCODONE HYDROCHLORIDE 5 MG/1
5 TABLET ORAL EVERY 4 HOURS
Refills: 0 | Status: DISCONTINUED | OUTPATIENT
Start: 2023-05-10 | End: 2023-05-11

## 2023-05-10 RX ADMIN — Medication 50 MILLIGRAM(S): at 17:17

## 2023-05-10 RX ADMIN — OXYCODONE HYDROCHLORIDE 5 MILLIGRAM(S): 5 TABLET ORAL at 03:05

## 2023-05-10 RX ADMIN — ATORVASTATIN CALCIUM 40 MILLIGRAM(S): 80 TABLET, FILM COATED ORAL at 21:22

## 2023-05-10 RX ADMIN — OXYCODONE HYDROCHLORIDE 10 MILLIGRAM(S): 5 TABLET ORAL at 14:34

## 2023-05-10 RX ADMIN — OXYCODONE HYDROCHLORIDE 5 MILLIGRAM(S): 5 TABLET ORAL at 02:35

## 2023-05-10 RX ADMIN — Medication 50 MILLIGRAM(S): at 05:45

## 2023-05-10 RX ADMIN — ENOXAPARIN SODIUM 40 MILLIGRAM(S): 100 INJECTION SUBCUTANEOUS at 17:16

## 2023-05-10 RX ADMIN — Medication 1 PATCH: at 07:10

## 2023-05-10 RX ADMIN — OXYCODONE HYDROCHLORIDE 5 MILLIGRAM(S): 5 TABLET ORAL at 08:39

## 2023-05-10 RX ADMIN — Medication 81 MILLIGRAM(S): at 12:14

## 2023-05-10 RX ADMIN — OXYCODONE HYDROCHLORIDE 10 MILLIGRAM(S): 5 TABLET ORAL at 20:11

## 2023-05-10 RX ADMIN — AMIODARONE HYDROCHLORIDE 200 MILLIGRAM(S): 400 TABLET ORAL at 05:45

## 2023-05-10 RX ADMIN — Medication 1 PATCH: at 11:00

## 2023-05-10 RX ADMIN — OXYCODONE HYDROCHLORIDE 5 MILLIGRAM(S): 5 TABLET ORAL at 09:30

## 2023-05-10 RX ADMIN — Medication 1 TABLET(S): at 14:04

## 2023-05-10 RX ADMIN — Medication 1: at 12:13

## 2023-05-10 RX ADMIN — OXYCODONE HYDROCHLORIDE 10 MILLIGRAM(S): 5 TABLET ORAL at 20:41

## 2023-05-10 RX ADMIN — OXYCODONE HYDROCHLORIDE 10 MILLIGRAM(S): 5 TABLET ORAL at 15:30

## 2023-05-10 NOTE — PROGRESS NOTE ADULT - ASSESSMENT
The patient is a 67 year old male with a history of HTN, HL, CAD s/p PCI and CABG, chronic systolic heart failure (last EF 50-55%), post-operative pericardial effusion, post-operative atrial fibrillation who presents with a fall.    Plan:  - ECG with possible old inferior MI  - Last echo 12/22 with low normal LV systolic function, small/moderate pericardial effusion  - Continue amiodarone 200 mg daily - he can discuss with his cardiologist as outpatient regarding discontinuing the medication as there has been no atrial fibrillation recurrence  - Not on anticoagulation for atrial fibrillation as this was only in the post-operative state and there was concern for worsening pericardial effusion at the time  - Continue aspirin 81 mg daily  - Continue atorvastatin 40 mg daily  - Continue metoprolol tartrate 50 mg bid  - Ortho follow-up  - Pain control  - PT

## 2023-05-10 NOTE — DISCHARGE NOTE NURSING/CASE MANAGEMENT/SOCIAL WORK - NSDCVIVACCINE_GEN_ALL_CORE_FT
Tdap; 02-Sep-2016 07:30; Syl Snow (RN); [employer]; [id number]; IntraMuscular; Deltoid Left.; 0.5 milliLiter(s); VIS (VIS Published: 09-May-2013, VIS Presented: 02-Sep-2016);

## 2023-05-10 NOTE — DISCHARGE NOTE NURSING/CASE MANAGEMENT/SOCIAL WORK - NSDCPEFALRISK_GEN_ALL_CORE
For information on Fall & Injury Prevention, visit: [email or web address] OR  [email or web address] OR  https://www.cdc.gov/steadi/patient.html

## 2023-05-10 NOTE — PROGRESS NOTE ADULT - ASSESSMENT
67yM PMH HTN , CAD, s/p CABG 2022 Dr. Gray, ACC/AHA stage C, NYHA functional class 3, HFrEF, depression, nicotine dependence, current alcohol use over recommended limit admitted for left nondisplaced pubic ramus fracture. Awaiting DC to [facility],

## 2023-05-10 NOTE — SOCIAL WORK PROGRESS NOTE - NSSWPROGRESSNOTE_GEN_ALL_CORE
pt for dc today to [facility], auth obtained from [facility] [id number] 5/10-5/17. ángel coordinated for dc at 130 pm with [facility], auth # [id number]. pt aware of available bed for dc today and in agreement.

## 2023-05-10 NOTE — DISCHARGE NOTE NURSING/CASE MANAGEMENT/SOCIAL WORK - PATIENT PORTAL LINK FT
You can access the FollowMyHealth Patient Portal offered by [facility] by registering at the following website: [email or web address]. By joining [facility]’s FollowMyHealth portal, you will also be able to view your health information using other applications (apps) compatible with our system.

## 2023-05-10 NOTE — SOCIAL WORK PROGRESS NOTE - NSSWPROGRESSNOTE_GEN_ALL_CORE
pt has been medically accepted at [facility] with bed available. rudy contacted Laura at [facility] who escalted case for review for auth. case number : [id number]. as per Laura, could take 24-56 hours to obtain auth. rudy to continue to follow for transition to [facility].

## 2023-05-10 NOTE — PROGRESS NOTE ADULT - PROBLEM SELECTOR PLAN 5
Last 24 hours of POC Glucose checks:   POCT Blood Glucose.: 123 mg/dL (05-10-23 @ 17:05)  POCT Blood Glucose.: 159 mg/dL (05-10-23 @ 11:55)  POCT Blood Glucose.: 128 mg/dL (05-10-23 @ 08:14)  POCT Blood Glucose.: 143 mg/dL (05-09-23 @ 21:10)  A1C with Estimated Average Glucose Result: 5.9 % (05-09-23 @ 07:10)

## 2023-05-10 NOTE — PROGRESS NOTE ADULT - PROBLEM SELECTOR PLAN 3
Continue home amiodarone 200mg qd + ASA 81mg qd  Pt not on Anti-coagulation. As per previous cardio notes from Dec 2022, eliquis was discontinued due to increased size of pericardial effusion.   Cardio [facility] following, f/u recs -- consider DC amio w/outpt cardio as afib was post op and no noted reccurence
Continue home amiodarone 200mg qd + ASA 81mg qd  Pt not on Anti-coagulation. As per previous cardio notes from Dec 2022, eliquis was discontinued due to increased size of pericardial effusion.   Cardio [facility] following, f/u recs -- consider DC amio w/outpt cardio as afib was post op and no noted reccurence

## 2023-05-10 NOTE — PROGRESS NOTE ADULT - PROBLEM SELECTOR PLAN 1
Acute nondisplaced left pubic fractures with intra-articular extension into the anterior acetabular column. Chronic intertrochanteric left proximal femoral fracture, status post ORIF without CT evidence for hardware complication. Moderate left worse than right arthrosis of the hip joints.  PRN Pain control: APAP 650 mild, Tramadol 50mg moderate, Oxy 5 severe  Ortho Juan monson, recs appreciated - No acute orthopaedic surgical intervention indicated  TTWB [toe touch weight bearing] on the LLE w/ assistive devices as needed  PT recs [facility]  FU w/ Dr. Martinez or prior orthopaedic surgeon for repeat xr in 2 weeks
Acute nondisplaced left pubic fractures with intra-articular extension into the anterior acetabular column. Chronic intertrochanteric left proximal femoral fracture, status post ORIF without CT evidence for hardware complication. Moderate left worse than right arthrosis of the hip joints.  PRN Pain control: APAP 650 mild, Tramadol 50mg moderate, Oxy 5 severe  Ortho Juan monson, recs appreciated - No acute orthopaedic surgical intervention indicated  TTWB [toe touch weight bearing] on the LLE w/ assistive devices as needed  PT recs [facility]  FU w/ Dr. Martinez or prior orthopaedic surgeon for repeat xr in 2 weeks  Stable for dispo to SNF

## 2023-05-10 NOTE — SOCIAL WORK PROGRESS NOTE - NSSWPROGRESSNOTE_GEN_ALL_CORE
[facility] faxed out again to numerous facilities. pt has been medically accepted at [facility] with bed available for dc tmrw 5/11.  rudy contacted IRAIDA Dickey with [facility], obtained new auth for dc to [facility] 5/11.  auth # [id number]. rudy met with pt at bedside, aware of above and in agreement with dc to [facility] 5/11.  sw to follow for coordination of ambulance in the am.

## 2023-05-10 NOTE — PROGRESS NOTE ADULT - PROBLEM SELECTOR PLAN 2
On Metoprolol tartrate 50mg tid with hold parameters  Decr metoprolol freq TID --> BID  Monitor routine hemodynamics  DASH diet  Cardio [facility] following, f/u recs
On Metoprolol tartrate 50mg tid with hold parameters  Decr metoprolol freq TID --> BID  Monitor routine hemodynamics  DASH diet  Cardio [facility] following, f/u recs

## 2023-05-10 NOTE — PROGRESS NOTE ADULT - PROBLEM SELECTOR PLAN 9
Pt was taking multi drug regiment for anxiety and depression but was recently stopped by his cardiologist  Admits to currently being in good spirits despite recent events

## 2023-05-10 NOTE — SOCIAL WORK PROGRESS NOTE - NSSWPROGRESSNOTE_GEN_ALL_CORE
contacted by pt who stated he just spoke with his ex wife and is refusing to accept the bed at [facility]. sw notified [facility] and cancelled 130 pm ángel. sw in contact with [facility] and [facility], both currently reviewing [facility]. pt stated willingness to accept bed at either of those 2 facilities. sw to continue to follow for transition to [facility]

## 2023-05-11 VITALS
RESPIRATION RATE: 18 BRPM | HEART RATE: 69 BPM | SYSTOLIC BLOOD PRESSURE: 144 MMHG | DIASTOLIC BLOOD PRESSURE: 68 MMHG | TEMPERATURE: 98 F

## 2023-05-11 PROCEDURE — 97110 THERAPEUTIC EXERCISES: CPT

## 2023-05-11 PROCEDURE — 83036 HEMOGLOBIN GLYCOSYLATED A1C: CPT

## 2023-05-11 PROCEDURE — 99285 EMERGENCY DEPT VISIT HI MDM: CPT

## 2023-05-11 PROCEDURE — 96374 THER/PROPH/DIAG INJ IV PUSH: CPT

## 2023-05-11 PROCEDURE — 84100 ASSAY OF PHOSPHORUS: CPT

## 2023-05-11 PROCEDURE — 86850 RBC ANTIBODY SCREEN: CPT

## 2023-05-11 PROCEDURE — 97162 PT EVAL MOD COMPLEX 30 MIN: CPT

## 2023-05-11 PROCEDURE — 85730 THROMBOPLASTIN TIME PARTIAL: CPT

## 2023-05-11 PROCEDURE — 76376 3D RENDER W/INTRP POSTPROCES: CPT

## 2023-05-11 PROCEDURE — 82962 GLUCOSE BLOOD TEST: CPT

## 2023-05-11 PROCEDURE — 97116 GAIT TRAINING THERAPY: CPT

## 2023-05-11 PROCEDURE — 85610 PROTHROMBIN TIME: CPT

## 2023-05-11 PROCEDURE — 72190 X-RAY EXAM OF PELVIS: CPT

## 2023-05-11 PROCEDURE — 85025 COMPLETE CBC W/AUTO DIFF WBC: CPT

## 2023-05-11 PROCEDURE — 73502 X-RAY EXAM HIP UNI 2-3 VIEWS: CPT

## 2023-05-11 PROCEDURE — 93005 ELECTROCARDIOGRAM TRACING: CPT

## 2023-05-11 PROCEDURE — 73552 X-RAY EXAM OF FEMUR 2/>: CPT

## 2023-05-11 PROCEDURE — 36415 COLL VENOUS BLD VENIPUNCTURE: CPT

## 2023-05-11 PROCEDURE — 82248 BILIRUBIN DIRECT: CPT

## 2023-05-11 PROCEDURE — 85027 COMPLETE CBC AUTOMATED: CPT

## 2023-05-11 PROCEDURE — 71045 X-RAY EXAM CHEST 1 VIEW: CPT

## 2023-05-11 PROCEDURE — 86901 BLOOD TYPING SEROLOGIC RH(D): CPT

## 2023-05-11 PROCEDURE — 97112 NEUROMUSCULAR REEDUCATION: CPT

## 2023-05-11 PROCEDURE — 82977 ASSAY OF GGT: CPT

## 2023-05-11 PROCEDURE — 83735 ASSAY OF MAGNESIUM: CPT

## 2023-05-11 PROCEDURE — 80053 COMPREHEN METABOLIC PANEL: CPT

## 2023-05-11 PROCEDURE — 72192 CT PELVIS W/O DYE: CPT | Mod: MA

## 2023-05-11 PROCEDURE — 86900 BLOOD TYPING SEROLOGIC ABO: CPT

## 2023-05-11 PROCEDURE — 70450 CT HEAD/BRAIN W/O DYE: CPT | Mod: MA

## 2023-05-11 PROCEDURE — 99239 HOSP IP/OBS DSCHRG MGMT >30: CPT | Mod: GC

## 2023-05-11 PROCEDURE — 72125 CT NECK SPINE W/O DYE: CPT | Mod: MA

## 2023-05-11 RX ORDER — SERTRALINE 25 MG/1
1 TABLET, FILM COATED ORAL
Qty: 30 | Refills: 0
Start: 2023-05-11 | End: 2023-06-09

## 2023-05-11 RX ORDER — MIRTAZAPINE 45 MG/1
1 TABLET, ORALLY DISINTEGRATING ORAL
Qty: 30 | Refills: 0
Start: 2023-05-11 | End: 2023-06-09

## 2023-05-11 RX ADMIN — Medication 50 MILLIGRAM(S): at 05:50

## 2023-05-11 RX ADMIN — OXYCODONE HYDROCHLORIDE 10 MILLIGRAM(S): 5 TABLET ORAL at 09:11

## 2023-05-11 RX ADMIN — OXYCODONE HYDROCHLORIDE 10 MILLIGRAM(S): 5 TABLET ORAL at 08:11

## 2023-05-11 RX ADMIN — OXYCODONE HYDROCHLORIDE 10 MILLIGRAM(S): 5 TABLET ORAL at 03:00

## 2023-05-11 RX ADMIN — AMIODARONE HYDROCHLORIDE 200 MILLIGRAM(S): 400 TABLET ORAL at 05:50

## 2023-05-11 RX ADMIN — OXYCODONE HYDROCHLORIDE 10 MILLIGRAM(S): 5 TABLET ORAL at 03:30
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
## 2023-05-11 NOTE — PROGRESS NOTE ADULT - ASSESSMENT
The patient is a 67 year old male with a history of HTN, HL, CAD s/p PCI and CABG, chronic systolic heart failure (last EF 50-55%), post-operative pericardial effusion, post-operative atrial fibrillation who presents with a fall.    Plan:  - ECG with possible old inferior MI  - Last echo 12/22 with low normal LV systolic function, small/moderate pericardial effusion  - Continue amiodarone 200 mg daily - he can discuss with his cardiologist as outpatient regarding discontinuing the medication as there has been no atrial fibrillation recurrence  - Not on anticoagulation for atrial fibrillation as this was only in the post-operative state and there was concern for worsening pericardial effusion at the time  - Continue aspirin 81 mg daily  - Continue atorvastatin 40 mg daily  - Continue metoprolol tartrate 50 mg bid  - Ortho follow-up  - Pain control  - PT  - Discharge planning

## 2023-05-11 NOTE — CHART NOTE - NSCHARTNOTEFT_GEN_A_CORE
The patient was seen and examined on the day of discharge by the attending physician. Pt stable for discharge. Please see discharge note for additional information regarding the hospital course and the day of discharge.    67yM PMH HTN , CAD, s/p CABG 2022 Dr. Gray, ACC/AHA stage C, NYHA functional class 3, HFrEF, depression, nicotine dependence, current alcohol use over recommended limit admitted for left nondisplaced pubic ramus fracture. Awaiting DC to [facility]    #pubic ramus fracture  #HTN  #chronic AF  #CAD / type 2 diabetes / nicotine dependence / alcohol abuse / electrolyte derangements / anxiety / depression  - pain controlled w/ current regimen  - encourage ambulation  - vte ppx for next 1 month is recommended due to fracture and limited mobility  - dc 5/10/23
The patient was seen and examined on the day of discharge by the attending physician. Pt stable for discharge. Please see discharge note for additional information regarding the hospital course and the day of discharge.     Logistical issues delayed dc from 5/10 to 5/11. No clinical events occurred.

## 2023-05-11 NOTE — SOCIAL WORK PROGRESS NOTE - NSSWPROGRESSNOTE_GEN_ALL_CORE
pt for dc today to [facility]. ambulance coordinated for 11 am. pt aware and in agreement with plans for dc

## 2023-05-26 ENCOUNTER — APPOINTMENT (OUTPATIENT)
Dept: ORTHOPEDIC SURGERY | Facility: CLINIC | Age: 68
End: 2023-05-26
Payer: MEDICARE

## 2023-05-26 VITALS — HEIGHT: 72 IN | WEIGHT: 183 LBS | BODY MASS INDEX: 24.79 KG/M2

## 2023-05-26 DIAGNOSIS — S32.592S OTHER SPECIFIED FRACTURE OF LEFT PUBIS, SEQUELA: ICD-10-CM

## 2023-05-26 PROCEDURE — 99204 OFFICE O/P NEW MOD 45 MIN: CPT
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
## 2023-05-26 NOTE — IMAGING
[facility]    Patients Name: Ludwig Pearson  Attending Physician: Sukih Lewis MD  CSN: [id number]    Location:  320/320-A  MRN: [id number]    YOB: 1960  Admission Date: 4/22/2019     Operative Note    Patient Name: Ludwig Pearson abdomen and made a posterior peritoneal incision, dissected the seminal vesicles and vas deferens.  The posterior space was made, anterior space was made.   We then performed bilateral pelvic lymph node dissection.  The borders of our dissection were the e Per drain placed in the pelvis.  The lateral trocar was closed with an 0 Vicryl in EndoClose fashion.  All port sites were then irrigated.  We made an extraction incision in the midline and removed the specimen.  We then removed all trocar sites under [facility] [de-identified] : Constitutional: well developed and well nourished, able to communicate\par Cardiovascular: Peripheral vascular exam is grossly normal\par Neurologic: Alert and oriented, no acute distress.\par Skin: normal skin with no ulcers, rashes, or lesions\par Pulmonary: No respiratory distress, breathing comfortably on room air\par Lymphatics: No obvious lymphadenopathy or lymphedema in areas examined\par \par LOWER EXTREMITY/LEFT HIP EXAM\par Standing pelvic alignment: Symmetric with no Trendelenburg\par Atrophy: none\par Ecchymosis/swelling: none\par \par Range of Motion\par Hip: Flexion/extension/ER/IR     / EX 20/ ER 45/ IR 20 / AB 60 /AD 20\par Impingement with flexion adduction and internal rotation: negative\par Contracture: none\par Snapping hip: negative\par Greater trochanter: no tenderness\par \par Neurovascular\par Distal extremities: warm to touch\par Sensation to light touch: intact\par Muscle strength: 5/5\par \par IMAGING:\par 05/26/2023 Xrays of the Left hip 3v were taken demonstrating gamma nail in place\par CT in PAC - left lateral pubic rami fracture

## 2023-08-18 ENCOUNTER — APPOINTMENT (OUTPATIENT)
Dept: ORTHOPEDIC SURGERY | Facility: CLINIC | Age: 68
End: 2023-08-18

## 2023-10-10 NOTE — CARE COORDINATION ASSESSMENT. - CAN THE PATIENT MANAGE THEIR OWN HEALTHCARE/FINANCIAL NEEDS?
CHIEF COMPLAINT:  Abdominal Pain       HISTORY OF PRESENT ILLNESS:  Manuelito Boles is a pleasant and cooperative 50 year old female who complains of Abdominal Pain     The patient relates having abdominal pain. History of IBS.  Was started on a medrol dose pack and bentyl with improvement in symptomms.  Would have to have a bowel movement multiple times in a day.  Abdominal pain is generalized.     Patient reports having right knee pain that just hurts.   No falls.  No loss of strength or sensation.      Outpatient Medications Marked as Taking for the 10/10/23 encounter (Office Visit) with Taiwo Echevarria, DO   Medication Sig Dispense Refill   • magnesium oxide (MAG-OX) 400 (240 Mg) MG tablet Take 400 mg by mouth daily.     • Brexpiprazole (Rexulti) 0.5 MG Tab      • hydroCORTisone (Anusol-HC) 2.5 % rectal cream Place 1 application. rectally in the morning and 1 application. in the evening. 30 g 0   • dicyclomine (BENTYL) 20 MG tablet Take 1 tablet by mouth 4 times daily (before meals and nightly). 120 tablet 1   • polyethylene glycol (MIRALAX) 17 GM/SCOOP powder Take 17 g by mouth daily. Stir and dissolve powder in any 4 to 8 ounces of beverage, then drink. 510 g 0   • Magnesium Oxide 400 MG Cap Take 400 mg by mouth daily. 30 capsule 1   • pantoprazole (PROTONIX) 40 MG tablet TAKE 1 TABLET BY MOUTH TWICE DAILY 180 tablet 0   • [DISCONTINUED] apixaBAN (ELIQUIS) 2.5 MG Tab Take 1 tablet by mouth in the morning and 1 tablet in the evening. 60 tablet 1   • Vitamin D, Ergocalciferol, 1.25 mg (50,000 units) capsule Take 1 capsule by mouth 1 day a week. 12 capsule 0   • gabapentin (NEURONTIN) 600 MG tablet TAKE 1/2 TO 1 TABLET BY MOUTH EVERY MORNING AND 2 TABLETS EVERY EVENING 90 tablet 2   • cetirizine (ZyrTEC) 10 MG tablet Take 1 tablet by mouth daily. 90 tablet 1   • ibuprofen (MOTRIN) 800 MG tablet TAKE 1 TABLET BY MOUTH EVERY 8 HOURS AS NEEDED FOR PAIN 90 tablet 1   • Cholecalciferol 1.25 mg (50,000 units) capsule  Take 1 capsule by mouth 1 day a week. 12 capsule 0   • montelukast (SINGULAIR) 10 MG tablet TAKE 1 TABLET BY MOUTH EVERY NIGHT 90 tablet 1   • ondansetron (ZOFRAN ODT) 4 MG disintegrating tablet Place 1 tablet onto the tongue every 8 hours as needed for Nausea. 12 tablet 0   • albuterol 108 (90 Base) MCG/ACT inhaler Inhale 2 puffs into the lungs every 4 hours as needed for Shortness of Breath, Wheezing or Other (Bronchospasm). 1 each 0   • OLANZapine (ZyPREXA) 5 MG tablet Take by mouth 2 times daily.     • traZODone (DESYREL) 100 MG tablet Take 100 mg by mouth at bedtime.     • triamcinolone (ARISTOCORT) 0.1 % cream Apply topically 2 times daily. Apply twice daily to lesion on right lower lip 30 g 1   • fluticasone (FLONASE) 50 MCG/ACT nasal spray SHAKE LIQUID AND USE 1 SPRAY IN EACH NOSTRIL DAILY 16 g 3   • [DISCONTINUED] levothyroxine 175 MCG tablet Take 1 tablet by mouth daily. 90 tablet 1   • atorvastatin (LIPITOR) 20 MG tablet TAKE 1 TABLET BY MOUTH DAILY 90 tablet 3   • diclofenac (VOLTAREN) 1 % gel APPLY 8 GRAMS TOPICALLY TWICE DAILY AS NEEDED FOR PAIN IN FEET (Patient taking differently: Apply topically as needed.) 100 g 0   • Ventolin  (90 Base) MCG/ACT inhaler INHALE 2 PUFFS INTO THE LUNGS EVERY 4 HOURS AS NEEDED FOR SHORTNESS OF BREATH OR WHEEZING 54 g 1   • acetaminophen (TYLENOL) 650 MG CR tablet Take 1,300 mg by mouth every 8 hours as needed for Pain. Indications: Pain     • sertraline (ZOLOFT) 100 MG tablet Take 100 mg by mouth daily. Takes 1.5 tabs     • clonazePAM (KlonoPIN) 0.5 MG tablet Take 0.5 mg by mouth 4 times daily.     • OLANZapine (ZyPREXA) 10 MG tablet Take 10 mg by mouth 2 times daily.     • Multiple Vitamins-Minerals (MULTIVITAMIN PO) Take 1 tablet by mouth daily.     • Ascorbic Acid (VITAMIN C PO) Take 1 tablet by mouth daily.     • Probiotic Product (PROBIOTIC DAILY PO) Take 1 capsule by mouth daily.        ALLERGIES:   Allergen Reactions   • Cyclobenzaprine HIVES and SHORTNESS  OF BREATH     Name brand only generic causes hives     • Lactose Intolerance (No Food Restrictions)    (Food) GI UPSET     Pt reports fluid milk causes mild GI upset. Milk as an ingredient is okay. Yogurt, cheese, cottage cheese okay.   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 EGD   • Fibromyalgia    • Foot deformity    • Genital warts    • Hernia, diaphragmatic    • History of bladder infections    • History of bronchitis    • History of chicken pox    • History of pneumonia    • Irregular heart beat    • Lactose intolerance    • Lumbar radiculopathy    • Neuropathy    • No blood products     PATIENT REQUESTS NO BLOOD PRODUCTS   • PTSD (post-traumatic stress disorder)    • RAD (reactive airway disease)    • RBBB    • Refusal of blood transfusions as patient is Islam    • Sjoegren syndrome    • Thyroid disease    • Unspecified asthma(493.90) 5/31/2014   • Unspecified hypothyroidism    • Vitamin B 12 deficiency      Past Surgical History:   Procedure Laterality Date   • Breast biopsy Right    • Bunionectomy Left 07/10/2018   • Bunionectomy Right 09/10/2019   • Cheilectomy Left 11/06/2018    AND HARDWARE REMOVAL   • Colonoscopy N/A 07/11/2023    [place], history of polyps   • Esophagogastroduodenoscopy  03/07/2018    reflux. MAC ANESTHESIA    • Eye surgery Right     EYE MUSCLE   • Medial branch block  2023   • Neuroma surgery Left 09/10/2019   • Upper gastrointestinal endoscopy      WOKE DURING IV SEDATION      Family History   Problem Relation Age of Onset   • Heart disease Mother    • Hypertension Mother    • Hyperlipidemia Mother    • Heart disease Father    • Depression Father    • Hypertension Father    • Hyperlipidemia Father    • Thyroid Sister         goiter   • Kidney disease Sister    • Irritable Bowel Syndrome Sister    • Irritable Bowel Syndrome Sister    • Irritable Bowel Syndrome Sister    • Colon Polyps Sister    • Cancer Sister         THYROID CANCER   • Other Sister         Fibromyalgia   • Irritable Bowel Syndrome Sister    • Clotting Disorder Brother    • Thyroid Maternal Uncle    • Cancer, Colon Paternal Aunt    • Irritable Bowel Syndrome Paternal Uncle    • Irritable Bowel Syndrome Paternal Uncle    • Irritable Bowel Syndrome Paternal Uncle        Social History     Tobacco Use   • Smoking status: Former     Current packs/day: 0.00     Average packs/day: 0.8 packs/day for 30.0 years (22.5 ttl pk-yrs)     Types: Cigarettes     Start date: 1992     Quit date: 2022     Years since quittin.2     Passive exposure: Current   • Smokeless tobacco: Never   • Tobacco comments:     will work on cessation at home   Vaping Use   • Vaping Use: never used   Substance Use Topics   • Alcohol use: No   • Drug use: No        I have reviewed the patient's past medical, surgical, social and family history, updating these as appropriate.      REVIEW OF SYSTEMS    Constitutional: Patient relates tiredness, no fevers, chills, or tiredness  Cardiovascular:  Patient relates no chest pain, dyspnea, or palpitations  Respiratory:         Patient relates no cough, shortness of breath, or hemoptysis  Gastrointestinal:  Patient relates no nausea, vomiting, diarrhea, or constipation      PHYSICAL EXAMINATION:  Visit Vitals  BP  130/68 (BP Location: LUE - Left upper extremity, Patient Position: Sitting, Cuff Size: Large Adult)   Pulse 82   Temp 97.1 °F (36.2 °C) (Temporal)   Resp 16   Ht 5' 7\" (1.702 m)   Wt 112 kg (247 lb)   LMP 02/16/2021 Comment: Irregular periods   SpO2 97%   BMI 38.69 kg/m²     GENERAL:  50 year old female alert, conversant, and in no acute distress.  HEAD:  Atraumatic, normocephalic  EYES:  Extraocular movements intact.  Pupils equal and reactive to light and accommodation, No scleral icterus  EARS:  Normal appearance, gross hearing intact. Tympanic membranes clear, light reflex present.  CARDIAC:  Regular rate and rhythm and no murmurs, clicks, or gallops.  Radial and carotid pulses 2+/4. No peripheral edema.  RESPIRATORY:  Normal respiratory effort. Clear to auscultation. No rales, rhonchi, or retraction.  No wheezes.  GASTROINTESTINAL: Abdomen soft, nontender, no hepatosplenomegaly, bowel sounds times 4.  MUSCULOSKELETAL: Normal gait and station, Upper Extremities: Muscle Strength 5/5. No digital cyanosis or clubbing. Lower Extremities: No digital cyanosis or clubbing. Muscle Strength 5/5.  NEUROLOGIC: CN (Cranial nerves) II-XII grossly intact, no tremors.  PSYCHIATRIC:  Alert and oriented times 4.  No changes in mood. Judgment and insight intact. Denies lack of interest in daily activities, denies feeling sad or blue. Denies depression.             ASSESSMENT:  1. Irritable bowel syndrome with diarrhea    2. Acute pain of right knee         PLAN:    Manuelito was seen today for abdominal pain.    Diagnoses and all orders for this visit:    Irritable bowel syndrome with diarrhea        -     Symptoms suggestive of IBS flareup        -      Reviewed benefits and risks of medication        -      Start prednisone taper  -     predniSONE (DELTASONE) 5 MG tablet; Take 2 tablets by mouth daily (with breakfast) for 7 days, THEN 1.5 tablets daily for 7 days, THEN 1 tablet daily for 7 days, THEN 0.5 tablets daily for 7  days.    Acute pain of right knee        -     Recommend rest, limit weight bearing squats, climbing stairs        -     Apply ice for swelling        -     Obtain imaging to evaluate for joint space narrowing, loose bodies  -     XR KNEE 3 VIEWS RIGHT; Future        Prescription medications including controlled substances, over the counter medication, vitamins, supplement, and dietary aids are optimized for each individual and are not to be shared, sold, re-purposed, or redistributed. There are serious and significant health risks that could result in death,  coma, short, and/or long term injury even when taken as prescribed. Benefits and risk have been reviewed. They are to be used for their intended purpose and are here to help you develop and achieve your health goals.       Reviewed benefits and risks of prescription medication with goal and intent to improve overall patient well being. If controlled substances prescribed, discussed benefits and risks of controlled substances including opioid and benzodiazepines.  Please feel free to contact the clinic or the pharmacy for question or concerns.    Follow up in 1 months or sooner if there is no improvement in symptoms or any time sooner if questions, concerns or worsening symptoms.          Thank you, again, for entrusting me with your care. Please do not hesitate to contact me with any questions or concerns, or if I can be of any assistance.      Yes

## 2024-01-16 NOTE — PROGRESS NOTE ADULT - PROBLEM SELECTOR PLAN 3
Carmella Boykin is a 84 y.o. female presenting for/with:    Chief Complaint   Patient presents with    Other     Pt reports having a on/off sharp pain in her head since last Saturday. Pt reports taking Tylenol daily with no relief. Tylenol last taken this am.        Vitals:    01/16/24 1414   BP: 131/60   Site: Left Upper Arm   Position: Sitting   Cuff Size: Medium Adult   Pulse: 56   Resp: 18   Temp: 97.5 °F (36.4 °C)   TempSrc: Temporal   SpO2: 97%   Weight: 86.7 kg (191 lb 3.2 oz)   Height: 1.651 m (5' 5\")       Pain Scale: 8/10  Pain Location: Head    \"Have you been to the ER, urgent care clinic since your last visit?  Hospitalized since your last visit?\"    NO    “Have you seen or consulted any other health care providers outside of [facility] since your last visit?”    NO                 1/16/2024     2:11 PM   PHQ-9    Little interest or pleasure in doing things 0   Feeling down, depressed, or hopeless 0   PHQ-2 Score 0   PHQ-9 Total Score 0           1/16/2024     2:20 PM 7/31/2023     3:00 PM 4/10/2023    12:00 AM 3/6/2023    12:00 AM 1/5/2023    12:00 AM 10/5/2022    12:00 AM 3/8/2022    12:00 AM   [facility] AMB LEARNING ASSESSMENT   Primary Learner Patient Patient Patient Patient Patient Patient Patient   co-learner caregiver   No Yes No No    Co-Learner Name    Jame Boykin      Primary Language ENGLISH ENGLISH ENGLISH ENGLISH ENGLISH ENGLISH ENGLISH   Learning Preference DEMONSTRATION DEMONSTRATION DEMONSTRATION DEMONSTRATION DEMONSTRATION DEMONSTRATION READING   Answered By patient pt pt pt pt pt patient   Relationship to Learner SELF SELF SELF SELF SELF SELF SELF            1/16/2024     2:13 PM   Amb Fall Risk Assessment and TUG Test   Do you feel unsteady or are you worried about falling?  no   2 or more falls in past year? no   Fall with injury in past year? no           1/16/2024     2:00 PM 8/29/2023    10:00 AM 8/1/2023     2:00 PM 6/20/2023     8:00 AM   ADL ASSESSMENT   Feeding 
Admitted with hematemesis.  Underwent EGD 5/10 that showed portal hypertension gastropathy.  Protonix BID.  Plan for Hepatology follow up when discharged with Dr. Pate.  No indication for repeat EGD prior to cardiac surgery per GI.  Increased risk for bleeding with anticoagulation.
- GI following.
Admitted with hematemesis.  Underwent EGD 5/10 that showed portal hypertension gastropathy.  Protonix BID.  Plan for Hepatology follow up when discharged with Dr. Pate.  No indication for repeat EGD prior to cardiac surgery per GI.  Increased risk for bleeding with anticoagulation.
Admitted with hematemesis.  Underwent EGD 5/10 that showed portal hypertension gastropathy.  Protonix BID.  Plan for Hepatology follow up when discharged with Dr. Pate.  No indication for repeat EGD prior to cardiac surgery per GI.  Increased risk for bleeding with anticoagulation.

## 2024-04-11 NOTE — PHYSICAL THERAPY INITIAL EVALUATION ADULT - MUSCLE TONE ASSESSMENT, REHAB EVAL
Utilize over-the-counter Tylenol or Motrin as directed for fever.    Ensure adequate fluid intake with electrolytes.    Thank you for the opportunity to care for you today.  Please take all medications as directed, and continue any previously prescribed medications unless we specifically discussed discontinuing them.  If your symptoms do not resolve or worsen please return to the clinic for re-evaluation.  If your situation becomes emergent, please present to the nearest emergency department.  Follow-up with your PCP for continued evaluation and management.    
bilateral upper extremities/bilateral lower extremities/normal
18

## 2024-06-12 ENCOUNTER — INPATIENT (INPATIENT)
Facility: HOSPITAL | Age: 69
LOS: 2 days | Discharge: HOME | DRG: 204 | End: 2024-06-15
Attending: INTERNAL MEDICINE | Admitting: INTERNAL MEDICINE
Payer: COMMERCIAL

## 2024-06-12 VITALS
WEIGHT: 160.06 LBS | OXYGEN SATURATION: 82 % | SYSTOLIC BLOOD PRESSURE: 160 MMHG | TEMPERATURE: 98 F | HEART RATE: 101 BPM | RESPIRATION RATE: 20 BRPM | DIASTOLIC BLOOD PRESSURE: 80 MMHG | HEIGHT: 72 IN

## 2024-06-12 DIAGNOSIS — E11.9 TYPE 2 DIABETES MELLITUS WITHOUT COMPLICATIONS: ICD-10-CM

## 2024-06-12 DIAGNOSIS — Z90.49 ACQUIRED ABSENCE OF OTHER SPECIFIED PARTS OF DIGESTIVE TRACT: Chronic | ICD-10-CM

## 2024-06-12 DIAGNOSIS — Z95.1 PRESENCE OF AORTOCORONARY BYPASS GRAFT: Chronic | ICD-10-CM

## 2024-06-12 DIAGNOSIS — R06.02 SHORTNESS OF BREATH: ICD-10-CM

## 2024-06-12 DIAGNOSIS — I48.0 PAROXYSMAL ATRIAL FIBRILLATION: ICD-10-CM

## 2024-06-12 DIAGNOSIS — Z86.79 PERSONAL HISTORY OF OTHER DISEASES OF THE CIRCULATORY SYSTEM: ICD-10-CM

## 2024-06-12 DIAGNOSIS — R79.89 OTHER SPECIFIED ABNORMAL FINDINGS OF BLOOD CHEMISTRY: ICD-10-CM

## 2024-06-12 DIAGNOSIS — Z98.89 OTHER SPECIFIED POSTPROCEDURAL STATES: Chronic | ICD-10-CM

## 2024-06-12 DIAGNOSIS — D53.9 NUTRITIONAL ANEMIA, UNSPECIFIED: ICD-10-CM

## 2024-06-12 DIAGNOSIS — Z29.9 ENCOUNTER FOR PROPHYLACTIC MEASURES, UNSPECIFIED: ICD-10-CM

## 2024-06-12 DIAGNOSIS — E83.52 HYPERCALCEMIA: ICD-10-CM

## 2024-06-12 DIAGNOSIS — J96.01 ACUTE RESPIRATORY FAILURE WITH HYPOXIA: ICD-10-CM

## 2024-06-12 DIAGNOSIS — D69.6 THROMBOCYTOPENIA, UNSPECIFIED: ICD-10-CM

## 2024-06-12 DIAGNOSIS — I25.10 ATHEROSCLEROTIC HEART DISEASE OF NATIVE CORONARY ARTERY WITHOUT ANGINA PECTORIS: ICD-10-CM

## 2024-06-12 LAB
ALBUMIN SERPL ELPH-MCNC: 2 G/DL — LOW (ref 3.3–5)
ALP SERPL-CCNC: 140 U/L — HIGH (ref 40–120)
ALT FLD-CCNC: 24 U/L — SIGNIFICANT CHANGE UP (ref 12–78)
ANION GAP SERPL CALC-SCNC: 10 MMOL/L — SIGNIFICANT CHANGE UP (ref 5–17)
APPEARANCE UR: CLEAR — SIGNIFICANT CHANGE UP
AST SERPL-CCNC: 77 U/L — HIGH (ref 15–37)
BASE EXCESS BLDV CALC-SCNC: -1.4 MMOL/L — SIGNIFICANT CHANGE UP (ref -2–3)
BASOPHILS # BLD AUTO: 0 K/UL — SIGNIFICANT CHANGE UP (ref 0–0.2)
BASOPHILS NFR BLD AUTO: 0 % — SIGNIFICANT CHANGE UP (ref 0–2)
BILIRUB SERPL-MCNC: 1.6 MG/DL — HIGH (ref 0.2–1.2)
BILIRUB UR-MCNC: NEGATIVE — SIGNIFICANT CHANGE UP
BLOOD GAS COMMENTS, VENOUS: SIGNIFICANT CHANGE UP
BUN SERPL-MCNC: 3 MG/DL — LOW (ref 7–23)
CALCIUM SERPL-MCNC: 8.8 MG/DL — SIGNIFICANT CHANGE UP (ref 8.5–10.1)
CHLORIDE SERPL-SCNC: 97 MMOL/L — SIGNIFICANT CHANGE UP (ref 96–108)
CO2 SERPL-SCNC: 22 MMOL/L — SIGNIFICANT CHANGE UP (ref 22–31)
COLOR SPEC: YELLOW — SIGNIFICANT CHANGE UP
CREAT SERPL-MCNC: 0.66 MG/DL — SIGNIFICANT CHANGE UP (ref 0.5–1.3)
D DIMER BLD IA.RAPID-MCNC: <150 NG/ML DDU — SIGNIFICANT CHANGE UP
DIFF PNL FLD: NEGATIVE — SIGNIFICANT CHANGE UP
EGFR: 103 ML/MIN/1.73M2 — SIGNIFICANT CHANGE UP
EGFR: 103 ML/MIN/1.73M2 — SIGNIFICANT CHANGE UP
EOSINOPHIL # BLD AUTO: 0 K/UL — SIGNIFICANT CHANGE UP (ref 0–0.5)
EOSINOPHIL NFR BLD AUTO: 0 % — SIGNIFICANT CHANGE UP (ref 0–6)
GLUCOSE SERPL-MCNC: 175 MG/DL — HIGH (ref 70–99)
GLUCOSE UR QL: NEGATIVE MG/DL — SIGNIFICANT CHANGE UP
HCO3 BLDV-SCNC: 24 MMOL/L — SIGNIFICANT CHANGE UP (ref 22–29)
HCT VFR BLD CALC: 31.2 % — LOW (ref 39–50)
HGB BLD-MCNC: 11.1 G/DL — LOW (ref 13–17)
KETONES UR-MCNC: NEGATIVE MG/DL — SIGNIFICANT CHANGE UP
LEUKOCYTE ESTERASE UR-ACNC: NEGATIVE — SIGNIFICANT CHANGE UP
LYMPHOCYTES # BLD AUTO: 1.49 K/UL — SIGNIFICANT CHANGE UP (ref 1–3.3)
LYMPHOCYTES # BLD AUTO: 29 % — SIGNIFICANT CHANGE UP (ref 13–44)
MCHC RBC-ENTMCNC: 35.6 GM/DL — SIGNIFICANT CHANGE UP (ref 32–36)
MCHC RBC-ENTMCNC: 39.8 PG — HIGH (ref 27–34)
MCV RBC AUTO: 111.8 FL — HIGH (ref 80–100)
MONOCYTES # BLD AUTO: 0.36 K/UL — SIGNIFICANT CHANGE UP (ref 0–0.9)
MONOCYTES NFR BLD AUTO: 7 % — SIGNIFICANT CHANGE UP (ref 2–14)
NEUTROPHILS # BLD AUTO: 3.3 K/UL — SIGNIFICANT CHANGE UP (ref 1.8–7.4)
NEUTROPHILS NFR BLD AUTO: 64 % — SIGNIFICANT CHANGE UP (ref 43–77)
NITRITE UR-MCNC: NEGATIVE — SIGNIFICANT CHANGE UP
NRBC # BLD: SIGNIFICANT CHANGE UP /100 WBCS (ref 0–0)
NRBC BLD-RTO: SIGNIFICANT CHANGE UP /100 WBCS (ref 0–0)
NT-PROBNP SERPL-SCNC: 695 PG/ML — HIGH (ref 0–125)
PCO2 BLDV: 40 MMHG — LOW (ref 42–55)
PH BLDV: 7.38 — SIGNIFICANT CHANGE UP (ref 7.32–7.43)
PH UR: 7 — SIGNIFICANT CHANGE UP (ref 5–8)
PLATELET # BLD AUTO: 77 K/UL — LOW (ref 150–400)
PO2 BLDV: 47 MMHG — HIGH (ref 25–45)
POTASSIUM SERPL-MCNC: 3.5 MMOL/L — SIGNIFICANT CHANGE UP (ref 3.5–5.3)
POTASSIUM SERPL-SCNC: 3.5 MMOL/L — SIGNIFICANT CHANGE UP (ref 3.5–5.3)
PROT SERPL-MCNC: 6 G/DL — SIGNIFICANT CHANGE UP (ref 6–8.3)
PROT UR-MCNC: NEGATIVE MG/DL — SIGNIFICANT CHANGE UP
RAPID RVP RESULT: SIGNIFICANT CHANGE UP
RBC # BLD: 2.79 M/UL — LOW (ref 4.2–5.8)
RBC # FLD: 11.5 % — SIGNIFICANT CHANGE UP (ref 10.3–14.5)
SAO2 % BLDV: 78.2 % — SIGNIFICANT CHANGE UP (ref 67–88)
SARS-COV-2 RNA SPEC QL NAA+PROBE: SIGNIFICANT CHANGE UP
SODIUM SERPL-SCNC: 129 MMOL/L — LOW (ref 135–145)
SP GR SPEC: 1.01 — SIGNIFICANT CHANGE UP (ref 1–1.03)
TROPONIN I, HIGH SENSITIVITY RESULT: 24.4 NG/L — SIGNIFICANT CHANGE UP
TROPONIN I, HIGH SENSITIVITY RESULT: 29.9 NG/L — SIGNIFICANT CHANGE UP
UROBILINOGEN FLD QL: 1 MG/DL — SIGNIFICANT CHANGE UP (ref 0.2–1)
WBC # BLD: 5.15 K/UL — SIGNIFICANT CHANGE UP (ref 3.8–10.5)
WBC # FLD AUTO: 5.15 K/UL — SIGNIFICANT CHANGE UP (ref 3.8–10.5)

## 2024-06-12 PROCEDURE — 93010 ELECTROCARDIOGRAM REPORT: CPT

## 2024-06-12 PROCEDURE — 71045 X-RAY EXAM CHEST 1 VIEW: CPT | Mod: 26

## 2024-06-12 PROCEDURE — 71275 CT ANGIOGRAPHY CHEST: CPT | Mod: 26,MC

## 2024-06-12 PROCEDURE — 99285 EMERGENCY DEPT VISIT HI MDM: CPT

## 2024-06-12 RX ORDER — SODIUM CHLORIDE 9 G/1000ML
1000 INJECTION, SOLUTION INTRAVENOUS
Refills: 0 | Status: DISCONTINUED | OUTPATIENT
Start: 2024-06-12 | End: 2024-06-15

## 2024-06-12 RX ORDER — MELATONIN 5 MG
3 TABLET ORAL AT BEDTIME
Refills: 0 | Status: DISCONTINUED | OUTPATIENT
Start: 2024-06-12 | End: 2024-06-15

## 2024-06-12 RX ORDER — CEFTRIAXONE 500 MG/1
1000 INJECTION, POWDER, FOR SOLUTION INTRAMUSCULAR; INTRAVENOUS ONCE
Refills: 0 | Status: COMPLETED | OUTPATIENT
Start: 2024-06-12 | End: 2024-06-12

## 2024-06-12 RX ORDER — DEXTROSE 50 % IN WATER 50 %
15 SYRINGE (ML) INTRAVENOUS ONCE
Refills: 0 | Status: DISCONTINUED | OUTPATIENT
Start: 2024-06-12 | End: 2024-06-15

## 2024-06-12 RX ORDER — ACETAMINOPHEN 500 MG/5ML
650 LIQUID (ML) ORAL EVERY 6 HOURS
Refills: 0 | Status: DISCONTINUED | OUTPATIENT
Start: 2024-06-12 | End: 2024-06-15

## 2024-06-12 RX ORDER — GLUCAGON 3 MG/1
1 POWDER NASAL ONCE
Refills: 0 | Status: DISCONTINUED | OUTPATIENT
Start: 2024-06-12 | End: 2024-06-15

## 2024-06-12 RX ORDER — AZITHROMYCIN 250 MG
500 CAPSULE ORAL ONCE
Refills: 0 | Status: COMPLETED | OUTPATIENT
Start: 2024-06-12 | End: 2024-06-12

## 2024-06-12 RX ORDER — ATORVASTATIN CALCIUM 80 MG/1
1 TABLET, FILM COATED ORAL
Refills: 0 | DISCHARGE

## 2024-06-12 RX ORDER — DEXTROSE MONOHYDRATE 100 G/1000ML
125 INJECTION, SOLUTION INTRAVENOUS ONCE
Refills: 0 | Status: DISCONTINUED | OUTPATIENT
Start: 2024-06-12 | End: 2024-06-15

## 2024-06-12 RX ORDER — GENTAMICIN SULFATE 0.3 %
1 DROPS OPHTHALMIC (EYE)
Refills: 0 | Status: DISCONTINUED | OUTPATIENT
Start: 2024-06-12 | End: 2024-06-15

## 2024-06-12 RX ORDER — DEXTROSE 50 % IN WATER 50 %
25 SYRINGE (ML) INTRAVENOUS ONCE
Refills: 0 | Status: DISCONTINUED | OUTPATIENT
Start: 2024-06-12 | End: 2024-06-15

## 2024-06-12 RX ORDER — ASPIRIN 325 MG
81 TABLET ORAL DAILY
Refills: 0 | Status: DISCONTINUED | OUTPATIENT
Start: 2024-06-12 | End: 2024-06-15

## 2024-06-12 RX ORDER — INSULIN LISPRO 100 U/ML
INJECTION, SOLUTION INTRAVENOUS; SUBCUTANEOUS AT BEDTIME
Refills: 0 | Status: DISCONTINUED | OUTPATIENT
Start: 2024-06-12 | End: 2024-06-15

## 2024-06-12 RX ORDER — AMMONIUM LACTATE 12 %
1 LOTION (ML) TOPICAL
Refills: 0 | Status: DISCONTINUED | OUTPATIENT
Start: 2024-06-12 | End: 2024-06-15

## 2024-06-12 RX ORDER — ENOXAPARIN SODIUM 100 MG/ML
40 INJECTION SUBCUTANEOUS EVERY 24 HOURS
Refills: 0 | Status: DISCONTINUED | OUTPATIENT
Start: 2024-06-12 | End: 2024-06-15

## 2024-06-12 RX ORDER — DEXTROSE 50 % IN WATER 50 %
12.5 SYRINGE (ML) INTRAVENOUS ONCE
Refills: 0 | Status: DISCONTINUED | OUTPATIENT
Start: 2024-06-12 | End: 2024-06-15

## 2024-06-12 RX ORDER — INSULIN LISPRO 100 U/ML
INJECTION, SOLUTION INTRAVENOUS; SUBCUTANEOUS
Refills: 0 | Status: DISCONTINUED | OUTPATIENT
Start: 2024-06-12 | End: 2024-06-15

## 2024-06-12 RX ADMIN — Medication 81 MILLIGRAM(S): at 22:59

## 2024-06-12 RX ADMIN — INSULIN LISPRO 0: 100 INJECTION, SOLUTION INTRAVENOUS; SUBCUTANEOUS at 23:04

## 2024-06-12 RX ADMIN — ENOXAPARIN SODIUM 40 MILLIGRAM(S): 100 INJECTION SUBCUTANEOUS at 23:04

## 2024-06-13 ENCOUNTER — RESULT REVIEW (OUTPATIENT)
Age: 69
End: 2024-06-13

## 2024-06-13 ENCOUNTER — TRANSCRIPTION ENCOUNTER (OUTPATIENT)
Age: 69
End: 2024-06-13

## 2024-06-13 DIAGNOSIS — Z87.898 PERSONAL HISTORY OF OTHER SPECIFIED CONDITIONS: ICD-10-CM

## 2024-06-13 DIAGNOSIS — K74.60 UNSPECIFIED CIRRHOSIS OF LIVER: ICD-10-CM

## 2024-06-13 DIAGNOSIS — D61.818 OTHER PANCYTOPENIA: ICD-10-CM

## 2024-06-13 LAB
A1C WITH ESTIMATED AVERAGE GLUCOSE RESULT: 4.6 % — SIGNIFICANT CHANGE UP (ref 4–5.6)
ALBUMIN SERPL ELPH-MCNC: 2.1 G/DL — LOW (ref 3.3–5)
ALP SERPL-CCNC: 141 U/L — HIGH (ref 40–120)
ALT FLD-CCNC: 19 U/L — SIGNIFICANT CHANGE UP (ref 12–78)
ANION GAP SERPL CALC-SCNC: 4 MMOL/L — LOW (ref 5–17)
APTT BLD: 34.2 SEC — SIGNIFICANT CHANGE UP (ref 24.5–35.6)
AST SERPL-CCNC: 51 U/L — HIGH (ref 15–37)
BASOPHILS # BLD AUTO: 0.02 K/UL — SIGNIFICANT CHANGE UP (ref 0–0.2)
BASOPHILS NFR BLD AUTO: 0.4 % — SIGNIFICANT CHANGE UP (ref 0–2)
BILIRUB SERPL-MCNC: 1.8 MG/DL — HIGH (ref 0.2–1.2)
BUN SERPL-MCNC: 4 MG/DL — LOW (ref 7–23)
CALCIT SERPL-MCNC: 3.2 PG/ML — SIGNIFICANT CHANGE UP
CALCIUM SERPL-MCNC: 9.1 MG/DL — SIGNIFICANT CHANGE UP (ref 8.4–10.5)
CALCIUM SERPL-MCNC: 9.4 MG/DL — SIGNIFICANT CHANGE UP (ref 8.5–10.1)
CHLORIDE SERPL-SCNC: 102 MMOL/L — SIGNIFICANT CHANGE UP (ref 96–108)
CHOLEST SERPL-MCNC: 100 MG/DL — SIGNIFICANT CHANGE UP
CO2 SERPL-SCNC: 28 MMOL/L — SIGNIFICANT CHANGE UP (ref 22–31)
CREAT SERPL-MCNC: 0.62 MG/DL — SIGNIFICANT CHANGE UP (ref 0.5–1.3)
EGFR: 105 ML/MIN/1.73M2 — SIGNIFICANT CHANGE UP
EGFR: 105 ML/MIN/1.73M2 — SIGNIFICANT CHANGE UP
EOSINOPHIL # BLD AUTO: 0 K/UL — SIGNIFICANT CHANGE UP (ref 0–0.5)
EOSINOPHIL NFR BLD AUTO: 0 % — SIGNIFICANT CHANGE UP (ref 0–6)
ESTIMATED AVERAGE GLUCOSE: 85 MG/DL — SIGNIFICANT CHANGE UP (ref 68–114)
FERRITIN SERPL-MCNC: 336 NG/ML — SIGNIFICANT CHANGE UP (ref 30–400)
FOLATE SERPL-MCNC: 6.6 NG/ML — SIGNIFICANT CHANGE UP
GLUCOSE BLDC GLUCOMTR-MCNC: 104 MG/DL — HIGH (ref 70–99)
GLUCOSE BLDC GLUCOMTR-MCNC: 128 MG/DL — HIGH (ref 70–99)
GLUCOSE BLDC GLUCOMTR-MCNC: 160 MG/DL — HIGH (ref 70–99)
GLUCOSE BLDC GLUCOMTR-MCNC: 219 MG/DL — HIGH (ref 70–99)
GLUCOSE SERPL-MCNC: 105 MG/DL — HIGH (ref 70–99)
HCT VFR BLD CALC: 30 % — LOW (ref 39–50)
HDLC SERPL-MCNC: 21 MG/DL — LOW
HGB BLD-MCNC: 10.7 G/DL — LOW (ref 13–17)
IMM GRANULOCYTES NFR BLD AUTO: 0.7 % — SIGNIFICANT CHANGE UP (ref 0–0.9)
INR BLD: 1.16 RATIO — SIGNIFICANT CHANGE UP (ref 0.85–1.18)
IRON SATN MFR SERPL: 35 % — SIGNIFICANT CHANGE UP (ref 16–55)
IRON SATN MFR SERPL: 60 UG/DL — SIGNIFICANT CHANGE UP (ref 45–165)
LDLC SERPL-MCNC: 62 MG/DL — SIGNIFICANT CHANGE UP
LIPID PNL WITH DIRECT LDL SERPL: 62 MG/DL — SIGNIFICANT CHANGE UP
LYMPHOCYTES # BLD AUTO: 1.83 K/UL — SIGNIFICANT CHANGE UP (ref 1–3.3)
LYMPHOCYTES # BLD AUTO: 40 % — SIGNIFICANT CHANGE UP (ref 13–44)
MCHC RBC-ENTMCNC: 35.7 GM/DL — SIGNIFICANT CHANGE UP (ref 32–36)
MCHC RBC-ENTMCNC: 38.9 PG — HIGH (ref 27–34)
MCV RBC AUTO: 109.1 FL — HIGH (ref 80–100)
MONOCYTES # BLD AUTO: 0.28 K/UL — SIGNIFICANT CHANGE UP (ref 0–0.9)
MONOCYTES NFR BLD AUTO: 6.1 % — SIGNIFICANT CHANGE UP (ref 2–14)
NEUTROPHILS # BLD AUTO: 2.41 K/UL — SIGNIFICANT CHANGE UP (ref 1.8–7.4)
NEUTROPHILS NFR BLD AUTO: 52.8 % — SIGNIFICANT CHANGE UP (ref 43–77)
NONHDLC SERPL-MCNC: 79 MG/DL — SIGNIFICANT CHANGE UP
NRBC # BLD: 0 /100 WBCS — SIGNIFICANT CHANGE UP (ref 0–0)
NRBC BLD-RTO: 0 /100 WBCS — SIGNIFICANT CHANGE UP (ref 0–0)
PLATELET # BLD AUTO: 65 K/UL — LOW (ref 150–400)
POTASSIUM SERPL-MCNC: 3.7 MMOL/L — SIGNIFICANT CHANGE UP (ref 3.5–5.3)
POTASSIUM SERPL-SCNC: 3.7 MMOL/L — SIGNIFICANT CHANGE UP (ref 3.5–5.3)
PROT SERPL-MCNC: 5.4 G/DL — LOW (ref 6–8.3)
PROTHROM AB SERPL-ACNC: 13.5 SEC — HIGH (ref 9.5–13)
PTH-INTACT FLD-MCNC: 48 PG/ML — SIGNIFICANT CHANGE UP (ref 15–65)
RBC # BLD: 2.75 M/UL — LOW (ref 4.2–5.8)
RBC # FLD: 11.3 % — SIGNIFICANT CHANGE UP (ref 10.3–14.5)
SODIUM SERPL-SCNC: 134 MMOL/L — LOW (ref 135–145)
T3 SERPL-MCNC: 86 NG/DL — SIGNIFICANT CHANGE UP (ref 80–200)
T4 AB SER-ACNC: 8.6 UG/DL — SIGNIFICANT CHANGE UP (ref 4.6–12)
TIBC SERPL-MCNC: 170 UG/DL — LOW (ref 220–430)
TRIGL SERPL-MCNC: 87 MG/DL — SIGNIFICANT CHANGE UP
TSH SERPL-MCNC: 1.04 UIU/ML — SIGNIFICANT CHANGE UP (ref 0.36–3.74)
UIBC SERPL-MCNC: 110 UG/DL — SIGNIFICANT CHANGE UP (ref 110–370)
VIT B12 SERPL-MCNC: >2000 PG/ML — HIGH (ref 232–1245)
WBC # BLD: 4.57 K/UL — SIGNIFICANT CHANGE UP (ref 3.8–10.5)
WBC # FLD AUTO: 4.57 K/UL — SIGNIFICANT CHANGE UP (ref 3.8–10.5)

## 2024-06-13 PROCEDURE — 76705 ECHO EXAM OF ABDOMEN: CPT | Mod: 26

## 2024-06-13 PROCEDURE — 93306 TTE W/DOPPLER COMPLETE: CPT | Mod: 26

## 2024-06-13 PROCEDURE — 99223 1ST HOSP IP/OBS HIGH 75: CPT

## 2024-06-13 PROCEDURE — 93010 ELECTROCARDIOGRAM REPORT: CPT

## 2024-06-13 RX ORDER — IOHEXOL 350 MG/ML
30 INJECTION, SOLUTION INTRAVENOUS ONCE
Refills: 0 | Status: COMPLETED | OUTPATIENT
Start: 2024-06-14 | End: 2024-06-14

## 2024-06-13 RX ORDER — AZITHROMYCIN 250 MG
500 CAPSULE ORAL EVERY 24 HOURS
Refills: 0 | Status: DISCONTINUED | OUTPATIENT
Start: 2024-06-14 | End: 2024-06-15

## 2024-06-13 RX ORDER — LORAZEPAM 4 MG/ML
1 VIAL (ML) INJECTION
Refills: 0 | Status: DISCONTINUED | OUTPATIENT
Start: 2024-06-13 | End: 2024-06-13

## 2024-06-13 RX ORDER — CEFTRIAXONE 500 MG/1
1000 INJECTION, POWDER, FOR SOLUTION INTRAMUSCULAR; INTRAVENOUS EVERY 24 HOURS
Refills: 0 | Status: DISCONTINUED | OUTPATIENT
Start: 2024-06-13 | End: 2024-06-15

## 2024-06-13 RX ORDER — LORAZEPAM 4 MG/ML
1 VIAL (ML) INJECTION
Refills: 0 | Status: DISCONTINUED | OUTPATIENT
Start: 2024-06-13 | End: 2024-06-15

## 2024-06-13 RX ORDER — NICOTINE POLACRILEX 4 MG/1
1 GUM, CHEWING ORAL DAILY
Refills: 0 | Status: DISCONTINUED | OUTPATIENT
Start: 2024-06-13 | End: 2024-06-15

## 2024-06-13 RX ORDER — AZITHROMYCIN 250 MG
CAPSULE ORAL
Refills: 0 | Status: DISCONTINUED | OUTPATIENT
Start: 2024-06-13 | End: 2024-06-15

## 2024-06-13 RX ORDER — AZITHROMYCIN 250 MG
500 CAPSULE ORAL ONCE
Refills: 0 | Status: COMPLETED | OUTPATIENT
Start: 2024-06-13 | End: 2024-06-13

## 2024-06-13 RX ADMIN — Medication 81 MILLIGRAM(S): at 12:39

## 2024-06-13 RX ADMIN — Medication 1 APPLICATION(S): at 05:48

## 2024-06-13 RX ADMIN — Medication 255 MILLIGRAM(S): at 03:24

## 2024-06-13 RX ADMIN — Medication 1 APPLICATION(S): at 18:35

## 2024-06-13 RX ADMIN — Medication 1 DROP(S): at 18:33

## 2024-06-13 RX ADMIN — Medication 40 MILLIGRAM(S): at 05:48

## 2024-06-13 RX ADMIN — Medication 255 MILLIGRAM(S): at 19:46

## 2024-06-13 RX ADMIN — INSULIN LISPRO 2: 100 INJECTION, SOLUTION INTRAVENOUS; SUBCUTANEOUS at 12:32

## 2024-06-13 RX ADMIN — Medication 40 MILLIEQUIVALENT(S): at 12:40

## 2024-06-13 RX ADMIN — CEFTRIAXONE 100 MILLIGRAM(S): 500 INJECTION, POWDER, FOR SOLUTION INTRAMUSCULAR; INTRAVENOUS at 18:34

## 2024-06-13 RX ADMIN — ENOXAPARIN SODIUM 40 MILLIGRAM(S): 100 INJECTION SUBCUTANEOUS at 23:14

## 2024-06-13 RX ADMIN — CEFTRIAXONE 100 MILLIGRAM(S): 500 INJECTION, POWDER, FOR SOLUTION INTRAMUSCULAR; INTRAVENOUS at 00:44

## 2024-06-13 RX ADMIN — Medication 40 MILLIGRAM(S): at 18:34

## 2024-06-13 RX ADMIN — Medication 1 DROP(S): at 12:42

## 2024-06-13 RX ADMIN — Medication 1 DROP(S): at 05:48

## 2024-06-14 LAB
ALBUMIN SERPL ELPH-MCNC: 1.9 G/DL — LOW (ref 3.3–5)
ALP SERPL-CCNC: 136 U/L — HIGH (ref 40–120)
ALT FLD-CCNC: 20 U/L — SIGNIFICANT CHANGE UP (ref 12–78)
ANION GAP SERPL CALC-SCNC: 5 MMOL/L — SIGNIFICANT CHANGE UP (ref 5–17)
AST SERPL-CCNC: 57 U/L — HIGH (ref 15–37)
BILIRUB SERPL-MCNC: 1.8 MG/DL — HIGH (ref 0.2–1.2)
BUN SERPL-MCNC: 5 MG/DL — LOW (ref 7–23)
CALCIUM SERPL-MCNC: 8.8 MG/DL — SIGNIFICANT CHANGE UP (ref 8.5–10.1)
CHLORIDE SERPL-SCNC: 104 MMOL/L — SIGNIFICANT CHANGE UP (ref 96–108)
CO2 SERPL-SCNC: 24 MMOL/L — SIGNIFICANT CHANGE UP (ref 22–31)
CREAT SERPL-MCNC: 0.43 MG/DL — LOW (ref 0.5–1.3)
EGFR: 117 ML/MIN/1.73M2 — SIGNIFICANT CHANGE UP
EGFR: 117 ML/MIN/1.73M2 — SIGNIFICANT CHANGE UP
GLUCOSE BLDC GLUCOMTR-MCNC: 103 MG/DL — HIGH (ref 70–99)
GLUCOSE BLDC GLUCOMTR-MCNC: 131 MG/DL — HIGH (ref 70–99)
GLUCOSE BLDC GLUCOMTR-MCNC: 152 MG/DL — HIGH (ref 70–99)
GLUCOSE BLDC GLUCOMTR-MCNC: 172 MG/DL — HIGH (ref 70–99)
GLUCOSE SERPL-MCNC: 95 MG/DL — SIGNIFICANT CHANGE UP (ref 70–99)
HCT VFR BLD CALC: 27 % — LOW (ref 39–50)
HGB BLD-MCNC: 9.7 G/DL — LOW (ref 13–17)
INR BLD: 1.21 RATIO — HIGH (ref 0.85–1.18)
LEGIONELLA AG UR QL: NEGATIVE — SIGNIFICANT CHANGE UP
MCHC RBC-ENTMCNC: 35.9 GM/DL — SIGNIFICANT CHANGE UP (ref 32–36)
MCHC RBC-ENTMCNC: 39.3 PG — HIGH (ref 27–34)
MCV RBC AUTO: 109.3 FL — HIGH (ref 80–100)
MELD SCORE WITH DIALYSIS: 26 POINTS — SIGNIFICANT CHANGE UP
MELD SCORE WITHOUT DIALYSIS: 11 POINTS — SIGNIFICANT CHANGE UP
NRBC # BLD: 0 /100 WBCS — SIGNIFICANT CHANGE UP (ref 0–0)
NRBC BLD-RTO: 0 /100 WBCS — SIGNIFICANT CHANGE UP (ref 0–0)
PLATELET # BLD AUTO: 64 K/UL — LOW (ref 150–400)
POTASSIUM SERPL-MCNC: 3.5 MMOL/L — SIGNIFICANT CHANGE UP (ref 3.5–5.3)
POTASSIUM SERPL-SCNC: 3.5 MMOL/L — SIGNIFICANT CHANGE UP (ref 3.5–5.3)
PROT SERPL-MCNC: 5.1 G/DL — LOW (ref 6–8.3)
PROTHROM AB SERPL-ACNC: 14.1 SEC — HIGH (ref 9.5–13)
RBC # BLD: 2.47 M/UL — LOW (ref 4.2–5.8)
RBC # BLD: 2.8 M/UL — LOW (ref 4.2–5.8)
RBC # FLD: 11.2 % — SIGNIFICANT CHANGE UP (ref 10.3–14.5)
RETICS #: 61.3 K/UL — SIGNIFICANT CHANGE UP (ref 25–125)
RETICS/RBC NFR: 2.2 % — SIGNIFICANT CHANGE UP (ref 0.5–2.5)
S PNEUM AG UR QL: NEGATIVE — SIGNIFICANT CHANGE UP
SODIUM SERPL-SCNC: 133 MMOL/L — LOW (ref 135–145)
WBC # BLD: 3.5 K/UL — LOW (ref 3.8–10.5)
WBC # FLD AUTO: 3.5 K/UL — LOW (ref 3.8–10.5)

## 2024-06-14 PROCEDURE — 70450 CT HEAD/BRAIN W/O DYE: CPT | Mod: 26

## 2024-06-14 PROCEDURE — 99232 SBSQ HOSP IP/OBS MODERATE 35: CPT

## 2024-06-14 PROCEDURE — 74176 CT ABD & PELVIS W/O CONTRAST: CPT | Mod: 26

## 2024-06-14 PROCEDURE — 73562 X-RAY EXAM OF KNEE 3: CPT | Mod: 26,RT

## 2024-06-14 RX ORDER — BISACODYL 5 MG
10 TABLET, DELAYED RELEASE (ENTERIC COATED) ORAL ONCE
Refills: 0 | Status: COMPLETED | OUTPATIENT
Start: 2024-06-14 | End: 2024-06-14

## 2024-06-14 RX ORDER — SENNA 187 MG
2 TABLET ORAL AT BEDTIME
Refills: 0 | Status: DISCONTINUED | OUTPATIENT
Start: 2024-06-14 | End: 2024-06-15

## 2024-06-14 RX ORDER — POLYETHYLENE GLYCOL 3350 17 G/17G
17 POWDER, FOR SOLUTION ORAL DAILY
Refills: 0 | Status: DISCONTINUED | OUTPATIENT
Start: 2024-06-14 | End: 2024-06-15

## 2024-06-14 RX ADMIN — ENOXAPARIN SODIUM 40 MILLIGRAM(S): 100 INJECTION SUBCUTANEOUS at 22:56

## 2024-06-14 RX ADMIN — Medication 255 MILLIGRAM(S): at 19:14

## 2024-06-14 RX ADMIN — IOHEXOL 30 MILLILITER(S): 350 INJECTION, SOLUTION INTRAVENOUS at 08:06

## 2024-06-14 RX ADMIN — Medication 40 MILLIGRAM(S): at 05:17

## 2024-06-14 RX ADMIN — Medication 40 MILLIEQUIVALENT(S): at 14:17

## 2024-06-14 RX ADMIN — Medication 1 DROP(S): at 05:17

## 2024-06-14 RX ADMIN — POLYETHYLENE GLYCOL 3350 17 GRAM(S): 17 POWDER, FOR SOLUTION ORAL at 14:53

## 2024-06-14 RX ADMIN — Medication 1 APPLICATION(S): at 05:17

## 2024-06-14 RX ADMIN — Medication 81 MILLIGRAM(S): at 12:22

## 2024-06-14 RX ADMIN — CEFTRIAXONE 100 MILLIGRAM(S): 500 INJECTION, POWDER, FOR SOLUTION INTRAMUSCULAR; INTRAVENOUS at 17:47

## 2024-06-14 RX ADMIN — Medication 40 MILLIGRAM(S): at 17:47

## 2024-06-14 RX ADMIN — Medication 10 MILLIGRAM(S): at 14:53

## 2024-06-14 RX ADMIN — INSULIN LISPRO 1: 100 INJECTION, SOLUTION INTRAVENOUS; SUBCUTANEOUS at 12:22

## 2024-06-14 RX ADMIN — Medication 1 DROP(S): at 12:25

## 2024-06-14 RX ADMIN — Medication 40 MILLIEQUIVALENT(S): at 17:47

## 2024-06-15 ENCOUNTER — TRANSCRIPTION ENCOUNTER (OUTPATIENT)
Age: 69
End: 2024-06-15

## 2024-06-15 VITALS
OXYGEN SATURATION: 99 % | HEART RATE: 95 BPM | RESPIRATION RATE: 18 BRPM | DIASTOLIC BLOOD PRESSURE: 57 MMHG | TEMPERATURE: 98 F | SYSTOLIC BLOOD PRESSURE: 93 MMHG

## 2024-06-15 LAB
ALBUMIN SERPL ELPH-MCNC: 2 G/DL — LOW (ref 3.3–5)
ALP SERPL-CCNC: 152 U/L — HIGH (ref 40–120)
ALT FLD-CCNC: 27 U/L — SIGNIFICANT CHANGE UP (ref 12–78)
ANION GAP SERPL CALC-SCNC: 5 MMOL/L — SIGNIFICANT CHANGE UP (ref 5–17)
AST SERPL-CCNC: 66 U/L — HIGH (ref 15–37)
BILIRUB SERPL-MCNC: 1.2 MG/DL — SIGNIFICANT CHANGE UP (ref 0.2–1.2)
BUN SERPL-MCNC: 6 MG/DL — LOW (ref 7–23)
CALCIUM SERPL-MCNC: 9.5 MG/DL — SIGNIFICANT CHANGE UP (ref 8.5–10.1)
CHLORIDE SERPL-SCNC: 105 MMOL/L — SIGNIFICANT CHANGE UP (ref 96–108)
CO2 SERPL-SCNC: 25 MMOL/L — SIGNIFICANT CHANGE UP (ref 22–31)
CREAT SERPL-MCNC: 0.57 MG/DL — SIGNIFICANT CHANGE UP (ref 0.5–1.3)
EGFR: 107 ML/MIN/1.73M2 — SIGNIFICANT CHANGE UP
EGFR: 107 ML/MIN/1.73M2 — SIGNIFICANT CHANGE UP
GLUCOSE BLDC GLUCOMTR-MCNC: 114 MG/DL — HIGH (ref 70–99)
GLUCOSE BLDC GLUCOMTR-MCNC: 205 MG/DL — HIGH (ref 70–99)
GLUCOSE BLDC GLUCOMTR-MCNC: 98 MG/DL — SIGNIFICANT CHANGE UP (ref 70–99)
GLUCOSE SERPL-MCNC: 94 MG/DL — SIGNIFICANT CHANGE UP (ref 70–99)
HCT VFR BLD CALC: 27.4 % — LOW (ref 39–50)
HGB BLD-MCNC: 9.9 G/DL — LOW (ref 13–17)
MCHC RBC-ENTMCNC: 36.1 GM/DL — HIGH (ref 32–36)
MCHC RBC-ENTMCNC: 40.2 PG — HIGH (ref 27–34)
MCV RBC AUTO: 111.4 FL — HIGH (ref 80–100)
NRBC # BLD: 0 /100 WBCS — SIGNIFICANT CHANGE UP (ref 0–0)
NRBC BLD-RTO: 0 /100 WBCS — SIGNIFICANT CHANGE UP (ref 0–0)
PLATELET # BLD AUTO: 65 K/UL — LOW (ref 150–400)
POTASSIUM SERPL-MCNC: 4 MMOL/L — SIGNIFICANT CHANGE UP (ref 3.5–5.3)
POTASSIUM SERPL-SCNC: 4 MMOL/L — SIGNIFICANT CHANGE UP (ref 3.5–5.3)
PROT SERPL-MCNC: 4.9 G/DL — LOW (ref 6–8.3)
PROT SERPL-MCNC: 5.3 G/DL — LOW (ref 6–8.3)
RBC # BLD: 2.46 M/UL — LOW (ref 4.2–5.8)
RBC # FLD: 11.3 % — SIGNIFICANT CHANGE UP (ref 10.3–14.5)
SODIUM SERPL-SCNC: 135 MMOL/L — SIGNIFICANT CHANGE UP (ref 135–145)
WBC # BLD: 4.22 K/UL — SIGNIFICANT CHANGE UP (ref 3.8–10.5)
WBC # FLD AUTO: 4.22 K/UL — SIGNIFICANT CHANGE UP (ref 3.8–10.5)

## 2024-06-15 PROCEDURE — 76705 ECHO EXAM OF ABDOMEN: CPT

## 2024-06-15 PROCEDURE — 71275 CT ANGIOGRAPHY CHEST: CPT | Mod: MC

## 2024-06-15 PROCEDURE — 73562 X-RAY EXAM OF KNEE 3: CPT

## 2024-06-15 PROCEDURE — 70450 CT HEAD/BRAIN W/O DYE: CPT | Mod: MC

## 2024-06-15 PROCEDURE — 80053 COMPREHEN METABOLIC PANEL: CPT

## 2024-06-15 PROCEDURE — 84484 ASSAY OF TROPONIN QUANT: CPT

## 2024-06-15 PROCEDURE — 83970 ASSAY OF PARATHORMONE: CPT

## 2024-06-15 PROCEDURE — 71045 X-RAY EXAM CHEST 1 VIEW: CPT

## 2024-06-15 PROCEDURE — 82962 GLUCOSE BLOOD TEST: CPT

## 2024-06-15 PROCEDURE — 86334 IMMUNOFIX E-PHORESIS SERUM: CPT

## 2024-06-15 PROCEDURE — 93306 TTE W/DOPPLER COMPLETE: CPT

## 2024-06-15 PROCEDURE — 87899 AGENT NOS ASSAY W/OPTIC: CPT

## 2024-06-15 PROCEDURE — 99232 SBSQ HOSP IP/OBS MODERATE 35: CPT

## 2024-06-15 PROCEDURE — 74176 CT ABD & PELVIS W/O CONTRAST: CPT | Mod: MC

## 2024-06-15 PROCEDURE — 82310 ASSAY OF CALCIUM: CPT

## 2024-06-15 PROCEDURE — 97161 PT EVAL LOW COMPLEX 20 MIN: CPT

## 2024-06-15 PROCEDURE — 83550 IRON BINDING TEST: CPT

## 2024-06-15 PROCEDURE — 85379 FIBRIN DEGRADATION QUANT: CPT

## 2024-06-15 PROCEDURE — 83036 HEMOGLOBIN GLYCOSYLATED A1C: CPT

## 2024-06-15 PROCEDURE — 84155 ASSAY OF PROTEIN SERUM: CPT

## 2024-06-15 PROCEDURE — 82746 ASSAY OF FOLIC ACID SERUM: CPT

## 2024-06-15 PROCEDURE — 84436 ASSAY OF TOTAL THYROXINE: CPT

## 2024-06-15 PROCEDURE — 85025 COMPLETE CBC W/AUTO DIFF WBC: CPT

## 2024-06-15 PROCEDURE — 81003 URINALYSIS AUTO W/O SCOPE: CPT

## 2024-06-15 PROCEDURE — 0225U NFCT DS DNA&RNA 21 SARSCOV2: CPT

## 2024-06-15 PROCEDURE — 82728 ASSAY OF FERRITIN: CPT

## 2024-06-15 PROCEDURE — 80061 LIPID PANEL: CPT

## 2024-06-15 PROCEDURE — 36415 COLL VENOUS BLD VENIPUNCTURE: CPT

## 2024-06-15 PROCEDURE — 84480 ASSAY TRIIODOTHYRONINE (T3): CPT

## 2024-06-15 PROCEDURE — 82607 VITAMIN B-12: CPT

## 2024-06-15 PROCEDURE — 83540 ASSAY OF IRON: CPT

## 2024-06-15 PROCEDURE — 85730 THROMBOPLASTIN TIME PARTIAL: CPT

## 2024-06-15 PROCEDURE — 82803 BLOOD GASES ANY COMBINATION: CPT

## 2024-06-15 PROCEDURE — 85610 PROTHROMBIN TIME: CPT

## 2024-06-15 PROCEDURE — 85045 AUTOMATED RETICULOCYTE COUNT: CPT

## 2024-06-15 PROCEDURE — 83880 ASSAY OF NATRIURETIC PEPTIDE: CPT

## 2024-06-15 PROCEDURE — 82308 ASSAY OF CALCITONIN: CPT

## 2024-06-15 PROCEDURE — 97116 GAIT TRAINING THERAPY: CPT

## 2024-06-15 PROCEDURE — 84443 ASSAY THYROID STIM HORMONE: CPT

## 2024-06-15 PROCEDURE — 87449 NOS EACH ORGANISM AG IA: CPT

## 2024-06-15 PROCEDURE — 99285 EMERGENCY DEPT VISIT HI MDM: CPT

## 2024-06-15 PROCEDURE — 84165 PROTEIN E-PHORESIS SERUM: CPT

## 2024-06-15 PROCEDURE — 93005 ELECTROCARDIOGRAM TRACING: CPT

## 2024-06-15 PROCEDURE — 85027 COMPLETE CBC AUTOMATED: CPT

## 2024-06-15 RX ORDER — NICOTINE POLACRILEX 4 MG/1
1 GUM, CHEWING ORAL
Qty: 0 | Refills: 0 | DISCHARGE
Start: 2024-06-15 | End: 2024-06-29

## 2024-06-15 RX ORDER — CEFTRIAXONE 500 MG/1
1000 INJECTION, POWDER, FOR SOLUTION INTRAMUSCULAR; INTRAVENOUS ONCE
Refills: 0 | Status: COMPLETED | OUTPATIENT
Start: 2024-06-15 | End: 2024-06-15

## 2024-06-15 RX ADMIN — Medication 40 MILLIGRAM(S): at 05:58

## 2024-06-15 RX ADMIN — CEFTRIAXONE 100 MILLIGRAM(S): 500 INJECTION, POWDER, FOR SOLUTION INTRAMUSCULAR; INTRAVENOUS at 13:20

## 2024-06-15 RX ADMIN — INSULIN LISPRO 2: 100 INJECTION, SOLUTION INTRAVENOUS; SUBCUTANEOUS at 11:56

## 2024-06-15 RX ADMIN — Medication 81 MILLIGRAM(S): at 11:05

## 2024-06-15 RX ADMIN — Medication 1 DROP(S): at 11:04

## 2024-06-16 RX ORDER — CEFPODOXIME PROXETIL 200 MG/1
1 TABLET, FILM COATED ORAL
Qty: 2 | Refills: 0
Start: 2024-06-16 | End: 2024-06-16

## 2024-06-18 LAB
% ALBUMIN: 44.6 % — SIGNIFICANT CHANGE UP
% ALPHA 1: 7.8 % — SIGNIFICANT CHANGE UP
% ALPHA 2: 12 % — SIGNIFICANT CHANGE UP
% BETA: 15.2 % — SIGNIFICANT CHANGE UP
% GAMMA: 20.4 % — SIGNIFICANT CHANGE UP
ALBUMIN SERPL ELPH-MCNC: 2.2 G/DL — LOW (ref 3.6–5.5)
ALBUMIN/GLOB SERPL ELPH: 0.8 RATIO — SIGNIFICANT CHANGE UP
ALPHA1 GLOB SERPL ELPH-MCNC: 0.4 G/DL — SIGNIFICANT CHANGE UP (ref 0.1–0.4)
ALPHA2 GLOB SERPL ELPH-MCNC: 0.6 G/DL — SIGNIFICANT CHANGE UP (ref 0.5–1)
B-GLOBULIN SERPL ELPH-MCNC: 0.7 G/DL — SIGNIFICANT CHANGE UP (ref 0.5–1)
GAMMA GLOBULIN: 1 G/DL — SIGNIFICANT CHANGE UP (ref 0.6–1.6)
PROT PATTERN SERPL ELPH-IMP: SIGNIFICANT CHANGE UP
PROT SERPL-MCNC: 4.9 G/DL — LOW (ref 6–8.3)

## 2024-06-20 ENCOUNTER — APPOINTMENT (OUTPATIENT)
Dept: PULMONOLOGY | Facility: CLINIC | Age: 69
End: 2024-06-20
Payer: MEDICARE

## 2024-06-20 VITALS
OXYGEN SATURATION: 98 % | HEIGHT: 72 IN | WEIGHT: 165 LBS | HEART RATE: 122 BPM | DIASTOLIC BLOOD PRESSURE: 69 MMHG | BODY MASS INDEX: 22.35 KG/M2 | SYSTOLIC BLOOD PRESSURE: 112 MMHG

## 2024-06-20 DIAGNOSIS — R06.09 OTHER FORMS OF DYSPNEA: ICD-10-CM

## 2024-06-20 PROBLEM — I50.9 HEART FAILURE, UNSPECIFIED: Chronic | Status: ACTIVE | Noted: 2024-06-12

## 2024-06-20 PROBLEM — E11.9 TYPE 2 DIABETES MELLITUS WITHOUT COMPLICATIONS: Chronic | Status: ACTIVE | Noted: 2024-06-12

## 2024-06-20 PROBLEM — I48.0 PAROXYSMAL ATRIAL FIBRILLATION: Chronic | Status: ACTIVE | Noted: 2024-06-12

## 2024-06-20 PROCEDURE — 99205 OFFICE O/P NEW HI 60 MIN: CPT

## 2024-06-20 RX ORDER — CHOLECALCIFEROL (VITAMIN D3) 125 MCG
125 MCG CAPSULE ORAL WEEKLY
Refills: 0 | Status: DISCONTINUED | COMMUNITY
End: 2024-06-20

## 2024-06-20 RX ORDER — INSULIN GLARGINE 100 [IU]/ML
100 INJECTION, SOLUTION SUBCUTANEOUS
Qty: 3 | Refills: 0 | Status: DISCONTINUED | COMMUNITY
Start: 2022-09-12 | End: 2024-06-20

## 2024-06-20 RX ORDER — ALBUTEROL SULFATE 90 UG/1
108 (90 BASE) INHALANT RESPIRATORY (INHALATION)
Qty: 1 | Refills: 3 | Status: ACTIVE | COMMUNITY
Start: 2024-06-20 | End: 1900-01-01

## 2024-06-20 RX ORDER — INSULIN ADMIN. SUPPLIES
30G X 8 MM INSULIN PEN (EA) SUBCUTANEOUS
Qty: 100 | Refills: 0 | Status: DISCONTINUED | COMMUNITY
Start: 2022-10-04 | End: 2024-06-20

## 2024-06-20 RX ORDER — METFORMIN ER 500 MG 500 MG/1
500 TABLET ORAL TWICE DAILY
Refills: 0 | Status: DISCONTINUED | COMMUNITY
End: 2024-06-20

## 2024-06-20 RX ORDER — MIRTAZAPINE 15 MG/1
15 TABLET, FILM COATED ORAL
Refills: 0 | Status: DISCONTINUED | COMMUNITY
End: 2024-06-20

## 2024-06-20 RX ORDER — INSULIN ASPART 100 [IU]/ML
100 INJECTION, SOLUTION INTRAVENOUS; SUBCUTANEOUS
Qty: 6 | Refills: 0 | Status: DISCONTINUED | COMMUNITY
Start: 2022-09-14 | End: 2024-06-20

## 2024-06-20 RX ORDER — DIPHENHYDRAMINE HCL 25 MG/1
25 TABLET ORAL
Refills: 0 | Status: DISCONTINUED | COMMUNITY
End: 2024-06-20

## 2024-06-20 RX ORDER — FOLIC ACID 1 MG/1
1 TABLET ORAL
Qty: 90 | Refills: 0 | Status: DISCONTINUED | COMMUNITY
End: 2024-06-20

## 2024-06-20 RX ORDER — CHLORHEXIDINE GLUCONATE 4 %
5 LIQUID (ML) TOPICAL AT BEDTIME
Refills: 0 | Status: DISCONTINUED | COMMUNITY
End: 2024-06-20

## 2024-06-20 RX ORDER — SERTRALINE HYDROCHLORIDE 100 MG/1
100 TABLET, FILM COATED ORAL DAILY
Refills: 0 | Status: DISCONTINUED | COMMUNITY
End: 2024-06-20

## 2024-06-20 RX ORDER — ELECTROLYTES/DEXTROSE
32G X 4 MM SOLUTION, ORAL ORAL
Qty: 100 | Refills: 0 | Status: DISCONTINUED | COMMUNITY
Start: 2022-07-22 | End: 2024-06-20

## 2024-06-20 RX ORDER — SACUBITRIL AND VALSARTAN 49; 51 MG/1; MG/1
49-51 TABLET, FILM COATED ORAL TWICE DAILY
Qty: 60 | Refills: 0 | Status: DISCONTINUED | COMMUNITY
Start: 1900-01-01 | End: 2024-06-20

## 2024-06-20 RX ORDER — COLD-HOT PACK
EACH MISCELLANEOUS
Refills: 0 | Status: DISCONTINUED | COMMUNITY
End: 2024-06-20

## 2024-06-20 RX ORDER — 70%ISOPROPYL ALCOHOL 0.7 ML/ML
70 SWAB TOPICAL
Qty: 100 | Refills: 0 | Status: DISCONTINUED | COMMUNITY
Start: 2022-07-22 | End: 2024-06-20

## 2024-06-20 RX ORDER — INSULIN GLARGINE-YFGN 100 [IU]/ML
100 INJECTION, SOLUTION SUBCUTANEOUS
Qty: 9 | Refills: 0 | Status: DISCONTINUED | COMMUNITY
Start: 2022-11-09 | End: 2024-06-20

## 2024-06-20 RX ORDER — SODIUM CHLORIDE 9 G/ML
0.9 INJECTION, SOLUTION INTRAVENOUS
Refills: 0 | Status: DISCONTINUED | COMMUNITY
Start: 2022-08-11 | End: 2024-06-20

## 2024-06-20 RX ORDER — ACETAMINOPHEN 500 MG/1
500 TABLET, COATED ORAL
Refills: 0 | Status: DISCONTINUED | COMMUNITY
End: 2024-06-20

## 2024-06-20 RX ORDER — INSULIN ASPART 100 [IU]/ML
100 INJECTION, SOLUTION INTRAVENOUS; SUBCUTANEOUS TWICE DAILY
Refills: 0 | Status: DISCONTINUED | COMMUNITY
End: 2024-06-20

## 2024-06-20 RX ORDER — ASCORBIC ACID 500 MG
500 TABLET ORAL
Refills: 0 | Status: DISCONTINUED | COMMUNITY
End: 2024-06-20

## 2024-06-20 RX ORDER — THIAMINE HCL 100 MG
100 TABLET ORAL DAILY
Refills: 0 | Status: DISCONTINUED | COMMUNITY
End: 2024-06-20

## 2024-06-20 RX ORDER — PANTOPRAZOLE 40 MG/1
40 TABLET, DELAYED RELEASE ORAL DAILY
Qty: 30 | Refills: 0 | Status: DISCONTINUED | COMMUNITY
End: 2024-06-20

## 2024-06-20 RX ORDER — LANCETS 30 GAUGE
EACH MISCELLANEOUS
Qty: 100 | Refills: 0 | Status: DISCONTINUED | COMMUNITY
Start: 2022-07-22 | End: 2024-06-20

## 2024-06-20 RX ORDER — BLOOD-GLUCOSE METER
W/DEVICE EACH MISCELLANEOUS
Qty: 1 | Refills: 0 | Status: DISCONTINUED | COMMUNITY
Start: 2022-07-22 | End: 2024-06-20

## 2024-06-20 RX ORDER — INSULIN GLARGINE 100 [IU]/ML
100 INJECTION, SOLUTION SUBCUTANEOUS DAILY
Refills: 0 | Status: DISCONTINUED | COMMUNITY
End: 2024-06-20

## 2024-06-20 RX ORDER — BLOOD SUGAR DIAGNOSTIC
STRIP MISCELLANEOUS
Qty: 100 | Refills: 0 | Status: DISCONTINUED | COMMUNITY
Start: 2022-07-22 | End: 2024-06-20

## 2024-06-20 RX ORDER — ACETAMINOPHEN 325 MG/1
325 TABLET ORAL
Refills: 0 | Status: DISCONTINUED | COMMUNITY
End: 2024-06-20

## 2024-06-20 RX ORDER — SIMVASTATIN 40 MG/1
40 TABLET, FILM COATED ORAL
Qty: 90 | Refills: 0 | Status: DISCONTINUED | COMMUNITY
Start: 1900-01-01 | End: 2024-06-20

## 2024-06-20 RX ORDER — METOPROLOL SUCCINATE 50 MG/1
50 TABLET, EXTENDED RELEASE ORAL
Qty: 30 | Refills: 0 | Status: DISCONTINUED | COMMUNITY
Start: 1900-01-01 | End: 2024-06-20

## 2024-06-20 NOTE — HISTORY OF PRESENT ILLNESS
[Former] : former [>= 20 pack years] : >= 20 pack years [Never] : never [TextBox_4] : Mr. BRIE WEBB is a 68 year old man with CAD s/p PCI (LEELA x 1 pRCA 2007), s/p CABG 2022;  HTN, depression, former smoker, former heavy EtOH abuse, and HLD, DM who is here for evaluation after recent hospitalization.   History: He was admitted with severe right knee pain, shortness of breath, and dizziness, which led to a fall on his right knee. During his hospitalization, he was diagnosed with acute hypoxic respiratory failure, pancytopenia, cirrhosis, elevated liver function tests (LFTs), history of weight loss, a lung nodule, mild protein-calorie malnutrition, paroxysmal atrial fibrillation, and right knee pain. He reports a mild cough over the past 2-3 weeks but denies wheezing or chest tightness. He experienced some upper chest tightness initially but not as severe as a previous heart attack.  He completed a course of antibiotics for suspected community-acquired pneumonia.  ABG during hospitalization did not show evidence of hypercapnia.  Patient was discharged home without oxygen   He has a history of open heart surgery in November 2022 and used to smoke 1.5 packs per day for at least 30 years but quit after his surgery. He denies any current medications except for a daily 81mg aspirin. He has lost approximately 10 pounds from his usual weight of 178-180 lbs. He reports having to climb 10 steep concrete steps to access his basement apartment and experiences shortness of breath and lightheadedness when carrying groceries.    ROS:  denies fevers, chills, night sweats, unintentional weight loss denies known autoimmune disease  PMH:  MI/CAD s/p PCI (LEELA x 1 pRCA 2007), s/p CABG 2022;  HTN, depression, former smoker, former heavy EtOH abuse, and HLD, DM Meds: per chart All: NKDA SH: former smoker FH: daughter has asthma PMD: REUBEN SYLVESTER Immunizations: [TextBox_11] : 1.5 [TextBox_13] : 30 [YearQuit] : 2022

## 2024-06-20 NOTE — ASSESSMENT
[FreeTextEntry1] : Mr. BRIE WEBB is a 68 year old man with CAD s/p PCI (LEELA x 1 pRCA 2007), s/p CABG 2022;  HTN, depression, former smoker, former heavy EtOH abuse, and HLD, DM who is here for evaluation after recent hospitalization.   #BOLES -unclear etiology.  May be related to underlying pulmonary cardiac disease or deconditioning -- Obtain pulmonary function testing, 6-minute walk test.  Can use albuterol on as-needed basis. Patient should follow-up with cardiologist for additional cardiac testing  #Former smoker -smoked at least 1 and half packs per day for more than 30 years.  Quit in 2022 after his CABG. CT chest during hospitalization was basilar atelectasis, patchy groundglass opacities in upper lobes -- obtain LDCT chest in 3-4 month  #other - f/u with heme/onc, hepatology and cardiology as recommended upon hospital d/c.   All questions answered. Patient in agreement with plan.  Follow up in 3-4mo or sooner if needed.

## 2024-06-20 NOTE — CONSULT LETTER
[Dear  ___] : Dear  [unfilled], [Consult Letter:] : I had the pleasure of evaluating your patient, [unfilled]. [Please see my note below.] : Please see my note below. [Consult Closing:] : Thank you very much for allowing me to participate in the care of this patient.  If you have any questions, please do not hesitate to contact me. [FreeTextEntry3] : Sincerely,  Roslyn Hankins MD [facility] tel: [phone] fax: [phone]

## 2024-06-27 ENCOUNTER — APPOINTMENT (OUTPATIENT)
Dept: PULMONOLOGY | Facility: CLINIC | Age: 69
End: 2024-06-27

## 2024-07-08 ENCOUNTER — APPOINTMENT (OUTPATIENT)
Dept: PULMONOLOGY | Facility: CLINIC | Age: 69
End: 2024-07-08
Payer: MEDICARE

## 2024-07-08 PROCEDURE — 94060 EVALUATION OF WHEEZING: CPT

## 2024-07-08 PROCEDURE — 94729 DIFFUSING CAPACITY: CPT

## 2024-07-08 PROCEDURE — 94727 GAS DIL/WSHOT DETER LNG VOL: CPT

## 2024-08-20 ENCOUNTER — NON-APPOINTMENT (OUTPATIENT)
Age: 69
End: 2024-08-20

## 2024-08-20 ENCOUNTER — APPOINTMENT (OUTPATIENT)
Dept: CARDIOLOGY | Facility: CLINIC | Age: 69
End: 2024-08-20
Payer: MEDICARE

## 2024-08-20 VITALS
HEART RATE: 130 BPM | DIASTOLIC BLOOD PRESSURE: 88 MMHG | BODY MASS INDEX: 17.07 KG/M2 | HEIGHT: 72 IN | WEIGHT: 126 LBS | SYSTOLIC BLOOD PRESSURE: 132 MMHG

## 2024-08-20 VITALS — SYSTOLIC BLOOD PRESSURE: 120 MMHG | DIASTOLIC BLOOD PRESSURE: 76 MMHG | HEART RATE: 107 BPM

## 2024-08-20 DIAGNOSIS — I50.20 UNSPECIFIED SYSTOLIC (CONGESTIVE) HEART FAILURE: ICD-10-CM

## 2024-08-20 DIAGNOSIS — I25.10 ATHEROSCLEROTIC HEART DISEASE OF NATIVE CORONARY ARTERY W/OUT ANGINA PECTORIS: ICD-10-CM

## 2024-08-20 DIAGNOSIS — I10 ESSENTIAL (PRIMARY) HYPERTENSION: ICD-10-CM

## 2024-08-20 PROCEDURE — 99214 OFFICE O/P EST MOD 30 MIN: CPT

## 2024-08-20 PROCEDURE — 93000 ELECTROCARDIOGRAM COMPLETE: CPT

## 2024-08-20 RX ORDER — METOPROLOL SUCCINATE 25 MG/1
25 TABLET, EXTENDED RELEASE ORAL
Qty: 60 | Refills: 3 | Status: ACTIVE | COMMUNITY
Start: 2024-08-20 | End: 1900-01-01

## 2024-08-20 NOTE — HISTORY OF PRESENT ILLNESS
[FreeTextEntry1] : Toby is a 68y old Male who presented to [facility] with a chief complaint of acute hypoxic respiratory failure on Annamaria 15, 2024. HPI: Toby is a 69 y/o M with PMHx HTN , MI/CAD s/p PCI (LEELA x 1 pRCA 2007) and CABG in Dec 2022, Afib, HFrEF, T2DM, depression presented to the ED for worsening shortness of breath for the past month and a half.  Pt stated over the past 2 weeks he's experienced worsening SOB stating he takes 3 steps and feels winded.  Denies orthopnea. Pt admits at least 20 pound weight loss in 5 months , fatigue and loss of appetite over the past 1.5 months as well.  Pt also complained of dizzy spells that have resulted in him falling - last episode occurring about 3 weeks ago and developed right knee pain as a result of falling on his steps.  Pt denies any chest pain currently or during the dizzy spells. Denies fever, chills, night sweats, abdominal pain, n/v, hematochezia. Endorses dry cough, easy bruising and neuropathy in both his feet. Pt states he has not taken any of his medications in about 1 year because he ran out and never followed up with a doctor -- states he is currently only taking ASA 81mg daily. He states his last cardiologist told him there were no other medications he needed. Pt reports smoking 1.5 PPD for over 40 years but currently smokes a few cigarettes daily. Pt reports drinking 2 beers daily. The patient stated that he has not seen a cardiologist or his primary care doctor for over a year and a half.  He states that he stopped taking all his medications except for baby aspirin.  But again he states that his old cardiologist told him he did not need anything else besides the aspirin. The EKG revealed RST at 107bpm with a RBBB pattern noted. Toby was instructed after being discharged from the hospital to follow up with pulmonary,  a PCP, Sports Medicine for his knee, Medical Oncology, gastroenterology and cardiology. He is walking with a cane due to knee pain as well as unsteadiness at times when walking.

## 2024-08-20 NOTE — HISTORY OF PRESENT ILLNESS
[FreeTextEntry1] : Toby is a 68y old Male who presented to [facility] with a chief complaint of acute hypoxic respiratory failure on Annamaria 15, 2024. HPI: Toby is a 67 y/o M with PMHx HTN , MI/CAD s/p PCI (LEELA x 1 pRCA 2007) and CABG in Dec 2022, Afib, HFrEF, T2DM, depression presented to the ED for worsening shortness of breath for the past month and a half.  Pt stated over the past 2 weeks he's experienced worsening SOB stating he takes 3 steps and feels winded.  Denies orthopnea. Pt admits at least 20 pound weight loss in 5 months , fatigue and loss of appetite over the past 1.5 months as well.  Pt also complained of dizzy spells that have resulted in him falling - last episode occurring about 3 weeks ago and developed right knee pain as a result of falling on his steps.  Pt denies any chest pain currently or during the dizzy spells. Denies fever, chills, night sweats, abdominal pain, n/v, hematochezia. Endorses dry cough, easy bruising and neuropathy in both his feet. Pt states he has not taken any of his medications in about 1 year because he ran out and never followed up with a doctor -- states he is currently only taking ASA 81mg daily. He states his last cardiologist told him there were no other medications he needed. Pt reports smoking 1.5 PPD for over 40 years but currently smokes a few cigarettes daily. Pt reports drinking 2 beers daily. The patient stated that he has not seen a cardiologist or his primary care doctor for over a year and a half.  He states that he stopped taking all his medications except for baby aspirin.  But again he states that his old cardiologist told him he did not need anything else besides the aspirin. The EKG revealed RST at 107bpm with a RBBB pattern noted. Toby was instructed after being discharged from the hospital to follow up with pulmonary,  a PCP, Sports Medicine for his knee, Medical Oncology, gastroenterology and cardiology. He is walking with a cane due to knee pain as well as unsteadiness at times when walking.

## 2024-08-20 NOTE — REVIEW OF SYSTEMS
[Joint Pain] : joint pain [Negative] : Heme/Lymph [FreeTextEntry3] : needs new glasses [FreeTextEntry7] : loss of appetite lost 20lbs [FreeTextEntry9] : knee pain [de-identified] : dry skin

## 2024-08-20 NOTE — PHYSICAL EXAM
[Normal S1, S2] : normal S1, S2 [Normal] : alert and oriented, normal memory [de-identified] : walks with a cane [de-identified] : very dry skin

## 2024-08-20 NOTE — HISTORY OF PRESENT ILLNESS
[FreeTextEntry1] : Toby is a 68y old Male who presented to [facility] with a chief complaint of acute hypoxic respiratory failure on Annamaria 15, 2024. HPI: Toby is a 67 y/o M with PMHx HTN , MI/CAD s/p PCI (LEELA x 1 pRCA 2007) and CABG in Dec 2022, Afib, HFrEF, T2DM, depression presented to the ED for worsening shortness of breath for the past month and a half.  Pt stated over the past 2 weeks he's experienced worsening SOB stating he takes 3 steps and feels winded.  Denies orthopnea. Pt admits at least 20 pound weight loss in 5 months , fatigue and loss of appetite over the past 1.5 months as well.  Pt also complained of dizzy spells that have resulted in him falling - last episode occurring about 3 weeks ago and developed right knee pain as a result of falling on his steps.  Pt denies any chest pain currently or during the dizzy spells. Denies fever, chills, night sweats, abdominal pain, n/v, hematochezia. Endorses dry cough, easy bruising and neuropathy in both his feet. Pt states he has not taken any of his medications in about 1 year because he ran out and never followed up with a doctor -- states he is currently only taking ASA 81mg daily. He states his last cardiologist told him there were no other medications he needed. Pt reports smoking 1.5 PPD for over 40 years but currently smokes a few cigarettes daily. Pt reports drinking 2 beers daily. The patient stated that he has not seen a cardiologist or his primary care doctor for over a year and a half.  He states that he stopped taking all his medications except for baby aspirin.  But again he states that his old cardiologist told him he did not need anything else besides the aspirin. The EKG revealed RST at 107bpm with a RBBB pattern noted. Toby was instructed after being discharged from the hospital to follow up with pulmonary,  a PCP, Sports Medicine for his knee, Medical Oncology, gastroenterology and cardiology. He is walking with a cane due to knee pain as well as unsteadiness at times when walking.

## 2024-08-20 NOTE — PHYSICAL EXAM
[Normal S1, S2] : normal S1, S2 [Normal] : alert and oriented, normal memory [de-identified] : walks with a cane [de-identified] : very dry skin

## 2024-08-20 NOTE — REVIEW OF SYSTEMS
[Joint Pain] : joint pain [Negative] : Heme/Lymph [FreeTextEntry3] : needs new glasses [FreeTextEntry7] : loss of appetite lost 20lbs [FreeTextEntry9] : knee pain [de-identified] : dry skin

## 2024-08-20 NOTE — REVIEW OF SYSTEMS
[Joint Pain] : joint pain [Negative] : Heme/Lymph [FreeTextEntry3] : needs new glasses [FreeTextEntry7] : loss of appetite lost 20lbs [FreeTextEntry9] : knee pain [de-identified] : dry skin

## 2024-08-20 NOTE — PHYSICAL EXAM
[Normal S1, S2] : normal S1, S2 [Normal] : alert and oriented, normal memory [de-identified] : walks with a cane [de-identified] : very dry skin

## 2024-08-20 NOTE — DISCUSSION/SUMMARY
[EKG obtained to assist in diagnosis and management of assessed problem(s)] : EKG obtained to assist in diagnosis and management of assessed problem(s) [FreeTextEntry1] : Toby is a 69 y/o M with PMHx HTN , MI/CAD s/p PCI (LEELA x 1 pRCA 2007) and CABG in Dec 2022, Afib, HFrEF, T2DM, depression presented to the ED for worsening shortness of breath for the past month and a half.  The patient stated that he has not seen a cardiologist or his primary care doctor for over a year and a half.  He states that he stopped taking all his medications except for baby aspirin.  But again he states that his old cardiologist told him he did not need anything else besides the aspirin. The EKG revealed RST at 107bpm with a RBBB pattern noted. An echocardiogram performed on June 13, 2024 revealed that the left ventricular cavity was normal in size.  Left ventricular systolic function was normal with an ejection fraction of 50 to 50%.  There was mild left ventricular hypertrophy.  There was normal right ventricular cavity size and normal systolic function.  The left atrium was normal in size.  There was mild mitral regurgitation.  There was a trileaflet aortic valve with normal systolic excursion.  There was no evidence of aortic regurgitation.  I have started him on metoprolol succinate 25mg Q12h to control his heart rate. I have asked him to return to the office in two weeks for a blood pressure and heart rate evaluation. After that, he will follow up with primary care, GI, oncology, pulmonology as well as with our office with Dr Kumar. I have encouraged him to quit cigarette smoking completely as well as drinking alcohol on a regular basis. He is looking into assisted living since it has become more difficult for him to take care of himself.  Further recommendations will be based on his clinical course.

## 2024-08-28 ENCOUNTER — INPATIENT (INPATIENT)
Facility: HOSPITAL | Age: 69
LOS: 3 days | Discharge: NURSING, REHAB OR LONG-TERM CARE | DRG: 948 | End: 2024-09-01
Attending: FAMILY MEDICINE | Admitting: INTERNAL MEDICINE
Payer: COMMERCIAL

## 2024-08-28 VITALS
SYSTOLIC BLOOD PRESSURE: 100 MMHG | TEMPERATURE: 98 F | RESPIRATION RATE: 20 BRPM | OXYGEN SATURATION: 97 % | HEART RATE: 102 BPM | HEIGHT: 72 IN | WEIGHT: 130.07 LBS | DIASTOLIC BLOOD PRESSURE: 58 MMHG

## 2024-08-28 DIAGNOSIS — Z90.49 ACQUIRED ABSENCE OF OTHER SPECIFIED PARTS OF DIGESTIVE TRACT: Chronic | ICD-10-CM

## 2024-08-28 DIAGNOSIS — Z95.1 PRESENCE OF AORTOCORONARY BYPASS GRAFT: Chronic | ICD-10-CM

## 2024-08-28 DIAGNOSIS — Z98.89 OTHER SPECIFIED POSTPROCEDURAL STATES: Chronic | ICD-10-CM

## 2024-08-28 LAB
ALBUMIN SERPL ELPH-MCNC: 2.3 G/DL — LOW (ref 3.3–5)
ALP SERPL-CCNC: 151 U/L — HIGH (ref 40–120)
ALT FLD-CCNC: 22 U/L — SIGNIFICANT CHANGE UP (ref 12–78)
ANION GAP SERPL CALC-SCNC: 10 MMOL/L — SIGNIFICANT CHANGE UP (ref 5–17)
APAP SERPL-MCNC: 10 UG/ML — SIGNIFICANT CHANGE UP (ref 10–30)
APPEARANCE UR: CLEAR — SIGNIFICANT CHANGE UP
AST SERPL-CCNC: 42 U/L — HIGH (ref 15–37)
BASOPHILS # BLD AUTO: 0.02 K/UL — SIGNIFICANT CHANGE UP (ref 0–0.2)
BASOPHILS NFR BLD AUTO: 0.3 % — SIGNIFICANT CHANGE UP (ref 0–2)
BILIRUB SERPL-MCNC: 1.3 MG/DL — HIGH (ref 0.2–1.2)
BILIRUB UR-MCNC: ABNORMAL
BUN SERPL-MCNC: 9 MG/DL — SIGNIFICANT CHANGE UP (ref 7–23)
CALCIUM SERPL-MCNC: 9 MG/DL — SIGNIFICANT CHANGE UP (ref 8.5–10.1)
CHLORIDE SERPL-SCNC: 101 MMOL/L — SIGNIFICANT CHANGE UP (ref 96–108)
CO2 SERPL-SCNC: 26 MMOL/L — SIGNIFICANT CHANGE UP (ref 22–31)
COLOR SPEC: ABNORMAL
CREAT SERPL-MCNC: 0.87 MG/DL — SIGNIFICANT CHANGE UP (ref 0.5–1.3)
DIFF PNL FLD: NEGATIVE — SIGNIFICANT CHANGE UP
EGFR: 95 ML/MIN/1.73M2 — SIGNIFICANT CHANGE UP
EOSINOPHIL # BLD AUTO: 0 K/UL — SIGNIFICANT CHANGE UP (ref 0–0.5)
EOSINOPHIL NFR BLD AUTO: 0 % — SIGNIFICANT CHANGE UP (ref 0–6)
GLUCOSE SERPL-MCNC: 140 MG/DL — HIGH (ref 70–99)
GLUCOSE UR QL: NEGATIVE MG/DL — SIGNIFICANT CHANGE UP
HCT VFR BLD CALC: 34.1 % — LOW (ref 39–50)
HGB BLD-MCNC: 11.4 G/DL — LOW (ref 13–17)
IMM GRANULOCYTES NFR BLD AUTO: 0.4 % — SIGNIFICANT CHANGE UP (ref 0–0.9)
KETONES UR-MCNC: ABNORMAL MG/DL
LEUKOCYTE ESTERASE UR-ACNC: ABNORMAL
LYMPHOCYTES # BLD AUTO: 1.5 K/UL — SIGNIFICANT CHANGE UP (ref 1–3.3)
LYMPHOCYTES # BLD AUTO: 22.4 % — SIGNIFICANT CHANGE UP (ref 13–44)
MAGNESIUM SERPL-MCNC: 1.6 MG/DL — SIGNIFICANT CHANGE UP (ref 1.6–2.6)
MCHC RBC-ENTMCNC: 33.4 GM/DL — SIGNIFICANT CHANGE UP (ref 32–36)
MCHC RBC-ENTMCNC: 37 PG — HIGH (ref 27–34)
MCV RBC AUTO: 110.7 FL — HIGH (ref 80–100)
MONOCYTES # BLD AUTO: 0.44 K/UL — SIGNIFICANT CHANGE UP (ref 0–0.9)
MONOCYTES NFR BLD AUTO: 6.6 % — SIGNIFICANT CHANGE UP (ref 2–14)
NEUTROPHILS # BLD AUTO: 4.71 K/UL — SIGNIFICANT CHANGE UP (ref 1.8–7.4)
NEUTROPHILS NFR BLD AUTO: 70.3 % — SIGNIFICANT CHANGE UP (ref 43–77)
NITRITE UR-MCNC: NEGATIVE — SIGNIFICANT CHANGE UP
NRBC # BLD: 0 /100 WBCS — SIGNIFICANT CHANGE UP (ref 0–0)
PCP SPEC-MCNC: SIGNIFICANT CHANGE UP
PH UR: 6 — SIGNIFICANT CHANGE UP (ref 5–8)
PLATELET # BLD AUTO: 104 K/UL — LOW (ref 150–400)
POTASSIUM SERPL-MCNC: 4 MMOL/L — SIGNIFICANT CHANGE UP (ref 3.5–5.3)
POTASSIUM SERPL-SCNC: 4 MMOL/L — SIGNIFICANT CHANGE UP (ref 3.5–5.3)
PROT SERPL-MCNC: 6 G/DL — SIGNIFICANT CHANGE UP (ref 6–8.3)
PROT UR-MCNC: NEGATIVE MG/DL — SIGNIFICANT CHANGE UP
RBC # BLD: 3.08 M/UL — LOW (ref 4.2–5.8)
RBC # FLD: 13.5 % — SIGNIFICANT CHANGE UP (ref 10.3–14.5)
SALICYLATES SERPL-MCNC: 1.8 MG/DL — LOW (ref 2.8–20)
SODIUM SERPL-SCNC: 137 MMOL/L — SIGNIFICANT CHANGE UP (ref 135–145)
SP GR SPEC: 1.02 — SIGNIFICANT CHANGE UP (ref 1–1.03)
UROBILINOGEN FLD QL: 1 MG/DL — SIGNIFICANT CHANGE UP (ref 0.2–1)
WBC # BLD: 6.7 K/UL — SIGNIFICANT CHANGE UP (ref 3.8–10.5)
WBC # FLD AUTO: 6.7 K/UL — SIGNIFICANT CHANGE UP (ref 3.8–10.5)

## 2024-08-28 PROCEDURE — 99285 EMERGENCY DEPT VISIT HI MDM: CPT

## 2024-08-28 PROCEDURE — 70450 CT HEAD/BRAIN W/O DYE: CPT | Mod: 26,MC

## 2024-08-28 RX ORDER — METOCLOPRAMIDE HCL 5 MG
10 TABLET ORAL ONCE
Refills: 0 | Status: COMPLETED | OUTPATIENT
Start: 2024-08-28 | End: 2024-08-28

## 2024-08-28 RX ORDER — ACETAMINOPHEN 325 MG/1
1000 TABLET ORAL ONCE
Refills: 0 | Status: COMPLETED | OUTPATIENT
Start: 2024-08-28 | End: 2024-08-28

## 2024-08-28 RX ORDER — SODIUM CHLORIDE 9 MG/ML
1000 INJECTION INTRAMUSCULAR; INTRAVENOUS; SUBCUTANEOUS ONCE
Refills: 0 | Status: COMPLETED | OUTPATIENT
Start: 2024-08-28 | End: 2024-08-28

## 2024-08-28 RX ADMIN — Medication 10 MILLIGRAM(S): at 16:34

## 2024-08-28 RX ADMIN — ACETAMINOPHEN 400 MILLIGRAM(S): 325 TABLET ORAL at 16:34

## 2024-08-28 RX ADMIN — SODIUM CHLORIDE 1000 MILLILITER(S): 9 INJECTION INTRAMUSCULAR; INTRAVENOUS; SUBCUTANEOUS at 18:00

## 2024-08-28 NOTE — ED PROVIDER NOTE - NSICDXPASTSURGICALHX_GEN_ALL_CORE_FT
PAST SURGICAL HISTORY:  History of colon resection     History of heart surgery cardiac stent    S/P CABG (coronary artery bypass graft)

## 2024-08-28 NOTE — ED ADULT NURSE NOTE - NS ED NURSE DISCH DISPOSITION
What Type Of Note Output Would You Prefer (Optional)?: Standard Output Is The Patient Presenting As Previously Scheduled?: Yes How Severe Is Your Rash?: moderate Is This A New Presentation, Or A Follow-Up?: Rash Additional History: Patient reports she gets woken up with the itching on her toes and fingers at night. She reports swelling the the toes and fingers which comes and goes.  Patient reports she has been dealing with chronic allergies and has been tested multiple times for allergies for various things in the environment. She does not like taking antihistamine but only takes Benadryl if and when she really needs it; helps itching when taken. Admitted

## 2024-08-28 NOTE — ED ADULT TRIAGE NOTE - CHIEF COMPLAINT QUOTE
pt was wheeled into triage by son. pt C/O intermittent episodes of confusion and head aches for 2 days. pt has a hx of ETOH abuse, last drink was today had 3 tall boys.

## 2024-08-28 NOTE — ED ADULT NURSE NOTE - NSFALLHARMRISKINTERV_ED_ALL_ED

## 2024-08-28 NOTE — ED ADULT NURSE REASSESSMENT NOTE - NS ED NURSE REASSESS COMMENT FT1
pt is AOX4, no signs of distress noted at this time. pt is able to voice needs. No tremors or agitation noted. VS as per flowsheet. Pt noted eating at this time. Care ongoing.

## 2024-08-28 NOTE — ED ADULT TRIAGE NOTE - TEMP AT ED ARRIVAL (C)
----- Message from Pete Mesa sent at 2/14/2024  4:30 PM CST -----  Contact: Yvonne Cueva is requesting a call back to discuss if she can take antibiotics before procedure on 2/23. Please call back at [phone]           Thanks     
Called pt and informed her that she have to hold antibiotics for 2 wks before her procedure. Pt verbalized understanding.    Astrid WASHBURN  
36.9

## 2024-08-28 NOTE — ED PROVIDER NOTE - PATIENT'S SEXUAL ORIENTATION
ED HPI GENERAL MEDICAL PROBLEM





- General


Chief Complaint: General


Stated Complaint: FALL OUT OF BUNK BED


Time Seen by Provider: 09/01/18 04:35


Source of Information: Reports: Patient, Family


History Limitations: Reports: No Limitations





- History of Present Illness


INITIAL COMMENTS - FREE TEXT/NARRATIVE: 





7 yo male fell from top bunk tonight hitting head. Emesis x 1 en route to the 

hospital. Visiting [place] 


Onset: Today


Onset Date: 09/01/18


Onset Time: 03:30


Duration: Minutes:


Location: Reports: Head


Quality: Reports: Ache


Severity: Moderate


Improves with: Reports: None


Worsens with: Reports: None


Context: Reports: Trauma


Associated Symptoms: Reports: Headaches, Nausea/Vomiting.  Denies: Fever/Chills


Treatments PTA: Reports: Other (see below) (none)


  ** Headache


Pain Score (Numeric/FACES): 5





- Related Data


 Allergies











Allergy/AdvReac Type Severity Reaction Status Date / Time


 


No Known Allergies Allergy   Verified 09/01/18 04:26











Home Meds: 


 Home Meds





NK [No Known Home Meds]  09/01/18 [History]











Past Medical History





- Past Health History


Medical/Surgical History: Denies Medical/Surgical History





Social & Family History





- Tobacco Use


Smoking Status *Q: Never Smoker





- Caffeine Use


Caffeine Use: Reports: None





- Recreational Drug Use


Recreational Drug Use: No





ED ROS PEDIATRIC





- Review of Systems


Review Of Systems: See Below


Constitutional: Reports: No Symptoms


HEENT: Reports: No Symptoms


Respiratory: Reports: No Symptoms


Cardiovascular: Reports: No Symptoms


GI/Abdominal: Reports: Nausea, Vomiting (times one)


: Reports: No Symptoms


Musculoskeletal: Reports: No Symptoms


Skin: Reports: No Symptoms


Neurological: Reports: Headache


Psychiatric: Reports: No Symptoms





ED EXAM, GENERAL (PEDS)





- Physical Exam


Exam: See Below


Exam Limited By: No Limitations


General Appearance: WD/WN, No Apparent Distress


Eyes: Bilateral: Normal Appearance


Ear (Abbreviated): Normal External Exam, Normal Canal, Hearing Grossly Normal, 

Normal TMs


Nose Exam: Normal Inspection, Normal Mucousa, No Blood


Mouth/Throat: Normal Inspection, Normal Lips, Normal Oropharynx


Head: Atraumatic, Normocephalic


Neck: Normal Inspection, Supple, Non-Tender


Respiratory/Chest: No Respiratory Distress, Lungs Clear, Normal Breath Sounds, 

No Accessory Muscle Use


Cardiovascular: Regular Rate, Rhythm, No Edema


GI/Abdominal Exam: Normal Bowel Sounds, Soft, Non-Tender, No Distention


Extremities: Normal Inspection, Normal Range of Motion, Non-Tender, No Pedal 

Edema


Neurological: Alert, Oriented, CN II-XII Intact, Normal Cognition, No Motor/

Sensory Deficits


Psychiatric: Normal Affect, Normal Mood


Skin Exam: Warm, Dry, Intact, Normal Color, No Rash


Lymphadenopathy: Bilateral: No Adenopathy





Course





- Vital Signs


Last Recorded V/S: 


 Last Vital Signs











Temp  36.3 C   09/01/18 04:24


 


Pulse  97   09/01/18 04:24


 


Resp  18   09/01/18 04:24


 


BP  108/65   09/01/18 04:24


 


Pulse Ox  96   09/01/18 04:24














- Orders/Labs/Meds


Meds: 


Medications














Discontinued Medications














Generic Name Dose Route Start Last Admin





  Trade Name Yuridia  PRN Reason Stop Dose Admin


 


Acetaminophen  360 mg  09/01/18 04:38  09/01/18 04:46





  Tylenol Solution  PO  09/01/18 04:39  360 mg





  ONETIME ONE   Administration





     





     





     





     


 


Ondansetron HCl  4 mg  09/01/18 04:37  09/01/18 04:42





  Zofran Odt  PO  09/01/18 04:38  4 mg





  ONETIME ONE   Administration





     





     





     





     














Departure





- Departure


Time of Disposition: 05:49


Disposition: Home, Self-Care 01


Condition: Fair


Clinical Impression: 


Concussion


Qualifiers:


 Encounter type: initial encounter Loss of consciousness presence/duration: 

without LOC Qualified Code(s): S06.0X0A - Concussion without loss of 

consciousness, initial encounter








- Discharge Information


*PRESCRIPTION DRUG MONITORING PROGRAM REVIEWED*: Not Applicable


*COPY OF PRESCRIPTION DRUG MONITORING REPORT IN PATIENT JOO: Not Applicable


Instructions:  Head Injury, Pediatric


Referrals: 


PCP,None [Primary Care Provider] - 


Forms:  ED Department Discharge


Additional Instructions: 


Acetaminophen as needed for headache today. Keep Alvin quiet and lying around 

today. Offer a light diet with easily digested foods. Starting tomorrow you may 

advance his diet as tolerated. No exertion over the weekend. Return if 

worse....worse headache or recurrent vomiting or decreased level of 

consciousness. Heterosexual

## 2024-08-28 NOTE — ED ADULT NURSE NOTE - OBJECTIVE STATEMENT
pt is AOX3, came to the ED with c/o HA that started for a few weeks. Pt states HA  pain is worsening. Pain rated 8 out of 10 at this time. pt states he feels pain and pressure and is unable to walk independently. Pt does have hx pelvic sx, CABG 2022. Pt states since surgeries, his health has been declining. Med hx DM2, HTN, HLD,. Pt is on asp 81mg. Pt did take 2 tylenol today 10AM. no allergies to medicine. Pt can usually walk with a cane independently. Pending lab and radiology results. care ongoing. pt is AOX3, came to the ED with c/o HA  that started for a few weeks. Pt states HA  pain is worsening. Pain rated 8 out of 10 at this time. pt states he feels pain and pressure and is unable to walk independently. Pt does have hx pelvic sx, CABG 2022. Pt states since surgeries, his health has been declining. Med hx DM2, HTN, HLD,. Pt is on asp 81mg. Pt did take 2 tylenol today 10AM. no allergies to medicine. Pt states he feels dizzy due to HA. ETOH abuse. Last drink was today (drank 3 tall boys).  Denies CP/SOB/n/v.  Pt can usually walk with a cane independently. Pending lab and radiology results. care ongoing.

## 2024-08-28 NOTE — ED PROVIDER NOTE - ATTENDING APP SHARED VISIT CONTRIBUTION OF CARE
Examination reveals a well-developed well-nourished male in no acute distress with clear lungs normal heart sounds benign nontender abdomen on musculoskeletal exam which finds him to be awake and alert oriented x 4 without any focal neurologic deficits.  I agree with plan and management outlined by PA.

## 2024-08-28 NOTE — ED PROVIDER NOTE - NSICDXPASTMEDICALHX_GEN_ALL_CORE_FT
PAST MEDICAL HISTORY:  Alcohol abuse     CHF (congestive heart failure)     Colon cancer     Hypertension     Myocardial infarct     Pancreatitis     Paroxysmal atrial fibrillation     T2DM (type 2 diabetes mellitus)

## 2024-08-28 NOTE — ED PROVIDER NOTE - CLINICAL SUMMARY MEDICAL DECISION MAKING FREE TEXT BOX
Text: The above diagnosis and findings were noted on the exam but not discussed at this time with the patient. Detail Level: Zone Increasing generalized weakness fatigue And shortness of breath over the past few weeks  and declining appetite here now requiring thorough independent history and physical labs CBC chemistries EKG and imaging.  I agree with plan and management outlined by PA.

## 2024-08-28 NOTE — ED PROVIDER NOTE - PROGRESS NOTE DETAILS
pt feeling well, results discussed, awake, alert, no signs of withdrawal; states he does not feel he needs help with alcohol, says he only drank today bc his son told him it would help his symptoms; would like assistance with placement to a facility like assisted living bc of ongoing issues with weakness and difficulty breathing and lives in basement apartment with stairs, states he has had difficulty getting accepted

## 2024-08-28 NOTE — ED PROVIDER NOTE - OBJECTIVE STATEMENT
68-year-old male with history of HTN, MI/CAD s/p PCI (LEELA X1 pRCA 2007), CABG 2022, afib, HFrEF, DM, depression complaining of headaches for the past few weeks.  Patient had an admission in this hospital June 12 to 15 for respiratory failure.  Reports he has been having ongoing shortness of breath, has followed up outpatient with cardiologist and pulmonologist.  This has been unchanged however headache is new.  States that is difficult for him to get around his apartment due to stairs and difficulty breathing.  Also has issues with weakness and pains in his legs.  States his son told him to drink alcohol to help his symptoms so he had few beers today.

## 2024-08-29 DIAGNOSIS — Z87.81 PERSONAL HISTORY OF (HEALED) TRAUMATIC FRACTURE: Chronic | ICD-10-CM

## 2024-08-29 DIAGNOSIS — I50.22 CHRONIC SYSTOLIC (CONGESTIVE) HEART FAILURE: ICD-10-CM

## 2024-08-29 DIAGNOSIS — E83.52 HYPERCALCEMIA: ICD-10-CM

## 2024-08-29 DIAGNOSIS — Z98.890 OTHER SPECIFIED POSTPROCEDURAL STATES: Chronic | ICD-10-CM

## 2024-08-29 DIAGNOSIS — D61.818 OTHER PANCYTOPENIA: ICD-10-CM

## 2024-08-29 DIAGNOSIS — E11.9 TYPE 2 DIABETES MELLITUS WITHOUT COMPLICATIONS: ICD-10-CM

## 2024-08-29 DIAGNOSIS — R29.898 OTHER SYMPTOMS AND SIGNS INVOLVING THE MUSCULOSKELETAL SYSTEM: ICD-10-CM

## 2024-08-29 DIAGNOSIS — I25.10 ATHEROSCLEROTIC HEART DISEASE OF NATIVE CORONARY ARTERY WITHOUT ANGINA PECTORIS: ICD-10-CM

## 2024-08-29 DIAGNOSIS — F10.20 ALCOHOL DEPENDENCE, UNCOMPLICATED: ICD-10-CM

## 2024-08-29 DIAGNOSIS — E11.42 TYPE 2 DIABETES MELLITUS WITH DIABETIC POLYNEUROPATHY: ICD-10-CM

## 2024-08-29 DIAGNOSIS — I48.0 PAROXYSMAL ATRIAL FIBRILLATION: ICD-10-CM

## 2024-08-29 DIAGNOSIS — R06.02 SHORTNESS OF BREATH: ICD-10-CM

## 2024-08-29 DIAGNOSIS — I11.0 HYPERTENSIVE HEART DISEASE WITH HEART FAILURE: ICD-10-CM

## 2024-08-29 DIAGNOSIS — Z29.9 ENCOUNTER FOR PROPHYLACTIC MEASURES, UNSPECIFIED: ICD-10-CM

## 2024-08-29 DIAGNOSIS — I10 ESSENTIAL (PRIMARY) HYPERTENSION: ICD-10-CM

## 2024-08-29 DIAGNOSIS — J96.01 ACUTE RESPIRATORY FAILURE WITH HYPOXIA: ICD-10-CM

## 2024-08-29 DIAGNOSIS — K70.30 ALCOHOLIC CIRRHOSIS OF LIVER WITHOUT ASCITES: ICD-10-CM

## 2024-08-29 DIAGNOSIS — R91.8 OTHER NONSPECIFIC ABNORMAL FINDING OF LUNG FIELD: ICD-10-CM

## 2024-08-29 DIAGNOSIS — E78.5 HYPERLIPIDEMIA, UNSPECIFIED: ICD-10-CM

## 2024-08-29 DIAGNOSIS — I25.2 OLD MYOCARDIAL INFARCTION: ICD-10-CM

## 2024-08-29 DIAGNOSIS — Z78.9 OTHER SPECIFIED HEALTH STATUS: ICD-10-CM

## 2024-08-29 DIAGNOSIS — F32.A DEPRESSION, UNSPECIFIED: ICD-10-CM

## 2024-08-29 DIAGNOSIS — Z87.898 PERSONAL HISTORY OF OTHER SPECIFIED CONDITIONS: ICD-10-CM

## 2024-08-29 DIAGNOSIS — F17.210 NICOTINE DEPENDENCE, CIGARETTES, UNCOMPLICATED: ICD-10-CM

## 2024-08-29 DIAGNOSIS — E87.1 HYPO-OSMOLALITY AND HYPONATREMIA: ICD-10-CM

## 2024-08-29 DIAGNOSIS — Z86.79 PERSONAL HISTORY OF OTHER DISEASES OF THE CIRCULATORY SYSTEM: ICD-10-CM

## 2024-08-29 DIAGNOSIS — M25.561 PAIN IN RIGHT KNEE: ICD-10-CM

## 2024-08-29 DIAGNOSIS — Z72.0 TOBACCO USE: ICD-10-CM

## 2024-08-29 DIAGNOSIS — E44.1 MILD PROTEIN-CALORIE MALNUTRITION: ICD-10-CM

## 2024-08-29 DIAGNOSIS — Z95.1 PRESENCE OF AORTOCORONARY BYPASS GRAFT: ICD-10-CM

## 2024-08-29 DIAGNOSIS — R53.1 WEAKNESS: ICD-10-CM

## 2024-08-29 LAB
INR BLD: 1.17 RATIO — SIGNIFICANT CHANGE UP (ref 0.85–1.18)
PROTHROM AB SERPL-ACNC: 13.3 SEC — HIGH (ref 9.5–13)

## 2024-08-29 PROCEDURE — 99222 1ST HOSP IP/OBS MODERATE 55: CPT | Mod: GC

## 2024-08-29 PROCEDURE — 71045 X-RAY EXAM CHEST 1 VIEW: CPT | Mod: 26

## 2024-08-29 RX ORDER — DEXTROSE 15 G/33 G
25 GEL IN PACKET (GRAM) ORAL ONCE
Refills: 0 | Status: DISCONTINUED | OUTPATIENT
Start: 2024-08-29 | End: 2024-09-01

## 2024-08-29 RX ORDER — TRAMADOL HYDROCHLORIDE 200 MG/1
25 TABLET, EXTENDED RELEASE ORAL
Refills: 0 | Status: DISCONTINUED | OUTPATIENT
Start: 2024-08-29 | End: 2024-09-01

## 2024-08-29 RX ORDER — ENOXAPARIN SODIUM 100 MG/ML
40 INJECTION SUBCUTANEOUS EVERY 24 HOURS
Refills: 0 | Status: DISCONTINUED | OUTPATIENT
Start: 2024-08-29 | End: 2024-09-01

## 2024-08-29 RX ORDER — ACETAMINOPHEN 325 MG/1
650 TABLET ORAL EVERY 6 HOURS
Refills: 0 | Status: DISCONTINUED | OUTPATIENT
Start: 2024-08-30 | End: 2024-09-01

## 2024-08-29 RX ORDER — TRAMADOL HYDROCHLORIDE 200 MG/1
50 TABLET, EXTENDED RELEASE ORAL EVERY 6 HOURS
Refills: 0 | Status: DISCONTINUED | OUTPATIENT
Start: 2024-08-29 | End: 2024-09-01

## 2024-08-29 RX ORDER — DEXTROSE 15 G/33 G
15 GEL IN PACKET (GRAM) ORAL ONCE
Refills: 0 | Status: DISCONTINUED | OUTPATIENT
Start: 2024-08-29 | End: 2024-09-01

## 2024-08-29 RX ORDER — DEXTROSE 15 G/33 G
12.5 GEL IN PACKET (GRAM) ORAL ONCE
Refills: 0 | Status: DISCONTINUED | OUTPATIENT
Start: 2024-08-29 | End: 2024-09-01

## 2024-08-29 RX ORDER — ACETAMINOPHEN 325 MG/1
1000 TABLET ORAL EVERY 6 HOURS
Refills: 0 | Status: COMPLETED | OUTPATIENT
Start: 2024-08-29 | End: 2024-08-30

## 2024-08-29 RX ORDER — ASPIRIN 81 MG
81 TABLET, DELAYED RELEASE (ENTERIC COATED) ORAL DAILY
Refills: 0 | Status: DISCONTINUED | OUTPATIENT
Start: 2024-08-29 | End: 2024-09-01

## 2024-08-29 RX ORDER — TRAMADOL HYDROCHLORIDE 200 MG/1
25 TABLET, EXTENDED RELEASE ORAL ONCE
Refills: 0 | Status: DISCONTINUED | OUTPATIENT
Start: 2024-08-29 | End: 2024-08-29

## 2024-08-29 RX ORDER — GLUCAGON INJECTION, SOLUTION 1 MG/.2ML
1 INJECTION, SOLUTION SUBCUTANEOUS ONCE
Refills: 0 | Status: DISCONTINUED | OUTPATIENT
Start: 2024-08-29 | End: 2024-09-01

## 2024-08-29 RX ORDER — MECLIZINE HYDROCHLORIDE 25 MG/1
25 TABLET ORAL THREE TIMES A DAY
Refills: 0 | Status: DISCONTINUED | OUTPATIENT
Start: 2024-08-29 | End: 2024-09-01

## 2024-08-29 RX ADMIN — ENOXAPARIN SODIUM 40 MILLIGRAM(S): 100 INJECTION SUBCUTANEOUS at 14:50

## 2024-08-29 RX ADMIN — ACETAMINOPHEN 1000 MILLIGRAM(S): 325 TABLET ORAL at 06:20

## 2024-08-29 RX ADMIN — SODIUM CHLORIDE 1000 MILLILITER(S): 9 INJECTION INTRAMUSCULAR; INTRAVENOUS; SUBCUTANEOUS at 06:21

## 2024-08-29 RX ADMIN — TRAMADOL HYDROCHLORIDE 25 MILLIGRAM(S): 200 TABLET, EXTENDED RELEASE ORAL at 17:26

## 2024-08-29 RX ADMIN — ACETAMINOPHEN 1000 MILLIGRAM(S): 325 TABLET ORAL at 06:21

## 2024-08-29 RX ADMIN — TRAMADOL HYDROCHLORIDE 25 MILLIGRAM(S): 200 TABLET, EXTENDED RELEASE ORAL at 14:52

## 2024-08-29 RX ADMIN — Medication 81 MILLIGRAM(S): at 14:50

## 2024-08-29 RX ADMIN — ACETAMINOPHEN 400 MILLIGRAM(S): 325 TABLET ORAL at 17:27

## 2024-08-29 NOTE — H&P ADULT - PROBLEM SELECTOR PLAN 5
Endorses using about 1 cigarette per day with history of 1.5 PPD before cardiac surgery.     - Nicotine lozange PRN for cravings  - pt endorses poor skin reaction to patch thus will defer use at this time  - SBIRT consult

## 2024-08-29 NOTE — PHYSICAL THERAPY INITIAL EVALUATION ADULT - ADDITIONAL COMMENTS
Pt lives in basement apartment w/ 10 steps/rail.  Pt is primarily a household ambulator however will go out when necessary.

## 2024-08-29 NOTE — H&P ADULT - PROBLEM SELECTOR PLAN 1
Pt states b/l LE weakness making it difficult to climb stairs from basement apartment. Ambulates with cane at baseline    - admit to medicine  - remote tele  - fall precautions  - PT consulted; recommend [facility]  -  consult for assisted living placement

## 2024-08-29 NOTE — PHYSICAL THERAPY INITIAL EVALUATION ADULT - NSACTIVITYREC_GEN_A_PT
Pt seen in ED.  40 minutes total time spent with patient for PT evaluation. Patient ambulate ~ 50' w/ rolling walker and CGA for safety and balance.  Pt reported previous multiple falls due to loss of balance and bilateral LE weakness.  Pt also report fear negotiating steps due to recent falls.  Pt is motivated to participate w/ PT services.  Recommend [facility] Rehabilitation Facility to progress functional mobility and ambulation.

## 2024-08-29 NOTE — H&P ADULT - PROBLEM SELECTOR PLAN 6
Chronic. Per patient taking metoprolol unknown dose, however, pharmacy states pt has not picked up medication (prescribed ? metoprolol succinate 25mg BID). Pt bp soft on admission    - hold home meds  - continue to monitor on routine vitals

## 2024-08-29 NOTE — H&P ADULT - NSHPSOCIALHISTORY_GEN_ALL_CORE
Lives alone, walks with a cane, able to perform ADLs Lives alone, walks with a cane, able to perform ADLs however increasingly difficult as he lives in apartment in the basement and has difficulty climbing stairs to street level. Endorses several falls in past due to weakness while climbing stairs

## 2024-08-29 NOTE — H&P ADULT - NSICDXPASTMEDICALHX_GEN_ALL_CORE_FT
PAST MEDICAL HISTORY:  Alcohol abuse     CHF (congestive heart failure)     Colon cancer     Hypertension     Myocardial infarct     Pancreatitis     Paroxysmal atrial fibrillation     T2DM (type 2 diabetes mellitus)      yes

## 2024-08-29 NOTE — H&P ADULT - NSHPREVIEWOFSYSTEMS_GEN_ALL_CORE
CONSTITUTIONAL: +weakness denies fever, chills, fatigue  HEENT: denies blurred vision, sore throat  SKIN: denies new lesions, rash  CARDIOVASCULAR: denies chest pain, chest pressure, palpitations  RESPIRATORY: +shortness of breath; denies sputum production  GASTROINTESTINAL: denies nausea, vomiting, diarrhea, abdominal pain  GENITOURINARY: denies dysuria, discharge  NEUROLOGICAL: +headache; denies numbness, focal weakness  MUSCULOSKELETAL: denies new joint pain, muscle aches  HEMATOLOGIC: denies gross bleeding, bruising  LYMPHATICS: denies enlarged lymph nodes, extremity swelling  PSYCHIATRIC: denies recent changes in anxiety, depression CONSTITUTIONAL: +weakness denies fever, chills, fatigue  HEENT: denies blurred vision, sore throat  SKIN: denies new lesions, rash  CARDIOVASCULAR: denies chest pain, chest pressure, palpitations  RESPIRATORY: +shortness of breath (chronic); denies sputum production  GASTROINTESTINAL: denies nausea, vomiting, diarrhea, abdominal pain  GENITOURINARY: denies dysuria, discharge  NEUROLOGICAL: +headache; denies numbness, focal weakness  MUSCULOSKELETAL: denies new joint pain, muscle aches  HEMATOLOGIC: denies gross bleeding, bruising  LYMPHATICS: denies enlarged lymph nodes, extremity swelling  PSYCHIATRIC: denies recent changes in anxiety, depression CONSTITUTIONAL: +weakness, weight loss, fatigue, sleeping more denies fever, chills, fatigue  HEENT: + congestion, rhinorrhea, sore throat, denies blurred vision,   SKIN: +persistent scaling rash and ecchymoses on upper and lower extremities. denies new lesions, rash  CARDIOVASCULAR: denies chest pain, chest pressure, palpitations  RESPIRATORY: +shortness of breath (chronic), cough; denies sputum production  GASTROINTESTINAL: + diarrhea (chronic), denies nausea, vomiting, diarrhea, abdominal pain  GENITOURINARY: denies dysuria, discharge  NEUROLOGICAL: +headache, vertigo, dizziness,  numbness and tingling in toes bilaterally. denies, focal weakness  MUSCULOSKELETAL: denies new joint pain, muscle aches  HEMATOLOGIC: denies gross bleeding, bruising  PSYCHIATRIC: denies recent changes in anxiety, depression CONSTITUTIONAL: + LE weakness, weight loss, fatigue, sleeping more denies fever, chills, fatigue  HEENT: + congestion, rhinorrhea, sore throat, denies blurred vision,   SKIN: +persistent scaling rash and ecchymoses on upper and lower extremities. denies new lesions, rash  CARDIOVASCULAR: denies chest pain, chest pressure, palpitations  RESPIRATORY: +shortness of breath (chronic), cough; denies sputum production  GASTROINTESTINAL: + diarrhea (chronic), denies nausea, vomiting, diarrhea, abdominal pain  GENITOURINARY: denies dysuria, discharge  NEUROLOGICAL: +headache, vertigo, dizziness,  numbness and tingling in toes bilaterally. denies, focal weakness  MUSCULOSKELETAL: denies new joint pain, muscle aches  HEMATOLOGIC: denies gross bleeding, bruising  PSYCHIATRIC: denies recent changes in anxiety, depression

## 2024-08-29 NOTE — H&P ADULT - PROBLEM SELECTOR PLAN 7
Hx of T2DM not on meds currently. Gluc 140 on admission    - last A1c in june 4.6  - Diet: DASH/TLC/CC  - RICH  - F/u A1c

## 2024-08-29 NOTE — H&P ADULT - NSICDXPASTSURGICALHX_GEN_ALL_CORE_FT
PAST SURGICAL HISTORY:  History of colon resection     History of heart surgery cardiac stent    S/P CABG (coronary artery bypass graft)      PAST SURGICAL HISTORY:  History of colon resection     History of heart surgery cardiac stent    S/P CABG (coronary artery bypass graft)     Status post fracture of femur     Status post fracture of pelvis

## 2024-08-29 NOTE — H&P ADULT - PROBLEM SELECTOR PLAN 4
Endorsing 7 drinks per week, however, heavier use in the past. Denies wanting assistance with reducing alcohol intake. CIWA 3 on admission. Blood alcohol on admission 184, also THC + on urine tox screen. Mildly elevated AST 42, ALT 22, Tbili 1.3, Albumin 2.3 in setting of liver cirrhosis     - CTM for symptoms of alcohol withdrawal    #liver cirrhosis  - avoid hepatotoxic agents  - f/u AM CMP Endorsing 7 drinks per week, however, heavier use in the past. Denies wanting assistance with reducing alcohol intake. CIWA 3 on admission. Blood alcohol on admission 184, also THC + on urine tox screen. Mildly elevated AST 42, ALT 22, Tbili 1.3, Albumin 2.3 in setting of liver cirrhosis     - CTM for symptoms of alcohol withdrawal    #liver cirrhosis  - avoid hepatotoxic agents  -f/u hepatitis panel  - f/u AM CMP  - f/u PT/INR

## 2024-08-29 NOTE — CARE COORDINATION ASSESSMENT. - NSPASTMEDSURGHISTORY_GEN_ALL_CORE_FT
PAST MEDICAL & SURGICAL HISTORY:  Alcohol abuse      Myocardial infarct      Hypertension      Pancreatitis      History of heart surgery  cardiac stent      History of colon resection      Colon cancer      CHF (congestive heart failure)      Paroxysmal atrial fibrillation      T2DM (type 2 diabetes mellitus)      S/P CABG (coronary artery bypass graft)

## 2024-08-29 NOTE — H&P ADULT - PROBLEM SELECTOR PLAN 8
Chronic last echo in June/2024 with EF 50%.  Not adherent to medication regimen o/p. Per pharmacy did not  metoprolol succinate prescription from June. No signs of volume overload on exam. Non compliance with meds & follow up    - f/u chest xray

## 2024-08-29 NOTE — H&P ADULT - ATTENDING COMMENTS
68-year-old male with history of HTN, MI/CAD s/p PCI (LEELA X1 pRCA 2007), CABG 2022, afib, HFrEF, DM, depression admitted for LE weakness and assisted living placement    PT eval, planning for Rehab.  Pt has dizziness, started on meclizine prn, Neuro consulted, possible role of MRI.    Vince Lanza, Attending Physician

## 2024-08-29 NOTE — H&P ADULT - ASSESSMENT
68-year-old male with history of HTN, MI/CAD s/p PCI (LEELA X1 pRCA 2007), CABG 2022, afib, HFrEF, DM, depression admitted for generalized weakness 68-year-old male with history of HTN, MI/CAD s/p PCI (LEELA X1 pRCA 2007), CABG 2022, afib, HFrEF, DM, depression admitted for LE weakness and assisted living placement

## 2024-08-29 NOTE — H&P ADULT - PROBLEM SELECTOR PLAN 9
Chronic s/p bypass (2022) and stent (2007) on ASA 81 daily. Not endorsing current chest pain    - continue ASA 81  Pt has not taken any other meds in over a year, Non compliance with meds & follow up Chronic s/p bypass (2022) and stent (2007) on ASA 81 daily. Not endorsing current chest pain    - continue ASA 81  Pt has not taken any other meds in over a year, Non-compliance with meds & follow up

## 2024-08-29 NOTE — CARE COORDINATION ASSESSMENT. - NS SW HEALTH CARE DECISION
"HOSPITALIST PROGRESS NOTE:      Date of Admit:  12/14/2020  3:23 PM  Date of Service:  12/24/2020  Hospital day #:  3 days   Code status:  Full Resuscitation    Summary:  Obed Knight is admitted with a complaint of  pain and discoloration of the left great toe pain. Chief Complaint:    Chief Complaint   Patient presents with   â¢ Leg Swelling   â¢ Toe Pain        Subjective:   Doing well, denies pain in her toe. Breathing stable, on 1L O2 NC and saturating 97%, denies sob. Aware of IPR consult. Pt lives at home with her . Rodrigo gonzalez'ed once due to high post void residual.     Review of Systems:  I performed comprehensive system review which is negative except for the positives stated in the narrative above. Reviewed Pertinent:  Allergies, Medications, Medical History, Surgical History, Social History, Family History, Radiology, Labs and Records. Intake/Output Summary (Last 24 hours) at 12/24/2020 1107  Last data filed at 12/24/2020 0815  Gross per 24 hour   Intake 630 ml   Output 1050 ml   Net -420 ml        PHYSICAL EXAMINATION:    Blood pressure 124/50, pulse 73, temperature 97.6 Â°F (36.4 Â°C), temperature source Oral, resp. rate 18, height 5' 3"" (1.6 m), weight 94.7 kg, last menstrual period 10/19/1970, SpO2 97 %. GENERAL:  No acute distress, Frail  HEENT:  PERRLA, pink conjunctivae, anicteric sclerae. RA  NECK:  supple  LUNGS/chest:  Normal respiratory effort. CTA   HEART:  Normal rate and regular rhythm. S1, S2 well heard. ABDOMEN:  Nondistended, soft and nontender. Mckeon   MUSCULOSKELETAL: Left great toe plantar surface has dusky appearance, there is blister. DP is felt easily  NEUROLOGIC:  Awake, alert  SKIN:  Ecchymosis/brusie R arm   PSYCHIATRY:  Normal affect, mood. Normal speech. LAB, IMAGING STUDIES, MICROBIOLOGY STUDIES and Medications are reviewed in EHR.     Creatinine (mg/dL)   Date Value   12/23/2020 1.48 (H)   12/22/2020 1.45 (H)   12/21/2020 1.57 (H) " 09/11/2019 0.80   03/06/2019 0.90   06/18/2018 0.96 (H)       HGB (g/dL)   Date Value   12/23/2020 7.9 (L)   12/22/2020 7.5 (L)   12/21/2020 7.9 (L)   09/11/2019 13.3   08/05/2019 13.4   06/18/2018 14.1           ASSESSMENT/PLAN:  1. Severe PAD with left great toe ischemia. S/p angioplasty to left SFA with two vessel run off below knee. Left dorsalis pedis is felt. F/u with vascular surgery as outpatient, will need arterial duplex and MARCOS in 4-6 weeks. 2. Acute pain. Controlled. This is mainly in the left great toe, left heel. Scheduled Tylenol, continue Norco as needed. Gabapentin 100 mg nightly  3. Acute respiratory failure with hypoxia/pulmonary HTN/Moderate TR. Increased oxygen requirement postprocedure. There was evidence of volume overload. Treated with IV Lasix and improved. Oral Lasix now resumed. 4. Acute kidney injury on CKD 3-diuresis, urine retention. Creatinine is improved  5. Chronic atrial fibrillation. Rate controlled on current medications, continue with Coreg. Resumed anticoagulation with warfarin 12/19. INR is supratherapeutic today 3.4, hold dose  6. Acute anemia. Etiology not entirely clear. She has extensive right upper extremity ecchymosis which is stable. No overt bleeding otherwise. Oral iron P  7. Hypokalemia. Replete per protocol  8. Hyperthyroidism. Methimazole  9. History of SDH.   10. AAA. Previously known, may have increased in size slightly this admission  11. Acute urinary retention:  Needed Mckeon which has been removed. She needed a straight cath once this am.  Continue with bladder scan Q shift    Pt was planned to be discharged to home with [facility] but upon eval with therapy, pt significantly weak and not safe for dc home. Pt refused VIELKA but open to IPR. IPR consulted for evaluation.      DVT PROPHYLAXIS:      No pharmacologic Venous Thromboembolism prophylaxis due to Currently therapeutic on antithrombotics or thrombolytics or anticoagulants (e.g. dabigatran, heparin, enoxaparin, fondaparinux, argatroban, warfarin, rivaroxaban, apixaban)     NUTRITION  Oral diet    DISCHARGE PLANNING:  YVON:  TBD  POSSIBLE BARRIERS TO DISCHARGE:  Placement   TENTATIVE DISCHARGE DISPOSITION:  IPR pending   -------------------------------------------------------------------------------------------------------------------  Case discussed with:  Patient, ARPIT Patrick DO  12/24/2020 11:07 AM  Pager:  [phone]  Page directly from 7:00 am to 7:00 pm.  Night Shift 7:00 p.m. - 7:00 a.m.   Use on-call pager [phone] No

## 2024-08-29 NOTE — H&P ADULT - PROBLEM SELECTOR PLAN 2
Endorsing head pressure relieved mildly by Tylenol in ED.     - tylenol 650 PO for mild pain Endorsing head pressure relieved mildly by Tylenol in ED. Endorsing intermittent dizzy spells in the past causing falls ~6 mo ago    - tramadol 25mg q6 PRN for moderate pain  - meclizine 25mg TID PRN for dizziness  - neuro consult; appreciate recs

## 2024-08-29 NOTE — PATIENT PROFILE ADULT - FALL HARM RISK - HARM RISK INTERVENTIONS

## 2024-08-29 NOTE — CARE COORDINATION ASSESSMENT. - OTHER PERTINENT DISCHARGE PLANNING INFORMATION:
pt is 68 year old man admitted from home with new onset of headaches. consult received re : assistance with placement / possible group home.  met with pt at bedside, workers role discussed. pt receptive to workers involvement however stated wanting to wait for his son Zeferino to arrive to discuss dc options as he believes his family has secured a bed for him at [facility]. pt is aware of PT recc [facility] at this time & is aware that [facility] is not a [facility], educated about the difference btwn [facility] and [facility].  worker offered to contact pts son, pt declined. pt lives in a basement apt, alone with 10 steep steps to enter. pt has three adult children and spoke of an ex wife who he remains close with. pt ambulated with a cane pta, owns a rolling walker as well. no current home care services, dc from [facility] June 2024 with no needs at that time.

## 2024-08-29 NOTE — CARE COORDINATION ASSESSMENT. - NSCAREPROVIDERS_GEN_ALL_CORE_FT
CARE PROVIDERS:  Accepting Physician: Vince Lanza  Administration: Rogelio Bonilla  Administration: Mirian Beckford  Admitting: Vince Lanza  Attending: Vince Lanza  Case Management: Kavon Perez  ED ACP: TawandaMpKimberli  ED Attending: Lico Yarbrough  ED Nurse: Sienna Hassan  Emergency Medicine: [facility]  Emergency Medicine: Faith Moss  Emergency Medicine: Koby Salcido  Nurse: Jorge Bocsh  PCA/Nursing Assistant: Sarai Anderson  Physical Therapy: Praful Dozier  Physical Therapy: Emma Heard  Primary Team: Zeferino Barry  Primary Team: Maykel Chaidez  Respiratory Therapy: Yudelka Redd  : Elliot Tai  : Lottie Linton  Student: David Mari// Supp. Assoc.: Cecy Lau

## 2024-08-29 NOTE — H&P ADULT - PROBLEM SELECTOR PLAN 3
Hx of paroxysmal aFib. Endorsing intermittent dizzy spells in the past. On exam with regular cardiac rhythm    - remote telemetry

## 2024-08-29 NOTE — PHYSICAL THERAPY INITIAL EVALUATION ADULT - PREDICTED DURATION OF THERAPY (DAYS/WKS), PT EVAL
----- Message from Desiree Simms MD sent at 10/5/2021  9:14 AM CDT -----  Please notify the patient her mammogram is normal.  
Left message for patient to return call regarding results.     
Message was left for patient to call office back to go over some non urgent lab results. When patient calls back please inform of message below.   
Patient called back, and notified of results. Expressed understanding.   
Patient returned call   
2 weeks

## 2024-08-29 NOTE — H&P ADULT - HISTORY OF PRESENT ILLNESS
68-year-old male with history of HTN, MI/CAD s/p PCI (LEELA X1 pRCA 2007), CABG 2022, afib, HFrEF, DM, depression complaining of headaches for the past few weeks.  Patient had an admission in this hospital June 12 -15 for respiratory failure.  Reports he has been having ongoing shortness of breath, has followed up outpatient with cardiologist and pulmonologist.  This has been unchanged however headache is new.  States that is difficult for him to get around his apartment due to stairs and difficulty breathing.  Also has issues with weakness and pains in his legs.  States his son told him to drink alcohol to help his symptoms so he had few beers today.    Denies fever, chills, chest pain, palpitations, cough, abdominal pain, nausea, vomiting, diarrhea, constipation, urinary frequency, urgency, or dysuria, changes in vision, dizziness, numbness, tingling.  Denies recent travel, recent antibiotic use, or sick contacts.    ED Course:   Vitals: BP: 100/58, HR: 102, Temp: 98.4, RR: 20, SpO2: 97% on RA  Labs:  WBC 6.7, Hgb 11.4, Hct 34.1, .7, Plt 104, Tbili 1.3, Alk phos 151, AST 42, ALT 22. THC +, blood Alcohol 184  ABG: pH: , PO2: , PCO2: , HCO3: , SpO2: %  UA: Trace ketones, small bili, trace leuk esterase, hyaline casts, calcium oxalate crystals  CXR: as per personal read, official read pending   CTH: No acute intracranial hemorrhage or mass effect. No significant change   from 06/14/2024.      EKG:   Received in the ED    68-year-old male with history of HTN, MI/CAD s/p PCI (LEELA X1 pRCA 2007), CABG 2022, afib, HFrEF, DM, presenting to the ED for headaches for the past few weeks and generalized weakness.  Patient had an admission in this hospital June 12 -15 for respiratory failure. Reports he has been having ongoing shortness of breath, has followed up outpatient with cardiologist and pulmonologist. This has been unchanged however headache is new. States that is difficult for him to get around his apartment due to stairs and difficulty breathing. Also has issues with weakness and pains in his legs. States his son told him to drink alcohol to help his symptoms so he had few beers today.    Denies fever, chills, chest pain, palpitations, cough, abdominal pain, nausea, vomiting, diarrhea, constipation, urinary frequency, urgency, or dysuria, changes in vision, dizziness, numbness, tingling.  Denies recent travel, recent antibiotic use, or sick contacts.    ED Course:   Vitals: BP: 100/58, HR: 102, Temp: 98.4, RR: 20, SpO2: 97% on RA  Labs:  WBC 6.7, Hgb 11.4, Hct 34.1, .7, Plt 104, Tbili 1.3, Alk phos 151, AST 42, ALT 22. THC +, blood Alcohol 184  ABG: pH: , PO2: , PCO2: , HCO3: , SpO2: %  UA: Trace ketones, small bili, trace leuk esterase, hyaline casts, calcium oxalate crystals  CXR: as per personal read, official read pending   CTH: No acute intracranial hemorrhage or mass effect. No significant change   from 06/14/2024.      EKG:   Received in the ED: 1g IV ofirmev x1, 10mg IVP Reglan x1, 1L 0.9% NS bolus x1   68-year-old male with history of HTN, MI/CAD s/p PCI (LEELA X1 pRCA 2007), CABG 2022, afib, HFrEF, T2DM, presenting to the ED for headaches for the past few weeks and generalized weakness.  The patient was admitted to the hospital from June 12-15 due to respiratory failure. Since then, he has had persistent SOB. Reports he has been having ongoing shortness of breath, has followed up outpatient with cardiologist and pulmonologist. His headache is described as a squeezing, diffuse pain that is localized behind his eyes and travels from forehead to occiput. He notes 2 areas of numbness bilaterally on the lateral portions of the OA joint that worsen when his headache returns. The patient also reports persistent rhinorrhea, watery eyes, congestion, and sore throat, which he attributes to seasonal allergies. His weakness is localized to the LE, with accompanying numbness and tingling in his toes.  The patient states his son told him to drink alcohol to help his headache and weakness, so he had few beers before arriving at the hospital. In addition, the patient expressed interest in assisted living placement.     + for worsening hearing and vision, congestion, rhinorrhea, sore throat, cough, vertigo, diarrhea (chronic) and dizziness   Denies fever, chills, chest pain, palpitations, abdominal pain, nausea, vomiting,  constipation, urinary frequency, urgency, or dysuria,  Denies recent travel, recent antibiotic use, or sick contacts.    ED Course:   Vitals: BP: 100/58, HR: 102, Temp: 98.4, RR: 20, SpO2: 97% on RA  Labs:  WBC 6.7, Hgb 11.4, Hct 34.1, .7, Plt 104, Tbili 1.3, Alk phos 151, AST 42, ALT 22. THC +, blood Alcohol 184  UA: Trace ketones, small bili, trace leuk esterase, hyaline casts, calcium oxalate crystals  CTH: No acute intracranial hemorrhage or mass effect. No significant change   from 06/14/2024.    Received in the ED: 1g IV ofirmev x1, 10mg IVP Reglan x1, 1L 0.9% NS bolus x1   68-year-old male with history of HTN, MI/CAD s/p PCI (LEELA X1 pRCA 2007), CABG 2022, afib, HFrEF, T2DM, colon cancer s/p colon resection (~12 years ago per pt) presenting to the ED for headaches for the past few weeks and generalized weakness.  The patient was admitted to the hospital from June 12-15 due to respiratory failure. Since then, he has had persistent SOB. Reports he has been having ongoing shortness of breath, has followed up outpatient with cardiologist and pulmonologist. His headache is described as a squeezing, diffuse pain that is localized behind his eyes and travels from forehead to occiput. He notes 2 areas of numbness bilaterally on the lateral portions of the OA joint that worsen when his headache returns. The patient also reports persistent rhinorrhea, watery eyes, congestion, and sore throat, which he attributes to seasonal allergies. Endorsing b/l LE weakness which makes it difficult for him to climb the stairs from his basement apartment prompting him to seek medical care. Also endorses numbness and tingling in his toes which he states is chronic. The patient states his son told him to drink alcohol to help his headache and weakness, so he had few beers before arriving at the hospital. In addition, the patient expressed interest in assisted living placement. Per pt last colonoscopy 2022 with benign findings.     + for worsening hearing and vision, congestion, rhinorrhea, sore throat, cough, vertigo, diarrhea (chronic) and dizziness   Denies fever, chills, chest pain, palpitations, abdominal pain, nausea, vomiting,  constipation, urinary frequency, urgency, or dysuria,  Denies recent travel, recent antibiotic use, or sick contacts.    ED Course:   Vitals: BP: 100/58, HR: 102, Temp: 98.4, RR: 20, SpO2: 97% on RA  Labs:  WBC 6.7, Hgb 11.4, Hct 34.1, .7, Plt 104, Tbili 1.3, Alk phos 151, AST 42, ALT 22. THC +, blood Alcohol 184  UA: Trace ketones, small bili, trace leuk esterase, hyaline casts, calcium oxalate crystals  CTH: No acute intracranial hemorrhage or mass effect. No significant change   from 06/14/2024.    Received in the ED: 1g IV ofirmev x1, 10mg IVP Reglan x1, 1L 0.9% NS bolus x1   68-year-old male with history of HTN, MI/CAD s/p PCI (LEELA X1 pRCA 2007), CABG 2022, afib, HFrEF, T2DM, hip fx s/p internal fixation, colon cancer s/p colon resection (~12 years ago per pt) presenting to the ED for headaches for the past few weeks and generalized weakness.  The patient was admitted to the hospital from June 12-15 due to respiratory failure. Since then, he has had persistent SOB. Reports he has been having ongoing shortness of breath, has followed up outpatient with cardiologist and pulmonologist. His headache is described as a squeezing, diffuse pain that is localized behind his eyes and travels from forehead to occiput. He notes 2 areas of numbness bilaterally on the lateral portions of the OA joint that worsen when his headache returns. The patient also reports persistent rhinorrhea, watery eyes, congestion, and sore throat, which he attributes to seasonal allergies. Endorsing b/l LE weakness which makes it difficult for him to climb the stairs from his basement apartment prompting him to seek medical care. Also endorses numbness and tingling in his toes which he states is chronic. The patient states his son told him to drink alcohol to help his headache and weakness, so he had few beers before arriving at the hospital. In addition, the patient expressed interest in assisted living placement. Per pt last colonoscopy 2022 with benign findings.     + for worsening hearing and vision, congestion, rhinorrhea, sore throat, cough, vertigo, diarrhea (chronic) and dizziness   Denies fever, chills, chest pain, palpitations, abdominal pain, nausea, vomiting,  constipation, urinary frequency, urgency, or dysuria,  Denies recent travel, recent antibiotic use, or sick contacts.    ED Course:   Vitals: BP: 100/58, HR: 102, Temp: 98.4, RR: 20, SpO2: 97% on RA  Labs:  WBC 6.7, Hgb 11.4, Hct 34.1, .7, Plt 104, Tbili 1.3, Alk phos 151, AST 42, ALT 22. THC +, blood Alcohol 184  UA: Trace ketones, small bili, trace leuk esterase, hyaline casts, calcium oxalate crystals  CTH: No acute intracranial hemorrhage or mass effect. No significant change   from 06/14/2024.    Received in the ED: 1g IV ofirmev x1, 10mg IVP Reglan x1, 1L 0.9% NS bolus x1

## 2024-08-29 NOTE — H&P ADULT - SOCIAL HISTORY
December 22, 2023      [facility]  [address] [address]  Phone: [phone]  Fax: [phone]       Patient: Francesca Munoz   YOB: 1992  Date of Visit: 12/22/2023    To Whom It May Concern:    MAURISIO Munoz  was at [facility] on 12/22/2023. The patient may return to work/school on 12/24/2023 with no restrictions. If you have any questions or concerns, or if I can be of further assistance, please do not hesitate to contact me.    Sincerely,    CESIA Deluca      Yes

## 2024-08-30 DIAGNOSIS — G44.219 EPISODIC TENSION-TYPE HEADACHE, NOT INTRACTABLE: ICD-10-CM

## 2024-08-30 LAB
A1C WITH ESTIMATED AVERAGE GLUCOSE RESULT: 4.4 % — SIGNIFICANT CHANGE UP (ref 4–5.6)
ALBUMIN SERPL ELPH-MCNC: 2.1 G/DL — LOW (ref 3.3–5)
ALBUMIN SERPL ELPH-MCNC: 2.2 G/DL — LOW (ref 3.3–5)
ALP SERPL-CCNC: 146 U/L — HIGH (ref 40–120)
ALP SERPL-CCNC: 151 U/L — HIGH (ref 40–120)
ALT FLD-CCNC: 20 U/L — SIGNIFICANT CHANGE UP (ref 12–78)
ALT FLD-CCNC: 21 U/L — SIGNIFICANT CHANGE UP (ref 12–78)
ANION GAP SERPL CALC-SCNC: 3 MMOL/L — LOW (ref 5–17)
ANION GAP SERPL CALC-SCNC: 4 MMOL/L — LOW (ref 5–17)
AST SERPL-CCNC: 42 U/L — HIGH (ref 15–37)
AST SERPL-CCNC: 46 U/L — HIGH (ref 15–37)
BASOPHILS # BLD AUTO: 0.03 K/UL — SIGNIFICANT CHANGE UP (ref 0–0.2)
BASOPHILS NFR BLD AUTO: 0.5 % — SIGNIFICANT CHANGE UP (ref 0–2)
BILIRUB DIRECT SERPL-MCNC: 0.7 MG/DL — HIGH (ref 0–0.3)
BILIRUB INDIRECT FLD-MCNC: 0.5 MG/DL — SIGNIFICANT CHANGE UP (ref 0.2–1)
BILIRUB SERPL-MCNC: 1.2 MG/DL — SIGNIFICANT CHANGE UP (ref 0.2–1.2)
BILIRUB SERPL-MCNC: 1.3 MG/DL — HIGH (ref 0.2–1.2)
BUN SERPL-MCNC: 8 MG/DL — SIGNIFICANT CHANGE UP (ref 7–23)
BUN SERPL-MCNC: 9 MG/DL — SIGNIFICANT CHANGE UP (ref 7–23)
CALCIUM SERPL-MCNC: 8.9 MG/DL — SIGNIFICANT CHANGE UP (ref 8.5–10.1)
CALCIUM SERPL-MCNC: 8.9 MG/DL — SIGNIFICANT CHANGE UP (ref 8.5–10.1)
CHLORIDE SERPL-SCNC: 103 MMOL/L — SIGNIFICANT CHANGE UP (ref 96–108)
CHLORIDE SERPL-SCNC: 103 MMOL/L — SIGNIFICANT CHANGE UP (ref 96–108)
CHOLEST SERPL-MCNC: 107 MG/DL — SIGNIFICANT CHANGE UP
CO2 SERPL-SCNC: 30 MMOL/L — SIGNIFICANT CHANGE UP (ref 22–31)
CO2 SERPL-SCNC: 31 MMOL/L — SIGNIFICANT CHANGE UP (ref 22–31)
CREAT SERPL-MCNC: 0.51 MG/DL — SIGNIFICANT CHANGE UP (ref 0.5–1.3)
CREAT SERPL-MCNC: 0.6 MG/DL — SIGNIFICANT CHANGE UP (ref 0.5–1.3)
CRP SERPL-MCNC: 48 MG/L — HIGH
EGFR: 106 ML/MIN/1.73M2 — SIGNIFICANT CHANGE UP
EGFR: 111 ML/MIN/1.73M2 — SIGNIFICANT CHANGE UP
EOSINOPHIL # BLD AUTO: 0 K/UL — SIGNIFICANT CHANGE UP (ref 0–0.5)
EOSINOPHIL NFR BLD AUTO: 0 % — SIGNIFICANT CHANGE UP (ref 0–6)
ERYTHROCYTE [SEDIMENTATION RATE] IN BLOOD: 9 MM/HR — SIGNIFICANT CHANGE UP (ref 0–20)
ESTIMATED AVERAGE GLUCOSE: 80 MG/DL — SIGNIFICANT CHANGE UP (ref 68–114)
GLUCOSE SERPL-MCNC: 167 MG/DL — HIGH (ref 70–99)
GLUCOSE SERPL-MCNC: 92 MG/DL — SIGNIFICANT CHANGE UP (ref 70–99)
HAV IGM SER-ACNC: SIGNIFICANT CHANGE UP
HBV CORE IGM SER-ACNC: SIGNIFICANT CHANGE UP
HBV SURFACE AG SER-ACNC: SIGNIFICANT CHANGE UP
HCT VFR BLD CALC: 34.1 % — LOW (ref 39–50)
HCV AB S/CO SERPL IA: 0.2 S/CO — SIGNIFICANT CHANGE UP (ref 0–0.99)
HCV AB SERPL-IMP: SIGNIFICANT CHANGE UP
HDLC SERPL-MCNC: 50 MG/DL — SIGNIFICANT CHANGE UP
HGB BLD-MCNC: 12 G/DL — LOW (ref 13–17)
IMM GRANULOCYTES NFR BLD AUTO: 0.3 % — SIGNIFICANT CHANGE UP (ref 0–0.9)
LIPID PNL WITH DIRECT LDL SERPL: 38 MG/DL — SIGNIFICANT CHANGE UP
LYMPHOCYTES # BLD AUTO: 2.32 K/UL — SIGNIFICANT CHANGE UP (ref 1–3.3)
LYMPHOCYTES # BLD AUTO: 39.9 % — SIGNIFICANT CHANGE UP (ref 13–44)
MAGNESIUM SERPL-MCNC: 1.7 MG/DL — SIGNIFICANT CHANGE UP (ref 1.6–2.6)
MCHC RBC-ENTMCNC: 35.2 GM/DL — SIGNIFICANT CHANGE UP (ref 32–36)
MCHC RBC-ENTMCNC: 39 PG — HIGH (ref 27–34)
MCV RBC AUTO: 110.7 FL — HIGH (ref 80–100)
MONOCYTES # BLD AUTO: 0.44 K/UL — SIGNIFICANT CHANGE UP (ref 0–0.9)
MONOCYTES NFR BLD AUTO: 7.6 % — SIGNIFICANT CHANGE UP (ref 2–14)
NEUTROPHILS # BLD AUTO: 3 K/UL — SIGNIFICANT CHANGE UP (ref 1.8–7.4)
NEUTROPHILS NFR BLD AUTO: 51.7 % — SIGNIFICANT CHANGE UP (ref 43–77)
NON HDL CHOLESTEROL: 58 MG/DL — SIGNIFICANT CHANGE UP
NRBC # BLD: 0 /100 WBCS — SIGNIFICANT CHANGE UP (ref 0–0)
PHOSPHATE SERPL-MCNC: 1.9 MG/DL — LOW (ref 2.5–4.5)
PLATELET # BLD AUTO: 85 K/UL — LOW (ref 150–400)
POTASSIUM SERPL-MCNC: 4 MMOL/L — SIGNIFICANT CHANGE UP (ref 3.5–5.3)
POTASSIUM SERPL-MCNC: 4.3 MMOL/L — SIGNIFICANT CHANGE UP (ref 3.5–5.3)
POTASSIUM SERPL-SCNC: 4 MMOL/L — SIGNIFICANT CHANGE UP (ref 3.5–5.3)
POTASSIUM SERPL-SCNC: 4.3 MMOL/L — SIGNIFICANT CHANGE UP (ref 3.5–5.3)
PROCALCITONIN SERPL-MCNC: 23.28 NG/ML — HIGH
PROT SERPL-MCNC: 5.7 G/DL — LOW (ref 6–8.3)
PROT SERPL-MCNC: 6 G/DL — SIGNIFICANT CHANGE UP (ref 6–8.3)
RAPID RVP RESULT: SIGNIFICANT CHANGE UP
RBC # BLD: 3.08 M/UL — LOW (ref 4.2–5.8)
RBC # FLD: 13.6 % — SIGNIFICANT CHANGE UP (ref 10.3–14.5)
SARS-COV-2 RNA SPEC QL NAA+PROBE: SIGNIFICANT CHANGE UP
SODIUM SERPL-SCNC: 137 MMOL/L — SIGNIFICANT CHANGE UP (ref 135–145)
SODIUM SERPL-SCNC: 137 MMOL/L — SIGNIFICANT CHANGE UP (ref 135–145)
TRIGL SERPL-MCNC: 105 MG/DL — SIGNIFICANT CHANGE UP
WBC # BLD: 5.81 K/UL — SIGNIFICANT CHANGE UP (ref 3.8–10.5)
WBC # FLD AUTO: 5.81 K/UL — SIGNIFICANT CHANGE UP (ref 3.8–10.5)

## 2024-08-30 PROCEDURE — 93010 ELECTROCARDIOGRAM REPORT: CPT

## 2024-08-30 PROCEDURE — 99233 SBSQ HOSP IP/OBS HIGH 50: CPT

## 2024-08-30 PROCEDURE — 99223 1ST HOSP IP/OBS HIGH 75: CPT

## 2024-08-30 RX ORDER — SODIUM CHLORIDE 0.65 %
1 AEROSOL, SPRAY (ML) NASAL
Refills: 0 | Status: DISCONTINUED | OUTPATIENT
Start: 2024-08-30 | End: 2024-09-01

## 2024-08-30 RX ORDER — LORAZEPAM 4 MG/ML
1 INJECTION INTRAMUSCULAR; INTRAVENOUS
Refills: 0 | Status: DISCONTINUED | OUTPATIENT
Start: 2024-08-30 | End: 2024-09-01

## 2024-08-30 RX ADMIN — TRAMADOL HYDROCHLORIDE 25 MILLIGRAM(S): 200 TABLET, EXTENDED RELEASE ORAL at 09:20

## 2024-08-30 RX ADMIN — Medication 81 MILLIGRAM(S): at 12:54

## 2024-08-30 RX ADMIN — ACETAMINOPHEN 1000 MILLIGRAM(S): 325 TABLET ORAL at 13:25

## 2024-08-30 RX ADMIN — ACETAMINOPHEN 1000 MILLIGRAM(S): 325 TABLET ORAL at 01:00

## 2024-08-30 RX ADMIN — TRAMADOL HYDROCHLORIDE 25 MILLIGRAM(S): 200 TABLET, EXTENDED RELEASE ORAL at 10:20

## 2024-08-30 RX ADMIN — TRAMADOL HYDROCHLORIDE 50 MILLIGRAM(S): 200 TABLET, EXTENDED RELEASE ORAL at 18:51

## 2024-08-30 RX ADMIN — ACETAMINOPHEN 400 MILLIGRAM(S): 325 TABLET ORAL at 00:24

## 2024-08-30 RX ADMIN — ENOXAPARIN SODIUM 40 MILLIGRAM(S): 100 INJECTION SUBCUTANEOUS at 15:58

## 2024-08-30 RX ADMIN — ACETAMINOPHEN 400 MILLIGRAM(S): 325 TABLET ORAL at 06:28

## 2024-08-30 RX ADMIN — TRAMADOL HYDROCHLORIDE 50 MILLIGRAM(S): 200 TABLET, EXTENDED RELEASE ORAL at 17:51

## 2024-08-30 RX ADMIN — ACETAMINOPHEN 400 MILLIGRAM(S): 325 TABLET ORAL at 12:55

## 2024-08-30 RX ADMIN — ACETAMINOPHEN 1000 MILLIGRAM(S): 325 TABLET ORAL at 07:28

## 2024-08-30 RX ADMIN — Medication 3 MILLIGRAM(S): at 21:49

## 2024-08-30 NOTE — SBIRT NOTE ADULT - NSSBIRTALCPOSREINDET_GEN_A_CORE
SW provided positive reinforcement regarding alcohol use, pt does not want referral to ETOH treatment at this time.

## 2024-08-30 NOTE — PROGRESS NOTE ADULT - PROBLEM SELECTOR PLAN 4
Endorsing 7 drinks per week, however, heavier use in the past. Denies wanting assistance with reducing alcohol intake. CIWA 3 on admission. Blood alcohol on admission 184, also THC + on urine tox screen. Mildly elevated AST 42, ALT 22, Tbili 1.3, Albumin 2.3 in setting of liver cirrhosis     - CTM for symptoms of alcohol withdrawal    #liver cirrhosis  - avoid hepatotoxic agents  -f/u hepatitis panel  - f/u AM CMP  - f/u PT/INR Endorsing 7 drinks per week, however, heavier use in the past. Denies wanting assistance with reducing alcohol intake. CIWA 3 on admission. Blood alcohol on admission 184, also THC + on urine tox screen. Mildly elevated AST 42, ALT 22, Tbili 1.3, Albumin 2.3 in setting of liver cirrhosis    on CIWA protocol     #liver cirrhosis  - avoid hepatotoxic agents  -f/u hepatitis panel  - LFT stable, had GI eval in [place]

## 2024-08-30 NOTE — PROGRESS NOTE ADULT - PROBLEM SELECTOR PLAN 9
Chronic s/p bypass (2022) and stent (2007) on ASA 81 daily. Not endorsing current chest pain    - continue ASA 81  Pt has not taken any other meds in over a year, Non-compliance with meds & follow up Chronic s/p bypass (2022) and stent (2007) on ASA 81 daily. Not endorsing current chest pain- continue ASA 81  does c/o dyspnea on exertion. Cardio eval noted, suggested out patient work up

## 2024-08-30 NOTE — PROGRESS NOTE ADULT - SUBJECTIVE AND OBJECTIVE BOX
neuro cons dict  seen for trouble and walking/ha  brain mri  likely peripheral neuropathy leading trouble in walking  neuropathic w/u  watch for DT  thiamine  brain mri wwo

## 2024-08-30 NOTE — CONSULT NOTE ADULT - SUBJECTIVE AND OBJECTIVE BOX
[facility]         Tony Barrera Patel, Lori, Manpreet      [phone] (office)    Reason for Consult: exertional dyspnea     Interval HPI: Patient was seen and examined at bedside. No overnight events on telemonitoring. Pt states that he has been having this exertional dyspnea for about one year ago, when he was admitted for his pelvic fracture. Since then, his dyspnea has progressed, where now he is unable to ambulate up his stairs more than three steps before stopping due to dyspnea. Pt states that he was here with the same issue on last admission (06/23) and was assumed etiology was pulmonary. However, pt was able to f/u with outpatient pulmonologist and a pulm function test was performed, which was unremarkable. Pt had seen the [facility] 08/20, where he was started on metoprolol for his sinus tachycardia.  Pt continues to be active smoker (much less than prior) only 1-2 cigarettes a day, along with daily alcohol consumption.     HPI:  68-year-old male with history of HTN, MI/CAD s/p PCI (LEELA X1 pRCA 2007), CABG 2022, afib, HFpEF, T2DM, hip fx s/p internal fixation, colon cancer s/p colon resection (~12 years ago per pt) presenting to the ED for headaches for the past few weeks and generalized weakness.  The patient was admitted to the hospital from June 12-15 due to respiratory failure. Since then, he has had persistent SOB. Reports he has been having ongoing shortness of breath, has followed up outpatient with cardiologist and pulmonologist. His headache is described as a squeezing, diffuse pain that is localized behind his eyes and travels from forehead to occiput. He notes 2 areas of numbness bilaterally on the lateral portions of the OA joint that worsen when his headache returns. The patient also reports persistent rhinorrhea, watery eyes, congestion, and sore throat, which he attributes to seasonal allergies. Endorsing b/l LE weakness which makes it difficult for him to climb the stairs from his basement apartment prompting him to seek medical care. Also endorses numbness and tingling in his toes which he states is chronic. The patient states his son told him to drink alcohol to help his headache and weakness, so he had few beers before arriving at the hospital. In addition, the patient expressed interest in assisted living placement. Per pt last colonoscopy 2022 with benign findings.     + for worsening hearing and vision, congestion, rhinorrhea, sore throat, cough, vertigo, diarrhea (chronic) and dizziness   Denies fever, chills, chest pain, palpitations, abdominal pain, nausea, vomiting,  constipation, urinary frequency, urgency, or dysuria,  Denies recent travel, recent antibiotic use, or sick contacts.    ED Course:   Vitals: BP: 100/58, HR: 102, Temp: 98.4, RR: 20, SpO2: 97% on RA  Labs:  WBC 6.7, Hgb 11.4, Hct 34.1, .7, Plt 104, Tbili 1.3, Alk phos 151, AST 42, ALT 22. THC +, blood Alcohol 184  UA: Trace ketones, small bili, trace leuk esterase, hyaline casts, calcium oxalate crystals  CTH: No acute intracranial hemorrhage or mass effect. No significant change   from 06/14/2024.    Received in the ED: 1g IV ofirmev x1, 10mg IVP Reglan x1, 1L 0.9% NS bolus x1   (29 Aug 2024 10:04)      PAST MEDICAL & SURGICAL HISTORY:  Pancreatitis      Hypertension      Myocardial infarct      Alcohol abuse      Colon cancer      T2DM (type 2 diabetes mellitus)      Paroxysmal atrial fibrillation      CHF (congestive heart failure)      History of colon resection      History of heart surgery  cardiac stent      S/P CABG (coronary artery bypass graft)      Status post fracture of femur      Status post fracture of pelvis          SOCIAL HISTORY: No active tobacco, alcohol or illicit drug use    FAMILY HISTORY:  FH: prostate cancer (Father)    Family history of atherosclerosis (Mother)        Home Medications:  aspirin 81 mg oral delayed release tablet: 1 tab(s) orally once a day (29 Aug 2024 11:26)      MEDICATIONS  (STANDING):  aspirin enteric coated 81 milliGRAM(s) Oral daily  dextrose 5%. 1000 milliLiter(s) (100 mL/Hr) IV Continuous <Continuous>  dextrose 5%. 1000 milliLiter(s) (50 mL/Hr) IV Continuous <Continuous>  dextrose 50% Injectable 12.5 Gram(s) IV Push once  dextrose 50% Injectable 25 Gram(s) IV Push once  dextrose 50% Injectable 25 Gram(s) IV Push once  enoxaparin Injectable 40 milliGRAM(s) SubCutaneous every 24 hours  glucagon  Injectable 1 milliGRAM(s) IntraMuscular once  insulin lispro (ADMELOG) corrective regimen sliding scale   SubCutaneous three times a day before meals  insulin lispro (ADMELOG) corrective regimen sliding scale   SubCutaneous at bedtime    MEDICATIONS  (PRN):  acetaminophen     Tablet .. 650 milliGRAM(s) Oral every 6 hours PRN Mild Pain (1 - 3)  acetaminophen     Tablet .. 650 milliGRAM(s) Oral every 6 hours PRN Temp greater or equal to 38C (100.4F)  dextrose Oral Gel 15 Gram(s) Oral once PRN Blood Glucose LESS THAN 70 milliGRAM(s)/deciliter  LORazepam   Injectable 1 milliGRAM(s) IV Push every 2 hours PRN CIWA-Ar score increase by 2 points and a total score of 7 or less  meclizine 25 milliGRAM(s) Oral three times a day PRN Dizziness  melatonin 3 milliGRAM(s) Oral at bedtime PRN Insomnia  nicotine  Polacrilex Lozenge 2 milliGRAM(s) Oral every 2 hours PRN Breakthrough cravings  sodium chloride 0.65% Nasal 1 Spray(s) Both Nostrils two times a day PRN Nasal Congestion  traMADol 25 milliGRAM(s) Oral four times a day PRN Moderate Pain (4 - 6)  traMADol 50 milliGRAM(s) Oral every 6 hours PRN Severe Pain (7 - 10)      Allergies    No Known Drug Allergies  Seasonal Allergies (pollen) (Eye Irritation; Rhinorrhea)    Intolerances        REVIEW OF SYSTEMS: Negative except as per HPI.    VITAL SIGNS:   Vital Signs Last 24 Hrs  T(C): 37 (30 Aug 2024 12:18), Max: 37 (30 Aug 2024 12:18)  T(F): 98.6 (30 Aug 2024 12:18), Max: 98.6 (30 Aug 2024 12:18)  HR: 101 (30 Aug 2024 12:18) (96 - 101)  BP: 134/85 (30 Aug 2024 12:18) (99/67 - 154/77)  BP(mean): --  RR: 18 (30 Aug 2024 12:18) (18 - 19)  SpO2: 100% (30 Aug 2024 12:18) (95% - 100%)    Parameters below as of 30 Aug 2024 12:18  Patient On (Oxygen Delivery Method): room air        I&O's Summary    30 Aug 2024 07:01  -  30 Aug 2024 15:34  --------------------------------------------------------  IN: 0 mL / OUT: 250 mL / NET: -250 mL        PHYSICAL EXAM:  Constitutional: NAD, cachectic male   HEENT NC/AT, dry mucous membranes  Pulmonary: Non-labored, breath sounds are clear bilaterally, no wheezing, rales or rhonchi  Cardiovascular: +S1, S2, RRR, systolic mumur appreciated   Gastrointestinal: Soft, nontender, nondistended, normoactive bowel sounds  Extremities: No peripheral edema   Neurological: Alert, strength and sensitivity are grossly intact  Skin: No obvious lesions/rashes  Psych: Mood & affect appropriate    LABS: All Labs Reviewed:                        12.0   5.81  )-----------( 85       ( 30 Aug 2024 08:30 )             34.1                         11.4   6.70  )-----------( 104      ( 28 Aug 2024 16:37 )             34.1     30 Aug 2024 10:25    137    |  103    |  9      ----------------------------<  167    4.3     |  31     |  0.60   30 Aug 2024 08:30    137    |  103    |  8      ----------------------------<  92     4.0     |  30     |  0.51   28 Aug 2024 16:37    137    |  101    |  9      ----------------------------<  140    4.0     |  26     |  0.87     Ca    8.9        30 Aug 2024 10:25  Ca    8.9        30 Aug 2024 08:30  Ca    9.0        28 Aug 2024 16:37  Phos  1.9       30 Aug 2024 10:25  Mg     1.7       30 Aug 2024 10:25  Mg     1.6       28 Aug 2024 16:37    TPro  5.7    /  Alb  2.1    /  TBili  1.2    /  DBili  0.7    /  AST  42     /  ALT  20     /  AlkPhos  146    30 Aug 2024 10:25  TPro  6.0    /  Alb  2.2    /  TBili  1.3    /  DBili  x      /  AST  46     /  ALT  21     /  AlkPhos  151    30 Aug 2024 08:30  TPro  6.0    /  Alb  2.3    /  TBili  1.3    /  DBili  x      /  AST  42     /  ALT  22     /  AlkPhos  151    28 Aug 2024 16:37    PT/INR - ( 29 Aug 2024 14:15 )   PT: 13.3 sec;   INR: 1.17 ratio               Blood Culture: N/A         EKG: sinus tachy, incomplete RBBB     RADIOLOGY: < from: CT Head No Cont (08.28.24 @ 17:41) >  ACC: [id number] EXAM:  CT BRAIN   ORDERED BY: COMFORT CATHERINE     PROCEDURE DATE:  08/28/2024          INTERPRETATION:  PROCEDURE: CT head without intravenous contrast    CLINICAL INDICATION: Headaches    TECHNIQUE: CT imaging of the brain is performed with multiplanar images   reviewed and displayed with both bone and soft tissue algorithm. No   contrast given.    COMPARISON: 6/14/2024    FINDINGS:    There is mild to moderate parenchymal atrophy, similar to the prior   study. No hydrocephalus,mass effect or midline shift.    No acute intracranial hemorrhage or extra-axial fluid collection.    Gray to white differentiation appears preserved, without CT evidence of   recent transcortical or territorial infarct. White matter demonstrates   similar mild periventricular microangiopathic low-density.    Bone algorithm images show no calvarial fracture or acute abnormality of   the calvarium or skull base. Paranasal sinuses and mastoids included on   this scan show no acute disease.      IMPRESSION:    No acute intracranial hemorrhage or mass effect. No significant change   from 06/14/2024.    --- End of Report ---    < end of copied text >      TTE 06/24:   CONCLUSIONS:      1. Left ventricular cavity is normal in size. Left ventricular systolic function is normal with an ejection fraction of 50 % by Blake's method of disks.   2. Mild left ventricular hypertrophy.   3. Normal right ventricular cavity size and normal systolic function.   4. The left atrium is normal in size.   5. Structurally normal mitral valve with normal leaflet excursion.   6. Mild mitral regurgitation.   7. Trileaflet aortic valve with normal systolic excursion. There is focal calcification of the aortic valve leaflets.   8. No evidence of aortic regurgitation. [facility]         Tony Barrera Patel, Lori, Manpreet      [phone] (office)    Reason for Consult: exertional dyspnea     Interval HPI: Patient was seen and examined at bedside. No overnight events on telemonitoring. Pt states that he has been having this exertional dyspnea for about one year ago, when he was admitted for his pelvic fracture. Since then, his dyspnea has progressed, where now he is unable to ambulate up his stairs more than three steps before stopping due to dyspnea. Pt states that he was here with the same issue on last admission (06/23) and was assumed etiology was pulmonary. However, pt was able to f/u with outpatient pulmonologist and a pulm function test was performed, which was unremarkable. Pt had seen the [facility] 08/20, where he was started on metoprolol for his sinus tachycardia.  Pt continues to be active smoker (much less than prior) only 1-2 cigarettes a day, along with daily alcohol consumption.     HPI:  68-year-old male with history of HTN, MI/CAD s/p PCI (LEELA X1 pRCA 2007), CABG 2022, afib, HFpEF, T2DM, hip fx s/p internal fixation, colon cancer s/p colon resection (~12 years ago per pt) presenting to the ED for headaches for the past few weeks and generalized weakness.  The patient was admitted to the hospital from June 12-15 due to respiratory failure. Since then, he has had persistent SOB. Reports he has been having ongoing shortness of breath, has followed up outpatient with cardiologist and pulmonologist. His headache is described as a squeezing, diffuse pain that is localized behind his eyes and travels from forehead to occiput. He notes 2 areas of numbness bilaterally on the lateral portions of the OA joint that worsen when his headache returns. The patient also reports persistent rhinorrhea, watery eyes, congestion, and sore throat, which he attributes to seasonal allergies. Endorsing b/l LE weakness which makes it difficult for him to climb the stairs from his basement apartment prompting him to seek medical care. Also endorses numbness and tingling in his toes which he states is chronic. The patient states his son told him to drink alcohol to help his headache and weakness, so he had few beers before arriving at the hospital. In addition, the patient expressed interest in assisted living placement. Per pt last colonoscopy 2022 with benign findings.     + for worsening hearing and vision, congestion, rhinorrhea, sore throat, cough, vertigo, diarrhea (chronic) and dizziness   Denies fever, chills, chest pain, palpitations, abdominal pain, nausea, vomiting,  constipation, urinary frequency, urgency, or dysuria,  Denies recent travel, recent antibiotic use, or sick contacts.    ED Course:   Vitals: BP: 100/58, HR: 102, Temp: 98.4, RR: 20, SpO2: 97% on RA  Labs:  WBC 6.7, Hgb 11.4, Hct 34.1, .7, Plt 104, Tbili 1.3, Alk phos 151, AST 42, ALT 22. THC +, blood Alcohol 184  UA: Trace ketones, small bili, trace leuk esterase, hyaline casts, calcium oxalate crystals  CTH: No acute intracranial hemorrhage or mass effect. No significant change   from 06/14/2024.    Received in the ED: 1g IV ofirmev x1, 10mg IVP Reglan x1, 1L 0.9% NS bolus x1   (29 Aug 2024 10:04)      PAST MEDICAL & SURGICAL HISTORY:  Pancreatitis      Hypertension      Myocardial infarct      Alcohol abuse      Colon cancer      T2DM (type 2 diabetes mellitus)      Paroxysmal atrial fibrillation      CHF (congestive heart failure)      History of colon resection      History of heart surgery  cardiac stent      S/P CABG (coronary artery bypass graft)      Status post fracture of femur      Status post fracture of pelvis          SOCIAL HISTORY: No active tobacco, alcohol or illicit drug use    FAMILY HISTORY:  FH: prostate cancer (Father)    Family history of atherosclerosis (Mother)        Home Medications:  aspirin 81 mg oral delayed release tablet: 1 tab(s) orally once a day (29 Aug 2024 11:26)      MEDICATIONS  (STANDING):  aspirin enteric coated 81 milliGRAM(s) Oral daily  dextrose 5%. 1000 milliLiter(s) (100 mL/Hr) IV Continuous <Continuous>  dextrose 5%. 1000 milliLiter(s) (50 mL/Hr) IV Continuous <Continuous>  dextrose 50% Injectable 12.5 Gram(s) IV Push once  dextrose 50% Injectable 25 Gram(s) IV Push once  dextrose 50% Injectable 25 Gram(s) IV Push once  enoxaparin Injectable 40 milliGRAM(s) SubCutaneous every 24 hours  glucagon  Injectable 1 milliGRAM(s) IntraMuscular once  insulin lispro (ADMELOG) corrective regimen sliding scale   SubCutaneous three times a day before meals  insulin lispro (ADMELOG) corrective regimen sliding scale   SubCutaneous at bedtime    MEDICATIONS  (PRN):  acetaminophen     Tablet .. 650 milliGRAM(s) Oral every 6 hours PRN Mild Pain (1 - 3)  acetaminophen     Tablet .. 650 milliGRAM(s) Oral every 6 hours PRN Temp greater or equal to 38C (100.4F)  dextrose Oral Gel 15 Gram(s) Oral once PRN Blood Glucose LESS THAN 70 milliGRAM(s)/deciliter  LORazepam   Injectable 1 milliGRAM(s) IV Push every 2 hours PRN CIWA-Ar score increase by 2 points and a total score of 7 or less  meclizine 25 milliGRAM(s) Oral three times a day PRN Dizziness  melatonin 3 milliGRAM(s) Oral at bedtime PRN Insomnia  nicotine  Polacrilex Lozenge 2 milliGRAM(s) Oral every 2 hours PRN Breakthrough cravings  sodium chloride 0.65% Nasal 1 Spray(s) Both Nostrils two times a day PRN Nasal Congestion  traMADol 25 milliGRAM(s) Oral four times a day PRN Moderate Pain (4 - 6)  traMADol 50 milliGRAM(s) Oral every 6 hours PRN Severe Pain (7 - 10)      Allergies    No Known Drug Allergies  Seasonal Allergies (pollen) (Eye Irritation; Rhinorrhea)    Intolerances        REVIEW OF SYSTEMS: Negative except as per HPI.    VITAL SIGNS:   Vital Signs Last 24 Hrs  T(C): 37 (30 Aug 2024 12:18), Max: 37 (30 Aug 2024 12:18)  T(F): 98.6 (30 Aug 2024 12:18), Max: 98.6 (30 Aug 2024 12:18)  HR: 101 (30 Aug 2024 12:18) (96 - 101)  BP: 134/85 (30 Aug 2024 12:18) (99/67 - 154/77)  BP(mean): --  RR: 18 (30 Aug 2024 12:18) (18 - 19)  SpO2: 100% (30 Aug 2024 12:18) (95% - 100%)    Parameters below as of 30 Aug 2024 12:18  Patient On (Oxygen Delivery Method): room air        I&O's Summary    30 Aug 2024 07:01  -  30 Aug 2024 15:34  --------------------------------------------------------  IN: 0 mL / OUT: 250 mL / NET: -250 mL        PHYSICAL EXAM:  Constitutional: NAD, cachectic male   HEENT NC/AT, dry mucous membranes  Pulmonary: Non-labored, breath sounds are clear bilaterally, no wheezing, rales or rhonchi  Cardiovascular: +S1, S2, RRR, systolic mumur appreciated   Gastrointestinal: Soft, nontender, nondistended, normoactive bowel sounds  Extremities: No peripheral edema   Neurological: Alert, strength and sensitivity are grossly intact  Skin: No obvious lesions/rashes  Psych: Mood & affect appropriate    LABS: All Labs Reviewed:                        12.0   5.81  )-----------( 85       ( 30 Aug 2024 08:30 )             34.1                         11.4   6.70  )-----------( 104      ( 28 Aug 2024 16:37 )             34.1     30 Aug 2024 10:25    137    |  103    |  9      ----------------------------<  167    4.3     |  31     |  0.60   30 Aug 2024 08:30    137    |  103    |  8      ----------------------------<  92     4.0     |  30     |  0.51   28 Aug 2024 16:37    137    |  101    |  9      ----------------------------<  140    4.0     |  26     |  0.87     Ca    8.9        30 Aug 2024 10:25  Ca    8.9        30 Aug 2024 08:30  Ca    9.0        28 Aug 2024 16:37  Phos  1.9       30 Aug 2024 10:25  Mg     1.7       30 Aug 2024 10:25  Mg     1.6       28 Aug 2024 16:37    TPro  5.7    /  Alb  2.1    /  TBili  1.2    /  DBili  0.7    /  AST  42     /  ALT  20     /  AlkPhos  146    30 Aug 2024 10:25  TPro  6.0    /  Alb  2.2    /  TBili  1.3    /  DBili  x      /  AST  46     /  ALT  21     /  AlkPhos  151    30 Aug 2024 08:30  TPro  6.0    /  Alb  2.3    /  TBili  1.3    /  DBili  x      /  AST  42     /  ALT  22     /  AlkPhos  151    28 Aug 2024 16:37    PT/INR - ( 29 Aug 2024 14:15 )   PT: 13.3 sec;   INR: 1.17 ratio               Blood Culture: N/A         EKG: sinus tachy, incomplete RBBB     RADIOLOGY: < from: CT Head No Cont (08.28.24 @ 17:41) >  ACC: [id number] EXAM:  CT BRAIN   ORDERED BY: COMFORT CATHERINE     PROCEDURE DATE:  08/28/2024          INTERPRETATION:  PROCEDURE: CT head without intravenous contrast    CLINICAL INDICATION: Headaches    TECHNIQUE: CT imaging of the brain is performed with multiplanar images   reviewed and displayed with both bone and soft tissue algorithm. No   contrast given.    COMPARISON: 6/14/2024    FINDINGS:    There is mild to moderate parenchymal atrophy, similar to the prior   study. No hydrocephalus,mass effect or midline shift.    No acute intracranial hemorrhage or extra-axial fluid collection.    Gray to white differentiation appears preserved, without CT evidence of   recent transcortical or territorial infarct. White matter demonstrates   similar mild periventricular microangiopathic low-density.    Bone algorithm images show no calvarial fracture or acute abnormality of   the calvarium or skull base. Paranasal sinuses and mastoids included on   this scan show no acute disease.      IMPRESSION:    No acute intracranial hemorrhage or mass effect. No significant change   from 06/14/2024.    --- End of Report ---    < end of copied text >      TTE 06/24:   CONCLUSIONS:      1. Left ventricular cavity is normal in size. Left ventricular systolic function is normal with an ejection fraction of 50 % by Blake's method of disks.   2. Mild left ventricular hypertrophy.   3. Normal right ventricular cavity size and normal systolic function.   4. The left atrium is normal in size.   5. Structurally normal mitral valve with normal leaflet excursion.   6. Mild mitral regurgitation.   7. Trileaflet aortic valve with normal systolic excursion. There is focal calcification of the aortic valve leaflets.   8. No evidence of aortic regurgitation.

## 2024-08-30 NOTE — PROGRESS NOTE ADULT - SUBJECTIVE AND OBJECTIVE BOX
Patient is a 68y old  Male who presents with a chief complaint of weakness (30 Aug 2024 15:33)      Subjective:  INTERVAL HPI/OVERNIGHT EVENTS: Patient seen and examined at bedside.  Patient c/o intermittent frontal headache. no dizziness/f/chills/n/v.    MEDICATIONS  (STANDING):  aspirin enteric coated 81 milliGRAM(s) Oral daily  dextrose 5%. 1000 milliLiter(s) (100 mL/Hr) IV Continuous <Continuous>  dextrose 5%. 1000 milliLiter(s) (50 mL/Hr) IV Continuous <Continuous>  dextrose 50% Injectable 12.5 Gram(s) IV Push once  dextrose 50% Injectable 25 Gram(s) IV Push once  dextrose 50% Injectable 25 Gram(s) IV Push once  enoxaparin Injectable 40 milliGRAM(s) SubCutaneous every 24 hours  glucagon  Injectable 1 milliGRAM(s) IntraMuscular once  insulin lispro (ADMELOG) corrective regimen sliding scale   SubCutaneous three times a day before meals  insulin lispro (ADMELOG) corrective regimen sliding scale   SubCutaneous at bedtime    MEDICATIONS  (PRN):  acetaminophen     Tablet .. 650 milliGRAM(s) Oral every 6 hours PRN Mild Pain (1 - 3)  acetaminophen     Tablet .. 650 milliGRAM(s) Oral every 6 hours PRN Temp greater or equal to 38C (100.4F)  dextrose Oral Gel 15 Gram(s) Oral once PRN Blood Glucose LESS THAN 70 milliGRAM(s)/deciliter  LORazepam   Injectable 1 milliGRAM(s) IV Push every 2 hours PRN CIWA-Ar score increase by 2 points and a total score of 7 or less  meclizine 25 milliGRAM(s) Oral three times a day PRN Dizziness  melatonin 3 milliGRAM(s) Oral at bedtime PRN Insomnia  nicotine  Polacrilex Lozenge 2 milliGRAM(s) Oral every 2 hours PRN Breakthrough cravings  sodium chloride 0.65% Nasal 1 Spray(s) Both Nostrils two times a day PRN Nasal Congestion  traMADol 25 milliGRAM(s) Oral four times a day PRN Moderate Pain (4 - 6)  traMADol 50 milliGRAM(s) Oral every 6 hours PRN Severe Pain (7 - 10)      Allergies    No Known Drug Allergies  Seasonal Allergies (pollen) (Eye Irritation; Rhinorrhea)    Intolerances        REVIEW OF SYSTEMS:  CONSTITUTIONAL: No fever or chills  HEENT:  No headache, no sore throat  RESPIRATORY: No cough or shortness of breath  CARDIOVASCULAR: No chest pain or palpitations  GASTROINTESTINAL: No abd pain, nausea, vomiting, or diarrhea      Objective:  Vital Signs Last 24 Hrs  T(C): 37 (30 Aug 2024 12:18), Max: 37 (30 Aug 2024 12:18)  T(F): 98.6 (30 Aug 2024 12:18), Max: 98.6 (30 Aug 2024 12:18)  HR: 101 (30 Aug 2024 12:18) (96 - 101)  BP: 134/85 (30 Aug 2024 12:18) (99/67 - 154/77)  BP(mean): --  RR: 18 (30 Aug 2024 12:18) (18 - 19)  SpO2: 100% (30 Aug 2024 12:18) (95% - 100%)    Parameters below as of 30 Aug 2024 12:18  Patient On (Oxygen Delivery Method): room air        GENERAL: NAD, lying in bed comfortably  HEAD and EYES :  Normocephalic, ZARINA, EOMI, conjunctiva clear.  eye pressure by Tonometer was 20 on left and 21.5  on left.   ENT: Moist mucous membranes  NECK: Supple  CHEST/LUNG: Clear to auscultation bilaterally; No rales or rhonchi; no wheezing. Unlabored respirations  HEART: Regular rate and rhythm; S1S2+  ABDOMEN: Bowel sounds present; Soft, Nontender, Nondistended.   EXTREMITIES:  + distal Peripheral Pulses;  No cyanosis, or edema  NERVOUS SYSTEM:  Alert & Oriented X3;  No gross focal deficits   MSK: moves all extremities  SKIN: No rashes     LABS:                        12.0   5.81  )-----------( 85       ( 30 Aug 2024 08:30 )             34.1     30 Aug 2024 10:25    137    |  103    |  9      ----------------------------<  167    4.3     |  31     |  0.60     Ca    8.9        30 Aug 2024 10:25  Phos  1.9       30 Aug 2024 10:25  Mg     1.7       30 Aug 2024 10:25    TPro  5.7    /  Alb  2.1    /  TBili  1.2    /  DBili  0.7    /  AST  42     /  ALT  20     /  AlkPhos  146    30 Aug 2024 10:25    PT/INR - ( 29 Aug 2024 14:15 )   PT: 13.3 sec;   INR: 1.17 ratio           Urinalysis Basic - ( 30 Aug 2024 10:25 )    Color: x / Appearance: x / SG: x / pH: x  Gluc: 167 mg/dL / Ketone: x  / Bili: x / Urobili: x   Blood: x / Protein: x / Nitrite: x   Leuk Esterase: x / RBC: x / WBC x   Sq Epi: x / Non Sq Epi: x / Bacteria: x      CAPILLARY BLOOD GLUCOSE      POCT Blood Glucose.: 122 mg/dL (30 Aug 2024 16:51)  POCT Blood Glucose.: 125 mg/dL (30 Aug 2024 11:54)  POCT Blood Glucose.: 94 mg/dL (30 Aug 2024 07:44)  POCT Blood Glucose.: 127 mg/dL (29 Aug 2024 21:35)        Urinalysis with Rflx Culture (collected 08-28-24 @ 18:00)        RADIOLOGY & ADDITIONAL TESTS:    Personally reviewed.     Consultant(s) Notes Reviewed:  [x] YES  [ ] NO    Plan of care discussed with patient ; all questions answered   Patient is a 68y old  Male who presents with a chief complaint of weakness (30 Aug 2024 15:33)      Subjective:  INTERVAL HPI/OVERNIGHT EVENTS: Patient seen and examined at bedside.  Patient c/o intermittent frontal headache. no dizziness/f/chills/n/v.    MEDICATIONS  (STANDING):  aspirin enteric coated 81 milliGRAM(s) Oral daily  dextrose 5%. 1000 milliLiter(s) (100 mL/Hr) IV Continuous <Continuous>  dextrose 5%. 1000 milliLiter(s) (50 mL/Hr) IV Continuous <Continuous>  dextrose 50% Injectable 12.5 Gram(s) IV Push once  dextrose 50% Injectable 25 Gram(s) IV Push once  dextrose 50% Injectable 25 Gram(s) IV Push once  enoxaparin Injectable 40 milliGRAM(s) SubCutaneous every 24 hours  glucagon  Injectable 1 milliGRAM(s) IntraMuscular once  insulin lispro (ADMELOG) corrective regimen sliding scale   SubCutaneous three times a day before meals  insulin lispro (ADMELOG) corrective regimen sliding scale   SubCutaneous at bedtime    MEDICATIONS  (PRN):  acetaminophen     Tablet .. 650 milliGRAM(s) Oral every 6 hours PRN Mild Pain (1 - 3)  acetaminophen     Tablet .. 650 milliGRAM(s) Oral every 6 hours PRN Temp greater or equal to 38C (100.4F)  dextrose Oral Gel 15 Gram(s) Oral once PRN Blood Glucose LESS THAN 70 milliGRAM(s)/deciliter  LORazepam   Injectable 1 milliGRAM(s) IV Push every 2 hours PRN CIWA-Ar score increase by 2 points and a total score of 7 or less  meclizine 25 milliGRAM(s) Oral three times a day PRN Dizziness  melatonin 3 milliGRAM(s) Oral at bedtime PRN Insomnia  nicotine  Polacrilex Lozenge 2 milliGRAM(s) Oral every 2 hours PRN Breakthrough cravings  sodium chloride 0.65% Nasal 1 Spray(s) Both Nostrils two times a day PRN Nasal Congestion  traMADol 25 milliGRAM(s) Oral four times a day PRN Moderate Pain (4 - 6)  traMADol 50 milliGRAM(s) Oral every 6 hours PRN Severe Pain (7 - 10)      Allergies    No Known Drug Allergies  Seasonal Allergies (pollen) (Eye Irritation; Rhinorrhea)    Intolerances        REVIEW OF SYSTEMS:  CONSTITUTIONAL: No fever or chills  HEENT:  + headache  RESPIRATORY: No cough or shortness of breath  CARDIOVASCULAR: No chest pain or palpitations  GASTROINTESTINAL: No abd pain, nausea, vomiting, or diarrhea      Objective:  Vital Signs Last 24 Hrs  T(C): 37 (30 Aug 2024 12:18), Max: 37 (30 Aug 2024 12:18)  T(F): 98.6 (30 Aug 2024 12:18), Max: 98.6 (30 Aug 2024 12:18)  HR: 101 (30 Aug 2024 12:18) (96 - 101)  BP: 134/85 (30 Aug 2024 12:18) (99/67 - 154/77)  BP(mean): --  RR: 18 (30 Aug 2024 12:18) (18 - 19)  SpO2: 100% (30 Aug 2024 12:18) (95% - 100%)    Parameters below as of 30 Aug 2024 12:18  Patient On (Oxygen Delivery Method): room air        GENERAL: NAD, lying in bed comfortably  HEAD and EYES :  Normocephalic, ZARINA, EOMI, conjunctiva clear.  eye pressure by Tonometer was 20 on left and 21.5  on left. no temperal area tenderness or scalp tenderness.   ENT: Moist mucous membranes  NECK: Supple  CHEST/LUNG: Clear to auscultation bilaterally; No rales or rhonchi; no wheezing. Unlabored respirations  HEART: Regular rate and rhythm; S1S2+  ABDOMEN: Bowel sounds present; Soft, Nontender, Nondistended.   EXTREMITIES:  + distal Peripheral Pulses;  No cyanosis, or edema  NERVOUS SYSTEM:  Alert & Oriented X3;  No gross focal deficits   MSK: moves all extremities  SKIN: No rashes     LABS:                        12.0   5.81  )-----------( 85       ( 30 Aug 2024 08:30 )             34.1     30 Aug 2024 10:25    137    |  103    |  9      ----------------------------<  167    4.3     |  31     |  0.60     Ca    8.9        30 Aug 2024 10:25  Phos  1.9       30 Aug 2024 10:25  Mg     1.7       30 Aug 2024 10:25    TPro  5.7    /  Alb  2.1    /  TBili  1.2    /  DBili  0.7    /  AST  42     /  ALT  20     /  AlkPhos  146    30 Aug 2024 10:25    PT/INR - ( 29 Aug 2024 14:15 )   PT: 13.3 sec;   INR: 1.17 ratio           Urinalysis Basic - ( 30 Aug 2024 10:25 )    Color: x / Appearance: x / SG: x / pH: x  Gluc: 167 mg/dL / Ketone: x  / Bili: x / Urobili: x   Blood: x / Protein: x / Nitrite: x   Leuk Esterase: x / RBC: x / WBC x   Sq Epi: x / Non Sq Epi: x / Bacteria: x      CAPILLARY BLOOD GLUCOSE      POCT Blood Glucose.: 122 mg/dL (30 Aug 2024 16:51)  POCT Blood Glucose.: 125 mg/dL (30 Aug 2024 11:54)  POCT Blood Glucose.: 94 mg/dL (30 Aug 2024 07:44)  POCT Blood Glucose.: 127 mg/dL (29 Aug 2024 21:35)        Urinalysis with Rflx Culture (collected 08-28-24 @ 18:00)        RADIOLOGY & ADDITIONAL TESTS:    Personally reviewed.     Consultant(s) Notes Reviewed:  [x] YES  [ ] NO    Plan of care discussed with patient ; all questions answered

## 2024-08-30 NOTE — PROGRESS NOTE ADULT - PROBLEM SELECTOR PLAN 10
Likely in setting of daily ETOH   -Hx of chronic alcohol use, Cirrhosis     #thrombocytopenia likely in setting of liver cirrhosis  -Pt endorses easy bruising  -Pt also with 50 pound weight loss and fatigue  - Monitor on daily CBC  - f/u PT/INR Likely in setting of daily ETOH   -Hx of chronic alcohol use, Cirrhosis     #thrombocytopenia likely in setting of liver cirrhosis  -Pt endorses easy bruising  -Pt also with 50 pound weight loss and fatigue  - Monitor on daily CBC

## 2024-08-30 NOTE — PROGRESS NOTE ADULT - PROBLEM SELECTOR PLAN 1
Pt states b/l LE weakness making it difficult to climb stairs from basement apartment. Ambulates with cane at baseline    - admit to medicine  - remote tele  - fall precautions  - PT consulted; recommend RUSLAN  -  consult for assisted living placement newly onset.  CT Head : no acute intracranial pathology , MRI head pending  tylenol/motrin prn for head   ESR negative. CRP , RVP pending , elevated procal   Utox+ THC   alcohol level high on admission  upper normal eye Pressure by Tonometer , need out patient ophthalmology eval    neurology eval noted.

## 2024-08-30 NOTE — SOCIAL WORK PROGRESS NOTE - NSSWPROGRESSNOTE_GEN_ALL_CORE
SW consult received, met with pt at bedside, pt requesting referral to subacute rehab. First choice is [facility], ROSELINE sent to local facilities and SW will follow up to ensure safe transition when bed secured and authorization obtained.

## 2024-08-30 NOTE — PROGRESS NOTE ADULT - PROBLEM SELECTOR PLAN 8
Chronic last echo in June/2024 with EF 50%.  Not adherent to medication regimen o/p. Per pharmacy did not  metoprolol succinate prescription from June. No signs of volume overload on exam. Non compliance with meds & follow up    - f/u chest xray Chronic last echo in June/2024 with EF 50%.  Not adherent to medication regimen o/p. Per pharmacy did not  metoprolol succinate prescription from June. No signs of volume overload on exam. Non compliance with meds & follow up    - f/u chest xray report

## 2024-08-30 NOTE — CONSULT NOTE ADULT - ATTENDING COMMENTS
68-year-old male with history of HTN, MI/CAD s/p PCI (LEELA X1 pRCA 2007), CABG 2022, afib, HFpEF, T2DM, hip fx s/p internal fixation, colon cancer s/p colon resection (~12 years ago per pt) presenting to the ED for headaches for the past few weeks and generalized weakness. Admitted for weakness and assisted living placement. Cardiology consulted for dyspnea on exertion.     Has chronic SOB  Still an active smoker  States he saw Pulmonary and had normal PFTs  Procal here is elevated.   CXR done yesterday still unread but appears clear  Known CAD with PCI and CABG 2022, denies any chest pain  Continue ASA, resume statin. LDL c is 58  Was recently restarted on Metoprolol Succinate 50mg BID by Dr. Kumar, please resume  Has known HFpEF but does not appear volume up on exam. Please obtain pro BNP.   EKG shows ST, iRBBB, possible anterolateral infarct, unchanged from prior  Recent TTE done 6/2024: LVEF 50%, normal RV function, mild MR, no AI. No need to repeat.   Can consider outpatient stress.

## 2024-08-30 NOTE — PROGRESS NOTE ADULT - PROBLEM SELECTOR PLAN 3
Hx of paroxysmal aFib. Endorsing intermittent dizzy spells in the past. On exam with regular cardiac rhythm    - remote telemetry Hx of paroxysmal aFib. Endorsing intermittent dizzy spells in the past. On exam with regular cardiac rhythm  - remote telemetry

## 2024-08-30 NOTE — PROGRESS NOTE ADULT - PROBLEM SELECTOR PLAN 7
Hx of T2DM not on meds currently. Gluc 140 on admission    - last A1c in june 4.6  - Diet: DASH/TLC/CC  - RICH  - F/u A1c Hx of T2DM not on meds currently. Gluc 140 on admission    - last A1c in june 4.6  - Diet: DASH/TLC/CC  - RICH

## 2024-08-30 NOTE — PROGRESS NOTE ADULT - PROBLEM SELECTOR PLAN 2
Endorsing head pressure relieved mildly by Tylenol in ED. Endorsing intermittent dizzy spells in the past causing falls ~6 mo ago    - tramadol 25mg q6 PRN for moderate pain  - meclizine 25mg TID PRN for dizziness  - neuro consult; appreciate recs Pt states b/l LE weakness making it difficult to climb stairs from basement apartment. Ambulates with cane at baseline. patient states his ambulation is not normal since his pelvic fracture.   - fall precautions  - PT consulted; recommend RUSLAN

## 2024-08-30 NOTE — SOCIAL WORK PROGRESS NOTE - NSSWPROGRESSNOTE_GEN_ALL_CORE
Pt accepted to [facility] for Subacute rehab, pending auth via [facility] #[id number]. SW to continue to follow to ensure safe transitional planning when authorization secured and pt is cleared for DC.

## 2024-08-30 NOTE — PROGRESS NOTE ADULT - ASSESSMENT
68-year-old male with history of HTN, MI/CAD s/p PCI (LEELA X1 pRCA 2007), CABG 2022, afib, HFrEF, DM, depression admitted for LE weakness and assisted living placement

## 2024-08-30 NOTE — CONSULT NOTE ADULT - ASSESSMENT
Assessment: 68-year-old male with history of HTN, MI/CAD s/p PCI (LEELA X1 pRCA 2007), CABG 2022, afib, HFpEF, T2DM, hip fx s/p internal fixation, colon cancer s/p colon resection (~12 years ago per pt) presenting to the ED for headaches for the past few weeks and generalized weakness. Admitted for weakness and assisted living placement. Cardiology consulted for dyspnea on exertion.     Plan:   - Patient without symptoms of angina or heart failure at this time.  - No clear evidence of acute ischemia. No ischemic changes on EKG, unchanged from prior.   - As pulmonary testing unremarkable, likely will require close outpatient f/u for stress testing, in setting of his extensive cardiac history and persistent symptoms.   - Recommend restarting high-intensity statin    - No meaningful evidence of volume overload  - Previous TTE Left ventricular systolic function is normal with an ejection fraction of 50 %   - Does not appear in acute exacerbation of HFpEF, however would check proBNP   - No need for repeat TTE at this time.     - BP well controlled, monitor routine hemodynamics.  - Monitor and replete lytes, keep K>4, Mg>2.  - Other cardiovascular workup will depend on clinical course.  - All other workup per primary team.  - Will continue to follow.

## 2024-08-31 LAB
ALBUMIN SERPL ELPH-MCNC: 2.2 G/DL — LOW (ref 3.3–5)
ALP SERPL-CCNC: 200 U/L — HIGH (ref 40–120)
ALT FLD-CCNC: 27 U/L — SIGNIFICANT CHANGE UP (ref 12–78)
ANION GAP SERPL CALC-SCNC: 3 MMOL/L — LOW (ref 5–17)
AST SERPL-CCNC: 65 U/L — HIGH (ref 15–37)
BASOPHILS # BLD AUTO: 0.03 K/UL — SIGNIFICANT CHANGE UP (ref 0–0.2)
BASOPHILS NFR BLD AUTO: 0.5 % — SIGNIFICANT CHANGE UP (ref 0–2)
BILIRUB SERPL-MCNC: 0.8 MG/DL — SIGNIFICANT CHANGE UP (ref 0.2–1.2)
BUN SERPL-MCNC: 7 MG/DL — SIGNIFICANT CHANGE UP (ref 7–23)
CALCIUM SERPL-MCNC: 9.5 MG/DL — SIGNIFICANT CHANGE UP (ref 8.5–10.1)
CHLORIDE SERPL-SCNC: 103 MMOL/L — SIGNIFICANT CHANGE UP (ref 96–108)
CO2 SERPL-SCNC: 32 MMOL/L — HIGH (ref 22–31)
CREAT SERPL-MCNC: 0.66 MG/DL — SIGNIFICANT CHANGE UP (ref 0.5–1.3)
EGFR: 103 ML/MIN/1.73M2 — SIGNIFICANT CHANGE UP
EOSINOPHIL # BLD AUTO: 0 K/UL — SIGNIFICANT CHANGE UP (ref 0–0.5)
EOSINOPHIL NFR BLD AUTO: 0 % — SIGNIFICANT CHANGE UP (ref 0–6)
FOLATE SERPL-MCNC: 8.1 NG/ML — SIGNIFICANT CHANGE UP
GLUCOSE SERPL-MCNC: 120 MG/DL — HIGH (ref 70–99)
HCT VFR BLD CALC: 34.9 % — LOW (ref 39–50)
HGB BLD-MCNC: 11.6 G/DL — LOW (ref 13–17)
IMM GRANULOCYTES NFR BLD AUTO: 0.5 % — SIGNIFICANT CHANGE UP (ref 0–0.9)
INR BLD: 1.07 RATIO — SIGNIFICANT CHANGE UP (ref 0.85–1.18)
LYMPHOCYTES # BLD AUTO: 1.85 K/UL — SIGNIFICANT CHANGE UP (ref 1–3.3)
LYMPHOCYTES # BLD AUTO: 30.1 % — SIGNIFICANT CHANGE UP (ref 13–44)
MAGNESIUM SERPL-MCNC: 1.6 MG/DL — SIGNIFICANT CHANGE UP (ref 1.6–2.6)
MCHC RBC-ENTMCNC: 33.2 GM/DL — SIGNIFICANT CHANGE UP (ref 32–36)
MCHC RBC-ENTMCNC: 37.3 PG — HIGH (ref 27–34)
MCV RBC AUTO: 112.2 FL — HIGH (ref 80–100)
MELD SCORE WITH DIALYSIS: 20 POINTS — SIGNIFICANT CHANGE UP
MELD SCORE WITHOUT DIALYSIS: 7 POINTS — SIGNIFICANT CHANGE UP
MONOCYTES # BLD AUTO: 0.44 K/UL — SIGNIFICANT CHANGE UP (ref 0–0.9)
MONOCYTES NFR BLD AUTO: 7.2 % — SIGNIFICANT CHANGE UP (ref 2–14)
NEUTROPHILS # BLD AUTO: 3.79 K/UL — SIGNIFICANT CHANGE UP (ref 1.8–7.4)
NEUTROPHILS NFR BLD AUTO: 61.7 % — SIGNIFICANT CHANGE UP (ref 43–77)
NRBC # BLD: 0 /100 WBCS — SIGNIFICANT CHANGE UP (ref 0–0)
PHOSPHATE SERPL-MCNC: 2.5 MG/DL — SIGNIFICANT CHANGE UP (ref 2.5–4.5)
PLATELET # BLD AUTO: 107 K/UL — LOW (ref 150–400)
POTASSIUM SERPL-MCNC: 4.8 MMOL/L — SIGNIFICANT CHANGE UP (ref 3.5–5.3)
POTASSIUM SERPL-SCNC: 4.8 MMOL/L — SIGNIFICANT CHANGE UP (ref 3.5–5.3)
PROCALCITONIN SERPL-MCNC: 14.75 NG/ML — HIGH
PROT SERPL-MCNC: 5.8 G/DL — LOW (ref 6–8.3)
PROT SERPL-MCNC: 6.1 G/DL — SIGNIFICANT CHANGE UP (ref 6–8.3)
PROTHROM AB SERPL-ACNC: 12.2 SEC — SIGNIFICANT CHANGE UP (ref 9.5–13)
RBC # BLD: 3.11 M/UL — LOW (ref 4.2–5.8)
RBC # FLD: 13.7 % — SIGNIFICANT CHANGE UP (ref 10.3–14.5)
SODIUM SERPL-SCNC: 138 MMOL/L — SIGNIFICANT CHANGE UP (ref 135–145)
VIT B12 SERPL-MCNC: 1523 PG/ML — HIGH (ref 232–1245)
WBC # BLD: 6.14 K/UL — SIGNIFICANT CHANGE UP (ref 3.8–10.5)
WBC # FLD AUTO: 6.14 K/UL — SIGNIFICANT CHANGE UP (ref 3.8–10.5)

## 2024-08-31 PROCEDURE — 99232 SBSQ HOSP IP/OBS MODERATE 35: CPT

## 2024-08-31 PROCEDURE — 70553 MRI BRAIN STEM W/O & W/DYE: CPT | Mod: 26

## 2024-08-31 PROCEDURE — 99233 SBSQ HOSP IP/OBS HIGH 50: CPT

## 2024-08-31 RX ORDER — TIOTROPIUM BROMIDE INHALATION SPRAY 3.12 UG/1
2 SPRAY, METERED RESPIRATORY (INHALATION) DAILY
Refills: 0 | Status: DISCONTINUED | OUTPATIENT
Start: 2024-08-31 | End: 2024-09-01

## 2024-08-31 RX ORDER — SODIUM PHOSPHATE, DIBASIC, ANHYDROUS, POTASSIUM PHOSPHATE, MONOBASIC, AND SODIUM PHOSPHATE, MONOBASIC, MONOHYDRATE 852; 155; 130 MG/1; MG/1; MG/1
1 TABLET, COATED ORAL ONCE
Refills: 0 | Status: COMPLETED | OUTPATIENT
Start: 2024-08-31 | End: 2024-08-31

## 2024-08-31 RX ORDER — FOLIC ACID 1 MG
1 TABLET ORAL DAILY
Refills: 0 | Status: DISCONTINUED | OUTPATIENT
Start: 2024-08-31 | End: 2024-09-01

## 2024-08-31 RX ORDER — ALPRAZOLAM 0.25 MG
0.25 TABLET ORAL ONCE
Refills: 0 | Status: DISCONTINUED | OUTPATIENT
Start: 2024-08-31 | End: 2024-08-31

## 2024-08-31 RX ORDER — THIAMINE HCL 250 MG
100 TABLET ORAL DAILY
Refills: 0 | Status: DISCONTINUED | OUTPATIENT
Start: 2024-08-31 | End: 2024-09-01

## 2024-08-31 RX ORDER — METOPROLOL TARTRATE 100 MG/1
50 TABLET ORAL
Refills: 0 | Status: DISCONTINUED | OUTPATIENT
Start: 2024-08-31 | End: 2024-09-01

## 2024-08-31 RX ADMIN — Medication 81 MILLIGRAM(S): at 10:06

## 2024-08-31 RX ADMIN — SODIUM PHOSPHATE, DIBASIC, ANHYDROUS, POTASSIUM PHOSPHATE, MONOBASIC, AND SODIUM PHOSPHATE, MONOBASIC, MONOHYDRATE 1 PACKET(S): 852; 155; 130 TABLET, COATED ORAL at 11:47

## 2024-08-31 RX ADMIN — TRAMADOL HYDROCHLORIDE 50 MILLIGRAM(S): 200 TABLET, EXTENDED RELEASE ORAL at 21:20

## 2024-08-31 RX ADMIN — TIOTROPIUM BROMIDE INHALATION SPRAY 2 PUFF(S): 3.12 SPRAY, METERED RESPIRATORY (INHALATION) at 17:19

## 2024-08-31 RX ADMIN — TRAMADOL HYDROCHLORIDE 50 MILLIGRAM(S): 200 TABLET, EXTENDED RELEASE ORAL at 15:11

## 2024-08-31 RX ADMIN — Medication 100 GRAM(S): at 14:02

## 2024-08-31 RX ADMIN — METOPROLOL TARTRATE 50 MILLIGRAM(S): 100 TABLET ORAL at 10:06

## 2024-08-31 RX ADMIN — Medication 1 MILLIGRAM(S): at 11:48

## 2024-08-31 RX ADMIN — Medication 0.25 MILLIGRAM(S): at 11:47

## 2024-08-31 RX ADMIN — ENOXAPARIN SODIUM 40 MILLIGRAM(S): 100 INJECTION SUBCUTANEOUS at 14:02

## 2024-08-31 RX ADMIN — Medication 100 MILLIGRAM(S): at 11:48

## 2024-08-31 RX ADMIN — TRAMADOL HYDROCHLORIDE 50 MILLIGRAM(S): 200 TABLET, EXTENDED RELEASE ORAL at 14:11

## 2024-08-31 RX ADMIN — TRAMADOL HYDROCHLORIDE 50 MILLIGRAM(S): 200 TABLET, EXTENDED RELEASE ORAL at 00:00

## 2024-08-31 RX ADMIN — Medication 1 SPRAY(S): at 17:23

## 2024-08-31 RX ADMIN — METOPROLOL TARTRATE 50 MILLIGRAM(S): 100 TABLET ORAL at 17:23

## 2024-08-31 NOTE — SOCIAL WORK PROGRESS NOTE - NSSWPROGRESSNOTE_GEN_ALL_CORE
AIDAN received auth. # [id number]. 8/30-9/10 for [employer].  As per RN we are waiting for MRI results.  AIDAN sent message to [employer] with the authorization.  AIDAN to follow.

## 2024-08-31 NOTE — PROGRESS NOTE ADULT - PROBLEM SELECTOR PLAN 7
Hx of T2DM not on meds currently. Gluc level well controlled, A1c in [facility] was 4.6   - Diet: DASH/TLC/CC  d/c FS monitoring and D/C RISS

## 2024-08-31 NOTE — PROGRESS NOTE ADULT - SUBJECTIVE AND OBJECTIVE BOX
[facility] -- Holly Carrizales, José Jimenez Savella, , Octaviano Case  Office # [phone]    Follow Up:  Dyspnea    Subjective/Observations: Comfortable on RA, non-orthopneic.  However, still c/o very mild dyspnea.  Denies orthopnea, CP or palpitations    REVIEW OF SYSTEMS: All other review of systems is negative unless indicated above  PAST MEDICAL & SURGICAL HISTORY:  Pancreatitis  Hypertension  Myocardial infarct  Alcohol abuse  Colon cancer  T2DM (type 2 diabetes mellitus)  Paroxysmal atrial fibrillation  CHF (congestive heart failure)  History of colon resection  History of heart surgery  cardiac stent  S/P CABG (coronary artery bypass graft)  Status post fracture of femur  Status post fracture of pelvis    MEDICATIONS  (STANDING):  aspirin enteric coated 81 milliGRAM(s) Oral daily  dextrose 5%. 1000 milliLiter(s) (100 mL/Hr) IV Continuous <Continuous>  dextrose 5%. 1000 milliLiter(s) (50 mL/Hr) IV Continuous <Continuous>  dextrose 50% Injectable 12.5 Gram(s) IV Push once  dextrose 50% Injectable 25 Gram(s) IV Push once  dextrose 50% Injectable 25 Gram(s) IV Push once  enoxaparin Injectable 40 milliGRAM(s) SubCutaneous every 24 hours  folic acid 1 milliGRAM(s) Oral daily  glucagon  Injectable 1 milliGRAM(s) IntraMuscular once  magnesium sulfate  IVPB 1 Gram(s) IV Intermittent once  metoprolol succinate ER 50 milliGRAM(s) Oral two times a day  thiamine 100 milliGRAM(s) Oral daily    MEDICATIONS  (PRN):  acetaminophen     Tablet .. 650 milliGRAM(s) Oral every 6 hours PRN Mild Pain (1 - 3)  acetaminophen     Tablet .. 650 milliGRAM(s) Oral every 6 hours PRN Temp greater or equal to 38C (100.4F)  dextrose Oral Gel 15 Gram(s) Oral once PRN Blood Glucose LESS THAN 70 milliGRAM(s)/deciliter  LORazepam   Injectable 1 milliGRAM(s) IV Push every 2 hours PRN CIWA-Ar score increase by 2 points and a total score of 7 or less  meclizine 25 milliGRAM(s) Oral three times a day PRN Dizziness  melatonin 3 milliGRAM(s) Oral at bedtime PRN Insomnia  nicotine  Polacrilex Lozenge 2 milliGRAM(s) Oral every 2 hours PRN Breakthrough cravings  sodium chloride 0.65% Nasal 1 Spray(s) Both Nostrils two times a day PRN Nasal Congestion  traMADol 25 milliGRAM(s) Oral four times a day PRN Moderate Pain (4 - 6)  traMADol 50 milliGRAM(s) Oral every 6 hours PRN Severe Pain (7 - 10)    Allergies    No Known Drug Allergies  Seasonal Allergies (pollen) (Eye Irritation; Rhinorrhea)    Intolerances    Vital Signs Last 24 Hrs  T(C): 36.9 (31 Aug 2024 05:11), Max: 36.9 (31 Aug 2024 05:11)  T(F): 98.5 (31 Aug 2024 05:11), Max: 98.5 (31 Aug 2024 05:11)  HR: 114 (31 Aug 2024 10:01) (104 - 114)  BP: 135/87 (31 Aug 2024 10:01) (125/85 - 167/86)  BP(mean): --  RR: 20 (31 Aug 2024 05:11) (18 - 20)  SpO2: 98% (31 Aug 2024 05:11) (95% - 98%)    Parameters below as of 31 Aug 2024 05:11  Patient On (Oxygen Delivery Method): room air    I&O's Summary    30 Aug 2024 07:01  -  31 Aug 2024 07:00  --------------------------------------------------------  IN: 0 mL / OUT: 250 mL / NET: -250 mL     PHYSICAL EXAM:  TELE: Not on tele  Constitutional: NAD, awake and alert, frail  HEENT: Moist Mucous Membranes, Anicteric  Pulmonary: Non-labored, breath sounds are clear bilaterally, No wheezing, rales or rhonchi  Cardiovascular: Regular, S1 and S2, No murmurs, rubs, gallops or clicks  Gastrointestinal: Bowel Sounds present, soft, nontender.  Abdominal distention  Lymph: Sacral edema. No lymphadenopathy.  Skin: No visible rashes or ulcers.  Psych:  Mood & affect appropriate  LABS: All Labs Reviewed:                        11.6   6.14  )-----------( 107      ( 31 Aug 2024 07:43 )             34.9                         12.0   5.81  )-----------( 85       ( 30 Aug 2024 08:30 )             34.1                         11.4   6.70  )-----------( 104      ( 28 Aug 2024 16:37 )             34.1     31 Aug 2024 07:43    138    |  103    |  7      ----------------------------<  120    4.8     |  32     |  0.66   30 Aug 2024 10:25    137    |  103    |  9      ----------------------------<  167    4.3     |  31     |  0.60   30 Aug 2024 08:30    137    |  103    |  8      ----------------------------<  92     4.0     |  30     |  0.51     Ca    9.5        31 Aug 2024 07:43  Ca    8.9        30 Aug 2024 10:25  Ca    8.9        30 Aug 2024 08:30  Phos  2.5       31 Aug 2024 07:43  Phos  1.9       30 Aug 2024 10:25  Mg     1.6       31 Aug 2024 07:43  Mg     1.7       30 Aug 2024 10:25  Mg     1.6       28 Aug 2024 16:37    TPro  6.1    /  Alb  2.2    /  TBili  0.8    /  DBili  x      /  AST  65     /  ALT  27     /  AlkPhos  200    31 Aug 2024 07:43  TPro  5.7    /  Alb  2.1    /  TBili  1.2    /  DBili  0.7    /  AST  42     /  ALT  20     /  AlkPhos  146    30 Aug 2024 10:25  TPro  6.0    /  Alb  2.2    /  TBili  1.3    /  DBili  x      /  AST  46     /  ALT  21     /  AlkPhos  151    30 Aug 2024 08:30    PT/INR - ( 31 Aug 2024 07:43 )   PT: 12.2 sec;   INR: 1.07 ratio      12 Lead ECG:   Ventricular Rate 105 BPM    Atrial Rate 105 BPM    P-R Interval 148 ms    QRS Duration 110 ms    Q-T Interval 362 ms    QTC Calculation(Bazett) 478 ms    P Axis 66 degrees    R Axis 49 degrees    T Axis 27 degrees    Diagnosis Line Sinus tachycardia with premature atrial complexes  Incomplete right bundle branch block  Inferior infarct (cited on or before 08-MAY-2023)  Anterolateral infarct (cited on or before 12-JUN-2024)  Abnormal ECG    Confirmed by Alba Case ([id number]) on 8/30/2024 2:25:28 PM  (08-30-24 @ 12:55)      TRANSTHORACIC ECHOCARDIOGRAM REPORT  ________________________________________________________________________________                                      _______       Pt. Name:       BRIE WEBB Study Date:    6/13/2024  MRN:            [id number]       YOB: 1955  Accession #:    [id number]      Age:           68 years  Account#:       [id number]     Gender:        M  Visit ID#  Heart Rate:                    Height:        71.65 in (182.00 cm)  Rhythm:                        Weight:        160.93 lb (73.00 kg)  Blood Pressure: 112/65 mmHg    BSA/BMI:       1.94 m² / 22.04 kg/m²  ________________________________________________________________________________________  Referring Physician:    Alexis  Interpreting Physician:Yovani Jimenez MD  Primary Sonographer:    Starr Brasher [facility]    CPT:               ECHO TTE WO CON COMP W DOPP - 85968.m  Indication(s):     Dyspnea, unspecified - R06.00  Procedure:         Transthoracic echocardiogram with 2-D, M-mode and complete                     spectral and color flow Doppler.  Ordering Location: [facility]  Admission Status:  Inpatient  _______________________________________________________________________________________     CONCLUSIONS:      1. Left ventricular cavity is normal in size. Left ventricular systolic function is normal with an ejection fraction of 50 % by Blake's method of disks.   2. Mild left ventricular hypertrophy.   3. Normal right ventricular cavity size and normal systolic function.   4. The left atrium is normal in size.   5. Structurally normal mitral valve with normal leaflet excursion.   6. Mild mitral regurgitation.   7. Trileaflet aortic valve with normal systolic excursion. There is focal calcification of the aortic valve leaflets.   8. No evidence of aortic regurgitation.  ______________________________________________________________________________________  FINDINGS:     Left Ventricle:  The left ventricular cavity is normal in size. Left ventricular systolic function isnormal with a calculated ejection fraction of 50 % by the Blake's biplane method of disks. Mild left ventricular hypertrophy.     Right Ventricle:  The right ventricular cavity is normal in size and normal systolic function.     Left Atrium:  The left atrium is normal in size.     Right Atrium:  The right atrium is normal in size.     Aortic Valve:  The aortic valve appears trileaflet with normal systolic excursion. There is focal calcification of the aortic valve leaflets. There is no evidenceof aortic regurgitation.     Mitral Valve:  Structurally normal mitral valve with normal leaflet excursion. There is mild mitral regurgitation.     Tricuspid Valve:  Structurally normal tricuspid valve with normal leaflet excursion.     Pulmonic Valve:  Structurally normal pulmonic valve with normal leaflet excursion.     Pericardium:  No pericardial effusion seen.  ____________________________________________________________________  QUANTITATIVE DATA:  Left Ventricle Measurements: (Indexed to BSA)     IVSd (2D):   1.2 cm  LVPWd (2D):  1.0 cm  LVIDd (2D):  3.6 cm  LVIDs (2D):  2.6 cm  LV Mass:     121 g  62.5 g/m²  LV Vol d, MOD A2C: 37.6 ml 19.42 ml/m²  LV Vol d, MOD A4C: 45.1 ml 23.30 ml/m²  LV Vol d, MOD BP:  42.3 ml 21.84 ml/m²  LV Vol s, MOD A2C: 16.1 ml 8.32 ml/m²  LV Vol s, MOD A4C: 20.3 ml 10.49 ml/m²  LV Vol s, MOD BP:  21.1 ml 10.92 ml/m²  LVOT SV MOD BP:    21.1 ml  LV EF% MOD BP:     50 %     MV E Vmax:    0.44 m/s  MV A Vmax:    0.95 m/s  MV E/A:       0.46  e' lateral:   7.18 cm/s  e' medial:    6.42 cm/s  E/e' lateral: 6.11  E/e' medial:  6.84  E/e' Average: 6.46  MV DT:        98 msec    Aorta Measurements: (Normal range) (Indexed to BSA)     Sinuses of Valsalva: 3.20 cm (3.1 - 3.7 cm)    Left Atrium Measurements: (Indexed to BSA)  LA Diam 2D: 1.80 cm    Mitral Valve Measurements:     MV E Vmax: 0.4 m/s  MV A Vmax: 1.0 m/s  MV E/A:    0.5    ________________________________________________________________________________________  Electronically signed on 6/13/2024 at 8:53:07 AM by Yovani Jimenez MD         *** Final ***      [facility] -- Holly Carrizales, José Jimenez Savella, , Octaviano Case  Office # [phone]    Follow Up:  Dyspnea    Subjective/Observations: Comfortable on RA, non-orthopneic.  However, still c/o very mild dyspnea.  Denies orthopnea, CP or palpitations    REVIEW OF SYSTEMS: All other review of systems is negative unless indicated above  PAST MEDICAL & SURGICAL HISTORY:  Pancreatitis  Hypertension  Myocardial infarct  Alcohol abuse  Colon cancer  T2DM (type 2 diabetes mellitus)  Paroxysmal atrial fibrillation  CHF (congestive heart failure)  History of colon resection  History of heart surgery  cardiac stent  S/P CABG (coronary artery bypass graft)  Status post fracture of femur  Status post fracture of pelvis    MEDICATIONS  (STANDING):  aspirin enteric coated 81 milliGRAM(s) Oral daily  dextrose 5%. 1000 milliLiter(s) (100 mL/Hr) IV Continuous <Continuous>  dextrose 5%. 1000 milliLiter(s) (50 mL/Hr) IV Continuous <Continuous>  dextrose 50% Injectable 12.5 Gram(s) IV Push once  dextrose 50% Injectable 25 Gram(s) IV Push once  dextrose 50% Injectable 25 Gram(s) IV Push once  enoxaparin Injectable 40 milliGRAM(s) SubCutaneous every 24 hours  folic acid 1 milliGRAM(s) Oral daily  glucagon  Injectable 1 milliGRAM(s) IntraMuscular once  magnesium sulfate  IVPB 1 Gram(s) IV Intermittent once  metoprolol succinate ER 50 milliGRAM(s) Oral two times a day  thiamine 100 milliGRAM(s) Oral daily    MEDICATIONS  (PRN):  acetaminophen     Tablet .. 650 milliGRAM(s) Oral every 6 hours PRN Mild Pain (1 - 3)  acetaminophen     Tablet .. 650 milliGRAM(s) Oral every 6 hours PRN Temp greater or equal to 38C (100.4F)  dextrose Oral Gel 15 Gram(s) Oral once PRN Blood Glucose LESS THAN 70 milliGRAM(s)/deciliter  LORazepam   Injectable 1 milliGRAM(s) IV Push every 2 hours PRN CIWA-Ar score increase by 2 points and a total score of 7 or less  meclizine 25 milliGRAM(s) Oral three times a day PRN Dizziness  melatonin 3 milliGRAM(s) Oral at bedtime PRN Insomnia  nicotine  Polacrilex Lozenge 2 milliGRAM(s) Oral every 2 hours PRN Breakthrough cravings  sodium chloride 0.65% Nasal 1 Spray(s) Both Nostrils two times a day PRN Nasal Congestion  traMADol 25 milliGRAM(s) Oral four times a day PRN Moderate Pain (4 - 6)  traMADol 50 milliGRAM(s) Oral every 6 hours PRN Severe Pain (7 - 10)    Allergies    No Known Drug Allergies  Seasonal Allergies (pollen) (Eye Irritation; Rhinorrhea)    Intolerances    Vital Signs Last 24 Hrs  T(C): 36.9 (31 Aug 2024 05:11), Max: 36.9 (31 Aug 2024 05:11)  T(F): 98.5 (31 Aug 2024 05:11), Max: 98.5 (31 Aug 2024 05:11)  HR: 114 (31 Aug 2024 10:01) (104 - 114)  BP: 135/87 (31 Aug 2024 10:01) (125/85 - 167/86)  BP(mean): --  RR: 20 (31 Aug 2024 05:11) (18 - 20)  SpO2: 98% (31 Aug 2024 05:11) (95% - 98%)    Parameters below as of 31 Aug 2024 05:11  Patient On (Oxygen Delivery Method): room air    I&O's Summary    30 Aug 2024 07:01  -  31 Aug 2024 07:00  --------------------------------------------------------  IN: 0 mL / OUT: 250 mL / NET: -250 mL     PHYSICAL EXAM:  TELE: Not on tele  Constitutional: NAD, awake and alert, frail  HEENT: Moist Mucous Membranes, Anicteric  Pulmonary: Non-labored, breath sounds are clear bilaterally, No wheezing, rales or rhonchi  Cardiovascular: Regular, S1 and S2, No murmurs, rubs, gallops or clicks  Gastrointestinal: Bowel Sounds present, soft, nontender.  Abdominal distention  Lymph: Sacral edema. No lymphadenopathy.  Skin: No visible rashes or ulcers.  Psych:  Mood & affect appropriate  LABS: All Labs Reviewed:                        11.6   6.14  )-----------( 107      ( 31 Aug 2024 07:43 )             34.9                         12.0   5.81  )-----------( 85       ( 30 Aug 2024 08:30 )             34.1                         11.4   6.70  )-----------( 104      ( 28 Aug 2024 16:37 )             34.1     31 Aug 2024 07:43    138    |  103    |  7      ----------------------------<  120    4.8     |  32     |  0.66   30 Aug 2024 10:25    137    |  103    |  9      ----------------------------<  167    4.3     |  31     |  0.60   30 Aug 2024 08:30    137    |  103    |  8      ----------------------------<  92     4.0     |  30     |  0.51     Ca    9.5        31 Aug 2024 07:43  Ca    8.9        30 Aug 2024 10:25  Ca    8.9        30 Aug 2024 08:30  Phos  2.5       31 Aug 2024 07:43  Phos  1.9       30 Aug 2024 10:25  Mg     1.6       31 Aug 2024 07:43  Mg     1.7       30 Aug 2024 10:25  Mg     1.6       28 Aug 2024 16:37    TPro  6.1    /  Alb  2.2    /  TBili  0.8    /  DBili  x      /  AST  65     /  ALT  27     /  AlkPhos  200    31 Aug 2024 07:43  TPro  5.7    /  Alb  2.1    /  TBili  1.2    /  DBili  0.7    /  AST  42     /  ALT  20     /  AlkPhos  146    30 Aug 2024 10:25  TPro  6.0    /  Alb  2.2    /  TBili  1.3    /  DBili  x      /  AST  46     /  ALT  21     /  AlkPhos  151    30 Aug 2024 08:30    PT/INR - ( 31 Aug 2024 07:43 )   PT: 12.2 sec;   INR: 1.07 ratio      12 Lead ECG:   Ventricular Rate 105 BPM    Atrial Rate 105 BPM    P-R Interval 148 ms    QRS Duration 110 ms    Q-T Interval 362 ms    QTC Calculation(Bazett) 478 ms    P Axis 66 degrees    R Axis 49 degrees    T Axis 27 degrees    Diagnosis Line Sinus tachycardia with premature atrial complexes  Incomplete right bundle branch block  Inferior infarct (cited on or before 08-MAY-2023)  Anterolateral infarct (cited on or before 12-JUN-2024)  Abnormal ECG    Confirmed by Alba Case ([id number]) on 8/30/2024 2:25:28 PM  (08-30-24 @ 12:55)      TRANSTHORACIC ECHOCARDIOGRAM REPORT  ________________________________________________________________________________                                      _______       Pt. Name:       BRIE WEBB Study Date:    6/13/2024  MRN:            [id number]       YOB: 1955  Accession #:    [id number]      Age:           68 years  Account#:       [id number]     Gender:        M  Visit ID#  Heart Rate:                    Height:        71.65 in (182.00 cm)  Rhythm:                        Weight:        160.93 lb (73.00 kg)  Blood Pressure: 112/65 mmHg    BSA/BMI:       1.94 m² / 22.04 kg/m²  ________________________________________________________________________________________  Referring Physician:    Alexis  Interpreting Physician:Yovani Jimenez MD  Primary Sonographer:    Starr Brasher [facility]    CPT:               ECHO TTE WO CON COMP W DOPP - 09200.m  Indication(s):     Dyspnea, unspecified - R06.00  Procedure:         Transthoracic echocardiogram with 2-D, M-mode and complete                     spectral and color flow Doppler.  Ordering Location: [facility]  Admission Status:  Inpatient  _______________________________________________________________________________________     CONCLUSIONS:      1. Left ventricular cavity is normal in size. Left ventricular systolic function is normal with an ejection fraction of 50 % by Blake's method of disks.   2. Mild left ventricular hypertrophy.   3. Normal right ventricular cavity size and normal systolic function.   4. The left atrium is normal in size.   5. Structurally normal mitral valve with normal leaflet excursion.   6. Mild mitral regurgitation.   7. Trileaflet aortic valve with normal systolic excursion. There is focal calcification of the aortic valve leaflets.   8. No evidence of aortic regurgitation.  ______________________________________________________________________________________  FINDINGS:     Left Ventricle:  The left ventricular cavity is normal in size. Left ventricular systolic function isnormal with a calculated ejection fraction of 50 % by the Blake's biplane method of disks. Mild left ventricular hypertrophy.     Right Ventricle:  The right ventricular cavity is normal in size and normal systolic function.     Left Atrium:  The left atrium is normal in size.     Right Atrium:  The right atrium is normal in size.     Aortic Valve:  The aortic valve appears trileaflet with normal systolic excursion. There is focal calcification of the aortic valve leaflets. There is no evidenceof aortic regurgitation.     Mitral Valve:  Structurally normal mitral valve with normal leaflet excursion. There is mild mitral regurgitation.     Tricuspid Valve:  Structurally normal tricuspid valve with normal leaflet excursion.     Pulmonic Valve:  Structurally normal pulmonic valve with normal leaflet excursion.     Pericardium:  No pericardial effusion seen.  ____________________________________________________________________  QUANTITATIVE DATA:  Left Ventricle Measurements: (Indexed to BSA)     IVSd (2D):   1.2 cm  LVPWd (2D):  1.0 cm  LVIDd (2D):  3.6 cm  LVIDs (2D):  2.6 cm  LV Mass:     121 g  62.5 g/m²  LV Vol d, MOD A2C: 37.6 ml 19.42 ml/m²  LV Vol d, MOD A4C: 45.1 ml 23.30 ml/m²  LV Vol d, MOD BP:  42.3 ml 21.84 ml/m²  LV Vol s, MOD A2C: 16.1 ml 8.32 ml/m²  LV Vol s, MOD A4C: 20.3 ml 10.49 ml/m²  LV Vol s, MOD BP:  21.1 ml 10.92 ml/m²  LVOT SV MOD BP:    21.1 ml  LV EF% MOD BP:     50 %     MV E Vmax:    0.44 m/s  MV A Vmax:    0.95 m/s  MV E/A:       0.46  e' lateral:   7.18 cm/s  e' medial:    6.42 cm/s  E/e' lateral: 6.11  E/e' medial:  6.84  E/e' Average: 6.46  MV DT:        98 msec    Aorta Measurements: (Normal range) (Indexed to BSA)     Sinuses of Valsalva: 3.20 cm (3.1 - 3.7 cm)    Left Atrium Measurements: (Indexed to BSA)  LA Diam 2D: 1.80 cm    Mitral Valve Measurements:     MV E Vmax: 0.4 m/s  MV A Vmax: 1.0 m/s  MV E/A:    0.5    ________________________________________________________________________________________  Electronically signed on 6/13/2024 at 8:53:07 AM by Yovani Jimenez MD         *** Final ***

## 2024-08-31 NOTE — PROGRESS NOTE ADULT - PROBLEM SELECTOR PLAN 9
Chronic s/p bypass (2022) and stent (2007) on ASA 81 daily. Not endorsing current chest pain- continue ASA 81  does c/o dyspnea on exertion. Cardio eval noted, suggested out patient work up  avoid statin due to abnormal LFT

## 2024-08-31 NOTE — CONSULT NOTE ADULT - SUBJECTIVE AND OBJECTIVE BOX
[facility] DIVISION of INFECTIOUS DISEASE  Adebayo Wray MD PhD, Gabriella Martinez MD, Pastora Tello MD, Chery Kumar MD, Ignacio Dela Cruz MD  and providing coverage with Katarzyna Rene MD  Providing Infectious Disease Consultations at [facility], [facility]'s    Office# [phone] to schedule follow up appointments  Answering Service for urgent calls or New Consults [phone]  Cell# to text for urgent issues Adebayo Wray [phone]     HPI:  68-year-old male with HTN, MI/CAD s/p PCI (LEELA X1 pRCA 2007), CABG 2022, afib, HFrEF, T2DM, hip fx s/p internal fixation, colon cancer s/p colon resection (~12 years ago per pt) presenting to the ED for headaches for the past few weeks and generalized weakness.  The patient was admitted to the hospital from June 12-15 due to respiratory failure. Since then, he has had persistent SOB. Reports he has been having ongoing shortness of breath, has followed up outpatient with cardiologist and pulmonologist. His headache is described as a squeezing, diffuse pain that is localized behind his eyes and travels from forehead to occiput. He notes 2 areas of numbness bilaterally on the lateral portions of the OA joint that worsen when his headache returns. The patient also reports persistent rhinorrhea, watery eyes, congestion, and sore throat, which he attributes to seasonal allergies. Endorsing b/l LE weakness which makes it difficult for him to climb the stairs from his basement apartment prompting him to seek medical care. Also endorses numbness and tingling in his toes which he states is chronic. The patient states his son told him to drink alcohol to help his headache and weakness, so he had few beers before arriving at the hospital. In addition, the patient expressed interest in assisted living placement. Per pt last colonoscopy 2022 with benign findings.   ED Vitals: BP: 100/58, HR: 102, Temp: 98.4, RR: 20, SpO2: 97% on RA      PAST MEDICAL & SURGICAL HISTORY:  Pancreatitis      Hypertension      Myocardial infarct      Alcohol abuse      Colon cancer      T2DM (type 2 diabetes mellitus)      Paroxysmal atrial fibrillation      CHF (congestive heart failure)      History of colon resection      History of heart surgery  cardiac stent      S/P CABG (coronary artery bypass graft)      Status post fracture of femur      Status post fracture of pelvis          Antimicrobials      Immunological      Other  acetaminophen     Tablet .. 650 milliGRAM(s) Oral every 6 hours PRN  acetaminophen     Tablet .. 650 milliGRAM(s) Oral every 6 hours PRN  aspirin enteric coated 81 milliGRAM(s) Oral daily  dextrose 5%. 1000 milliLiter(s) IV Continuous <Continuous>  dextrose 5%. 1000 milliLiter(s) IV Continuous <Continuous>  dextrose 50% Injectable 12.5 Gram(s) IV Push once  dextrose 50% Injectable 25 Gram(s) IV Push once  dextrose 50% Injectable 25 Gram(s) IV Push once  dextrose Oral Gel 15 Gram(s) Oral once PRN  enoxaparin Injectable 40 milliGRAM(s) SubCutaneous every 24 hours  folic acid 1 milliGRAM(s) Oral daily  glucagon  Injectable 1 milliGRAM(s) IntraMuscular once  LORazepam   Injectable 1 milliGRAM(s) IV Push every 2 hours PRN  magnesium sulfate  IVPB 1 Gram(s) IV Intermittent once  meclizine 25 milliGRAM(s) Oral three times a day PRN  melatonin 3 milliGRAM(s) Oral at bedtime PRN  metoprolol succinate ER 50 milliGRAM(s) Oral two times a day  nicotine  Polacrilex Lozenge 2 milliGRAM(s) Oral every 2 hours PRN  sodium chloride 0.65% Nasal 1 Spray(s) Both Nostrils two times a day PRN  thiamine 100 milliGRAM(s) Oral daily  traMADol 25 milliGRAM(s) Oral four times a day PRN  traMADol 50 milliGRAM(s) Oral every 6 hours PRN      Allergies    No Known Drug Allergies  Seasonal Allergies (pollen) (Eye Irritation; Rhinorrhea)    Intolerances        SOCIAL HISTORY:  Lives alone, walks with a cane, able to perform ADLs however increasingly difficult as he lives in apartment in the basement and has difficulty climbing stairs to street level. Endorses several falls in past due to weakness while climbing stairs (29 Aug 2024 10:04)      FAMILY HISTORY:  FH: prostate cancer (Father)  Family history of atherosclerosis (Mother)      ROS:    EYES:  Negative  blurry vision or double vision  GASTROINTESTINAL:  Negative for nausea, vomiting, diarrhea  -otherwise negative except for subjective    Vital Signs Last 24 Hrs  T(C): 36.9 (31 Aug 2024 05:11), Max: 37 (30 Aug 2024 12:18)  T(F): 98.5 (31 Aug 2024 05:11), Max: 98.6 (30 Aug 2024 12:18)  HR: 114 (31 Aug 2024 10:01) (101 - 114)  BP: 135/87 (31 Aug 2024 10:01) (125/85 - 167/86)  BP(mean): --  RR: 20 (31 Aug 2024 05:11) (18 - 20)  SpO2: 98% (31 Aug 2024 05:11) (95% - 100%)    Parameters below as of 31 Aug 2024 05:11  Patient On (Oxygen Delivery Method): room air        PE:  In no distress  HEENT:  NC, PERRL, sclerae anicteric, conjunctivae clear, EOMI.  Sinuses nontender, no nasal exudate.  No buccal or pharyngeal lesions, erythema or exudate  Neck:  Supple, no adenopathy  Lungs:  No accessory muscle use, bilaterally clear to auscultation  Cor:  distant  Abd:  Symmetric, normoactive BS.  Soft, nontender, no masses, guarding or rebound.  Liver and spleen not enlarged  Extrem:  No cyanosis or edema  Skin:  No rashes.  Neuro: grossly intact  Musc: moving all limbs freely, no focal deficits        LABS:                        11.6   6.14  )-----------( 107      ( 31 Aug 2024 07:43 )             34.9       WBC Count: 6.14 K/uL (08-31-24 @ 07:43)  WBC Count: 5.81 K/uL (08-30-24 @ 08:30)  WBC Count: 6.70 K/uL (08-28-24 @ 16:37)      08-31    138  |  103  |  7   ----------------------------<  120<H>  4.8   |  32<H>  |  0.66    Ca    9.5      31 Aug 2024 07:43  Phos  2.5     08-31  Mg     1.6     08-31    TPro  6.1  /  Alb  2.2<L>  /  TBili  0.8  /  DBili  x   /  AST  65<H>  /  ALT  27  /  AlkPhos  200<H>  08-31      Creatinine: 0.66 mg/dL (08-31-24 @ 07:43)  Creatinine: 0.60 mg/dL (08-30-24 @ 10:25)  Creatinine: 0.51 mg/dL (08-30-24 @ 08:30)  Creatinine: 0.87 mg/dL (08-28-24 @ 16:37)      Urinalysis Basic - ( 31 Aug 2024 07:43 )    Color: x / Appearance: x / SG: x / pH: x  Gluc: 120 mg/dL / Ketone: x  / Bili: x / Urobili: x   Blood: x / Protein: x / Nitrite: x   Leuk Esterase: x / RBC: x / WBC x   Sq Epi: x / Non Sq Epi: x / Bacteria: x              MICROBIOLOGY:      RADIOLOGY & ADDITIONAL STUDIES:    --< from: Xray Chest 1 View- PORTABLE-Routine (Xray Chest 1 View- PORTABLE-Routine .) (08.29.24 @ 13:26) >    ACC: [id number] EXAM:  XR CHEST PORTABLE ROUTINE 1V   ORDERED BY: MICHAEL BURGESS     PROCEDURE DATE:  08/29/2024          INTERPRETATION:  Dyspnea    AP chest. Prior dated 6/12/2024.    Median sternotomy and CABG. Normal heart mediastinum. No consolidation   pneumothorax or effusion.    IMPRESSION: No acute findings

## 2024-08-31 NOTE — PROGRESS NOTE ADULT - PROBLEM SELECTOR PLAN 6
Chronic. Per patient taking metoprolol unknown dose, however, pharmacy states pt has not picked up medication (prescribed ? metoprolol succinate 25mg BID). Pt bp soft on admission    - hold home meds  - continue to monitor on routine vitals
resumed on metoprolol 50mg bid

## 2024-08-31 NOTE — DIETITIAN INITIAL EVALUATION ADULT - PROBLEM SELECTOR PLAN 2
Endorsing head pressure relieved mildly by Tylenol in ED. Endorsing intermittent dizzy spells in the past causing falls ~6 mo ago    - tramadol 25mg q6 PRN for moderate pain  - meclizine 25mg TID PRN for dizziness  - neuro consult; appreciate recs
Alert and interactive, no focal deficits

## 2024-08-31 NOTE — PROGRESS NOTE ADULT - PROBLEM SELECTOR PLAN 5
Endorses using about 1 cigarette per day with history of 1.5 PPD before cardiac surgery.     - Nicotine lozange PRN for cravings  - pt endorses poor skin reaction to patch thus will defer use at this time  - SBIRT consult
Endorses using about 1 cigarette per day with history of 1.5 PPD before cardiac surgery.     - Nicotine lozange PRN for cravings  - pt endorses poor skin reaction to patch thus will defer use at this time  - SBIRT consult

## 2024-08-31 NOTE — CONSULT NOTE ADULT - ASSESSMENT
68-year-old male with HTN, MI/CAD s/p PCI (LEELA X1 pRCA 2007), CABG 2022, afib, HFrEF, T2DM, hip fx s/p internal fixation, colon cancer s/p colon resection (~12 years ago per pt) presenting to the ED for headaches for the past few weeks and generalized weakness.  The patient was admitted to the hospital from June 12-15 due to respiratory failure. Since then, he has had persistent SOB. Reports he has been having ongoing shortness of breath, has followed up outpatient with cardiologist and pulmonologist. His headache is described as a squeezing, diffuse pain that is localized behind his eyes and travels from forehead to occiput. He notes 2 areas of numbness bilaterally on the lateral portions of the OA joint that worsen when his headache returns. The patient also reports persistent rhinorrhea, watery eyes, congestion, and sore throat, which he attributes to seasonal allergies. Endorsing b/l LE weakness which makes it difficult for him to climb the stairs from his basement apartment prompting him to seek medical care. Also endorses numbness and tingling in his toes which he states is chronic. The patient states his son told him to drink alcohol to help his headache and weakness, so he had few beers before arriving at the hospital. In addition, the patient expressed interest in assisted living placement. Per pt last colonoscopy 2022 with benign findings.   ED Vitals: BP: 100/58, HR: 102, Temp: 98.4, RR: 20, SpO2: 97% on RA    RECOMMENDATIONS  headaches, generalized weakness, dyspnea  pt is afebrile, no leukocytosis, clear lungs on exam and imaging, no reported urinary symptoms and bland UA  rec obs off abx    Thank you for consulting us and involving us in the management of this most interesting and challenging case.  We will follow along in the care of this patient. Please call us at [phone] or text me directly on my cell# at [phone] with any concerns.

## 2024-08-31 NOTE — PROGRESS NOTE ADULT - PROBLEM SELECTOR PLAN 1
newly onset.  CT Head : no acute intracranial pathology , MRI head pending  tylenol ,tramadol prn for headache   ESR negative. RVP negative , elevated procal and CRP -> will have ID eval to r/o infectious cause    Utox+ THC   alcohol level high on admission  upper normal eye Pressure by [employer] , need out patient ophthalmology eval    neurology eval noted.

## 2024-08-31 NOTE — CONSULT NOTE ADULT - ASSESSMENT
68-year-old male with history of HTN, MI/CAD s/p PCI (LEELA X1 pRCA 2007), CABG 2022, afib, HFrEF, T2DM, hip fx s/p internal fixation, colon cancer s/p colon resection (~12 years ago per pt) presenting to the ED for headaches for the past few weeks and generalized weakness.  The patient was admitted to the hospital from June 12-15 due to respiratory failure. Since then, he has had persistent SOB. Reports he has been having ongoing shortness of breath, has followed up outpatient with cardiologist and pulmonologist. 68-year-old male with history of HTN, MI/CAD s/p PCI (LEELA X1 pRCA 2007), CABG 2022, afib, HFrEF, T2DM, hip fx s/p internal fixation, colon cancer s/p colon resection (~12 years ago per pt) presenting to the ED for headaches for the past few weeks and generalized weakness.  The patient was admitted to the hospital from June 12-15 due to respiratory failure. Since then, he has had persistent SOB. Reports he has been having ongoing shortness of breath, has followed up outpatient with cardiologist and pulmonologist.    cad  smoker  nicotine dep  etoh use   EDWIN  HTN  AF  CABG  DM  Colon Ca hx   68-year-old male with history of HTN, MI/CAD s/p PCI (LEELA X1 pRCA 2007), CABG 2022, afib, HFrEF, T2DM, hip fx s/p internal fixation, colon cancer s/p colon resection (~12 years ago per pt) presenting to the ED for headaches for the past few weeks and generalized weakness.  The patient was admitted to the hospital from June 12-15 due to respiratory failure. Since then, he has had persistent SOB. Reports he has been having ongoing shortness of breath, has followed up outpatient with cardiologist and pulmonologist.    cad  smoker  nicotine dep  etoh use   EDWIN  HTN  AF  CABG  DM  Colon Ca hx    extensive card hx - [facility] rx regimen  BP control  smoker - drinker -   smoking cess ed  NRT  counseling - education - SBIRT -   DM care  AIDAN doe  follows with Dr Hankins - [facility] office -   PFT as outpatient  Spiriva - Proventil PRN and VBG for COPD   monitor VS and Sat  goal sat > 88 pct  ciwa monitoring for EDWIN - NATALIA - Ativan PRN - folic acid and thiamine

## 2024-08-31 NOTE — DIETITIAN INITIAL EVALUATION ADULT - ORAL INTAKE PTA/DIET HISTORY
patient seen in bed limited interview . patient visited sleeping this morning now back from MRI at time of visit. states he is hungry. per RN PO intake is good.  food preferences noted. no food allergies. patient willing to try glucerna supplement. patient reports not on home meds for DM. reports diet alone has given up all sugars. A1c 4.4%   reports poor appetite for ~ 2 months and #   no food allergies  denies difficulty chewing swallowing poor dentition noted

## 2024-08-31 NOTE — DIETITIAN INITIAL EVALUATION ADULT - OTHER INFO
Reason for Admission: weakness  History of Present Illness:   68-year-old male with history of HTN, MI/CAD s/p PCI (LEELA X1 pRCA 2007), CABG 2022, afib, HFrEF, T2DM, hip fx s/p internal fixation, colon cancer s/p colon resection (~12 years ago per pt) presenting to the ED for headaches for the past few weeks and generalized weakness.  The patient was admitted to the hospital from June 12-15 due to respiratory failure. Since then, he has had persistent SOB. Reports he has been having ongoing shortness of breath, has followed up outpatient with cardiologist and pulmonologist. His headache is described as a squeezing, diffuse pain that is localized behind his eyes and travels from forehead to occiput. He notes 2 areas of numbness bilaterally on the lateral portions of the OA joint that worsen when his headache returns. The patient also reports persistent rhinorrhea, watery eyes, congestion, and sore throat, which he attributes to seasonal allergies. Endorsing b/l LE weakness which makes it difficult for him to climb the stairs from his basement apartment prompting him to seek medical care. Also endorses numbness and tingling in his toes which he states is chronic. The patient states his son told him to drink alcohol to help his headache and weakness, so he had few beers before arriving at the hospital. In addition, the patient expressed interest in assisted living placement. Per pt last colonoscopy 2022 with benign findings.   weight 130# this admit # weight Annamaria admit ~ 160# continuing weight loss  8/29 BM  Phos 1.9 supplemented

## 2024-08-31 NOTE — DIETITIAN INITIAL EVALUATION ADULT - NSICDXPASTSURGICALHX_GEN_ALL_CORE_FT
PAST SURGICAL HISTORY:  History of colon resection     History of heart surgery cardiac stent    S/P CABG (coronary artery bypass graft)     Status post fracture of femur     Status post fracture of pelvis

## 2024-08-31 NOTE — DIETITIAN INITIAL EVALUATION ADULT - PERTINENT LABORATORY DATA
08-31    138  |  103  |  7   ----------------------------<  120<H>  4.8   |  32<H>  |  0.66    Ca    9.5      31 Aug 2024 07:43  Phos  2.5     08-31  Mg     1.6     08-31    TPro  6.1  /  Alb  2.2<L>  /  TBili  0.8  /  DBili  x   /  AST  65<H>  /  ALT  27  /  AlkPhos  200<H>  08-31  POCT Blood Glucose.: 110 mg/dL (08-31-24 @ 11:55)  A1C with Estimated Average Glucose Result: 4.4 % (08-30-24 @ 08:30)  A1C with Estimated Average Glucose Result: 4.6 % (06-13-24 @ 04:03)

## 2024-08-31 NOTE — PROGRESS NOTE ADULT - PROBLEM SELECTOR PLAN 8
Chronic last echo in June/2024 with EF 50%.  Not adherent to medication regimen o/p. Per pharmacy did not  metoprolol succinate prescription from June. No signs of volume overload on exam. Non compliance with meds & follow up

## 2024-08-31 NOTE — PROGRESS NOTE ADULT - PROBLEM SELECTOR PLAN 4
Endorsing 7 drinks per week, however, heavier use in the past. Denies wanting assistance with reducing alcohol intake. CIWA 3 on admission. Blood alcohol on admission 184, also THC + on urine tox screen. Mildly elevated AST 42, ALT 22, Tbili 1.3, Albumin 2.3 in setting of liver cirrhosis    on CIWA protocol , addiction medicine eval    folic acid /thiamine.      #liver cirrhosis likely due to alcohol use  -elevated procal   - avoid hepatotoxic agents  - negative  hepatitis panel  - LFT stable, had GI eval in [place], will need out patient follow up

## 2024-08-31 NOTE — DIETITIAN NUTRITION RISK NOTIFICATION - TREATMENT: THE FOLLOWING DIET HAS BEEN RECOMMENDED
Diet, DASH/TLC:   Sodium & Cholesterol Restricted  Consistent Carbohydrate {Evening Snack} (08-29-24 @ 11:21) [Active]

## 2024-08-31 NOTE — DIETITIAN INITIAL EVALUATION ADULT - PERTINENT MEDS FT
MEDICATIONS  (STANDING):  aspirin enteric coated 81 milliGRAM(s) Oral daily  dextrose 5%. 1000 milliLiter(s) (100 mL/Hr) IV Continuous <Continuous>  dextrose 5%. 1000 milliLiter(s) (50 mL/Hr) IV Continuous <Continuous>  dextrose 50% Injectable 12.5 Gram(s) IV Push once  dextrose 50% Injectable 25 Gram(s) IV Push once  dextrose 50% Injectable 25 Gram(s) IV Push once  enoxaparin Injectable 40 milliGRAM(s) SubCutaneous every 24 hours  folic acid 1 milliGRAM(s) Oral daily  glucagon  Injectable 1 milliGRAM(s) IntraMuscular once  magnesium sulfate  IVPB 1 Gram(s) IV Intermittent once  metoprolol succinate ER 50 milliGRAM(s) Oral two times a day  thiamine 100 milliGRAM(s) Oral daily    MEDICATIONS  (PRN):  acetaminophen     Tablet .. 650 milliGRAM(s) Oral every 6 hours PRN Mild Pain (1 - 3)  acetaminophen     Tablet .. 650 milliGRAM(s) Oral every 6 hours PRN Temp greater or equal to 38C (100.4F)  dextrose Oral Gel 15 Gram(s) Oral once PRN Blood Glucose LESS THAN 70 milliGRAM(s)/deciliter  LORazepam   Injectable 1 milliGRAM(s) IV Push every 2 hours PRN CIWA-Ar score increase by 2 points and a total score of 7 or less  meclizine 25 milliGRAM(s) Oral three times a day PRN Dizziness  melatonin 3 milliGRAM(s) Oral at bedtime PRN Insomnia  nicotine  Polacrilex Lozenge 2 milliGRAM(s) Oral every 2 hours PRN Breakthrough cravings  sodium chloride 0.65% Nasal 1 Spray(s) Both Nostrils two times a day PRN Nasal Congestion  traMADol 25 milliGRAM(s) Oral four times a day PRN Moderate Pain (4 - 6)  traMADol 50 milliGRAM(s) Oral every 6 hours PRN Severe Pain (7 - 10)

## 2024-08-31 NOTE — PROGRESS NOTE ADULT - PROBLEM SELECTOR PLAN 2
Pt states b/l LE weakness and dyspnea making it difficult to climb stairs from basement apartment. Ambulates with cane at baseline. patient states his ambulation is not normal since his pelvic fracture in 2023, chart review patient had Left femur fracture s/p ORIF, prior CT in [place] also show multiple healed rib fractures.   - fall precautions  - PT consulted; recommend RUSLAN

## 2024-08-31 NOTE — PROGRESS NOTE ADULT - SUBJECTIVE AND OBJECTIVE BOX
Patient is a 68y old  Male who presents with a chief complaint of weakness (31 Aug 2024 07:54)      Subjective:  INTERVAL HPI/OVERNIGHT EVENTS: Patient seen and examined at bedside.  Patient still c/o some frontal headache    MEDICATIONS  (STANDING):  aspirin enteric coated 81 milliGRAM(s) Oral daily  dextrose 5%. 1000 milliLiter(s) (100 mL/Hr) IV Continuous <Continuous>  dextrose 5%. 1000 milliLiter(s) (50 mL/Hr) IV Continuous <Continuous>  dextrose 50% Injectable 12.5 Gram(s) IV Push once  dextrose 50% Injectable 25 Gram(s) IV Push once  dextrose 50% Injectable 25 Gram(s) IV Push once  enoxaparin Injectable 40 milliGRAM(s) SubCutaneous every 24 hours  folic acid 1 milliGRAM(s) Oral daily  glucagon  Injectable 1 milliGRAM(s) IntraMuscular once  magnesium sulfate  IVPB 1 Gram(s) IV Intermittent once  metoprolol succinate ER 50 milliGRAM(s) Oral two times a day  thiamine 100 milliGRAM(s) Oral daily    MEDICATIONS  (PRN):  acetaminophen     Tablet .. 650 milliGRAM(s) Oral every 6 hours PRN Mild Pain (1 - 3)  acetaminophen     Tablet .. 650 milliGRAM(s) Oral every 6 hours PRN Temp greater or equal to 38C (100.4F)  dextrose Oral Gel 15 Gram(s) Oral once PRN Blood Glucose LESS THAN 70 milliGRAM(s)/deciliter  LORazepam   Injectable 1 milliGRAM(s) IV Push every 2 hours PRN CIWA-Ar score increase by 2 points and a total score of 7 or less  meclizine 25 milliGRAM(s) Oral three times a day PRN Dizziness  melatonin 3 milliGRAM(s) Oral at bedtime PRN Insomnia  nicotine  Polacrilex Lozenge 2 milliGRAM(s) Oral every 2 hours PRN Breakthrough cravings  sodium chloride 0.65% Nasal 1 Spray(s) Both Nostrils two times a day PRN Nasal Congestion  traMADol 25 milliGRAM(s) Oral four times a day PRN Moderate Pain (4 - 6)  traMADol 50 milliGRAM(s) Oral every 6 hours PRN Severe Pain (7 - 10)      Allergies    No Known Drug Allergies  Seasonal Allergies (pollen) (Eye Irritation; Rhinorrhea)    Intolerances        REVIEW OF SYSTEMS:  CONSTITUTIONAL: No fever or chills  HEENT:  + headache   RESPIRATORY: No cough or shortness of breath  CARDIOVASCULAR: No chest pain or palpitations  GASTROINTESTINAL: No abd pain, nausea, vomiting, or diarrhea      Objective:  Vital Signs Last 24 Hrs  T(C): 36.9 (31 Aug 2024 05:11), Max: 37 (30 Aug 2024 12:18)  T(F): 98.5 (31 Aug 2024 05:11), Max: 98.6 (30 Aug 2024 12:18)  HR: 114 (31 Aug 2024 10:01) (101 - 114)  BP: 135/87 (31 Aug 2024 10:01) (125/85 - 167/86)  BP(mean): --  RR: 20 (31 Aug 2024 05:11) (18 - 20)  SpO2: 98% (31 Aug 2024 05:11) (95% - 100%)    Parameters below as of 31 Aug 2024 05:11  Patient On (Oxygen Delivery Method): room air        GENERAL: NAD, lying in bed comfortably  HEAD:  Normocephalic  EYES:  conjunctiva and sclera clear  ENT: Moist mucous membranes  NECK: Supple  CHEST/LUNG: Clear to auscultation bilaterally; No rales or rhonchi; no wheezing. Unlabored respirations  HEART: Regular rate and rhythm; S1S2+  ABDOMEN: Bowel sounds present; Soft, Nontender, Nondistended.   EXTREMITIES:  + distal Peripheral Pulses;  No cyanosis, or edema  NERVOUS SYSTEM:  Alert & Oriented X3;  No gross focal deficits   MSK: moves all extremities  SKIN: No rashes     LABS:                        11.6   6.14  )-----------( 107      ( 31 Aug 2024 07:43 )             34.9     31 Aug 2024 07:43    138    |  103    |  7      ----------------------------<  120    4.8     |  32     |  0.66     Ca    9.5        31 Aug 2024 07:43  Phos  2.5       31 Aug 2024 07:43  Mg     1.6       31 Aug 2024 07:43    TPro  6.1    /  Alb  2.2    /  TBili  0.8    /  DBili  x      /  AST  65     /  ALT  27     /  AlkPhos  200    31 Aug 2024 07:43    PT/INR - ( 31 Aug 2024 07:43 )   PT: 12.2 sec;   INR: 1.07 ratio           Urinalysis Basic - ( 31 Aug 2024 07:43 )    Color: x / Appearance: x / SG: x / pH: x  Gluc: 120 mg/dL / Ketone: x  / Bili: x / Urobili: x   Blood: x / Protein: x / Nitrite: x   Leuk Esterase: x / RBC: x / WBC x   Sq Epi: x / Non Sq Epi: x / Bacteria: x      CAPILLARY BLOOD GLUCOSE      POCT Blood Glucose.: 113 mg/dL (31 Aug 2024 07:54)  POCT Blood Glucose.: 133 mg/dL (30 Aug 2024 21:21)  POCT Blood Glucose.: 122 mg/dL (30 Aug 2024 16:51)        Urinalysis with Rflx Culture (collected 08-28-24 @ 18:00)        RADIOLOGY & ADDITIONAL TESTS:    Personally reviewed.     Consultant(s) Notes Reviewed:  [x] YES  [ ] NO    Plan of care discussed with patient ; all questions answered

## 2024-08-31 NOTE — DIETITIAN INITIAL EVALUATION ADULT - PROBLEM SELECTOR PLAN 10
Likely in setting of daily ETOH   -Hx of chronic alcohol use, Cirrhosis     #thrombocytopenia likely in setting of liver cirrhosis  -Pt endorses easy bruising  -Pt also with 50 pound weight loss and fatigue  - Monitor on daily CBC  - f/u PT/INR

## 2024-08-31 NOTE — DIETITIAN INITIAL EVALUATION ADULT - PROBLEM SELECTOR PLAN 4
Endorsing 7 drinks per week, however, heavier use in the past. Denies wanting assistance with reducing alcohol intake. CIWA 3 on admission. Blood alcohol on admission 184, also THC + on urine tox screen. Mildly elevated AST 42, ALT 22, Tbili 1.3, Albumin 2.3 in setting of liver cirrhosis     - CTM for symptoms of alcohol withdrawal    #liver cirrhosis  - avoid hepatotoxic agents  -f/u hepatitis panel  - f/u AM CMP  - f/u PT/INR

## 2024-08-31 NOTE — PROGRESS NOTE ADULT - PROBLEM SELECTOR PLAN 10
Likely in setting of daily ETOH   -Hx of chronic alcohol use, Cirrhosis   - H/H stable, iron study in Memo indicate anemia of chronic disease , B12 level was elevated.     #thrombocytopenia likely in setting of liver cirrhosis  -Pt endorses easy bruising  -Pt also with 50 pound weight loss and fatigue  - patient states he had GI visit and Endoscopy and clonoscopy 2 years ago was told OK.

## 2024-08-31 NOTE — DIETITIAN INITIAL EVALUATION ADULT - ETIOLOGY
related to inadequate energy intake  related to inadequate energy intake in setting of ETOH use/chronic disease

## 2024-08-31 NOTE — PROGRESS NOTE ADULT - ASSESSMENT
68M w/ HTN, MI/CAD s/p PCI (LEELA X1 pRCA 2007), CABG 2022, afib, HFpEF, T2DM, hip fx s/p internal fixation, colon cancer s/p colon resection (~12 years ago per pt) presenting to the ED for headaches for the past few weeks and generalized weakness. Admitted for weakness and assisted living placement. Cardiology consulted for dyspnea on exertion.     Dyspnea/HFpEF/CAD s/p stent/CABG, PAfib  - Patient without symptoms of angina or heart failure at this time.  - No clear evidence of acute ischemia. No ischemic changes on EKG, unchanged from prior.   - Continue ASA  - Resume statin when able    - No meaningful evidence of volume overload  - Previous TTE Left ventricular systolic function is normal with an ejection fraction of 50 %   - Does not appear in acute exacerbation of HFpEF.  CXR not consistent with CHF  - No need for repeat TTE at this time.     - BP well controlled  - Monitor and replete lytes, keep K>4, Mg>2.  - Will continue to follow.     68M w/ HTN, MI/CAD s/p PCI (LEELA X1 pRCA 2007), CABG 2022, afib, HFpEF, T2DM, hip fx s/p internal fixation, colon cancer s/p colon resection (~12 years ago per pt) presenting to the ED for headaches for the past few weeks and generalized weakness. Admitted for weakness and assisted living placement. Cardiology consulted for dyspnea on exertion.     Dyspnea/HFpEF/CAD s/p stent/CABG, PAfib  - Patient without symptoms of angina or heart failure at this time.  - No clear evidence of acute ischemia. No ischemic changes on EKG, unchanged from prior.   - Continue ASA  - Resume statin     - No meaningful evidence of volume overload  - Previous TTE Left ventricular systolic function is normal with an ejection fraction of 50 %   - Does not appear in acute exacerbation of HFpEF.  CXR not consistent with CHF  - No need for repeat TTE at this time.     - BP well controlled  - Monitor and replete lytes, keep K>4, Mg>2.  - Will continue to follow.

## 2024-08-31 NOTE — PROGRESS NOTE ADULT - NS ATTEND AMEND GEN_ALL_CORE FT
68-year-old male with history of HTN, MI/CAD s/p PCI (LEELA X1 pRCA 2007), CABG 2022, afib, HFpEF, T2DM, hip fx s/p internal fixation, colon cancer s/p colon resection (~12 years ago per pt) presenting to the ED for headaches for the past few weeks and generalized weakness. Admitted for weakness and assisted living placement. Cardiology consulted for dyspnea on exertion.     Has chronic SOB  Still an active smoker  States he saw Pulmonary and had normal PFTs  Procal here is elevated.   CXR done yesterday still unread but appears clear  Known CAD with PCI and CABG 2022, denies any chest pain  Continue ASA, resume statin. LDL c is 58  Metoprolol resumed.   Has known HFpEF but does not appear volume up on exam. Please obtain pro BNP.   EKG shows ST, iRBBB, possible anterolateral infarct, unchanged from prior  Recent TTE done 6/2024: LVEF 50%, normal RV function, mild MR, no AI. No need to repeat.   Can consider outpatient stress.

## 2024-08-31 NOTE — CONSULT NOTE ADULT - SUBJECTIVE AND OBJECTIVE BOX
Date/Time Patient Seen:  		  Referring MD:   Data Reviewed	       Patient is a 68y old  Male who presents with a chief complaint of weakness (30 Aug 2024 17:19)      Subjective/HPI   68-year-old male with history of HTN, MI/CAD s/p PCI (LEELA X1 pRCA 2007), CABG 2022, afib, HFrEF, T2DM, hip fx s/p internal fixation, colon cancer s/p colon resection (~12 years ago per pt) presenting to the ED for headaches for the past few weeks and generalized weakness.  The patient was admitted to the hospital from June 12-15 due to respiratory failure. Since then, he has had persistent SOB. Reports he has been having ongoing shortness of breath, has followed up outpatient with cardiologist and pulmonologist. His headache is described as a squeezing, diffuse pain that is localized behind his eyes and travels from forehead to occiput. He notes 2 areas of numbness bilaterally on the lateral portions of the OA joint that worsen when his headache returns. The patient also reports persistent rhinorrhea, watery eyes, congestion, and sore throat, which he attributes to seasonal allergies. Endorsing b/l LE weakness which makes it difficult for him to climb the stairs from his basement apartment prompting him to seek medical care. Also endorses numbness and tingling in his toes which he states is chronic. The patient states his son told him to drink alcohol to help his headache and weakness, so he had few beers before arriving at the hospital. In addition, the patient expressed interest in assisted living placement. Per pt last colonoscopy 2022 with benign findings.     PAST MEDICAL & SURGICAL HISTORY:  Pancreatitis    Hypertension    Myocardial infarct    Alcohol abuse    Colon cancer    T2DM (type 2 diabetes mellitus)    Paroxysmal atrial fibrillation    CHF (congestive heart failure)    History of colon resection    History of heart surgery  cardiac stent    S/P CABG (coronary artery bypass graft)    H/O pelvic surgery    Status post fracture of femur    Status post fracture of pelvis         Review of Systems:  Review of Systems: CONSTITUTIONAL: + LE weakness, weight loss, fatigue, sleeping more denies fever, chills, fatigue  HEENT: + congestion, rhinorrhea, sore throat, denies blurred vision,   SKIN: +persistent scaling rash and ecchymoses on upper and lower extremities. denies new lesions, rash  CARDIOVASCULAR: denies chest pain, chest pressure, palpitations  RESPIRATORY: +shortness of breath (chronic), cough; denies sputum production  GASTROINTESTINAL: + diarrhea (chronic), denies nausea, vomiting, diarrhea, abdominal pain  GENITOURINARY: denies dysuria, discharge  NEUROLOGICAL: +headache, vertigo, dizziness,  numbness and tingling in toes bilaterally. denies, focal weakness  MUSCULOSKELETAL: denies new joint pain, muscle aches  HEMATOLOGIC: denies gross bleeding, bruising  PSYCHIATRIC: denies recent changes in anxiety, depression  Other Review of Systems: All other review of systems negative, except as noted in HPI      Allergies and Intolerances:        Allergies:  	No Known Drug Allergies:   	Seasonal Allergies (pollen) [freetext allergy; no alerts]: Miscellaneous, Eye Irritation, Rhinorrhea, Seasonal Allergies (pollen)    Home Medications:   * Patient Currently Takes Medications as of 29-Aug-2024 11:32 documented in Structured Notes  · 	aspirin 81 mg oral delayed release tablet: Last Dose Taken:  , 1 tab(s) orally once a day    Patient History:    Past Medical, Past Surgical, and Family History:  PAST MEDICAL HISTORY:  Alcohol abuse     CHF (congestive heart failure)     Colon cancer     Hypertension     Myocardial infarct     Pancreatitis     Paroxysmal atrial fibrillation     T2DM (type 2 diabetes mellitus).     PAST SURGICAL HISTORY:  History of colon resection     History of heart surgery cardiac stent    S/P CABG (coronary artery bypass graft)     Status post fracture of femur     Status post fracture of pelvis.     FAMILY HISTORY:  Father  Still living? No  FH: prostate cancer, Age at diagnosis: Age Unknown    Mother  Still living? No  Family history of atherosclerosis, Age at diagnosis: Age Unknown.     Social History:  · Substance use	Yes  · Alcohol use	Drank 4-5 beers per day for 48 years. Cut down to 7 beers per week  · Substance use	Denies  · Tobacco use	Smoked 1- 1/2 packs per day for 48 years. Now smokes 2-3 cigarettes per day  · Social History (marital status, living situation, occupation, and sexual history)	Lives alone, walks with a cane, able to perform ADLs however increasingly difficult as he lives in apartment in the basement and has difficulty climbing stairs to street level. Endorses several falls in past due to weakness while climbing stairs     Tobacco Screening:  · Core Measure Site	Yes  · Has the patient used tobacco in the past 30 days?	Yes  · Tobacco Cessation Education/Counseling	Offered and provided  · Tobacco Cessation Medication	Offered and patient accepted    Risk Assessment:    Present on Admission:  Deep Venous Thrombosis	no  Pulmonary Embolus	no     HIV Screening:  · In accordance with [place] State law, we offer every patient who comes to our ED an HIV test. Would you like to be tested today?	Opt out     Hepatitis C Test Questions:  · In accordance with [facility] Law, we offer every patient a Hepatitis C test. Would you like to be tested today?	Opt out        Medication list         MEDICATIONS  (STANDING):  aspirin enteric coated 81 milliGRAM(s) Oral daily  dextrose 5%. 1000 milliLiter(s) (100 mL/Hr) IV Continuous <Continuous>  dextrose 5%. 1000 milliLiter(s) (50 mL/Hr) IV Continuous <Continuous>  dextrose 50% Injectable 12.5 Gram(s) IV Push once  dextrose 50% Injectable 25 Gram(s) IV Push once  dextrose 50% Injectable 25 Gram(s) IV Push once  enoxaparin Injectable 40 milliGRAM(s) SubCutaneous every 24 hours  glucagon  Injectable 1 milliGRAM(s) IntraMuscular once  insulin lispro (ADMELOG) corrective regimen sliding scale   SubCutaneous three times a day before meals  insulin lispro (ADMELOG) corrective regimen sliding scale   SubCutaneous at bedtime    MEDICATIONS  (PRN):  acetaminophen     Tablet .. 650 milliGRAM(s) Oral every 6 hours PRN Mild Pain (1 - 3)  acetaminophen     Tablet .. 650 milliGRAM(s) Oral every 6 hours PRN Temp greater or equal to 38C (100.4F)  dextrose Oral Gel 15 Gram(s) Oral once PRN Blood Glucose LESS THAN 70 milliGRAM(s)/deciliter  LORazepam   Injectable 1 milliGRAM(s) IV Push every 2 hours PRN CIWA-Ar score increase by 2 points and a total score of 7 or less  meclizine 25 milliGRAM(s) Oral three times a day PRN Dizziness  melatonin 3 milliGRAM(s) Oral at bedtime PRN Insomnia  nicotine  Polacrilex Lozenge 2 milliGRAM(s) Oral every 2 hours PRN Breakthrough cravings  sodium chloride 0.65% Nasal 1 Spray(s) Both Nostrils two times a day PRN Nasal Congestion  traMADol 25 milliGRAM(s) Oral four times a day PRN Moderate Pain (4 - 6)  traMADol 50 milliGRAM(s) Oral every 6 hours PRN Severe Pain (7 - 10)         Vitals log        ICU Vital Signs Last 24 Hrs  T(C): 36.9 (31 Aug 2024 05:11), Max: 37 (30 Aug 2024 12:18)  T(F): 98.5 (31 Aug 2024 05:11), Max: 98.6 (30 Aug 2024 12:18)  HR: 104 (31 Aug 2024 05:11) (101 - 105)  BP: 167/86 (31 Aug 2024 05:11) (125/85 - 167/86)  BP(mean): --  ABP: --  ABP(mean): --  RR: 20 (31 Aug 2024 05:11) (18 - 20)  SpO2: 98% (31 Aug 2024 05:11) (95% - 100%)    O2 Parameters below as of 31 Aug 2024 05:11  Patient On (Oxygen Delivery Method): room air                 Input and Output:  I&O's Detail    30 Aug 2024 07:01  -  31 Aug 2024 07:00  --------------------------------------------------------  IN:  Total IN: 0 mL    OUT:    Voided (mL): 250 mL  Total OUT: 250 mL    Total NET: -250 mL          Lab Data                        12.0   5.81  )-----------( 85       ( 30 Aug 2024 08:30 )             34.1     08-30    137  |  103  |  9   ----------------------------<  167<H>  4.3   |  31  |  0.60    Ca    8.9      30 Aug 2024 10:25  Phos  1.9     08-30  Mg     1.7     08-30    TPro  5.7<L>  /  Alb  2.1<L>  /  TBili  1.2  /  DBili  0.7<H>  /  AST  42<H>  /  ALT  20  /  AlkPhos  146<H>  08-30            Review of Systems	      Objective     Physical Examination        Pertinent Lab findings & Imaging      Audra:  NO   Adequate UO     I&O's Detail    30 Aug 2024 07:01  -  31 Aug 2024 07:00  --------------------------------------------------------  IN:  Total IN: 0 mL    OUT:    Voided (mL): 250 mL  Total OUT: 250 mL    Total NET: -250 mL               Discussed with:     Cultures:	        Radiology                             Date/Time Patient Seen:  		  Referring MD:   Data Reviewed	       Patient is a 68y old  Male who presents with a chief complaint of weakness (30 Aug 2024 17:19)      Subjective/HPI   68-year-old male with history of HTN, MI/CAD s/p PCI (LEELA X1 pRCA 2007), CABG 2022, afib, HFrEF, T2DM, hip fx s/p internal fixation, colon cancer s/p colon resection (~12 years ago per pt) presenting to the ED for headaches for the past few weeks and generalized weakness.  The patient was admitted to the hospital from June 12-15 due to respiratory failure. Since then, he has had persistent SOB. Reports he has been having ongoing shortness of breath, has followed up outpatient with cardiologist and pulmonologist. His headache is described as a squeezing, diffuse pain that is localized behind his eyes and travels from forehead to occiput. He notes 2 areas of numbness bilaterally on the lateral portions of the OA joint that worsen when his headache returns. The patient also reports persistent rhinorrhea, watery eyes, congestion, and sore throat, which he attributes to seasonal allergies. Endorsing b/l LE weakness which makes it difficult for him to climb the stairs from his basement apartment prompting him to seek medical care. Also endorses numbness and tingling in his toes which he states is chronic. The patient states his son told him to drink alcohol to help his headache and weakness, so he had few beers before arriving at the hospital. In addition, the patient expressed interest in assisted living placement. Per pt last colonoscopy 2022 with benign findings.     PAST MEDICAL & SURGICAL HISTORY:  Pancreatitis    Hypertension    Myocardial infarct    Alcohol abuse    Colon cancer    T2DM (type 2 diabetes mellitus)    Paroxysmal atrial fibrillation    CHF (congestive heart failure)    History of colon resection    History of heart surgery  cardiac stent    S/P CABG (coronary artery bypass graft)    H/O pelvic surgery    Status post fracture of femur    Status post fracture of pelvis         Review of Systems:  Review of Systems: CONSTITUTIONAL: + LE weakness, weight loss, fatigue, sleeping more denies fever, chills, fatigue  HEENT: + congestion, rhinorrhea, sore throat, denies blurred vision,   SKIN: +persistent scaling rash and ecchymoses on upper and lower extremities. denies new lesions, rash  CARDIOVASCULAR: denies chest pain, chest pressure, palpitations  RESPIRATORY: +shortness of breath (chronic), cough; denies sputum production  GASTROINTESTINAL: + diarrhea (chronic), denies nausea, vomiting, diarrhea, abdominal pain  GENITOURINARY: denies dysuria, discharge  NEUROLOGICAL: +headache, vertigo, dizziness,  numbness and tingling in toes bilaterally. denies, focal weakness  MUSCULOSKELETAL: denies new joint pain, muscle aches  HEMATOLOGIC: denies gross bleeding, bruising  PSYCHIATRIC: denies recent changes in anxiety, depression  Other Review of Systems: All other review of systems negative, except as noted in HPI      Allergies and Intolerances:        Allergies:  	No Known Drug Allergies:   	Seasonal Allergies (pollen) [freetext allergy; no alerts]: Miscellaneous, Eye Irritation, Rhinorrhea, Seasonal Allergies (pollen)    Home Medications:   * Patient Currently Takes Medications as of 29-Aug-2024 11:32 documented in Structured Notes  · 	aspirin 81 mg oral delayed release tablet: Last Dose Taken:  , 1 tab(s) orally once a day    Patient History:    Past Medical, Past Surgical, and Family History:  PAST MEDICAL HISTORY:  Alcohol abuse     CHF (congestive heart failure)     Colon cancer     Hypertension     Myocardial infarct     Pancreatitis     Paroxysmal atrial fibrillation     T2DM (type 2 diabetes mellitus).     PAST SURGICAL HISTORY:  History of colon resection     History of heart surgery cardiac stent    S/P CABG (coronary artery bypass graft)     Status post fracture of femur     Status post fracture of pelvis.     FAMILY HISTORY:  Father  Still living? No  FH: prostate cancer, Age at diagnosis: Age Unknown    Mother  Still living? No  Family history of atherosclerosis, Age at diagnosis: Age Unknown.     Social History:  · Substance use	Yes  · Alcohol use	Drank 4-5 beers per day for 48 years. Cut down to 7 beers per week  · Substance use	Denies  · Tobacco use	Smoked 1- 1/2 packs per day for 48 years. Now smokes 2-3 cigarettes per day  · Social History (marital status, living situation, occupation, and sexual history)	Lives alone, walks with a cane, able to perform ADLs however increasingly difficult as he lives in apartment in the basement and has difficulty climbing stairs to street level. Endorses several falls in past due to weakness while climbing stairs     Tobacco Screening:  · Core Measure Site	Yes  · Has the patient used tobacco in the past 30 days?	Yes  · Tobacco Cessation Education/Counseling	Offered and provided  · Tobacco Cessation Medication	Offered and patient accepted    Risk Assessment:    Present on Admission:  Deep Venous Thrombosis	no  Pulmonary Embolus	no     HIV Screening:  · In accordance with [place] State law, we offer every patient who comes to our ED an HIV test. Would you like to be tested today?	Opt out     Hepatitis C Test Questions:  · In accordance with [facility] Law, we offer every patient a Hepatitis C test. Would you like to be tested today?	Opt out        Medication list         MEDICATIONS  (STANDING):  aspirin enteric coated 81 milliGRAM(s) Oral daily  dextrose 5%. 1000 milliLiter(s) (100 mL/Hr) IV Continuous <Continuous>  dextrose 5%. 1000 milliLiter(s) (50 mL/Hr) IV Continuous <Continuous>  dextrose 50% Injectable 12.5 Gram(s) IV Push once  dextrose 50% Injectable 25 Gram(s) IV Push once  dextrose 50% Injectable 25 Gram(s) IV Push once  enoxaparin Injectable 40 milliGRAM(s) SubCutaneous every 24 hours  glucagon  Injectable 1 milliGRAM(s) IntraMuscular once  insulin lispro (ADMELOG) corrective regimen sliding scale   SubCutaneous three times a day before meals  insulin lispro (ADMELOG) corrective regimen sliding scale   SubCutaneous at bedtime    MEDICATIONS  (PRN):  acetaminophen     Tablet .. 650 milliGRAM(s) Oral every 6 hours PRN Mild Pain (1 - 3)  acetaminophen     Tablet .. 650 milliGRAM(s) Oral every 6 hours PRN Temp greater or equal to 38C (100.4F)  dextrose Oral Gel 15 Gram(s) Oral once PRN Blood Glucose LESS THAN 70 milliGRAM(s)/deciliter  LORazepam   Injectable 1 milliGRAM(s) IV Push every 2 hours PRN CIWA-Ar score increase by 2 points and a total score of 7 or less  meclizine 25 milliGRAM(s) Oral three times a day PRN Dizziness  melatonin 3 milliGRAM(s) Oral at bedtime PRN Insomnia  nicotine  Polacrilex Lozenge 2 milliGRAM(s) Oral every 2 hours PRN Breakthrough cravings  sodium chloride 0.65% Nasal 1 Spray(s) Both Nostrils two times a day PRN Nasal Congestion  traMADol 25 milliGRAM(s) Oral four times a day PRN Moderate Pain (4 - 6)  traMADol 50 milliGRAM(s) Oral every 6 hours PRN Severe Pain (7 - 10)         Vitals log        ICU Vital Signs Last 24 Hrs  T(C): 36.9 (31 Aug 2024 05:11), Max: 37 (30 Aug 2024 12:18)  T(F): 98.5 (31 Aug 2024 05:11), Max: 98.6 (30 Aug 2024 12:18)  HR: 104 (31 Aug 2024 05:11) (101 - 105)  BP: 167/86 (31 Aug 2024 05:11) (125/85 - 167/86)  BP(mean): --  ABP: --  ABP(mean): --  RR: 20 (31 Aug 2024 05:11) (18 - 20)  SpO2: 98% (31 Aug 2024 05:11) (95% - 100%)    O2 Parameters below as of 31 Aug 2024 05:11  Patient On (Oxygen Delivery Method): room air                 Input and Output:  I&O's Detail    30 Aug 2024 07:01  -  31 Aug 2024 07:00  --------------------------------------------------------  IN:  Total IN: 0 mL    OUT:    Voided (mL): 250 mL  Total OUT: 250 mL    Total NET: -250 mL          Lab Data                        12.0   5.81  )-----------( 85       ( 30 Aug 2024 08:30 )             34.1     08-30    137  |  103  |  9   ----------------------------<  167<H>  4.3   |  31  |  0.60    Ca    8.9      30 Aug 2024 10:25  Phos  1.9     08-30  Mg     1.7     08-30    TPro  5.7<L>  /  Alb  2.1<L>  /  TBili  1.2  /  DBili  0.7<H>  /  AST  42<H>  /  ALT  20  /  AlkPhos  146<H>  08-30            Review of Systems	    smoker  weak  depressed  anxious  on RA  verbal  alert    Objective     Physical Examination    heart s1s2  lung dec BS  head nc  head at  verbal  alert  cn grossly int  moves extr  oriented      Pertinent Lab findings & Imaging      Audra:  NO   Adequate UO     I&O's Detail    30 Aug 2024 07:01  -  31 Aug 2024 07:00  --------------------------------------------------------  IN:  Total IN: 0 mL    OUT:    Voided (mL): 250 mL  Total OUT: 250 mL    Total NET: -250 mL               Discussed with:     Cultures:	        Radiology    ACC: [id number] EXAM:  [facility]   ORDERED BY: SAL PENG     PROCEDURE DATE:  06/12/2024          INTERPRETATION:  CLINICAL INFORMATION: Shortness of breath    COMPARISON: CT chest 12/1/2022    CONTRAST/COMPLICATIONS:  IV Contrast: Omnipaque 350  70 cc administered   30 cc discarded  Oral Contrast: NONE  Complications: None reported at time of study completion    PROCEDURE:  CT Angiography of the Chest.  Sagittal and coronal reformats were performed as well as 3D (MIP)   reconstructions.    FINDINGS:    LUNGS AND LARGE AIRWAYS: Patent central airways. Linear bibasilar   atelectasis versus scarring. Patchy nonspecific groundglass opacities and   nodules in the right worse than left upper lobes, likely infectious   versus inflammatory. Otherwise, no consolidation.  PLEURA: No pleural effusion.  VESSELS: Aortic and coronary artery calcifications. Coronary stents. No   pulmonary embolism.  HEART: Heart size is normal. No pericardial effusion. Status post CABG.  MEDIASTINUM AND CHUCKY: No lymphadenopathy.  CHEST WALL AND LOWER NECK: Within normal limits.  VISUALIZED UPPER ABDOMEN: Cirrhotic liver morphology.  BONES: Degenerative changes. Subacute left and chronic bilateral rib   fractures. Subacute/healing mid sternal body fracture. Chronic T7 and T11   vertebral compression deformities. Median sternotomy.    IMPRESSION:  No pulmonary embolism.    Nonspecific groundglass opacities and tiny nodules in the right worse   than left upper lobes, likely infectious versus inflammatory.        --- End of Report ---            PIPER WALLS MD; Attending Radiologist  This document has been electronically signed. Jun 12 2024  9:58PM      CONCLUSIONS:      1. Left ventricular cavity is normal in size. Left ventricular systolic function is normal with an ejection fraction of 50 % by Blake's method of disks.   2. Mild left ventricular hypertrophy.   3. Normal right ventricular cavity size and normal systolic function.   4. The left atrium is normal in size.   5. Structurally normal mitral valve with normal leaflet excursion.   6. Mild mitral regurgitation.   7. Trileaflet aortic valve with normal systolic excursion. There is focal calcification of the aortic valve leaflets.   8. No evidence of aortic regurgitation.

## 2024-08-31 NOTE — DIETITIAN INITIAL EVALUATION ADULT - PROBLEM SELECTOR PLAN 9
Chronic s/p bypass (2022) and stent (2007) on ASA 81 daily. Not endorsing current chest pain    - continue ASA 81  Pt has not taken any other meds in over a year, Non-compliance with meds & follow up

## 2024-09-01 VITALS
SYSTOLIC BLOOD PRESSURE: 121 MMHG | HEART RATE: 86 BPM | RESPIRATION RATE: 17 BRPM | OXYGEN SATURATION: 99 % | TEMPERATURE: 98 F | DIASTOLIC BLOOD PRESSURE: 79 MMHG

## 2024-09-01 LAB
ALBUMIN SERPL ELPH-MCNC: 2.1 G/DL — LOW (ref 3.3–5)
ALP SERPL-CCNC: 157 U/L — HIGH (ref 40–120)
ALT FLD-CCNC: 26 U/L — SIGNIFICANT CHANGE UP (ref 12–78)
ANION GAP SERPL CALC-SCNC: 5 MMOL/L — SIGNIFICANT CHANGE UP (ref 5–17)
AST SERPL-CCNC: 54 U/L — HIGH (ref 15–37)
BASE EXCESS BLDV CALC-SCNC: 2.8 MMOL/L — SIGNIFICANT CHANGE UP (ref -2–3)
BILIRUB SERPL-MCNC: 0.7 MG/DL — SIGNIFICANT CHANGE UP (ref 0.2–1.2)
BLOOD GAS COMMENTS, VENOUS: SIGNIFICANT CHANGE UP
BUN SERPL-MCNC: 8 MG/DL — SIGNIFICANT CHANGE UP (ref 7–23)
CALCIUM SERPL-MCNC: 9.5 MG/DL — SIGNIFICANT CHANGE UP (ref 8.5–10.1)
CHLORIDE SERPL-SCNC: 104 MMOL/L — SIGNIFICANT CHANGE UP (ref 96–108)
CO2 SERPL-SCNC: 28 MMOL/L — SIGNIFICANT CHANGE UP (ref 22–31)
CREAT SERPL-MCNC: 0.61 MG/DL — SIGNIFICANT CHANGE UP (ref 0.5–1.3)
EGFR: 105 ML/MIN/1.73M2 — SIGNIFICANT CHANGE UP
GLUCOSE SERPL-MCNC: 118 MG/DL — HIGH (ref 70–99)
HCO3 BLDV-SCNC: 28 MMOL/L — SIGNIFICANT CHANGE UP (ref 22–29)
NT-PROBNP SERPL-SCNC: 2846 PG/ML — HIGH (ref 0–125)
PCO2 BLDV: 46 MMHG — SIGNIFICANT CHANGE UP (ref 42–55)
PH BLDV: 7.39 — SIGNIFICANT CHANGE UP (ref 7.32–7.43)
PO2 BLDV: 111 MMHG — HIGH (ref 25–45)
POTASSIUM SERPL-MCNC: 4.3 MMOL/L — SIGNIFICANT CHANGE UP (ref 3.5–5.3)
POTASSIUM SERPL-SCNC: 4.3 MMOL/L — SIGNIFICANT CHANGE UP (ref 3.5–5.3)
PROT SERPL-MCNC: 5.7 G/DL — LOW (ref 6–8.3)
SAO2 % BLDV: 99.5 % — HIGH (ref 67–88)
SODIUM SERPL-SCNC: 137 MMOL/L — SIGNIFICANT CHANGE UP (ref 135–145)

## 2024-09-01 PROCEDURE — 83735 ASSAY OF MAGNESIUM: CPT

## 2024-09-01 PROCEDURE — 99285 EMERGENCY DEPT VISIT HI MDM: CPT | Mod: 25

## 2024-09-01 PROCEDURE — 80061 LIPID PANEL: CPT

## 2024-09-01 PROCEDURE — 86140 C-REACTIVE PROTEIN: CPT

## 2024-09-01 PROCEDURE — 82962 GLUCOSE BLOOD TEST: CPT

## 2024-09-01 PROCEDURE — 80048 BASIC METABOLIC PNL TOTAL CA: CPT

## 2024-09-01 PROCEDURE — 0225U NFCT DS DNA&RNA 21 SARSCOV2: CPT

## 2024-09-01 PROCEDURE — 97162 PT EVAL MOD COMPLEX 30 MIN: CPT

## 2024-09-01 PROCEDURE — 83036 HEMOGLOBIN GLYCOSYLATED A1C: CPT

## 2024-09-01 PROCEDURE — 86334 IMMUNOFIX E-PHORESIS SERUM: CPT

## 2024-09-01 PROCEDURE — 80076 HEPATIC FUNCTION PANEL: CPT

## 2024-09-01 PROCEDURE — 71045 X-RAY EXAM CHEST 1 VIEW: CPT

## 2024-09-01 PROCEDURE — 93005 ELECTROCARDIOGRAM TRACING: CPT

## 2024-09-01 PROCEDURE — 82607 VITAMIN B-12: CPT

## 2024-09-01 PROCEDURE — 83880 ASSAY OF NATRIURETIC PEPTIDE: CPT

## 2024-09-01 PROCEDURE — 85652 RBC SED RATE AUTOMATED: CPT

## 2024-09-01 PROCEDURE — 80074 ACUTE HEPATITIS PANEL: CPT

## 2024-09-01 PROCEDURE — 84165 PROTEIN E-PHORESIS SERUM: CPT

## 2024-09-01 PROCEDURE — 85025 COMPLETE CBC W/AUTO DIFF WBC: CPT

## 2024-09-01 PROCEDURE — 85610 PROTHROMBIN TIME: CPT

## 2024-09-01 PROCEDURE — 97116 GAIT TRAINING THERAPY: CPT

## 2024-09-01 PROCEDURE — 94640 AIRWAY INHALATION TREATMENT: CPT

## 2024-09-01 PROCEDURE — 97530 THERAPEUTIC ACTIVITIES: CPT

## 2024-09-01 PROCEDURE — 70450 CT HEAD/BRAIN W/O DYE: CPT | Mod: MC

## 2024-09-01 PROCEDURE — 82803 BLOOD GASES ANY COMBINATION: CPT

## 2024-09-01 PROCEDURE — A9579: CPT

## 2024-09-01 PROCEDURE — 84145 PROCALCITONIN (PCT): CPT

## 2024-09-01 PROCEDURE — 84155 ASSAY OF PROTEIN SERUM: CPT

## 2024-09-01 PROCEDURE — 80053 COMPREHEN METABOLIC PANEL: CPT

## 2024-09-01 PROCEDURE — 96375 TX/PRO/DX INJ NEW DRUG ADDON: CPT

## 2024-09-01 PROCEDURE — 99239 HOSP IP/OBS DSCHRG MGMT >30: CPT

## 2024-09-01 PROCEDURE — 80307 DRUG TEST PRSMV CHEM ANLYZR: CPT

## 2024-09-01 PROCEDURE — 84100 ASSAY OF PHOSPHORUS: CPT

## 2024-09-01 PROCEDURE — 82746 ASSAY OF FOLIC ACID SERUM: CPT

## 2024-09-01 PROCEDURE — 70553 MRI BRAIN STEM W/O & W/DYE: CPT | Mod: MC

## 2024-09-01 PROCEDURE — 96365 THER/PROPH/DIAG IV INF INIT: CPT

## 2024-09-01 PROCEDURE — 81001 URINALYSIS AUTO W/SCOPE: CPT

## 2024-09-01 PROCEDURE — 36415 COLL VENOUS BLD VENIPUNCTURE: CPT

## 2024-09-01 RX ORDER — THIAMINE HCL 250 MG
1 TABLET ORAL
Qty: 0 | Refills: 0 | DISCHARGE
Start: 2024-09-01

## 2024-09-01 RX ORDER — FOLIC ACID 1 MG
1 TABLET ORAL
Qty: 0 | Refills: 0 | DISCHARGE
Start: 2024-09-01

## 2024-09-01 RX ORDER — METOPROLOL TARTRATE 100 MG/1
1 TABLET ORAL
Qty: 0 | Refills: 0 | DISCHARGE
Start: 2024-09-01

## 2024-09-01 RX ORDER — TIOTROPIUM BROMIDE INHALATION SPRAY 3.12 UG/1
2 SPRAY, METERED RESPIRATORY (INHALATION)
Qty: 0 | Refills: 0 | DISCHARGE
Start: 2024-09-01

## 2024-09-01 RX ADMIN — Medication 100 MILLIGRAM(S): at 12:06

## 2024-09-01 RX ADMIN — Medication 1 MILLIGRAM(S): at 12:06

## 2024-09-01 RX ADMIN — Medication 81 MILLIGRAM(S): at 12:05

## 2024-09-01 RX ADMIN — TRAMADOL HYDROCHLORIDE 25 MILLIGRAM(S): 200 TABLET, EXTENDED RELEASE ORAL at 13:36

## 2024-09-01 RX ADMIN — METOPROLOL TARTRATE 50 MILLIGRAM(S): 100 TABLET ORAL at 06:26

## 2024-09-01 NOTE — DISCHARGE NOTE PROVIDER - NSDCMRMEDTOKEN_GEN_ALL_CORE_FT
albuterol 90 mcg/inh inhalation aerosol: 2 puff(s) inhaled every 6 hours As needed Shortness of Breath and/or Wheezing  aspirin 81 mg oral delayed release tablet: 1 tab(s) orally once a day  folic acid 1 mg oral tablet: 1 tab(s) orally once a day  Lipitor 40 mg oral tablet: 1 tab(s) orally once a day (at bedtime)  metoprolol succinate 50 mg oral tablet, extended release: 1 tab(s) orally 2 times a day  Nicorette Mint 2 mg oral transmucosal lozenge: 1 lozenge by transmucosal administration every 4 hours as needed for SMOKE CESSATION  thiamine 100 mg oral tablet: 1 tab(s) orally once a day  tiotropium 2.5 mcg/inh inhalation aerosol: 2 puff(s) inhaled once a day

## 2024-09-01 NOTE — DISCHARGE NOTE PROVIDER - NSDCCPCAREPLAN_GEN_ALL_CORE_FT
PRINCIPAL DISCHARGE DIAGNOSIS  Diagnosis: Frequent episodic tension-type headache  Assessment and Plan of Treatment: improved, neurology was consulted, CT/MRI head without acute intracranial pathology. smoke cessation and avoid alcohol use advised.      SECONDARY DISCHARGE DIAGNOSES  Diagnosis: Alcohol use  Assessment and Plan of Treatment: was placed on CIWA protocol, alcohol cessation as discussed.    Diagnosis: Tobacco use  Assessment and Plan of Treatment: smoking cessation as discussed    Diagnosis: CAD (coronary artery disease)  Assessment and Plan of Treatment: h/o CABG, HFpEF: c/w ASA, Statin, metoprolol , follow up cardiology out patient, may benefit from out patient stress test    Diagnosis: Liver cirrhosis  Assessment and Plan of Treatment: follwo up with GI/Hepatology as advised    Diagnosis: Anemia of chronic disease  Assessment and Plan of Treatment: monitor out patient by PCP    Diagnosis: Increased intraocular pressure, bilateral  Assessment and Plan of Treatment: need ophthalmology evaluation out patient    Diagnosis: COPD exacerbation  Assessment and Plan of Treatment: you were seen by pulmonologist . proventil , spiriva inhaler started , follow up with your pulmonologist

## 2024-09-01 NOTE — PROGRESS NOTE ADULT - SUBJECTIVE AND OBJECTIVE BOX
Date/Time Patient Seen:  		  Referring MD:   Data Reviewed	       Patient is a 68y old  Male who presents with a chief complaint of Weakness     (31 Aug 2024 13:25)      Subjective/HPI     PAST MEDICAL & SURGICAL HISTORY:  Pancreatitis    Hypertension    Myocardial infarct    Alcohol abuse    Colon cancer    T2DM (type 2 diabetes mellitus)    Paroxysmal atrial fibrillation    CHF (congestive heart failure)    History of colon resection    History of heart surgery  cardiac stent    S/P CABG (coronary artery bypass graft)    H/O pelvic surgery    Status post fracture of femur    Status post fracture of pelvis          Medication list         MEDICATIONS  (STANDING):  aspirin enteric coated 81 milliGRAM(s) Oral daily  dextrose 5%. 1000 milliLiter(s) (100 mL/Hr) IV Continuous <Continuous>  dextrose 5%. 1000 milliLiter(s) (50 mL/Hr) IV Continuous <Continuous>  dextrose 50% Injectable 12.5 Gram(s) IV Push once  dextrose 50% Injectable 25 Gram(s) IV Push once  dextrose 50% Injectable 25 Gram(s) IV Push once  enoxaparin Injectable 40 milliGRAM(s) SubCutaneous every 24 hours  folic acid 1 milliGRAM(s) Oral daily  glucagon  Injectable 1 milliGRAM(s) IntraMuscular once  metoprolol succinate ER 50 milliGRAM(s) Oral two times a day  thiamine 100 milliGRAM(s) Oral daily  tiotropium 2.5 MICROgram(s) Inhaler 2 Puff(s) Inhalation daily    MEDICATIONS  (PRN):  acetaminophen     Tablet .. 650 milliGRAM(s) Oral every 6 hours PRN Mild Pain (1 - 3)  acetaminophen     Tablet .. 650 milliGRAM(s) Oral every 6 hours PRN Temp greater or equal to 38C (100.4F)  albuterol    90 MICROgram(s) HFA Inhaler 2 Puff(s) Inhalation every 6 hours PRN Shortness of Breath and/or Wheezing  dextrose Oral Gel 15 Gram(s) Oral once PRN Blood Glucose LESS THAN 70 milliGRAM(s)/deciliter  LORazepam   Injectable 1 milliGRAM(s) IV Push every 2 hours PRN CIWA-Ar score increase by 2 points and a total score of 7 or less  meclizine 25 milliGRAM(s) Oral three times a day PRN Dizziness  melatonin 3 milliGRAM(s) Oral at bedtime PRN Insomnia  nicotine  Polacrilex Lozenge 2 milliGRAM(s) Oral every 2 hours PRN Breakthrough cravings  sodium chloride 0.65% Nasal 1 Spray(s) Both Nostrils two times a day PRN Nasal Congestion  traMADol 25 milliGRAM(s) Oral four times a day PRN Moderate Pain (4 - 6)  traMADol 50 milliGRAM(s) Oral every 6 hours PRN Severe Pain (7 - 10)         Vitals log        ICU Vital Signs Last 24 Hrs  T(C): 36.9 (01 Sep 2024 05:10), Max: 37 (31 Aug 2024 13:47)  T(F): 98.4 (01 Sep 2024 05:10), Max: 98.6 (31 Aug 2024 13:47)  HR: 83 (01 Sep 2024 05:10) (83 - 114)  BP: 122/80 (01 Sep 2024 05:10) (101/68 - 135/87)  BP(mean): --  ABP: --  ABP(mean): --  RR: 19 (01 Sep 2024 05:10) (17 - 19)  SpO2: 98% (01 Sep 2024 05:10) (95% - 98%)    O2 Parameters below as of 01 Sep 2024 05:10  Patient On (Oxygen Delivery Method): room air                 Input and Output:  I&O's Detail    30 Aug 2024 07:01  -  31 Aug 2024 07:00  --------------------------------------------------------  IN:  Total IN: 0 mL    OUT:    Voided (mL): 250 mL  Total OUT: 250 mL    Total NET: -250 mL          Lab Data                        11.6   6.14  )-----------( 107      ( 31 Aug 2024 07:43 )             34.9     08-31    138  |  103  |  7   ----------------------------<  120<H>  4.8   |  32<H>  |  0.66    Ca    9.5      31 Aug 2024 07:43  Phos  2.5     08-31  Mg     1.6     08-31    TPro  6.1  /  Alb  2.2<L>  /  TBili  0.8  /  DBili  x   /  AST  65<H>  /  ALT  27  /  AlkPhos  200<H>  08-31            Review of Systems	      Objective     Physical Examination    heart s1s2  lung dc BS  head nc  head at  verbal      Pertinent Lab findings & Imaging      Audra:  NO   Adequate UO     I&O's Detail    30 Aug 2024 07:01  -  31 Aug 2024 07:00  --------------------------------------------------------  IN:  Total IN: 0 mL    OUT:    Voided (mL): 250 mL  Total OUT: 250 mL    Total NET: -250 mL               Discussed with:     Cultures:	        Radiology

## 2024-09-01 NOTE — DISCHARGE NOTE PROVIDER - CARE PROVIDERS DIRECT ADDRESSES
[email or web address],DirectAddress_Unknown,DirectAddress_Unknown,DirectAddress_Unknown

## 2024-09-01 NOTE — DISCHARGE NOTE PROVIDER - CARE PROVIDER_API CALL
Alba Case  Cardiology  [address] [place] [address]  Phone: [phone]  Fax: [phone]  Follow Up Time: 2 weeks    your pulmonologist,   Phone: (   )    -  Fax: (   )    -  Follow Up Time: Routine    José Thorpe  Gastroenterology  [address] [place] [id number]  Phone: [phone]  Fax: [phone]  Follow Up Time: 2 weeks    your primary care doctor,   Phone: (   )    -  Fax: (   )    -  Follow Up Time: 1 week

## 2024-09-01 NOTE — DISCHARGE NOTE NURSING/CASE MANAGEMENT/SOCIAL WORK - PATIENT PORTAL LINK FT
You can access the FollowMyHealth Patient Portal offered by [facility] by registering at the following website: [email or web address]. By joining [employer]’s FollowMyHealth portal, you will also be able to view your health information using other applications (apps) compatible with our system.

## 2024-09-01 NOTE — CASE MANAGEMENT PROGRESS NOTE - NSCMPROGRESSNOTE_GEN_ALL_CORE
CM consult for bathroom not accessible, stairs at home noted and reviewed. SW following pt for RUSLAN per recommendations, CM will continue to collaborate with interdisciplinary team, remain available to assist and monitor for home care needs.

## 2024-09-01 NOTE — DISCHARGE NOTE NURSING/CASE MANAGEMENT/SOCIAL WORK - NSDCPEEMAIL_GEN_ALL_CORE
[facility] for Tobacco Control email [email or web address]

## 2024-09-01 NOTE — DISCHARGE NOTE NURSING/CASE MANAGEMENT/SOCIAL WORK - NSDCPEFALRISK_GEN_ALL_CORE
For information on Fall & Injury Prevention, visit: [email or web address] OR  [email or web address] OR  https://www.cdc.gov/steadi/patient.html

## 2024-09-01 NOTE — PROGRESS NOTE ADULT - SUBJECTIVE AND OBJECTIVE BOX
[facility] DIVISION of INFECTIOUS DISEASE  Adebayo Wray MD PhD, Gabriella Martinez MD, Pastora Tello MD, Chery Kumar MD, Ignacio Dela Cruz MD  and providing coverage with Katarzyna Rene MD  Providing Infectious Disease Consultations at [facility], [facility], [facility]'s    Office# [phone] to schedule follow up appointments  Answering Service for urgent calls or New Consults [phone]  Cell# to text for urgent issues Adebayo Wray [phone]     infectious diseases progress note:    BRIE WEBB is a 68y y. o. Male patient    Overnight and events of the last 24hrs reviewed    Allergies    No Known Drug Allergies  Seasonal Allergies (pollen) (Eye Irritation; Rhinorrhea)    Intolerances        ANTIBIOTICS/RELEVANT:  antimicrobials    immunologic:    OTHER:  acetaminophen     Tablet .. 650 milliGRAM(s) Oral every 6 hours PRN  acetaminophen     Tablet .. 650 milliGRAM(s) Oral every 6 hours PRN  albuterol    90 MICROgram(s) HFA Inhaler 2 Puff(s) Inhalation every 6 hours PRN  aspirin enteric coated 81 milliGRAM(s) Oral daily  dextrose 5%. 1000 milliLiter(s) IV Continuous <Continuous>  dextrose 5%. 1000 milliLiter(s) IV Continuous <Continuous>  dextrose 50% Injectable 12.5 Gram(s) IV Push once  dextrose 50% Injectable 25 Gram(s) IV Push once  dextrose 50% Injectable 25 Gram(s) IV Push once  dextrose Oral Gel 15 Gram(s) Oral once PRN  enoxaparin Injectable 40 milliGRAM(s) SubCutaneous every 24 hours  folic acid 1 milliGRAM(s) Oral daily  glucagon  Injectable 1 milliGRAM(s) IntraMuscular once  LORazepam   Injectable 1 milliGRAM(s) IV Push every 2 hours PRN  meclizine 25 milliGRAM(s) Oral three times a day PRN  melatonin 3 milliGRAM(s) Oral at bedtime PRN  metoprolol succinate ER 50 milliGRAM(s) Oral two times a day  nicotine  Polacrilex Lozenge 2 milliGRAM(s) Oral every 2 hours PRN  sodium chloride 0.65% Nasal 1 Spray(s) Both Nostrils two times a day PRN  thiamine 100 milliGRAM(s) Oral daily  tiotropium 2.5 MICROgram(s) Inhaler 2 Puff(s) Inhalation daily  traMADol 25 milliGRAM(s) Oral four times a day PRN  traMADol 50 milliGRAM(s) Oral every 6 hours PRN      Objective:  Vital Signs Last 24 Hrs  T(C): 36.9 (01 Sep 2024 05:10), Max: 37 (31 Aug 2024 13:47)  T(F): 98.4 (01 Sep 2024 05:10), Max: 98.6 (31 Aug 2024 13:47)  HR: 83 (01 Sep 2024 05:10) (83 - 114)  BP: 122/80 (01 Sep 2024 05:10) (101/68 - 135/87)  BP(mean): --  RR: 19 (01 Sep 2024 05:10) (17 - 19)  SpO2: 98% (01 Sep 2024 05:10) (95% - 98%)    Parameters below as of 01 Sep 2024 05:10  Patient On (Oxygen Delivery Method): room air        T(C): 36.9 (09-01-24 @ 05:10), Max: 37 (08-30-24 @ 12:18)  T(C): 36.9 (09-01-24 @ 05:10), Max: 37 (08-30-24 @ 12:18)  T(C): 36.9 (09-01-24 @ 05:10), Max: 37.3 (08-29-24 @ 08:03)    PHYSICAL EXAM:  HEENT: NC atraumatic  Neck: supple  Respiratory: no accessory muscle use, breathing comfortably  Cardiovascular: distant  Gastrointestinal: normal appearing, nondistended  Extremities: no clubbing, no cyanosis,        LABS:                          11.6   6.14  )-----------( 107      ( 31 Aug 2024 07:43 )             34.9       WBC  6.14 08-31 @ 07:43  5.81 08-30 @ 08:30  6.70 08-28 @ 16:37      08-31    138  |  103  |  7   ----------------------------<  120<H>  4.8   |  32<H>  |  0.66    Ca    9.5      31 Aug 2024 07:43  Phos  2.5     08-31  Mg     1.6     08-31    TPro  6.1  /  Alb  2.2<L>  /  TBili  0.8  /  DBili  x   /  AST  65<H>  /  ALT  27  /  AlkPhos  200<H>  08-31      Creatinine: 0.66 mg/dL (08-31-24 @ 07:43)  Creatinine: 0.60 mg/dL (08-30-24 @ 10:25)  Creatinine: 0.51 mg/dL (08-30-24 @ 08:30)  Creatinine: 0.87 mg/dL (08-28-24 @ 16:37)      PT/INR - ( 31 Aug 2024 07:43 )   PT: 12.2 sec;   INR: 1.07 ratio           Urinalysis Basic - ( 31 Aug 2024 07:43 )    Color: x / Appearance: x / SG: x / pH: x  Gluc: 120 mg/dL / Ketone: x  / Bili: x / Urobili: x   Blood: x / Protein: x / Nitrite: x   Leuk Esterase: x / RBC: x / WBC x   Sq Epi: x / Non Sq Epi: x / Bacteria: x            INFLAMMATORY MARKERS      MICROBIOLOGY:              RADIOLOGY & ADDITIONAL STUDIES:

## 2024-09-01 NOTE — PROGRESS NOTE ADULT - ASSESSMENT
68-year-old male with history of HTN, MI/CAD s/p PCI (LEELA X1 pRCA 2007), CABG 2022, afib, HFrEF, T2DM, hip fx s/p internal fixation, colon cancer s/p colon resection (~12 years ago per pt) presenting to the ED for headaches for the past few weeks and generalized weakness.  The patient was admitted to the hospital from June 12-15 due to respiratory failure. Since then, he has had persistent SOB. Reports he has been having ongoing shortness of breath, has followed up outpatient with cardiologist and pulmonologist.    cad  smoker  nicotine dep  etoh use   EDWIN  HTN  AF  CABG  DM  Colon Ca hx    extensive card hx - cvs rx regimen  BP control  smoker - drinker -   smoking cess ed  NRT  counseling - education - SBIRT -   DM care  AIDAN doe  follows with Dr Hankins - [facility] office -   PFT as outpatient  Spiriva - Proventil PRN and VBG for COPD   monitor VS and Sat  goal sat > 88 pct  ciwa monitoring for EDWIN - NATALIA - Ativan PRN - folic acid and thiamine

## 2024-09-01 NOTE — PROGRESS NOTE ADULT - ASSESSMENT
68-year-old male with HTN, MI/CAD s/p PCI (LEELA X1 pRCA 2007), CABG 2022, afib, HFrEF, T2DM, hip fx s/p internal fixation, colon cancer s/p colon resection (~12 years ago per pt) presenting to the ED for headaches for the past few weeks and generalized weakness.  The patient was admitted to the hospital from June 12-15 due to respiratory failure. Since then, he has had persistent SOB. Reports he has been having ongoing shortness of breath, has followed up outpatient with cardiologist and pulmonologist. His headache is described as a squeezing, diffuse pain that is localized behind his eyes and travels from forehead to occiput. He notes 2 areas of numbness bilaterally on the lateral portions of the OA joint that worsen when his headache returns. The patient also reports persistent rhinorrhea, watery eyes, congestion, and sore throat, which he attributes to seasonal allergies. Endorsing b/l LE weakness which makes it difficult for him to climb the stairs from his basement apartment prompting him to seek medical care. Also endorses numbness and tingling in his toes which he states is chronic. The patient states his son told him to drink alcohol to help his headache and weakness, so he had few beers before arriving at the hospital. In addition, the patient expressed interest in assisted living placement. Per pt last colonoscopy 2022 with benign findings.   ED Vitals: BP: 100/58, HR: 102, Temp: 98.4, RR: 20, SpO2: 97% on RA    RECOMMENDATIONS  headaches, generalized weakness, dyspnea  pt is afebrile, no leukocytosis, clear lungs on exam and imaging, no reported urinary symptoms and bland UA  rec obs off abx    Thank you for consulting us and involving us in the management of this most interesting and challenging case.  Please call us at [phone] or text me directly on my cell#[phone] with any concerns or further questions.

## 2024-09-01 NOTE — DISCHARGE NOTE NURSING/CASE MANAGEMENT/SOCIAL WORK - NSDCPEWEB_GEN_ALL_CORE
[facility] for Tobacco Control website --- [email or web address] website --- [email or web address]

## 2024-09-01 NOTE — DISCHARGE NOTE NURSING/CASE MANAGEMENT/SOCIAL WORK - NSDCVIVACCINE_GEN_ALL_CORE_FT
Tdap; 02-Sep-2016 07:30; Syl Snow (RN); [employer]; [id number]; IntraMuscular; Deltoid Left.; 0.5 milliLiter(s); VIS (VIS Published: 09-May-2013, VIS Presented: 02-Sep-2016);

## 2024-09-01 NOTE — DISCHARGE NOTE PROVIDER - DETAILS OF MALNUTRITION DIAGNOSIS/DIAGNOSES
This patient has been assessed with a concern for Malnutrition and was treated during this hospitalization for the following Nutrition diagnosis/diagnoses:     -  08/31/2024: Severe protein-calorie malnutrition   -  08/31/2024: Underweight (BMI < 19)

## 2024-09-01 NOTE — SOCIAL WORK PROGRESS NOTE - NSSWPROGRESSNOTE_GEN_ALL_CORE
PAtient to be discharged to [facility] via [facility] at 2:00 Patient in agreement. Copy of chart to follow

## 2024-09-01 NOTE — DISCHARGE NOTE NURSING/CASE MANAGEMENT/SOCIAL WORK - NURSING SECTION COMPLETE
Pt notified of message. Pt verbalized understanding      ----- Message from DAVIDSON Bond sent at 8/16/2023 10:54 AM CDT -----  Please advise the patient that all lab are noted to be clinically acceptable, we will continue to monitor at future lab draws    Patient/Caregiver provided printed discharge information.

## 2024-09-01 NOTE — DISCHARGE NOTE PROVIDER - PROVIDER TOKENS
PROVIDER:[TOKEN:[[id number]],FOLLOWUP:[2 weeks]],FREE:[LAST:[your pulmonologist],PHONE:[(   )    -],FAX:[(   )    -],FOLLOWUP:[Routine]],PROVIDER:[TOKEN:[[id number]],FOLLOWUP:[2 weeks]],FREE:[LAST:[your primary care doctor],PHONE:[(   )    -],FAX:[(   )    -],FOLLOWUP:[1 week]]

## 2024-09-04 LAB
% ALBUMIN: 42.7 % — SIGNIFICANT CHANGE UP
% ALPHA 1: 8.2 % — SIGNIFICANT CHANGE UP
% ALPHA 2: 11.5 % — SIGNIFICANT CHANGE UP
% BETA: 15.5 % — SIGNIFICANT CHANGE UP
% GAMMA: 22.1 % — SIGNIFICANT CHANGE UP
ALBUMIN SERPL ELPH-MCNC: 2.5 G/DL — LOW (ref 3.6–5.5)
ALBUMIN/GLOB SERPL ELPH: 0.8 RATIO — SIGNIFICANT CHANGE UP
ALPHA1 GLOB SERPL ELPH-MCNC: 0.5 G/DL — HIGH (ref 0.1–0.4)
ALPHA2 GLOB SERPL ELPH-MCNC: 0.7 G/DL — SIGNIFICANT CHANGE UP (ref 0.5–1)
B-GLOBULIN SERPL ELPH-MCNC: 0.9 G/DL — SIGNIFICANT CHANGE UP (ref 0.5–1)
GAMMA GLOBULIN: 1.3 G/DL — SIGNIFICANT CHANGE UP (ref 0.6–1.6)
INTERPRETATION SERPL IFE-IMP: SIGNIFICANT CHANGE UP
PROT PATTERN SERPL ELPH-IMP: SIGNIFICANT CHANGE UP
PROT SERPL-MCNC: 5.8 G/DL — LOW (ref 6–8.3)

## 2024-12-17 NOTE — PATIENT PROFILE ADULT - BILL PAYMENT
Subjective   Patient ID: Kajal Null is a 88 y.o. female with newly diagnosed stage IV adenocarcinoma of the lung, lymphocytic colitis, laryngeal cancer s/p radiation, CKD, anemia, s/p R shoulder replacement with recent recurrent malignant pleural effusions who presents to Follow-up. Spoke with Scotty on the phone during the visit.    HPI  Concerns today:  #Follow up on recurrent pleural effusions  Denies any shortness of breath today   Feels pretty good   Still has aches and pains     11/22 was last thoracentesis     Plan is to go back on 12/19 to see if there is more fluid     Chronic issues:  #newly diagnosed stage IV adenocarcinoma of the lung   -Seeing Dr. Ferrera at [facility]  -MRI brain reassuring   -Started on osimertinib in November    #Lymphocytic colitis  -Often constipated  -Seeing Dr. Cruz, previously saw Dr. Guido    #Laryngeal cancer   -Sees oncology at [facility]-- Dr. 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 Madrigal    #Anemia  -Macrocytic with elevated ferritin, normal iron levels now, previously low sat, TIBC has always been normal   -B12 normal in 2019, no folate done in the past   -Getting a special kind of blood test scheduled, she is not sure, might be a tagged RBC scan    #Venous stasis     #Recurrent UTIs    #S/p R shoulder replacement  -L shoulder hurting now     Health Maintenance:  Colon cancer screening: Colonoscopy 2017 with lymphocytic colitis   Breast cancer screening: Mammogram Dec 2023 normal   Osteoporosis screening: normal dexa 2022  Immunizations: Pneumococcal up to date, shingrix? Tdap?    Social history:  -Lives by herself   -One son Oren in [facility], other son William is an adolescent doctor in [place]  -[facility] passed away      Objective   Visit Vitals  /77 (BP Location: Left arm, Patient Position: Sitting)   Pulse 69   Wt 49.9 kg (110 lb)    SpO2 98%   BMI 22.99 kg/m²   OB Status Postmenopausal   Smoking Status Former   BSA 1.43 m²      Physical Exam  General: Well appearing, conversational, in no acute distress  HEENT: EOMI, PERRL, nares patent without congestion, MMM  CV: RRR, loud murmur   Resp: Lungs CTAB, normal work of breathing  GI: Soft, nondistended, nontender, BS+   Ext: 1+ lower ext edema bilaterally  Skin: Warm, dry  Neuro: Awake, alert, oriented x3, moving all 4 extremities, nonfocal, normal gait, ambulates without assistance  Psych: Appropriate mood and affect        Assessment/Plan   Kajal Null is a 88 y.o. female with newly diagnosed stage IV adenocarcinoma of the lung, lymphocytic colitis, laryngeal cancer s/p radiation, CKD, anemia, s/p R shoulder replacement with recent recurrent malignant pleural effusions who presents to Follow-up. Spoke with Scotty on the phone during the visit.    -Lung exam reassuring today    -Discussed getting COVID vaccine in January (had COVID in August)    -Will repeat TSH with next set of labs (abnormal while sick)    -Discussed that atorvastatin is likely not providing much benefit, ok to stop    Follow up in March  Problem List Items Addressed This Visit    None               Court Jerome MD MPH    no

## 2025-02-13 NOTE — PATIENT PROFILE ADULT - HEALTH LITERACY
Last Visit 11/21/2024  Next Visit 5/27/2025  Last Refill Given 11/19/2024    Medication: omeprazole (PriLOSEC) 40 MG capsule     Passed Protocol       
no

## 2025-07-09 NOTE — DISCHARGE NOTE PROVIDER - HOSPITAL COURSE
Surgeon (Optional): Eugenia Biopsy Photograph Reviewed: Yes Previous Accession (Optional): [id number] Size Of Lesion In Cm: 2.5 Size Of Margin In Cm: 0.4 Anesthesia Volume In Cc: 12 Was An Eye Clamp Used?: No Eye Clamp Note Details: An eye clamp was used during the procedure. Excision Method: Elliptical Saucerization Depth: dermis and superficial adipose tissue Repair Type: Complex Intermediate / Complex Repair - Final Wound Length In Cm: 8.2 Deep Sutures: 4-0 Maxon Suturegard Retention Suture: 2-0 Nylon Retention Suture Bite Size: 3 mm Length To Time In Minutes Device Was In Place: 10 Number Of Hemigard Strips Per Side: 1 Undermining Type: Entire Wound Debridement Text: The wound edges were debrided prior to proceeding with the closure to facilitate wound healing. Helical Rim Text: The closure involved the helical rim. Vermilion Border Text: The closure involved the vermilion border. Nostril Rim Text: The closure involved the nostril rim. Retention Suture Text: Retention sutures were placed to support the closure and prevent dehiscence. Primary Defect Width (In Cm): 3.3 ADMISSION DATE:  08-29-24    ---  FROM ADMISSION H+P:   HPI:  68-year-old male with history of HTN, MI/CAD s/p PCI (LEELA X1 pRCA 2007), CABG 2022, afib, HFrEF, T2DM, hip fx s/p internal fixation, colon cancer s/p colon resection (~12 years ago per pt) presenting to the ED for headaches for the past few weeks and generalized weakness.  The patient was admitted to the hospital from June 12-15 due to respiratory failure. Since then, he has had persistent SOB. Reports he has been having ongoing shortness of breath, has followed up outpatient with cardiologist and pulmonologist. His headache is described as a squeezing, diffuse pain that is localized behind his eyes and travels from forehead to occiput. He notes 2 areas of numbness bilaterally on the lateral portions of the OA joint that worsen when his headache returns. The patient also reports persistent rhinorrhea, watery eyes, congestion, and sore throat, which he attributes to seasonal allergies. Endorsing b/l LE weakness which makes it difficult for him to climb the stairs from his basement apartment prompting him to seek medical care. Also endorses numbness and tingling in his toes which he states is chronic. The patient states his son told him to drink alcohol to help his headache and weakness, so he had few beers before arriving at the hospital. In addition, the patient expressed interest in assisted living placement. Per pt last colonoscopy 2022 with benign findings.     + for worsening hearing and vision, congestion, rhinorrhea, sore throat, cough, vertigo, diarrhea (chronic) and dizziness   Denies fever, chills, chest pain, palpitations, abdominal pain, nausea, vomiting,  constipation, urinary frequency, urgency, or dysuria,  Denies recent travel, recent antibiotic use, or sick contacts.    ED Course:   Vitals: BP: 100/58, HR: 102, Temp: 98.4, RR: 20, SpO2: 97% on RA  Labs:  WBC 6.7, Hgb 11.4, Hct 34.1, .7, Plt 104, Tbili 1.3, Alk phos 151, AST 42, ALT 22. THC +, blood Alcohol 184  UA: Trace ketones, small bili, trace leuk esterase, hyaline casts, calcium oxalate crystals  CTH: No acute intracranial hemorrhage or mass effect. No significant change   from 06/14/2024.    Received in the ED: 1g IV ofirmev x1, 10mg IVP Reglan x1, 1L 0.9% NS bolus x1   (29 Aug 2024 10:04)      ---  HOSPITAL COURSE/PERTINENT LABS/PROCEDURES PERFORMED/PENDING TESTS:   Pt was admitted for headache and weakness,dyspnea  neurology consulted CT /MRI negative for acute intracranial pathology.   ID consulted, monitored off antibiotic.   pulmonary and addition medicine consulted, patient has elevated alcohol level on admission, CIWA protocol started but he didn't needed pharmacology intervention. Spiriva and proventil added for COPD   Liver cirrhosis due to alcohol use: LFT stable, will need out patient GI/Hepatology follow up    HTN, MI/CAD s/p PCI (LEELA X1 pRCA 2007), CABG 2022, afib, HFpEF: recent echo in Jun reviewed, Cardio consulted. continued on ASA , metoprolol, will resume statin     Patient is stable for discharge as per primary medical team and consultants.    PT consulted, recommends discharge ______SAR     Patient showed improvement throughout hospitalization. Patient was seen and examined on day of discharge. Patient was medically optimized for discharge with close outpatient follow up.    ---  PATIENT CONDITION:  - stable    --  VITALS:   T(C): 36.9 (09-01-24 @ 05:10), Max: 37 (08-31-24 @ 13:47)  HR: 83 (09-01-24 @ 05:10) (83 - 102)  BP: 122/80 (09-01-24 @ 05:10) (101/68 - 127/80)  RR: 19 (09-01-24 @ 05:10) (17 - 19)  SpO2: 98% (09-01-24 @ 05:10) (95% - 98%)    PHYSICAL EXAM ON DAY OF DISCHARGE:    ---  CONSULTANTS:   cardiology   pulmonary/addiction medicine   ID   Neurology        Secondary Defect Length (In Cm): 0 Lab: 253 Lab Facility:  Graft Donor Site Bandage (Optional-Leave Blank If You Don't Want In Note): Steri-strips and a pressure bandage were applied to the donor site. Epidermal Closure Graft Donor Site (Optional): simple interrupted Billing Type: Third-Party Bill Excision Depth: fascia Scalpel Size: 10 blade Anesthesia Type: 1% lidocaine with epinephrine and a 1:10 solution of 8.4% sodium bicarbonate Additional Anesthesia Volume In Cc: 3 Hemostasis: Electrocautery Estimated Blood Loss (Cc): minimal Detail Level: Detailed Repair Depth: use same depth as excision depth Anesthesia Type: 1% lidocaine with epinephrine Anesthesia Volume In Cc: 7 Epidermal Closure: running subcuticular Wound Care: No ointment Dressing: sterile steri-strips and sterile pressure dressing Wound Check: 21 days Suturegard Intro: Intraoperative tissue expansion was performed, utilizing the SUTUREGARD device, in order to reduce wound tension. Suturegard Body: The suture ends were repeatedly re-tightened and re-clamped to achieve the desired tissue expansion. Hemigard Intro: Due to skin fragility and wound tension, it was decided to use HEMIGARD adhesive retention suture devices to permit a linear closure. The skin was cleaned and dried for a 6cm distance away from the wound. Excessive hair, if present, was removed to allow for adhesion. Hemigard Postcare Instructions: The HEMIGARD strips are to remain completely dry for at least 5-7 days. Positioning (Leave Blank If You Do Not Want): The patient was placed in a comfortable position exposing the surgical site. Pre-Excision Curettage Text (Leave Blank If You Do Not Want): Prior to drawing the surgical margin the visible lesion was removed with electrodesiccation and curettage to clearly define the lesion size. Complex Repair Preamble Text (Leave Blank If You Do Not Want): Extensive wide undermining was performed. Intermediate Repair Preamble Text (Leave Blank If You Do Not Want): Undermining was performed with blunt dissection. Curvilinear Excision Additional Text (Leave Blank If You Do Not Want): The margin was drawn around the clinically apparent lesion.  A curvilinear shape was then drawn on the skin incorporating the lesion and margins.  Incisions were then made along these lines to the appropriate tissue plane and the lesion was extirpated. Fusiform Excision Additional Text (Leave Blank If You Do Not Want): The margin was drawn around the clinically apparent lesion.  A fusiform shape was then drawn on the skin incorporating the lesion and margins.  Incisions were then made along these lines to the appropriate tissue plane and the lesion was extirpated. Elliptical Excision Additional Text (Leave Blank If You Do Not Want): The margin was drawn around the clinically apparent lesion.  An elliptical shape was then drawn on the skin incorporating the lesion and margins.  Incisions were then made along these lines to the appropriate tissue plane and the lesion was extirpated. Saucerization Excision Additional Text (Leave Blank If You Do Not Want): The margin was drawn around the clinically apparent lesion.  Incisions were then made along these lines, in a tangential fashion, to the appropriate tissue plane and the lesion was extirpated. Slit Excision Additional Text (Leave Blank If You Do Not Want): A linear line was drawn on the skin overlying the lesion. An incision was made slowly until the lesion was visualized.  Once visualized, the lesion was removed with blunt dissection. Excisional Biopsy Additional Text (Leave Blank If You Do Not Want): The margin was drawn around the clinically apparent lesion. An elliptical shape was then drawn on the skin incorporating the lesion and margins.  Incisions were then made along these lines to the appropriate tissue plane and the lesion was extirpated. Perilesional Excision Additional Text (Leave Blank If You Do Not Want): The margin was drawn around the clinically apparent lesion. Incisions were then made along these lines to the appropriate tissue plane and the lesion was extirpated. Repair Performed By Another Provider Text (Leave Blank If You Do Not Want): After the tissue was excised the defect was repaired by another provider. No Repair - Repaired With Adjacent Surgical Defect Text (Leave Blank If You Do Not Want): After the excision the defect was repaired concurrently with another surgical defect which was in close approximation. Adjacent Tissue Transfer Text: The defect edges were debeveled with a #15 scalpel blade. Given the location of the defect and the proximity to free margins an adjacent tissue transfer was deemed most appropriate. Using a sterile surgical marker, an appropriate flap was drawn incorporating the defect and placing the expected incisions within the relaxed skin tension lines where possible. The area thus outlined was incised deep to adipose tissue with a #15 scalpel blade. The skin margins were undermined to an appropriate distance in all directions utilizing iris scissors and carried over to close the primary defect. Advancement Flap (Single) Text: The defect edges were debeveled with a #15 scalpel blade.  Given the location of the defect and the proximity to free margins a single advancement flap was deemed most appropriate.  Using a sterile surgical marker, an appropriate advancement flap was drawn incorporating the defect and placing the expected incisions within the relaxed skin tension lines where possible.    The area thus outlined was incised deep to adipose tissue with a #15 scalpel blade.  The skin margins were undermined to an appropriate distance in all directions utilizing iris scissors. Advancement Flap (Double) Text: The defect edges were debeveled with a #15 scalpel blade.  Given the location of the defect and the proximity to free margins a double advancement flap was deemed most appropriate.  Using a sterile surgical marker, the appropriate advancement flaps were drawn incorporating the defect and placing the expected incisions within the relaxed skin tension lines where possible.    The area thus outlined was incised deep to adipose tissue with a #15 scalpel blade.  The skin margins were undermined to an appropriate distance in all directions utilizing iris scissors. Burow's Advancement Flap Text: The defect edges were debeveled with a #15 scalpel blade.  Given the location of the defect and the proximity to free margins a Burow's advancement flap was deemed most appropriate.  Using a sterile surgical marker, the appropriate advancement flap was drawn incorporating the defect and placing the expected incisions within the relaxed skin tension lines where possible.    The area thus outlined was incised deep to adipose tissue with a #15 scalpel blade.  The skin margins were undermined to an appropriate distance in all directions utilizing iris scissors. Chonodrocutaneous Helical Advancement Flap Text: The defect edges were debeveled with a #15 scalpel blade.  Given the location of the defect and the proximity to free margins a chondrocutaneous helical advancement flap was deemed most appropriate.  Using a sterile surgical marker, the appropriate advancement flap was drawn incorporating the defect and placing the expected incisions within the relaxed skin tension lines where possible.    The area thus outlined was incised deep to adipose tissue with a #15 scalpel blade.  The skin margins were undermined to an appropriate distance in all directions utilizing iris scissors. Crescentic Advancement Flap Text: The defect edges were debeveled with a #15 scalpel blade.  Given the location of the defect and the proximity to free margins a crescentic advancement flap was deemed most appropriate.  Using a sterile surgical marker, the appropriate advancement flap was drawn incorporating the defect and placing the expected incisions within the relaxed skin tension lines where possible.    The area thus outlined was incised deep to adipose tissue with a #15 scalpel blade.  The skin margins were undermined to an appropriate distance in all directions utilizing iris scissors. A-T Advancement Flap Text: The defect edges were debeveled with a #15 scalpel blade.  Given the location of the defect, shape of the defect and the proximity to free margins an A-T advancement flap was deemed most appropriate.  Using a sterile surgical marker, an appropriate advancement flap was drawn incorporating the defect and placing the expected incisions within the relaxed skin tension lines where possible.    The area thus outlined was incised deep to adipose tissue with a #15 scalpel blade.  The skin margins were undermined to an appropriate distance in all directions utilizing iris scissors. O-T Advancement Flap Text: The defect edges were debeveled with a #15 scalpel blade.  Given the location of the defect, shape of the defect and the proximity to free margins an O-T advancement flap was deemed most appropriate.  Using a sterile surgical marker, an appropriate advancement flap was drawn incorporating the defect and placing the expected incisions within the relaxed skin tension lines where possible.    The area thus outlined was incised deep to adipose tissue with a #15 scalpel blade.  The skin margins were undermined to an appropriate distance in all directions utilizing iris scissors. O-L Flap Text: The defect edges were debeveled with a #15 scalpel blade.  Given the location of the defect, shape of the defect and the proximity to free margins an O-L flap was deemed most appropriate.  Using a sterile surgical marker, an appropriate advancement flap was drawn incorporating the defect and placing the expected incisions within the relaxed skin tension lines where possible.    The area thus outlined was incised deep to adipose tissue with a #15 scalpel blade.  The skin margins were undermined to an appropriate distance in all directions utilizing iris scissors. O-Z Flap Text: The defect edges were debeveled with a #15 scalpel blade.  Given the location of the defect, shape of the defect and the proximity to free margins an O-Z flap was deemed most appropriate.  Using a sterile surgical marker, an appropriate transposition flap was drawn incorporating the defect and placing the expected incisions within the relaxed skin tension lines where possible. The area thus outlined was incised deep to adipose tissue with a #15 scalpel blade.  The skin margins were undermined to an appropriate distance in all directions utilizing iris scissors. Double O-Z Flap Text: The defect edges were debeveled with a #15 scalpel blade.  Given the location of the defect, shape of the defect and the proximity to free margins a Double O-Z flap was deemed most appropriate.  Using a sterile surgical marker, an appropriate transposition flap was drawn incorporating the defect and placing the expected incisions within the relaxed skin tension lines where possible. The area thus outlined was incised deep to adipose tissue with a #15 scalpel blade.  The skin margins were undermined to an appropriate distance in all directions utilizing iris scissors. V-Y Flap Text: The defect edges were debeveled with a #15 scalpel blade.  Given the location of the defect, shape of the defect and the proximity to free margins a V-Y flap was deemed most appropriate.  Using a sterile surgical marker, an appropriate advancement flap was drawn incorporating the defect and placing the expected incisions within the relaxed skin tension lines where possible.    The area thus outlined was incised deep to adipose tissue with a #15 scalpel blade.  The skin margins were undermined to an appropriate distance in all directions utilizing iris scissors. Advancement-Rotation Flap Text: The defect edges were debeveled with a #15 scalpel blade.  Given the location of the defect, shape of the defect and the proximity to free margins an advancement-rotation flap was deemed most appropriate.  Using a sterile surgical marker, an appropriate flap was drawn incorporating the defect and placing the expected incisions within the relaxed skin tension lines where possible. The area thus outlined was incised deep to adipose tissue with a #15 scalpel blade.  The skin margins were undermined to an appropriate distance in all directions utilizing iris scissors. Mercedes Flap Text: The defect edges were debeveled with a #15 scalpel blade.  Given the location of the defect, shape of the defect and the proximity to free margins a Mercedes flap was deemed most appropriate.  Using a sterile surgical marker, an appropriate advancement flap was drawn incorporating the defect and placing the expected incisions within the relaxed skin tension lines where possible. The area thus outlined was incised deep to adipose tissue with a #15 scalpel blade.  The skin margins were undermined to an appropriate distance in all directions utilizing iris scissors. Modified Advancement Flap Text: The defect edges were debeveled with a #15 scalpel blade.  Given the location of the defect, shape of the defect and the proximity to free margins a modified advancement flap was deemed most appropriate.  Using a sterile surgical marker, an appropriate advancement flap was drawn incorporating the defect and placing the expected incisions within the relaxed skin tension lines where possible.    The area thus outlined was incised deep to adipose tissue with a #15 scalpel blade.  The skin margins were undermined to an appropriate distance in all directions utilizing iris scissors. Mucosal Advancement Flap Text: Given the location of the defect, shape of the defect and the proximity to free margins a mucosal advancement flap was deemed most appropriate. Incisions were made with a 15 blade scalpel in the appropriate fashion along the cutaneous vermilion border and the mucosal lip. The remaining actinically damaged mucosal tissue was excised.  The mucosal advancement flap was then elevated to the gingival sulcus with care taken to preserve the neurovascular structures and advanced into the primary defect. Care was taken to ensure that precise realignment of the vermilion border was achieved. Peng Advancement Flap Text: The defect edges were debeveled with a #15 scalpel blade.  Given the location of the defect, shape of the defect and the proximity to free margins a Peng advancement flap was deemed most appropriate.  Using a sterile surgical marker, an appropriate advancement flap was drawn incorporating the defect and placing the expected incisions within the relaxed skin tension lines where possible. The area thus outlined was incised deep to adipose tissue with a #15 scalpel blade.  The skin margins were undermined to an appropriate distance in all directions utilizing iris scissors. Hatchet Flap Text: The defect edges were debeveled with a #15 scalpel blade.  Given the location of the defect, shape of the defect and the proximity to free margins a hatchet flap was deemed most appropriate.  Using a sterile surgical marker, an appropriate hatchet flap was drawn incorporating the defect and placing the expected incisions within the relaxed skin tension lines where possible.    The area thus outlined was incised deep to adipose tissue with a #15 scalpel blade.  The skin margins were undermined to an appropriate distance in all directions utilizing iris scissors. Rotation Flap Text: The defect edges were debeveled with a #15 scalpel blade.  Given the location of the defect, shape of the defect and the proximity to free margins a rotation flap was deemed most appropriate.  Using a sterile surgical marker, an appropriate rotation flap was drawn incorporating the defect and placing the expected incisions within the relaxed skin tension lines where possible.    The area thus outlined was incised deep to adipose tissue with a #15 scalpel blade.  The skin margins were undermined to an appropriate distance in all directions utilizing iris scissors. Bilateral Rotation Flap Text: The defect edges were debeveled with a #15 scalpel blade. Given the location of the defect, shape of the defect and the proximity to free margins a bilateral rotation flap was deemed most appropriate. Using a sterile surgical marker, an appropriate rotation flap was drawn incorporating the defect and placing the expected incisions within the relaxed skin tension lines where possible. The area thus outlined was incised deep to adipose tissue with a #15 scalpel blade. The skin margins were undermined to an appropriate distance in all directions utilizing iris scissors. Following this, the designed flap was carried over into the primary defect and sutured into place. Spiral Flap Text: The defect edges were debeveled with a #15 scalpel blade.  Given the location of the defect, shape of the defect and the proximity to free margins a spiral flap was deemed most appropriate.  Using a sterile surgical marker, an appropriate rotation flap was drawn incorporating the defect and placing the expected incisions within the relaxed skin tension lines where possible. The area thus outlined was incised deep to adipose tissue with a #15 scalpel blade.  The skin margins were undermined to an appropriate distance in all directions utilizing iris scissors. Staged Advancement Flap Text: The defect edges were debeveled with a #15 scalpel blade.  Given the location of the defect, shape of the defect and the proximity to free margins a staged advancement flap was deemed most appropriate.  Using a sterile surgical marker, an appropriate advancement flap was drawn incorporating the defect and placing the expected incisions within the relaxed skin tension lines where possible. The area thus outlined was incised deep to adipose tissue with a #15 scalpel blade.  The skin margins were undermined to an appropriate distance in all directions utilizing iris scissors. Star Wedge Flap Text: The defect edges were debeveled with a #15 scalpel blade.  Given the location of the defect, shape of the defect and the proximity to free margins a star wedge flap was deemed most appropriate.  Using a sterile surgical marker, an appropriate rotation flap was drawn incorporating the defect and placing the expected incisions within the relaxed skin tension lines where possible. The area thus outlined was incised deep to adipose tissue with a #15 scalpel blade.  The skin margins were undermined to an appropriate distance in all directions utilizing iris scissors. Transposition Flap Text: The defect edges were debeveled with a #15 scalpel blade.  Given the location of the defect and the proximity to free margins a transposition flap was deemed most appropriate.  Using a sterile surgical marker, an appropriate transposition flap was drawn incorporating the defect.    The area thus outlined was incised deep to adipose tissue with a #15 scalpel blade.  The skin margins were undermined to an appropriate distance in all directions utilizing iris scissors. Muscle Hinge Flap Text: The defect edges were debeveled with a #15 scalpel blade.  Given the size, depth and location of the defect and the proximity to free margins a muscle hinge flap was deemed most appropriate.  Using a sterile surgical marker, an appropriate hinge flap was drawn incorporating the defect. The area thus outlined was incised with a #15 scalpel blade.  The skin margins were undermined to an appropriate distance in all directions utilizing iris scissors. Mustarde Flap Text: The defect edges were debeveled with a #15 scalpel blade.  Given the size, depth and location of the defect and the proximity to free margins a Mustarde flap was deemed most appropriate. Using a sterile surgical marker, an appropriate flap was drawn incorporating the defect. The area thus outlined was incised with a #15 scalpel blade. The skin margins were undermined to an appropriate distance in all directions utilizing iris scissors. Following this, the designed flap was carried into the primary defect and sutured into place. Nasal Turnover Hinge Flap Text: The defect edges were debeveled with a #15 scalpel blade.  Given the size, depth, location of the defect and the defect being full thickness a nasal turnover hinge flap was deemed most appropriate.  Using a sterile surgical marker, an appropriate hinge flap was drawn incorporating the defect. The area thus outlined was incised with a #15 scalpel blade. The flap was designed to recreate the nasal mucosal lining and the alar rim. The skin margins were undermined to an appropriate distance in all directions utilizing iris scissors. Nasalis-Muscle-Based Myocutaneous Island Pedicle Flap Text: Using a #15 blade, an incision was made around the donor flap to the level of the nasalis muscle. Wide lateral undermining was then performed in both the subcutaneous plane above the nasalis muscle, and in a submuscular plane just above periosteum. This allowed the formation of a free nasalis muscle axial pedicle (based on the angular artery) which was still attached to the actual cutaneous flap, increasing its mobility and vascular viability. Hemostasis was obtained with pinpoint electrocoagulation. The flap was mobilized into position and the pivotal anchor points positioned and stabilized with buried interrupted sutures. Subcutaneous and dermal tissues were closed in a multilayered fashion with sutures. Tissue redundancies were excised, and the epidermal edges were apposed without significant tension and sutured with sutures. Nasalis Myocutaneous Flap Text: Using a #15 blade, an incision was made around the donor flap to the level of the nasalis muscle. Wide lateral undermining was then performed in both the subcutaneous plane above the nasalis muscle, and in a submuscular plane just above periosteum. This allowed the formation of a free nasalis muscle axial pedicle which was still attached to the actual cutaneous flap, increasing its mobility and vascular viability. Hemostasis was obtained with pinpoint electrocoagulation. The flap was mobilized into position and the pivotal anchor points positioned and stabilized with buried interrupted sutures. Subcutaneous and dermal tissues were closed in a multilayered fashion with sutures. Tissue redundancies were excised, and the epidermal edges were apposed without significant tension and sutured with sutures. Nasolabial Transposition Flap Text: The defect edges were debeveled with a #15 scalpel blade.  Given the size, depth and location of the defect and the proximity to free margins a nasolabial transposition flap was deemed most appropriate. Using a sterile surgical marker, an appropriate flap was drawn incorporating the defect. The area thus outlined was incised with a #15 scalpel blade. The skin margins were undermined to an appropriate distance in all directions utilizing iris scissors. Following this, the designed flap was carried into the primary defect and sutured into place. Orbicularis Oris Muscle Flap Text: The defect edges were debeveled with a #15 scalpel blade.  Given that the defect affected the competency of the oral sphincter an obicularis oris muscle flap was deemed most appropriate to restore this competency and normal muscle function.  Using a sterile surgical marker, an appropriate flap was drawn incorporating the defect. The area thus outlined was incised with a #15 scalpel blade. Melolabial Transposition Flap Text: The defect edges were debeveled with a #15 scalpel blade.  Given the location of the defect and the proximity to free margins a melolabial flap was deemed most appropriate.  Using a sterile surgical marker, an appropriate melolabial transposition flap was drawn incorporating the defect.    The area thus outlined was incised deep to adipose tissue with a #15 scalpel blade.  The skin margins were undermined to an appropriate distance in all directions utilizing iris scissors. Rectangular Flap Text: The defect edges were debeveled with a #15 scalpel blade. Given the location of the defect and the proximity to free margins a rectangular flap was deemed most appropriate. Using a sterile surgical marker, an appropriate rectangular flap was drawn incorporating the defect. The area thus outlined was incised deep to adipose tissue with a #15 scalpel blade. The skin margins were undermined to an appropriate distance in all directions utilizing iris scissors. Following this, the designed flap was carried over into the primary defect and sutured into place. Rhombic Flap Text: The defect edges were debeveled with a #15 scalpel blade.  Given the location of the defect and the proximity to free margins a rhombic flap was deemed most appropriate.  Using a sterile surgical marker, an appropriate rhombic flap was drawn incorporating the defect.    The area thus outlined was incised deep to adipose tissue with a #15 scalpel blade.  The skin margins were undermined to an appropriate distance in all directions utilizing iris scissors. Rhomboid Transposition Flap Text: The defect edges were debeveled with a #15 scalpel blade.  Given the location of the defect and the proximity to free margins a rhomboid transposition flap was deemed most appropriate.  Using a sterile surgical marker, an appropriate rhomboid flap was drawn incorporating the defect.    The area thus outlined was incised deep to adipose tissue with a #15 scalpel blade.  The skin margins were undermined to an appropriate distance in all directions utilizing iris scissors. Bi-Rhombic Flap Text: The defect edges were debeveled with a #15 scalpel blade.  Given the location of the defect and the proximity to free margins a bi-rhombic flap was deemed most appropriate.  Using a sterile surgical marker, an appropriate rhombic flap was drawn incorporating the defect. The area thus outlined was incised deep to adipose tissue with a #15 scalpel blade.  The skin margins were undermined to an appropriate distance in all directions utilizing iris scissors. Helical Rim Advancement Flap Text: The defect edges were debeveled with a #15 blade scalpel.  Given the location of the defect and the proximity to free margins (helical rim) a double helical rim advancement flap was deemed most appropriate.  Using a sterile surgical marker, the appropriate advancement flaps were drawn incorporating the defect and placing the expected incisions between the helical rim and antihelix where possible.  The area thus outlined was incised through and through with a #15 scalpel blade.  With a skin hook and iris scissors, the flaps were gently and sharply undermined and freed up. Bilateral Helical Rim Advancement Flap Text: The defect edges were debeveled with a #15 blade scalpel.  Given the location of the defect and the proximity to free margins (helical rim) a bilateral helical rim advancement flap was deemed most appropriate.  Using a sterile surgical marker, the appropriate advancement flaps were drawn incorporating the defect and placing the expected incisions between the helical rim and antihelix where possible.  The area thus outlined was incised through and through with a #15 scalpel blade.  With a skin hook and iris scissors, the flaps were gently and sharply undermined and freed up. Ear Star Wedge Flap Text: The defect edges were debeveled with a #15 blade scalpel.  Given the location of the defect and the proximity to free margins (helical rim) an ear star wedge flap was deemed most appropriate.  Using a sterile surgical marker, the appropriate flap was drawn incorporating the defect and placing the expected incisions between the helical rim and antihelix where possible.  The area thus outlined was incised through and through with a #15 scalpel blade. Flip-Flop Flap Text: The defect edges were debeveled with a #15 blade scalpel.  Given the location of the defect and the proximity to free margins a flip-flop flap was deemed most appropriate. Using a sterile surgical marker, the appropriate flap was drawn incorporating the defect and placing the expected incisions between the helical rim and antihelix where possible.  The area thus outlined was incised through and through with a #15 scalpel blade. Following this, the designed flap was carried over into the primary defect and sutured into place. Banner Transposition Flap Text: The defect edges were debeveled with a #15 scalpel blade.  Given the location of the defect and the proximity to free margins a Banner transposition flap was deemed most appropriate.  Using a sterile surgical marker, an appropriate flap drawn around the defect. The area thus outlined was incised deep to adipose tissue with a #15 scalpel blade.  The skin margins were undermined to an appropriate distance in all directions utilizing iris scissors. Bilobed Flap Text: The defect edges were debeveled with a #15 scalpel blade.  Given the location of the defect and the proximity to free margins a bilobe flap was deemed most appropriate.  Using a sterile surgical marker, an appropriate bilobe flap drawn around the defect.    The area thus outlined was incised deep to adipose tissue with a #15 scalpel blade.  The skin margins were undermined to an appropriate distance in all directions utilizing iris scissors. Bilobed Transposition Flap Text: The defect edges were debeveled with a #15 scalpel blade.  Given the location of the defect and the proximity to free margins a bilobed transposition flap was deemed most appropriate.  Using a sterile surgical marker, an appropriate bilobe flap drawn around the defect.    The area thus outlined was incised deep to adipose tissue with a #15 scalpel blade.  The skin margins were undermined to an appropriate distance in all directions utilizing iris scissors. Trilobed Flap Text: The defect edges were debeveled with a #15 scalpel blade.  Given the location of the defect and the proximity to free margins a trilobed flap was deemed most appropriate.  Using a sterile surgical marker, an appropriate trilobed flap drawn around the defect.    The area thus outlined was incised deep to adipose tissue with a #15 scalpel blade.  The skin margins were undermined to an appropriate distance in all directions utilizing iris scissors. Dorsal Nasal Flap Text: The defect edges were debeveled with a #15 scalpel blade.  Given the location of the defect and the proximity to free margins a dorsal nasal flap was deemed most appropriate.  Using a sterile surgical marker, an appropriate dorsal nasal flap was drawn around the defect.    The area thus outlined was incised deep to adipose tissue with a #15 scalpel blade.  The skin margins were undermined to an appropriate distance in all directions utilizing iris scissors. Island Pedicle Flap Text: The defect edges were debeveled with a #15 scalpel blade.  Given the location of the defect, shape of the defect and the proximity to free margins an island pedicle advancement flap was deemed most appropriate.  Using a sterile surgical marker, an appropriate advancement flap was drawn incorporating the defect, outlining the appropriate donor tissue and placing the expected incisions within the relaxed skin tension lines where possible.    The area thus outlined was incised deep to adipose tissue with a #15 scalpel blade.  The skin margins were undermined to an appropriate distance in all directions around the primary defect and laterally outward around the island pedicle utilizing iris scissors.  There was minimal undermining beneath the pedicle flap. Island Pedicle Flap With Canthal Suspension Text: The defect edges were debeveled with a #15 scalpel blade.  Given the location of the defect, shape of the defect and the proximity to free margins an island pedicle advancement flap was deemed most appropriate.  Using a sterile surgical marker, an appropriate advancement flap was drawn incorporating the defect, outlining the appropriate donor tissue and placing the expected incisions within the relaxed skin tension lines where possible. The area thus outlined was incised deep to adipose tissue with a #15 scalpel blade.  The skin margins were undermined to an appropriate distance in all directions around the primary defect and laterally outward around the island pedicle utilizing iris scissors.  There was minimal undermining beneath the pedicle flap. A suspension suture was placed in the canthal tendon to prevent tension and prevent ectropion. Alar Island Pedicle Flap Text: The defect edges were debeveled with a #15 scalpel blade.  Given the location of the defect, shape of the defect and the proximity to the alar rim an island pedicle advancement flap was deemed most appropriate.  Using a sterile surgical marker, an appropriate advancement flap was drawn incorporating the defect, outlining the appropriate donor tissue and placing the expected incisions within the nasal ala running parallel to the alar rim. The area thus outlined was incised with a #15 scalpel blade.  The skin margins were undermined minimally to an appropriate distance in all directions around the primary defect and laterally outward around the island pedicle utilizing iris scissors.  There was minimal undermining beneath the pedicle flap. Double Island Pedicle Flap Text: The defect edges were debeveled with a #15 scalpel blade.  Given the location of the defect, shape of the defect and the proximity to free margins a double island pedicle advancement flap was deemed most appropriate.  Using a sterile surgical marker, an appropriate advancement flap was drawn incorporating the defect, outlining the appropriate donor tissue and placing the expected incisions within the relaxed skin tension lines where possible.    The area thus outlined was incised deep to adipose tissue with a #15 scalpel blade.  The skin margins were undermined to an appropriate distance in all directions around the primary defect and laterally outward around the island pedicle utilizing iris scissors.  There was minimal undermining beneath the pedicle flap. Island Pedicle Flap-Requiring Vessel Identification Text: The defect edges were debeveled with a #15 scalpel blade.  Given the location of the defect, shape of the defect and the proximity to free margins an island pedicle advancement flap was deemed most appropriate.  Using a sterile surgical marker, an appropriate advancement flap was drawn, based on the axial vessel mentioned above, incorporating the defect, outlining the appropriate donor tissue and placing the expected incisions within the relaxed skin tension lines where possible.    The area thus outlined was incised deep to adipose tissue with a #15 scalpel blade.  The skin margins were undermined to an appropriate distance in all directions around the primary defect and laterally outward around the island pedicle utilizing iris scissors.  There was minimal undermining beneath the pedicle flap. Keystone Flap Text: The defect edges were debeveled with a #15 scalpel blade.  Given the location of the defect, shape of the defect a keystone flap was deemed most appropriate.  Using a sterile surgical marker, an appropriate keystone flap was drawn incorporating the defect, outlining the appropriate donor tissue and placing the expected incisions within the relaxed skin tension lines where possible. The area thus outlined was incised deep to adipose tissue with a #15 scalpel blade.  The skin margins were undermined to an appropriate distance in all directions around the primary defect and laterally outward around the flap utilizing iris scissors. O-T Plasty Text: The defect edges were debeveled with a #15 scalpel blade.  Given the location of the defect, shape of the defect and the proximity to free margins an O-T plasty was deemed most appropriate.  Using a sterile surgical marker, an appropriate O-T plasty was drawn incorporating the defect and placing the expected incisions within the relaxed skin tension lines where possible.    The area thus outlined was incised deep to adipose tissue with a #15 scalpel blade.  The skin margins were undermined to an appropriate distance in all directions utilizing iris scissors. O-Z Plasty Text: The defect edges were debeveled with a #15 scalpel blade.  Given the location of the defect, shape of the defect and the proximity to free margins an O-Z plasty (double transposition flap) was deemed most appropriate.  Using a sterile surgical marker, the appropriate transposition flaps were drawn incorporating the defect and placing the expected incisions within the relaxed skin tension lines where possible.    The area thus outlined was incised deep to adipose tissue with a #15 scalpel blade.  The skin margins were undermined to an appropriate distance in all directions utilizing iris scissors.  Hemostasis was achieved with electrocautery.  The flaps were then transposed into place, one clockwise and the other counterclockwise, and anchored with interrupted buried subcutaneous sutures. Double O-Z Plasty Text: The defect edges were debeveled with a #15 scalpel blade.  Given the location of the defect, shape of the defect and the proximity to free margins a Double O-Z plasty (double transposition flap) was deemed most appropriate.  Using a sterile surgical marker, the appropriate transposition flaps were drawn incorporating the defect and placing the expected incisions within the relaxed skin tension lines where possible. The area thus outlined was incised deep to adipose tissue with a #15 scalpel blade.  The skin margins were undermined to an appropriate distance in all directions utilizing iris scissors.  Hemostasis was achieved with electrocautery.  The flaps were then transposed into place, one clockwise and the other counterclockwise, and anchored with interrupted buried subcutaneous sutures. V-Y Plasty Text: The defect edges were debeveled with a #15 scalpel blade.  Given the location of the defect, shape of the defect and the proximity to free margins an V-Y advancement flap was deemed most appropriate.  Using a sterile surgical marker, an appropriate advancement flap was drawn incorporating the defect and placing the expected incisions within the relaxed skin tension lines where possible.    The area thus outlined was incised deep to adipose tissue with a #15 scalpel blade.  The skin margins were undermined to an appropriate distance in all directions utilizing iris scissors. H Plasty Text: Given the location of the defect, shape of the defect and the proximity to free margins a H-plasty was deemed most appropriate for repair.  Using a sterile surgical marker, the appropriate advancement arms of the H-plasty were drawn incorporating the defect and placing the expected incisions within the relaxed skin tension lines where possible. The area thus outlined was incised deep to adipose tissue with a #15 scalpel blade. The skin margins were undermined to an appropriate distance in all directions utilizing iris scissors.  The opposing advancement arms were then advanced into place in opposite direction and anchored with interrupted buried subcutaneous sutures. W Plasty Text: The lesion was extirpated to the level of the fat with a #15 scalpel blade.  Given the location of the defect, shape of the defect and the proximity to free margins a W-plasty was deemed most appropriate for repair.  Using a sterile surgical marker, the appropriate transposition arms of the W-plasty were drawn incorporating the defect and placing the expected incisions within the relaxed skin tension lines where possible.    The area thus outlined was incised deep to adipose tissue with a #15 scalpel blade.  The skin margins were undermined to an appropriate distance in all directions utilizing iris scissors.  The opposing transposition arms were then transposed into place in opposite direction and anchored with interrupted buried subcutaneous sutures. Z Plasty Text: The lesion was extirpated to the level of the fat with a #15 scalpel blade.  Given the location of the defect, shape of the defect and the proximity to free margins a Z-plasty was deemed most appropriate for repair.  Using a sterile surgical marker, the appropriate transposition arms of the Z-plasty were drawn incorporating the defect and placing the expected incisions within the relaxed skin tension lines where possible.    The area thus outlined was incised deep to adipose tissue with a #15 scalpel blade.  The skin margins were undermined to an appropriate distance in all directions utilizing iris scissors.  The opposing transposition arms were then transposed into place in opposite direction and anchored with interrupted buried subcutaneous sutures. Double Z Plasty Text: The lesion was extirpated to the level of the fat with a #15 scalpel blade. Given the location of the defect, shape of the defect and the proximity to free margins a double Z-plasty was deemed most appropriate for repair. Using a sterile surgical marker, the appropriate transposition arms of the double Z-plasty were drawn incorporating the defect and placing the expected incisions within the relaxed skin tension lines where possible. The area thus outlined was incised deep to adipose tissue with a #15 scalpel blade. The skin margins were undermined to an appropriate distance in all directions utilizing iris scissors. The opposing transposition arms were then transposed and carried over into place in opposite direction and anchored with interrupted buried subcutaneous sutures. Zygomaticofacial Flap Text: Given the location of the defect, shape of the defect and the proximity to free margins a zygomaticofacial flap was deemed most appropriate for repair.  Using a sterile surgical marker, the appropriate flap was drawn incorporating the defect and placing the expected incisions within the relaxed skin tension lines where possible. The area thus outlined was incised deep to adipose tissue with a #15 scalpel blade with preservation of a vascular pedicle.  The skin margins were undermined to an appropriate distance in all directions utilizing iris scissors.  The flap was then placed into the defect and anchored with interrupted buried subcutaneous sutures. Cheek Interpolation Flap Text: A decision was made to reconstruct the defect utilizing an interpolation axial flap and a staged reconstruction.  A telfa template was made of the defect.  This telfa template was then used to outline the Cheek Interpolation flap.  The donor area for the pedicle flap was then injected with anesthesia.  The flap was excised through the skin and subcutaneous tissue down to the layer of the underlying musculature.  The interpolation flap was carefully excised within this deep plane to maintain its blood supply.  The edges of the donor site were undermined.   The donor site was closed in a primary fashion.  The pedicle was then rotated into position and sutured.  Once the tube was sutured into place, adequate blood supply was confirmed with blanching and refill.  The pedicle was then wrapped with xeroform gauze and dressed appropriately with a telfa and gauze bandage to ensure continued blood supply and protect the attached pedicle. Cheek-To-Nose Interpolation Flap Text: A decision was made to reconstruct the defect utilizing an interpolation axial flap and a staged reconstruction.  A telfa template was made of the defect.  This telfa template was then used to outline the Cheek-To-Nose Interpolation flap.  The donor area for the pedicle flap was then injected with anesthesia.  The flap was excised through the skin and subcutaneous tissue down to the layer of the underlying musculature.  The interpolation flap was carefully excised within this deep plane to maintain its blood supply.  The edges of the donor site were undermined.   The donor site was closed in a primary fashion.  The pedicle was then rotated into position and sutured.  Once the tube was sutured into place, adequate blood supply was confirmed with blanching and refill.  The pedicle was then wrapped with xeroform gauze and dressed appropriately with a telfa and gauze bandage to ensure continued blood supply and protect the attached pedicle. Interpolation Flap Text: A decision was made to reconstruct the defect utilizing an interpolation axial flap and a staged reconstruction.  A telfa template was made of the defect.  This telfa template was then used to outline the interpolation flap.  The donor area for the pedicle flap was then injected with anesthesia.  The flap was excised through the skin and subcutaneous tissue down to the layer of the underlying musculature.  The interpolation flap was carefully excised within this deep plane to maintain its blood supply.  The edges of the donor site were undermined.   The donor site was closed in a primary fashion.  The pedicle was then rotated into position and sutured.  Once the tube was sutured into place, adequate blood supply was confirmed with blanching and refill.  The pedicle was then wrapped with xeroform gauze and dressed appropriately with a telfa and gauze bandage to ensure continued blood supply and protect the attached pedicle. Melolabial Interpolation Flap Text: A decision was made to reconstruct the defect utilizing an interpolation axial flap and a staged reconstruction.  A telfa template was made of the defect.  This telfa template was then used to outline the melolabial interpolation flap.  The donor area for the pedicle flap was then injected with anesthesia.  The flap was excised through the skin and subcutaneous tissue down to the layer of the underlying musculature.  The pedicle flap was carefully excised within this deep plane to maintain its blood supply.  The edges of the donor site were undermined.   The donor site was closed in a primary fashion.  The pedicle was then rotated into position and sutured.  Once the tube was sutured into place, adequate blood supply was confirmed with blanching and refill.  The pedicle was then wrapped with xeroform gauze and dressed appropriately with a telfa and gauze bandage to ensure continued blood supply and protect the attached pedicle. Mastoid Interpolation Flap Text: A decision was made to reconstruct the defect utilizing an interpolation axial flap and a staged reconstruction.  A telfa template was made of the defect.  This telfa template was then used to outline the mastoid interpolation flap.  The donor area for the pedicle flap was then injected with anesthesia.  The flap was excised through the skin and subcutaneous tissue down to the layer of the underlying musculature.  The pedicle flap was carefully excised within this deep plane to maintain its blood supply.  The edges of the donor site were undermined.   The donor site was closed in a primary fashion.  The pedicle was then rotated into position and sutured.  Once the tube was sutured into place, adequate blood supply was confirmed with blanching and refill.  The pedicle was then wrapped with xeroform gauze and dressed appropriately with a telfa and gauze bandage to ensure continued blood supply and protect the attached pedicle. Posterior Auricular Interpolation Flap Text: A decision was made to reconstruct the defect utilizing an interpolation axial flap and a staged reconstruction.  A telfa template was made of the defect.  This telfa template was then used to outline the posterior auricular interpolation flap.  The donor area for the pedicle flap was then injected with anesthesia.  The flap was excised through the skin and subcutaneous tissue down to the layer of the underlying musculature.  The pedicle flap was carefully excised within this deep plane to maintain its blood supply.  The edges of the donor site were undermined.   The donor site was closed in a primary fashion.  The pedicle was then rotated into position and sutured.  Once the tube was sutured into place, adequate blood supply was confirmed with blanching and refill.  The pedicle was then wrapped with xeroform gauze and dressed appropriately with a telfa and gauze bandage to ensure continued blood supply and protect the attached pedicle. Paramedian Forehead Flap Text: A decision was made to reconstruct the defect utilizing an interpolation axial flap and a staged reconstruction.  A telfa template was made of the defect.  This telfa template was then used to outline the paramedian forehead pedicle flap.  The donor area for the pedicle flap was then injected with anesthesia.  The flap was excised through the skin and subcutaneous tissue down to the layer of the underlying musculature.  The pedicle flap was carefully excised within this deep plane to maintain its blood supply.  The edges of the donor site were undermined.   The donor site was closed in a primary fashion.  The pedicle was then rotated into position and sutured.  Once the tube was sutured into place, adequate blood supply was confirmed with blanching and refill.  The pedicle was then wrapped with xeroform gauze and dressed appropriately with a telfa and gauze bandage to ensure continued blood supply and protect the attached pedicle. Abbe Flap (Upper To Lower Lip) Text: The defect of the lower lip was assessed and measured.  Given the location and size of the defect, an Abbe flap was deemed most appropriate. Using a sterile surgical marker, an appropriate Abbe flap was measured and drawn on the upper lip. Local anesthesia was then infiltrated.  A scalpel was then used to incise the upper lip through and through the skin, vermilion, muscle and mucosa, leaving the flap pedicled on the opposite side.  The flap was then rotated and transferred to the lower lip defect.  The flap was then sutured into place with a three layer technique, closing the orbicularis oris muscle layer with subcutaneous buried sutures, followed by a mucosal layer and an epidermal layer. Abbe Flap (Lower To Upper Lip) Text: The defect of the upper lip was assessed and measured.  Given the location and size of the defect, an Abbe flap was deemed most appropriate. Using a sterile surgical marker, an appropriate Abbe flap was measured and drawn on the lower lip. Local anesthesia was then infiltrated. A scalpel was then used to incise the upper lip through and through the skin, vermilion, muscle and mucosa, leaving the flap pedicled on the opposite side.  The flap was then rotated and transferred to the lower lip defect.  The flap was then sutured into place with a three layer technique, closing the orbicularis oris muscle layer with subcutaneous buried sutures, followed by a mucosal layer and an epidermal layer. Estlander Flap (Upper To Lower Lip) Text: The defect of the lower lip was assessed and measured.  Given the location and size of the defect, an Estlander flap was deemed most appropriate. Using a sterile surgical marker, an appropriate Estlander flap was measured and drawn on the upper lip. Local anesthesia was then infiltrated. A scalpel was then used to incise the lateral aspect of the flap, through skin, muscle and mucosa, leaving the flap pedicled medially.  The flap was then rotated and positioned to fill the lower lip defect.  The flap was then sutured into place with a three layer technique, closing the orbicularis oris muscle layer with subcutaneous buried sutures, followed by a mucosal layer and an epidermal layer. Lip Wedge Excision Repair Text: Given the location of the defect and the proximity to free margins a full thickness wedge repair was deemed most appropriate.  Using a sterile surgical marker, the appropriate repair was drawn incorporating the defect and placing the expected incisions perpendicular to the vermilion border.  The vermilion border was also meticulously outlined to ensure appropriate reapproximation during the repair.  The area thus outlined was incised through and through with a #15 scalpel blade.  The muscularis and dermis were reaproximated with deep sutures following hemostasis. Care was taken to realign the vermilion border before proceeding with the superficial closure.  Once the vermilion was realigned the superfical and mucosal closure was finished. Ftsg Text: The defect edges were debeveled with a #15 scalpel blade.  Given the location of the defect, shape of the defect and the proximity to free margins a full thickness skin graft was deemed most appropriate.  Using a sterile surgical marker, the primary defect shape was transferred to the donor site. The area thus outlined was incised deep to adipose tissue with a #15 scalpel blade.  The harvested graft was then trimmed of adipose tissue until only dermis and epidermis was left.  The skin margins of the secondary defect were undermined to an appropriate distance in all directions utilizing iris scissors.  The secondary defect was closed with interrupted buried subcutaneous sutures.  The skin edges were then re-apposed with running  sutures.  The skin graft was then placed in the primary defect and oriented appropriately. Split-Thickness Skin Graft Text: The defect edges were debeveled with a #15 scalpel blade.  Given the location of the defect, shape of the defect and the proximity to free margins a split thickness skin graft was deemed most appropriate.  Using a sterile surgical marker, the primary defect shape was transferred to the donor site. The split thickness graft was then harvested.  The skin graft was then placed in the primary defect and oriented appropriately. Pinch Graft Text: The defect edges were debeveled with a #15 scalpel blade. Given the location of the defect, shape of the defect and the proximity to free margins a pinch graft was deemed most appropriate. Using a sterile surgical marker, the primary defect shape was transferred to the donor site. The area thus outlined was incised deep to adipose tissue with a #15 scalpel blade.  The harvested graft was then trimmed of adipose tissue until only dermis and epidermis was left. The skin margins of the secondary defect were undermined to an appropriate distance in all directions utilizing iris scissors.  The secondary defect was closed with interrupted buried subcutaneous sutures.  The skin edges were then re-apposed with running  sutures.  The skin graft was then placed in the primary defect and oriented appropriately. Burow's Graft Text: The defect edges were debeveled with a #15 scalpel blade.  Given the location of the defect, shape of the defect, the proximity to free margins and the presence of a standing cone deformity a Burow's skin graft was deemed most appropriate. The standing cone was removed and this tissue was then trimmed to the shape of the primary defect. The adipose tissue was also removed until only dermis and epidermis were left.  The skin margins of the secondary defect were undermined to an appropriate distance in all directions utilizing iris scissors.  The secondary defect was closed with interrupted buried subcutaneous sutures.  The skin edges were then re-apposed with running  sutures.  The skin graft was then placed in the primary defect and oriented appropriately. Cartilage Graft Text: The defect edges were debeveled with a #15 scalpel blade.  Given the location of the defect, shape of the defect, the fact the defect involved a full thickness cartilage defect a cartilage graft was deemed most appropriate.  An appropriate donor site was identified, cleansed, and anesthetized. The cartilage graft was then harvested and transferred to the recipient site, oriented appropriately and then sutured into place.  The secondary defect was then repaired using a primary closure. Composite Graft Text: The defect edges were debeveled with a #15 scalpel blade.  Given the location of the defect, shape of the defect, the proximity to free margins and the fact the defect was full thickness a composite graft was deemed most appropriate.  The defect was outline and then transferred to the donor site.  A full thickness graft was then excised from the donor site. The graft was then placed in the primary defect, oriented appropriately and then sutured into place.  The secondary defect was then repaired using a primary closure. Epidermal Autograft Text: The defect edges were debeveled with a #15 scalpel blade.  Given the location of the defect, shape of the defect and the proximity to free margins an epidermal autograft was deemed most appropriate.  Using a sterile surgical marker, the primary defect shape was transferred to the donor site. The epidermal graft was then harvested.  The skin graft was then placed in the primary defect and oriented appropriately. Dermal Autograft Text: The defect edges were debeveled with a #15 scalpel blade.  Given the location of the defect, shape of the defect and the proximity to free margins a dermal autograft was deemed most appropriate.  Using a sterile surgical marker, the primary defect shape was transferred to the donor site. The area thus outlined was incised deep to adipose tissue with a #15 scalpel blade.  The harvested graft was then trimmed of adipose and epidermal tissue until only dermis was left.  The skin graft was then placed in the primary defect and oriented appropriately. Skin Substitute Text: The defect edges were debeveled with a #15 scalpel blade.  Given the location of the defect, shape of the defect and the proximity to free margins a skin substitute graft was deemed most appropriate.  The graft material was trimmed to fit the size of the defect. The graft was then placed in the primary defect and oriented appropriately. Tissue Cultured Epidermal Autograft Text: The defect edges were debeveled with a #15 scalpel blade.  Given the location of the defect, shape of the defect and the proximity to free margins a tissue cultured epidermal autograft was deemed most appropriate.  The graft was then trimmed to fit the size of the defect.  The graft was then placed in the primary defect and oriented appropriately. Xenograft Text: The defect edges were debeveled with a #15 scalpel blade.  Given the location of the defect, shape of the defect and the proximity to free margins a xenograft was deemed most appropriate.  The graft was then trimmed to fit the size of the defect.  The graft was then placed in the primary defect and oriented appropriately. Purse String (Intermediate) Text: Given the location of the defect and the characteristics of the surrounding skin a purse string intermediate closure was deemed most appropriate.  Undermining was performed circumfirentially around the surgical defect.  A purse string suture was then placed and tightened. Purse String (Simple) Text: Given the location of the defect and the characteristics of the surrounding skin a purse string simple closure was deemed most appropriate.  Undermining was performed circumferentially around the surgical defect.  A purse string suture was then placed and tightened. Partial Purse String (Intermediate) Text: Given the location of the defect and the characteristics of the surrounding skin an intermediate purse string closure was deemed most appropriate.  Undermining was performed circumferentially around the surgical defect.  A purse string suture was then placed and tightened. Wound tension of the circular defect prevented complete closure of the wound. Partial Purse String (Simple) Text: Given the location of the defect and the characteristics of the surrounding skin a simple purse string closure was deemed most appropriate.  Undermining was performed circumferentially around the surgical defect.  A purse string suture was then placed and tightened. Wound tension of the circular defect prevented complete closure of the wound. Complex Repair And Single Advancement Flap Text: The defect edges were debeveled with a #15 scalpel blade.  The primary defect was closed partially with a complex linear closure.  Given the location of the remaining defect, shape of the defect and the proximity to free margins a single advancement flap was deemed most appropriate for complete closure of the defect.  Using a sterile surgical marker, an appropriate advancement flap was drawn incorporating the defect and placing the expected incisions within the relaxed skin tension lines where possible.    The area thus outlined was incised deep to adipose tissue with a #15 scalpel blade.  The skin margins were undermined to an appropriate distance in all directions utilizing iris scissors. Complex Repair And Double Advancement Flap Text: The defect edges were debeveled with a #15 scalpel blade.  The primary defect was closed partially with a complex linear closure.  Given the location of the remaining defect, shape of the defect and the proximity to free margins a double advancement flap was deemed most appropriate for complete closure of the defect.  Using a sterile surgical marker, an appropriate advancement flap was drawn incorporating the defect and placing the expected incisions within the relaxed skin tension lines where possible.    The area thus outlined was incised deep to adipose tissue with a #15 scalpel blade.  The skin margins were undermined to an appropriate distance in all directions utilizing iris scissors. Complex Repair And Modified Advancement Flap Text: The defect edges were debeveled with a #15 scalpel blade.  The primary defect was closed partially with a complex linear closure.  Given the location of the remaining defect, shape of the defect and the proximity to free margins a modified advancement flap was deemed most appropriate for complete closure of the defect.  Using a sterile surgical marker, an appropriate advancement flap was drawn incorporating the defect and placing the expected incisions within the relaxed skin tension lines where possible.    The area thus outlined was incised deep to adipose tissue with a #15 scalpel blade.  The skin margins were undermined to an appropriate distance in all directions utilizing iris scissors. Complex Repair And A-T Advancement Flap Text: The defect edges were debeveled with a #15 scalpel blade.  The primary defect was closed partially with a complex linear closure.  Given the location of the remaining defect, shape of the defect and the proximity to free margins an A-T advancement flap was deemed most appropriate for complete closure of the defect.  Using a sterile surgical marker, an appropriate advancement flap was drawn incorporating the defect and placing the expected incisions within the relaxed skin tension lines where possible.    The area thus outlined was incised deep to adipose tissue with a #15 scalpel blade.  The skin margins were undermined to an appropriate distance in all directions utilizing iris scissors. Complex Repair And O-T Advancement Flap Text: The defect edges were debeveled with a #15 scalpel blade.  The primary defect was closed partially with a complex linear closure.  Given the location of the remaining defect, shape of the defect and the proximity to free margins an O-T advancement flap was deemed most appropriate for complete closure of the defect.  Using a sterile surgical marker, an appropriate advancement flap was drawn incorporating the defect and placing the expected incisions within the relaxed skin tension lines where possible.    The area thus outlined was incised deep to adipose tissue with a #15 scalpel blade.  The skin margins were undermined to an appropriate distance in all directions utilizing iris scissors. Complex Repair And O-L Flap Text: The defect edges were debeveled with a #15 scalpel blade.  The primary defect was closed partially with a complex linear closure.  Given the location of the remaining defect, shape of the defect and the proximity to free margins an O-L flap was deemed most appropriate for complete closure of the defect.  Using a sterile surgical marker, an appropriate flap was drawn incorporating the defect and placing the expected incisions within the relaxed skin tension lines where possible.    The area thus outlined was incised deep to adipose tissue with a #15 scalpel blade.  The skin margins were undermined to an appropriate distance in all directions utilizing iris scissors. Complex Repair And Bilobe Flap Text: The defect edges were debeveled with a #15 scalpel blade.  The primary defect was closed partially with a complex linear closure.  Given the location of the remaining defect, shape of the defect and the proximity to free margins a bilobe flap was deemed most appropriate for complete closure of the defect.  Using a sterile surgical marker, an appropriate advancement flap was drawn incorporating the defect and placing the expected incisions within the relaxed skin tension lines where possible.    The area thus outlined was incised deep to adipose tissue with a #15 scalpel blade.  The skin margins were undermined to an appropriate distance in all directions utilizing iris scissors. Complex Repair And Melolabial Flap Text: The defect edges were debeveled with a #15 scalpel blade.  The primary defect was closed partially with a complex linear closure.  Given the location of the remaining defect, shape of the defect and the proximity to free margins a melolabial flap was deemed most appropriate for complete closure of the defect.  Using a sterile surgical marker, an appropriate advancement flap was drawn incorporating the defect and placing the expected incisions within the relaxed skin tension lines where possible.    The area thus outlined was incised deep to adipose tissue with a #15 scalpel blade.  The skin margins were undermined to an appropriate distance in all directions utilizing iris scissors. Complex Repair And Rotation Flap Text: The defect edges were debeveled with a #15 scalpel blade.  The primary defect was closed partially with a complex linear closure.  Given the location of the remaining defect, shape of the defect and the proximity to free margins a rotation flap was deemed most appropriate for complete closure of the defect.  Using a sterile surgical marker, an appropriate advancement flap was drawn incorporating the defect and placing the expected incisions within the relaxed skin tension lines where possible.    The area thus outlined was incised deep to adipose tissue with a #15 scalpel blade.  The skin margins were undermined to an appropriate distance in all directions utilizing iris scissors. Complex Repair And Rhombic Flap Text: The defect edges were debeveled with a #15 scalpel blade.  The primary defect was closed partially with a complex linear closure.  Given the location of the remaining defect, shape of the defect and the proximity to free margins a rhombic flap was deemed most appropriate for complete closure of the defect.  Using a sterile surgical marker, an appropriate advancement flap was drawn incorporating the defect and placing the expected incisions within the relaxed skin tension lines where possible.    The area thus outlined was incised deep to adipose tissue with a #15 scalpel blade.  The skin margins were undermined to an appropriate distance in all directions utilizing iris scissors. Complex Repair And Transposition Flap Text: The defect edges were debeveled with a #15 scalpel blade.  The primary defect was closed partially with a complex linear closure.  Given the location of the remaining defect, shape of the defect and the proximity to free margins a transposition flap was deemed most appropriate for complete closure of the defect.  Using a sterile surgical marker, an appropriate advancement flap was drawn incorporating the defect and placing the expected incisions within the relaxed skin tension lines where possible.    The area thus outlined was incised deep to adipose tissue with a #15 scalpel blade.  The skin margins were undermined to an appropriate distance in all directions utilizing iris scissors. Complex Repair And V-Y Plasty Text: The defect edges were debeveled with a #15 scalpel blade.  The primary defect was closed partially with a complex linear closure.  Given the location of the remaining defect, shape of the defect and the proximity to free margins a V-Y plasty was deemed most appropriate for complete closure of the defect.  Using a sterile surgical marker, an appropriate advancement flap was drawn incorporating the defect and placing the expected incisions within the relaxed skin tension lines where possible.    The area thus outlined was incised deep to adipose tissue with a #15 scalpel blade.  The skin margins were undermined to an appropriate distance in all directions utilizing iris scissors. Complex Repair And M Plasty Text: The defect edges were debeveled with a #15 scalpel blade.  The primary defect was closed partially with a complex linear closure.  Given the location of the remaining defect, shape of the defect and the proximity to free margins an M plasty was deemed most appropriate for complete closure of the defect.  Using a sterile surgical marker, an appropriate advancement flap was drawn incorporating the defect and placing the expected incisions within the relaxed skin tension lines where possible.    The area thus outlined was incised deep to adipose tissue with a #15 scalpel blade.  The skin margins were undermined to an appropriate distance in all directions utilizing iris scissors. Complex Repair And Double M Plasty Text: The defect edges were debeveled with a #15 scalpel blade.  The primary defect was closed partially with a complex linear closure.  Given the location of the remaining defect, shape of the defect and the proximity to free margins a double M plasty was deemed most appropriate for complete closure of the defect.  Using a sterile surgical marker, an appropriate advancement flap was drawn incorporating the defect and placing the expected incisions within the relaxed skin tension lines where possible.    The area thus outlined was incised deep to adipose tissue with a #15 scalpel blade.  The skin margins were undermined to an appropriate distance in all directions utilizing iris scissors. Complex Repair And W Plasty Text: The defect edges were debeveled with a #15 scalpel blade.  The primary defect was closed partially with a complex linear closure.  Given the location of the remaining defect, shape of the defect and the proximity to free margins a W plasty was deemed most appropriate for complete closure of the defect.  Using a sterile surgical marker, an appropriate advancement flap was drawn incorporating the defect and placing the expected incisions within the relaxed skin tension lines where possible.    The area thus outlined was incised deep to adipose tissue with a #15 scalpel blade.  The skin margins were undermined to an appropriate distance in all directions utilizing iris scissors. Complex Repair And Z Plasty Text: The defect edges were debeveled with a #15 scalpel blade.  The primary defect was closed partially with a complex linear closure.  Given the location of the remaining defect, shape of the defect and the proximity to free margins a Z plasty was deemed most appropriate for complete closure of the defect.  Using a sterile surgical marker, an appropriate advancement flap was drawn incorporating the defect and placing the expected incisions within the relaxed skin tension lines where possible.    The area thus outlined was incised deep to adipose tissue with a #15 scalpel blade.  The skin margins were undermined to an appropriate distance in all directions utilizing iris scissors. Complex Repair And Dorsal Nasal Flap Text: The defect edges were debeveled with a #15 scalpel blade.  The primary defect was closed partially with a complex linear closure.  Given the location of the remaining defect, shape of the defect and the proximity to free margins a dorsal nasal flap was deemed most appropriate for complete closure of the defect.  Using a sterile surgical marker, an appropriate flap was drawn incorporating the defect and placing the expected incisions within the relaxed skin tension lines where possible.    The area thus outlined was incised deep to adipose tissue with a #15 scalpel blade.  The skin margins were undermined to an appropriate distance in all directions utilizing iris scissors. Complex Repair And Ftsg Text: The defect edges were debeveled with a #15 scalpel blade.  The primary defect was closed partially with a complex linear closure.  Given the location of the defect, shape of the defect and the proximity to free margins a full thickness skin graft was deemed most appropriate to repair the remaining defect.  The graft was trimmed to fit the size of the remaining defect.  The graft was then placed in the primary defect, oriented appropriately, and sutured into place. Complex Repair And Burow's Graft Text: The defect edges were debeveled with a #15 scalpel blade.  The primary defect was closed partially with a complex linear closure.  Given the location of the defect, shape of the defect, the proximity to free margins and the presence of a standing cone deformity a Burow's graft was deemed most appropriate to repair the remaining defect.  The graft was trimmed to fit the size of the remaining defect.  The graft was then placed in the primary defect, oriented appropriately, and sutured into place. Complex Repair And Split-Thickness Skin Graft Text: The defect edges were debeveled with a #15 scalpel blade.  The primary defect was closed partially with a complex linear closure.  Given the location of the defect, shape of the defect and the proximity to free margins a split thickness skin graft was deemed most appropriate to repair the remaining defect.  The graft was trimmed to fit the size of the remaining defect.  The graft was then placed in the primary defect, oriented appropriately, and sutured into place. Complex Repair And Epidermal Autograft Text: The defect edges were debeveled with a #15 scalpel blade.  The primary defect was closed partially with a complex linear closure.  Given the location of the defect, shape of the defect and the proximity to free margins an epidermal autograft was deemed most appropriate to repair the remaining defect.  The graft was trimmed to fit the size of the remaining defect.  The graft was then placed in the primary defect, oriented appropriately, and sutured into place. Complex Repair And Dermal Autograft Text: The defect edges were debeveled with a #15 scalpel blade.  The primary defect was closed partially with a complex linear closure.  Given the location of the defect, shape of the defect and the proximity to free margins an dermal autograft was deemed most appropriate to repair the remaining defect.  The graft was trimmed to fit the size of the remaining defect.  The graft was then placed in the primary defect, oriented appropriately, and sutured into place. Complex Repair And Tissue Cultured Epidermal Autograft Text: The defect edges were debeveled with a #15 scalpel blade.  The primary defect was closed partially with a complex linear closure.  Given the location of the defect, shape of the defect and the proximity to free margins an tissue cultured epidermal autograft was deemed most appropriate to repair the remaining defect.  The graft was trimmed to fit the size of the remaining defect.  The graft was then placed in the primary defect, oriented appropriately, and sutured into place. Complex Repair And Xenograft Text: The defect edges were debeveled with a #15 scalpel blade.  The primary defect was closed partially with a complex linear closure.  Given the location of the defect, shape of the defect and the proximity to free margins a xenograft was deemed most appropriate to repair the remaining defect.  The graft was trimmed to fit the size of the remaining defect.  The graft was then placed in the primary defect, oriented appropriately, and sutured into place. Complex Repair And Skin Substitute Graft Text: The defect edges were debeveled with a #15 scalpel blade.  The primary defect was closed partially with a complex linear closure.  Given the location of the remaining defect, shape of the defect and the proximity to free margins a skin substitute graft was deemed most appropriate to repair the remaining defect.  The graft was trimmed to fit the size of the remaining defect.  The graft was then placed in the primary defect, oriented appropriately, and sutured into place. Include Anticoagulation In Mohs Note?: Please Select the Appropriate Response Path Notes (To The Dermatopathologist): Please check margins. Consent was obtained from the patient. The risks and benefits to therapy were discussed in detail. Specifically, the risks of infection, scarring, bleeding, prolonged wound healing, incomplete removal, allergy to anesthesia, nerve injury and recurrence were addressed. Prior to the procedure, the treatment site was clearly identified and confirmed by the patient. All components of Universal Protocol/PAUSE Rule completed. Post-Care Instructions: I reviewed with the patient in detail post-care instructions. Patient is not to engage in any heavy lifting, exercise, or swimming for the next 14 days. Should the patient develop any fevers, chills, bleeding, severe pain patient will contact the office immediately. Home Suture Removal Text: Patient was provided a home suture removal kit and will remove their sutures at home.  If they have any questions or difficulties they will call the office. Where Do You Want The Question To Include Opioid Counseling Located?: Case Summary Tab Information: Selecting Yes will display possible errors in your note based on the variables you have selected. This validation is only offered as a suggestion for you. PLEASE NOTE THAT THE VALIDATION TEXT WILL BE REMOVED WHEN YOU FINALIZE YOUR NOTE. IF YOU WANT TO FAX A PRELIMINARY NOTE YOU WILL NEED TO TOGGLE THIS TO 'NO' IF YOU DO NOT WANT IT IN YOUR FAXED NOTE.

## 2025-08-25 ENCOUNTER — INPATIENT (INPATIENT)
Facility: HOSPITAL | Age: 70
LOS: 6 days | Discharge: HOME | DRG: 536 | End: 2025-09-01
Attending: STUDENT IN AN ORGANIZED HEALTH CARE EDUCATION/TRAINING PROGRAM
Payer: COMMERCIAL

## 2025-08-25 VITALS
HEIGHT: 72 IN | TEMPERATURE: 98 F | OXYGEN SATURATION: 98 % | SYSTOLIC BLOOD PRESSURE: 118 MMHG | WEIGHT: 154.98 LBS | DIASTOLIC BLOOD PRESSURE: 79 MMHG | HEART RATE: 89 BPM | RESPIRATION RATE: 18 BRPM

## 2025-08-25 DIAGNOSIS — S72.001A FRACTURE OF UNSPECIFIED PART OF NECK OF RIGHT FEMUR, INITIAL ENCOUNTER FOR CLOSED FRACTURE: ICD-10-CM

## 2025-08-25 DIAGNOSIS — Z95.1 PRESENCE OF AORTOCORONARY BYPASS GRAFT: Chronic | ICD-10-CM

## 2025-08-25 DIAGNOSIS — Z87.81 PERSONAL HISTORY OF (HEALED) TRAUMATIC FRACTURE: Chronic | ICD-10-CM

## 2025-08-25 DIAGNOSIS — Z90.49 ACQUIRED ABSENCE OF OTHER SPECIFIED PARTS OF DIGESTIVE TRACT: Chronic | ICD-10-CM

## 2025-08-25 DIAGNOSIS — Z98.89 OTHER SPECIFIED POSTPROCEDURAL STATES: Chronic | ICD-10-CM

## 2025-08-25 LAB
ALBUMIN SERPL ELPH-MCNC: 2.5 G/DL — LOW (ref 3.3–5)
ALP SERPL-CCNC: 157 U/L — HIGH (ref 40–120)
ALT FLD-CCNC: 20 U/L — SIGNIFICANT CHANGE UP (ref 12–78)
ANION GAP SERPL CALC-SCNC: 6 MMOL/L — SIGNIFICANT CHANGE UP (ref 5–17)
APTT BLD: 33.2 SEC — SIGNIFICANT CHANGE UP (ref 26.1–36.8)
AST SERPL-CCNC: 43 U/L — HIGH (ref 15–37)
BASOPHILS # BLD AUTO: 0.02 K/UL — SIGNIFICANT CHANGE UP (ref 0–0.2)
BASOPHILS NFR BLD AUTO: 0.8 % — SIGNIFICANT CHANGE UP (ref 0–2)
BILIRUB SERPL-MCNC: 1.7 MG/DL — HIGH (ref 0.2–1.2)
BUN SERPL-MCNC: 7 MG/DL — SIGNIFICANT CHANGE UP (ref 7–23)
CALCIUM SERPL-MCNC: 9.2 MG/DL — SIGNIFICANT CHANGE UP (ref 8.5–10.1)
CHLORIDE SERPL-SCNC: 101 MMOL/L — SIGNIFICANT CHANGE UP (ref 96–108)
CO2 SERPL-SCNC: 28 MMOL/L — SIGNIFICANT CHANGE UP (ref 22–31)
CREAT SERPL-MCNC: 0.74 MG/DL — SIGNIFICANT CHANGE UP (ref 0.5–1.3)
EGFR: 99 ML/MIN/1.73M2 — SIGNIFICANT CHANGE UP
EGFR: 99 ML/MIN/1.73M2 — SIGNIFICANT CHANGE UP
EOSINOPHIL # BLD AUTO: 0 K/UL — SIGNIFICANT CHANGE UP (ref 0–0.5)
EOSINOPHIL NFR BLD AUTO: 0 % — SIGNIFICANT CHANGE UP (ref 0–6)
GLUCOSE SERPL-MCNC: 102 MG/DL — HIGH (ref 70–99)
HCT VFR BLD CALC: 25.8 % — LOW (ref 39–50)
HGB BLD-MCNC: 9.1 G/DL — LOW (ref 13–17)
IMM GRANULOCYTES # BLD AUTO: 0.01 K/UL — SIGNIFICANT CHANGE UP (ref 0–0.07)
IMM GRANULOCYTES NFR BLD AUTO: 0.4 % — SIGNIFICANT CHANGE UP (ref 0–0.9)
IMMATURE PLATELET FRACTION #: 1.2 K/UL — LOW (ref 3.9–12.5)
IMMATURE PLATELET FRACTION %: 1.3 % — LOW (ref 1.6–7.1)
INR BLD: 1.21 RATIO — HIGH (ref 0.85–1.16)
LYMPHOCYTES # BLD AUTO: 0.83 K/UL — LOW (ref 1–3.3)
LYMPHOCYTES NFR BLD AUTO: 31.6 % — SIGNIFICANT CHANGE UP (ref 13–44)
MCHC RBC-ENTMCNC: 35.3 G/DL — SIGNIFICANT CHANGE UP (ref 32–36)
MCHC RBC-ENTMCNC: 39.1 PG — HIGH (ref 27–34)
MCV RBC AUTO: 110.7 FL — HIGH (ref 80–100)
MONOCYTES # BLD AUTO: 0.16 K/UL — SIGNIFICANT CHANGE UP (ref 0–0.9)
MONOCYTES NFR BLD AUTO: 6.1 % — SIGNIFICANT CHANGE UP (ref 2–14)
NEUTROPHILS # BLD AUTO: 1.61 K/UL — LOW (ref 1.8–7.4)
NEUTROPHILS NFR BLD AUTO: 61.1 % — SIGNIFICANT CHANGE UP (ref 43–77)
NRBC # BLD AUTO: 0 K/UL — SIGNIFICANT CHANGE UP (ref 0–0)
NRBC # FLD: 0 K/UL — SIGNIFICANT CHANGE UP (ref 0–0)
NRBC BLD AUTO-RTO: 0 /100 WBCS — SIGNIFICANT CHANGE UP (ref 0–0)
PLATELET # BLD AUTO: 95 K/UL — LOW (ref 150–400)
PMV BLD: 9 FL — SIGNIFICANT CHANGE UP (ref 7–13)
POTASSIUM SERPL-MCNC: 3.3 MMOL/L — LOW (ref 3.5–5.3)
POTASSIUM SERPL-SCNC: 3.3 MMOL/L — LOW (ref 3.5–5.3)
PROT SERPL-MCNC: 5.9 G/DL — LOW (ref 6–8.3)
PROTHROM AB SERPL-ACNC: 14 SEC — HIGH (ref 9.9–13.4)
RBC # BLD: 2.33 M/UL — LOW (ref 4.2–5.8)
RBC # FLD: 15.6 % — HIGH (ref 10.3–14.5)
SODIUM SERPL-SCNC: 135 MMOL/L — SIGNIFICANT CHANGE UP (ref 135–145)
WBC # BLD: 2.63 K/UL — LOW (ref 3.8–10.5)
WBC # FLD AUTO: 2.63 K/UL — LOW (ref 3.8–10.5)

## 2025-08-25 PROCEDURE — 70450 CT HEAD/BRAIN W/O DYE: CPT | Mod: 26

## 2025-08-25 PROCEDURE — 73552 X-RAY EXAM OF FEMUR 2/>: CPT

## 2025-08-25 PROCEDURE — 85610 PROTHROMBIN TIME: CPT

## 2025-08-25 PROCEDURE — 85730 THROMBOPLASTIN TIME PARTIAL: CPT

## 2025-08-25 PROCEDURE — 73552 X-RAY EXAM OF FEMUR 2/>: CPT | Mod: 26,RT

## 2025-08-25 PROCEDURE — 70450 CT HEAD/BRAIN W/O DYE: CPT

## 2025-08-25 PROCEDURE — 76376 3D RENDER W/INTRP POSTPROCES: CPT

## 2025-08-25 PROCEDURE — 73030 X-RAY EXAM OF SHOULDER: CPT | Mod: 26,RT

## 2025-08-25 PROCEDURE — 73060 X-RAY EXAM OF HUMERUS: CPT

## 2025-08-25 PROCEDURE — 80053 COMPREHEN METABOLIC PANEL: CPT

## 2025-08-25 PROCEDURE — 72192 CT PELVIS W/O DYE: CPT | Mod: 26

## 2025-08-25 PROCEDURE — 71045 X-RAY EXAM CHEST 1 VIEW: CPT | Mod: 26

## 2025-08-25 PROCEDURE — 73080 X-RAY EXAM OF ELBOW: CPT

## 2025-08-25 PROCEDURE — 72192 CT PELVIS W/O DYE: CPT

## 2025-08-25 PROCEDURE — 73502 X-RAY EXAM HIP UNI 2-3 VIEWS: CPT | Mod: 26,RT

## 2025-08-25 PROCEDURE — 73562 X-RAY EXAM OF KNEE 3: CPT | Mod: 26,RT

## 2025-08-25 PROCEDURE — 73060 X-RAY EXAM OF HUMERUS: CPT | Mod: 26,RT

## 2025-08-25 PROCEDURE — 73502 X-RAY EXAM HIP UNI 2-3 VIEWS: CPT

## 2025-08-25 PROCEDURE — 85025 COMPLETE CBC W/AUTO DIFF WBC: CPT

## 2025-08-25 PROCEDURE — 76376 3D RENDER W/INTRP POSTPROCES: CPT | Mod: 26

## 2025-08-25 PROCEDURE — 73562 X-RAY EXAM OF KNEE 3: CPT

## 2025-08-25 PROCEDURE — 99285 EMERGENCY DEPT VISIT HI MDM: CPT

## 2025-08-25 PROCEDURE — 99223 1ST HOSP IP/OBS HIGH 75: CPT

## 2025-08-25 PROCEDURE — 71045 X-RAY EXAM CHEST 1 VIEW: CPT

## 2025-08-25 PROCEDURE — 36415 COLL VENOUS BLD VENIPUNCTURE: CPT

## 2025-08-25 PROCEDURE — 73080 X-RAY EXAM OF ELBOW: CPT | Mod: 26,RT

## 2025-08-25 PROCEDURE — 93010 ELECTROCARDIOGRAM REPORT: CPT

## 2025-08-25 PROCEDURE — 73030 X-RAY EXAM OF SHOULDER: CPT

## 2025-08-25 RX ORDER — ACETAMINOPHEN 500 MG/5ML
1000 LIQUID (ML) ORAL ONCE
Refills: 0 | Status: COMPLETED | OUTPATIENT
Start: 2025-08-25 | End: 2025-08-25

## 2025-08-25 RX ORDER — CEPHALEXIN 250 MG/1
500 CAPSULE ORAL ONCE
Refills: 0 | Status: COMPLETED | OUTPATIENT
Start: 2025-08-25 | End: 2025-08-25

## 2025-08-25 RX ADMIN — Medication 1000 MILLIGRAM(S): at 20:45

## 2025-08-25 RX ADMIN — Medication 1000 MILLILITER(S): at 20:30

## 2025-08-25 RX ADMIN — Medication 40 MILLIEQUIVALENT(S): at 23:32

## 2025-08-25 RX ADMIN — Medication 4 MILLIGRAM(S): at 23:45

## 2025-08-25 RX ADMIN — Medication 500 MILLILITER(S): at 21:00

## 2025-08-25 RX ADMIN — Medication 400 MILLIGRAM(S): at 20:30

## 2025-08-25 RX ADMIN — Medication 4 MILLIGRAM(S): at 23:32

## 2025-08-26 ENCOUNTER — TRANSCRIPTION ENCOUNTER (OUTPATIENT)
Age: 70
End: 2025-08-26

## 2025-08-26 DIAGNOSIS — F10.10 ALCOHOL ABUSE, UNCOMPLICATED: ICD-10-CM

## 2025-08-26 DIAGNOSIS — L03.119 CELLULITIS OF UNSPECIFIED PART OF LIMB: ICD-10-CM

## 2025-08-26 DIAGNOSIS — S72.009A FRACTURE OF UNSPECIFIED PART OF NECK OF UNSPECIFIED FEMUR, INITIAL ENCOUNTER FOR CLOSED FRACTURE: ICD-10-CM

## 2025-08-26 DIAGNOSIS — E87.6 HYPOKALEMIA: ICD-10-CM

## 2025-08-26 DIAGNOSIS — D64.9 ANEMIA, UNSPECIFIED: ICD-10-CM

## 2025-08-26 LAB
A1C WITH ESTIMATED AVERAGE GLUCOSE RESULT: 4.2 % — SIGNIFICANT CHANGE UP (ref 4–5.6)
ALBUMIN SERPL ELPH-MCNC: 2.4 G/DL — LOW (ref 3.3–5)
ALP SERPL-CCNC: 166 U/L — HIGH (ref 40–120)
ALT FLD-CCNC: 19 U/L — SIGNIFICANT CHANGE UP (ref 12–78)
ANION GAP SERPL CALC-SCNC: 2 MMOL/L — LOW (ref 5–17)
ANION GAP SERPL CALC-SCNC: 4 MMOL/L — LOW (ref 5–17)
APTT BLD: 35.1 SEC — SIGNIFICANT CHANGE UP (ref 26.1–36.8)
AST SERPL-CCNC: 39 U/L — HIGH (ref 15–37)
BILIRUB SERPL-MCNC: 1.8 MG/DL — HIGH (ref 0.2–1.2)
BUN SERPL-MCNC: 8 MG/DL — SIGNIFICANT CHANGE UP (ref 7–23)
BUN SERPL-MCNC: 8 MG/DL — SIGNIFICANT CHANGE UP (ref 7–23)
CALCIUM SERPL-MCNC: 9.1 MG/DL — SIGNIFICANT CHANGE UP (ref 8.5–10.1)
CALCIUM SERPL-MCNC: 9.2 MG/DL — SIGNIFICANT CHANGE UP (ref 8.5–10.1)
CHLORIDE SERPL-SCNC: 106 MMOL/L — SIGNIFICANT CHANGE UP (ref 96–108)
CHLORIDE SERPL-SCNC: 106 MMOL/L — SIGNIFICANT CHANGE UP (ref 96–108)
CHOLEST SERPL-MCNC: 85 MG/DL — SIGNIFICANT CHANGE UP
CO2 SERPL-SCNC: 27 MMOL/L — SIGNIFICANT CHANGE UP (ref 22–31)
CO2 SERPL-SCNC: 27 MMOL/L — SIGNIFICANT CHANGE UP (ref 22–31)
CREAT SERPL-MCNC: 0.69 MG/DL — SIGNIFICANT CHANGE UP (ref 0.5–1.3)
CREAT SERPL-MCNC: 0.79 MG/DL — SIGNIFICANT CHANGE UP (ref 0.5–1.3)
EGFR: 101 ML/MIN/1.73M2 — SIGNIFICANT CHANGE UP
EGFR: 101 ML/MIN/1.73M2 — SIGNIFICANT CHANGE UP
EGFR: 97 ML/MIN/1.73M2 — SIGNIFICANT CHANGE UP
EGFR: 97 ML/MIN/1.73M2 — SIGNIFICANT CHANGE UP
ESTIMATED AVERAGE GLUCOSE: 74 MG/DL — SIGNIFICANT CHANGE UP (ref 68–114)
FERRITIN SERPL-MCNC: 304 NG/ML — SIGNIFICANT CHANGE UP (ref 30–400)
FOLATE SERPL-MCNC: 9.9 NG/ML — SIGNIFICANT CHANGE UP
GLUCOSE SERPL-MCNC: 106 MG/DL — HIGH (ref 70–99)
GLUCOSE SERPL-MCNC: 85 MG/DL — SIGNIFICANT CHANGE UP (ref 70–99)
GRAM STN FLD: SIGNIFICANT CHANGE UP
HCT VFR BLD CALC: 25.4 % — LOW (ref 39–50)
HCT VFR BLD CALC: 27.8 % — LOW (ref 39–50)
HDLC SERPL-MCNC: 51 MG/DL — SIGNIFICANT CHANGE UP
HGB BLD-MCNC: 8.7 G/DL — LOW (ref 13–17)
HGB BLD-MCNC: 9.6 G/DL — LOW (ref 13–17)
IMMATURE PLATELET FRACTION #: 1.2 K/UL — LOW (ref 3.9–12.5)
IMMATURE PLATELET FRACTION #: 1.2 K/UL — LOW (ref 3.9–12.5)
IMMATURE PLATELET FRACTION %: 1.2 % — LOW (ref 1.6–7.1)
IMMATURE PLATELET FRACTION %: 1.3 % — LOW (ref 1.6–7.1)
INR BLD: 1.24 RATIO — HIGH (ref 0.85–1.16)
IRON SATN MFR SERPL: 49 % — SIGNIFICANT CHANGE UP (ref 16–55)
IRON SATN MFR SERPL: 78 UG/DL — SIGNIFICANT CHANGE UP (ref 45–165)
LDLC SERPL-MCNC: 20 MG/DL — SIGNIFICANT CHANGE UP
LIPID PNL WITH DIRECT LDL SERPL: 20 MG/DL — SIGNIFICANT CHANGE UP
MAGNESIUM SERPL-MCNC: 1.8 MG/DL — SIGNIFICANT CHANGE UP (ref 1.6–2.6)
MCHC RBC-ENTMCNC: 34.3 G/DL — SIGNIFICANT CHANGE UP (ref 32–36)
MCHC RBC-ENTMCNC: 34.5 G/DL — SIGNIFICANT CHANGE UP (ref 32–36)
MCHC RBC-ENTMCNC: 38.7 PG — HIGH (ref 27–34)
MCHC RBC-ENTMCNC: 39 PG — HIGH (ref 27–34)
MCV RBC AUTO: 112.1 FL — HIGH (ref 80–100)
MCV RBC AUTO: 113.9 FL — HIGH (ref 80–100)
NONHDLC SERPL-MCNC: 33 MG/DL — SIGNIFICANT CHANGE UP
NRBC # BLD AUTO: 0 K/UL — SIGNIFICANT CHANGE UP (ref 0–0)
NRBC # BLD AUTO: 0 K/UL — SIGNIFICANT CHANGE UP (ref 0–0)
NRBC # FLD: 0 K/UL — SIGNIFICANT CHANGE UP (ref 0–0)
NRBC # FLD: 0 K/UL — SIGNIFICANT CHANGE UP (ref 0–0)
NRBC BLD AUTO-RTO: 0 /100 WBCS — SIGNIFICANT CHANGE UP (ref 0–0)
NRBC BLD AUTO-RTO: 0 /100 WBCS — SIGNIFICANT CHANGE UP (ref 0–0)
PHOSPHATE SERPL-MCNC: 1.8 MG/DL — LOW (ref 2.5–4.5)
PLATELET # BLD AUTO: 95 K/UL — LOW (ref 150–400)
PLATELET # BLD AUTO: 99 K/UL — LOW (ref 150–400)
PMV BLD: 8.9 FL — SIGNIFICANT CHANGE UP (ref 7–13)
PMV BLD: 9 FL — SIGNIFICANT CHANGE UP (ref 7–13)
POTASSIUM SERPL-MCNC: 4.4 MMOL/L — SIGNIFICANT CHANGE UP (ref 3.5–5.3)
POTASSIUM SERPL-MCNC: 4.6 MMOL/L — SIGNIFICANT CHANGE UP (ref 3.5–5.3)
POTASSIUM SERPL-SCNC: 4.4 MMOL/L — SIGNIFICANT CHANGE UP (ref 3.5–5.3)
POTASSIUM SERPL-SCNC: 4.6 MMOL/L — SIGNIFICANT CHANGE UP (ref 3.5–5.3)
PROT SERPL-MCNC: 5.9 G/DL — LOW (ref 6–8.3)
PROTHROM AB SERPL-ACNC: 14.4 SEC — HIGH (ref 9.9–13.4)
RBC # BLD: 2.23 M/UL — LOW (ref 4.2–5.8)
RBC # BLD: 2.48 M/UL — LOW (ref 4.2–5.8)
RBC # FLD: 16 % — HIGH (ref 10.3–14.5)
RBC # FLD: 16.2 % — HIGH (ref 10.3–14.5)
SODIUM SERPL-SCNC: 135 MMOL/L — SIGNIFICANT CHANGE UP (ref 135–145)
SODIUM SERPL-SCNC: 137 MMOL/L — SIGNIFICANT CHANGE UP (ref 135–145)
SPECIMEN SOURCE: SIGNIFICANT CHANGE UP
TIBC SERPL-MCNC: 158 UG/DL — LOW (ref 220–430)
TRIGL SERPL-MCNC: 52 MG/DL — SIGNIFICANT CHANGE UP
UIBC SERPL-MCNC: 80 UG/DL — LOW (ref 110–370)
VIT B12 SERPL-MCNC: 1047 PG/ML — SIGNIFICANT CHANGE UP (ref 232–1245)
WBC # BLD: 2.58 K/UL — LOW (ref 3.8–10.5)
WBC # BLD: 2.8 K/UL — LOW (ref 3.8–10.5)
WBC # FLD AUTO: 2.58 K/UL — LOW (ref 3.8–10.5)
WBC # FLD AUTO: 2.8 K/UL — LOW (ref 3.8–10.5)

## 2025-08-26 PROCEDURE — 85730 THROMBOPLASTIN TIME PARTIAL: CPT

## 2025-08-26 PROCEDURE — 93005 ELECTROCARDIOGRAM TRACING: CPT

## 2025-08-26 PROCEDURE — 86850 RBC ANTIBODY SCREEN: CPT

## 2025-08-26 PROCEDURE — 85610 PROTHROMBIN TIME: CPT

## 2025-08-26 PROCEDURE — 73552 X-RAY EXAM OF FEMUR 2/>: CPT

## 2025-08-26 PROCEDURE — 73030 X-RAY EXAM OF SHOULDER: CPT

## 2025-08-26 PROCEDURE — 82746 ASSAY OF FOLIC ACID SERUM: CPT

## 2025-08-26 PROCEDURE — 80074 ACUTE HEPATITIS PANEL: CPT

## 2025-08-26 PROCEDURE — 83735 ASSAY OF MAGNESIUM: CPT

## 2025-08-26 PROCEDURE — 82607 VITAMIN B-12: CPT

## 2025-08-26 PROCEDURE — 72192 CT PELVIS W/O DYE: CPT

## 2025-08-26 PROCEDURE — 99233 SBSQ HOSP IP/OBS HIGH 50: CPT

## 2025-08-26 PROCEDURE — 82728 ASSAY OF FERRITIN: CPT

## 2025-08-26 PROCEDURE — 36415 COLL VENOUS BLD VENIPUNCTURE: CPT

## 2025-08-26 PROCEDURE — 85027 COMPLETE CBC AUTOMATED: CPT

## 2025-08-26 PROCEDURE — 80053 COMPREHEN METABOLIC PANEL: CPT

## 2025-08-26 PROCEDURE — 84100 ASSAY OF PHOSPHORUS: CPT

## 2025-08-26 PROCEDURE — 99223 1ST HOSP IP/OBS HIGH 75: CPT

## 2025-08-26 PROCEDURE — 73080 X-RAY EXAM OF ELBOW: CPT

## 2025-08-26 PROCEDURE — 86900 BLOOD TYPING SEROLOGIC ABO: CPT

## 2025-08-26 PROCEDURE — 70450 CT HEAD/BRAIN W/O DYE: CPT

## 2025-08-26 PROCEDURE — 80048 BASIC METABOLIC PNL TOTAL CA: CPT

## 2025-08-26 PROCEDURE — 86901 BLOOD TYPING SEROLOGIC RH(D): CPT

## 2025-08-26 PROCEDURE — 73721 MRI JNT OF LWR EXTRE W/O DYE: CPT

## 2025-08-26 PROCEDURE — 83540 ASSAY OF IRON: CPT

## 2025-08-26 PROCEDURE — 73721 MRI JNT OF LWR EXTRE W/O DYE: CPT | Mod: 26,RT

## 2025-08-26 PROCEDURE — 76000 FLUOROSCOPY <1 HR PHYS/QHP: CPT

## 2025-08-26 PROCEDURE — 83036 HEMOGLOBIN GLYCOSYLATED A1C: CPT

## 2025-08-26 PROCEDURE — 73562 X-RAY EXAM OF KNEE 3: CPT

## 2025-08-26 PROCEDURE — 71045 X-RAY EXAM CHEST 1 VIEW: CPT

## 2025-08-26 PROCEDURE — 85025 COMPLETE CBC W/AUTO DIFF WBC: CPT

## 2025-08-26 PROCEDURE — 73502 X-RAY EXAM HIP UNI 2-3 VIEWS: CPT

## 2025-08-26 PROCEDURE — 76376 3D RENDER W/INTRP POSTPROCES: CPT

## 2025-08-26 PROCEDURE — 87070 CULTURE OTHR SPECIMN AEROBIC: CPT

## 2025-08-26 PROCEDURE — 83550 IRON BINDING TEST: CPT

## 2025-08-26 PROCEDURE — 73060 X-RAY EXAM OF HUMERUS: CPT

## 2025-08-26 PROCEDURE — 80061 LIPID PANEL: CPT

## 2025-08-26 DEVICE — PIN ORTHO PRECISION TAPER 3.9X450MM: Type: IMPLANTABLE DEVICE | Site: RIGHT | Status: FUNCTIONAL

## 2025-08-26 DEVICE — SCREW LOKG 5X37.5MM: Type: IMPLANTABLE DEVICE | Site: RIGHT | Status: FUNCTIONAL

## 2025-08-26 DEVICE — K-WIRE STRYKER 3.2MM X 450MM: Type: IMPLANTABLE DEVICE | Site: RIGHT | Status: FUNCTIONAL

## 2025-08-26 DEVICE — NAIL INTRAMED TROCHANTER 125 DEG 10X170MM: Type: IMPLANTABLE DEVICE | Site: RIGHT | Status: FUNCTIONAL

## 2025-08-26 DEVICE — SCREW LAG GAMMA 10.5X100 MM: Type: IMPLANTABLE DEVICE | Site: RIGHT | Status: FUNCTIONAL

## 2025-08-26 RX ORDER — PIPERACILLIN-TAZO-DEXTROSE,ISO 3.375G/5
3.38 IV SOLUTION, PIGGYBACK PREMIX FROZEN(ML) INTRAVENOUS EVERY 8 HOURS
Refills: 0 | Status: DISCONTINUED | OUTPATIENT
Start: 2025-08-26 | End: 2025-08-26

## 2025-08-26 RX ORDER — B1/B2/B3/B5/B6/B12/VIT C/FOLIC 500-0.5 MG
1 TABLET ORAL DAILY
Refills: 0 | Status: DISCONTINUED | OUTPATIENT
Start: 2025-08-29 | End: 2025-09-01

## 2025-08-26 RX ORDER — HYDROMORPHONE/SOD CHLOR,ISO/PF 2 MG/10 ML
1 SYRINGE (ML) INJECTION EVERY 6 HOURS
Refills: 0 | Status: DISCONTINUED | OUTPATIENT
Start: 2025-08-26 | End: 2025-08-26

## 2025-08-26 RX ORDER — ACETAMINOPHEN 500 MG/5ML
650 LIQUID (ML) ORAL EVERY 6 HOURS
Refills: 0 | Status: DISCONTINUED | OUTPATIENT
Start: 2025-08-26 | End: 2025-08-26

## 2025-08-26 RX ORDER — CEFAZOLIN SODIUM IN 0.9 % NACL 3 G/100 ML
2000 INTRAVENOUS SOLUTION, PIGGYBACK (ML) INTRAVENOUS EVERY 8 HOURS
Refills: 0 | Status: COMPLETED | OUTPATIENT
Start: 2025-08-26 | End: 2025-08-27

## 2025-08-26 RX ORDER — ACETAMINOPHEN 500 MG/5ML
650 LIQUID (ML) ORAL EVERY 6 HOURS
Refills: 0 | Status: DISCONTINUED | OUTPATIENT
Start: 2025-08-26 | End: 2025-09-01

## 2025-08-26 RX ORDER — PIPERACILLIN-TAZO-DEXTROSE,ISO 3.375G/5
3.38 IV SOLUTION, PIGGYBACK PREMIX FROZEN(ML) INTRAVENOUS ONCE
Refills: 0 | Status: COMPLETED | OUTPATIENT
Start: 2025-08-26 | End: 2025-08-26

## 2025-08-26 RX ORDER — CEFAZOLIN SODIUM IN 0.9 % NACL 3 G/100 ML
2000 INTRAVENOUS SOLUTION, PIGGYBACK (ML) INTRAVENOUS ONCE
Refills: 0 | Status: DISCONTINUED | OUTPATIENT
Start: 2025-08-26 | End: 2025-08-26

## 2025-08-26 RX ORDER — FOLIC ACID 1 MG/1
1 TABLET ORAL DAILY
Refills: 0 | Status: DISCONTINUED | OUTPATIENT
Start: 2025-08-29 | End: 2025-09-01

## 2025-08-26 RX ORDER — ONDANSETRON HCL/PF 4 MG/2 ML
4 VIAL (ML) INJECTION EVERY 8 HOURS
Refills: 0 | Status: DISCONTINUED | OUTPATIENT
Start: 2025-08-26 | End: 2025-08-26

## 2025-08-26 RX ORDER — OXYCODONE HYDROCHLORIDE 30 MG/1
10 TABLET ORAL EVERY 6 HOURS
Refills: 0 | Status: DISCONTINUED | OUTPATIENT
Start: 2025-08-26 | End: 2025-09-01

## 2025-08-26 RX ORDER — POLYETHYLENE GLYCOL 3350 17 G/17G
17 POWDER, FOR SOLUTION ORAL AT BEDTIME
Refills: 0 | Status: DISCONTINUED | OUTPATIENT
Start: 2025-08-26 | End: 2025-09-01

## 2025-08-26 RX ORDER — SODIUM CHLORIDE 9 G/1000ML
1000 INJECTION, SOLUTION INTRAVENOUS
Refills: 0 | Status: DISCONTINUED | OUTPATIENT
Start: 2025-08-26 | End: 2025-08-26

## 2025-08-26 RX ORDER — POTASSIUM PHOSPHATE, MONOBASIC POTASSIUM PHOSPHATE, DIBASIC INJECTION, 236; 224 MG/ML; MG/ML
15 SOLUTION, CONCENTRATE INTRAVENOUS ONCE
Refills: 0 | Status: DISCONTINUED | OUTPATIENT
Start: 2025-08-26 | End: 2025-08-29

## 2025-08-26 RX ORDER — FOLIC ACID 1 MG/1
1 TABLET ORAL DAILY
Refills: 0 | Status: DISCONTINUED | OUTPATIENT
Start: 2025-08-26 | End: 2025-08-26

## 2025-08-26 RX ORDER — DIAZEPAM 5 MG/1
10 TABLET ORAL
Refills: 0 | Status: DISCONTINUED | OUTPATIENT
Start: 2025-08-26 | End: 2025-08-26

## 2025-08-26 RX ORDER — SENNA 187 MG
2 TABLET ORAL AT BEDTIME
Refills: 0 | Status: DISCONTINUED | OUTPATIENT
Start: 2025-08-26 | End: 2025-09-01

## 2025-08-26 RX ORDER — MELATONIN 5 MG
3 TABLET ORAL AT BEDTIME
Refills: 0 | Status: DISCONTINUED | OUTPATIENT
Start: 2025-08-26 | End: 2025-08-26

## 2025-08-26 RX ORDER — OXYCODONE HYDROCHLORIDE 30 MG/1
5 TABLET ORAL ONCE
Refills: 0 | Status: DISCONTINUED | OUTPATIENT
Start: 2025-08-26 | End: 2025-08-26

## 2025-08-26 RX ORDER — B1/B2/B3/B5/B6/B12/VIT C/FOLIC 500-0.5 MG
1 TABLET ORAL DAILY
Refills: 0 | Status: DISCONTINUED | OUTPATIENT
Start: 2025-08-26 | End: 2025-08-26

## 2025-08-26 RX ORDER — DIAZEPAM 5 MG/1
10 TABLET ORAL
Refills: 0 | Status: DISCONTINUED | OUTPATIENT
Start: 2025-08-26 | End: 2025-08-29

## 2025-08-26 RX ORDER — ASPIRIN 325 MG
81 TABLET ORAL
Refills: 0 | Status: DISCONTINUED | OUTPATIENT
Start: 2025-08-27 | End: 2025-09-01

## 2025-08-26 RX ORDER — MAGNESIUM HYDROXIDE 400 MG/5ML
30 SUSPENSION ORAL DAILY
Refills: 0 | Status: DISCONTINUED | OUTPATIENT
Start: 2025-08-26 | End: 2025-09-01

## 2025-08-26 RX ORDER — MELATONIN 5 MG
3 TABLET ORAL AT BEDTIME
Refills: 0 | Status: DISCONTINUED | OUTPATIENT
Start: 2025-08-29 | End: 2025-09-01

## 2025-08-26 RX ORDER — OXYCODONE HYDROCHLORIDE 30 MG/1
5 TABLET ORAL EVERY 6 HOURS
Refills: 0 | Status: DISCONTINUED | OUTPATIENT
Start: 2025-08-26 | End: 2025-09-01

## 2025-08-26 RX ORDER — TRAMADOL HYDROCHLORIDE 50 MG/1
50 TABLET, FILM COATED ORAL EVERY 6 HOURS
Refills: 0 | Status: DISCONTINUED | OUTPATIENT
Start: 2025-08-26 | End: 2025-09-01

## 2025-08-26 RX ORDER — ONDANSETRON HCL/PF 4 MG/2 ML
4 VIAL (ML) INJECTION ONCE
Refills: 0 | Status: DISCONTINUED | OUTPATIENT
Start: 2025-08-26 | End: 2025-08-26

## 2025-08-26 RX ORDER — PIPERACILLIN-TAZO-DEXTROSE,ISO 3.375G/5
3.38 IV SOLUTION, PIGGYBACK PREMIX FROZEN(ML) INTRAVENOUS EVERY 8 HOURS
Refills: 0 | Status: DISCONTINUED | OUTPATIENT
Start: 2025-08-26 | End: 2025-08-27

## 2025-08-26 RX ORDER — MAGNESIUM, ALUMINUM HYDROXIDE 200-200 MG
30 TABLET,CHEWABLE ORAL EVERY 4 HOURS
Refills: 0 | Status: DISCONTINUED | OUTPATIENT
Start: 2025-08-26 | End: 2025-08-26

## 2025-08-26 RX ORDER — HYDROMORPHONE/SOD CHLOR,ISO/PF 2 MG/10 ML
0.5 SYRINGE (ML) INJECTION
Refills: 0 | Status: DISCONTINUED | OUTPATIENT
Start: 2025-08-26 | End: 2025-08-26

## 2025-08-26 RX ORDER — ONDANSETRON HCL/PF 4 MG/2 ML
4 VIAL (ML) INJECTION EVERY 8 HOURS
Refills: 0 | Status: DISCONTINUED | OUTPATIENT
Start: 2025-08-29 | End: 2025-09-01

## 2025-08-26 RX ORDER — HEPARIN SODIUM 1000 [USP'U]/ML
5000 INJECTION INTRAVENOUS; SUBCUTANEOUS EVERY 8 HOURS
Refills: 0 | Status: DISCONTINUED | OUTPATIENT
Start: 2025-08-26 | End: 2025-08-26

## 2025-08-26 RX ORDER — PIPERACILLIN-TAZO-DEXTROSE,ISO 3.375G/5
3.38 IV SOLUTION, PIGGYBACK PREMIX FROZEN(ML) INTRAVENOUS ONCE
Refills: 0 | Status: DISCONTINUED | OUTPATIENT
Start: 2025-08-26 | End: 2025-08-26

## 2025-08-26 RX ADMIN — Medication 25 GRAM(S): at 14:41

## 2025-08-26 RX ADMIN — Medication 1 MILLIGRAM(S): at 05:46

## 2025-08-26 RX ADMIN — Medication 1 MILLIGRAM(S): at 06:01

## 2025-08-26 RX ADMIN — Medication 200 GRAM(S): at 05:46

## 2025-08-26 RX ADMIN — Medication 25 GRAM(S): at 23:00

## 2025-08-26 RX ADMIN — CEPHALEXIN 500 MILLIGRAM(S): 250 CAPSULE ORAL at 00:39

## 2025-08-26 RX ADMIN — Medication 100 MILLIGRAM(S): at 22:24

## 2025-08-26 RX ADMIN — SODIUM CHLORIDE 75 MILLILITER(S): 9 INJECTION, SOLUTION INTRAVENOUS at 15:15

## 2025-08-26 RX ADMIN — Medication 70 MILLILITER(S): at 01:19

## 2025-08-27 LAB
ALBUMIN SERPL ELPH-MCNC: 2 G/DL — LOW (ref 3.3–5)
ALP SERPL-CCNC: 121 U/L — HIGH (ref 40–120)
ALT FLD-CCNC: 14 U/L — SIGNIFICANT CHANGE UP (ref 12–78)
ANION GAP SERPL CALC-SCNC: 4 MMOL/L — LOW (ref 5–17)
AST SERPL-CCNC: 30 U/L — SIGNIFICANT CHANGE UP (ref 15–37)
BILIRUB SERPL-MCNC: 1 MG/DL — SIGNIFICANT CHANGE UP (ref 0.2–1.2)
BUN SERPL-MCNC: 20 MG/DL — SIGNIFICANT CHANGE UP (ref 7–23)
CALCIUM SERPL-MCNC: 9 MG/DL — SIGNIFICANT CHANGE UP (ref 8.5–10.1)
CHLORIDE SERPL-SCNC: 105 MMOL/L — SIGNIFICANT CHANGE UP (ref 96–108)
CO2 SERPL-SCNC: 27 MMOL/L — SIGNIFICANT CHANGE UP (ref 22–31)
CREAT SERPL-MCNC: 1.1 MG/DL — SIGNIFICANT CHANGE UP (ref 0.5–1.3)
EGFR: 73 ML/MIN/1.73M2 — SIGNIFICANT CHANGE UP
EGFR: 73 ML/MIN/1.73M2 — SIGNIFICANT CHANGE UP
GLUCOSE BLDC GLUCOMTR-MCNC: 165 MG/DL — HIGH (ref 70–99)
GLUCOSE BLDC GLUCOMTR-MCNC: 193 MG/DL — HIGH (ref 70–99)
GLUCOSE SERPL-MCNC: 150 MG/DL — HIGH (ref 70–99)
GRAM STN FLD: ABNORMAL
HAV IGM SER-ACNC: SIGNIFICANT CHANGE UP
HBV CORE IGM SER-ACNC: SIGNIFICANT CHANGE UP
HBV SURFACE AG SER-ACNC: SIGNIFICANT CHANGE UP
HCT VFR BLD CALC: 19.3 % — CRITICAL LOW (ref 39–50)
HCT VFR BLD CALC: 19.6 % — CRITICAL LOW (ref 39–50)
HCT VFR BLD CALC: 23.4 % — LOW (ref 39–50)
HCV AB S/CO SERPL IA: 0.11 S/CO — SIGNIFICANT CHANGE UP (ref 0–0.79)
HCV AB SERPL-IMP: SIGNIFICANT CHANGE UP
HGB BLD-MCNC: 6.9 G/DL — CRITICAL LOW (ref 13–17)
HGB BLD-MCNC: 7 G/DL — CRITICAL LOW (ref 13–17)
HGB BLD-MCNC: 8.3 G/DL — LOW (ref 13–17)
IMMATURE PLATELET FRACTION #: 1.5 K/UL — LOW (ref 3.9–12.5)
IMMATURE PLATELET FRACTION %: 2 % — SIGNIFICANT CHANGE UP (ref 1.6–7.1)
MAGNESIUM SERPL-MCNC: 1.8 MG/DL — SIGNIFICANT CHANGE UP (ref 1.6–2.6)
MCHC RBC-ENTMCNC: 35.8 G/DL — SIGNIFICANT CHANGE UP (ref 32–36)
MCHC RBC-ENTMCNC: 40.4 PG — HIGH (ref 27–34)
MCV RBC AUTO: 112.9 FL — HIGH (ref 80–100)
NRBC # BLD AUTO: 0 K/UL — SIGNIFICANT CHANGE UP (ref 0–0)
NRBC # FLD: 0 K/UL — SIGNIFICANT CHANGE UP (ref 0–0)
NRBC BLD AUTO-RTO: 0 /100 WBCS — SIGNIFICANT CHANGE UP (ref 0–0)
PHOSPHATE SERPL-MCNC: 2.7 MG/DL — SIGNIFICANT CHANGE UP (ref 2.5–4.5)
PLATELET # BLD AUTO: 73 K/UL — LOW (ref 150–400)
PMV BLD: 9.8 FL — SIGNIFICANT CHANGE UP (ref 7–13)
POTASSIUM SERPL-MCNC: 5.3 MMOL/L — SIGNIFICANT CHANGE UP (ref 3.5–5.3)
POTASSIUM SERPL-SCNC: 5.3 MMOL/L — SIGNIFICANT CHANGE UP (ref 3.5–5.3)
PROT SERPL-MCNC: 5 G/DL — LOW (ref 6–8.3)
RBC # BLD: 1.71 M/UL — LOW (ref 4.2–5.8)
RBC # FLD: 15.9 % — HIGH (ref 10.3–14.5)
SODIUM SERPL-SCNC: 136 MMOL/L — SIGNIFICANT CHANGE UP (ref 135–145)
WBC # BLD: 4.72 K/UL — SIGNIFICANT CHANGE UP (ref 3.8–10.5)
WBC # FLD AUTO: 4.72 K/UL — SIGNIFICANT CHANGE UP (ref 3.8–10.5)

## 2025-08-27 PROCEDURE — 99233 SBSQ HOSP IP/OBS HIGH 50: CPT

## 2025-08-27 PROCEDURE — 99232 SBSQ HOSP IP/OBS MODERATE 35: CPT

## 2025-08-27 RX ORDER — INSULIN LISPRO 100 U/ML
INJECTION, SOLUTION INTRAVENOUS; SUBCUTANEOUS
Refills: 0 | Status: DISCONTINUED | OUTPATIENT
Start: 2025-08-27 | End: 2025-09-01

## 2025-08-27 RX ORDER — DEXTROSE 50 % IN WATER 50 %
25 SYRINGE (ML) INTRAVENOUS ONCE
Refills: 0 | Status: DISCONTINUED | OUTPATIENT
Start: 2025-08-27 | End: 2025-09-01

## 2025-08-27 RX ORDER — DEXTROSE 50 % IN WATER 50 %
12.5 SYRINGE (ML) INTRAVENOUS ONCE
Refills: 0 | Status: DISCONTINUED | OUTPATIENT
Start: 2025-08-27 | End: 2025-09-01

## 2025-08-27 RX ORDER — GLUCAGON 3 MG/1
1 POWDER NASAL ONCE
Refills: 0 | Status: DISCONTINUED | OUTPATIENT
Start: 2025-08-27 | End: 2025-09-01

## 2025-08-27 RX ORDER — INSULIN LISPRO 100 U/ML
INJECTION, SOLUTION INTRAVENOUS; SUBCUTANEOUS AT BEDTIME
Refills: 0 | Status: DISCONTINUED | OUTPATIENT
Start: 2025-08-27 | End: 2025-09-01

## 2025-08-27 RX ORDER — SODIUM CHLORIDE 9 G/1000ML
1000 INJECTION, SOLUTION INTRAVENOUS
Refills: 0 | Status: DISCONTINUED | OUTPATIENT
Start: 2025-08-27 | End: 2025-09-01

## 2025-08-27 RX ORDER — DEXTROSE 50 % IN WATER 50 %
15 SYRINGE (ML) INTRAVENOUS ONCE
Refills: 0 | Status: DISCONTINUED | OUTPATIENT
Start: 2025-08-27 | End: 2025-09-01

## 2025-08-27 RX ORDER — CEFAZOLIN SODIUM IN 0.9 % NACL 3 G/100 ML
1000 INTRAVENOUS SOLUTION, PIGGYBACK (ML) INTRAVENOUS EVERY 8 HOURS
Refills: 0 | Status: DISCONTINUED | OUTPATIENT
Start: 2025-08-27 | End: 2025-09-01

## 2025-08-27 RX ADMIN — Medication 25 GRAM(S): at 06:42

## 2025-08-27 RX ADMIN — Medication 100 MILLIGRAM(S): at 16:08

## 2025-08-27 RX ADMIN — OXYCODONE HYDROCHLORIDE 10 MILLIGRAM(S): 30 TABLET ORAL at 16:30

## 2025-08-27 RX ADMIN — OXYCODONE HYDROCHLORIDE 10 MILLIGRAM(S): 30 TABLET ORAL at 23:30

## 2025-08-27 RX ADMIN — INSULIN LISPRO 1: 100 INJECTION, SOLUTION INTRAVENOUS; SUBCUTANEOUS at 17:04

## 2025-08-27 RX ADMIN — Medication 650 MILLIGRAM(S): at 06:01

## 2025-08-27 RX ADMIN — Medication 650 MILLIGRAM(S): at 23:50

## 2025-08-27 RX ADMIN — Medication 100 MILLIGRAM(S): at 21:19

## 2025-08-27 RX ADMIN — Medication 650 MILLIGRAM(S): at 18:06

## 2025-08-27 RX ADMIN — OXYCODONE HYDROCHLORIDE 10 MILLIGRAM(S): 30 TABLET ORAL at 22:30

## 2025-08-27 RX ADMIN — Medication 650 MILLIGRAM(S): at 12:30

## 2025-08-27 RX ADMIN — Medication 81 MILLIGRAM(S): at 18:06

## 2025-08-27 RX ADMIN — Medication 100 MILLIGRAM(S): at 06:01

## 2025-08-27 RX ADMIN — OXYCODONE HYDROCHLORIDE 10 MILLIGRAM(S): 30 TABLET ORAL at 15:54

## 2025-08-27 RX ADMIN — Medication 650 MILLIGRAM(S): at 11:49

## 2025-08-27 RX ADMIN — OXYCODONE HYDROCHLORIDE 10 MILLIGRAM(S): 30 TABLET ORAL at 10:00

## 2025-08-27 RX ADMIN — Medication 81 MILLIGRAM(S): at 06:01

## 2025-08-27 RX ADMIN — OXYCODONE HYDROCHLORIDE 10 MILLIGRAM(S): 30 TABLET ORAL at 09:04

## 2025-08-28 DIAGNOSIS — I25.10 ATHEROSCLEROTIC HEART DISEASE OF NATIVE CORONARY ARTERY WITHOUT ANGINA PECTORIS: ICD-10-CM

## 2025-08-28 DIAGNOSIS — E11.9 TYPE 2 DIABETES MELLITUS WITHOUT COMPLICATIONS: ICD-10-CM

## 2025-08-28 LAB
-  CLINDAMYCIN: SIGNIFICANT CHANGE UP
-  ERYTHROMYCIN: SIGNIFICANT CHANGE UP
-  GENTAMICIN: SIGNIFICANT CHANGE UP
-  OXACILLIN: SIGNIFICANT CHANGE UP
-  PENICILLIN: SIGNIFICANT CHANGE UP
-  RIFAMPIN: SIGNIFICANT CHANGE UP
-  TETRACYCLINE: SIGNIFICANT CHANGE UP
-  TRIMETHOPRIM/SULFAMETHOXAZOLE: SIGNIFICANT CHANGE UP
-  VANCOMYCIN: SIGNIFICANT CHANGE UP
ALBUMIN SERPL ELPH-MCNC: 2.3 G/DL — LOW (ref 3.3–5)
ALP SERPL-CCNC: 155 U/L — HIGH (ref 40–120)
ALT FLD-CCNC: 18 U/L — SIGNIFICANT CHANGE UP (ref 12–78)
ANION GAP SERPL CALC-SCNC: 4 MMOL/L — LOW (ref 5–17)
AST SERPL-CCNC: 44 U/L — HIGH (ref 15–37)
BILIRUB SERPL-MCNC: 1.3 MG/DL — HIGH (ref 0.2–1.2)
BUN SERPL-MCNC: 25 MG/DL — HIGH (ref 7–23)
CALCIUM SERPL-MCNC: 9.8 MG/DL — SIGNIFICANT CHANGE UP (ref 8.5–10.1)
CHLORIDE SERPL-SCNC: 103 MMOL/L — SIGNIFICANT CHANGE UP (ref 96–108)
CO2 SERPL-SCNC: 29 MMOL/L — SIGNIFICANT CHANGE UP (ref 22–31)
CREAT SERPL-MCNC: 0.95 MG/DL — SIGNIFICANT CHANGE UP (ref 0.5–1.3)
CRP SERPL-MCNC: 28 MG/L — HIGH
EGFR: 87 ML/MIN/1.73M2 — SIGNIFICANT CHANGE UP
EGFR: 87 ML/MIN/1.73M2 — SIGNIFICANT CHANGE UP
ERYTHROCYTE [SEDIMENTATION RATE] IN BLOOD: 5 MM/HR — SIGNIFICANT CHANGE UP (ref 0–15)
GLUCOSE BLDC GLUCOMTR-MCNC: 116 MG/DL — HIGH (ref 70–99)
GLUCOSE BLDC GLUCOMTR-MCNC: 134 MG/DL — HIGH (ref 70–99)
GLUCOSE BLDC GLUCOMTR-MCNC: 145 MG/DL — HIGH (ref 70–99)
GLUCOSE BLDC GLUCOMTR-MCNC: 161 MG/DL — HIGH (ref 70–99)
GLUCOSE SERPL-MCNC: 122 MG/DL — HIGH (ref 70–99)
HCT VFR BLD CALC: 27.9 % — LOW (ref 39–50)
HGB BLD-MCNC: 9.8 G/DL — LOW (ref 13–17)
MCHC RBC-ENTMCNC: 35.1 G/DL — SIGNIFICANT CHANGE UP (ref 32–36)
MCHC RBC-ENTMCNC: 37.3 PG — HIGH (ref 27–34)
MCV RBC AUTO: 106.1 FL — HIGH (ref 80–100)
METHOD TYPE: SIGNIFICANT CHANGE UP
NRBC # BLD AUTO: 0 K/UL — SIGNIFICANT CHANGE UP (ref 0–0)
NRBC # FLD: 0 K/UL — SIGNIFICANT CHANGE UP (ref 0–0)
NRBC BLD AUTO-RTO: 0 /100 WBCS — SIGNIFICANT CHANGE UP (ref 0–0)
PLATELET # BLD AUTO: 109 K/UL — LOW (ref 150–400)
PMV BLD: 9.5 FL — SIGNIFICANT CHANGE UP (ref 7–13)
POTASSIUM SERPL-MCNC: 4.8 MMOL/L — SIGNIFICANT CHANGE UP (ref 3.5–5.3)
POTASSIUM SERPL-SCNC: 4.8 MMOL/L — SIGNIFICANT CHANGE UP (ref 3.5–5.3)
PROT SERPL-MCNC: 5.9 G/DL — LOW (ref 6–8.3)
RBC # BLD: 2.63 M/UL — LOW (ref 4.2–5.8)
RBC # FLD: 24.6 % — HIGH (ref 10.3–14.5)
SODIUM SERPL-SCNC: 136 MMOL/L — SIGNIFICANT CHANGE UP (ref 135–145)
WBC # BLD: 9.91 K/UL — SIGNIFICANT CHANGE UP (ref 3.8–10.5)
WBC # FLD AUTO: 9.91 K/UL — SIGNIFICANT CHANGE UP (ref 3.8–10.5)

## 2025-08-28 PROCEDURE — 99232 SBSQ HOSP IP/OBS MODERATE 35: CPT

## 2025-08-28 PROCEDURE — 99233 SBSQ HOSP IP/OBS HIGH 50: CPT

## 2025-08-28 RX ADMIN — OXYCODONE HYDROCHLORIDE 10 MILLIGRAM(S): 30 TABLET ORAL at 18:45

## 2025-08-28 RX ADMIN — OXYCODONE HYDROCHLORIDE 10 MILLIGRAM(S): 30 TABLET ORAL at 10:00

## 2025-08-28 RX ADMIN — INSULIN LISPRO 1: 100 INJECTION, SOLUTION INTRAVENOUS; SUBCUTANEOUS at 12:29

## 2025-08-28 RX ADMIN — Medication 81 MILLIGRAM(S): at 05:49

## 2025-08-28 RX ADMIN — OXYCODONE HYDROCHLORIDE 10 MILLIGRAM(S): 30 TABLET ORAL at 09:12

## 2025-08-28 RX ADMIN — Medication 100 MILLIGRAM(S): at 21:44

## 2025-08-28 RX ADMIN — Medication 100 MILLIGRAM(S): at 05:49

## 2025-08-28 RX ADMIN — Medication 100 MILLIGRAM(S): at 16:04

## 2025-08-28 RX ADMIN — Medication 81 MILLIGRAM(S): at 18:45

## 2025-08-28 RX ADMIN — Medication 2 TABLET(S): at 21:44

## 2025-08-28 RX ADMIN — Medication 650 MILLIGRAM(S): at 05:49

## 2025-08-29 LAB
ALBUMIN SERPL ELPH-MCNC: 2.1 G/DL — LOW (ref 3.3–5)
ALP SERPL-CCNC: 157 U/L — HIGH (ref 40–120)
ALT FLD-CCNC: 25 U/L — SIGNIFICANT CHANGE UP (ref 12–78)
ANION GAP SERPL CALC-SCNC: 2 MMOL/L — LOW (ref 5–17)
AST SERPL-CCNC: 63 U/L — HIGH (ref 15–37)
BILIRUB SERPL-MCNC: 1.3 MG/DL — HIGH (ref 0.2–1.2)
BLD GP AB SCN SERPL QL: SIGNIFICANT CHANGE UP
BUN SERPL-MCNC: 18 MG/DL — SIGNIFICANT CHANGE UP (ref 7–23)
CALCIUM SERPL-MCNC: 9.4 MG/DL — SIGNIFICANT CHANGE UP (ref 8.5–10.1)
CHLORIDE SERPL-SCNC: 104 MMOL/L — SIGNIFICANT CHANGE UP (ref 96–108)
CO2 SERPL-SCNC: 28 MMOL/L — SIGNIFICANT CHANGE UP (ref 22–31)
CREAT SERPL-MCNC: 0.8 MG/DL — SIGNIFICANT CHANGE UP (ref 0.5–1.3)
EGFR: 96 ML/MIN/1.73M2 — SIGNIFICANT CHANGE UP
EGFR: 96 ML/MIN/1.73M2 — SIGNIFICANT CHANGE UP
GLUCOSE BLDC GLUCOMTR-MCNC: 134 MG/DL — HIGH (ref 70–99)
GLUCOSE BLDC GLUCOMTR-MCNC: 135 MG/DL — HIGH (ref 70–99)
GLUCOSE BLDC GLUCOMTR-MCNC: 162 MG/DL — HIGH (ref 70–99)
GLUCOSE BLDC GLUCOMTR-MCNC: 172 MG/DL — HIGH (ref 70–99)
GLUCOSE SERPL-MCNC: 184 MG/DL — HIGH (ref 70–99)
HCT VFR BLD CALC: 23.4 % — LOW (ref 39–50)
HCT VFR BLD CALC: 24.1 % — LOW (ref 39–50)
HGB BLD-MCNC: 8.1 G/DL — LOW (ref 13–17)
HGB BLD-MCNC: 8.3 G/DL — LOW (ref 13–17)
IMMATURE PLATELET FRACTION #: 1 K/UL — LOW (ref 3.9–12.5)
IMMATURE PLATELET FRACTION #: 1.2 K/UL — LOW (ref 3.9–12.5)
IMMATURE PLATELET FRACTION %: 1 % — LOW (ref 1.6–7.1)
IMMATURE PLATELET FRACTION %: 1.4 % — LOW (ref 1.6–7.1)
MAGNESIUM SERPL-MCNC: 1.9 MG/DL — SIGNIFICANT CHANGE UP (ref 1.6–2.6)
MCHC RBC-ENTMCNC: 33.6 G/DL — SIGNIFICANT CHANGE UP (ref 32–36)
MCHC RBC-ENTMCNC: 35.5 G/DL — SIGNIFICANT CHANGE UP (ref 32–36)
MCHC RBC-ENTMCNC: 37.9 PG — HIGH (ref 27–34)
MCHC RBC-ENTMCNC: 38.1 PG — HIGH (ref 27–34)
MCV RBC AUTO: 107.3 FL — HIGH (ref 80–100)
MCV RBC AUTO: 112.6 FL — HIGH (ref 80–100)
MRSA PCR RESULT.: SIGNIFICANT CHANGE UP
NRBC # BLD AUTO: 0 K/UL — SIGNIFICANT CHANGE UP (ref 0–0)
NRBC # BLD AUTO: 0 K/UL — SIGNIFICANT CHANGE UP (ref 0–0)
NRBC # FLD: 0 K/UL — SIGNIFICANT CHANGE UP (ref 0–0)
NRBC # FLD: 0 K/UL — SIGNIFICANT CHANGE UP (ref 0–0)
NRBC BLD AUTO-RTO: 0 /100 WBCS — SIGNIFICANT CHANGE UP (ref 0–0)
NRBC BLD AUTO-RTO: 0 /100 WBCS — SIGNIFICANT CHANGE UP (ref 0–0)
PHOSPHATE SERPL-MCNC: 2 MG/DL — LOW (ref 2.5–4.5)
PLATELET # BLD AUTO: 84 K/UL — LOW (ref 150–400)
PLATELET # BLD AUTO: 97 K/UL — LOW (ref 150–400)
PMV BLD: 9.2 FL — SIGNIFICANT CHANGE UP (ref 7–13)
PMV BLD: 9.9 FL — SIGNIFICANT CHANGE UP (ref 7–13)
POTASSIUM SERPL-MCNC: 3.9 MMOL/L — SIGNIFICANT CHANGE UP (ref 3.5–5.3)
POTASSIUM SERPL-SCNC: 3.9 MMOL/L — SIGNIFICANT CHANGE UP (ref 3.5–5.3)
PROT SERPL-MCNC: 5.4 G/DL — LOW (ref 6–8.3)
RBC # BLD: 2.14 M/UL — LOW (ref 4.2–5.8)
RBC # BLD: 2.18 M/UL — LOW (ref 4.2–5.8)
RBC # FLD: 23 % — HIGH (ref 10.3–14.5)
RBC # FLD: 23.3 % — HIGH (ref 10.3–14.5)
S AUREUS DNA NOSE QL NAA+PROBE: DETECTED
SODIUM SERPL-SCNC: 134 MMOL/L — LOW (ref 135–145)
WBC # BLD: 4.79 K/UL — SIGNIFICANT CHANGE UP (ref 3.8–10.5)
WBC # BLD: 5.12 K/UL — SIGNIFICANT CHANGE UP (ref 3.8–10.5)
WBC # FLD AUTO: 4.79 K/UL — SIGNIFICANT CHANGE UP (ref 3.8–10.5)
WBC # FLD AUTO: 5.12 K/UL — SIGNIFICANT CHANGE UP (ref 3.8–10.5)

## 2025-08-29 PROCEDURE — 70450 CT HEAD/BRAIN W/O DYE: CPT

## 2025-08-29 PROCEDURE — 80061 LIPID PANEL: CPT

## 2025-08-29 PROCEDURE — 73080 X-RAY EXAM OF ELBOW: CPT

## 2025-08-29 PROCEDURE — 85730 THROMBOPLASTIN TIME PARTIAL: CPT

## 2025-08-29 PROCEDURE — 76376 3D RENDER W/INTRP POSTPROCES: CPT

## 2025-08-29 PROCEDURE — 80048 BASIC METABOLIC PNL TOTAL CA: CPT

## 2025-08-29 PROCEDURE — 83550 IRON BINDING TEST: CPT

## 2025-08-29 PROCEDURE — 82962 GLUCOSE BLOOD TEST: CPT

## 2025-08-29 PROCEDURE — 86901 BLOOD TYPING SEROLOGIC RH(D): CPT

## 2025-08-29 PROCEDURE — 83036 HEMOGLOBIN GLYCOSYLATED A1C: CPT

## 2025-08-29 PROCEDURE — 93005 ELECTROCARDIOGRAM TRACING: CPT

## 2025-08-29 PROCEDURE — 82728 ASSAY OF FERRITIN: CPT

## 2025-08-29 PROCEDURE — 97530 THERAPEUTIC ACTIVITIES: CPT

## 2025-08-29 PROCEDURE — 97116 GAIT TRAINING THERAPY: CPT

## 2025-08-29 PROCEDURE — 87070 CULTURE OTHR SPECIMN AEROBIC: CPT

## 2025-08-29 PROCEDURE — 80053 COMPREHEN METABOLIC PANEL: CPT

## 2025-08-29 PROCEDURE — 85018 HEMOGLOBIN: CPT

## 2025-08-29 PROCEDURE — 99233 SBSQ HOSP IP/OBS HIGH 50: CPT

## 2025-08-29 PROCEDURE — 36415 COLL VENOUS BLD VENIPUNCTURE: CPT

## 2025-08-29 PROCEDURE — 85014 HEMATOCRIT: CPT

## 2025-08-29 PROCEDURE — 86850 RBC ANTIBODY SCREEN: CPT

## 2025-08-29 PROCEDURE — 87641 MR-STAPH DNA AMP PROBE: CPT

## 2025-08-29 PROCEDURE — 84100 ASSAY OF PHOSPHORUS: CPT

## 2025-08-29 PROCEDURE — 72192 CT PELVIS W/O DYE: CPT

## 2025-08-29 PROCEDURE — 97110 THERAPEUTIC EXERCISES: CPT

## 2025-08-29 PROCEDURE — 97535 SELF CARE MNGMENT TRAINING: CPT

## 2025-08-29 PROCEDURE — 73060 X-RAY EXAM OF HUMERUS: CPT

## 2025-08-29 PROCEDURE — P9040: CPT

## 2025-08-29 PROCEDURE — 73502 X-RAY EXAM HIP UNI 2-3 VIEWS: CPT

## 2025-08-29 PROCEDURE — 97166 OT EVAL MOD COMPLEX 45 MIN: CPT

## 2025-08-29 PROCEDURE — 82746 ASSAY OF FOLIC ACID SERUM: CPT

## 2025-08-29 PROCEDURE — 71045 X-RAY EXAM CHEST 1 VIEW: CPT

## 2025-08-29 PROCEDURE — 85027 COMPLETE CBC AUTOMATED: CPT

## 2025-08-29 PROCEDURE — 86900 BLOOD TYPING SEROLOGIC ABO: CPT

## 2025-08-29 PROCEDURE — 73030 X-RAY EXAM OF SHOULDER: CPT

## 2025-08-29 PROCEDURE — 99232 SBSQ HOSP IP/OBS MODERATE 35: CPT

## 2025-08-29 PROCEDURE — C1713: CPT

## 2025-08-29 PROCEDURE — 76000 FLUOROSCOPY <1 HR PHYS/QHP: CPT

## 2025-08-29 PROCEDURE — 73562 X-RAY EXAM OF KNEE 3: CPT

## 2025-08-29 PROCEDURE — 80074 ACUTE HEPATITIS PANEL: CPT

## 2025-08-29 PROCEDURE — 83540 ASSAY OF IRON: CPT

## 2025-08-29 PROCEDURE — 36430 TRANSFUSION BLD/BLD COMPNT: CPT

## 2025-08-29 PROCEDURE — 73721 MRI JNT OF LWR EXTRE W/O DYE: CPT

## 2025-08-29 PROCEDURE — 87186 SC STD MICRODIL/AGAR DIL: CPT

## 2025-08-29 PROCEDURE — 97162 PT EVAL MOD COMPLEX 30 MIN: CPT

## 2025-08-29 PROCEDURE — 82607 VITAMIN B-12: CPT

## 2025-08-29 PROCEDURE — 86140 C-REACTIVE PROTEIN: CPT

## 2025-08-29 PROCEDURE — 86923 COMPATIBILITY TEST ELECTRIC: CPT

## 2025-08-29 PROCEDURE — C9399: CPT

## 2025-08-29 PROCEDURE — 85025 COMPLETE CBC W/AUTO DIFF WBC: CPT

## 2025-08-29 PROCEDURE — 87640 STAPH A DNA AMP PROBE: CPT

## 2025-08-29 PROCEDURE — 85610 PROTHROMBIN TIME: CPT

## 2025-08-29 PROCEDURE — 85652 RBC SED RATE AUTOMATED: CPT

## 2025-08-29 PROCEDURE — 73552 X-RAY EXAM OF FEMUR 2/>: CPT

## 2025-08-29 PROCEDURE — 83735 ASSAY OF MAGNESIUM: CPT

## 2025-08-29 RX ORDER — POTASSIUM PHOSPHATE, MONOBASIC POTASSIUM PHOSPHATE, DIBASIC INJECTION, 236; 224 MG/ML; MG/ML
15 SOLUTION, CONCENTRATE INTRAVENOUS ONCE
Refills: 0 | Status: COMPLETED | OUTPATIENT
Start: 2025-08-29 | End: 2025-08-29

## 2025-08-29 RX ORDER — GABAPENTIN 400 MG/1
200 CAPSULE ORAL ONCE
Refills: 0 | Status: COMPLETED | OUTPATIENT
Start: 2025-08-29 | End: 2025-08-29

## 2025-08-29 RX ORDER — MUPIROCIN CALCIUM 20 MG/G
1 CREAM TOPICAL
Refills: 0 | Status: DISCONTINUED | OUTPATIENT
Start: 2025-08-29 | End: 2025-09-01

## 2025-08-29 RX ADMIN — MUPIROCIN CALCIUM 1 APPLICATION(S): 20 CREAM TOPICAL at 22:07

## 2025-08-29 RX ADMIN — OXYCODONE HYDROCHLORIDE 10 MILLIGRAM(S): 30 TABLET ORAL at 09:53

## 2025-08-29 RX ADMIN — GABAPENTIN 200 MILLIGRAM(S): 400 CAPSULE ORAL at 18:33

## 2025-08-29 RX ADMIN — POTASSIUM PHOSPHATE, MONOBASIC POTASSIUM PHOSPHATE, DIBASIC INJECTION, 62.5 MILLIMOLE(S): 236; 224 SOLUTION, CONCENTRATE INTRAVENOUS at 15:28

## 2025-08-29 RX ADMIN — Medication 650 MILLIGRAM(S): at 05:37

## 2025-08-29 RX ADMIN — Medication 100 MILLIGRAM(S): at 05:37

## 2025-08-29 RX ADMIN — Medication 81 MILLIGRAM(S): at 05:38

## 2025-08-29 RX ADMIN — INSULIN LISPRO 1: 100 INJECTION, SOLUTION INTRAVENOUS; SUBCUTANEOUS at 16:59

## 2025-08-29 RX ADMIN — Medication 100 MILLIGRAM(S): at 22:06

## 2025-08-29 RX ADMIN — OXYCODONE HYDROCHLORIDE 10 MILLIGRAM(S): 30 TABLET ORAL at 22:06

## 2025-08-29 RX ADMIN — Medication 650 MILLIGRAM(S): at 00:10

## 2025-08-29 RX ADMIN — OXYCODONE HYDROCHLORIDE 10 MILLIGRAM(S): 30 TABLET ORAL at 08:55

## 2025-08-29 RX ADMIN — Medication 81 MILLIGRAM(S): at 16:58

## 2025-08-29 RX ADMIN — Medication 650 MILLIGRAM(S): at 16:58

## 2025-08-29 RX ADMIN — Medication 100 MILLIGRAM(S): at 11:56

## 2025-08-29 RX ADMIN — OXYCODONE HYDROCHLORIDE 10 MILLIGRAM(S): 30 TABLET ORAL at 15:34

## 2025-08-29 RX ADMIN — OXYCODONE HYDROCHLORIDE 10 MILLIGRAM(S): 30 TABLET ORAL at 23:00

## 2025-08-29 RX ADMIN — Medication 100 MILLIGRAM(S): at 14:45

## 2025-08-29 RX ADMIN — Medication 650 MILLIGRAM(S): at 11:55

## 2025-08-29 RX ADMIN — Medication 650 MILLIGRAM(S): at 23:37

## 2025-08-29 RX ADMIN — FOLIC ACID 1 MILLIGRAM(S): 1 TABLET ORAL at 11:55

## 2025-08-29 RX ADMIN — OXYCODONE HYDROCHLORIDE 10 MILLIGRAM(S): 30 TABLET ORAL at 16:10

## 2025-08-29 RX ADMIN — Medication 1 TABLET(S): at 11:55

## 2025-08-30 LAB
ALBUMIN SERPL ELPH-MCNC: 2.1 G/DL — LOW (ref 3.3–5)
ALP SERPL-CCNC: 153 U/L — HIGH (ref 40–120)
ALT FLD-CCNC: 45 U/L — SIGNIFICANT CHANGE UP (ref 12–78)
ANION GAP SERPL CALC-SCNC: 5 MMOL/L — SIGNIFICANT CHANGE UP (ref 5–17)
AST SERPL-CCNC: 52 U/L — HIGH (ref 15–37)
BILIRUB SERPL-MCNC: 1.4 MG/DL — HIGH (ref 0.2–1.2)
BUN SERPL-MCNC: 11 MG/DL — SIGNIFICANT CHANGE UP (ref 7–23)
CALCIUM SERPL-MCNC: 9.6 MG/DL — SIGNIFICANT CHANGE UP (ref 8.5–10.1)
CHLORIDE SERPL-SCNC: 104 MMOL/L — SIGNIFICANT CHANGE UP (ref 96–108)
CO2 SERPL-SCNC: 29 MMOL/L — SIGNIFICANT CHANGE UP (ref 22–31)
CREAT SERPL-MCNC: 0.72 MG/DL — SIGNIFICANT CHANGE UP (ref 0.5–1.3)
CRP SERPL-MCNC: 56 MG/L — HIGH
EGFR: 100 ML/MIN/1.73M2 — SIGNIFICANT CHANGE UP
EGFR: 100 ML/MIN/1.73M2 — SIGNIFICANT CHANGE UP
GLUCOSE BLDC GLUCOMTR-MCNC: 141 MG/DL — HIGH (ref 70–99)
GLUCOSE BLDC GLUCOMTR-MCNC: 174 MG/DL — HIGH (ref 70–99)
GLUCOSE BLDC GLUCOMTR-MCNC: 186 MG/DL — HIGH (ref 70–99)
GLUCOSE BLDC GLUCOMTR-MCNC: 206 MG/DL — HIGH (ref 70–99)
GLUCOSE BLDC GLUCOMTR-MCNC: 88 MG/DL — SIGNIFICANT CHANGE UP (ref 70–99)
GLUCOSE SERPL-MCNC: 120 MG/DL — HIGH (ref 70–99)
HCT VFR BLD CALC: 25 % — LOW (ref 39–50)
HGB BLD-MCNC: 8.5 G/DL — LOW (ref 13–17)
IMMATURE PLATELET FRACTION #: 1 K/UL — LOW (ref 3.9–12.5)
IMMATURE PLATELET FRACTION %: 1.3 % — LOW (ref 1.6–7.1)
MCHC RBC-ENTMCNC: 34 G/DL — SIGNIFICANT CHANGE UP (ref 32–36)
MCHC RBC-ENTMCNC: 36.5 PG — HIGH (ref 27–34)
MCV RBC AUTO: 107.3 FL — HIGH (ref 80–100)
NRBC # BLD AUTO: 0 K/UL — SIGNIFICANT CHANGE UP (ref 0–0)
NRBC # FLD: 0 K/UL — SIGNIFICANT CHANGE UP (ref 0–0)
NRBC BLD AUTO-RTO: 0 /100 WBCS — SIGNIFICANT CHANGE UP (ref 0–0)
PLATELET # BLD AUTO: 80 K/UL — LOW (ref 150–400)
PMV BLD: 9.4 FL — SIGNIFICANT CHANGE UP (ref 7–13)
POTASSIUM SERPL-MCNC: 4.3 MMOL/L — SIGNIFICANT CHANGE UP (ref 3.5–5.3)
POTASSIUM SERPL-SCNC: 4.3 MMOL/L — SIGNIFICANT CHANGE UP (ref 3.5–5.3)
PROT SERPL-MCNC: 5.3 G/DL — LOW (ref 6–8.3)
RBC # BLD: 2.33 M/UL — LOW (ref 4.2–5.8)
RBC # FLD: 22.9 % — HIGH (ref 10.3–14.5)
SODIUM SERPL-SCNC: 138 MMOL/L — SIGNIFICANT CHANGE UP (ref 135–145)
WBC # BLD: 5.72 K/UL — SIGNIFICANT CHANGE UP (ref 3.8–10.5)
WBC # FLD AUTO: 5.72 K/UL — SIGNIFICANT CHANGE UP (ref 3.8–10.5)

## 2025-08-30 PROCEDURE — 99233 SBSQ HOSP IP/OBS HIGH 50: CPT

## 2025-08-30 RX ADMIN — Medication 650 MILLIGRAM(S): at 06:01

## 2025-08-30 RX ADMIN — OXYCODONE HYDROCHLORIDE 10 MILLIGRAM(S): 30 TABLET ORAL at 06:12

## 2025-08-30 RX ADMIN — Medication 650 MILLIGRAM(S): at 00:00

## 2025-08-30 RX ADMIN — Medication 1 TABLET(S): at 13:59

## 2025-08-30 RX ADMIN — Medication 81 MILLIGRAM(S): at 06:01

## 2025-08-30 RX ADMIN — Medication 650 MILLIGRAM(S): at 13:58

## 2025-08-30 RX ADMIN — FOLIC ACID 1 MILLIGRAM(S): 1 TABLET ORAL at 13:59

## 2025-08-30 RX ADMIN — INSULIN LISPRO 2: 100 INJECTION, SOLUTION INTRAVENOUS; SUBCUTANEOUS at 17:20

## 2025-08-30 RX ADMIN — INSULIN LISPRO 1: 100 INJECTION, SOLUTION INTRAVENOUS; SUBCUTANEOUS at 09:18

## 2025-08-30 RX ADMIN — OXYCODONE HYDROCHLORIDE 10 MILLIGRAM(S): 30 TABLET ORAL at 23:20

## 2025-08-30 RX ADMIN — Medication 100 MILLIGRAM(S): at 13:59

## 2025-08-30 RX ADMIN — Medication 650 MILLIGRAM(S): at 06:57

## 2025-08-30 RX ADMIN — MUPIROCIN CALCIUM 1 APPLICATION(S): 20 CREAM TOPICAL at 06:01

## 2025-08-30 RX ADMIN — OXYCODONE HYDROCHLORIDE 10 MILLIGRAM(S): 30 TABLET ORAL at 20:06

## 2025-08-30 RX ADMIN — MUPIROCIN CALCIUM 1 APPLICATION(S): 20 CREAM TOPICAL at 18:57

## 2025-08-30 RX ADMIN — Medication 100 MILLIGRAM(S): at 14:19

## 2025-08-30 RX ADMIN — OXYCODONE HYDROCHLORIDE 10 MILLIGRAM(S): 30 TABLET ORAL at 06:58

## 2025-08-30 RX ADMIN — Medication 100 MILLIGRAM(S): at 22:11

## 2025-08-30 RX ADMIN — Medication 81 MILLIGRAM(S): at 18:56

## 2025-08-30 RX ADMIN — Medication 100 MILLIGRAM(S): at 06:01

## 2025-08-30 RX ADMIN — OXYCODONE HYDROCHLORIDE 10 MILLIGRAM(S): 30 TABLET ORAL at 19:06

## 2025-08-31 LAB
ALBUMIN SERPL ELPH-MCNC: 2 G/DL — LOW (ref 3.3–5)
ALP SERPL-CCNC: 155 U/L — HIGH (ref 40–120)
ALT FLD-CCNC: 23 U/L — SIGNIFICANT CHANGE UP (ref 12–78)
ANION GAP SERPL CALC-SCNC: 3 MMOL/L — LOW (ref 5–17)
AST SERPL-CCNC: 54 U/L — HIGH (ref 15–37)
BILIRUB SERPL-MCNC: 1.5 MG/DL — HIGH (ref 0.2–1.2)
BUN SERPL-MCNC: 13 MG/DL — SIGNIFICANT CHANGE UP (ref 7–23)
CALCIUM SERPL-MCNC: 9.8 MG/DL — SIGNIFICANT CHANGE UP (ref 8.5–10.1)
CHLORIDE SERPL-SCNC: 107 MMOL/L — SIGNIFICANT CHANGE UP (ref 96–108)
CO2 SERPL-SCNC: 27 MMOL/L — SIGNIFICANT CHANGE UP (ref 22–31)
CREAT SERPL-MCNC: 0.65 MG/DL — SIGNIFICANT CHANGE UP (ref 0.5–1.3)
CULTURE RESULTS: ABNORMAL
EGFR: 103 ML/MIN/1.73M2 — SIGNIFICANT CHANGE UP
EGFR: 103 ML/MIN/1.73M2 — SIGNIFICANT CHANGE UP
GLUCOSE BLDC GLUCOMTR-MCNC: 136 MG/DL — HIGH (ref 70–99)
GLUCOSE BLDC GLUCOMTR-MCNC: 158 MG/DL — HIGH (ref 70–99)
GLUCOSE BLDC GLUCOMTR-MCNC: 166 MG/DL — HIGH (ref 70–99)
GLUCOSE BLDC GLUCOMTR-MCNC: 173 MG/DL — HIGH (ref 70–99)
GLUCOSE SERPL-MCNC: 139 MG/DL — HIGH (ref 70–99)
HCT VFR BLD CALC: 23.5 % — LOW (ref 39–50)
HCT VFR BLD CALC: 24.7 % — LOW (ref 39–50)
HGB BLD-MCNC: 8.3 G/DL — LOW (ref 13–17)
HGB BLD-MCNC: 8.6 G/DL — LOW (ref 13–17)
IMMATURE PLATELET FRACTION #: 1.4 K/UL — LOW (ref 3.9–12.5)
IMMATURE PLATELET FRACTION #: 2.1 K/UL — LOW (ref 3.9–12.5)
IMMATURE PLATELET FRACTION %: 1.9 % — SIGNIFICANT CHANGE UP (ref 1.6–7.1)
IMMATURE PLATELET FRACTION %: 3.6 % — SIGNIFICANT CHANGE UP (ref 1.6–7.1)
MCHC RBC-ENTMCNC: 34.8 G/DL — SIGNIFICANT CHANGE UP (ref 32–36)
MCHC RBC-ENTMCNC: 35.3 G/DL — SIGNIFICANT CHANGE UP (ref 32–36)
MCHC RBC-ENTMCNC: 37.4 PG — HIGH (ref 27–34)
MCHC RBC-ENTMCNC: 37.6 PG — HIGH (ref 27–34)
MCV RBC AUTO: 105.9 FL — HIGH (ref 80–100)
MCV RBC AUTO: 107.9 FL — HIGH (ref 80–100)
NRBC # BLD AUTO: 0 K/UL — SIGNIFICANT CHANGE UP (ref 0–0)
NRBC # BLD AUTO: 0 K/UL — SIGNIFICANT CHANGE UP (ref 0–0)
NRBC # FLD: 0 K/UL — SIGNIFICANT CHANGE UP (ref 0–0)
NRBC # FLD: 0 K/UL — SIGNIFICANT CHANGE UP (ref 0–0)
NRBC BLD AUTO-RTO: 0 /100 WBCS — SIGNIFICANT CHANGE UP (ref 0–0)
NRBC BLD AUTO-RTO: 0 /100 WBCS — SIGNIFICANT CHANGE UP (ref 0–0)
ORGANISM # SPEC MICROSCOPIC CNT: ABNORMAL
ORGANISM # SPEC MICROSCOPIC CNT: SIGNIFICANT CHANGE UP
PLATELET # BLD AUTO: 54 K/UL — LOW (ref 150–400)
PLATELET # BLD AUTO: 73 K/UL — LOW (ref 150–400)
PMV BLD: 10.4 FL — SIGNIFICANT CHANGE UP (ref 7–13)
PMV BLD: 9.3 FL — SIGNIFICANT CHANGE UP (ref 7–13)
POTASSIUM SERPL-MCNC: 4.1 MMOL/L — SIGNIFICANT CHANGE UP (ref 3.5–5.3)
POTASSIUM SERPL-SCNC: 4.1 MMOL/L — SIGNIFICANT CHANGE UP (ref 3.5–5.3)
PROT SERPL-MCNC: 5.3 G/DL — LOW (ref 6–8.3)
RBC # BLD: 2.22 M/UL — LOW (ref 4.2–5.8)
RBC # BLD: 2.29 M/UL — LOW (ref 4.2–5.8)
RBC # FLD: 22.6 % — HIGH (ref 10.3–14.5)
RBC # FLD: 22.9 % — HIGH (ref 10.3–14.5)
SODIUM SERPL-SCNC: 137 MMOL/L — SIGNIFICANT CHANGE UP (ref 135–145)
SPECIMEN SOURCE: SIGNIFICANT CHANGE UP
WBC # BLD: 4.91 K/UL — SIGNIFICANT CHANGE UP (ref 3.8–10.5)
WBC # BLD: 4.95 K/UL — SIGNIFICANT CHANGE UP (ref 3.8–10.5)
WBC # FLD AUTO: 4.91 K/UL — SIGNIFICANT CHANGE UP (ref 3.8–10.5)
WBC # FLD AUTO: 4.95 K/UL — SIGNIFICANT CHANGE UP (ref 3.8–10.5)

## 2025-08-31 PROCEDURE — 99233 SBSQ HOSP IP/OBS HIGH 50: CPT

## 2025-08-31 RX ORDER — B1/B2/B3/B5/B6/B12/VIT C/FOLIC 500-0.5 MG
1 TABLET ORAL
Qty: 0 | Refills: 0 | DISCHARGE
Start: 2025-08-31

## 2025-08-31 RX ORDER — SENNA 187 MG
2 TABLET ORAL
Qty: 0 | Refills: 0 | DISCHARGE
Start: 2025-08-31

## 2025-08-31 RX ORDER — POLYETHYLENE GLYCOL 3350 17 G/17G
17 POWDER, FOR SOLUTION ORAL
Qty: 0 | Refills: 0 | DISCHARGE
Start: 2025-08-31

## 2025-08-31 RX ORDER — ASPIRIN 325 MG
1 TABLET ORAL
Qty: 0 | Refills: 0 | DISCHARGE
Start: 2025-08-31

## 2025-08-31 RX ORDER — OXYCODONE HYDROCHLORIDE 30 MG/1
1 TABLET ORAL
Qty: 0 | Refills: 0 | DISCHARGE
Start: 2025-08-31

## 2025-08-31 RX ORDER — TRAMADOL HYDROCHLORIDE 50 MG/1
1 TABLET, FILM COATED ORAL
Qty: 0 | Refills: 0 | DISCHARGE
Start: 2025-08-31

## 2025-08-31 RX ORDER — CEPHALEXIN 250 MG/1
1 CAPSULE ORAL
Qty: 0 | Refills: 0 | DISCHARGE

## 2025-08-31 RX ADMIN — INSULIN LISPRO 1: 100 INJECTION, SOLUTION INTRAVENOUS; SUBCUTANEOUS at 16:45

## 2025-08-31 RX ADMIN — OXYCODONE HYDROCHLORIDE 10 MILLIGRAM(S): 30 TABLET ORAL at 22:20

## 2025-08-31 RX ADMIN — FOLIC ACID 1 MILLIGRAM(S): 1 TABLET ORAL at 11:39

## 2025-08-31 RX ADMIN — INSULIN LISPRO 1: 100 INJECTION, SOLUTION INTRAVENOUS; SUBCUTANEOUS at 11:38

## 2025-08-31 RX ADMIN — MUPIROCIN CALCIUM 1 APPLICATION(S): 20 CREAM TOPICAL at 18:36

## 2025-08-31 RX ADMIN — Medication 100 MILLIGRAM(S): at 11:39

## 2025-08-31 RX ADMIN — Medication 650 MILLIGRAM(S): at 18:36

## 2025-08-31 RX ADMIN — MUPIROCIN CALCIUM 1 APPLICATION(S): 20 CREAM TOPICAL at 05:17

## 2025-08-31 RX ADMIN — Medication 1 TABLET(S): at 11:39

## 2025-08-31 RX ADMIN — Medication 100 MILLIGRAM(S): at 14:06

## 2025-08-31 RX ADMIN — Medication 81 MILLIGRAM(S): at 18:36

## 2025-08-31 RX ADMIN — OXYCODONE HYDROCHLORIDE 10 MILLIGRAM(S): 30 TABLET ORAL at 02:59

## 2025-08-31 RX ADMIN — OXYCODONE HYDROCHLORIDE 10 MILLIGRAM(S): 30 TABLET ORAL at 01:59

## 2025-08-31 RX ADMIN — Medication 81 MILLIGRAM(S): at 05:17

## 2025-08-31 RX ADMIN — Medication 650 MILLIGRAM(S): at 05:17

## 2025-08-31 RX ADMIN — Medication 100 MILLIGRAM(S): at 21:38

## 2025-08-31 RX ADMIN — Medication 100 MILLIGRAM(S): at 05:16

## 2025-08-31 RX ADMIN — OXYCODONE HYDROCHLORIDE 10 MILLIGRAM(S): 30 TABLET ORAL at 09:06

## 2025-08-31 RX ADMIN — OXYCODONE HYDROCHLORIDE 10 MILLIGRAM(S): 30 TABLET ORAL at 08:06

## 2025-08-31 RX ADMIN — Medication 650 MILLIGRAM(S): at 11:39

## 2025-09-01 ENCOUNTER — TRANSCRIPTION ENCOUNTER (OUTPATIENT)
Age: 70
End: 2025-09-01

## 2025-09-01 VITALS
HEART RATE: 111 BPM | SYSTOLIC BLOOD PRESSURE: 122 MMHG | RESPIRATION RATE: 20 BRPM | TEMPERATURE: 98 F | DIASTOLIC BLOOD PRESSURE: 70 MMHG | OXYGEN SATURATION: 98 %

## 2025-09-01 LAB
CRP SERPL-MCNC: 58 MG/L — HIGH
GLUCOSE BLDC GLUCOMTR-MCNC: 133 MG/DL — HIGH (ref 70–99)
GLUCOSE BLDC GLUCOMTR-MCNC: 179 MG/DL — HIGH (ref 70–99)

## 2025-09-01 PROCEDURE — 97162 PT EVAL MOD COMPLEX 30 MIN: CPT

## 2025-09-01 PROCEDURE — 76376 3D RENDER W/INTRP POSTPROCES: CPT

## 2025-09-01 PROCEDURE — 70450 CT HEAD/BRAIN W/O DYE: CPT

## 2025-09-01 PROCEDURE — 83735 ASSAY OF MAGNESIUM: CPT

## 2025-09-01 PROCEDURE — C1713: CPT

## 2025-09-01 PROCEDURE — 80048 BASIC METABOLIC PNL TOTAL CA: CPT

## 2025-09-01 PROCEDURE — 73030 X-RAY EXAM OF SHOULDER: CPT

## 2025-09-01 PROCEDURE — 83540 ASSAY OF IRON: CPT

## 2025-09-01 PROCEDURE — 80061 LIPID PANEL: CPT

## 2025-09-01 PROCEDURE — 97535 SELF CARE MNGMENT TRAINING: CPT

## 2025-09-01 PROCEDURE — P9040: CPT

## 2025-09-01 PROCEDURE — 85652 RBC SED RATE AUTOMATED: CPT

## 2025-09-01 PROCEDURE — 86900 BLOOD TYPING SEROLOGIC ABO: CPT

## 2025-09-01 PROCEDURE — 73502 X-RAY EXAM HIP UNI 2-3 VIEWS: CPT

## 2025-09-01 PROCEDURE — 84100 ASSAY OF PHOSPHORUS: CPT

## 2025-09-01 PROCEDURE — 86923 COMPATIBILITY TEST ELECTRIC: CPT

## 2025-09-01 PROCEDURE — 82728 ASSAY OF FERRITIN: CPT

## 2025-09-01 PROCEDURE — 85027 COMPLETE CBC AUTOMATED: CPT

## 2025-09-01 PROCEDURE — 80053 COMPREHEN METABOLIC PANEL: CPT

## 2025-09-01 PROCEDURE — 72192 CT PELVIS W/O DYE: CPT

## 2025-09-01 PROCEDURE — 87641 MR-STAPH DNA AMP PROBE: CPT

## 2025-09-01 PROCEDURE — 71045 X-RAY EXAM CHEST 1 VIEW: CPT

## 2025-09-01 PROCEDURE — 93005 ELECTROCARDIOGRAM TRACING: CPT

## 2025-09-01 PROCEDURE — 36415 COLL VENOUS BLD VENIPUNCTURE: CPT

## 2025-09-01 PROCEDURE — 82607 VITAMIN B-12: CPT

## 2025-09-01 PROCEDURE — 97116 GAIT TRAINING THERAPY: CPT

## 2025-09-01 PROCEDURE — 96374 THER/PROPH/DIAG INJ IV PUSH: CPT

## 2025-09-01 PROCEDURE — 83036 HEMOGLOBIN GLYCOSYLATED A1C: CPT

## 2025-09-01 PROCEDURE — 82746 ASSAY OF FOLIC ACID SERUM: CPT

## 2025-09-01 PROCEDURE — 97530 THERAPEUTIC ACTIVITIES: CPT

## 2025-09-01 PROCEDURE — 97110 THERAPEUTIC EXERCISES: CPT

## 2025-09-01 PROCEDURE — P9016: CPT

## 2025-09-01 PROCEDURE — 73721 MRI JNT OF LWR EXTRE W/O DYE: CPT

## 2025-09-01 PROCEDURE — 85610 PROTHROMBIN TIME: CPT

## 2025-09-01 PROCEDURE — 82962 GLUCOSE BLOOD TEST: CPT

## 2025-09-01 PROCEDURE — C9399: CPT

## 2025-09-01 PROCEDURE — 87640 STAPH A DNA AMP PROBE: CPT

## 2025-09-01 PROCEDURE — 36430 TRANSFUSION BLD/BLD COMPNT: CPT

## 2025-09-01 PROCEDURE — 73060 X-RAY EXAM OF HUMERUS: CPT

## 2025-09-01 PROCEDURE — 86901 BLOOD TYPING SEROLOGIC RH(D): CPT

## 2025-09-01 PROCEDURE — 73562 X-RAY EXAM OF KNEE 3: CPT

## 2025-09-01 PROCEDURE — 86140 C-REACTIVE PROTEIN: CPT

## 2025-09-01 PROCEDURE — 85025 COMPLETE CBC W/AUTO DIFF WBC: CPT

## 2025-09-01 PROCEDURE — 76000 FLUOROSCOPY <1 HR PHYS/QHP: CPT

## 2025-09-01 PROCEDURE — 87186 SC STD MICRODIL/AGAR DIL: CPT

## 2025-09-01 PROCEDURE — 97166 OT EVAL MOD COMPLEX 45 MIN: CPT

## 2025-09-01 PROCEDURE — 73552 X-RAY EXAM OF FEMUR 2/>: CPT

## 2025-09-01 PROCEDURE — 85014 HEMATOCRIT: CPT

## 2025-09-01 PROCEDURE — 85730 THROMBOPLASTIN TIME PARTIAL: CPT

## 2025-09-01 PROCEDURE — 85018 HEMOGLOBIN: CPT

## 2025-09-01 PROCEDURE — 99285 EMERGENCY DEPT VISIT HI MDM: CPT | Mod: 25

## 2025-09-01 PROCEDURE — 80074 ACUTE HEPATITIS PANEL: CPT

## 2025-09-01 PROCEDURE — 99239 HOSP IP/OBS DSCHRG MGMT >30: CPT

## 2025-09-01 PROCEDURE — 86850 RBC ANTIBODY SCREEN: CPT

## 2025-09-01 PROCEDURE — 73080 X-RAY EXAM OF ELBOW: CPT

## 2025-09-01 PROCEDURE — 83550 IRON BINDING TEST: CPT

## 2025-09-01 PROCEDURE — 87070 CULTURE OTHR SPECIMN AEROBIC: CPT

## 2025-09-01 RX ORDER — CEPHALEXIN 250 MG/1
500 CAPSULE ORAL
Refills: 0 | Status: DISCONTINUED | OUTPATIENT
Start: 2025-09-01 | End: 2025-09-01

## 2025-09-01 RX ADMIN — OXYCODONE HYDROCHLORIDE 10 MILLIGRAM(S): 30 TABLET ORAL at 11:30

## 2025-09-01 RX ADMIN — Medication 650 MILLIGRAM(S): at 05:25

## 2025-09-01 RX ADMIN — INSULIN LISPRO 1: 100 INJECTION, SOLUTION INTRAVENOUS; SUBCUTANEOUS at 11:51

## 2025-09-01 RX ADMIN — CEPHALEXIN 500 MILLIGRAM(S): 250 CAPSULE ORAL at 11:53

## 2025-09-01 RX ADMIN — Medication 100 MILLIGRAM(S): at 05:27

## 2025-09-01 RX ADMIN — OXYCODONE HYDROCHLORIDE 10 MILLIGRAM(S): 30 TABLET ORAL at 10:37

## 2025-09-01 RX ADMIN — Medication 650 MILLIGRAM(S): at 11:52

## 2025-09-01 RX ADMIN — Medication 100 MILLIGRAM(S): at 11:53

## 2025-09-01 RX ADMIN — Medication 1 TABLET(S): at 11:53

## 2025-09-01 RX ADMIN — Medication 81 MILLIGRAM(S): at 05:27

## 2025-09-01 RX ADMIN — FOLIC ACID 1 MILLIGRAM(S): 1 TABLET ORAL at 11:52

## 2025-09-01 RX ADMIN — MUPIROCIN CALCIUM 1 APPLICATION(S): 20 CREAM TOPICAL at 05:25

## (undated) DEVICE — DRAPE TOWEL BLUE 17" X 24"

## (undated) DEVICE — DRSG MEPILEX 10 X 10CM (4 X 4") AG

## (undated) DEVICE — DRSG OPSITE 2.5 X 2"

## (undated) DEVICE — SUT VICRYL 0 36" CTX UNDYED

## (undated) DEVICE — MULTIPLE PERFUSION SET FEMALE 1 INLET LEG W 4 LEGS 15" (BLUE/RED)

## (undated) DEVICE — SUT MONOCRYL 4-0 27" PS-2 UNDYED

## (undated) DEVICE — SOL IRR POUR NS 0.9% 500ML

## (undated) DEVICE — BEAVER BLADE MINI SHARP ALL ROUND (BLUE)

## (undated) DEVICE — PHRENIC NERVE PAD MEDIUM

## (undated) DEVICE — GOWN TRIMAX LG

## (undated) DEVICE — CLEANER FOR ELCTR TIP

## (undated) DEVICE — GLV 6.5 PROTEXIS (WHITE)

## (undated) DEVICE — MARKING PEN W RULER

## (undated) DEVICE — DRSG COBAN 6"

## (undated) DEVICE — FORCEP RADIAL JAW 4 W NDL 2.4MM 2.8MM 240CM ORANGE DISP

## (undated) DEVICE — UNDERPAD LINEN SAVER 23 X 36"

## (undated) DEVICE — PACK VALVE

## (undated) DEVICE — LAP PAD 18 X 18"

## (undated) DEVICE — SET BLOOD Y-TYPE

## (undated) DEVICE — AORTIC PUNCH 4.4MM LONG HANDLE

## (undated) DEVICE — SUT VICRYL 0 36" CT-1 UNDYED

## (undated) DEVICE — DRAPE TOWEL WHITE 17" X 24"

## (undated) DEVICE — Device

## (undated) DEVICE — GOWN IMPERV XL

## (undated) DEVICE — TUBING TRUWAVE PRESSURE MALE/FEMALE 72"

## (undated) DEVICE — DRSG COMBINE 5 X 9"

## (undated) DEVICE — SUT PROLENE 7-0 30" BV-1

## (undated) DEVICE — SUT PLEDGET 9MM X 4MM X 1.5MM

## (undated) DEVICE — FORCEP RADIAL JAW 4 JUMBO W NDL 2.8MM 3.2MM 240CM ORANGE DISP

## (undated) DEVICE — SUT SILK 0 30" TIES

## (undated) DEVICE — TUBING INSUFFLATION LAP FILTER 10FT

## (undated) DEVICE — SOL IRR BAG H2O 1000ML

## (undated) DEVICE — TRAP E POLY

## (undated) DEVICE — GETINGE VASOVIEW 7 ENDOSCOPIC VESSEL HARVESTING SYSTEM

## (undated) DEVICE — SUT VICRYL 2-0 27" CT-1 UNDYED

## (undated) DEVICE — DRAPE C ARM UNIVERSAL

## (undated) DEVICE — DRILL BIT STRYKER ORTHO LOKG 4.2X360MM

## (undated) DEVICE — SUCTION YANKAUER NO CONTROL VENT

## (undated) DEVICE — SUCTION TUBE CARDIAC FRAZIER 6FR SHAFT 3"

## (undated) DEVICE — BLADE SURGICAL #15 CARBON

## (undated) DEVICE — PLV-STRYKER SYSTEM 8: Type: DURABLE MEDICAL EQUIPMENT

## (undated) DEVICE — SUT PROLENE 7-0 24" BV175-6

## (undated) DEVICE — VALVE ENDOSCOPE DEFENDO SINGLE USE

## (undated) DEVICE — TRAY IRRIGATION SYR BULB 60CC

## (undated) DEVICE — DRAPE IOBAN 33" X 23"

## (undated) DEVICE — GLV 8 PROTEXIS (WHITE)

## (undated) DEVICE — SUT SILK 5-0 60" TIES

## (undated) DEVICE — TAPE UMBILICAL 1/8 X 30" STRANDS

## (undated) DEVICE — SAW BLADE STRYKER STERNUM 31MM X 6.27 X .79

## (undated) DEVICE — AORTIC PUNCH 5MM STANDARD HANDLE

## (undated) DEVICE — SUT DOUBLE 6 WIRE STERNAL

## (undated) DEVICE — STEALTH CLAMP INSERT FIBRA/FIBRA 60MM

## (undated) DEVICE — NDL COUNTER FOAM AND MAGNET 40-70

## (undated) DEVICE — SUT VICRYL 1 36" CTX UNDYED

## (undated) DEVICE — CATH IV SAFE BC 22G X 1" (BLUE)

## (undated) DEVICE — PACK OPEN HEART VAMP PLUS

## (undated) DEVICE — TUBING ALARIS PUMP MODULE NON-DEHP

## (undated) DEVICE — SOL IRR POUR NS 0.9% 1000ML

## (undated) DEVICE — SUT STAINLESS STEEL 5 18" SCC

## (undated) DEVICE — CHEST DRAIN PLEUR-EVAC DRY/WET ADULT-PEDS SINGLE (QUICK)

## (undated) DEVICE — PRESSURE INFUSOR BAG 1000ML

## (undated) DEVICE — PACK IV START WITH CHG

## (undated) DEVICE — CONNECTOR STRAIGHT 3/8 X 1/2"

## (undated) DEVICE — SENSOR MYOCARDIAL TEMP 15MM

## (undated) DEVICE — SUT PROLENE 6-0 30" C-1

## (undated) DEVICE — DRSG MEPILEX 10 X 30CM (4 X 12") AG

## (undated) DEVICE — SYR SLIP 10CC

## (undated) DEVICE — PREP CHLORAPREP HI-LITE ORANGE 26ML

## (undated) DEVICE — PACK HIP FRACTURE

## (undated) DEVICE — SPONGE PEANUT AUTO COUNT

## (undated) DEVICE — SUT PROLENE 4-0 36" SH

## (undated) DEVICE — DRAPE SLUSH / WARMER 44 X 66"

## (undated) DEVICE — SUT SILK 2-0 18" SH (POP-OFF)

## (undated) DEVICE — FOLEY TRAY 16FR 5CC LF LUBRISIL ADVANCE TEMP CLOSED

## (undated) DEVICE — PRESSURE INFUSOR BAG 500ML

## (undated) DEVICE — GLV 7 PROTEXIS (BLUE)

## (undated) DEVICE — SOL BAG NS 0.9% 1000ML

## (undated) DEVICE — TOURNIQUET SET 12FR (1 RED, 1 BLUE, 1 SNARE) 7"

## (undated) DEVICE — TOURNIQUET SET TOURNIKWIK 12FR (4 TUBES, 1 SNARE) 7.5"

## (undated) DEVICE — SUT PERMAHAND SILK 2 60" TIES

## (undated) DEVICE — BULLDOG SPRING CLIP 6MM SOFT/SOFT

## (undated) DEVICE — VENTING ADAPTER "Y" (RED/BLUE) 7.5"

## (undated) DEVICE — SNARE POLYP HEXAGONAL SM 13X2.4X240

## (undated) DEVICE — MASK PROC EAR LOOP

## (undated) DEVICE — STABILIZER HAND ASSISTANT ATTACHMENT W STABLESOFT 2S

## (undated) DEVICE — VENODYNE/SCD SLEEVE CALF MEDIUM

## (undated) DEVICE — SENSOR O2 FINGER ADULT

## (undated) DEVICE — ELCTR BOVIE PENCIL HANDPIECE ROCKER SWITCH 15FT

## (undated) DEVICE — STAPLER SKIN PROXIMATE

## (undated) DEVICE — DRAPE INSTRUMENT POUCH 6.75" X 11"

## (undated) DEVICE — SUT BOOT STANDARD (ASSORTED) 5 PAIR

## (undated) DEVICE — SYR LUER SLIP TIP 50CC

## (undated) DEVICE — GLV 8.5 PROTEXIS (WHITE)

## (undated) DEVICE — PLV-SCD MACHINE: Type: DURABLE MEDICAL EQUIPMENT

## (undated) DEVICE — SUT PROLENE 4-0 36" RB-1

## (undated) DEVICE — SOL INJ NS 0.9% 1000ML

## (undated) DEVICE — VISITEC 4X4

## (undated) DEVICE — DRSG TEGADERM 4X4.75"

## (undated) DEVICE — ELCTR GROUNDING PAD ADULT COVIDIEN

## (undated) DEVICE — PACING CABLE A/V TEMP SCREW DOWN 6FT

## (undated) DEVICE — SUT SOFSILK 4-0 24" CV-15

## (undated) DEVICE — SYNOVIS VASCULAR PROBE 1.5MM 15CM

## (undated) DEVICE — DRSG CURITY GAUZE SPONGE 4 X 4" 12-PLY

## (undated) DEVICE — NDL BLUNT 18G LOOP VESSEL MAXI WHITE

## (undated) DEVICE — SUT POLYSORB 2-0 30" GS-11 UNDYED

## (undated) DEVICE — SUT VICRYL 0 54" REEL UNDYED

## (undated) DEVICE — NDL HYPO SAFE 18G X 1.5" (PINK)

## (undated) DEVICE — MAXI-FLO SUCTION CATHETER KIT 14FR STRAIGHT

## (undated) DEVICE — POLY TRAP ETRAP

## (undated) DEVICE — DENTURE CUP PINK

## (undated) DEVICE — GLV 6.5 PROTEXIS (BLUE)

## (undated) DEVICE — PREP SCRUB BRUSH W CHG 4%

## (undated) DEVICE — SUT VICRYL 3-0 27" CT-1

## (undated) DEVICE — SNARE EXACTO COLD 9MMX230CM

## (undated) DEVICE — TUBING SUCTION 20FT

## (undated) DEVICE — STOPCOCK 3 WAY W SWIVEL MALE LUER LOCK

## (undated) DEVICE — GLV 6 PROTEXIS (WHITE)

## (undated) DEVICE — DRSG OPSITE 13.75 X 4"

## (undated) DEVICE — VENODYNE/SCD SLEEVE CALF LARGE

## (undated) DEVICE — DRSG ALLEVYN LIFE SACRUM 6.75 X 6.75 (PINK)

## (undated) DEVICE — SWITCH ARISS TABLE MOUNT UNEQUAL LEGS 13"

## (undated) DEVICE — DRSG 2X2

## (undated) DEVICE — MEDICATION LABELS W MARKER

## (undated) DEVICE — SUT SILK 2 30" MH

## (undated) DEVICE — TUBING MEDI-VAC W MAXIGRIP CONNECTORS 1/4"X6'

## (undated) DEVICE — TUBING IV EXTENSION MACRO W CLAVE 7"

## (undated) DEVICE — SUT POLYSORB 0 30" GS-12 UNDYED

## (undated) DEVICE — DRAPE 3/4 SHEET 52X76"

## (undated) DEVICE — TONGUE DEPRESSOR

## (undated) DEVICE — GLV 7.5 PROTEXIS (WHITE)

## (undated) DEVICE — POSITIONER CARDIAC BUMP

## (undated) DEVICE — DRSG TAPE TRANSPORE 3"

## (undated) DEVICE — SUT PROLENE 3-0 36" SH-1

## (undated) DEVICE — STOPCOCK 3 WAY W TUBE 35"

## (undated) DEVICE — SOL ANTI FOG

## (undated) DEVICE — SYR IV FLUSH SALINE 10ML 30/TY

## (undated) DEVICE — LIGATING CLIPS AESCULAP LARGE 6

## (undated) DEVICE — DRSG CURITY GAUZE SPONGE 4 X 4" 12-PLY NON-STERILE

## (undated) DEVICE — SUT SILK 0 30" SH

## (undated) DEVICE — SUT PROLENE 4-0 30" SH-1

## (undated) DEVICE — TUBING IV SET SECOND 34" W/O LOK-BLUNT

## (undated) DEVICE — VESSEL LOOP ASPEN MAXI RED

## (undated) DEVICE — SOL IRR POUR H2O 500ML

## (undated) DEVICE — PACING CABLE (BLUE) ATRIAL TEMP SCREW DOWN 12FT

## (undated) DEVICE — SUT VICRYL 2 27" TP-1 UNDYED

## (undated) DEVICE — MEDTRONIC CLEARVIEW BLOWER MISTER KIT W TUBING SET

## (undated) DEVICE — SUT ETHIBOND 1 30" OS6

## (undated) DEVICE — CATH IV SAFE BC 24G X 0.75" (YELLOW)

## (undated) DEVICE — WARMING BLANKET DUO-THERM HYPER/HYPOTHERM ADULT

## (undated) DEVICE — WARMING BLANKET UPPER ADULT

## (undated) DEVICE — LIGATING CLIPS AESCULAP MEDIUM BLUE 24

## (undated) DEVICE — SYR LUER LOK 20CC

## (undated) DEVICE — GLV 7 PROTEXIS (WHITE)

## (undated) DEVICE — DRSG KLING 4"

## (undated) DEVICE — SYS VEIN HARVESTING VIRTUOSAPH PLUS W/ RADIAL